# Patient Record
Sex: MALE | Race: BLACK OR AFRICAN AMERICAN | NOT HISPANIC OR LATINO | Employment: OTHER | URBAN - METROPOLITAN AREA
[De-identification: names, ages, dates, MRNs, and addresses within clinical notes are randomized per-mention and may not be internally consistent; named-entity substitution may affect disease eponyms.]

---

## 2017-05-28 ENCOUNTER — APPOINTMENT (OUTPATIENT)
Dept: GENERAL RADIOLOGY | Facility: HOSPITAL | Age: 77
End: 2017-05-28

## 2017-05-28 ENCOUNTER — APPOINTMENT (OUTPATIENT)
Dept: CT IMAGING | Facility: HOSPITAL | Age: 77
End: 2017-05-28

## 2017-05-28 ENCOUNTER — APPOINTMENT (OUTPATIENT)
Dept: MRI IMAGING | Facility: HOSPITAL | Age: 77
End: 2017-05-28

## 2017-05-28 ENCOUNTER — HOSPITAL ENCOUNTER (OUTPATIENT)
Facility: HOSPITAL | Age: 77
Setting detail: OBSERVATION
Discharge: HOME | End: 2017-05-30
Attending: EMERGENCY MEDICINE | Admitting: FAMILY MEDICINE

## 2017-05-28 DIAGNOSIS — R10.10 UPPER ABDOMINAL PAIN: ICD-10-CM

## 2017-05-28 DIAGNOSIS — M88.9 PAGET DISEASE OF BONE: Chronic | ICD-10-CM

## 2017-05-28 DIAGNOSIS — I16.0 HYPERTENSIVE URGENCY, MALIGNANT: ICD-10-CM

## 2017-05-28 DIAGNOSIS — N28.1 COMPLEX RENAL CYST: ICD-10-CM

## 2017-05-28 DIAGNOSIS — Z74.09 IMPAIRED MOBILITY AND ADLS: ICD-10-CM

## 2017-05-28 DIAGNOSIS — Z78.9 IMPAIRED MOBILITY AND ADLS: ICD-10-CM

## 2017-05-28 DIAGNOSIS — G93.41 ACUTE METABOLIC ENCEPHALOPATHY: Primary | ICD-10-CM

## 2017-05-28 PROBLEM — R41.82 ALTERED MENTAL STATUS, UNSPECIFIED: Status: ACTIVE | Noted: 2017-05-28

## 2017-05-28 PROBLEM — E78.5 HYPERLIPIDEMIA: Chronic | Status: ACTIVE | Noted: 2017-05-28

## 2017-05-28 PROBLEM — K57.90 DIVERTICULOSIS: Chronic | Status: ACTIVE | Noted: 2017-05-28

## 2017-05-28 PROBLEM — Z72.0 TOBACCO ABUSE: Chronic | Status: ACTIVE | Noted: 2017-05-28

## 2017-05-28 PROBLEM — D75.839 THROMBOCYTHEMIA: Status: ACTIVE | Noted: 2017-05-28

## 2017-05-28 PROBLEM — D72.819 LEUKOPENIA: Status: ACTIVE | Noted: 2017-05-28

## 2017-05-28 PROBLEM — I10 HYPERTENSION: Chronic | Status: ACTIVE | Noted: 2017-05-28

## 2017-05-28 PROBLEM — D72.819 CHRONIC LEUKOPENIA: Chronic | Status: ACTIVE | Noted: 2017-05-28

## 2017-05-28 PROBLEM — D69.3 CHRONIC IDIOPATHIC THROMBOCYTOPENIA: Chronic | Status: ACTIVE | Noted: 2017-05-28

## 2017-05-28 PROBLEM — R10.84 GENERALIZED ABDOMINAL PAIN: Status: ACTIVE | Noted: 2017-05-28

## 2017-05-28 PROBLEM — E11.9 DIABETES MELLITUS (HCC): Chronic | Status: ACTIVE | Noted: 2017-05-28

## 2017-05-28 LAB
ALBUMIN SERPL-MCNC: 4 G/DL (ref 3.2–4.8)
ALBUMIN/GLOB SERPL: 1.2 G/DL (ref 1.5–2.5)
ALP SERPL-CCNC: 63 U/L (ref 25–100)
ALT SERPL W P-5'-P-CCNC: 9 U/L (ref 7–40)
AMMONIA BLD-SCNC: 17 UMOL/L (ref 19–60)
ANION GAP SERPL CALCULATED.3IONS-SCNC: 0 MMOL/L (ref 3–11)
AST SERPL-CCNC: 17 U/L (ref 0–33)
BASOPHILS # BLD AUTO: 0.01 10*3/MM3 (ref 0–0.2)
BASOPHILS NFR BLD AUTO: 0.3 % (ref 0–1)
BILIRUB SERPL-MCNC: 0.5 MG/DL (ref 0.3–1.2)
BILIRUB UR QL STRIP: NEGATIVE
BUN BLD-MCNC: 13 MG/DL (ref 9–23)
BUN BLDA-MCNC: 17 MG/DL (ref 8–26)
BUN/CREAT SERPL: 14.4 (ref 7–25)
CA-I BLDA-SCNC: 1.26 MMOL/L (ref 1.2–1.32)
CALCIUM SPEC-SCNC: 9.5 MG/DL (ref 8.7–10.4)
CHLORIDE BLDA-SCNC: 110 MMOL/L (ref 98–109)
CHLORIDE SERPL-SCNC: 112 MMOL/L (ref 99–109)
CLARITY UR: CLEAR
CO2 BLDA-SCNC: 23 MMOL/L (ref 24–29)
CO2 SERPL-SCNC: 28 MMOL/L (ref 20–31)
COLOR UR: YELLOW
CREAT BLD-MCNC: 0.9 MG/DL (ref 0.6–1.3)
CREAT BLDA-MCNC: 0.9 MG/DL (ref 0.6–1.3)
D-LACTATE SERPL-SCNC: 1.1 MMOL/L (ref 0.5–2)
DEPRECATED RDW RBC AUTO: 50.6 FL (ref 37–54)
EOSINOPHIL # BLD AUTO: 0.06 10*3/MM3 (ref 0.1–0.3)
EOSINOPHIL NFR BLD AUTO: 2 % (ref 0–3)
ERYTHROCYTE [DISTWIDTH] IN BLOOD BY AUTOMATED COUNT: 15.5 % (ref 11.3–14.5)
GFR SERPL CREATININE-BSD FRML MDRD: 99 ML/MIN/1.73
GLOBULIN UR ELPH-MCNC: 3.3 GM/DL
GLUCOSE BLD-MCNC: 112 MG/DL (ref 70–100)
GLUCOSE BLDC GLUCOMTR-MCNC: 100 MG/DL (ref 70–130)
GLUCOSE BLDC GLUCOMTR-MCNC: 110 MG/DL (ref 70–130)
GLUCOSE UR STRIP-MCNC: NEGATIVE MG/DL
HCT VFR BLD AUTO: 45.1 % (ref 38.9–50.9)
HCT VFR BLDA CALC: 45 % (ref 38–51)
HGB BLD-MCNC: 14.4 G/DL (ref 13.1–17.5)
HGB BLDA-MCNC: 15.3 G/DL (ref 12–17)
HGB UR QL STRIP.AUTO: NEGATIVE
HOLD SPECIMEN: NORMAL
HOLD SPECIMEN: NORMAL
IMM GRANULOCYTES # BLD: 0 10*3/MM3 (ref 0–0.03)
IMM GRANULOCYTES NFR BLD: 0 % (ref 0–0.6)
INR PPP: 0.9 (ref 0.8–1.2)
KETONES UR QL STRIP: NEGATIVE
LEUKOCYTE ESTERASE UR QL STRIP.AUTO: NEGATIVE
LYMPHOCYTES # BLD AUTO: 0.96 10*3/MM3 (ref 0.6–4.8)
LYMPHOCYTES NFR BLD AUTO: 32.8 % (ref 24–44)
MCH RBC QN AUTO: 28.2 PG (ref 27–31)
MCHC RBC AUTO-ENTMCNC: 31.9 G/DL (ref 32–36)
MCV RBC AUTO: 88.4 FL (ref 80–99)
MONOCYTES # BLD AUTO: 0.23 10*3/MM3 (ref 0–1)
MONOCYTES NFR BLD AUTO: 7.8 % (ref 0–12)
NEUTROPHILS # BLD AUTO: 1.67 10*3/MM3 (ref 1.5–8.3)
NEUTROPHILS NFR BLD AUTO: 57.1 % (ref 41–71)
NITRITE UR QL STRIP: NEGATIVE
PH UR STRIP.AUTO: <=5 [PH] (ref 5–8)
PLATELET # BLD AUTO: 125 10*3/MM3 (ref 150–450)
PMV BLD AUTO: 10.9 FL (ref 6–12)
POTASSIUM BLD-SCNC: 4.4 MMOL/L (ref 3.5–5.5)
POTASSIUM BLDA-SCNC: 4.2 MMOL/L (ref 3.5–4.9)
PROT SERPL-MCNC: 7.3 G/DL (ref 5.7–8.2)
PROT UR QL STRIP: NEGATIVE
PROTHROMBIN TIME: 11.4 SECONDS (ref 12.8–15.2)
RBC # BLD AUTO: 5.1 10*6/MM3 (ref 4.2–5.76)
SODIUM BLD-SCNC: 140 MMOL/L (ref 132–146)
SODIUM BLDA-SCNC: 145 MMOL/L (ref 138–146)
SP GR UR STRIP: 1.02 (ref 1–1.03)
TROPONIN I SERPL-MCNC: 0 NG/ML (ref 0–0.07)
TROPONIN I SERPL-MCNC: 0.01 NG/ML (ref 0–0.07)
UROBILINOGEN UR QL STRIP: NORMAL
WBC NRBC COR # BLD: 2.93 10*3/MM3 (ref 3.5–10.8)
WHOLE BLOOD HOLD SPECIMEN: NORMAL
WHOLE BLOOD HOLD SPECIMEN: NORMAL

## 2017-05-28 PROCEDURE — G0378 HOSPITAL OBSERVATION PER HR: HCPCS

## 2017-05-28 PROCEDURE — 81003 URINALYSIS AUTO W/O SCOPE: CPT | Performed by: EMERGENCY MEDICINE

## 2017-05-28 PROCEDURE — 80047 BASIC METABLC PNL IONIZED CA: CPT

## 2017-05-28 PROCEDURE — 99220 PR INITIAL OBSERVATION CARE/DAY 70 MINUTES: CPT | Performed by: FAMILY MEDICINE

## 2017-05-28 PROCEDURE — 85610 PROTHROMBIN TIME: CPT

## 2017-05-28 PROCEDURE — 85025 COMPLETE CBC W/AUTO DIFF WBC: CPT | Performed by: EMERGENCY MEDICINE

## 2017-05-28 PROCEDURE — 82962 GLUCOSE BLOOD TEST: CPT

## 2017-05-28 PROCEDURE — 99285 EMERGENCY DEPT VISIT HI MDM: CPT

## 2017-05-28 PROCEDURE — 80053 COMPREHEN METABOLIC PANEL: CPT | Performed by: EMERGENCY MEDICINE

## 2017-05-28 PROCEDURE — 85014 HEMATOCRIT: CPT

## 2017-05-28 PROCEDURE — 70551 MRI BRAIN STEM W/O DYE: CPT

## 2017-05-28 PROCEDURE — 71010 HC CHEST PA OR AP: CPT

## 2017-05-28 PROCEDURE — 83605 ASSAY OF LACTIC ACID: CPT | Performed by: EMERGENCY MEDICINE

## 2017-05-28 PROCEDURE — 70450 CT HEAD/BRAIN W/O DYE: CPT

## 2017-05-28 PROCEDURE — 84484 ASSAY OF TROPONIN QUANT: CPT

## 2017-05-28 PROCEDURE — 0 IOPAMIDOL 61 % SOLUTION: Performed by: FAMILY MEDICINE

## 2017-05-28 PROCEDURE — 25010000002 HYDROMORPHONE PER 4 MG: Performed by: EMERGENCY MEDICINE

## 2017-05-28 PROCEDURE — 93005 ELECTROCARDIOGRAM TRACING: CPT | Performed by: EMERGENCY MEDICINE

## 2017-05-28 PROCEDURE — 74178 CT ABD&PLV WO CNTR FLWD CNTR: CPT

## 2017-05-28 PROCEDURE — 82140 ASSAY OF AMMONIA: CPT | Performed by: EMERGENCY MEDICINE

## 2017-05-28 RX ORDER — SODIUM CHLORIDE 9 MG/ML
75 INJECTION, SOLUTION INTRAVENOUS CONTINUOUS
Status: DISCONTINUED | OUTPATIENT
Start: 2017-05-28 | End: 2017-05-29

## 2017-05-28 RX ORDER — NICOTINE 21 MG/24HR
1 PATCH, TRANSDERMAL 24 HOURS TRANSDERMAL DAILY
Status: DISCONTINUED | OUTPATIENT
Start: 2017-05-28 | End: 2017-05-30 | Stop reason: HOSPADM

## 2017-05-28 RX ORDER — NICOTINE POLACRILEX 4 MG
15 LOZENGE BUCCAL
Status: DISCONTINUED | OUTPATIENT
Start: 2017-05-28 | End: 2017-05-30 | Stop reason: HOSPADM

## 2017-05-28 RX ORDER — SODIUM CHLORIDE 0.9 % (FLUSH) 0.9 %
10 SYRINGE (ML) INJECTION AS NEEDED
Status: DISCONTINUED | OUTPATIENT
Start: 2017-05-28 | End: 2017-05-30 | Stop reason: HOSPADM

## 2017-05-28 RX ORDER — HYDROMORPHONE HYDROCHLORIDE 1 MG/ML
0.5 INJECTION, SOLUTION INTRAMUSCULAR; INTRAVENOUS; SUBCUTANEOUS ONCE
Status: COMPLETED | OUTPATIENT
Start: 2017-05-28 | End: 2017-05-28

## 2017-05-28 RX ORDER — METOPROLOL SUCCINATE 50 MG/1
50 TABLET, EXTENDED RELEASE ORAL DAILY
Status: DISCONTINUED | OUTPATIENT
Start: 2017-05-28 | End: 2017-05-30 | Stop reason: HOSPADM

## 2017-05-28 RX ORDER — HYDRALAZINE HYDROCHLORIDE 20 MG/ML
10 INJECTION INTRAMUSCULAR; INTRAVENOUS EVERY 6 HOURS PRN
Status: DISCONTINUED | OUTPATIENT
Start: 2017-05-28 | End: 2017-05-30 | Stop reason: HOSPADM

## 2017-05-28 RX ORDER — HYDROMORPHONE HYDROCHLORIDE 1 MG/ML
0.5 INJECTION, SOLUTION INTRAMUSCULAR; INTRAVENOUS; SUBCUTANEOUS
Status: DISCONTINUED | OUTPATIENT
Start: 2017-05-28 | End: 2017-05-30 | Stop reason: HOSPADM

## 2017-05-28 RX ORDER — METOPROLOL SUCCINATE 50 MG/1
50 TABLET, EXTENDED RELEASE ORAL DAILY
COMMUNITY
End: 2018-06-06 | Stop reason: SDUPTHER

## 2017-05-28 RX ORDER — NALOXONE HCL 0.4 MG/ML
0.4 VIAL (ML) INJECTION
Status: DISCONTINUED | OUTPATIENT
Start: 2017-05-28 | End: 2017-05-30 | Stop reason: HOSPADM

## 2017-05-28 RX ORDER — SODIUM CHLORIDE 0.9 % (FLUSH) 0.9 %
1-10 SYRINGE (ML) INJECTION AS NEEDED
Status: DISCONTINUED | OUTPATIENT
Start: 2017-05-28 | End: 2017-05-30 | Stop reason: HOSPADM

## 2017-05-28 RX ORDER — DEXTROSE MONOHYDRATE 25 G/50ML
25 INJECTION, SOLUTION INTRAVENOUS
Status: DISCONTINUED | OUTPATIENT
Start: 2017-05-28 | End: 2017-05-30 | Stop reason: HOSPADM

## 2017-05-28 RX ADMIN — IOPAMIDOL 71.6 ML: 612 INJECTION, SOLUTION INTRAVENOUS at 18:32

## 2017-05-28 RX ADMIN — SODIUM CHLORIDE 75 ML/HR: 9 INJECTION, SOLUTION INTRAVENOUS at 20:48

## 2017-05-28 RX ADMIN — HYDROMORPHONE HYDROCHLORIDE 0.5 MG: 1 INJECTION, SOLUTION INTRAMUSCULAR; INTRAVENOUS; SUBCUTANEOUS at 14:18

## 2017-05-29 ENCOUNTER — APPOINTMENT (OUTPATIENT)
Dept: CARDIOLOGY | Facility: HOSPITAL | Age: 77
End: 2017-05-29

## 2017-05-29 PROBLEM — G93.41 ACUTE METABOLIC ENCEPHALOPATHY: Status: RESOLVED | Noted: 2017-05-28 | Resolved: 2017-05-29

## 2017-05-29 PROBLEM — N28.1 COMPLEX RENAL CYST: Status: ACTIVE | Noted: 2017-05-29

## 2017-05-29 PROBLEM — I16.0 HYPERTENSIVE URGENCY, MALIGNANT: Status: ACTIVE | Noted: 2017-05-29

## 2017-05-29 LAB
ALBUMIN SERPL-MCNC: 3.4 G/DL (ref 3.2–4.8)
ALBUMIN/GLOB SERPL: 1.2 G/DL (ref 1.5–2.5)
ALP SERPL-CCNC: 50 U/L (ref 25–100)
ALT SERPL W P-5'-P-CCNC: 8 U/L (ref 7–40)
ANION GAP SERPL CALCULATED.3IONS-SCNC: 4 MMOL/L (ref 3–11)
ARTICHOKE IGE QN: 105 MG/DL (ref 0–130)
AST SERPL-CCNC: 15 U/L (ref 0–33)
BASOPHILS # BLD AUTO: 0 10*3/MM3 (ref 0–0.2)
BASOPHILS NFR BLD AUTO: 0 % (ref 0–1)
BH CV ECHO MEAS - AI DEC SLOPE: 216 CM/SEC^2
BH CV ECHO MEAS - AI MAX PG: 81.4 MMHG
BH CV ECHO MEAS - AI MAX VEL: 451 CM/SEC
BH CV ECHO MEAS - AI P1/2T: 611.6 MSEC
BH CV ECHO MEAS - AO MAX PG (FULL): 3.8 MMHG
BH CV ECHO MEAS - AO MAX PG: 8.1 MMHG
BH CV ECHO MEAS - AO MEAN PG (FULL): 2 MMHG
BH CV ECHO MEAS - AO MEAN PG: 4 MMHG
BH CV ECHO MEAS - AO ROOT AREA (BSA CORRECTED): 1.7
BH CV ECHO MEAS - AO ROOT AREA: 9.1 CM^2
BH CV ECHO MEAS - AO ROOT DIAM: 3.4 CM
BH CV ECHO MEAS - AO V2 MAX: 142 CM/SEC
BH CV ECHO MEAS - AO V2 MEAN: 94.1 CM/SEC
BH CV ECHO MEAS - AO V2 VTI: 31.4 CM
BH CV ECHO MEAS - AVA(I,A): 2.9 CM^2
BH CV ECHO MEAS - AVA(I,D): 2.9 CM^2
BH CV ECHO MEAS - AVA(V,A): 2.8 CM^2
BH CV ECHO MEAS - AVA(V,D): 2.8 CM^2
BH CV ECHO MEAS - BSA(HAYCOCK): 2.1 M^2
BH CV ECHO MEAS - BSA(HAYCOCK): 2.1 M^2
BH CV ECHO MEAS - BSA: 2 M^2
BH CV ECHO MEAS - BSA: 2 M^2
BH CV ECHO MEAS - BZI_BMI: 27.3 KILOGRAMS/M^2
BH CV ECHO MEAS - BZI_BMI: 27.3 KILOGRAMS/M^2
BH CV ECHO MEAS - BZI_METRIC_HEIGHT: 177.8 CM
BH CV ECHO MEAS - BZI_METRIC_HEIGHT: 177.8 CM
BH CV ECHO MEAS - BZI_METRIC_WEIGHT: 86.2 KG
BH CV ECHO MEAS - BZI_METRIC_WEIGHT: 86.2 KG
BH CV ECHO MEAS - CONTRAST EF 4CH: 51.5 ML/M^2
BH CV ECHO MEAS - EDV(CUBED): 190.6 ML
BH CV ECHO MEAS - EDV(MOD-SP4): 237 ML
BH CV ECHO MEAS - EDV(TEICH): 163.6 ML
BH CV ECHO MEAS - EF(CUBED): 68.4 %
BH CV ECHO MEAS - EF(MOD-SP4): 51.5 %
BH CV ECHO MEAS - EF(TEICH): 59.2 %
BH CV ECHO MEAS - ESV(CUBED): 60.2 ML
BH CV ECHO MEAS - ESV(MOD-SP4): 115 ML
BH CV ECHO MEAS - ESV(TEICH): 66.7 ML
BH CV ECHO MEAS - FS: 31.9 %
BH CV ECHO MEAS - IVS/LVPW: 1.1
BH CV ECHO MEAS - IVSD: 1.3 CM
BH CV ECHO MEAS - LA DIMENSION: 4.5 CM
BH CV ECHO MEAS - LA/AO: 1.3
BH CV ECHO MEAS - LV DIASTOLIC VOL/BSA (35-75): 116 ML/M^2
BH CV ECHO MEAS - LV MASS(C)D: 305 GRAMS
BH CV ECHO MEAS - LV MASS(C)DI: 149.3 GRAMS/M^2
BH CV ECHO MEAS - LV MAX PG: 4.2 MMHG
BH CV ECHO MEAS - LV MEAN PG: 2 MMHG
BH CV ECHO MEAS - LV SYSTOLIC VOL/BSA (12-30): 56.3 ML/M^2
BH CV ECHO MEAS - LV V1 MAX: 103 CM/SEC
BH CV ECHO MEAS - LV V1 MEAN: 62.7 CM/SEC
BH CV ECHO MEAS - LV V1 VTI: 23.6 CM
BH CV ECHO MEAS - LVIDD: 5.6 CM
BH CV ECHO MEAS - LVIDS: 3.9 CM
BH CV ECHO MEAS - LVLD AP4: 10.4 CM
BH CV ECHO MEAS - LVLS AP4: 8.8 CM
BH CV ECHO MEAS - LVOT AREA (M): 3.8 CM^2
BH CV ECHO MEAS - LVOT AREA: 3.8 CM^2
BH CV ECHO MEAS - LVOT DIAM: 2.2 CM
BH CV ECHO MEAS - LVPWD: 1.2 CM
BH CV ECHO MEAS - MV A MAX VEL: 89.8 CM/SEC
BH CV ECHO MEAS - MV E MAX VEL: 67.6 CM/SEC
BH CV ECHO MEAS - MV E/A: 0.75
BH CV ECHO MEAS - SI(AO): 139.6 ML/M^2
BH CV ECHO MEAS - SI(CUBED): 63.8 ML/M^2
BH CV ECHO MEAS - SI(LVOT): 43.9 ML/M^2
BH CV ECHO MEAS - SI(MOD-SP4): 59.7 ML/M^2
BH CV ECHO MEAS - SI(TEICH): 47.4 ML/M^2
BH CV ECHO MEAS - SV(AO): 285.1 ML
BH CV ECHO MEAS - SV(CUBED): 130.4 ML
BH CV ECHO MEAS - SV(LVOT): 89.7 ML
BH CV ECHO MEAS - SV(MOD-SP4): 122 ML
BH CV ECHO MEAS - SV(TEICH): 96.9 ML
BH CV XLRA MEAS LEFT CCA RATIO VEL: 94 CM/SEC
BH CV XLRA MEAS LEFT DIST CCA EDV: 15.4 CM/SEC
BH CV XLRA MEAS LEFT DIST CCA PSV: 94 CM/SEC
BH CV XLRA MEAS LEFT DIST ICA EDV: 14.9 CM/SEC
BH CV XLRA MEAS LEFT DIST ICA PSV: 73.4 CM/SEC
BH CV XLRA MEAS LEFT ICA RATIO VEL: 106 CM/SEC
BH CV XLRA MEAS LEFT ICA/CCA RATIO: 1.1
BH CV XLRA MEAS LEFT MID ICA EDV: 14.5 CM/SEC
BH CV XLRA MEAS LEFT MID ICA PSV: 106 CM/SEC
BH CV XLRA MEAS LEFT PROX CCA EDV: 14 CM/SEC
BH CV XLRA MEAS LEFT PROX CCA PSV: 116 CM/SEC
BH CV XLRA MEAS LEFT PROX ECA PSV: 168 CM/SEC
BH CV XLRA MEAS LEFT PROX ICA EDV: 16.3 CM/SEC
BH CV XLRA MEAS LEFT PROX ICA PSV: 73.5 CM/SEC
BH CV XLRA MEAS LEFT PROX SCLA PSV: 162 CM/SEC
BH CV XLRA MEAS LEFT VERTEBRAL A EDV: 8.8 CM/SEC
BH CV XLRA MEAS LEFT VERTEBRAL A PSV: 55.3 CM/SEC
BH CV XLRA MEAS RIGHT CCA RATIO VEL: 112 CM/SEC
BH CV XLRA MEAS RIGHT DIST CCA EDV: 14 CM/SEC
BH CV XLRA MEAS RIGHT DIST CCA PSV: 112 CM/SEC
BH CV XLRA MEAS RIGHT DIST ICA EDV: 17.2 CM/SEC
BH CV XLRA MEAS RIGHT DIST ICA PSV: 98.4 CM/SEC
BH CV XLRA MEAS RIGHT ICA RATIO VEL: 91.3 CM/SEC
BH CV XLRA MEAS RIGHT ICA/CCA RATIO: 0.82
BH CV XLRA MEAS RIGHT MID ICA EDV: 17.2 CM/SEC
BH CV XLRA MEAS RIGHT MID ICA PSV: 91.3 CM/SEC
BH CV XLRA MEAS RIGHT PROX CCA EDV: 11.9 CM/SEC
BH CV XLRA MEAS RIGHT PROX CCA PSV: 97.1 CM/SEC
BH CV XLRA MEAS RIGHT PROX ECA PSV: 162 CM/SEC
BH CV XLRA MEAS RIGHT PROX ICA EDV: 19.2 CM/SEC
BH CV XLRA MEAS RIGHT PROX ICA PSV: 90.8 CM/SEC
BH CV XLRA MEAS RIGHT PROX SCLA PSV: 193 CM/SEC
BH CV XLRA MEAS RIGHT VERTEBRAL A EDV: 10.5 CM/SEC
BH CV XLRA MEAS RIGHT VERTEBRAL A PSV: 45.4 CM/SEC
BILIRUB SERPL-MCNC: 0.7 MG/DL (ref 0.3–1.2)
BUN BLD-MCNC: 12 MG/DL (ref 9–23)
BUN/CREAT SERPL: 15 (ref 7–25)
CALCIUM SPEC-SCNC: 9 MG/DL (ref 8.7–10.4)
CHLORIDE SERPL-SCNC: 113 MMOL/L (ref 99–109)
CHOLEST SERPL-MCNC: 149 MG/DL (ref 0–200)
CO2 SERPL-SCNC: 24 MMOL/L (ref 20–31)
CREAT BLD-MCNC: 0.8 MG/DL (ref 0.6–1.3)
DEPRECATED RDW RBC AUTO: 50 FL (ref 37–54)
EOSINOPHIL # BLD AUTO: 0.02 10*3/MM3 (ref 0.1–0.3)
EOSINOPHIL NFR BLD AUTO: 0.5 % (ref 0–3)
ERYTHROCYTE [DISTWIDTH] IN BLOOD BY AUTOMATED COUNT: 15.6 % (ref 11.3–14.5)
GFR SERPL CREATININE-BSD FRML MDRD: 113 ML/MIN/1.73
GLOBULIN UR ELPH-MCNC: 2.8 GM/DL
GLUCOSE BLD-MCNC: 91 MG/DL (ref 70–100)
GLUCOSE BLDC GLUCOMTR-MCNC: 121 MG/DL (ref 70–130)
GLUCOSE BLDC GLUCOMTR-MCNC: 150 MG/DL (ref 70–130)
GLUCOSE BLDC GLUCOMTR-MCNC: 89 MG/DL (ref 70–130)
HBA1C MFR BLD: 6 % (ref 4.8–5.6)
HCT VFR BLD AUTO: 39.8 % (ref 38.9–50.9)
HDLC SERPL-MCNC: 34 MG/DL (ref 40–60)
HGB BLD-MCNC: 12.6 G/DL (ref 13.1–17.5)
IMM GRANULOCYTES # BLD: 0.01 10*3/MM3 (ref 0–0.03)
IMM GRANULOCYTES NFR BLD: 0.2 % (ref 0–0.6)
LV EF 2D ECHO EST: 55 %
LYMPHOCYTES # BLD AUTO: 1.18 10*3/MM3 (ref 0.6–4.8)
LYMPHOCYTES NFR BLD AUTO: 29.4 % (ref 24–44)
MCH RBC QN AUTO: 27.6 PG (ref 27–31)
MCHC RBC AUTO-ENTMCNC: 31.7 G/DL (ref 32–36)
MCV RBC AUTO: 87.3 FL (ref 80–99)
MONOCYTES # BLD AUTO: 0.32 10*3/MM3 (ref 0–1)
MONOCYTES NFR BLD AUTO: 8 % (ref 0–12)
NEUTROPHILS # BLD AUTO: 2.48 10*3/MM3 (ref 1.5–8.3)
NEUTROPHILS NFR BLD AUTO: 61.9 % (ref 41–71)
PLATELET # BLD AUTO: 109 10*3/MM3 (ref 150–450)
PMV BLD AUTO: 11.5 FL (ref 6–12)
POTASSIUM BLD-SCNC: 3.8 MMOL/L (ref 3.5–5.5)
PROT SERPL-MCNC: 6.2 G/DL (ref 5.7–8.2)
RBC # BLD AUTO: 4.56 10*6/MM3 (ref 4.2–5.76)
SODIUM BLD-SCNC: 141 MMOL/L (ref 132–146)
TRIGL SERPL-MCNC: 96 MG/DL (ref 0–150)
TROPONIN I SERPL-MCNC: 0.03 NG/ML
TSH SERPL DL<=0.05 MIU/L-ACNC: 1.44 MIU/ML (ref 0.35–5.35)
WBC NRBC COR # BLD: 4.01 10*3/MM3 (ref 3.5–10.8)

## 2017-05-29 PROCEDURE — 85025 COMPLETE CBC W/AUTO DIFF WBC: CPT | Performed by: NURSE PRACTITIONER

## 2017-05-29 PROCEDURE — 80061 LIPID PANEL: CPT | Performed by: NURSE PRACTITIONER

## 2017-05-29 PROCEDURE — G0378 HOSPITAL OBSERVATION PER HR: HCPCS

## 2017-05-29 PROCEDURE — G9170 MEMORY D/C STATUS: HCPCS

## 2017-05-29 PROCEDURE — 83036 HEMOGLOBIN GLYCOSYLATED A1C: CPT | Performed by: NURSE PRACTITIONER

## 2017-05-29 PROCEDURE — G8996 SWALLOW CURRENT STATUS: HCPCS

## 2017-05-29 PROCEDURE — 99225 PR SBSQ OBSERVATION CARE/DAY 25 MINUTES: CPT | Performed by: INTERNAL MEDICINE

## 2017-05-29 PROCEDURE — 93880 EXTRACRANIAL BILAT STUDY: CPT

## 2017-05-29 PROCEDURE — 93306 TTE W/DOPPLER COMPLETE: CPT | Performed by: INTERNAL MEDICINE

## 2017-05-29 PROCEDURE — 93880 EXTRACRANIAL BILAT STUDY: CPT | Performed by: INTERNAL MEDICINE

## 2017-05-29 PROCEDURE — 93306 TTE W/DOPPLER COMPLETE: CPT

## 2017-05-29 PROCEDURE — 84484 ASSAY OF TROPONIN QUANT: CPT | Performed by: NURSE PRACTITIONER

## 2017-05-29 PROCEDURE — G9168 MEMORY CURRENT STATUS: HCPCS

## 2017-05-29 PROCEDURE — G8997 SWALLOW GOAL STATUS: HCPCS

## 2017-05-29 PROCEDURE — 92610 EVALUATE SWALLOWING FUNCTION: CPT

## 2017-05-29 PROCEDURE — G9169 MEMORY GOAL STATUS: HCPCS

## 2017-05-29 PROCEDURE — 80053 COMPREHEN METABOLIC PANEL: CPT | Performed by: NURSE PRACTITIONER

## 2017-05-29 PROCEDURE — 92523 SPEECH SOUND LANG COMPREHEN: CPT

## 2017-05-29 PROCEDURE — 84443 ASSAY THYROID STIM HORMONE: CPT | Performed by: NURSE PRACTITIONER

## 2017-05-29 PROCEDURE — G8998 SWALLOW D/C STATUS: HCPCS

## 2017-05-29 PROCEDURE — 82962 GLUCOSE BLOOD TEST: CPT

## 2017-05-30 ENCOUNTER — APPOINTMENT (OUTPATIENT)
Dept: NEUROLOGY | Facility: HOSPITAL | Age: 77
End: 2017-05-30
Attending: INTERNAL MEDICINE

## 2017-05-30 VITALS
WEIGHT: 190 LBS | OXYGEN SATURATION: 95 % | HEIGHT: 70 IN | BODY MASS INDEX: 27.2 KG/M2 | HEART RATE: 47 BPM | DIASTOLIC BLOOD PRESSURE: 65 MMHG | TEMPERATURE: 97.5 F | RESPIRATION RATE: 16 BRPM | SYSTOLIC BLOOD PRESSURE: 157 MMHG

## 2017-05-30 PROBLEM — G45.1 HEMISPHERIC CAROTID ARTERY SYNDROME: Status: ACTIVE | Noted: 2017-05-30

## 2017-05-30 PROBLEM — G93.41 ACUTE METABOLIC ENCEPHALOPATHY: Status: ACTIVE | Noted: 2017-05-30

## 2017-05-30 PROBLEM — G47.34 NOCTURNAL HYPOXIA: Status: ACTIVE | Noted: 2017-05-30

## 2017-05-30 PROBLEM — N28.1 BILATERAL RENAL CYSTS: Chronic | Status: ACTIVE | Noted: 2017-05-30

## 2017-05-30 LAB
GLUCOSE BLDC GLUCOMTR-MCNC: 105 MG/DL (ref 70–130)
GLUCOSE BLDC GLUCOMTR-MCNC: 126 MG/DL (ref 70–130)
GLUCOSE BLDC GLUCOMTR-MCNC: 144 MG/DL (ref 70–130)
PSA SERPL-MCNC: 1.26 NG/ML (ref 0–4)

## 2017-05-30 PROCEDURE — G8988 SELF CARE GOAL STATUS: HCPCS

## 2017-05-30 PROCEDURE — 99205 OFFICE O/P NEW HI 60 MIN: CPT | Performed by: PSYCHIATRY & NEUROLOGY

## 2017-05-30 PROCEDURE — 97530 THERAPEUTIC ACTIVITIES: CPT

## 2017-05-30 PROCEDURE — 84153 ASSAY OF PSA TOTAL: CPT | Performed by: INTERNAL MEDICINE

## 2017-05-30 PROCEDURE — G8987 SELF CARE CURRENT STATUS: HCPCS

## 2017-05-30 PROCEDURE — 95819 EEG AWAKE AND ASLEEP: CPT

## 2017-05-30 PROCEDURE — G0378 HOSPITAL OBSERVATION PER HR: HCPCS

## 2017-05-30 PROCEDURE — 97165 OT EVAL LOW COMPLEX 30 MIN: CPT

## 2017-05-30 PROCEDURE — 95819 EEG AWAKE AND ASLEEP: CPT | Performed by: PSYCHIATRY & NEUROLOGY

## 2017-05-30 PROCEDURE — G8989 SELF CARE D/C STATUS: HCPCS

## 2017-05-30 PROCEDURE — 99217 PR OBSERVATION CARE DISCHARGE MANAGEMENT: CPT | Performed by: INTERNAL MEDICINE

## 2017-05-30 PROCEDURE — 96375 TX/PRO/DX INJ NEW DRUG ADDON: CPT

## 2017-05-30 PROCEDURE — 82962 GLUCOSE BLOOD TEST: CPT

## 2017-05-30 RX ORDER — ATORVASTATIN CALCIUM 40 MG/1
40 TABLET, FILM COATED ORAL NIGHTLY
Qty: 30 TABLET | Refills: 1 | Status: SHIPPED | OUTPATIENT
Start: 2017-05-30 | End: 2017-05-30 | Stop reason: HOSPADM

## 2017-05-30 RX ORDER — ASPIRIN 81 MG/1
81 TABLET, CHEWABLE ORAL DAILY
Status: DISCONTINUED | OUTPATIENT
Start: 2017-05-30 | End: 2017-05-30 | Stop reason: HOSPADM

## 2017-05-30 RX ORDER — ROSUVASTATIN CALCIUM 20 MG/1
20 TABLET, COATED ORAL NIGHTLY
Status: DISCONTINUED | OUTPATIENT
Start: 2017-05-30 | End: 2017-05-30 | Stop reason: HOSPADM

## 2017-05-30 RX ORDER — PANTOPRAZOLE SODIUM 40 MG/1
40 TABLET, DELAYED RELEASE ORAL DAILY
Qty: 30 TABLET | Refills: 6 | Status: SHIPPED | OUTPATIENT
Start: 2017-05-30 | End: 2018-11-16

## 2017-05-30 RX ORDER — ROSUVASTATIN CALCIUM 20 MG/1
20 TABLET, COATED ORAL NIGHTLY
Qty: 30 TABLET | Refills: 0 | Status: SHIPPED | OUTPATIENT
Start: 2017-05-30 | End: 2017-05-30 | Stop reason: HOSPADM

## 2017-05-30 RX ORDER — PANTOPRAZOLE SODIUM 40 MG/10ML
40 INJECTION, POWDER, LYOPHILIZED, FOR SOLUTION INTRAVENOUS
Status: DISCONTINUED | OUTPATIENT
Start: 2017-05-30 | End: 2017-05-30 | Stop reason: HOSPADM

## 2017-05-30 RX ORDER — ASPIRIN 81 MG/1
81 TABLET, CHEWABLE ORAL DAILY
Start: 2017-05-30

## 2017-05-30 RX ADMIN — Medication: at 10:57

## 2017-05-30 RX ADMIN — PANTOPRAZOLE SODIUM 40 MG: 40 INJECTION, POWDER, FOR SOLUTION INTRAVENOUS at 10:19

## 2017-05-30 RX ADMIN — METOPROLOL SUCCINATE 50 MG: 50 TABLET, EXTENDED RELEASE ORAL at 08:26

## 2017-08-11 ENCOUNTER — HOSPITAL ENCOUNTER (OUTPATIENT)
Dept: GENERAL RADIOLOGY | Facility: HOSPITAL | Age: 77
Discharge: HOME | End: 2017-08-11
Admitting: PHYSICIAN ASSISTANT

## 2017-08-11 ENCOUNTER — TRANSCRIBE ORDERS (OUTPATIENT)
Dept: ADMINISTRATIVE | Facility: HOSPITAL | Age: 77
End: 2017-08-11

## 2017-08-11 DIAGNOSIS — M25.561 RIGHT KNEE PAIN, UNSPECIFIED CHRONICITY: Primary | ICD-10-CM

## 2017-08-11 PROCEDURE — 73560 X-RAY EXAM OF KNEE 1 OR 2: CPT

## 2017-08-12 ENCOUNTER — HOSPITAL ENCOUNTER (EMERGENCY)
Facility: HOSPITAL | Age: 77
Discharge: HOME | End: 2017-08-12
Attending: EMERGENCY MEDICINE | Admitting: EMERGENCY MEDICINE

## 2017-08-12 ENCOUNTER — APPOINTMENT (OUTPATIENT)
Dept: GENERAL RADIOLOGY | Facility: HOSPITAL | Age: 77
End: 2017-08-12

## 2017-08-12 VITALS
SYSTOLIC BLOOD PRESSURE: 147 MMHG | HEIGHT: 70 IN | TEMPERATURE: 98.2 F | DIASTOLIC BLOOD PRESSURE: 65 MMHG | RESPIRATION RATE: 16 BRPM | WEIGHT: 212 LBS | OXYGEN SATURATION: 100 % | BODY MASS INDEX: 30.35 KG/M2 | HEART RATE: 58 BPM

## 2017-08-12 DIAGNOSIS — M25.561 ACUTE PAIN OF RIGHT KNEE: ICD-10-CM

## 2017-08-12 DIAGNOSIS — S83.8X1A SPRAIN OF OTHER LIGAMENT OF RIGHT KNEE, INITIAL ENCOUNTER: Primary | ICD-10-CM

## 2017-08-12 LAB
ANION GAP SERPL CALCULATED.3IONS-SCNC: 8 MMOL/L (ref 3–11)
BASOPHILS # BLD AUTO: 0.01 10*3/MM3 (ref 0–0.2)
BASOPHILS NFR BLD AUTO: 0.3 % (ref 0–1)
BUN BLD-MCNC: 13 MG/DL (ref 9–23)
BUN/CREAT SERPL: 14.4 (ref 7–25)
CALCIUM SPEC-SCNC: 9.8 MG/DL (ref 8.7–10.4)
CHLORIDE SERPL-SCNC: 106 MMOL/L (ref 99–109)
CO2 SERPL-SCNC: 27 MMOL/L (ref 20–31)
CREAT BLD-MCNC: 0.9 MG/DL (ref 0.6–1.3)
DEPRECATED RDW RBC AUTO: 43.7 FL (ref 37–54)
EOSINOPHIL # BLD AUTO: 0.06 10*3/MM3 (ref 0–0.3)
EOSINOPHIL NFR BLD AUTO: 1.6 % (ref 0–3)
ERYTHROCYTE [DISTWIDTH] IN BLOOD BY AUTOMATED COUNT: 13.8 % (ref 11.3–14.5)
GFR SERPL CREATININE-BSD FRML MDRD: 99 ML/MIN/1.73
GLUCOSE BLD-MCNC: 86 MG/DL (ref 70–100)
HCT VFR BLD AUTO: 44 % (ref 38.9–50.9)
HGB BLD-MCNC: 14.3 G/DL (ref 13.1–17.5)
IMM GRANULOCYTES # BLD: 0 10*3/MM3 (ref 0–0.03)
IMM GRANULOCYTES NFR BLD: 0 % (ref 0–0.6)
LYMPHOCYTES # BLD AUTO: 1.12 10*3/MM3 (ref 0.6–4.8)
LYMPHOCYTES NFR BLD AUTO: 29.4 % (ref 24–44)
MCH RBC QN AUTO: 27.9 PG (ref 27–31)
MCHC RBC AUTO-ENTMCNC: 32.5 G/DL (ref 32–36)
MCV RBC AUTO: 85.9 FL (ref 80–99)
MONOCYTES # BLD AUTO: 0.29 10*3/MM3 (ref 0–1)
MONOCYTES NFR BLD AUTO: 7.6 % (ref 0–12)
NEUTROPHILS # BLD AUTO: 2.33 10*3/MM3 (ref 1.5–8.3)
NEUTROPHILS NFR BLD AUTO: 61.1 % (ref 41–71)
PLATELET # BLD AUTO: 110 10*3/MM3 (ref 150–450)
PMV BLD AUTO: 10.9 FL (ref 6–12)
POTASSIUM BLD-SCNC: 4 MMOL/L (ref 3.5–5.5)
RBC # BLD AUTO: 5.12 10*6/MM3 (ref 4.2–5.76)
SODIUM BLD-SCNC: 141 MMOL/L (ref 132–146)
URATE SERPL-MCNC: 7.1 MG/DL (ref 3.7–9.2)
WBC NRBC COR # BLD: 3.81 10*3/MM3 (ref 3.5–10.8)

## 2017-08-12 PROCEDURE — 80048 BASIC METABOLIC PNL TOTAL CA: CPT | Performed by: NURSE PRACTITIONER

## 2017-08-12 PROCEDURE — 85025 COMPLETE CBC W/AUTO DIFF WBC: CPT | Performed by: NURSE PRACTITIONER

## 2017-08-12 PROCEDURE — 99283 EMERGENCY DEPT VISIT LOW MDM: CPT

## 2017-08-12 PROCEDURE — 84550 ASSAY OF BLOOD/URIC ACID: CPT | Performed by: NURSE PRACTITIONER

## 2017-08-12 PROCEDURE — 73560 X-RAY EXAM OF KNEE 1 OR 2: CPT

## 2017-08-12 RX ORDER — HYDROCODONE BITARTRATE AND ACETAMINOPHEN 5; 325 MG/1; MG/1
1 TABLET ORAL EVERY 6 HOURS PRN
Qty: 12 TABLET | Refills: 0 | Status: SHIPPED | OUTPATIENT
Start: 2017-08-12 | End: 2017-12-01

## 2017-08-12 RX ORDER — CYCLOBENZAPRINE HCL 10 MG
10 TABLET ORAL ONCE
Status: DISCONTINUED | OUTPATIENT
Start: 2017-08-12 | End: 2017-08-13 | Stop reason: HOSPADM

## 2017-08-12 RX ORDER — POLYETHYLENE GLYCOL 3350 17 G/17G
17 POWDER, FOR SOLUTION ORAL DAILY
COMMUNITY
End: 2019-12-09

## 2017-08-12 RX ORDER — MELOXICAM 15 MG/1
15 TABLET ORAL DAILY
Qty: 15 TABLET | Refills: 0 | Status: SHIPPED | OUTPATIENT
Start: 2017-08-12 | End: 2018-11-16

## 2017-08-12 RX ORDER — OXYCODONE HYDROCHLORIDE AND ACETAMINOPHEN 5; 325 MG/1; MG/1
1 TABLET ORAL ONCE
Status: COMPLETED | OUTPATIENT
Start: 2017-08-12 | End: 2017-08-12

## 2017-08-12 RX ADMIN — OXYCODONE AND ACETAMINOPHEN 1 TABLET: 5; 325 TABLET ORAL at 22:04

## 2017-08-13 NOTE — DISCHARGE INSTRUCTIONS
Rest, ice and elevate right leg and follow up with orthopedic surgeon on Monday. Take medication as prescribed and return to ER with worsening of symptoms or development of new symptoms.

## 2017-10-06 ENCOUNTER — CONSULT (OUTPATIENT)
Dept: SLEEP MEDICINE | Facility: HOSPITAL | Age: 77
End: 2017-10-06

## 2017-10-06 VITALS
OXYGEN SATURATION: 98 % | HEIGHT: 70 IN | WEIGHT: 214 LBS | DIASTOLIC BLOOD PRESSURE: 76 MMHG | SYSTOLIC BLOOD PRESSURE: 158 MMHG | HEART RATE: 60 BPM | BODY MASS INDEX: 30.64 KG/M2

## 2017-10-06 DIAGNOSIS — G47.33 OBSTRUCTIVE SLEEP APNEA, ADULT: ICD-10-CM

## 2017-10-06 DIAGNOSIS — G47.34 NOCTURNAL HYPOXIA: Primary | ICD-10-CM

## 2017-10-06 PROCEDURE — 99204 OFFICE O/P NEW MOD 45 MIN: CPT | Performed by: INTERNAL MEDICINE

## 2017-10-06 RX ORDER — POTASSIUM CHLORIDE 20 MEQ/1
TABLET, EXTENDED RELEASE ORAL DAILY
COMMUNITY
Start: 2017-08-20 | End: 2018-06-06

## 2017-10-06 RX ORDER — BUMETANIDE 0.5 MG/1
TABLET ORAL
COMMUNITY
Start: 2017-07-14 | End: 2018-06-06 | Stop reason: SDUPTHER

## 2017-10-06 RX ORDER — ROSUVASTATIN CALCIUM 20 MG/1
TABLET, COATED ORAL DAILY
COMMUNITY
Start: 2017-09-20 | End: 2018-06-06 | Stop reason: SDUPTHER

## 2017-10-06 NOTE — PATIENT INSTRUCTIONS
Hypoxemia  Hypoxemia occurs when your blood does not contain enough oxygen. The body cannot work well when it does not have enough oxygen because every part of your body needs oxygen. Oxygen travels to all parts of the body through your blood. Hypoxemia can develop suddenly or can come on slowly.  CAUSES  Some common causes of hypoxemia include:  · Long-term (chronic) lung diseases, such as chronic obstructive pulmonary disease (COPD) or interstitial lung disease.   · Disorders that affect breathing at night, such as sleep apnea.  · Fluid buildup in your lungs (pulmonary edema).   · Lung infection (pneumonia).  · Lung or throat cancer.  · Abnormal blood flow that bypasses the lungs (shunt).  · Certain diseases that affect nerves or muscles.  · A collapsed lung (pneumothorax).  · A blood clot in the lungs (pulmonary embolus).  · Certain types of heart disease.  · Slow or shallow breathing (hypoventilation).   · Certain medicines.  · High altitudes.  · Toxic chemicals and gases.  SIGNS AND SYMPTOMS  Not everyone who has hypoxemia will develop symptoms. If the hypoxemia developed quickly, you will likely have symptoms such as shortness of breath. If the hypoxemia came on slowly over months or years, you may not notice any symptoms. Symptoms can include:  · Shortness of breath (dyspnea).  · Bluish color of the skin, lips, or nail beds.  · Breathing that is fast, noisy, or shallow.  · A fast heartbeat.  · Feeling tired or sleepy.  · Being confused or feeling anxious.  DIAGNOSIS  To determine if you have hypoxemia, your health care provider may perform:  · A physical exam.  · Blood tests.  · A pulse oximetry. A sensor will be put on your finger, toe, or earlobe to measure the percent of oxygen in your blood.  TREATMENT  You will likely be treated with oxygen therapy. Depending on the cause of your hypoxemia, you may need oxygen for a short time (weeks or months), or you may need it indefinitely. Your health care provider  may also recommend other therapies to treat the underlying cause of your hypoxemia.  HOME CARE INSTRUCTIONS  · Only take over-the-counter or prescription medicines as directed by your health care provider.  · Follow oxygen safety measures if you are on oxygen therapy. These may include:    Always having a backup supply of oxygen.    Not allowing anyone to smoke around oxygen.    Handling the oxygen tanks carefully and as instructed.  · If you smoke, quit. Stay away from people who smoke.  · Follow up with your health care provider as directed.  SEEK MEDICAL CARE IF:  · You have any concerns about your oxygen therapy.  · You still have trouble breathing.  · You become short of breath when you exercise.  · You are tired when you wake up.  · You have a headache when you wake up.  SEEK IMMEDIATE MEDICAL CARE IF:   · Your breathing gets worse.  · You have new shortness of breath with normal activity.  · You have a bluish color of the skin, lips, or nail beds.  · You have confusion or cloudy thinking.  · You cough up dark mucus.  · You have chest pain.  · You have a fever.  MAKE SURE YOU:  · Understand these instructions.  · Will watch your condition.  · Will get help right away if you are not doing well or get worse.     This information is not intended to replace advice given to you by your health care provider. Make sure you discuss any questions you have with your health care provider.     Document Released: 07/02/2012 Document Revised: 12/23/2014 Document Reviewed: 07/17/2014  [employer] Interactive Patient Education ©2017 [employer]  Sleep Apnea  Sleep apnea is a condition in which breathing pauses or becomes shallow during sleep. Episodes of sleep apnea usually last 10 seconds or longer, and they may occur as many as 20 times an hour. Sleep apnea disrupts your sleep and keeps your body from getting the rest that it needs. This condition can increase your risk of certain health problems, including:  · Heart  attack.  · Stroke.  · Obesity.  · Diabetes.  · Heart failure.  · Irregular heartbeat.  There are three kinds of sleep apnea:  · Obstructive sleep apnea. This kind is caused by a blocked or collapsed airway.  · Central sleep apnea. This kind happens when the part of the brain that controls breathing does not send the correct signals to the muscles that control breathing.  · Mixed sleep apnea. This is a combination of obstructive and central sleep apnea.  CAUSES  The most common cause of this condition is a collapsed or blocked airway. An airway can collapse or become blocked if:  · Your throat muscles are abnormally relaxed.  · Your tongue and tonsils are larger than normal.  · You are overweight.  · Your airway is smaller than normal.  RISK FACTORS  This condition is more likely to develop in people who:  · Are overweight.  · Smoke.  · Have a smaller than normal airway.  · Are elderly.  · Are male.  · Drink alcohol.  · Take sedatives or tranquilizers.  · Have a family history of sleep apnea.  SYMPTOMS  Symptoms of this condition include:  · Trouble staying asleep.  · Daytime sleepiness and tiredness.  · Irritability.  · Loud snoring.  · Morning headaches.  · Trouble concentrating.  · Forgetfulness.  · Decreased interest in sex.  · Unexplained sleepiness.  · Mood swings.  · Personality changes.  · Feelings of depression.  · Waking up often during the night to urinate.  · Dry mouth.  · Sore throat.  DIAGNOSIS  This condition may be diagnosed with:  · A medical history.  · A physical exam.  · A series of tests that are done while you are sleeping (sleep study). These tests are usually done in a sleep lab, but they may also be done at home.  TREATMENT  Treatment for this condition aims to restore normal breathing and to ease symptoms during sleep. It may involve managing health issues that can affect breathing, such as high blood pressure or obesity. Treatment may include:  · Sleeping on your side.  · Using a  decongestant if you have nasal congestion.  · Avoiding the use of depressants, including alcohol, sedatives, and narcotics.  · Losing weight if you are overweight.  · Making changes to your diet.  · Quitting smoking.  · Using a device to open your airway while you sleep, such as:    An oral appliance. This is a custom-made mouthpiece that shifts your lower jaw forward.    A continuous positive airway pressure (CPAP) device. This device delivers oxygen to your airway through a mask.    A nasal expiratory positive airway pressure (EPAP) device. This device has valves that you put into each nostril.    A bi-level positive airway pressure (BPAP) device. This device delivers oxygen to your airway through a mask.  · Surgery if other treatments do not work. During surgery, excess tissue is removed to create a wider airway.  It is important to get treatment for sleep apnea. Without treatment, this condition can lead to:  · High blood pressure.  · Coronary artery disease.  · (Men) An inability to achieve or maintain an erection (impotence).  · Reduced thinking abilities.  HOME CARE INSTRUCTIONS  · Make any lifestyle changes that your health care provider recommends.  · Eat a healthy, well-balanced diet.  · Take over-the-counter and prescription medicines only as told by your health care provider.  · Avoid using depressants, including alcohol, sedatives, and narcotics.  · Take steps to lose weight if you are overweight.  · If you were given a device to open your airway while you sleep, use it only as told by your health care provider.  · Do not use any tobacco products, such as cigarettes, chewing tobacco, and e-cigarettes. If you need help quitting, ask your health care provider.  · Keep all follow-up visits as told by your health care provider. This is important.  SEEK MEDICAL CARE IF:  · The device that you received to open your airway during sleep is uncomfortable or does not seem to be working.  · Your symptoms do not  improve.  · Your symptoms get worse.  SEEK IMMEDIATE MEDICAL CARE IF:  · You develop chest pain.  · You develop shortness of breath.  · You develop discomfort in your back, arms, or stomach.  · You have trouble speaking.  · You have weakness on one side of your body.  · You have drooping in your face.  These symptoms may represent a serious problem that is an emergency. Do not wait to see if the symptoms will go away. Get medical help right away. Call your local emergency services (911 in the [place] Do not drive yourself to the hospital.     This information is not intended to replace advice given to you by your health care provider. Make sure you discuss any questions you have with your health care provider.     Document Released: 12/08/2003 Document Revised: 04/10/2017 Document Reviewed: 09/26/2016  [employer] Interactive Patient Education ©2017 [employer]

## 2017-10-07 NOTE — PROGRESS NOTES
Subjective   Narendra Cochran is a 76 y.o. male is being seen for consultation today at the request of Harriet Alfredo MD for the evaluation of disturbed sleep and nocturnal hypoxemia.  Patient's primary care physician is Dr. Sexton.    History of Present Illness  Patient was hospitalized at [facility] in May.  He was noted to have significant problems with oxygenation while sleeping.  He has been placed on supplemental oxygen but he is referred here for further evaluation.  He denies knowing if he has any snoring.  He denies being noted to have apneas.  He denies awakening gasping for breath but does admit he's not rested on awakening in the morning.  He denies morning headaches.  He denies falling asleep while sitting quietly during the day.  He denies any problems while driving.  He denies any history of congestive heart failure.  His echocardiogram showed a normal ejection fraction earlier this year.  He denies ever breaking his nose or having trouble breathing through his nose.    He denies any reflux symptoms he denies hypnagogic hallucinations sleep paralysis or cataplexy.  He denies kicking or jerking his legs at night.  He says he often doesn't go to bed until about 4 AM.  He will go to sleep about 10 minutes.  He thinks he awakens twice during the night he gets about 8 hours of sleep and says that he often feels okay although sometimes he is not rested.  He's had a history of hypertension known for several years.  He's been known diabetic for several years.  He denies any history coronary artery disease.  He has a history of arthritis.  No Known Allergies       Current Outpatient Prescriptions:   •  aspirin 81 MG chewable tablet, Chew 1 tablet Daily., Disp: , Rfl:   •  bumetanide (BUMEX) 0.5 MG tablet, , Disp: , Rfl:   •  metFORMIN (GLUCOPHAGE) 1000 MG tablet, Take 1 tablet by mouth Daily With Breakfast., Disp: 30 tablet, Rfl: 0  •  pantoprazole (PROTONIX) 40 MG EC tablet, Take 1 tablet by mouth  Daily., Disp: 30 tablet, Rfl: 6  •  polyethylene glycol (MIRALAX) packet, Take 17 g by mouth Daily., Disp: , Rfl:   •  potassium chloride (K-DUR,KLOR-CON) 20 MEQ CR tablet, , Disp: , Rfl:   •  rosuvastatin (CRESTOR) 20 MG tablet, , Disp: , Rfl:   •  HYDROcodone-acetaminophen (NORCO) 5-325 MG per tablet, Take 1 tablet by mouth Every 6 (Six) Hours As Needed for Moderate Pain (4-6)., Disp: 12 tablet, Rfl: 0  •  meloxicam (MOBIC) 15 MG tablet, Take 1 tablet by mouth Daily., Disp: 15 tablet, Rfl: 0  •  metoprolol succinate XL (TOPROL-XL) 50 MG 24 hr tablet, Take 50 mg by mouth Daily., Disp: , Rfl:   •  PHARMACY MEDS TO BED CONSULT, Daily (Monday-Friday)., Disp: 1 each, Rfl: 0    Smoking status: Current Every Day Smoker                                                   Packs/day: 0.50      Years: 50.00     Smokeless status: Never Used                           History   Alcohol Use He has one drink per week.              Caffeine: He has 1 cup of coffee per day    Past Medical History:   Diagnosis Date   • Arthritis    • Diabetes mellitus    • Diverticulitis    • GERD (gastroesophageal reflux disease)    • Hyperlipidemia    • Hypertension    • Paget disease of bone        Past Surgical History:   Procedure Laterality Date   • APPENDECTOMY     • COLON RESECTION      second to diverticulitis    • FOOT SURGERY Left        History reviewed. Family history is positive for diabetes and hypertension    The following portions of the patient's history were reviewed and updated as appropriate: allergies, current medications, past family history, past medical history, past social history, past surgical history and problem list.    Review of Systems   Constitutional: Negative.    HENT: Positive for hearing loss, postnasal drip and sinus pressure.    Eyes: Positive for visual disturbance.   Respiratory: Negative.    Cardiovascular: Negative.    Gastrointestinal: Positive for abdominal pain.   Endocrine: Positive for cold intolerance,  "heat intolerance, polydipsia and polyuria.   Genitourinary: Positive for frequency.   Musculoskeletal: Positive for arthralgias, joint swelling and myalgias.   Skin: Negative.    Allergic/Immunologic: Negative.    Neurological: Negative.    Hematological: Negative.    Psychiatric/Behavioral: Negative.     [place] score 7/24    Objective     /76  Pulse 60  Ht 70\" (177.8 cm)  Wt 214 lb (97.1 kg)  SpO2 98%  BMI 30.71 kg/m2     Physical Exam   Constitutional: He is oriented to person, place, and time. He appears well-developed and well-nourished.   He is obese.   HENT:   Head: Normocephalic and atraumatic.   He has Mallampati class IV anatomy.   Eyes: EOM are normal. Pupils are equal, round, and reactive to light.   Neck: Normal range of motion. Neck supple.   Cardiovascular: Normal rate, regular rhythm and normal heart sounds.    Pulmonary/Chest: Effort normal and breath sounds normal.   Abdominal: Soft. Bowel sounds are normal.   Musculoskeletal: Normal range of motion. He exhibits no edema.   Neurological: He is alert and oriented to person, place, and time.   Skin: Skin is warm and dry.   Psychiatric: He has a normal mood and affect. His behavior is normal.         Assessment/Plan   Narendra was seen today for sleeping problem.    Diagnoses and all orders for this visit:    Nocturnal hypoxia  -     Polysomnography 4 or More Parameters; Future    Obstructive sleep apnea, adult  -     Polysomnography 4 or More Parameters; Future     patient presents with history as noted above.  He was noted to have significant nocturnal hypoxemia when hospitalized earlier this year.  He denies much the way of snoring but may have more significant sleep-disordered breathing then he is aware.  I think it would be prudent to proceed with evaluation with polysomnogram.  I've discussed possible therapies for sleep-disordered breathing including CPAP, weight control, oral appliances, and surgery.  I've also discussed the possible " complications of long-term untreated obstructive sleep apnea.  He is to return then after his study and we'll reassess situation.  He is encouraged to lose weight.  He is encouraged to avoid alcohol and sedatives close to bedtime.  He is encouraged practice lateral position sleep.         Brent Clemente MD [facility]  Sleep Medicine  Pulmonary and Critical Care Medicine

## 2017-12-01 ENCOUNTER — APPOINTMENT (OUTPATIENT)
Dept: GENERAL RADIOLOGY | Facility: HOSPITAL | Age: 77
End: 2017-12-01

## 2017-12-01 ENCOUNTER — HOSPITAL ENCOUNTER (EMERGENCY)
Facility: HOSPITAL | Age: 77
Discharge: HOME | End: 2017-12-01
Attending: EMERGENCY MEDICINE | Admitting: EMERGENCY MEDICINE

## 2017-12-01 VITALS
BODY MASS INDEX: 30.35 KG/M2 | HEIGHT: 70 IN | DIASTOLIC BLOOD PRESSURE: 65 MMHG | WEIGHT: 212 LBS | SYSTOLIC BLOOD PRESSURE: 144 MMHG | RESPIRATION RATE: 20 BRPM | HEART RATE: 56 BPM | OXYGEN SATURATION: 99 % | TEMPERATURE: 98.4 F

## 2017-12-01 DIAGNOSIS — M88.9 PAGET DISEASE OF BONE: Chronic | ICD-10-CM

## 2017-12-01 DIAGNOSIS — M17.11 OSTEOARTHRITIS OF RIGHT KNEE, UNSPECIFIED OSTEOARTHRITIS TYPE: ICD-10-CM

## 2017-12-01 DIAGNOSIS — E11.9 NON-INSULIN DEPENDENT TYPE 2 DIABETES MELLITUS (HCC): ICD-10-CM

## 2017-12-01 DIAGNOSIS — M70.61 TROCHANTERIC BURSITIS OF RIGHT HIP: Primary | ICD-10-CM

## 2017-12-01 PROCEDURE — 73560 X-RAY EXAM OF KNEE 1 OR 2: CPT

## 2017-12-01 PROCEDURE — 99283 EMERGENCY DEPT VISIT LOW MDM: CPT

## 2017-12-01 PROCEDURE — 73502 X-RAY EXAM HIP UNI 2-3 VIEWS: CPT

## 2017-12-01 RX ORDER — HYDROCODONE BITARTRATE AND ACETAMINOPHEN 5; 325 MG/1; MG/1
1 TABLET ORAL EVERY 6 HOURS PRN
Qty: 12 TABLET | Refills: 0 | Status: SHIPPED | OUTPATIENT
Start: 2017-12-01 | End: 2017-12-08

## 2017-12-01 RX ORDER — HYDROCODONE BITARTRATE AND ACETAMINOPHEN 5; 325 MG/1; MG/1
1 TABLET ORAL ONCE
Status: COMPLETED | OUTPATIENT
Start: 2017-12-01 | End: 2017-12-01

## 2017-12-01 RX ORDER — PREDNISONE 10 MG/1
10 TABLET ORAL 2 TIMES DAILY
Qty: 6 TABLET | Refills: 0 | Status: SHIPPED | OUTPATIENT
Start: 2017-12-01 | End: 2017-12-04

## 2017-12-01 RX ADMIN — HYDROCODONE BITARTRATE AND ACETAMINOPHEN 1 TABLET: 5; 325 TABLET ORAL at 19:49

## 2017-12-01 NOTE — ED PROVIDER NOTES
Subjective   HPI Comments: 77-year-old male presents to emergency department complaining of right hip pain since yesterday.  Fell 2 days ago, landing on her right hip and right knee.  Has some slight swelling in the right knee with no pain.  Has right hip pain, primarily lateral aspect, worse with movement and weightbearing.  No back pain or abdominal pain denies other symptoms or injuries.  Patient denies syncope presyncope dizziness, states tripped and fell.    Patient is a 77 y.o. male presenting with lower extremity pain.   Lower Extremity Issue   Location:  Hip  Time since incident:  2 days  Injury: yes    Mechanism of injury: fall    Fall:     Fall occurred: Ground-level fall at home.    Height of fall:  Ground level    Impact surface: floor.    Point of impact: Right knee right hip.    Entrapped after fall: no    Hip location:  R hip  Pain details:     Quality:  Aching    Severity:  Moderate    Onset quality:  Gradual    Duration:  1 day    Timing:  Constant    Progression:  Waxing and waning  Chronicity:  New  Dislocation: no    Foreign body present:  No foreign bodies  Tetanus status:  Unknown  Prior injury to area:  No  Relieved by:  Nothing  Worsened by:  Nothing  Ineffective treatments:  None tried  Associated symptoms: decreased ROM and swelling    Associated symptoms: no back pain, no fever and no stiffness        Review of Systems   Constitutional: Negative for chills, diaphoresis and fever.   HENT: Negative for congestion and sore throat.    Respiratory: Negative for cough, choking, chest tightness, shortness of breath and wheezing.    Cardiovascular: Negative for chest pain and leg swelling.   Gastrointestinal: Negative for abdominal distention, abdominal pain, anal bleeding, blood in stool, constipation, diarrhea, nausea and vomiting.   Genitourinary: Negative for difficulty urinating, dysuria, flank pain, frequency, hematuria and urgency.   Musculoskeletal: Negative for back pain and stiffness.         Per history of present illness   All other systems reviewed and are negative.      Past Medical History:   Diagnosis Date   • Arthritis    • Diabetes mellitus    • Diverticulitis    • GERD (gastroesophageal reflux disease)    • Hyperlipidemia    • Hypertension    • Paget disease of bone        No Known Allergies    Past Surgical History:   Procedure Laterality Date   • APPENDECTOMY     • COLON RESECTION      second to diverticulitis    • FOOT SURGERY Left        History reviewed. No pertinent family history.    Social History     Social History   • Marital status:      Spouse name: N/A   • Number of children: N/A   • Years of education: N/A     Social History Main Topics   • Smoking status: Current Every Day Smoker     Packs/day: 0.50     Years: 50.00   • Smokeless tobacco: Never Used   • Alcohol use Yes      Comment: OCCASIONALLY   • Drug use: No   • Sexual activity: Defer     Other Topics Concern   • None     Social History Narrative   • None           Objective   Physical Exam   Constitutional: He is oriented to person, place, and time. He appears well-developed and well-nourished. No distress.   Pleasant awake alert oriented not toxic appearing, conversant   HENT:   Head: Normocephalic and atraumatic.   Right Ear: External ear normal.   Left Ear: External ear normal.   Nose: Nose normal.   Mouth/Throat: Oropharynx is clear and moist. No oropharyngeal exudate.   Eyes: Conjunctivae and EOM are normal. Pupils are equal, round, and reactive to light. Right eye exhibits no discharge. Left eye exhibits no discharge. No scleral icterus.   Neck: Normal range of motion. Neck supple. No JVD present. No tracheal deviation present. No thyromegaly present.   Cardiovascular: Normal rate and regular rhythm.  Exam reveals no gallop and no friction rub.    No murmur heard.  Pulmonary/Chest: Effort normal. No stridor. No respiratory distress. He has no wheezes. He has no rales. He exhibits no tenderness.   Abdominal:  Soft. Bowel sounds are normal. He exhibits no distension and no mass. There is no guarding.   Musculoskeletal: He exhibits tenderness. He exhibits no edema or deformity.   Tenderness to lateral trochanteric aspect of right hip.  Range of motion is guarded, with pain upon abduction of hip.  Able to flex and extend passively and actively without difficulty.  No obvious deformity or external rotation or shortening.  There is slight soft tissue swelling/effusion to the right knee with no erythema or warmth or tenderness.  Range of motion is full.  Neurovascular status is intact distally.   Neurological: He is alert and oriented to person, place, and time. No cranial nerve deficit. He exhibits normal muscle tone. Coordination normal.   Skin: Skin is warm and dry. No rash noted. He is not diaphoretic. No erythema. No pallor.   Psychiatric: He has a normal mood and affect. His behavior is normal. Judgment and thought content normal.   Nursing note and vitals reviewed.      Procedures         ED Course  ED Course   Comment By Time   Right knee with degenerative findings.  Right hip and pelvis with diffuse sclerosis.  Patient has known history of Paget's disease.  No acute findings per radiology. Ollie Rico PA-C 12/01 1914                  [facility]    Final diagnoses:   Trochanteric bursitis of right hip   Osteoarthritis of right knee, unspecified osteoarthritis type   Paget disease of bone   Non-insulin dependent type 2 diabetes mellitus            Nina Lopez  12/01/17 1748       Ollie Rico PA-C  12/01/17 1931

## 2017-12-02 NOTE — DISCHARGE INSTRUCTIONS
Warm compresses to hip and knee every few hours for 20-30 minutes.  Use caution when ambulating, especially on stairs.  Follow-up with your primary care provider for recheck in 3 days.  Return if any change or worsening.

## 2017-12-08 ENCOUNTER — TRANSCRIBE ORDERS (OUTPATIENT)
Dept: ADMINISTRATIVE | Facility: HOSPITAL | Age: 77
End: 2017-12-08

## 2017-12-08 ENCOUNTER — APPOINTMENT (OUTPATIENT)
Dept: CARDIOLOGY | Facility: HOSPITAL | Age: 77
End: 2017-12-08

## 2017-12-08 ENCOUNTER — HOSPITAL ENCOUNTER (EMERGENCY)
Facility: HOSPITAL | Age: 77
Discharge: HOME | End: 2017-12-08
Attending: EMERGENCY MEDICINE | Admitting: EMERGENCY MEDICINE

## 2017-12-08 VITALS
HEART RATE: 56 BPM | DIASTOLIC BLOOD PRESSURE: 78 MMHG | HEIGHT: 70 IN | OXYGEN SATURATION: 99 % | RESPIRATION RATE: 18 BRPM | TEMPERATURE: 98.5 F | WEIGHT: 212 LBS | SYSTOLIC BLOOD PRESSURE: 152 MMHG | BODY MASS INDEX: 30.35 KG/M2

## 2017-12-08 DIAGNOSIS — I10 ESSENTIAL HYPERTENSION: Chronic | ICD-10-CM

## 2017-12-08 DIAGNOSIS — S86.811A STRAIN OF CALF MUSCLE, RIGHT, INITIAL ENCOUNTER: Primary | ICD-10-CM

## 2017-12-08 DIAGNOSIS — M88.9 PAGET DISEASE OF BONE: Chronic | ICD-10-CM

## 2017-12-08 DIAGNOSIS — Z86.39 HISTORY OF DIABETES MELLITUS: ICD-10-CM

## 2017-12-08 DIAGNOSIS — M79.604 LOWER EXTREMITY PAIN, RIGHT: Primary | ICD-10-CM

## 2017-12-08 LAB
HOLD SPECIMEN: NORMAL
HOLD SPECIMEN: NORMAL
WHOLE BLOOD HOLD SPECIMEN: NORMAL
WHOLE BLOOD HOLD SPECIMEN: NORMAL

## 2017-12-08 PROCEDURE — 93971 EXTREMITY STUDY: CPT | Performed by: INTERNAL MEDICINE

## 2017-12-08 PROCEDURE — 93971 EXTREMITY STUDY: CPT

## 2017-12-08 PROCEDURE — 99284 EMERGENCY DEPT VISIT MOD MDM: CPT

## 2017-12-08 RX ORDER — ONDANSETRON 4 MG/1
4 TABLET, ORALLY DISINTEGRATING ORAL ONCE
Status: COMPLETED | OUTPATIENT
Start: 2017-12-08 | End: 2017-12-08

## 2017-12-08 RX ORDER — HYDROCODONE BITARTRATE AND ACETAMINOPHEN 5; 325 MG/1; MG/1
1 TABLET ORAL EVERY 6 HOURS PRN
Qty: 10 TABLET | Refills: 0 | Status: SHIPPED | OUTPATIENT
Start: 2017-12-08 | End: 2019-01-31 | Stop reason: HOSPADM

## 2017-12-08 RX ORDER — HYDROCODONE BITARTRATE AND ACETAMINOPHEN 5; 325 MG/1; MG/1
1 TABLET ORAL ONCE
Status: COMPLETED | OUTPATIENT
Start: 2017-12-08 | End: 2017-12-08

## 2017-12-08 RX ADMIN — ONDANSETRON 4 MG: 4 TABLET, ORALLY DISINTEGRATING ORAL at 20:25

## 2017-12-08 RX ADMIN — HYDROCODONE BITARTRATE AND ACETAMINOPHEN 1 TABLET: 5; 325 TABLET ORAL at 20:25

## 2017-12-09 LAB
BH CV ECHO MEAS - BSA(HAYCOCK): 2.2 M^2
BH CV ECHO MEAS - BSA: 2.1 M^2
BH CV ECHO MEAS - BZI_BMI: 30.4 KILOGRAMS/M^2
BH CV ECHO MEAS - BZI_METRIC_HEIGHT: 177.8 CM
BH CV ECHO MEAS - BZI_METRIC_WEIGHT: 96.2 KG
BH CV LOWER VASCULAR LEFT COMMON FEMORAL AUGMENT: NORMAL
BH CV LOWER VASCULAR LEFT COMMON FEMORAL COMPRESS: NORMAL
BH CV LOWER VASCULAR LEFT COMMON FEMORAL PHASIC: NORMAL
BH CV LOWER VASCULAR LEFT COMMON FEMORAL SPONT: NORMAL
BH CV LOWER VASCULAR RIGHT COMMON FEMORAL AUGMENT: NORMAL
BH CV LOWER VASCULAR RIGHT COMMON FEMORAL COMPRESS: NORMAL
BH CV LOWER VASCULAR RIGHT COMMON FEMORAL PHASIC: NORMAL
BH CV LOWER VASCULAR RIGHT COMMON FEMORAL SPONT: NORMAL
BH CV LOWER VASCULAR RIGHT DISTAL FEMORAL COMPRESS: NORMAL
BH CV LOWER VASCULAR RIGHT MID FEMORAL AUGMENT: NORMAL
BH CV LOWER VASCULAR RIGHT MID FEMORAL COMPRESS: NORMAL
BH CV LOWER VASCULAR RIGHT MID FEMORAL PHASIC: NORMAL
BH CV LOWER VASCULAR RIGHT MID FEMORAL SPONT: NORMAL
BH CV LOWER VASCULAR RIGHT PERONEAL COMPRESS: NORMAL
BH CV LOWER VASCULAR RIGHT POPLITEAL AUGMENT: NORMAL
BH CV LOWER VASCULAR RIGHT POPLITEAL COMPRESS: NORMAL
BH CV LOWER VASCULAR RIGHT POPLITEAL PHASIC: NORMAL
BH CV LOWER VASCULAR RIGHT POPLITEAL SPONT: NORMAL
BH CV LOWER VASCULAR RIGHT POSTERIOR TIBIAL COMPRESS: NORMAL
BH CV LOWER VASCULAR RIGHT PROXIMAL FEMORAL COMPRESS: NORMAL
BH CV LOWER VASCULAR RIGHT SAPHENOFEMORAL JUNCTION AUGMENT: NORMAL
BH CV LOWER VASCULAR RIGHT SAPHENOFEMORAL JUNCTION COMPRESS: NORMAL
BH CV LOWER VASCULAR RIGHT SAPHENOFEMORAL JUNCTION PHASIC: NORMAL
BH CV LOWER VASCULAR RIGHT SAPHENOFEMORAL JUNCTION SPONT: NORMAL
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
## 2017-12-09 NOTE — DISCHARGE INSTRUCTIONS
Apply warm compresses every few hours for 20-30 minutes.  Keep leg elevated and rest as much as possible.  Follow-up with Dr. Sexton for recheck on Monday.  Return to the emergency department if any change or worsening of symptoms.

## 2018-03-08 NOTE — ED PROVIDER NOTES
Subjective   Patient is a 76 y.o. male presenting with lower extremity pain.   History provided by:  Patient and relative  Lower Extremity Issue   Location:  Knee  Time since incident:  4 days  Injury: yes    Mechanism of injury comment:  Fell while mowing lawn bending rith leg up under him  Knee location:  R knee  Pain details:     Quality:  Aching    Radiates to:  R leg    Severity:  Moderate    Onset quality:  Sudden    Duration:  2 days    Timing:  Constant    Progression:  Unchanged  Chronicity:  New  Dislocation: no    Associated symptoms: no back pain and no fever    Pt was seen by PCP yesterday and had XR ordered. Pt reports walking today with minimal difficulty, but pain and swelling has gotten worse this evening. Denies fever, or H/O gout.    Review of Systems   Constitutional: Negative for chills and fever.   Musculoskeletal: Positive for gait problem and joint swelling (right knee swelling). Negative for back pain.   Neurological: Negative for numbness.   All other systems reviewed and are negative.      Past Medical History:   Diagnosis Date   • Diabetes mellitus    • Diverticulitis    • GERD (gastroesophageal reflux disease)    • Hyperlipidemia    • Hypertension    • Paget disease of bone        No Known Allergies    Past Surgical History:   Procedure Laterality Date   • APPENDECTOMY     • COLON RESECTION      second to diverticulitis    • FOOT SURGERY Left        History reviewed. No pertinent family history.    Social History     Social History   • Marital status:      Spouse name: N/A   • Number of children: N/A   • Years of education: N/A     Social History Main Topics   • Smoking status: Current Every Day Smoker     Packs/day: 0.50     Years: 50.00   • Smokeless tobacco: None   • Alcohol use No      Comment: ex smoker    • Drug use: No   • Sexual activity: Defer     Other Topics Concern   • None     Social History Narrative           Objective   Physical Exam   Constitutional: He is  oriented to person, place, and time. He appears well-developed.   HENT:   Right Ear: External ear normal.   Left Ear: External ear normal.   Cardiovascular: Normal rate, regular rhythm and intact distal pulses.    Pulses:       Dorsalis pedis pulses are 2+ on the right side, and 2+ on the left side.   Cap refill BLE WNL   Pulmonary/Chest: Effort normal and breath sounds normal. No respiratory distress.   Musculoskeletal:        Right knee: He exhibits swelling. He exhibits normal range of motion, no LCL laxity and no MCL laxity. Tenderness found.   Neurological: He is alert and oriented to person, place, and time.   Skin: Skin is warm and dry.   Psychiatric: He has a normal mood and affect. His behavior is normal.   Vitals reviewed.      Procedures         ED Course  ED Course      Discussed x-ray findings and lab findings with patient and family.  We'll have him wear the immobilizer 24 7 except for showering until cleared by orthopedics.  Medication as prescribed.  Follow-up with orthopedics in 2-3 days.  Return to ER with worsening symptoms or development of new symptoms.  Patient verbalized understanding            MDM    Final diagnoses:   Sprain of other ligament of right knee, initial encounter   Acute pain of right knee            Sugar Prado, APRN  08/12/17 2620     Received dose of Benadryl last night, but constantly woken up by staff. Encouraged her to request sleep aid Zaleplon that I will order as PRN.

## 2018-06-06 ENCOUNTER — OFFICE VISIT (OUTPATIENT)
Dept: ORTHOPEDIC SURGERY | Facility: CLINIC | Age: 78
End: 2018-06-06

## 2018-06-06 ENCOUNTER — OFFICE VISIT (OUTPATIENT)
Dept: FAMILY MEDICINE CLINIC | Facility: CLINIC | Age: 78
End: 2018-06-06

## 2018-06-06 VITALS
BODY MASS INDEX: 29.63 KG/M2 | DIASTOLIC BLOOD PRESSURE: 60 MMHG | SYSTOLIC BLOOD PRESSURE: 110 MMHG | WEIGHT: 207 LBS | HEART RATE: 60 BPM | HEIGHT: 70 IN | OXYGEN SATURATION: 98 %

## 2018-06-06 VITALS — OXYGEN SATURATION: 97 % | WEIGHT: 193.56 LBS | BODY MASS INDEX: 27.71 KG/M2 | HEART RATE: 56 BPM | HEIGHT: 70 IN

## 2018-06-06 DIAGNOSIS — I10 ESSENTIAL HYPERTENSION: Primary | Chronic | ICD-10-CM

## 2018-06-06 DIAGNOSIS — R52 PAIN: Primary | ICD-10-CM

## 2018-06-06 DIAGNOSIS — M17.11 PRIMARY OSTEOARTHRITIS OF RIGHT KNEE: ICD-10-CM

## 2018-06-06 DIAGNOSIS — E11.8 TYPE 2 DIABETES MELLITUS WITH COMPLICATION, WITHOUT LONG-TERM CURRENT USE OF INSULIN (HCC): Chronic | ICD-10-CM

## 2018-06-06 DIAGNOSIS — E78.5 HYPERLIPIDEMIA, UNSPECIFIED HYPERLIPIDEMIA TYPE: Chronic | ICD-10-CM

## 2018-06-06 DIAGNOSIS — M25.561 ACUTE PAIN OF RIGHT KNEE: ICD-10-CM

## 2018-06-06 PROCEDURE — 99214 OFFICE O/P EST MOD 30 MIN: CPT | Performed by: PHYSICIAN ASSISTANT

## 2018-06-06 PROCEDURE — 20610 DRAIN/INJ JOINT/BURSA W/O US: CPT | Performed by: ORTHOPAEDIC SURGERY

## 2018-06-06 PROCEDURE — 99204 OFFICE O/P NEW MOD 45 MIN: CPT | Performed by: ORTHOPAEDIC SURGERY

## 2018-06-06 RX ORDER — POTASSIUM CHLORIDE 1500 MG/1
1 TABLET, FILM COATED, EXTENDED RELEASE ORAL DAILY
COMMUNITY
Start: 2018-05-23 | End: 2018-11-16 | Stop reason: SDUPTHER

## 2018-06-06 RX ORDER — METOPROLOL SUCCINATE 50 MG/1
50 TABLET, EXTENDED RELEASE ORAL DAILY
Qty: 90 TABLET | Refills: 1 | Status: SHIPPED | OUTPATIENT
Start: 2018-06-06 | End: 2018-11-16 | Stop reason: SDUPTHER

## 2018-06-06 RX ORDER — BUMETANIDE 0.5 MG/1
0.5 TABLET ORAL DAILY
Qty: 90 TABLET | Refills: 1 | Status: SHIPPED | OUTPATIENT
Start: 2018-06-06 | End: 2018-11-16 | Stop reason: SDUPTHER

## 2018-06-06 RX ORDER — BUPIVACAINE HYDROCHLORIDE 2.5 MG/ML
4 INJECTION, SOLUTION INFILTRATION; PERINEURAL
Status: COMPLETED | OUTPATIENT
Start: 2018-06-06 | End: 2018-06-06

## 2018-06-06 RX ORDER — BETAMETHASONE SODIUM PHOSPHATE AND BETAMETHASONE ACETATE 3; 3 MG/ML; MG/ML
6 INJECTION, SUSPENSION INTRA-ARTICULAR; INTRALESIONAL; INTRAMUSCULAR; SOFT TISSUE
Status: COMPLETED | OUTPATIENT
Start: 2018-06-06 | End: 2018-06-06

## 2018-06-06 RX ORDER — LIDOCAINE HYDROCHLORIDE 10 MG/ML
4 INJECTION, SOLUTION INFILTRATION; PERINEURAL
Status: COMPLETED | OUTPATIENT
Start: 2018-06-06 | End: 2018-06-06

## 2018-06-06 RX ORDER — ROSUVASTATIN CALCIUM 20 MG/1
20 TABLET, COATED ORAL DAILY
Qty: 90 TABLET | Refills: 1 | Status: SHIPPED | OUTPATIENT
Start: 2018-06-06 | End: 2018-11-16 | Stop reason: SDUPTHER

## 2018-06-06 RX ORDER — BLOOD SUGAR DIAGNOSTIC
STRIP MISCELLANEOUS
COMMUNITY
Start: 2018-05-23 | End: 2019-02-13 | Stop reason: SDUPTHER

## 2018-06-06 RX ORDER — LANCETS
EACH MISCELLANEOUS
COMMUNITY
Start: 2018-05-23 | End: 2019-02-13 | Stop reason: SDUPTHER

## 2018-06-06 RX ADMIN — LIDOCAINE HYDROCHLORIDE 4 ML: 10 INJECTION, SOLUTION INFILTRATION; PERINEURAL at 15:48

## 2018-06-06 RX ADMIN — BETAMETHASONE SODIUM PHOSPHATE AND BETAMETHASONE ACETATE 6 MG: 3; 3 INJECTION, SUSPENSION INTRA-ARTICULAR; INTRALESIONAL; INTRAMUSCULAR; SOFT TISSUE at 15:48

## 2018-06-06 RX ADMIN — BUPIVACAINE HYDROCHLORIDE 4 ML: 2.5 INJECTION, SOLUTION INFILTRATION; PERINEURAL at 15:48

## 2018-06-06 NOTE — PROGRESS NOTES
Procedure   Large Joint Arthrocentesis  Date/Time: 6/6/2018 3:48 PM  Consent given by: patient  Site marked: site marked  Timeout: Immediately prior to procedure a time out was called to verify the correct patient, procedure, equipment, support staff and site/side marked as required   Supporting Documentation  Indications: pain   Procedure Details  Location: knee - R knee  Preparation: Patient was prepped and draped in the usual sterile fashion  Needle size: 22 G  Approach: anterolateral  Medications administered: 6 mg betamethasone acetate-betamethasone sodium phosphate 6 (3-3) MG/ML; 4 mL bupivacaine 0.25 %; 4 mL lidocaine 1 %  Patient tolerance: patient tolerated the procedure well with no immediate complications

## 2018-06-06 NOTE — PROGRESS NOTES
Chief Complaint   Patient presents with   • Med Refill     pantoprazole        Patient is a pleasant 77-year-old -American male who presents to the office to establish care.  Patient was previously seen by Dr. Sexton and myself at [facility].  Patient presents today for hypertension, diabetes, GERD, hyperlipidemia and acute on chronic right knee pain.  Patient's blood pressure on repeat is 130/70 today.  He is compliant on bumex 0.5 mg daily and metoprolol 25 mg daily.  He is also taking potassium 20 mEq daily.  He has completed the pantoprazole prescription and reports improvement in GI issues.  He is no longer taking the pantoprazole and reports that his symptoms have not returned.  He is compliant on metformin for diabetes.      Patient is having acute on chronic right knee pain.  Patient has been having ongoing knee pain for the last several months.  He has had injections at [facility] with some temporary mild relief.  He has been to physical therapy without significant improvement.  He presents today with significant pain that is limiting his ability to walk and his quality of life.  He has had limited imaging showing DJD.  Patient has not been seen by orthopedics.  Patient takes the bus and transportation especially now is very difficult.        Past Medical History:   Diagnosis Date   • Arthritis    • Diabetes mellitus    • Diverticulitis    • GERD (gastroesophageal reflux disease)    • Hyperlipidemia    • Hypertension    • Paget disease of bone        Past Surgical History:   Procedure Laterality Date   • APPENDECTOMY     • COLON RESECTION      second to diverticulitis    • FOOT SURGERY Left        Family History   Problem Relation Age of Onset   • Diabetes Sister        Social History     Social History   • Marital status:      Spouse name: N/A   • Number of children: N/A   • Years of education: N/A     Occupational History   • Not on file.     Social History Main Topics   • Smoking status: Current Every  Day Smoker     Packs/day: 0.50     Years: 50.00     Types: Cigars   • Smokeless tobacco: Never Used   • Alcohol use No      Comment: rarely   • Drug use: No   • Sexual activity: Defer     Other Topics Concern   • Not on file     Social History Narrative   • No narrative on file       No Known Allergies    ROS    Review of Systems   Constitutional: Positive for activity change. Negative for appetite change.   Respiratory: Negative for cough and shortness of breath.    Gastrointestinal: Negative for GERD.   Musculoskeletal: Positive for arthralgias, joint swelling and myalgias.   Psychiatric/Behavioral: Negative for depressed mood.       Vitals:    06/06/18 1338   BP: 110/60   Pulse: 60   SpO2: 98%         Current Outpatient Prescriptions:   •  ACCU-CHEK FASTCLIX LANCETS misc, Use daily., Disp: , Rfl:   •  ACCU-CHEK SMARTVIEW test strip, Use daily, Disp: , Rfl:   •  aspirin 81 MG chewable tablet, Chew 1 tablet Daily., Disp: , Rfl:   •  bumetanide (BUMEX) 0.5 MG tablet, Take 1 tablet by mouth Daily., Disp: 90 tablet, Rfl: 1  •  metFORMIN (GLUCOPHAGE) 1000 MG tablet, Take 1 tablet by mouth Daily With Breakfast., Disp: 30 tablet, Rfl: 0  •  metoprolol succinate XL (TOPROL-XL) 50 MG 24 hr tablet, Take 1 tablet by mouth Daily., Disp: 90 tablet, Rfl: 1  •  pantoprazole (PROTONIX) 40 MG EC tablet, Take 1 tablet by mouth Daily., Disp: 30 tablet, Rfl: 6  •  polyethylene glycol (MIRALAX) packet, Take 17 g by mouth Daily., Disp: , Rfl:   •  potassium chloride ER (K-TAB) 20 MEQ tablet controlled-release ER tablet, Take 1 tablet by mouth Daily., Disp: , Rfl:   •  rosuvastatin (CRESTOR) 20 MG tablet, Take 1 tablet by mouth Daily., Disp: 90 tablet, Rfl: 1  •  HYDROcodone-acetaminophen (NORCO) 5-325 MG per tablet, Take 1 tablet by mouth Every 6 (Six) Hours As Needed for Moderate Pain  or Severe Pain ., Disp: 10 tablet, Rfl: 0  •  meloxicam (MOBIC) 15 MG tablet, Take 1 tablet by mouth Daily., Disp: 15 tablet, Rfl: 0  •  PHARMACY MEDS  TO BED CONSULT, Daily (Monday-Friday)., Disp: 1 each, Rfl: 0    PE    Physical Exam   Constitutional: He is oriented to person, place, and time. He appears well-developed and well-nourished. He appears distressed.   HENT:   Head: Normocephalic.   Eyes: Conjunctivae are normal. No scleral icterus.   Neck: Normal range of motion.   Cardiovascular: Normal rate, regular rhythm and normal heart sounds.  Exam reveals no gallop and no friction rub.    No murmur heard.  Pulmonary/Chest: Effort normal and breath sounds normal. No respiratory distress. He has no wheezes. He has no rales.   Musculoskeletal: Normal range of motion.   Right knee is TTP with swelling.  No effusion appreciated, no heat or erythema, laceration/wound.  Antalgic gait secondary to pain.   Neurological: He is alert and oriented to person, place, and time.   Skin: Skin is warm. No rash noted. No erythema.   Psychiatric: He has a normal mood and affect. Judgment normal.   Vitals reviewed.      A/P    Narendra was seen today for med refill.    Diagnoses and all orders for this visit:    Essential hypertension  -stable, well-controlled today  -repeat /72  -patient monitors at home  -     bumetanide (BUMEX) 0.5 MG tablet; Take 1 tablet by mouth Daily.  -     metoprolol succinate XL (TOPROL-XL) 50 MG 24 hr tablet; Take 1 tablet by mouth Daily.    Acute pain of right knee  -acute on chronic pain x several months  -TTP with swelling  -having difficulty walking  -failed in-office injections and physical therapy  -     Ambulatory Referral to Orthopedic Surgery    Type 2 diabetes mellitus with complication, without long-term current use of insulin  -     metFORMIN (GLUCOPHAGE) 1000 MG tablet; Take 1 tablet by mouth Daily With Breakfast.    Hyperlipidemia, unspecified hyperlipidemia type  -     rosuvastatin (CRESTOR) 20 MG tablet; Take 1 tablet by mouth Daily.         Plan of care reviewed with patient at the conclusion of today's visit. Education was provided  regarding diagnosis, management and any prescribed or recommended OTC medications.  Patient verbalizes understanding of and agreement with management plan.    Return in about 3 months (around 9/6/2018) for Next scheduled follow up.     Marguerite Moore PA-C

## 2018-06-06 NOTE — PROGRESS NOTES
[facility]    Subjective     Chief Complaint   Patient presents with   • Right Knee - Pain        HPI      Narendra Cochran is a 77 y.o. male.  He complains of right knee pain.  It started in December.  It is intermittent.  He had a fall in January.  Pain is throbbing.  It is 9 out of 10.  He's had a previous injection.        Past Medical History:   Diagnosis Date   • Arthritis    • Diabetes mellitus    • Diverticulitis    • GERD (gastroesophageal reflux disease)    • Hyperlipidemia    • Hypertension    • Paget disease of bone       Past Surgical History:   Procedure Laterality Date   • APPENDECTOMY     • COLON RESECTION      second to diverticulitis    • FOOT SURGERY Left       Family History   Problem Relation Age of Onset   • Diabetes Sister      Social History     Social History   • Marital status:      Spouse name: N/A   • Number of children: N/A   • Years of education: N/A     Occupational History   • Not on file.     Social History Main Topics   • Smoking status: Current Every Day Smoker     Packs/day: 0.50     Years: 50.00     Types: Cigars   • Smokeless tobacco: Never Used   • Alcohol use No      Comment: rarely   • Drug use: No   • Sexual activity: Defer     Other Topics Concern   • Not on file     Social History Narrative   • No narrative on file      Current Outpatient Prescriptions on File Prior to Visit   Medication Sig Dispense Refill   • ACCU-CHEK FASTCLIX LANCETS misc Use daily.     • ACCU-CHEK SMARTVIEW test strip Use daily     • aspirin 81 MG chewable tablet Chew 1 tablet Daily.     • bumetanide (BUMEX) 0.5 MG tablet Take 1 tablet by mouth Daily. 90 tablet 1   • HYDROcodone-acetaminophen (NORCO) 5-325 MG per tablet Take 1 tablet by mouth Every 6 (Six) Hours As Needed for Moderate Pain  or Severe Pain . 10 tablet 0   • meloxicam (MOBIC) 15 MG tablet Take 1 tablet by mouth Daily. 15 tablet 0   • metFORMIN (GLUCOPHAGE) 1000 MG tablet Take 1 tablet by mouth Daily With  "Breakfast. 30 tablet 0   • metoprolol succinate XL (TOPROL-XL) 50 MG 24 hr tablet Take 1 tablet by mouth Daily. 90 tablet 1   • pantoprazole (PROTONIX) 40 MG EC tablet Take 1 tablet by mouth Daily. 30 tablet 6   • PHARMACY MEDS TO BED CONSULT Daily (Monday-Friday). 1 each 0   • polyethylene glycol (MIRALAX) packet Take 17 g by mouth Daily.     • potassium chloride ER (K-TAB) 20 MEQ tablet controlled-release ER tablet Take 1 tablet by mouth Daily.     • rosuvastatin (CRESTOR) 20 MG tablet Take 1 tablet by mouth Daily. 90 tablet 1   • [DISCONTINUED] bumetanide (BUMEX) 0.5 MG tablet      • [DISCONTINUED] metFORMIN (GLUCOPHAGE) 1000 MG tablet Take 1 tablet by mouth Daily With Breakfast. 30 tablet 0   • [DISCONTINUED] metoprolol succinate XL (TOPROL-XL) 50 MG 24 hr tablet Take 50 mg by mouth Daily.     • [DISCONTINUED] potassium chloride (K-DUR,KLOR-CON) 20 MEQ CR tablet Daily.     • [DISCONTINUED] rosuvastatin (CRESTOR) 20 MG tablet Daily.       No current facility-administered medications on file prior to visit.       No Known Allergies     The following portions of the patient's history were reviewed and updated as appropriate: allergies, current medications, past family history, past medical history, past social history, past surgical history and problem list.    Review of Systems   Constitutional: Negative.    HENT: Negative.    Eyes: Negative.    Respiratory: Negative.    Cardiovascular: Negative.    Gastrointestinal: Negative.    Endocrine: Negative.    Genitourinary: Negative.    Musculoskeletal: Positive for arthralgias.   Skin: Negative.    Allergic/Immunologic: Negative.    Neurological: Negative.    Hematological: Negative.    Psychiatric/Behavioral: Negative.         Objective      Physical Exam  Pulse 56   Ht 177.8 cm (70\")   Wt 87.8 kg (193 lb 9 oz)   SpO2 97%   BMI 27.77 kg/m²     Body mass index is 27.77 kg/m².        GENERAL APPEARANCE: awake, alert & oriented x 3, in no acute distress and well " developed, well nourished  PSYCH: normal mood and affect  LUNGS:  breathing nonlabored, no wheezing  EYES: sclera anicteric, pupils equal  CARDIOVASCULAR: palpable pulses dorsalis pedis, palpable posterior tibial bilaterally. Capillary refill less than 2 seconds  INTEGUMENTARY: skin intact, no clubbing, cyanosis  NEUROLOGIC:  Normal Sensation and reflexes             Ortho Exam  Peripheral Vascular:    Upper Extremity:   Inspection:  Left--no cyanotic nail beds Right--no cyanotic nail beds   Bilateral:  Pink nail beds with brisk capillary refill   Palpation:  Bilateral radial pulse normal    Musculoskeletal:  Global Assessment:  Overall assessment of Lower Extremity Muscle Strength and Tone:  Right quadriceps--5/5   Right hamstrings--5/5       Right tibialis anterior--5/5  Right gastroc-soleus--5/5  Right EHL --5/5    Lower Extremity:  Knee/Patella:  No digital clubbing or cyanosis.    Examination of right knee reveals:  Normal deep tendon reflexes, coordination, strength, tone, sensation.  No known fractures or deformities.    Inspection and Palpation:  Right knee:  Tenderness:  Over the medial joint line and moderate severity  Effusion:  none  Crepitus:  Positive  Pulses:  2+  Ecchymosis:  None  Warmth:  None     ROM:  Right:  Extension:120    Flexion: 5  Left:  Extension:120     Flexion:5    Instability:    Right:  Lachman Test:  Negative, Varus stress test negative, Valgus stress test negative    Deformities/Malalignments/Discrepancies:    Left:  No deformities   Right:  Genu Varum    Functional Testing:  Giovanni's test:  Negative  Patella grind test:  Positive  Q-angle:  normal        Imaging/Studies  Imaging Results (last 7 days)     Procedure Component Value Units Date/Time    XR Knee 4+ View Right [[id number]] Resulted:  06/06/18 1542     Updated:  06/06/18 1542    Narrative:       Knee X-Ray  Indication: Pain    Upright AP of bilateral knees. Lateral, skiers and Sunrise views of right   knee     Findings:  Mild arthritic changes  No fracture  No bony lesion  Normal soft tissues  Normal joint spaces    No prior studies were available for comparison.            Assessment/Plan        ICD-10-CM ICD-9-CM   1. Pain R52 780.96   2. Primary osteoarthritis of right knee M17.11 715.16       Orders Placed This Encounter   Procedures   • Large Joint Arthrocentesis   • XR Knee 4+ View Right        I gave him a right knee cortisone injection today.  He tolerated it well.  He will follow-up in 3 weeks.  We will give him a hinged knee brace for support because he has had some instability.      Medical Decision Making  Management Options : over-the-counter medicine and prescription/IM medicine  Data/Risk: radiology tests and independent visualization of imaging, lab tests, or EMG/NCV    Jason Treviño MD  06/06/18  4:01 PM         EMR Dragon/Transcription disclaimer:  Much of this encounter note is an electronic transcription of spoken language to printed text. Electronic transcription of spoken language may permit erroneous, or at times, nonsensical words or phrases to be inadvertently transcribed. Although I have reviewed the note for such errors, some may still exist.

## 2018-06-27 ENCOUNTER — OFFICE VISIT (OUTPATIENT)
Dept: ORTHOPEDIC SURGERY | Facility: CLINIC | Age: 78
End: 2018-06-27

## 2018-06-27 VITALS — HEART RATE: 52 BPM | HEIGHT: 70 IN | WEIGHT: 203.26 LBS | OXYGEN SATURATION: 100 % | BODY MASS INDEX: 29.1 KG/M2

## 2018-06-27 DIAGNOSIS — M17.11 PRIMARY OSTEOARTHRITIS OF RIGHT KNEE: Primary | ICD-10-CM

## 2018-06-27 PROCEDURE — 99212 OFFICE O/P EST SF 10 MIN: CPT | Performed by: ORTHOPAEDIC SURGERY

## 2018-06-27 NOTE — PROGRESS NOTES
[facility]    Subjective     Chief Complaint   Patient presents with   • Follow-up     3 week f/u Primary osteoarthritis of right knee        HPI      Narendra Cochran is a 77 y.o. male.  He says his right knee is doing much better.  I gave him a cortisone injection last time in a knee brace.  He is quite pleased.  He has no pain.        Past Medical History:   Diagnosis Date   • Arthritis    • Diabetes mellitus    • Diverticulitis    • GERD (gastroesophageal reflux disease)    • Hyperlipidemia    • Hypertension    • Paget disease of bone       Past Surgical History:   Procedure Laterality Date   • APPENDECTOMY     • COLON RESECTION      second to diverticulitis    • FOOT SURGERY Left       Family History   Problem Relation Age of Onset   • Diabetes Sister      Social History     Social History   • Marital status:      Spouse name: N/A   • Number of children: N/A   • Years of education: N/A     Occupational History   • Not on file.     Social History Main Topics   • Smoking status: Current Every Day Smoker     Packs/day: 0.50     Years: 50.00     Types: Cigars   • Smokeless tobacco: Never Used   • Alcohol use No      Comment: rarely   • Drug use: No   • Sexual activity: Defer     Other Topics Concern   • Not on file     Social History Narrative   • No narrative on file      Current Outpatient Prescriptions on File Prior to Visit   Medication Sig Dispense Refill   • ACCU-CHEK FASTCLIX LANCETS misc Use daily.     • ACCU-CHEK SMARTVIEW test strip Use daily     • aspirin 81 MG chewable tablet Chew 1 tablet Daily.     • bumetanide (BUMEX) 0.5 MG tablet Take 1 tablet by mouth Daily. 90 tablet 1   • HYDROcodone-acetaminophen (NORCO) 5-325 MG per tablet Take 1 tablet by mouth Every 6 (Six) Hours As Needed for Moderate Pain  or Severe Pain . 10 tablet 0   • meloxicam (MOBIC) 15 MG tablet Take 1 tablet by mouth Daily. 15 tablet 0   • metFORMIN (GLUCOPHAGE) 1000 MG tablet Take 1 tablet by mouth  "Daily With Breakfast. 30 tablet 0   • metoprolol succinate XL (TOPROL-XL) 50 MG 24 hr tablet Take 1 tablet by mouth Daily. 90 tablet 1   • pantoprazole (PROTONIX) 40 MG EC tablet Take 1 tablet by mouth Daily. 30 tablet 6   • PHARMACY MEDS TO BED CONSULT Daily (Monday-Friday). 1 each 0   • polyethylene glycol (MIRALAX) packet Take 17 g by mouth Daily.     • potassium chloride ER (K-TAB) 20 MEQ tablet controlled-release ER tablet Take 1 tablet by mouth Daily.     • rosuvastatin (CRESTOR) 20 MG tablet Take 1 tablet by mouth Daily. 90 tablet 1     No current facility-administered medications on file prior to visit.       No Known Allergies     The following portions of the patient's history were reviewed and updated as appropriate: allergies, current medications, past family history, past medical history, past social history, past surgical history and problem list.    Review of Systems   Constitutional: Negative.    HENT: Negative.    Eyes: Negative.    Respiratory: Negative.    Cardiovascular: Negative.    Gastrointestinal: Negative.    Endocrine: Negative.    Genitourinary: Negative.    Musculoskeletal: Positive for arthralgias (Right knee).   Skin: Negative.    Allergic/Immunologic: Negative.    Neurological: Negative.    Hematological: Negative.    Psychiatric/Behavioral: Negative.         Objective      Physical Exam  Pulse 52   Ht 177.8 cm (70\")   Wt 92.2 kg (203 lb 4.2 oz)   SpO2 100%   BMI 29.17 kg/m²     Body mass index is 29.17 kg/m².        GENERAL APPEARANCE: awake, alert & oriented x 3, in no acute distress and well developed, well nourished  PSYCH: normal mood and affect  LUNGS:  breathing nonlabored, no wheezing  EYES: sclera anicteric, pupils equal  CARDIOVASCULAR: palpable pulses dorsalis pedis, palpable posterior tibial bilaterally. Capillary refill less than 2 seconds  INTEGUMENTARY: skin intact, no clubbing, cyanosis  NEUROLOGIC:  Normal Sensation and reflexes             Ortho Exam  Peripheral " Vascular:    Upper Extremity:   Inspection:  Left--no cyanotic nail beds Right--no cyanotic nail beds   Bilateral:  Pink nail beds with brisk capillary refill   Palpation:  Bilateral radial pulse normal  Musculoskeletal:  Global Assessment:  Overall assessment of Lower Extremity Muscle Strength and Tone:  Right quadriceps--5/5  Right hamstrings--5/5  Right tibialis anterior--5/5  Right gastroc soleus--5/5  Right EHL--5/5  Lower Extremity:  Knee/Patella:  No digital clubbing or cyanosis.    Examination of right knee reveals:  Normal deep tendon reflexes, coordination, strength, tone, sensation.  No known fractures or deformities.  Inspection and Palpation:    Right knee:  Tenderness:  none  Effusion:  none  Crepitus:  none  Pulses:  2+  Ecchymosis:  None  Warmth:  None   ROM:  Right:  Extension:0    Flexion:135  Left:  Extension:0     Flexion:135  Instability:  Right:  Lachman Test:  Negative, Varus stress test negative,   Valgus stress test negative, Posterior Drawer Test:  Negative  Deformities/Malalignments/Discrepancies:    Left:  none  Right:  none  Functional Testing:  Right:  Giovanni's test:  Negative  Patella grind test:  Negative  Q-angle:  Normal  Apprehension Sign:  Negative        Assessment/Plan        ICD-10-CM ICD-9-CM   1. Primary osteoarthritis of right knee M17.11 715.16     He will follow-up prn  Medical Decision Making  Management Options : over-the-counter medicine      Jason Treviño MD  06/27/18  3:08 PM         EMR Dragon/Transcription disclaimer:  Much of this encounter note is an electronic transcription of spoken language to printed text. Electronic transcription of spoken language may permit erroneous, or at times, nonsensical words or phrases to be inadvertently transcribed. Although I have reviewed the note for such errors, some may still exist.

## 2018-11-16 ENCOUNTER — LAB REQUISITION (OUTPATIENT)
Dept: LAB | Facility: HOSPITAL | Age: 78
End: 2018-11-16

## 2018-11-16 ENCOUNTER — OFFICE VISIT (OUTPATIENT)
Dept: FAMILY MEDICINE CLINIC | Facility: CLINIC | Age: 78
End: 2018-11-16

## 2018-11-16 VITALS
DIASTOLIC BLOOD PRESSURE: 78 MMHG | OXYGEN SATURATION: 99 % | WEIGHT: 204.6 LBS | BODY MASS INDEX: 29.29 KG/M2 | HEART RATE: 70 BPM | SYSTOLIC BLOOD PRESSURE: 152 MMHG | HEIGHT: 70 IN

## 2018-11-16 DIAGNOSIS — Z72.0 TOBACCO ABUSE: Chronic | ICD-10-CM

## 2018-11-16 DIAGNOSIS — R06.09 EXERTIONAL DYSPNEA: ICD-10-CM

## 2018-11-16 DIAGNOSIS — M25.561 ACUTE PAIN OF RIGHT KNEE: Primary | ICD-10-CM

## 2018-11-16 DIAGNOSIS — E78.5 HYPERLIPIDEMIA, UNSPECIFIED HYPERLIPIDEMIA TYPE: Chronic | ICD-10-CM

## 2018-11-16 DIAGNOSIS — E11.8 TYPE 2 DIABETES MELLITUS WITH COMPLICATION, WITHOUT LONG-TERM CURRENT USE OF INSULIN (HCC): Chronic | ICD-10-CM

## 2018-11-16 DIAGNOSIS — Z00.00 ROUTINE GENERAL MEDICAL EXAMINATION AT A HEALTH CARE FACILITY: ICD-10-CM

## 2018-11-16 DIAGNOSIS — I10 ESSENTIAL HYPERTENSION: Chronic | ICD-10-CM

## 2018-11-16 PROBLEM — H91.90 IMPAIRED HEARING: Status: ACTIVE | Noted: 2018-11-16

## 2018-11-16 PROBLEM — IMO0001 IMPAIRED HEARING: Status: ACTIVE | Noted: 2018-11-16

## 2018-11-16 LAB
ALBUMIN SERPL-MCNC: 4.31 G/DL (ref 3.2–4.8)
ALBUMIN/GLOB SERPL: 1.9 G/DL (ref 1.5–2.5)
ALP SERPL-CCNC: 69 U/L (ref 25–100)
ALT SERPL W P-5'-P-CCNC: 16 U/L (ref 7–40)
ANION GAP SERPL CALCULATED.3IONS-SCNC: 2 MMOL/L (ref 3–11)
AST SERPL-CCNC: 27 U/L (ref 0–33)
BILIRUB SERPL-MCNC: 0.5 MG/DL (ref 0.3–1.2)
BUN BLD-MCNC: 19 MG/DL (ref 9–23)
BUN/CREAT SERPL: 19.6 (ref 7–25)
CALCIUM SPEC-SCNC: 9.3 MG/DL (ref 8.7–10.4)
CHLORIDE SERPL-SCNC: 110 MMOL/L (ref 99–109)
CO2 SERPL-SCNC: 29 MMOL/L (ref 20–31)
CREAT BLD-MCNC: 0.97 MG/DL (ref 0.6–1.3)
GFR SERPL CREATININE-BSD FRML MDRD: 90 ML/MIN/1.73
GLOBULIN UR ELPH-MCNC: 2.3 GM/DL
GLUCOSE BLD-MCNC: 135 MG/DL (ref 70–100)
HBA1C MFR BLD: 6.4 % (ref 4.8–5.6)
POTASSIUM BLD-SCNC: 4.1 MMOL/L (ref 3.5–5.5)
PROT SERPL-MCNC: 6.6 G/DL (ref 5.7–8.2)
SODIUM BLD-SCNC: 141 MMOL/L (ref 132–146)

## 2018-11-16 PROCEDURE — 83036 HEMOGLOBIN GLYCOSYLATED A1C: CPT | Performed by: PHYSICIAN ASSISTANT

## 2018-11-16 PROCEDURE — 80053 COMPREHEN METABOLIC PANEL: CPT | Performed by: PHYSICIAN ASSISTANT

## 2018-11-16 PROCEDURE — 99214 OFFICE O/P EST MOD 30 MIN: CPT | Performed by: PHYSICIAN ASSISTANT

## 2018-11-16 PROCEDURE — 36415 COLL VENOUS BLD VENIPUNCTURE: CPT | Performed by: PHYSICIAN ASSISTANT

## 2018-11-16 RX ORDER — BUMETANIDE 0.5 MG/1
0.5 TABLET ORAL DAILY
Qty: 90 TABLET | Refills: 1 | Status: SHIPPED | OUTPATIENT
Start: 2018-11-16 | End: 2019-02-18 | Stop reason: SDUPTHER

## 2018-11-16 RX ORDER — ROSUVASTATIN CALCIUM 20 MG/1
20 TABLET, COATED ORAL DAILY
Qty: 90 TABLET | Refills: 1 | Status: SHIPPED | OUTPATIENT
Start: 2018-11-16 | End: 2019-02-18 | Stop reason: SDUPTHER

## 2018-11-16 RX ORDER — POTASSIUM CHLORIDE 1500 MG/1
20 TABLET, FILM COATED, EXTENDED RELEASE ORAL DAILY
Qty: 90 TABLET | Refills: 0 | Status: SHIPPED | OUTPATIENT
Start: 2018-11-16 | End: 2019-02-18 | Stop reason: SDUPTHER

## 2018-11-16 RX ORDER — METOPROLOL SUCCINATE 50 MG/1
50 TABLET, EXTENDED RELEASE ORAL DAILY
Qty: 90 TABLET | Refills: 1 | Status: SHIPPED | OUTPATIENT
Start: 2018-11-16 | End: 2019-02-18 | Stop reason: SDUPTHER

## 2018-11-16 NOTE — PROGRESS NOTES
Chief Complaint   Patient presents with   • Follow-up     Needs refills on medications       HPI     Narendra Cochran is a pleasant 78 y.o. male who is here for routine follow-up of chronic conditions:  HTN, DM II, hyperlipidemia, right knee pain and exertional dyspnea.  Blood pressure is elevated today.  He reports that it is usually better at home when he checks it.  He is compliant on all medications.  His right knee pain improved after steroid injection and it is not bothering him as much anymore.  He does report acute back pain without radiation into his legs.  He denies any trauma/injury.  Reports pain worse with certain positions and going from sitting to standing or leaning forward.  He reports exertional dyspnea with prolonged walking.  History of smoking, although he denies current use today.  He is not using an inhaler.    Past Medical History:   Diagnosis Date   • Arthritis    • Diabetes mellitus (CMS/HCC)    • Diverticulitis    • GERD (gastroesophageal reflux disease)    • Hyperlipidemia    • Hypertension    • Paget disease of bone        Past Surgical History:   Procedure Laterality Date   • APPENDECTOMY     • COLON RESECTION      second to diverticulitis    • FOOT SURGERY Left        Family History   Problem Relation Age of Onset   • Diabetes Sister        Social History     Socioeconomic History   • Marital status:      Spouse name: Not on file   • Number of children: Not on file   • Years of education: Not on file   • Highest education level: Not on file   Social Needs   • Financial resource strain: Not on file   • Food insecurity - worry: Not on file   • Food insecurity - inability: Not on file   • Transportation needs - medical: Not on file   • Transportation needs - non-medical: Not on file   Occupational History   • Not on file   Tobacco Use   • Smoking status: Current Every Day Smoker     Packs/day: 0.50     Years: 50.00     Pack years: 25.00     Types: Cigars   • Smokeless tobacco: Never Used  "  Substance and Sexual Activity   • Alcohol use: No     Comment: rarely   • Drug use: No   • Sexual activity: Defer   Other Topics Concern   • Not on file   Social History Narrative   • Not on file       No Known Allergies    ROS    Review of Systems   Constitutional: Negative for chills, diaphoresis, fatigue and fever.   HENT: Positive for hearing loss.    Respiratory: Positive for shortness of breath and wheezing. Negative for cough.    Cardiovascular: Negative for chest pain, palpitations and leg swelling.   Musculoskeletal: Positive for arthralgias, back pain, gait problem, joint swelling and myalgias.   Psychiatric/Behavioral: Negative for depressed mood. The patient is not nervous/anxious.        Vitals:    11/16/18 1527   BP: 152/78   BP Location: Right arm   Patient Position: Sitting   Cuff Size: Adult   Pulse: 70   SpO2: 99%   Weight: 92.8 kg (204 lb 9.6 oz)   Height: 177.8 cm (70\")         Current Outpatient Medications:   •  ACCU-CHEK FASTCLIX LANCETS misc, Use daily., Disp: , Rfl:   •  ACCU-CHEK SMARTVIEW test strip, Use daily, Disp: , Rfl:   •  aspirin 81 MG chewable tablet, Chew 1 tablet Daily., Disp: , Rfl:   •  bumetanide (BUMEX) 0.5 MG tablet, Take 1 tablet by mouth Daily., Disp: 90 tablet, Rfl: 1  •  HYDROcodone-acetaminophen (NORCO) 5-325 MG per tablet, Take 1 tablet by mouth Every 6 (Six) Hours As Needed for Moderate Pain  or Severe Pain ., Disp: 10 tablet, Rfl: 0  •  metFORMIN (GLUCOPHAGE) 1000 MG tablet, Take 1 tablet by mouth Daily With Breakfast., Disp: 30 tablet, Rfl: 0  •  metoprolol succinate XL (TOPROL-XL) 50 MG 24 hr tablet, Take 1 tablet by mouth Daily., Disp: 90 tablet, Rfl: 1  •  PHARMACY MEDS TO BED CONSULT, Daily (Monday-Friday)., Disp: 1 each, Rfl: 0  •  polyethylene glycol (MIRALAX) packet, Take 17 g by mouth Daily., Disp: , Rfl:   •  potassium chloride ER (K-TAB) 20 MEQ tablet controlled-release ER tablet, Take 1 tablet by mouth Daily., Disp: 90 tablet, Rfl: 0  •  rosuvastatin " (CRESTOR) 20 MG tablet, Take 1 tablet by mouth Daily., Disp: 90 tablet, Rfl: 1  •  tiotropium bromide-olodaterol (STIOLTO RESPIMAT) 2.5-2.5 MCG/ACT aerosol solution inhaler, Inhale 2 puffs Daily., Disp: , Rfl:     PE    Physical Exam   Constitutional: He appears well-developed and well-nourished. No distress.   HENT:   Head: Normocephalic and atraumatic.   Eyes: EOM are normal.   Neck: Normal range of motion.   Cardiovascular: Normal rate, regular rhythm and normal heart sounds.   Pulmonary/Chest: Effort normal. He has decreased breath sounds. He has no wheezes.   Musculoskeletal: Normal range of motion.   Neurological: He is alert.   Skin: Skin is warm. He is not diaphoretic. No erythema.   Psychiatric: He has a normal mood and affect. His speech is normal and behavior is normal. Judgment and thought content normal. He is not actively hallucinating. He is attentive.       A/P    Narendra was seen today for follow-up.    Diagnoses and all orders for this visit:    Acute pain of right knee    Essential hypertension  -blood pressure is elevated today  -patient is reporting pain in his back  -he monitors it at home and will call if it is above 140/90  -will order CMP today  -     metoprolol succinate XL (TOPROL-XL) 50 MG 24 hr tablet; Take 1 tablet by mouth Daily.  -     bumetanide (BUMEX) 0.5 MG tablet; Take 1 tablet by mouth Daily.  -     Comprehensive Metabolic Panel; Future  -     potassium chloride ER (K-TAB) 20 MEQ tablet controlled-release ER tablet; Take 1 tablet by mouth Daily.    Hyperlipidemia, unspecified hyperlipidemia type  -     rosuvastatin (CRESTOR) 20 MG tablet; Take 1 tablet by mouth Daily.    Type 2 diabetes mellitus with complication, without long-term current use of insulin (CMS/[facility])  -     metFORMIN (GLUCOPHAGE) 1000 MG tablet; Take 1 tablet by mouth Daily With Breakfast.  -     Hemoglobin A1c; Future    Exertional dyspnea  Tobacco abuse  -history of tobacco abuse, patient states that he doesn't  smoke anymore currently  -reports exertional dyspnea with prolonged walking/activity  -will trial stiolto, samples given       Plan of care reviewed with patient at the conclusion of today's visit. Education was provided regarding diagnosis, management and any prescribed or recommended OTC medications.  Patient verbalizes understanding of and agreement with management plan.    Return in about 3 months (around 2/16/2019) for Annual physical, Medicare Wellness.     Marguerite Moore PA-C

## 2018-11-16 NOTE — PATIENT INSTRUCTIONS
-do not take Mobic (meloxicam)  -take bumex in the morning with potassium  -limit ibuprofen to once a day  -may take tylenol for pain  -call if blood pressure is higher than 140/90  -use heating pad for back pain and avoid heavy lifting    Back Pain, Adult  Many adults have back pain from time to time. Common causes of back pain include:  · A strained muscle or ligament.  · Wear and tear (degeneration) of the spinal disks.  · Arthritis.  · A hit to the back.    Back pain can be short-lived (acute) or last a long time (chronic). A physical exam, lab tests, and imaging studies may be done to find the cause of your pain.  Follow these instructions at home:  Managing pain and stiffness  · Take over-the-counter and prescription medicines only as told by your health care provider.  · If directed, apply heat to the affected area as often as told by your health care provider. Use the heat source that your health care provider recommends, such as a moist heat pack or a heating pad.  ? Place a towel between your skin and the heat source.  ? Leave the heat on for 20-30 minutes.  ? Remove the heat if your skin turns bright red. This is especially important if you are unable to feel pain, heat, or cold. You have a greater risk of getting burned.  · If directed, apply ice to the injured area:  ? Put ice in a plastic bag.  ? Place a towel between your skin and the bag.  ? Leave the ice on for 20 minutes, 2-3 times a day for the first 2-3 days.  Activity  · Do not stay in bed. Resting more than 1-2 days can delay your recovery.  · Take short walks on even surfaces as soon as you are able. Try to increase the length of time you walk each day.  · Do not sit, drive, or  one place for more than 30 minutes at a time. Sitting or standing for long periods of time can put stress on your back.  · Use proper lifting techniques. When you bend and lift, use positions that put less stress on your back:  ? Bend your knees.  ? Keep the  load close to your body.  ? Avoid twisting.  · Exercise regularly as told by your health care provider. Exercising will help your back heal faster. This also helps prevent back injuries by keeping muscles strong and flexible.  · Your health care provider may recommend that you see a physical therapist. This person can help you come up with a safe exercise program. Do any exercises as told by your physical therapist.  Lifestyle  · Maintain a healthy weight. Extra weight puts stress on your back and makes it difficult to have good posture.  · Avoid activities or situations that make you feel anxious or stressed. Learn ways to manage anxiety and stress. One way to manage stress is through exercise. Stress and anxiety increase muscle tension and can make back pain worse.  General instructions  · Sleep on a firm mattress in a comfortable position. Try lying on your side with your knees slightly bent. If you lie on your back, put a pillow under your knees.  · Follow your treatment plan as told by your health care provider. This may include:  ? Cognitive or behavioral therapy.  ? Acupuncture or massage therapy.  ? Meditation or yoga.  Contact a health care provider if:  · You have pain that is not relieved with rest or medicine.  · You have increasing pain going down into your legs or buttocks.  · Your pain does not improve in 2 weeks.  · You have pain at night.  · You lose weight.  · You have a fever or chills.  Get help right away if:  · You develop new bowel or bladder control problems.  · You have unusual weakness or numbness in your arms or legs.  · You develop nausea or vomiting.  · You develop abdominal pain.  · You feel faint.  Summary  · Many adults have back pain from time to time. A physical exam, lab tests, and imaging studies may be done to find the cause of your pain.  · Use proper lifting techniques. When you bend and lift, use positions that put less stress on your back.  · Take over-the-counter and  prescription medicines and apply heat or ice as directed by your health care provider.  This information is not intended to replace advice given to you by your health care provider. Make sure you discuss any questions you have with your health care provider.  Document Released: 2006 Document Revised: 2018 Document Reviewed: 2018  [employer] Patient Education © 2018 [employer]    Back Exercises  The following exercises strengthen the muscles that help to support the back. They also help to keep the lower back flexible. Doing these exercises can help to prevent back pain or lessen existing pain.  If you have back pain or discomfort, try doing these exercises 2-3 times each day or as told by your health care provider. When the pain goes away, do them once each day, but increase the number of times that you repeat the steps for each exercise (do more repetitions). If you do not have back pain or discomfort, do these exercises once each day or as told by your health care provider.  Exercises  Single Knee to Chest    Repeat these steps 3-5 times for each le. Lie on your back on a firm bed or the floor with your legs extended.  2. Bring one knee to your chest. Your other leg should stay extended and in contact with the floor.  3. Hold your knee in place by grabbing your knee or thigh.  4. Pull on your knee until you feel a gentle stretch in your lower back.  5. Hold the stretch for 10-30 seconds.  6. Slowly release and straighten your leg.    Pelvic Tilt    Repeat these steps 5-10 times:  1. Lie on your back on a firm bed or the floor with your legs extended.  2. Bend your knees so they are pointing toward the ceiling and your feet are flat on the floor.  3. Tighten your lower abdominal muscles to press your lower back against the floor. This motion will tilt your pelvis so your tailbone points up toward the ceiling instead of pointing to your feet or the floor.  4. With gentle tension  and even breathing, hold this position for 5-10 seconds.    Cat-Cow    Repeat these steps until your lower back becomes more flexible:  1. Get into a hands-and-knees position on a firm surface. Keep your hands under your shoulders, and keep your knees under your hips. You may place padding under your knees for comfort.  2. Let your head hang down, and point your tailbone toward the floor so your lower back becomes rounded like the back of a cat.  3. Hold this position for 5 seconds.  4. Slowly lift your head and point your tailbone up toward the ceiling so your back forms a sagging arch like the back of a cow.  5. Hold this position for 5 seconds.    Press-Ups    Repeat these steps 5-10 times:  1. Lie on your abdomen (face-down) on the floor.  2. Place your palms near your head, about shoulder-width apart.  3. While you keep your back as relaxed as possible and keep your hips on the floor, slowly straighten your arms to raise the top half of your body and lift your shoulders. Do not use your back muscles to raise your upper torso. You may adjust the placement of your hands to make yourself more comfortable.  4. Hold this position for 5 seconds while you keep your back relaxed.  5. Slowly return to lying flat on the floor.    Bridges    Repeat these steps 10 times:  1. Lie on your back on a firm surface.  2. Bend your knees so they are pointing toward the ceiling and your feet are flat on the floor.  3. Tighten your buttocks muscles and lift your buttocks off of the floor until your waist is at almost the same height as your knees. You should feel the muscles working in your buttocks and the back of your thighs. If you do not feel these muscles, slide your feet 1-2 inches farther away from your buttocks.  4. Hold this position for 3-5 seconds.  5. Slowly lower your hips to the starting position, and allow your buttocks muscles to relax completely.    If this exercise is too easy, try doing it with your arms crossed  over your chest.  Abdominal Crunches    Repeat these steps 5-10 times:  1. Lie on your back on a firm bed or the floor with your legs extended.  2. Bend your knees so they are pointing toward the ceiling and your feet are flat on the floor.  3. Cross your arms over your chest.  4. Tip your chin slightly toward your chest without bending your neck.  5. Tighten your abdominal muscles and slowly raise your trunk (torso) high enough to lift your shoulder blades a tiny bit off of the floor. Avoid raising your torso higher than that, because it can put too much stress on your low back and it does not help to strengthen your abdominal muscles.  6. Slowly return to your starting position.    Back Lifts  Repeat these steps 5-10 times:  1. Lie on your abdomen (face-down) with your arms at your sides, and rest your forehead on the floor.  2. Tighten the muscles in your legs and your buttocks.  3. Slowly lift your chest off of the floor while you keep your hips pressed to the floor. Keep the back of your head in line with the curve in your back. Your eyes should be looking at the floor.  4. Hold this position for 3-5 seconds.  5. Slowly return to your starting position.    Contact a health care provider if:  · Your back pain or discomfort gets much worse when you do an exercise.  · Your back pain or discomfort does not lessen within 2 hours after you exercise.  If you have any of these problems, stop doing these exercises right away. Do not do them again unless your health care provider says that you can.  Get help right away if:  · You develop sudden, severe back pain. If this happens, stop doing the exercises right away. Do not do them again unless your health care provider says that you can.  This information is not intended to replace advice given to you by your health care provider. Make sure you discuss any questions you have with your health care provider.  Document Released: 01/25/2006 Document Revised: 04/26/2017  Document Reviewed: 02/11/2016  [employer] Interactive Patient Education © 2017 [employer]

## 2018-12-12 LAB
ALBUMIN SERPL-MCNC: 4.31 G/DL (ref 3.2–4.8)
ALBUMIN/GLOB SERPL: 1.9 G/DL (ref 1.5–2.5)
ALP SERPL-CCNC: 69 U/L (ref 25–100)
ALT SERPL-CCNC: 16 U/L (ref 7–40)
AST SERPL-CCNC: 27 U/L (ref 0–33)
BILIRUB SERPL-MCNC: 0.5 MG/DL (ref 0.3–1.2)
BUN SERPL-MCNC: 19 MG/DL (ref 9–23)
BUN/CREAT SERPL: 19.6 (ref 7–25)
CALCIUM SERPL-MCNC: 9.3 MG/DL (ref 8.7–10.4)
CHLORIDE SERPL-SCNC: 110 MMOL/L (ref 99–109)
CO2 SERPL-SCNC: 29 MMOL/L (ref 20–31)
CREAT SERPL-MCNC: 0.97 MG/DL (ref 0.6–1.3)
GLOBULIN SER CALC-MCNC: 2.3 G/DL
GLUCOSE SERPL-MCNC: 135 MG/DL (ref 70–100)
HBA1C MFR BLD: 6.4 % (ref 4.8–5.6)
POTASSIUM SERPL-SCNC: 4.1 MMOL/L (ref 3.5–5.5)
PROT SERPL-MCNC: 6.6 G/DL (ref 5.7–8.2)
SODIUM SERPL-SCNC: 141 MMOL/L (ref 132–146)

## 2019-01-25 ENCOUNTER — APPOINTMENT (OUTPATIENT)
Dept: GENERAL RADIOLOGY | Facility: HOSPITAL | Age: 79
End: 2019-01-25

## 2019-01-25 ENCOUNTER — APPOINTMENT (OUTPATIENT)
Dept: CT IMAGING | Facility: HOSPITAL | Age: 79
End: 2019-01-25

## 2019-01-25 ENCOUNTER — HOSPITAL ENCOUNTER (INPATIENT)
Facility: HOSPITAL | Age: 79
LOS: 6 days | Discharge: NURSING, REHAB OR LONG-TERM CARE | End: 2019-01-31
Attending: EMERGENCY MEDICINE | Admitting: HOSPITALIST

## 2019-01-25 DIAGNOSIS — M25.562 CHRONIC PAIN OF BOTH KNEES: ICD-10-CM

## 2019-01-25 DIAGNOSIS — R10.9 ABDOMINAL PAIN, UNSPECIFIED ABDOMINAL LOCATION: ICD-10-CM

## 2019-01-25 DIAGNOSIS — J69.0 ASPIRATION PNEUMONIA OF RIGHT UPPER LOBE, UNSPECIFIED ASPIRATION PNEUMONIA TYPE (HCC): ICD-10-CM

## 2019-01-25 DIAGNOSIS — Z86.39 HISTORY OF DIABETES MELLITUS, TYPE II: ICD-10-CM

## 2019-01-25 DIAGNOSIS — M88.9 PAGET DISEASE OF BONE: Chronic | ICD-10-CM

## 2019-01-25 DIAGNOSIS — M25.561 CHRONIC PAIN OF BOTH KNEES: ICD-10-CM

## 2019-01-25 DIAGNOSIS — R56.9 NEW ONSET SEIZURE (HCC): Primary | ICD-10-CM

## 2019-01-25 DIAGNOSIS — Z78.9 IMPAIRED MOBILITY AND ADLS: ICD-10-CM

## 2019-01-25 DIAGNOSIS — Z86.79 HISTORY OF HYPERTENSION: ICD-10-CM

## 2019-01-25 DIAGNOSIS — Z74.09 IMPAIRED FUNCTIONAL MOBILITY, BALANCE, GAIT, AND ENDURANCE: ICD-10-CM

## 2019-01-25 DIAGNOSIS — R93.89 ABNORMAL CHEST X-RAY: ICD-10-CM

## 2019-01-25 DIAGNOSIS — Z74.09 IMPAIRED MOBILITY AND ADLS: ICD-10-CM

## 2019-01-25 DIAGNOSIS — G89.29 CHRONIC PAIN OF BOTH KNEES: ICD-10-CM

## 2019-01-25 LAB
ALBUMIN SERPL-MCNC: 4.19 G/DL (ref 3.2–4.8)
ALBUMIN/GLOB SERPL: 1.5 G/DL (ref 1.5–2.5)
ALP SERPL-CCNC: 73 U/L (ref 25–100)
ALT SERPL W P-5'-P-CCNC: 13 U/L (ref 7–40)
AMPHET+METHAMPHET UR QL: NEGATIVE
AMPHETAMINES UR QL: NEGATIVE
ANION GAP SERPL CALCULATED.3IONS-SCNC: 12 MMOL/L (ref 3–11)
AST SERPL-CCNC: 28 U/L (ref 0–33)
BACTERIA UR QL AUTO: ABNORMAL /HPF
BARBITURATES UR QL SCN: NEGATIVE
BASOPHILS # BLD AUTO: 0 10*3/MM3 (ref 0–0.2)
BASOPHILS NFR BLD AUTO: 0 % (ref 0–1)
BENZODIAZ UR QL SCN: NEGATIVE
BILIRUB SERPL-MCNC: 0.5 MG/DL (ref 0.3–1.2)
BILIRUB UR QL STRIP: NEGATIVE
BUN BLD-MCNC: 18 MG/DL (ref 9–23)
BUN/CREAT SERPL: 16.8 (ref 7–25)
BUPRENORPHINE SERPL-MCNC: NEGATIVE NG/ML
CALCIUM SPEC-SCNC: 9 MG/DL (ref 8.7–10.4)
CANNABINOIDS SERPL QL: POSITIVE
CHLORIDE SERPL-SCNC: 110 MMOL/L (ref 99–109)
CK SERPL-CCNC: 407 U/L (ref 26–174)
CLARITY UR: CLEAR
CO2 SERPL-SCNC: 20 MMOL/L (ref 20–31)
COCAINE UR QL: NEGATIVE
COLOR UR: YELLOW
CREAT BLD-MCNC: 1.07 MG/DL (ref 0.6–1.3)
D-LACTATE SERPL-SCNC: 3.5 MMOL/L (ref 0.5–2)
DEPRECATED RDW RBC AUTO: 46.3 FL (ref 37–54)
EOSINOPHIL # BLD AUTO: 0.04 10*3/MM3 (ref 0–0.3)
EOSINOPHIL NFR BLD AUTO: 1.2 % (ref 0–3)
ERYTHROCYTE [DISTWIDTH] IN BLOOD BY AUTOMATED COUNT: 14.7 % (ref 11.3–14.5)
GFR SERPL CREATININE-BSD FRML MDRD: 81 ML/MIN/1.73
GLOBULIN UR ELPH-MCNC: 2.7 GM/DL
GLUCOSE BLD-MCNC: 112 MG/DL (ref 70–100)
GLUCOSE UR STRIP-MCNC: NEGATIVE MG/DL
HCT VFR BLD AUTO: 43.9 % (ref 38.9–50.9)
HGB BLD-MCNC: 14.7 G/DL (ref 13.1–17.5)
HGB UR QL STRIP.AUTO: ABNORMAL
HOLD SPECIMEN: NORMAL
HOLD SPECIMEN: NORMAL
HYALINE CASTS UR QL AUTO: ABNORMAL /LPF
IMM GRANULOCYTES # BLD AUTO: 0.01 10*3/MM3 (ref 0–0.03)
IMM GRANULOCYTES NFR BLD AUTO: 0.3 % (ref 0–0.6)
KETONES UR QL STRIP: ABNORMAL
LEUKOCYTE ESTERASE UR QL STRIP.AUTO: NEGATIVE
LYMPHOCYTES # BLD AUTO: 1.04 10*3/MM3 (ref 0.6–4.8)
LYMPHOCYTES NFR BLD AUTO: 30.6 % (ref 24–44)
MAGNESIUM SERPL-MCNC: 2.1 MG/DL (ref 1.3–2.7)
MCH RBC QN AUTO: 28.9 PG (ref 27–31)
MCHC RBC AUTO-ENTMCNC: 33.5 G/DL (ref 32–36)
MCV RBC AUTO: 86.2 FL (ref 80–99)
METHADONE UR QL SCN: NEGATIVE
MONOCYTES # BLD AUTO: 0.22 10*3/MM3 (ref 0–1)
MONOCYTES NFR BLD AUTO: 6.5 % (ref 0–12)
NEUTROPHILS # BLD AUTO: 2.1 10*3/MM3 (ref 1.5–8.3)
NEUTROPHILS NFR BLD AUTO: 61.7 % (ref 41–71)
NITRITE UR QL STRIP: NEGATIVE
OPIATES UR QL: NEGATIVE
OXYCODONE UR QL SCN: NEGATIVE
PCP UR QL SCN: NEGATIVE
PH UR STRIP.AUTO: 6.5 [PH] (ref 5–8)
PLATELET # BLD AUTO: 108 10*3/MM3 (ref 150–450)
PMV BLD AUTO: 11.3 FL (ref 6–12)
POTASSIUM BLD-SCNC: 4.5 MMOL/L (ref 3.5–5.5)
PROPOXYPH UR QL: NEGATIVE
PROT SERPL-MCNC: 6.9 G/DL (ref 5.7–8.2)
PROT UR QL STRIP: ABNORMAL
RBC # BLD AUTO: 5.09 10*6/MM3 (ref 4.2–5.76)
RBC # UR: ABNORMAL /HPF
REF LAB TEST METHOD: ABNORMAL
SODIUM BLD-SCNC: 142 MMOL/L (ref 132–146)
SP GR UR STRIP: 1.02 (ref 1–1.03)
SQUAMOUS #/AREA URNS HPF: ABNORMAL /HPF
TRICYCLICS UR QL SCN: NEGATIVE
TROPONIN I SERPL-MCNC: 0.01 NG/ML (ref 0–0.07)
UROBILINOGEN UR QL STRIP: ABNORMAL
WBC NRBC COR # BLD: 3.4 10*3/MM3 (ref 3.5–10.8)
WBC UR QL AUTO: ABNORMAL /HPF
WHOLE BLOOD HOLD SPECIMEN: NORMAL
WHOLE BLOOD HOLD SPECIMEN: NORMAL

## 2019-01-25 PROCEDURE — 74177 CT ABD & PELVIS W/CONTRAST: CPT

## 2019-01-25 PROCEDURE — 25010000002 IOPAMIDOL 61 % SOLUTION: Performed by: EMERGENCY MEDICINE

## 2019-01-25 PROCEDURE — 80306 DRUG TEST PRSMV INSTRMNT: CPT | Performed by: NURSE PRACTITIONER

## 2019-01-25 PROCEDURE — 25010000002 MIDAZOLAM PER 1 MG

## 2019-01-25 PROCEDURE — 83735 ASSAY OF MAGNESIUM: CPT | Performed by: EMERGENCY MEDICINE

## 2019-01-25 PROCEDURE — 99285 EMERGENCY DEPT VISIT HI MDM: CPT

## 2019-01-25 PROCEDURE — 25010000002 LEVETRIRACETAM PER 10 MG: Performed by: NURSE PRACTITIONER

## 2019-01-25 PROCEDURE — 85025 COMPLETE CBC W/AUTO DIFF WBC: CPT | Performed by: EMERGENCY MEDICINE

## 2019-01-25 PROCEDURE — 83605 ASSAY OF LACTIC ACID: CPT | Performed by: NURSE PRACTITIONER

## 2019-01-25 PROCEDURE — 70450 CT HEAD/BRAIN W/O DYE: CPT

## 2019-01-25 PROCEDURE — 93005 ELECTROCARDIOGRAM TRACING: CPT | Performed by: EMERGENCY MEDICINE

## 2019-01-25 PROCEDURE — 87040 BLOOD CULTURE FOR BACTERIA: CPT | Performed by: NURSE PRACTITIONER

## 2019-01-25 PROCEDURE — 84484 ASSAY OF TROPONIN QUANT: CPT

## 2019-01-25 PROCEDURE — 82550 ASSAY OF CK (CPK): CPT | Performed by: NURSE PRACTITIONER

## 2019-01-25 PROCEDURE — 81001 URINALYSIS AUTO W/SCOPE: CPT | Performed by: EMERGENCY MEDICINE

## 2019-01-25 PROCEDURE — 80053 COMPREHEN METABOLIC PANEL: CPT | Performed by: EMERGENCY MEDICINE

## 2019-01-25 PROCEDURE — 71045 X-RAY EXAM CHEST 1 VIEW: CPT

## 2019-01-25 RX ORDER — CEFTRIAXONE SODIUM 1 G/50ML
1 INJECTION, SOLUTION INTRAVENOUS ONCE
Status: COMPLETED | OUTPATIENT
Start: 2019-01-25 | End: 2019-01-26

## 2019-01-25 RX ORDER — LORAZEPAM 2 MG/ML
INJECTION INTRAMUSCULAR
Status: DISCONTINUED
Start: 2019-01-25 | End: 2019-01-25 | Stop reason: WASHOUT

## 2019-01-25 RX ORDER — MIDAZOLAM HYDROCHLORIDE 1 MG/ML
4 INJECTION INTRAMUSCULAR; INTRAVENOUS ONCE
Status: COMPLETED | OUTPATIENT
Start: 2019-01-25 | End: 2019-01-25

## 2019-01-25 RX ORDER — MIDAZOLAM HYDROCHLORIDE 1 MG/ML
INJECTION INTRAMUSCULAR; INTRAVENOUS
Status: COMPLETED
Start: 2019-01-25 | End: 2019-01-25

## 2019-01-25 RX ORDER — SODIUM CHLORIDE 0.9 % (FLUSH) 0.9 %
10 SYRINGE (ML) INJECTION AS NEEDED
Status: DISCONTINUED | OUTPATIENT
Start: 2019-01-25 | End: 2019-01-31 | Stop reason: HOSPADM

## 2019-01-25 RX ADMIN — SODIUM CHLORIDE 1000 ML: 9 INJECTION, SOLUTION INTRAVENOUS at 20:30

## 2019-01-25 RX ADMIN — MIDAZOLAM HYDROCHLORIDE 4 MG: 1 INJECTION INTRAMUSCULAR; INTRAVENOUS at 20:27

## 2019-01-25 RX ADMIN — MIDAZOLAM HYDROCHLORIDE 4 MG: 1 INJECTION, SOLUTION INTRAMUSCULAR; INTRAVENOUS at 20:27

## 2019-01-25 RX ADMIN — LEVETIRACETAM 2000 MG: 500 INJECTION, SOLUTION, CONCENTRATE INTRAVENOUS at 20:33

## 2019-01-25 RX ADMIN — IOPAMIDOL 100 ML: 612 INJECTION, SOLUTION INTRAVENOUS at 20:54

## 2019-01-25 RX ADMIN — SODIUM CHLORIDE 1000 ML: 9 INJECTION, SOLUTION INTRAVENOUS at 23:55

## 2019-01-26 ENCOUNTER — APPOINTMENT (OUTPATIENT)
Dept: NEUROLOGY | Facility: HOSPITAL | Age: 79
End: 2019-01-26
Attending: PSYCHIATRY & NEUROLOGY

## 2019-01-26 ENCOUNTER — APPOINTMENT (OUTPATIENT)
Dept: MRI IMAGING | Facility: HOSPITAL | Age: 79
End: 2019-01-26

## 2019-01-26 ENCOUNTER — APPOINTMENT (OUTPATIENT)
Dept: CT IMAGING | Facility: HOSPITAL | Age: 79
End: 2019-01-26

## 2019-01-26 PROBLEM — E87.20 LACTIC ACIDOSIS: Status: ACTIVE | Noted: 2019-01-26

## 2019-01-26 LAB
ANION GAP SERPL CALCULATED.3IONS-SCNC: 2 MMOL/L (ref 3–11)
BASOPHILS # BLD AUTO: 0.01 10*3/MM3 (ref 0–0.2)
BASOPHILS NFR BLD AUTO: 0.2 % (ref 0–1)
BUN BLD-MCNC: 14 MG/DL (ref 9–23)
BUN/CREAT SERPL: 16.1 (ref 7–25)
CALCIUM SPEC-SCNC: 8.5 MG/DL (ref 8.7–10.4)
CHLORIDE SERPL-SCNC: 114 MMOL/L (ref 99–109)
CO2 SERPL-SCNC: 24 MMOL/L (ref 20–31)
CREAT BLD-MCNC: 0.87 MG/DL (ref 0.6–1.3)
D-LACTATE SERPL-SCNC: 1.2 MMOL/L (ref 0.5–2)
D-LACTATE SERPL-SCNC: 2.5 MMOL/L (ref 0.5–2)
DEPRECATED RDW RBC AUTO: 46.2 FL (ref 37–54)
EOSINOPHIL # BLD AUTO: 0.03 10*3/MM3 (ref 0–0.3)
EOSINOPHIL NFR BLD AUTO: 0.7 % (ref 0–3)
ERYTHROCYTE [DISTWIDTH] IN BLOOD BY AUTOMATED COUNT: 14.6 % (ref 11.3–14.5)
GFR SERPL CREATININE-BSD FRML MDRD: 102 ML/MIN/1.73
GLUCOSE BLD-MCNC: 88 MG/DL (ref 70–100)
GLUCOSE BLDC GLUCOMTR-MCNC: 110 MG/DL (ref 70–130)
GLUCOSE BLDC GLUCOMTR-MCNC: 139 MG/DL (ref 70–130)
GLUCOSE BLDC GLUCOMTR-MCNC: 88 MG/DL (ref 70–130)
GLUCOSE BLDC GLUCOMTR-MCNC: 93 MG/DL (ref 70–130)
HBA1C MFR BLD: 6.5 % (ref 4.8–5.6)
HCT VFR BLD AUTO: 40.3 % (ref 38.9–50.9)
HGB BLD-MCNC: 13 G/DL (ref 13.1–17.5)
HOLD SPECIMEN: NORMAL
IMM GRANULOCYTES # BLD AUTO: 0.01 10*3/MM3 (ref 0–0.03)
IMM GRANULOCYTES NFR BLD AUTO: 0.2 % (ref 0–0.6)
LYMPHOCYTES # BLD AUTO: 1.1 10*3/MM3 (ref 0.6–4.8)
LYMPHOCYTES NFR BLD AUTO: 24 % (ref 24–44)
MCH RBC QN AUTO: 27.6 PG (ref 27–31)
MCHC RBC AUTO-ENTMCNC: 32.3 G/DL (ref 32–36)
MCV RBC AUTO: 85.6 FL (ref 80–99)
MONOCYTES # BLD AUTO: 0.35 10*3/MM3 (ref 0–1)
MONOCYTES NFR BLD AUTO: 7.6 % (ref 0–12)
NEUTROPHILS # BLD AUTO: 3.09 10*3/MM3 (ref 1.5–8.3)
NEUTROPHILS NFR BLD AUTO: 67.5 % (ref 41–71)
PLATELET # BLD AUTO: 106 10*3/MM3 (ref 150–450)
PMV BLD AUTO: 11.7 FL (ref 6–12)
POTASSIUM BLD-SCNC: 3.7 MMOL/L (ref 3.5–5.5)
RBC # BLD AUTO: 4.71 10*6/MM3 (ref 4.2–5.76)
SODIUM BLD-SCNC: 140 MMOL/L (ref 132–146)
WBC NRBC COR # BLD: 4.58 10*3/MM3 (ref 3.5–10.8)

## 2019-01-26 PROCEDURE — 63710000001 INSULIN LISPRO (HUMAN) PER 5 UNITS: Performed by: NURSE PRACTITIONER

## 2019-01-26 PROCEDURE — 92610 EVALUATE SWALLOWING FUNCTION: CPT

## 2019-01-26 PROCEDURE — 25010000002 VANCOMYCIN 10 G RECONSTITUTED SOLUTION

## 2019-01-26 PROCEDURE — 83605 ASSAY OF LACTIC ACID: CPT | Performed by: INTERNAL MEDICINE

## 2019-01-26 PROCEDURE — 95819 EEG AWAKE AND ASLEEP: CPT

## 2019-01-26 PROCEDURE — 85025 COMPLETE CBC W/AUTO DIFF WBC: CPT | Performed by: NURSE PRACTITIONER

## 2019-01-26 PROCEDURE — 70553 MRI BRAIN STEM W/O & W/DYE: CPT

## 2019-01-26 PROCEDURE — A9577 INJ MULTIHANCE: HCPCS | Performed by: INTERNAL MEDICINE

## 2019-01-26 PROCEDURE — 71250 CT THORAX DX C-: CPT

## 2019-01-26 PROCEDURE — 25010000002 AZITHROMYCIN: Performed by: NURSE PRACTITIONER

## 2019-01-26 PROCEDURE — 99223 1ST HOSP IP/OBS HIGH 75: CPT | Performed by: INTERNAL MEDICINE

## 2019-01-26 PROCEDURE — 82962 GLUCOSE BLOOD TEST: CPT

## 2019-01-26 PROCEDURE — 25010000002 PIPERACILLIN SOD-TAZOBACTAM PER 1 G: Performed by: NURSE PRACTITIONER

## 2019-01-26 PROCEDURE — 80048 BASIC METABOLIC PNL TOTAL CA: CPT | Performed by: NURSE PRACTITIONER

## 2019-01-26 PROCEDURE — 0 GADOBENATE DIMEGLUMINE 529 MG/ML SOLUTION: Performed by: INTERNAL MEDICINE

## 2019-01-26 PROCEDURE — 83036 HEMOGLOBIN GLYCOSYLATED A1C: CPT | Performed by: NURSE PRACTITIONER

## 2019-01-26 PROCEDURE — 25010000002 ENOXAPARIN PER 10 MG: Performed by: NURSE PRACTITIONER

## 2019-01-26 PROCEDURE — 83605 ASSAY OF LACTIC ACID: CPT | Performed by: NURSE PRACTITIONER

## 2019-01-26 PROCEDURE — 25010000002 CEFTRIAXONE PER 250 MG: Performed by: NURSE PRACTITIONER

## 2019-01-26 RX ORDER — SODIUM CHLORIDE 9 MG/ML
75 INJECTION, SOLUTION INTRAVENOUS ONCE
Status: COMPLETED | OUTPATIENT
Start: 2019-01-26 | End: 2019-01-26

## 2019-01-26 RX ORDER — SODIUM CHLORIDE 0.9 % (FLUSH) 0.9 %
3 SYRINGE (ML) INJECTION EVERY 12 HOURS SCHEDULED
Status: DISCONTINUED | OUTPATIENT
Start: 2019-01-26 | End: 2019-01-31 | Stop reason: HOSPADM

## 2019-01-26 RX ORDER — LEVETIRACETAM 500 MG/1
500 TABLET ORAL EVERY 12 HOURS SCHEDULED
Status: DISCONTINUED | OUTPATIENT
Start: 2019-01-26 | End: 2019-01-31 | Stop reason: HOSPADM

## 2019-01-26 RX ORDER — BUMETANIDE 1 MG/1
0.5 TABLET ORAL DAILY
Status: DISCONTINUED | OUTPATIENT
Start: 2019-01-26 | End: 2019-01-31 | Stop reason: HOSPADM

## 2019-01-26 RX ORDER — NICOTINE POLACRILEX 4 MG
15 LOZENGE BUCCAL
Status: DISCONTINUED | OUTPATIENT
Start: 2019-01-26 | End: 2019-01-31 | Stop reason: HOSPADM

## 2019-01-26 RX ORDER — ACETAMINOPHEN 325 MG/1
650 TABLET ORAL EVERY 6 HOURS PRN
Status: DISCONTINUED | OUTPATIENT
Start: 2019-01-26 | End: 2019-01-31 | Stop reason: HOSPADM

## 2019-01-26 RX ORDER — ROSUVASTATIN CALCIUM 20 MG/1
20 TABLET, COATED ORAL DAILY
Status: DISCONTINUED | OUTPATIENT
Start: 2019-01-26 | End: 2019-01-31 | Stop reason: HOSPADM

## 2019-01-26 RX ORDER — ASPIRIN 81 MG/1
81 TABLET, CHEWABLE ORAL DAILY
Status: DISCONTINUED | OUTPATIENT
Start: 2019-01-26 | End: 2019-01-31 | Stop reason: HOSPADM

## 2019-01-26 RX ORDER — SODIUM CHLORIDE 0.9 % (FLUSH) 0.9 %
3-10 SYRINGE (ML) INJECTION AS NEEDED
Status: DISCONTINUED | OUTPATIENT
Start: 2019-01-26 | End: 2019-01-31 | Stop reason: HOSPADM

## 2019-01-26 RX ORDER — METOPROLOL SUCCINATE 50 MG/1
50 TABLET, EXTENDED RELEASE ORAL DAILY
Status: DISCONTINUED | OUTPATIENT
Start: 2019-01-26 | End: 2019-01-31 | Stop reason: HOSPADM

## 2019-01-26 RX ORDER — DEXTROSE MONOHYDRATE 25 G/50ML
25 INJECTION, SOLUTION INTRAVENOUS
Status: DISCONTINUED | OUTPATIENT
Start: 2019-01-26 | End: 2019-01-31 | Stop reason: HOSPADM

## 2019-01-26 RX ADMIN — BUMETANIDE 0.5 MG: 1 TABLET ORAL at 09:27

## 2019-01-26 RX ADMIN — TAZOBACTAM SODIUM AND PIPERACILLIN SODIUM 3.38 G: 375; 3 INJECTION, SOLUTION INTRAVENOUS at 16:55

## 2019-01-26 RX ADMIN — ASPIRIN 81 MG CHEWABLE TABLET 81 MG: 81 TABLET CHEWABLE at 09:28

## 2019-01-26 RX ADMIN — AZITHROMYCIN MONOHYDRATE 500 MG: 500 INJECTION, POWDER, LYOPHILIZED, FOR SOLUTION INTRAVENOUS at 02:17

## 2019-01-26 RX ADMIN — CEFTRIAXONE SODIUM 1 G: 1 INJECTION, SOLUTION INTRAVENOUS at 00:48

## 2019-01-26 RX ADMIN — POLYETHYLENE GLYCOL 3350 17 G: 17 POWDER, FOR SOLUTION ORAL at 09:28

## 2019-01-26 RX ADMIN — SODIUM CHLORIDE, PRESERVATIVE FREE 3 ML: 5 INJECTION INTRAVENOUS at 02:18

## 2019-01-26 RX ADMIN — TAZOBACTAM SODIUM AND PIPERACILLIN SODIUM 3.38 G: 375; 3 INJECTION, SOLUTION INTRAVENOUS at 09:27

## 2019-01-26 RX ADMIN — SODIUM CHLORIDE 75 ML/HR: 9 INJECTION, SOLUTION INTRAVENOUS at 02:24

## 2019-01-26 RX ADMIN — ENOXAPARIN SODIUM 40 MG: 40 INJECTION SUBCUTANEOUS at 05:18

## 2019-01-26 RX ADMIN — SODIUM CHLORIDE, PRESERVATIVE FREE 3 ML: 5 INJECTION INTRAVENOUS at 20:44

## 2019-01-26 RX ADMIN — METOPROLOL SUCCINATE 50 MG: 50 TABLET, EXTENDED RELEASE ORAL at 09:27

## 2019-01-26 RX ADMIN — TAZOBACTAM SODIUM AND PIPERACILLIN SODIUM 3.38 G: 375; 3 INJECTION, SOLUTION INTRAVENOUS at 02:17

## 2019-01-26 RX ADMIN — ROSUVASTATIN CALCIUM 20 MG: 20 TABLET, FILM COATED ORAL at 09:27

## 2019-01-26 RX ADMIN — GADOBENATE DIMEGLUMINE 18 ML: 529 INJECTION, SOLUTION INTRAVENOUS at 04:15

## 2019-01-26 RX ADMIN — TAZOBACTAM SODIUM AND PIPERACILLIN SODIUM 3.38 G: 375; 3 INJECTION, SOLUTION INTRAVENOUS at 23:04

## 2019-01-26 RX ADMIN — LEVETIRACETAM 500 MG: 500 TABLET, FILM COATED ORAL at 16:55

## 2019-01-26 RX ADMIN — VANCOMYCIN HYDROCHLORIDE 2500 MG: 10 INJECTION, POWDER, LYOPHILIZED, FOR SOLUTION INTRAVENOUS at 02:17

## 2019-01-27 LAB
GLUCOSE BLDC GLUCOMTR-MCNC: 111 MG/DL (ref 70–130)
GLUCOSE BLDC GLUCOMTR-MCNC: 154 MG/DL (ref 70–130)
GLUCOSE BLDC GLUCOMTR-MCNC: 77 MG/DL (ref 70–130)
GLUCOSE BLDC GLUCOMTR-MCNC: 83 MG/DL (ref 70–130)
MRSA DNA SPEC QL NAA+PROBE: NEGATIVE

## 2019-01-27 PROCEDURE — 25010000002 PIPERACILLIN SOD-TAZOBACTAM PER 1 G: Performed by: NURSE PRACTITIONER

## 2019-01-27 PROCEDURE — 25010000002 HYDRALAZINE PER 20 MG: Performed by: NURSE PRACTITIONER

## 2019-01-27 PROCEDURE — 99232 SBSQ HOSP IP/OBS MODERATE 35: CPT | Performed by: NURSE PRACTITIONER

## 2019-01-27 PROCEDURE — 82962 GLUCOSE BLOOD TEST: CPT

## 2019-01-27 PROCEDURE — 25010000002 ENOXAPARIN PER 10 MG: Performed by: NURSE PRACTITIONER

## 2019-01-27 PROCEDURE — 87641 MR-STAPH DNA AMP PROBE: CPT

## 2019-01-27 PROCEDURE — 25010000002 VANCOMYCIN 10 G RECONSTITUTED SOLUTION

## 2019-01-27 RX ORDER — HYDRALAZINE HYDROCHLORIDE 20 MG/ML
10 INJECTION INTRAMUSCULAR; INTRAVENOUS ONCE
Status: COMPLETED | OUTPATIENT
Start: 2019-01-27 | End: 2019-01-27

## 2019-01-27 RX ADMIN — ENOXAPARIN SODIUM 40 MG: 40 INJECTION SUBCUTANEOUS at 05:53

## 2019-01-27 RX ADMIN — ROSUVASTATIN CALCIUM 20 MG: 20 TABLET, FILM COATED ORAL at 09:29

## 2019-01-27 RX ADMIN — LEVETIRACETAM 500 MG: 500 TABLET, FILM COATED ORAL at 20:53

## 2019-01-27 RX ADMIN — HYDRALAZINE HYDROCHLORIDE 10 MG: 20 INJECTION INTRAMUSCULAR; INTRAVENOUS at 20:53

## 2019-01-27 RX ADMIN — POLYETHYLENE GLYCOL 3350 17 G: 17 POWDER, FOR SOLUTION ORAL at 09:31

## 2019-01-27 RX ADMIN — SODIUM CHLORIDE, PRESERVATIVE FREE 3 ML: 5 INJECTION INTRAVENOUS at 09:30

## 2019-01-27 RX ADMIN — BUMETANIDE 0.5 MG: 1 TABLET ORAL at 09:29

## 2019-01-27 RX ADMIN — VANCOMYCIN HYDROCHLORIDE 1500 MG: 10 INJECTION, POWDER, LYOPHILIZED, FOR SOLUTION INTRAVENOUS at 05:53

## 2019-01-27 RX ADMIN — TAZOBACTAM SODIUM AND PIPERACILLIN SODIUM 3.38 G: 375; 3 INJECTION, SOLUTION INTRAVENOUS at 16:05

## 2019-01-27 RX ADMIN — TAZOBACTAM SODIUM AND PIPERACILLIN SODIUM 3.38 G: 375; 3 INJECTION, SOLUTION INTRAVENOUS at 09:27

## 2019-01-27 RX ADMIN — ASPIRIN 81 MG CHEWABLE TABLET 81 MG: 81 TABLET CHEWABLE at 09:28

## 2019-01-27 RX ADMIN — LEVETIRACETAM 500 MG: 500 TABLET, FILM COATED ORAL at 09:29

## 2019-01-28 LAB
ANION GAP SERPL CALCULATED.3IONS-SCNC: 4 MMOL/L (ref 3–11)
BUN BLD-MCNC: 6 MG/DL (ref 9–23)
BUN/CREAT SERPL: 7.9 (ref 7–25)
CALCIUM SPEC-SCNC: 8.6 MG/DL (ref 8.7–10.4)
CHLORIDE SERPL-SCNC: 108 MMOL/L (ref 99–109)
CO2 SERPL-SCNC: 21 MMOL/L (ref 20–31)
CREAT BLD-MCNC: 0.76 MG/DL (ref 0.6–1.3)
DEPRECATED RDW RBC AUTO: 44.6 FL (ref 37–54)
ERYTHROCYTE [DISTWIDTH] IN BLOOD BY AUTOMATED COUNT: 14.3 % (ref 11.3–14.5)
GFR SERPL CREATININE-BSD FRML MDRD: 120 ML/MIN/1.73
GLUCOSE BLD-MCNC: 85 MG/DL (ref 70–100)
GLUCOSE BLDC GLUCOMTR-MCNC: 120 MG/DL (ref 70–130)
GLUCOSE BLDC GLUCOMTR-MCNC: 77 MG/DL (ref 70–130)
GLUCOSE BLDC GLUCOMTR-MCNC: 90 MG/DL (ref 70–130)
GLUCOSE BLDC GLUCOMTR-MCNC: 98 MG/DL (ref 70–130)
HCT VFR BLD AUTO: 41.4 % (ref 38.9–50.9)
HGB BLD-MCNC: 13.7 G/DL (ref 13.1–17.5)
MCH RBC QN AUTO: 28 PG (ref 27–31)
MCHC RBC AUTO-ENTMCNC: 33.1 G/DL (ref 32–36)
MCV RBC AUTO: 84.7 FL (ref 80–99)
PLATELET # BLD AUTO: 96 10*3/MM3 (ref 150–450)
PMV BLD AUTO: 11.5 FL (ref 6–12)
POTASSIUM BLD-SCNC: 3.6 MMOL/L (ref 3.5–5.5)
RBC # BLD AUTO: 4.89 10*6/MM3 (ref 4.2–5.76)
SODIUM BLD-SCNC: 133 MMOL/L (ref 132–146)
VANCOMYCIN TROUGH SERPL-MCNC: 9.8 MCG/ML (ref 10–20)
WBC NRBC COR # BLD: 3.35 10*3/MM3 (ref 3.5–10.8)

## 2019-01-28 PROCEDURE — 25010000002 VANCOMYCIN PER 500 MG

## 2019-01-28 PROCEDURE — 25010000002 ENOXAPARIN PER 10 MG: Performed by: NURSE PRACTITIONER

## 2019-01-28 PROCEDURE — 85027 COMPLETE CBC AUTOMATED: CPT | Performed by: NURSE PRACTITIONER

## 2019-01-28 PROCEDURE — 80202 ASSAY OF VANCOMYCIN: CPT

## 2019-01-28 PROCEDURE — 97161 PT EVAL LOW COMPLEX 20 MIN: CPT

## 2019-01-28 PROCEDURE — 97165 OT EVAL LOW COMPLEX 30 MIN: CPT | Performed by: OCCUPATIONAL THERAPIST

## 2019-01-28 PROCEDURE — 80048 BASIC METABOLIC PNL TOTAL CA: CPT | Performed by: NURSE PRACTITIONER

## 2019-01-28 PROCEDURE — 82962 GLUCOSE BLOOD TEST: CPT

## 2019-01-28 PROCEDURE — 99232 SBSQ HOSP IP/OBS MODERATE 35: CPT | Performed by: NURSE PRACTITIONER

## 2019-01-28 PROCEDURE — 25010000002 PIPERACILLIN SOD-TAZOBACTAM PER 1 G: Performed by: NURSE PRACTITIONER

## 2019-01-28 RX ORDER — VANCOMYCIN HYDROCHLORIDE 1 G/200ML
1000 INJECTION, SOLUTION INTRAVENOUS EVERY 12 HOURS
Status: DISCONTINUED | OUTPATIENT
Start: 2019-01-28 | End: 2019-01-30

## 2019-01-28 RX ADMIN — BUMETANIDE 0.5 MG: 1 TABLET ORAL at 08:18

## 2019-01-28 RX ADMIN — SODIUM CHLORIDE, PRESERVATIVE FREE 3 ML: 5 INJECTION INTRAVENOUS at 20:54

## 2019-01-28 RX ADMIN — TAZOBACTAM SODIUM AND PIPERACILLIN SODIUM 3.38 G: 375; 3 INJECTION, SOLUTION INTRAVENOUS at 00:54

## 2019-01-28 RX ADMIN — ASPIRIN 81 MG CHEWABLE TABLET 81 MG: 81 TABLET CHEWABLE at 08:18

## 2019-01-28 RX ADMIN — LEVETIRACETAM 500 MG: 500 TABLET, FILM COATED ORAL at 08:18

## 2019-01-28 RX ADMIN — VANCOMYCIN HYDROCHLORIDE 1000 MG: 1 INJECTION, SOLUTION INTRAVENOUS at 20:53

## 2019-01-28 RX ADMIN — SODIUM CHLORIDE, PRESERVATIVE FREE 3 ML: 5 INJECTION INTRAVENOUS at 08:19

## 2019-01-28 RX ADMIN — ENOXAPARIN SODIUM 40 MG: 40 INJECTION SUBCUTANEOUS at 05:42

## 2019-01-28 RX ADMIN — POLYETHYLENE GLYCOL 3350 17 G: 17 POWDER, FOR SOLUTION ORAL at 08:19

## 2019-01-28 RX ADMIN — TAZOBACTAM SODIUM AND PIPERACILLIN SODIUM 3.38 G: 375; 3 INJECTION, SOLUTION INTRAVENOUS at 15:50

## 2019-01-28 RX ADMIN — TAZOBACTAM SODIUM AND PIPERACILLIN SODIUM 3.38 G: 375; 3 INJECTION, SOLUTION INTRAVENOUS at 08:18

## 2019-01-28 RX ADMIN — METOPROLOL SUCCINATE 50 MG: 50 TABLET, EXTENDED RELEASE ORAL at 08:18

## 2019-01-28 RX ADMIN — VANCOMYCIN HYDROCHLORIDE 1000 MG: 1 INJECTION, SOLUTION INTRAVENOUS at 08:18

## 2019-01-28 RX ADMIN — ACETAMINOPHEN 650 MG: 325 TABLET ORAL at 00:54

## 2019-01-28 RX ADMIN — LEVETIRACETAM 500 MG: 500 TABLET, FILM COATED ORAL at 20:53

## 2019-01-28 RX ADMIN — ROSUVASTATIN CALCIUM 20 MG: 20 TABLET, FILM COATED ORAL at 08:18

## 2019-01-29 LAB
ANION GAP SERPL CALCULATED.3IONS-SCNC: 7 MMOL/L (ref 3–11)
BUN BLD-MCNC: 8 MG/DL (ref 9–23)
BUN/CREAT SERPL: 7.2 (ref 7–25)
CALCIUM SPEC-SCNC: 9 MG/DL (ref 8.7–10.4)
CHLORIDE SERPL-SCNC: 109 MMOL/L (ref 99–109)
CO2 SERPL-SCNC: 24 MMOL/L (ref 20–31)
CREAT BLD-MCNC: 1.11 MG/DL (ref 0.6–1.3)
GFR SERPL CREATININE-BSD FRML MDRD: 77 ML/MIN/1.73
GLUCOSE BLD-MCNC: 90 MG/DL (ref 70–100)
GLUCOSE BLDC GLUCOMTR-MCNC: 120 MG/DL (ref 70–130)
GLUCOSE BLDC GLUCOMTR-MCNC: 123 MG/DL (ref 70–130)
GLUCOSE BLDC GLUCOMTR-MCNC: 147 MG/DL (ref 70–130)
GLUCOSE BLDC GLUCOMTR-MCNC: 88 MG/DL (ref 70–130)
POTASSIUM BLD-SCNC: 3.6 MMOL/L (ref 3.5–5.5)
POTASSIUM BLD-SCNC: 3.7 MMOL/L (ref 3.5–5.5)
POTASSIUM BLD-SCNC: 4.5 MMOL/L (ref 3.5–5.5)
SODIUM BLD-SCNC: 140 MMOL/L (ref 132–146)

## 2019-01-29 PROCEDURE — 97530 THERAPEUTIC ACTIVITIES: CPT

## 2019-01-29 PROCEDURE — 84132 ASSAY OF SERUM POTASSIUM: CPT | Performed by: NURSE PRACTITIONER

## 2019-01-29 PROCEDURE — 97535 SELF CARE MNGMENT TRAINING: CPT

## 2019-01-29 PROCEDURE — 84132 ASSAY OF SERUM POTASSIUM: CPT | Performed by: HOSPITALIST

## 2019-01-29 PROCEDURE — 25010000002 ENOXAPARIN PER 10 MG: Performed by: NURSE PRACTITIONER

## 2019-01-29 PROCEDURE — 25010000002 VANCOMYCIN PER 500 MG

## 2019-01-29 PROCEDURE — 82962 GLUCOSE BLOOD TEST: CPT

## 2019-01-29 PROCEDURE — 80048 BASIC METABOLIC PNL TOTAL CA: CPT

## 2019-01-29 PROCEDURE — 99233 SBSQ HOSP IP/OBS HIGH 50: CPT | Performed by: NURSE PRACTITIONER

## 2019-01-29 PROCEDURE — 25010000002 PIPERACILLIN SOD-TAZOBACTAM PER 1 G: Performed by: NURSE PRACTITIONER

## 2019-01-29 RX ORDER — POTASSIUM CHLORIDE 7.45 MG/ML
10 INJECTION INTRAVENOUS
Status: DISCONTINUED | OUTPATIENT
Start: 2019-01-29 | End: 2019-01-31 | Stop reason: HOSPADM

## 2019-01-29 RX ORDER — POTASSIUM CHLORIDE 750 MG/1
40 CAPSULE, EXTENDED RELEASE ORAL AS NEEDED
Status: DISCONTINUED | OUTPATIENT
Start: 2019-01-29 | End: 2019-01-31 | Stop reason: HOSPADM

## 2019-01-29 RX ORDER — POTASSIUM CHLORIDE 1.5 G/1.77G
40 POWDER, FOR SOLUTION ORAL AS NEEDED
Status: DISCONTINUED | OUTPATIENT
Start: 2019-01-29 | End: 2019-01-31 | Stop reason: HOSPADM

## 2019-01-29 RX ADMIN — SODIUM CHLORIDE, PRESERVATIVE FREE 3 ML: 5 INJECTION INTRAVENOUS at 21:22

## 2019-01-29 RX ADMIN — TAZOBACTAM SODIUM AND PIPERACILLIN SODIUM 3.38 G: 375; 3 INJECTION, SOLUTION INTRAVENOUS at 10:23

## 2019-01-29 RX ADMIN — SODIUM CHLORIDE, PRESERVATIVE FREE 3 ML: 5 INJECTION INTRAVENOUS at 10:26

## 2019-01-29 RX ADMIN — TAZOBACTAM SODIUM AND PIPERACILLIN SODIUM 3.38 G: 375; 3 INJECTION, SOLUTION INTRAVENOUS at 16:22

## 2019-01-29 RX ADMIN — TAZOBACTAM SODIUM AND PIPERACILLIN SODIUM 3.38 G: 375; 3 INJECTION, SOLUTION INTRAVENOUS at 00:08

## 2019-01-29 RX ADMIN — BUMETANIDE 0.5 MG: 1 TABLET ORAL at 10:26

## 2019-01-29 RX ADMIN — VANCOMYCIN HYDROCHLORIDE 1000 MG: 1 INJECTION, SOLUTION INTRAVENOUS at 21:21

## 2019-01-29 RX ADMIN — ASPIRIN 81 MG CHEWABLE TABLET 81 MG: 81 TABLET CHEWABLE at 10:26

## 2019-01-29 RX ADMIN — TAZOBACTAM SODIUM AND PIPERACILLIN SODIUM 3.38 G: 375; 3 INJECTION, SOLUTION INTRAVENOUS at 23:35

## 2019-01-29 RX ADMIN — POTASSIUM CHLORIDE 40 MEQ: 750 CAPSULE, EXTENDED RELEASE ORAL at 10:28

## 2019-01-29 RX ADMIN — VANCOMYCIN HYDROCHLORIDE 1000 MG: 1 INJECTION, SOLUTION INTRAVENOUS at 10:23

## 2019-01-29 RX ADMIN — LEVETIRACETAM 500 MG: 500 TABLET, FILM COATED ORAL at 21:21

## 2019-01-29 RX ADMIN — ENOXAPARIN SODIUM 40 MG: 40 INJECTION SUBCUTANEOUS at 05:11

## 2019-01-29 RX ADMIN — ROSUVASTATIN CALCIUM 20 MG: 20 TABLET, FILM COATED ORAL at 10:26

## 2019-01-29 RX ADMIN — LEVETIRACETAM 500 MG: 500 TABLET, FILM COATED ORAL at 10:25

## 2019-01-29 RX ADMIN — POTASSIUM CHLORIDE 40 MEQ: 750 CAPSULE, EXTENDED RELEASE ORAL at 16:22

## 2019-01-30 LAB
ANION GAP SERPL CALCULATED.3IONS-SCNC: 3 MMOL/L (ref 3–11)
BACTERIA SPEC AEROBE CULT: NORMAL
BACTERIA SPEC AEROBE CULT: NORMAL
BUN BLD-MCNC: 10 MG/DL (ref 9–23)
BUN/CREAT SERPL: 9.2 (ref 7–25)
CALCIUM SPEC-SCNC: 9.2 MG/DL (ref 8.7–10.4)
CHLORIDE SERPL-SCNC: 114 MMOL/L (ref 99–109)
CO2 SERPL-SCNC: 25 MMOL/L (ref 20–31)
CREAT BLD-MCNC: 1.09 MG/DL (ref 0.6–1.3)
GFR SERPL CREATININE-BSD FRML MDRD: 79 ML/MIN/1.73
GLUCOSE BLD-MCNC: 98 MG/DL (ref 70–100)
GLUCOSE BLDC GLUCOMTR-MCNC: 100 MG/DL (ref 70–130)
GLUCOSE BLDC GLUCOMTR-MCNC: 106 MG/DL (ref 70–130)
GLUCOSE BLDC GLUCOMTR-MCNC: 133 MG/DL (ref 70–130)
GLUCOSE BLDC GLUCOMTR-MCNC: 90 MG/DL (ref 70–130)
POTASSIUM BLD-SCNC: 3.8 MMOL/L (ref 3.5–5.5)
SODIUM BLD-SCNC: 142 MMOL/L (ref 132–146)
VANCOMYCIN TROUGH SERPL-MCNC: 19.7 MCG/ML (ref 10–20)

## 2019-01-30 PROCEDURE — 97110 THERAPEUTIC EXERCISES: CPT

## 2019-01-30 PROCEDURE — 97116 GAIT TRAINING THERAPY: CPT

## 2019-01-30 PROCEDURE — 80202 ASSAY OF VANCOMYCIN: CPT

## 2019-01-30 PROCEDURE — 25010000002 ENOXAPARIN PER 10 MG: Performed by: NURSE PRACTITIONER

## 2019-01-30 PROCEDURE — 82962 GLUCOSE BLOOD TEST: CPT

## 2019-01-30 PROCEDURE — 80048 BASIC METABOLIC PNL TOTAL CA: CPT

## 2019-01-30 PROCEDURE — 99233 SBSQ HOSP IP/OBS HIGH 50: CPT | Performed by: NURSE PRACTITIONER

## 2019-01-30 PROCEDURE — 25010000002 VANCOMYCIN PER 500 MG

## 2019-01-30 PROCEDURE — 25010000002 PIPERACILLIN SOD-TAZOBACTAM PER 1 G: Performed by: NURSE PRACTITIONER

## 2019-01-30 RX ORDER — L.ACID,PARA/B.BIFIDUM/S.THERM 8B CELL
1 CAPSULE ORAL DAILY
Status: DISCONTINUED | OUTPATIENT
Start: 2019-01-30 | End: 2019-01-31 | Stop reason: HOSPADM

## 2019-01-30 RX ORDER — AMOXICILLIN AND CLAVULANATE POTASSIUM 875; 125 MG/1; MG/1
1 TABLET, FILM COATED ORAL EVERY 12 HOURS SCHEDULED
Status: DISCONTINUED | OUTPATIENT
Start: 2019-01-30 | End: 2019-01-31 | Stop reason: HOSPADM

## 2019-01-30 RX ADMIN — LEVETIRACETAM 500 MG: 500 TABLET, FILM COATED ORAL at 21:02

## 2019-01-30 RX ADMIN — VANCOMYCIN HYDROCHLORIDE 1000 MG: 1 INJECTION, SOLUTION INTRAVENOUS at 08:54

## 2019-01-30 RX ADMIN — Medication 1 CAPSULE: at 12:49

## 2019-01-30 RX ADMIN — POLYETHYLENE GLYCOL 3350 17 G: 17 POWDER, FOR SOLUTION ORAL at 08:54

## 2019-01-30 RX ADMIN — BUMETANIDE 0.5 MG: 1 TABLET ORAL at 08:55

## 2019-01-30 RX ADMIN — ROSUVASTATIN CALCIUM 20 MG: 20 TABLET, FILM COATED ORAL at 08:55

## 2019-01-30 RX ADMIN — ASPIRIN 81 MG CHEWABLE TABLET 81 MG: 81 TABLET CHEWABLE at 08:55

## 2019-01-30 RX ADMIN — LEVETIRACETAM 500 MG: 500 TABLET, FILM COATED ORAL at 08:55

## 2019-01-30 RX ADMIN — SODIUM CHLORIDE, PRESERVATIVE FREE 3 ML: 5 INJECTION INTRAVENOUS at 08:55

## 2019-01-30 RX ADMIN — AMOXICILLIN AND CLAVULANATE POTASSIUM 1 TABLET: 875; 125 TABLET, FILM COATED ORAL at 21:02

## 2019-01-30 RX ADMIN — ENOXAPARIN SODIUM 40 MG: 40 INJECTION SUBCUTANEOUS at 06:45

## 2019-01-30 RX ADMIN — TAZOBACTAM SODIUM AND PIPERACILLIN SODIUM 3.38 G: 375; 3 INJECTION, SOLUTION INTRAVENOUS at 08:54

## 2019-01-31 VITALS
WEIGHT: 191.6 LBS | SYSTOLIC BLOOD PRESSURE: 120 MMHG | BODY MASS INDEX: 25.95 KG/M2 | TEMPERATURE: 98.4 F | HEIGHT: 72 IN | OXYGEN SATURATION: 98 % | DIASTOLIC BLOOD PRESSURE: 67 MMHG | RESPIRATION RATE: 16 BRPM | HEART RATE: 79 BPM

## 2019-01-31 LAB
ANION GAP SERPL CALCULATED.3IONS-SCNC: 4 MMOL/L (ref 3–11)
BUN BLD-MCNC: 11 MG/DL (ref 9–23)
BUN/CREAT SERPL: 10.2 (ref 7–25)
CALCIUM SPEC-SCNC: 8.9 MG/DL (ref 8.7–10.4)
CHLORIDE SERPL-SCNC: 108 MMOL/L (ref 99–109)
CO2 SERPL-SCNC: 26 MMOL/L (ref 20–31)
CREAT BLD-MCNC: 1.08 MG/DL (ref 0.6–1.3)
GFR SERPL CREATININE-BSD FRML MDRD: 80 ML/MIN/1.73
GLUCOSE BLD-MCNC: 85 MG/DL (ref 70–100)
GLUCOSE BLDC GLUCOMTR-MCNC: 124 MG/DL (ref 70–130)
GLUCOSE BLDC GLUCOMTR-MCNC: 83 MG/DL (ref 70–130)
POTASSIUM BLD-SCNC: 3.8 MMOL/L (ref 3.5–5.5)
SODIUM BLD-SCNC: 138 MMOL/L (ref 132–146)

## 2019-01-31 PROCEDURE — 25010000002 ENOXAPARIN PER 10 MG: Performed by: NURSE PRACTITIONER

## 2019-01-31 PROCEDURE — 82962 GLUCOSE BLOOD TEST: CPT

## 2019-01-31 PROCEDURE — 97535 SELF CARE MNGMENT TRAINING: CPT | Performed by: OCCUPATIONAL THERAPIST

## 2019-01-31 PROCEDURE — 99239 HOSP IP/OBS DSCHRG MGMT >30: CPT | Performed by: NURSE PRACTITIONER

## 2019-01-31 PROCEDURE — 97530 THERAPEUTIC ACTIVITIES: CPT | Performed by: OCCUPATIONAL THERAPIST

## 2019-01-31 PROCEDURE — 80048 BASIC METABOLIC PNL TOTAL CA: CPT

## 2019-01-31 RX ORDER — AMOXICILLIN AND CLAVULANATE POTASSIUM 875; 125 MG/1; MG/1
1 TABLET, FILM COATED ORAL EVERY 12 HOURS SCHEDULED
Qty: 10 TABLET | Refills: 0 | Status: SHIPPED | OUTPATIENT
Start: 2019-01-31 | End: 2019-02-05

## 2019-01-31 RX ORDER — L.ACID,PARA/B.BIFIDUM/S.THERM 8B CELL
1 CAPSULE ORAL DAILY
Qty: 30 CAPSULE | Refills: 1 | Status: SHIPPED | OUTPATIENT
Start: 2019-02-01 | End: 2019-02-18

## 2019-01-31 RX ORDER — LEVETIRACETAM 500 MG/1
500 TABLET ORAL EVERY 12 HOURS SCHEDULED
Start: 2019-01-31 | End: 2019-02-18

## 2019-01-31 RX ADMIN — BUMETANIDE 0.5 MG: 1 TABLET ORAL at 08:34

## 2019-01-31 RX ADMIN — AMOXICILLIN AND CLAVULANATE POTASSIUM 1 TABLET: 875; 125 TABLET, FILM COATED ORAL at 08:34

## 2019-01-31 RX ADMIN — Medication 1 CAPSULE: at 08:35

## 2019-01-31 RX ADMIN — POLYETHYLENE GLYCOL 3350 17 G: 17 POWDER, FOR SOLUTION ORAL at 08:34

## 2019-01-31 RX ADMIN — ENOXAPARIN SODIUM 40 MG: 40 INJECTION SUBCUTANEOUS at 06:13

## 2019-01-31 RX ADMIN — METOPROLOL SUCCINATE 50 MG: 50 TABLET, EXTENDED RELEASE ORAL at 08:35

## 2019-01-31 RX ADMIN — ROSUVASTATIN CALCIUM 20 MG: 20 TABLET, FILM COATED ORAL at 08:36

## 2019-01-31 RX ADMIN — LEVETIRACETAM 500 MG: 500 TABLET, FILM COATED ORAL at 08:35

## 2019-01-31 RX ADMIN — ASPIRIN 81 MG CHEWABLE TABLET 81 MG: 81 TABLET CHEWABLE at 08:36

## 2019-02-13 ENCOUNTER — READMISSION MANAGEMENT (OUTPATIENT)
Dept: CALL CENTER | Facility: HOSPITAL | Age: 79
End: 2019-02-13

## 2019-02-13 ENCOUNTER — TELEPHONE (OUTPATIENT)
Dept: FAMILY MEDICINE CLINIC | Facility: CLINIC | Age: 79
End: 2019-02-13

## 2019-02-13 DIAGNOSIS — E11.8 TYPE 2 DIABETES MELLITUS WITH COMPLICATION, WITHOUT LONG-TERM CURRENT USE OF INSULIN (HCC): Chronic | ICD-10-CM

## 2019-02-13 RX ORDER — LANCETS
EACH MISCELLANEOUS
Qty: 102 EACH | Refills: 0 | Status: SHIPPED | OUTPATIENT
Start: 2019-02-13 | End: 2020-08-03 | Stop reason: SDUPTHER

## 2019-02-13 RX ORDER — BLOOD SUGAR DIAGNOSTIC
STRIP MISCELLANEOUS
Qty: 100 EACH | Refills: 0 | Status: SHIPPED | OUTPATIENT
Start: 2019-02-13 | End: 2019-03-26

## 2019-02-13 NOTE — TELEPHONE ENCOUNTER
They called back and I gave the verbal order and confirmed that his diabetes medicine was sent to [facility] pharmacy

## 2019-02-13 NOTE — OUTREACH NOTE
Prep Survey      Responses   Facility patient discharged from?  [facility]   Is patient eligible?  Yes   Discharge diagnosis  Seizure-new onset   Does the patient have one of the following disease processes/diagnoses(primary or secondary)?  Other   Does the patient have Home health ordered?  No   Is there a DME ordered?  No   General alerts for this patient  DC from sub acute care on 2/10   Prep survey completed?  Yes          Marta Jack RN

## 2019-02-13 NOTE — TELEPHONE ENCOUNTER
Okay to give order for diabetes education.  Please reorder glucometer supplies.  I sent in refill of metformin to [facility].

## 2019-02-13 NOTE — TELEPHONE ENCOUNTER
Called luek with HH, no answer . m with office number to call back.     Sent in glucometer supplies into [facility] pharmacy

## 2019-02-13 NOTE — TELEPHONE ENCOUNTER
PER YODIT CORDERO, CALLED, STATED PT IS OUT OF DIABETIC MEDICINE AND GLUCOMETER. ALSO, REQUESTING A VERBAL ORDER TO TREAT PT FOR DIABETES EDUCATION.    [phone]

## 2019-02-14 ENCOUNTER — READMISSION MANAGEMENT (OUTPATIENT)
Dept: CALL CENTER | Facility: HOSPITAL | Age: 79
End: 2019-02-14

## 2019-02-14 NOTE — OUTREACH NOTE
Medical Week 3 Survey      Responses   Facility patient discharged from?  [place]   Does the patient have one of the following disease processes/diagnoses(primary or secondary)?  Other   Week 3 attempt successful?  Yes   Call start time  1246   Call end time  1249   General alerts for this patient  DC from sub acute care on 2/10   Discharge diagnosis  Seizure-new onset   Is patient permission given to speak with other caregiver?  Yes   List who call center can speak with  Jessica - wife    Person spoke with today (if not patient) and relationship  Jessica - wife    Meds reviewed with patient/caregiver?  Yes   Is the patient having any side effects they believe may be caused by any medication additions or changes?  No   Does the patient have all medications ordered at discharge?  Yes   Is the patient taking all medications as directed (includes completed medication regime)?  Yes   Does the patient have a primary care provider?   Yes   Comments regarding PCP  All the appts have been made    Does the patient have an appointment with their PCP within 7 days of discharge?  Yes   Has the patient kept scheduled appointments due by today?  Yes   Has home health visited the patient within 72 hours of discharge?  Yes   Psychosocial issues?  No   Did the patient receive a copy of their discharge instructions?  Yes   What is the patient's perception of their health status since discharge?  Improving   Is the patient/caregiver able to teach back signs and symptoms related to disease process for when to call PCP?  Yes   Is the patient/caregiver able to teach back signs and symptoms related to disease process for when to call 911?  Yes   Is the patient/caregiver able to teach back the hierarchy of who to call/visit for symptoms/problems? PCP, Specialist, Home health nurse, Urgent Care, ED, 911  Yes   Week 3 Call Completed?  Yes   Graduated  Yes [He is doing very good. ]   Did the patient feel the follow up calls were helpful  during their recovery period?  Yes   Was the number of calls appropriate?  Yes   Does the patient have an Advance Directive or Living Will?  Yes   Is the patient/caregiver familiar with Advance Care Planning?  No   Would the patient like more information on Advance Care Planning?  No          Francia Hernandez RN

## 2019-02-18 ENCOUNTER — LAB (OUTPATIENT)
Dept: LAB | Facility: HOSPITAL | Age: 79
End: 2019-02-18

## 2019-02-18 ENCOUNTER — OFFICE VISIT (OUTPATIENT)
Dept: FAMILY MEDICINE CLINIC | Facility: CLINIC | Age: 79
End: 2019-02-18

## 2019-02-18 ENCOUNTER — CONSULT (OUTPATIENT)
Dept: ONCOLOGY | Facility: CLINIC | Age: 79
End: 2019-02-18

## 2019-02-18 VITALS
WEIGHT: 199 LBS | HEIGHT: 72 IN | BODY MASS INDEX: 26.95 KG/M2 | SYSTOLIC BLOOD PRESSURE: 152 MMHG | HEART RATE: 70 BPM | DIASTOLIC BLOOD PRESSURE: 78 MMHG | OXYGEN SATURATION: 98 %

## 2019-02-18 VITALS
OXYGEN SATURATION: 97 % | HEIGHT: 72 IN | DIASTOLIC BLOOD PRESSURE: 77 MMHG | BODY MASS INDEX: 26.68 KG/M2 | TEMPERATURE: 97.8 F | HEART RATE: 53 BPM | SYSTOLIC BLOOD PRESSURE: 168 MMHG | WEIGHT: 197 LBS | RESPIRATION RATE: 18 BRPM

## 2019-02-18 DIAGNOSIS — Z72.0 TOBACCO ABUSE: Chronic | ICD-10-CM

## 2019-02-18 DIAGNOSIS — R56.9 SEIZURE (HCC): ICD-10-CM

## 2019-02-18 DIAGNOSIS — E55.9 VITAMIN D DEFICIENCY: ICD-10-CM

## 2019-02-18 DIAGNOSIS — E78.5 HYPERLIPIDEMIA, UNSPECIFIED HYPERLIPIDEMIA TYPE: Chronic | ICD-10-CM

## 2019-02-18 DIAGNOSIS — Z00.00 MEDICARE ANNUAL WELLNESS VISIT, SUBSEQUENT: Primary | ICD-10-CM

## 2019-02-18 DIAGNOSIS — H91.93 BILATERAL HEARING LOSS, UNSPECIFIED HEARING LOSS TYPE: ICD-10-CM

## 2019-02-18 DIAGNOSIS — J44.9 CHRONIC OBSTRUCTIVE PULMONARY DISEASE, UNSPECIFIED COPD TYPE (HCC): ICD-10-CM

## 2019-02-18 DIAGNOSIS — Z23 NEED FOR PNEUMOCOCCAL VACCINE: ICD-10-CM

## 2019-02-18 DIAGNOSIS — M25.561 CHRONIC PAIN OF RIGHT KNEE: ICD-10-CM

## 2019-02-18 DIAGNOSIS — D69.3 CHRONIC IDIOPATHIC THROMBOCYTOPENIA (HCC): ICD-10-CM

## 2019-02-18 DIAGNOSIS — Z12.5 SCREENING FOR PROSTATE CANCER: ICD-10-CM

## 2019-02-18 DIAGNOSIS — G89.29 CHRONIC PAIN OF RIGHT KNEE: ICD-10-CM

## 2019-02-18 DIAGNOSIS — I10 ESSENTIAL HYPERTENSION: Chronic | ICD-10-CM

## 2019-02-18 DIAGNOSIS — M88.9 PAGET DISEASE OF BONE: Chronic | ICD-10-CM

## 2019-02-18 DIAGNOSIS — M88.9 PAGET DISEASE OF BONE: Primary | Chronic | ICD-10-CM

## 2019-02-18 DIAGNOSIS — E11.8 TYPE 2 DIABETES MELLITUS WITH COMPLICATION, WITHOUT LONG-TERM CURRENT USE OF INSULIN (HCC): Chronic | ICD-10-CM

## 2019-02-18 PROBLEM — M13.0 HYPERTROPHIC POLYARTHRITIS: Status: ACTIVE | Noted: 2019-02-18

## 2019-02-18 PROBLEM — M25.569 GONALGIA: Status: ACTIVE | Noted: 2019-02-18

## 2019-02-18 LAB
25(OH)D3 SERPL-MCNC: 24.9 NG/ML
ALBUMIN SERPL-MCNC: 4.2 G/DL (ref 3.2–4.8)
ALBUMIN/GLOB SERPL: 1.7 G/DL (ref 1.5–2.5)
ALP SERPL-CCNC: 82 U/L (ref 25–100)
ALT SERPL W P-5'-P-CCNC: 16 U/L (ref 7–40)
ANION GAP SERPL CALCULATED.3IONS-SCNC: 5 MMOL/L (ref 3–11)
ARTICHOKE IGE QN: 63 MG/DL (ref 0–130)
AST SERPL-CCNC: 20 U/L (ref 0–33)
BILIRUB SERPL-MCNC: 0.5 MG/DL (ref 0.3–1.2)
BUN BLD-MCNC: 19 MG/DL (ref 9–23)
BUN/CREAT SERPL: 15.4 (ref 7–25)
CALCIUM SPEC-SCNC: 9.6 MG/DL (ref 8.7–10.4)
CHLORIDE SERPL-SCNC: 109 MMOL/L (ref 99–109)
CHOLEST SERPL-MCNC: 132 MG/DL (ref 0–200)
CO2 SERPL-SCNC: 28 MMOL/L (ref 20–31)
CREAT BLD-MCNC: 1.23 MG/DL (ref 0.6–1.3)
GFR SERPL CREATININE-BSD FRML MDRD: 69 ML/MIN/1.73
GLOBULIN UR ELPH-MCNC: 2.5 GM/DL
GLUCOSE BLD-MCNC: 125 MG/DL (ref 70–100)
HDLC SERPL-MCNC: 36 MG/DL (ref 40–60)
POTASSIUM BLD-SCNC: 4.5 MMOL/L (ref 3.5–5.5)
PROT SERPL-MCNC: 6.7 G/DL (ref 5.7–8.2)
PSA SERPL-MCNC: 1.6 NG/ML (ref 0–4)
SODIUM BLD-SCNC: 142 MMOL/L (ref 132–146)
TRIGL SERPL-MCNC: 161 MG/DL (ref 0–150)

## 2019-02-18 PROCEDURE — 86334 IMMUNOFIX E-PHORESIS SERUM: CPT

## 2019-02-18 PROCEDURE — 82306 VITAMIN D 25 HYDROXY: CPT

## 2019-02-18 PROCEDURE — 80053 COMPREHEN METABOLIC PANEL: CPT

## 2019-02-18 PROCEDURE — 82784 ASSAY IGA/IGD/IGG/IGM EACH: CPT

## 2019-02-18 PROCEDURE — 99204 OFFICE O/P NEW MOD 45 MIN: CPT | Performed by: INTERNAL MEDICINE

## 2019-02-18 PROCEDURE — 99397 PER PM REEVAL EST PAT 65+ YR: CPT | Performed by: PHYSICIAN ASSISTANT

## 2019-02-18 PROCEDURE — 84165 PROTEIN E-PHORESIS SERUM: CPT

## 2019-02-18 PROCEDURE — G0439 PPPS, SUBSEQ VISIT: HCPCS | Performed by: PHYSICIAN ASSISTANT

## 2019-02-18 PROCEDURE — 96160 PT-FOCUSED HLTH RISK ASSMT: CPT | Performed by: PHYSICIAN ASSISTANT

## 2019-02-18 PROCEDURE — 90732 PPSV23 VACC 2 YRS+ SUBQ/IM: CPT | Performed by: PHYSICIAN ASSISTANT

## 2019-02-18 PROCEDURE — 80061 LIPID PANEL: CPT

## 2019-02-18 PROCEDURE — G0009 ADMIN PNEUMOCOCCAL VACCINE: HCPCS | Performed by: PHYSICIAN ASSISTANT

## 2019-02-18 PROCEDURE — G0103 PSA SCREENING: HCPCS

## 2019-02-18 PROCEDURE — 36415 COLL VENOUS BLD VENIPUNCTURE: CPT

## 2019-02-18 RX ORDER — METOPROLOL SUCCINATE 50 MG/1
50 TABLET, EXTENDED RELEASE ORAL DAILY
Qty: 90 TABLET | Refills: 1 | Status: SHIPPED | OUTPATIENT
Start: 2019-02-18 | End: 2019-09-05 | Stop reason: SDUPTHER

## 2019-02-18 RX ORDER — POTASSIUM CHLORIDE 1500 MG/1
20 TABLET, FILM COATED, EXTENDED RELEASE ORAL DAILY
Qty: 90 TABLET | Refills: 0 | Status: SHIPPED | OUTPATIENT
Start: 2019-02-18 | End: 2019-06-24 | Stop reason: SDUPTHER

## 2019-02-18 RX ORDER — LEVETIRACETAM 250 MG/1
250 TABLET ORAL 2 TIMES DAILY
COMMUNITY
Start: 2019-02-11 | End: 2019-02-18 | Stop reason: SDUPTHER

## 2019-02-18 RX ORDER — POTASSIUM CHLORIDE 1500 MG/1
20 TABLET, EXTENDED RELEASE ORAL DAILY
COMMUNITY
Start: 2018-11-19 | End: 2019-02-18 | Stop reason: SDUPTHER

## 2019-02-18 RX ORDER — LEVETIRACETAM 250 MG/1
250 TABLET ORAL 2 TIMES DAILY
Qty: 180 TABLET | Refills: 1 | Status: SHIPPED | OUTPATIENT
Start: 2019-02-18 | End: 2019-10-21 | Stop reason: SDUPTHER

## 2019-02-18 RX ORDER — AMLODIPINE BESYLATE 5 MG/1
5 TABLET ORAL DAILY
Qty: 90 TABLET | Refills: 3 | Status: ON HOLD | OUTPATIENT
Start: 2019-02-18 | End: 2019-11-14 | Stop reason: SDUPTHER

## 2019-02-18 RX ORDER — ROSUVASTATIN CALCIUM 20 MG/1
20 TABLET, COATED ORAL DAILY
Qty: 90 TABLET | Refills: 1 | Status: SHIPPED | OUTPATIENT
Start: 2019-02-18 | End: 2019-09-05 | Stop reason: SDUPTHER

## 2019-02-18 RX ORDER — BUMETANIDE 0.5 MG/1
0.5 TABLET ORAL DAILY
Qty: 90 TABLET | Refills: 1 | Status: SHIPPED | OUTPATIENT
Start: 2019-02-18 | End: 2019-09-05 | Stop reason: SDUPTHER

## 2019-02-18 NOTE — PROGRESS NOTES
CHIEF COMPLAINT: Follow-up Paget's disease and chronic leukopenia and thrombocytopenia with history of Barraza's and more recent new onset seizures    REASON FOR REFERRAL: Same      RECORDS OBTAINED  Records of the patients history including those obtained from  primary care were reviewed and summarized in detail.    Oncology/Hematology History    1. Paget's disease of the bone:  2. History of leukopenia and thrombocytopenia  3. History of Barraza's esophagus treated with Protonix    -Has a history of Paget's disease of the bone dating back into his 50s where he was managed by a physician at [facility] and they performed a bone biopsy according to the patient that prove that this was not metastatic.  He had received periodic bisphosphonate therapy as the alkaline phosphatase and bone imaging would dictate.  I Saw patient for the first time in 2010 for this history of Paget's disease of the bone with alkaline phosphatase of 142 and a bone scan 11/2010 showing substantial and severe abnormalities in the pelvis, left femur and worse in the left pelvis most consistent with Paget's disease.  X-rays of the lumbar spine revealed Paget's disease of the proximal femurs, pelvis, lumbar spine with fusion of the left sacroiliac joint.  He also had Paget's of the proximal femur on plain x-ray.  He had prostatic enlargement with a normal PSA.  9/29/10 colonoscopy showed hemorrhoids with no malignancy.  We started him on calcium 400 mg 3 times a day with vitamin D 800 international units daily.  Because of involvement of weightbearing bones we started him on Reclast 5 mg on 1/24/11.  Subsequent vitamin D level normalized.June 2012 because of abdominal pain had a CT of the abdomen knowing small umbilical hernia and bilateral renal cysts.  No splenomegaly or hepatomegaly.  The May 2012 total body bone scan showed slight improvement in previous right tibial and leftward skull abnormalities with treatment and subsequent  December 2012 bone scan showed no active metabolic disease to suggest any ongoing Paget's.  Peripheral smear showed mild leukoplakia pancytopenia without dysplastic changes and no immature forms.  Platelets 112,000 and 2010 with white count 3520 hemoglobin 14, unremarkable coag, and negative PEYTON and rheumatoid factor.  In January 2019 he developed seizures.  MRI of the brain negative.  CT showed some atelectasis right upper lobe and 11 mm right adrenal nodule not well characterized on CT.  There was abnormal heterogeneous sclerosis of the lower lumbar spine, sacrum, and pelvis consistent with his Paget's.  This compares to May 2017 showing mixed lytic and sclerotic area is of the pelvis and lumbar sacral and femoral areas which could be concerning for metastatic disease or marrow infiltrative process and no significant bony expansion to confirm Paget's.  Last saw me in 2014.  Post seizure follow-up saw me 2/18/19.  His alkaline phosphatase has been normal on review of his labs through 2017 and 2018 as well as January 2019.  I will order repeat bone scan.  I also ordered a bone survey given the prior mixed lytic and sclerotic picture.  I also checked his serum immunoelectrophoresis.  The odds of aggressive malignancy lurking all this time is unlikely but concomitant malignancy can cloud this picture and can come on with age.        Paget disease of bone    5/28/2017 Initial Diagnosis     Paget disease of bone            HISTORY OF PRESENT ILLNESS:  The patient is a 78 y.o.  male, referred for Follow-up Paget's disease and chronic leukopenia and thrombocytopenia with history of Barraza's and more recent new onset seizures.  I reviewed my old records from [facility].  See above for his hematology oncology history of present illness.    REVIEW OF SYSTEMS:  A 14 point review of systems was performed and is negative except as noted above.    Past Medical History:   Diagnosis Date   • Arthritis    • Diabetes mellitus (CMS/HCC)     • Diverticulitis    • GERD (gastroesophageal reflux disease)    • Hyperlipidemia    • Hypertension    • Paget disease of bone      Past Surgical History:   Procedure Laterality Date   • APPENDECTOMY     • COLON RESECTION      second to diverticulitis    • FOOT SURGERY Left        Current Outpatient Medications on File Prior to Visit   Medication Sig Dispense Refill   • ACCU-CHEK FASTCLIX LANCETS misc Use daily. 102 each 0   • ACCU-CHEK SMARTVIEW test strip Use daily 100 each 0   • aspirin 81 MG chewable tablet Chew 1 tablet Daily.     • bumetanide (BUMEX) 0.5 MG tablet Take 1 tablet by mouth Daily. 90 tablet 1   • KLOR-CON 20 MEQ CR tablet      • lactobacillus acidophilus (RISAQUAD) capsule capsule Take 1 capsule by mouth Daily for 41 days. Take through 2/14/2019 30 capsule 1   • levETIRAcetam (KEPPRA) 250 MG tablet      • levETIRAcetam (KEPPRA) 500 MG tablet Take 1 tablet by mouth Every 12 (Twelve) Hours.     • metFORMIN (GLUCOPHAGE) 1000 MG tablet Take 1 tablet by mouth Daily With Breakfast. 30 tablet 0   • metoprolol succinate XL (TOPROL-XL) 50 MG 24 hr tablet Take 1 tablet by mouth Daily. 90 tablet 1   • PHARMACY MEDS TO BED CONSULT Daily (Monday-Friday). 1 each 0   • polyethylene glycol (MIRALAX) packet Take 17 g by mouth Daily.     • potassium chloride ER (K-TAB) 20 MEQ tablet controlled-release ER tablet Take 1 tablet by mouth Daily. 90 tablet 0   • rosuvastatin (CRESTOR) 20 MG tablet Take 1 tablet by mouth Daily. 90 tablet 1   • tiotropium bromide-olodaterol (STIOLTO RESPIMAT) 2.5-2.5 MCG/ACT aerosol solution inhaler Inhale 2 puffs Daily.       No current facility-administered medications on file prior to visit.        No Known Allergies    Social History     Socioeconomic History   • Marital status:      Spouse name: Not on file   • Number of children: Not on file   • Years of education: Not on file   • Highest education level: Not on file   Tobacco Use   • Smoking status: Current Every Day Smoker  "    Packs/day: 0.25     Years: 65.00     Pack years: 16.25     Types: Cigars, Cigarettes   • Smokeless tobacco: Never Used   Substance and Sexual Activity   • Alcohol use: No     Comment: rarely   • Drug use: No   • Sexual activity: Defer       Family History   Problem Relation Age of Onset   • Diabetes Sister        PHYSICAL EXAM:    /77   Pulse 53   Temp 97.8 °F (36.6 °C)   Resp 18   Ht 182.9 cm (72\")   Wt 89.4 kg (197 lb)   SpO2 97%   BMI 26.72 kg/m²     ECOG: (1) Restricted in physically strenuous activity, ambulatory and able to do work of light nature  General: well appearing male in no acute distress  HEENT: sclera anicteric, oropharynx clear  Lymphatics: no cervical, supraclavicular, inguinal, or axillary adenopathy  Cardiovascular: regular rate and rhythm, no murmurs  Neck: Supple; No thyromegaly  Lungs: clear to auscultation bilaterally. No respiratory distress.   Abdomen: soft, nontender, nondistended.  No palpable organomegaly  Extremities: no cyanosis, clubbing, edema, or cords  Skin: no rashes, lesions, bruising, or petechiae  Neuro: Alert and oriented x 4; Moving all extremities.  Psych: No anxiety or depression    Lab Results   Component Value Date    HGB 13.7 01/28/2019    HCT 41.4 01/28/2019    MCV 84.7 01/28/2019    PLT 96 (L) 01/28/2019    WBC 3.35 (L) 01/28/2019    NEUTROABS 3.09 01/26/2019    LYMPHSABS 1.10 01/26/2019    MONOSABS 0.35 01/26/2019    EOSABS 0.03 01/26/2019    BASOSABS 0.01 01/26/2019     Lab Results   Component Value Date    GLUCOSE 85 01/31/2019    BUN 11 01/31/2019    CREATININE 1.08 01/31/2019     01/31/2019    K 3.8 01/31/2019     01/31/2019    CO2 26.0 01/31/2019    CALCIUM 8.9 01/31/2019    PROTEINTOT 6.9 01/25/2019    ALBUMIN 4.19 01/25/2019    BILITOT 0.5 01/25/2019    ALKPHOS 73 01/25/2019    AST 28 01/25/2019    ALT 13 01/25/2019           Assessment/Plan      1. Paget's disease of the bone:  2. History of leukopenia and " thrombocytopenia  3. History of Barraza's esophagus treated with Protonix    -Has a history of Paget's disease of the bone dating back into his 50s where he was managed by a physician at [facility] and they performed a bone biopsy according to the patient that prove that this was not metastatic.  He had received periodic bisphosphonate therapy as the alkaline phosphatase and bone imaging would dictate.  I Saw patient for the first time in 2010 for this history of Paget's disease of the bone with alkaline phosphatase of 142 and a bone scan 11/2010 showing substantial and severe abnormalities in the pelvis, left femur and worse in the left pelvis most consistent with Paget's disease.  X-rays of the lumbar spine revealed Paget's disease of the proximal femurs, pelvis, lumbar spine with fusion of the left sacroiliac joint.  He also had Paget's of the proximal femur on plain x-ray.  He had prostatic enlargement with a normal PSA.  9/29/10 colonoscopy showed hemorrhoids with no malignancy.  We started him on calcium 400 mg 3 times a day with vitamin D 800 international units daily.  Because of involvement of weightbearing bones we started him on Reclast 5 mg on 1/24/11.  Subsequent vitamin D level normalized.June 2012 because of abdominal pain had a CT of the abdomen knowing small umbilical hernia and bilateral renal cysts.  No splenomegaly or hepatomegaly.  The May 2012 total body bone scan showed slight improvement in previous right tibial and leftward skull abnormalities with treatment and subsequent December 2012 bone scan showed no active metabolic disease to suggest any ongoing Paget's.  Peripheral smear showed mild leukoplakia pancytopenia without dysplastic changes and no immature forms.  Platelets 112,000 and 2010 with white count 3520 hemoglobin 14, unremarkable coag, and negative PEYTON and rheumatoid factor.  In January 2019 he developed seizures.  MRI of the brain negative.  CT showed some  atelectasis right upper lobe and 11 mm right adrenal nodule not well characterized on CT.  There was abnormal heterogeneous sclerosis of the lower lumbar spine, sacrum, and pelvis consistent with his Paget's.  This compares to May 2017 showing mixed lytic and sclerotic area is of the pelvis and lumbar sacral and femoral areas which could be concerning for metastatic disease or marrow infiltrative process and no significant bony expansion to confirm Paget's.  Last saw me in 2014.  Post seizure follow-up saw me 2/18/19.  His alkaline phosphatase has been normal on review of his labs through 2017 and 2018 as well as January 2019.  I will order repeat bone scan.  I also ordered a bone survey given the prior mixed lytic and sclerotic picture.  I also checked his serum immunoelectrophoresis.  The odds of aggressive malignancy lurking all this time is unlikely but concomitant malignancy can cloud this picture and can come on with age.    Discussed with patient 45 minutes greater than 50% spent counseling.    Thomas Lui MD    2/18/2019

## 2019-02-19 PROBLEM — J44.9 CHRONIC OBSTRUCTIVE PULMONARY DISEASE (HCC): Status: ACTIVE | Noted: 2019-02-19

## 2019-02-19 LAB
ALBUMIN SERPL-MCNC: 3.8 G/DL (ref 2.9–4.4)
ALBUMIN/GLOB SERPL: 1.2 {RATIO} (ref 0.7–1.7)
ALPHA1 GLOB FLD ELPH-MCNC: 0.3 G/DL (ref 0–0.4)
ALPHA2 GLOB SERPL ELPH-MCNC: 0.6 G/DL (ref 0.4–1)
B-GLOBULIN SERPL ELPH-MCNC: 1 G/DL (ref 0.7–1.3)
GAMMA GLOB SERPL ELPH-MCNC: 1.5 G/DL (ref 0.4–1.8)
GLOBULIN SER CALC-MCNC: 3.4 G/DL (ref 2.2–3.9)
IGA SERPL-MCNC: 138 MG/DL (ref 61–437)
IGG SERPL-MCNC: 1369 MG/DL (ref 700–1600)
IGM SERPL-MCNC: 33 MG/DL (ref 15–143)
INTERPRETATION SERPL IEP-IMP: NORMAL
Lab: NORMAL
M-SPIKE: NORMAL G/DL
PROT SERPL-MCNC: 7.2 G/DL (ref 6–8.5)

## 2019-02-21 ENCOUNTER — TELEPHONE (OUTPATIENT)
Dept: FAMILY MEDICINE CLINIC | Facility: CLINIC | Age: 79
End: 2019-02-21

## 2019-02-21 DIAGNOSIS — J44.9 CHRONIC OBSTRUCTIVE PULMONARY DISEASE, UNSPECIFIED COPD TYPE (HCC): Primary | ICD-10-CM

## 2019-02-21 NOTE — TELEPHONE ENCOUNTER
Called Salma back, no answer. [facility] for her to return call. Samples will be downstairs for them to  when convenient for them.

## 2019-02-21 NOTE — TELEPHONE ENCOUNTER
Salma called back, gave message below. Verbalized understanding and had no further questions or concerns at this time.

## 2019-02-21 NOTE — TELEPHONE ENCOUNTER
PER ALEJANDRO WITH DAIN AT HOME, STATED THE HOSPITAL PRESCRIBED STIOLTO AND HE CURRENTLY DOES NOT HAVE ANY LEFT.  TO GET A REFILL IT IS EXPENSIVE FOR PT. PLEASE CALL BACK TO ADVISE ON WHAT HE SHOULD DO.      [phone]

## 2019-02-21 NOTE — TELEPHONE ENCOUNTER
Will trial anoro.  See if this is more cost effective.  Will leave samples of stiolto at front for patient to pick-up in interim.

## 2019-02-27 ENCOUNTER — OFFICE VISIT (OUTPATIENT)
Dept: ORTHOPEDIC SURGERY | Facility: CLINIC | Age: 79
End: 2019-02-27

## 2019-02-27 VITALS — BODY MASS INDEX: 26.31 KG/M2 | HEART RATE: 61 BPM | OXYGEN SATURATION: 96 % | WEIGHT: 194.22 LBS | HEIGHT: 72 IN

## 2019-02-27 DIAGNOSIS — M25.561 RIGHT KNEE PAIN, UNSPECIFIED CHRONICITY: Primary | ICD-10-CM

## 2019-02-27 DIAGNOSIS — M17.11 PRIMARY OSTEOARTHRITIS OF RIGHT KNEE: ICD-10-CM

## 2019-02-27 PROCEDURE — 99214 OFFICE O/P EST MOD 30 MIN: CPT | Performed by: ORTHOPAEDIC SURGERY

## 2019-02-27 PROCEDURE — 20610 DRAIN/INJ JOINT/BURSA W/O US: CPT | Performed by: ORTHOPAEDIC SURGERY

## 2019-02-27 RX ORDER — POTASSIUM CHLORIDE 1500 MG/1
TABLET, EXTENDED RELEASE ORAL
COMMUNITY
Start: 2019-02-21 | End: 2019-11-14 | Stop reason: HOSPADM

## 2019-02-27 RX ADMIN — LIDOCAINE HYDROCHLORIDE 4 ML: 10 INJECTION, SOLUTION INFILTRATION; PERINEURAL at 13:41

## 2019-02-27 RX ADMIN — BUPIVACAINE HYDROCHLORIDE 4 ML: 5 INJECTION, SOLUTION PERINEURAL at 13:41

## 2019-02-27 NOTE — PROGRESS NOTES
Procedure   Large Joint Arthrocentesis: R knee  Date/Time: 2/27/2019 1:41 PM  Consent given by: patient  Site marked: site marked  Timeout: Immediately prior to procedure a time out was called to verify the correct patient, procedure, equipment, support staff and site/side marked as required   Supporting Documentation  Indications: pain   Procedure Details  Location: knee - R knee  Preparation: Patient was prepped and draped in the usual sterile fashion  Needle size: 22 G  Medications administered: 4 mL bupivacaine 0.5 %; 4 mL lidocaine 1 % ([address] [place] [address] [id number] jan 2020)  Patient tolerance: patient tolerated the procedure well with no immediate complications

## 2019-02-27 NOTE — PROGRESS NOTES
[facility] Note    Subjective     Chief Complaint   Patient presents with   • Right Knee - Pain     Pain has started to increase recently        HPI      Narendra Cochran is a 78 y.o. male.  He complains of right knee pain.  He has had it for several years.  Pain is 5 out of 10.  Is aching.  Is worse with walking stairs and sleeping.  He is tried a cream but no injections.        Past Medical History:   Diagnosis Date   • Arthritis    • Diabetes mellitus (CMS/HCC)    • Diverticulitis    • GERD (gastroesophageal reflux disease)    • Hyperlipidemia    • Hypertension    • Paget disease of bone       Past Surgical History:   Procedure Laterality Date   • APPENDECTOMY     • COLON RESECTION      second to diverticulitis    • FOOT SURGERY Left       Family History   Problem Relation Age of Onset   • Diabetes Sister      Social History     Socioeconomic History   • Marital status:      Spouse name: Not on file   • Number of children: Not on file   • Years of education: Not on file   • Highest education level: Not on file   Social Needs   • Financial resource strain: Not on file   • Food insecurity - worry: Not on file   • Food insecurity - inability: Not on file   • Transportation needs - medical: Not on file   • Transportation needs - non-medical: Not on file   Occupational History   • Not on file   Tobacco Use   • Smoking status: Current Every Day Smoker     Packs/day: 0.25     Years: 65.00     Pack years: 16.25     Types: Cigars, Cigarettes   • Smokeless tobacco: Never Used   Substance and Sexual Activity   • Alcohol use: No     Comment: rarely   • Drug use: No   • Sexual activity: Defer   Other Topics Concern   • Not on file   Social History Narrative   • Not on file      Current Outpatient Medications on File Prior to Visit   Medication Sig Dispense Refill   • ACCU-CHEK FASTCLIX LANCETS misc Use daily. 102 each 0   • ACCU-CHEK SMARTVIEW test strip Use daily 100 each 0   • amLODIPine  "(NORVASC) 5 MG tablet Take 1 tablet by mouth Daily. 90 tablet 3   • aspirin 81 MG chewable tablet Chew 1 tablet Daily.     • bumetanide (BUMEX) 0.5 MG tablet Take 1 tablet by mouth Daily. 90 tablet 1   • KLOR-CON 20 MEQ CR tablet      • levETIRAcetam (KEPPRA) 250 MG tablet Take 1 tablet by mouth 2 (Two) Times a Day. 180 tablet 1   • metFORMIN (GLUCOPHAGE) 1000 MG tablet Take 1 tablet by mouth Daily With Breakfast. 90 tablet 3   • metoprolol succinate XL (TOPROL-XL) 50 MG 24 hr tablet Take 1 tablet by mouth Daily. 90 tablet 1   • PHARMACY MEDS TO BED CONSULT Daily (Monday-Friday). 1 each 0   • polyethylene glycol (MIRALAX) packet Take 17 g by mouth Daily.     • potassium chloride ER (K-TAB) 20 MEQ tablet controlled-release ER tablet Take 1 tablet by mouth Daily. 90 tablet 0   • rosuvastatin (CRESTOR) 20 MG tablet Take 1 tablet by mouth Daily. 90 tablet 1   • umeclidinium-vilanterol (ANORO ELLIPTA) 62.5-25 MCG/INH aerosol powder  inhaler Inhale 1 puff Daily. 60 each 5     No current facility-administered medications on file prior to visit.       No Known Allergies     The following portions of the patient's history were reviewed and updated as appropriate: allergies, current medications, past family history, past medical history, past social history, past surgical history and problem list.    Review of Systems   Constitutional: Positive for activity change.   HENT: Positive for hearing loss.    Eyes: Negative.    Respiratory: Negative.    Cardiovascular: Negative.    Gastrointestinal: Negative.    Endocrine: Negative.    Genitourinary: Negative.    Musculoskeletal: Positive for arthralgias (right knee) and gait problem.   Skin: Negative.    Allergic/Immunologic: Negative.    Hematological: Negative.    Psychiatric/Behavioral: Negative.         Objective      Physical Exam  Pulse 61   Ht 182.9 cm (72\")   Wt 88.1 kg (194 lb 3.6 oz)   SpO2 96%   BMI 26.34 kg/m²     Body mass index is 26.34 kg/m².        GENERAL " APPEARANCE: awake, alert & oriented x 3, in no acute distress and well developed, well nourished  PSYCH: normal mood and affect  LUNGS:  breathing nonlabored, no wheezing  EYES: sclera anicteric, pupils equal  CARDIOVASCULAR: palpable pulses dorsalis pedis, palpable posterior tibial bilaterally. Capillary refill less than 2 seconds  INTEGUMENTARY: skin intact, no clubbing, cyanosis  NEUROLOGIC:  Normal Sensation and reflexes             Ortho Exam  Peripheral Vascular:    Upper Extremity:   Inspection:  Left--no cyanotic nail beds Right--no cyanotic nail beds   Bilateral:  Pink nail beds with brisk capillary refill   Palpation:  Bilateral radial pulse normal    Musculoskeletal:  Global Assessment:  Overall assessment of Lower Extremity Muscle Strength and Tone:  Right quadriceps--5/5   Right hamstrings--5/5       Right tibialis anterior--5/5  Right gastroc-soleus--5/5  Right EHL --5/5    Lower Extremity:  Knee/Patella:  No digital clubbing or cyanosis.    Examination of right knee reveals:  Normal deep tendon reflexes, coordination, strength, tone, sensation.  No known fractures or deformities.    Inspection and Palpation:  Right knee:  Tenderness:  Over the medial joint line and moderate severity  Effusion:  none  Crepitus:  Positive  Pulses:  2+  Ecchymosis:  None  Warmth:  None     ROM:  Right:  Extension:120    Flexion: 5  Left:  Extension:120     Flexion:5    Instability:    Right:  Lachman Test:  Negative, Varus stress test negative, Valgus stress test negative    Deformities/Malalignments/Discrepancies:    Left:  No deformities   Right:  Genu Varum    Functional Testing:  Giovanni's test:  Negative  Patella grind test:  Positive  Q-angle:  normal        Imaging/Studies  Imaging Results (last 7 days)     Procedure Component Value Units Date/Time    XR Knee 4+ View Right [[id number]] Resulted:  02/27/19 1337     Updated:  02/27/19 1337    Narrative:       Knee X-Ray  Indication: Pain    Upright AP of bilateral  knees. Lateral, skiers and Sunrise views of right   knee     Findings: Minimal arthritic changes  No fracture  No bony lesion  Normal soft tissues  Normal joint spaces    No prior studies were available for comparison.            Assessment/Plan        ICD-10-CM ICD-9-CM   1. Right knee pain, unspecified chronicity M25.561 719.46   2. Primary osteoarthritis of right knee M17.11 715.16       Orders Placed This Encounter   Procedures   • XR Knee 4+ View Right      Injected his right knee with cortisone.  He tolerated the injection well without complication.  He will follow-up in 3 weeks.  If this does not help we can try Orthovisc  Medical Decision Making  Management Options : prescription/IM medicine  Data/Risk: radiology tests and independent visualization of imaging, lab tests, or EMG/NCV    Jason Treviño MD  02/27/19  1:41 PM         EMR Dragon/Transcription disclaimer:  Much of this encounter note is an electronic transcription of spoken language to printed text. Electronic transcription of spoken language may permit erroneous, or at times, nonsensical words or phrases to be inadvertently transcribed. Although I have reviewed the note for such errors, some may still exist.

## 2019-02-28 RX ORDER — LIDOCAINE HYDROCHLORIDE 10 MG/ML
4 INJECTION, SOLUTION INFILTRATION; PERINEURAL
Status: COMPLETED | OUTPATIENT
Start: 2019-02-27 | End: 2019-02-27

## 2019-02-28 RX ORDER — BUPIVACAINE HYDROCHLORIDE 5 MG/ML
4 INJECTION, SOLUTION PERINEURAL
Status: COMPLETED | OUTPATIENT
Start: 2019-02-27 | End: 2019-02-27

## 2019-03-05 ENCOUNTER — TELEPHONE (OUTPATIENT)
Dept: FAMILY MEDICINE CLINIC | Facility: CLINIC | Age: 79
End: 2019-03-05

## 2019-03-05 DIAGNOSIS — E11.8 TYPE 2 DIABETES MELLITUS WITH COMPLICATION, WITHOUT LONG-TERM CURRENT USE OF INSULIN (HCC): Primary | Chronic | ICD-10-CM

## 2019-03-05 RX ORDER — BLOOD-GLUCOSE METER
1 EACH MISCELLANEOUS DAILY
Qty: 1 KIT | Refills: 0 | Status: SHIPPED | OUTPATIENT
Start: 2019-03-05 | End: 2020-08-03 | Stop reason: SDUPTHER

## 2019-03-05 NOTE — TELEPHONE ENCOUNTER
FRANCISCO JAVIER BROWN WITH DAIN AT HOME, STATED PT HAS  LANCETS AND STRIPS, BUT NO GLUCOMETER. PLEASE CALL BACK TO ADVISE.      [phone]

## 2019-03-05 NOTE — TELEPHONE ENCOUNTER
Home Health Nurse called back to see if we ordered glucometer. Stated it was order. Nurse verbalized understanding.

## 2019-03-05 NOTE — TELEPHONE ENCOUNTER
Sent in Accu-chek glucometer to [facility]. LVM to #[phone] to call back and gave office number.

## 2019-03-12 ENCOUNTER — TELEPHONE (OUTPATIENT)
Dept: FAMILY MEDICINE CLINIC | Facility: CLINIC | Age: 79
End: 2019-03-12

## 2019-03-12 NOTE — TELEPHONE ENCOUNTER
[facility] called.  They received a letter but was not signed.  Feb 18 office note needs to be signed and Faxed to [phone]

## 2019-03-20 ENCOUNTER — OFFICE VISIT (OUTPATIENT)
Dept: ORTHOPEDIC SURGERY | Facility: CLINIC | Age: 79
End: 2019-03-20

## 2019-03-20 VITALS — WEIGHT: 195.11 LBS | BODY MASS INDEX: 26.43 KG/M2 | OXYGEN SATURATION: 98 % | HEART RATE: 66 BPM | HEIGHT: 72 IN

## 2019-03-20 DIAGNOSIS — M17.11 PRIMARY OSTEOARTHRITIS OF RIGHT KNEE: Primary | ICD-10-CM

## 2019-03-20 PROCEDURE — 99212 OFFICE O/P EST SF 10 MIN: CPT | Performed by: ORTHOPAEDIC SURGERY

## 2019-03-20 RX ORDER — CHOLECALCIFEROL (VITAMIN D3) 25 MCG
TABLET,CHEWABLE ORAL
COMMUNITY
Start: 2019-03-04 | End: 2019-12-09

## 2019-03-20 NOTE — PROGRESS NOTES
[facility] [place] [facility] Note    Subjective     Chief Complaint   Patient presents with   • Right Knee - Follow-up     Primary Osteoarthritis of Right Knee; Cortisone Injection given 02/27/2019  3 week f/u        HPI      Narendra ONEIDA Cochran is a 78 y.o. male.  Doing much better follow-up right knee cortisone injection.  He still has pain 4 out of 10.  It is aching.        Past Medical History:   Diagnosis Date   • Arthritis    • Diabetes mellitus (CMS/HCC)    • Diverticulitis    • GERD (gastroesophageal reflux disease)    • Hyperlipidemia    • Hypertension    • Paget disease of bone       Past Surgical History:   Procedure Laterality Date   • APPENDECTOMY     • COLON RESECTION      second to diverticulitis    • FOOT SURGERY Left       Family History   Problem Relation Age of Onset   • Diabetes Sister      Social History     Socioeconomic History   • Marital status:      Spouse name: Not on file   • Number of children: Not on file   • Years of education: Not on file   • Highest education level: Not on file   Tobacco Use   • Smoking status: Current Every Day Smoker     Packs/day: 0.25     Years: 65.00     Pack years: 16.25     Types: Cigars, Cigarettes   • Smokeless tobacco: Never Used   Substance and Sexual Activity   • Alcohol use: No     Comment: rarely   • Drug use: No   • Sexual activity: Defer      Current Outpatient Medications on File Prior to Visit   Medication Sig Dispense Refill   • ACCU-CHEK FASTCLIX LANCETS misc Use daily. 102 each 0   • ACCU-CHEK SMARTVIEW test strip Use daily 100 each 0   • amLODIPine (NORVASC) 5 MG tablet Take 1 tablet by mouth Daily. 90 tablet 3   • aspirin 81 MG chewable tablet Chew 1 tablet Daily.     • B Complex-C-Folic Acid (SUPER B-COMPLEX/VIT C/FA) tablet      • Blood Glucose Monitoring Suppl (ACCU-CHEK GUIDE) w/Device kit 1 each Daily. 1 kit 0   • bumetanide (BUMEX) 0.5 MG tablet Take 1 tablet by mouth Daily. 90 tablet 1   • KLOR-CON 20 MEQ CR tablet      •  "levETIRAcetam (KEPPRA) 250 MG tablet Take 1 tablet by mouth 2 (Two) Times a Day. 180 tablet 1   • metFORMIN (GLUCOPHAGE) 1000 MG tablet Take 1 tablet by mouth Daily With Breakfast. 90 tablet 3   • metoprolol succinate XL (TOPROL-XL) 50 MG 24 hr tablet Take 1 tablet by mouth Daily. 90 tablet 1   • PHARMACY MEDS TO BED CONSULT Daily (Monday-Friday). 1 each 0   • polyethylene glycol (MIRALAX) packet Take 17 g by mouth Daily.     • potassium chloride ER (K-TAB) 20 MEQ tablet controlled-release ER tablet Take 1 tablet by mouth Daily. 90 tablet 0   • rosuvastatin (CRESTOR) 20 MG tablet Take 1 tablet by mouth Daily. 90 tablet 1   • umeclidinium-vilanterol (ANORO ELLIPTA) 62.5-25 MCG/INH aerosol powder  inhaler Inhale 1 puff Daily. 60 each 5     No current facility-administered medications on file prior to visit.       No Known Allergies     The following portions of the patient's history were reviewed and updated as appropriate: allergies, current medications, past family history, past medical history, past social history, past surgical history and problem list.    Review of Systems   Constitutional: Negative.    HENT: Negative.    Eyes: Negative.    Respiratory: Negative.    Cardiovascular: Negative.    Gastrointestinal: Negative.    Endocrine: Negative.    Genitourinary: Negative.    Musculoskeletal: Positive for back pain and joint swelling.   Skin: Negative.    Allergic/Immunologic: Negative.    Neurological: Negative.    Hematological: Negative.    Psychiatric/Behavioral: Negative.         Objective      Physical Exam  Pulse 66   Ht 182.9 cm (72.01\")   Wt 88.5 kg (195 lb 1.7 oz)   SpO2 98%   BMI 26.46 kg/m²     Body mass index is 26.46 kg/m².        GENERAL APPEARANCE: awake, alert & oriented x 3, in no acute distress and well developed, well nourished  PSYCH: normal mood and affect    Ortho Exam  Peripheral Vascular:    Upper Extremity:   Inspection:  Left--no cyanotic nail beds Right--no cyanotic nail " beds   Bilateral:  Pink nail beds with brisk capillary refill   Palpation:  Bilateral radial pulse normal    Musculoskeletal:  Global Assessment:  Overall assessment of Lower Extremity Muscle Strength and Tone:  Right quadriceps--5/5   Right hamstrings--5/5       Right tibialis anterior--5/5  Right gastroc-soleus--5/5  Right EHL --5/5    Lower Extremity:  Knee/Patella:  No digital clubbing or cyanosis.    Examination of right knee reveals:  Normal deep tendon reflexes, coordination, strength, tone, sensation.  No known fractures or deformities.    Inspection and Palpation:  Right knee:  Tenderness:  Over the medial joint line and moderate severity  Effusion:  none  Crepitus:  Positive  Pulses:  2+  Ecchymosis:  None  Warmth:  None     ROM:  Right:  Extension:120    Flexion: 5  Left:  Extension:120     Flexion:5    Instability:    Right:  Lachman Test:  Negative, Varus stress test negative, Valgus stress test negative    Deformities/Malalignments/Discrepancies:    Left:  No deformities   Right:  Genu Varum    Functional Testing:  Giovanni's test:  Negative  Patella grind test:  Positive  Q-angle:  normal        Imaging/Studies  Imaging Results (last 7 days)     ** No results found for the last 168 hours. **          Assessment/Plan        ICD-10-CM ICD-9-CM   1. Primary osteoarthritis of right knee M17.11 715.16     He will follow-up in 2 months for possible repeat cortisone injection.  In the meantime he will take over-the-counter medicine.  Medical Decision Making  Management Options : over-the-counter medicine      Jason Treviño MD  03/20/19  1:58 PM         EMR Dragon/Transcription disclaimer:  Much of this encounter note is an electronic transcription of spoken language to printed text. Electronic transcription of spoken language may permit erroneous, or at times, nonsensical words or phrases to be inadvertently transcribed. Although I have reviewed the note for such errors, some may still exist.

## 2019-03-21 ENCOUNTER — OFFICE VISIT (OUTPATIENT)
Dept: ONCOLOGY | Facility: CLINIC | Age: 79
End: 2019-03-21

## 2019-03-21 VITALS
HEIGHT: 72 IN | DIASTOLIC BLOOD PRESSURE: 69 MMHG | SYSTOLIC BLOOD PRESSURE: 158 MMHG | TEMPERATURE: 97.6 F | BODY MASS INDEX: 27.09 KG/M2 | RESPIRATION RATE: 16 BRPM | WEIGHT: 200 LBS | HEART RATE: 60 BPM | OXYGEN SATURATION: 98 %

## 2019-03-21 DIAGNOSIS — M88.9 PAGET DISEASE OF BONE: Primary | Chronic | ICD-10-CM

## 2019-03-21 PROCEDURE — 99214 OFFICE O/P EST MOD 30 MIN: CPT | Performed by: INTERNAL MEDICINE

## 2019-03-21 NOTE — PROGRESS NOTES
CHIEF COMPLAINT: These of the bone    Problem List:  Oncology/Hematology History    1. Paget's disease of the bone:  2. History of leukopenia and thrombocytopenia  3. History of Barraza's esophagus treated with Protonix    -Has a history of Paget's disease of the bone dating back into his 50s where he was managed by a physician at [facility] and they performed a bone biopsy according to the patient that prove that this was not metastatic.  He had received periodic bisphosphonate therapy as the alkaline phosphatase and bone imaging would dictate.  I Saw patient for the first time in 2010 for this history of Paget's disease of the bone with alkaline phosphatase of 142 and a bone scan 11/2010 showing substantial and severe abnormalities in the pelvis, left femur and worse in the left pelvis most consistent with Paget's disease.  X-rays of the lumbar spine revealed Paget's disease of the proximal femurs, pelvis, lumbar spine with fusion of the left sacroiliac joint.  He also had Paget's of the proximal femur on plain x-ray.  He had prostatic enlargement with a normal PSA.  9/29/10 colonoscopy showed hemorrhoids with no malignancy.  We started him on calcium 400 mg 3 times a day with vitamin D 800 international units daily.  Because of involvement of weightbearing bones we started him on Reclast 5 mg on 1/24/11.  Subsequent vitamin D level normalized.June 2012 because of abdominal pain had a CT of the abdomen knowing small umbilical hernia and bilateral renal cysts.  No splenomegaly or hepatomegaly.  The May 2012 total body bone scan showed slight improvement in previous right tibial and leftward skull abnormalities with treatment and subsequent December 2012 bone scan showed no active metabolic disease to suggest any ongoing Paget's.  Peripheral smear showed mild leukoplakia pancytopenia without dysplastic changes and no immature forms.  Platelets 112,000 and 2010 with white count 3520 hemoglobin 14,  unremarkable coag, and negative PEYTON and rheumatoid factor.  In January 2019 he developed seizures.  MRI of the brain negative.  CT showed some atelectasis right upper lobe and 11 mm right adrenal nodule not well characterized on CT.  There was abnormal heterogeneous sclerosis of the lower lumbar spine, sacrum, and pelvis consistent with his Paget's.  This compares to May 2017 showing mixed lytic and sclerotic area is of the pelvis and lumbar sacral and femoral areas which could be concerning for metastatic disease or marrow infiltrative process and no significant bony expansion to confirm Paget's.  Last saw me in 2014.  Post seizure follow-up saw me 2/18/19.  His alkaline phosphatase has been normal on review of his labs through 2017 and 2018 as well as January 2019.  I will order repeat bone scan.  I also ordered a bone survey given the prior mixed lytic and sclerotic picture.  I also checked his serum immunoelectrophoresis.  The odds of aggressive malignancy lurking all this time is unlikely but concomitant malignancy can cloud this picture and can come on with age.        Paget disease of bone    5/28/2017 Initial Diagnosis     Paget disease of bone            HISTORY OF PRESENT ILLNESS:  The patient is a 78 y.o. male, here for follow up on management of Paget's disease of the bone.  The previous bone pain that he return to me regarding this back in February 2019 following seizures for which she had had scan showing sclerotic lesions.  His alkaline phosphatase was entirely normal.  PSA was normal.  No monoclonal gammopathy.      Past Medical History:   Diagnosis Date   • Arthritis    • Diabetes mellitus (CMS/HCC)    • Diverticulitis    • GERD (gastroesophageal reflux disease)    • Hyperlipidemia    • Hypertension    • Paget disease of bone      Past Surgical History:   Procedure Laterality Date   • APPENDECTOMY     • COLON RESECTION      second to diverticulitis    • FOOT SURGERY Left        No Known  "Allergies    Family History and Social History reviewed and changed as necessary      REVIEW OF SYSTEM:   Review of Systems   Constitutional: Negative for appetite change, chills, diaphoresis, fatigue, fever and unexpected weight change.   HENT:   Negative for mouth sores, sore throat and trouble swallowing.    Eyes: Negative for icterus.   Respiratory: Negative for cough, hemoptysis and shortness of breath.    Cardiovascular: Negative for chest pain, leg swelling and palpitations.   Gastrointestinal: Negative for abdominal distention, abdominal pain, blood in stool, constipation, diarrhea, nausea and vomiting.   Endocrine: Negative for hot flashes.   Genitourinary: Negative for bladder incontinence, difficulty urinating, dysuria, frequency and hematuria.    Musculoskeletal: Negative for gait problem, neck pain and neck stiffness.   Skin: Negative for rash.   Neurological: Negative for dizziness, gait problem, headaches, light-headedness and numbness.   Hematological: Negative for adenopathy. Does not bruise/bleed easily.   Psychiatric/Behavioral: Negative for depression. The patient is not nervous/anxious.    All other systems reviewed and are negative.       PHYSICAL EXAM    Vitals:    03/21/19 1519   BP: 158/69   Pulse: 60   Resp: 16   Temp: 97.6 °F (36.4 °C)   SpO2: 98%   Weight: 90.7 kg (200 lb)   Height: 182.9 cm (72\")     Constitutional: Appears well-developed and well-nourished. No distress.   ECOG: (1) Restricted in physically strenuous activity, ambulatory and able to do work of light nature  HENT:   Head: Normocephalic.   Mouth/Throat: Oropharynx is clear and moist.   Eyes: Conjunctivae are normal. Pupils are equal, round, and reactive to light. No scleral icterus.   Neck: Neck supple. No JVD present. No thyromegaly present.   Cardiovascular: Normal rate, regular rhythm and normal heart sounds.    Pulmonary/Chest: Breath sounds normal. No respiratory distress.   Abdominal: Soft. Exhibits no distension and " no mass. There is no hepatosplenomegaly. There is no tenderness. There is no rebound and no guarding.   Musculoskeletal:Exhibits no edema, tenderness or deformity.   Neurological: Alert and oriented to person, place, and time. Exhibits normal muscle tone.   Skin: No ecchymosis, no petechiae and no rash noted. Not diaphoretic. No cyanosis. Nails show no clubbing.   Psychiatric: Normal mood and affect.   Vitals reviewed.      Lab Results   Component Value Date    HGB 13.7 01/28/2019    HCT 41.4 01/28/2019    MCV 84.7 01/28/2019    PLT 96 (L) 01/28/2019    WBC 3.35 (L) 01/28/2019    NEUTROABS 3.09 01/26/2019    LYMPHSABS 1.10 01/26/2019    MONOSABS 0.35 01/26/2019    EOSABS 0.03 01/26/2019    BASOSABS 0.01 01/26/2019       Lab Results   Component Value Date    GLUCOSE 125 (H) 02/18/2019    BUN 19 02/18/2019    CREATININE 1.23 02/18/2019     02/18/2019    K 4.5 02/18/2019     02/18/2019    CO2 28.0 02/18/2019    CALCIUM 9.6 02/18/2019    PROTEINTOT 6.7 02/18/2019    ALBUMIN 4.20 02/18/2019    ALBUMIN 3.8 02/18/2019    BILITOT 0.5 02/18/2019    ALKPHOS 82 02/18/2019    AST 20 02/18/2019    ALT 16 02/18/2019                   ASSESSMENT & PLAN:    1. History of Paget's disease  2. Sclerotic bone lesions    Discussion:The previous bone pain that he return to me regarding this back in February 2019 following seizures for which she had had scan showing sclerotic lesions.  His alkaline phosphatase was entirely normal.  PSA was normal.  No monoclonal gammopathy.  The odds of this being progressive metastasis so slowly over such a long period of time is quite low.  The odds of there being active Paget's with normal alkaline phosphatase is also quite low and his bony pains have completely resolved.  He did not get the MRI or bone scan as previously ordered but, given the lack of symptoms and the normal alkaline phosphatase, we will continue to just scan based on symptoms as they arise and/or elevated alkaline  phosphatase.  He will follow up with primary care for CMP couple times a year and we will see as needed    Discussed with patient 30 minutes greater than 50% spent counseling  Thomas Lui MD    03/21/2019

## 2019-03-26 DIAGNOSIS — E08.00 DIABETES MELLITUS DUE TO UNDERLYING CONDITION WITH HYPEROSMOLARITY WITHOUT COMA, WITH LONG-TERM CURRENT USE OF INSULIN (HCC): Primary | Chronic | ICD-10-CM

## 2019-03-26 DIAGNOSIS — Z79.4 DIABETES MELLITUS DUE TO UNDERLYING CONDITION WITH HYPEROSMOLARITY WITHOUT COMA, WITH LONG-TERM CURRENT USE OF INSULIN (HCC): Primary | Chronic | ICD-10-CM

## 2019-03-26 NOTE — TELEPHONE ENCOUNTER
Niru from  called and stated that patient had the wrong strips for glucose monitor. Pt testing meter is Accu Check Guide and is requesting those strips be sent to pharmacy.      Called correct strips into mail order pharmacy

## 2019-05-20 ENCOUNTER — OFFICE VISIT (OUTPATIENT)
Dept: FAMILY MEDICINE CLINIC | Facility: CLINIC | Age: 79
End: 2019-05-20

## 2019-05-20 VITALS
OXYGEN SATURATION: 98 % | SYSTOLIC BLOOD PRESSURE: 150 MMHG | BODY MASS INDEX: 26.76 KG/M2 | WEIGHT: 197.6 LBS | HEIGHT: 72 IN | HEART RATE: 50 BPM | DIASTOLIC BLOOD PRESSURE: 64 MMHG

## 2019-05-20 DIAGNOSIS — Z72.0 TOBACCO ABUSE: Chronic | ICD-10-CM

## 2019-05-20 DIAGNOSIS — E11.8 TYPE 2 DIABETES MELLITUS WITH COMPLICATION, WITHOUT LONG-TERM CURRENT USE OF INSULIN (HCC): Primary | Chronic | ICD-10-CM

## 2019-05-20 DIAGNOSIS — I10 ESSENTIAL HYPERTENSION: Chronic | ICD-10-CM

## 2019-05-20 DIAGNOSIS — R56.9 SEIZURE (HCC): ICD-10-CM

## 2019-05-20 DIAGNOSIS — M25.561 ACUTE PAIN OF RIGHT KNEE: ICD-10-CM

## 2019-05-20 DIAGNOSIS — H91.93 BILATERAL HEARING LOSS, UNSPECIFIED HEARING LOSS TYPE: ICD-10-CM

## 2019-05-20 DIAGNOSIS — J44.9 CHRONIC OBSTRUCTIVE PULMONARY DISEASE, UNSPECIFIED COPD TYPE (HCC): ICD-10-CM

## 2019-05-20 LAB — HBA1C MFR BLD: 5.9 %

## 2019-05-20 PROCEDURE — 99214 OFFICE O/P EST MOD 30 MIN: CPT | Performed by: PHYSICIAN ASSISTANT

## 2019-05-20 PROCEDURE — 83036 HEMOGLOBIN GLYCOSYLATED A1C: CPT | Performed by: PHYSICIAN ASSISTANT

## 2019-05-20 NOTE — PROGRESS NOTES
Chief Complaint   Patient presents with   • Hypertension   • Diabetes       HPI     Narendra Cochran is a pleasant 78 y.o. male who is here for routine follow-up of right knee pain, hypertension, seizures and hearing loss.  Patient was referred to audiology but has not been, states that he has never received a call.  Went to ophthalmology appointment.  He cancelled appointment with Dr. Treviño today as his right knee pain is doing well.  His blood pressure is slightly elevated, he reports that he is taking the amlodipine.  Denies any dizziness or headache.  He is compliant on all medications.  He is still taking keppra and hasn't had any further seizures.    Past Medical History:   Diagnosis Date   • Arthritis    • Diabetes mellitus (CMS/HCC)    • Diverticulitis    • GERD (gastroesophageal reflux disease)    • Hyperlipidemia    • Hypertension    • Paget disease of bone        Past Surgical History:   Procedure Laterality Date   • APPENDECTOMY     • COLON RESECTION      second to diverticulitis    • FOOT SURGERY Left        Family History   Problem Relation Age of Onset   • Diabetes Sister        Social History     Socioeconomic History   • Marital status:      Spouse name: Not on file   • Number of children: Not on file   • Years of education: Not on file   • Highest education level: Not on file   Tobacco Use   • Smoking status: Current Every Day Smoker     Packs/day: 0.25     Years: 65.00     Pack years: 16.25     Types: Cigars, Cigarettes   • Smokeless tobacco: Never Used   Substance and Sexual Activity   • Alcohol use: No     Comment: rarely   • Drug use: No   • Sexual activity: Defer       No Known Allergies    ROS    Review of Systems   Constitutional: Negative for chills, diaphoresis and fever.   HENT: Positive for hearing loss. Negative for congestion, postnasal drip and rhinorrhea.    Respiratory: Negative for cough.    Cardiovascular: Negative for chest pain and leg swelling.   Musculoskeletal: Positive  "for arthralgias and gait problem.   Neurological: Negative for dizziness, seizures, light-headedness and headache.       Vitals:    05/20/19 1557   BP: 150/64   BP Location: Right arm   Patient Position: Sitting   Cuff Size: Adult   Pulse: 50   SpO2: 98%   Weight: 89.6 kg (197 lb 9.6 oz)   Height: 182.9 cm (72\")       Current Outpatient Medications on File Prior to Visit   Medication Sig Dispense Refill   • ACCU-CHEK FASTCLIX LANCETS misc Use daily. 102 each 0   • amLODIPine (NORVASC) 5 MG tablet Take 1 tablet by mouth Daily. 90 tablet 3   • aspirin 81 MG chewable tablet Chew 1 tablet Daily.     • B Complex-C-Folic Acid (SUPER B-COMPLEX/VIT C/FA) tablet      • Blood Glucose Monitoring Suppl (ACCU-CHEK GUIDE) w/Device kit 1 each Daily. 1 kit 0   • bumetanide (BUMEX) 0.5 MG tablet Take 1 tablet by mouth Daily. 90 tablet 1   • glucose blood test strip Use as instructed to test blood sugar daily 100 each 5   • KLOR-CON 20 MEQ CR tablet      • levETIRAcetam (KEPPRA) 250 MG tablet Take 1 tablet by mouth 2 (Two) Times a Day. 180 tablet 1   • metFORMIN (GLUCOPHAGE) 1000 MG tablet Take 1 tablet by mouth Daily With Breakfast. 90 tablet 3   • metoprolol succinate XL (TOPROL-XL) 50 MG 24 hr tablet Take 1 tablet by mouth Daily. 90 tablet 1   • PHARMACY MEDS TO BED CONSULT Daily (Monday-Friday). 1 each 0   • polyethylene glycol (MIRALAX) packet Take 17 g by mouth Daily.     • potassium chloride ER (K-TAB) 20 MEQ tablet controlled-release ER tablet Take 1 tablet by mouth Daily. 90 tablet 0   • rosuvastatin (CRESTOR) 20 MG tablet Take 1 tablet by mouth Daily. 90 tablet 1   • umeclidinium-vilanterol (ANORO ELLIPTA) 62.5-25 MCG/INH aerosol powder  inhaler Inhale 1 puff Daily. 60 each 5     No current facility-administered medications on file prior to visit.        Results for orders placed or performed in visit on 05/20/19   Microalbumin / Creatinine Urine Ratio - Urine, Clean Catch   Result Value Ref Range    Creatinine, Urine 208.9 " Not Estab. mg/dL    Microalbumin, Urine 36.2 Not Estab. ug/mL    Microalbumin/Creatinine Ratio 17.3 0.0 - 30.0 mg/g creat   POC Glycosylated Hemoglobin (Hb A1C)   Result Value Ref Range    Hemoglobin A1C 5.9 %       PE    Physical Exam   Constitutional: Vital signs are normal. He appears well-developed and well-nourished. He is active and cooperative. He does not appear ill. No distress. He appears overweight.   HENT:   Head: Normocephalic and atraumatic.   Right Ear: Decreased hearing is noted.   Left Ear: Decreased hearing is noted.   Eyes: EOM are normal.   Neck: Normal range of motion.   Cardiovascular: Normal rate, regular rhythm and normal heart sounds.   Pulmonary/Chest: Effort normal and breath sounds normal.   Musculoskeletal: Normal range of motion. He exhibits no edema.   Neurological: He is alert.   Skin: Skin is warm. He is not diaphoretic. No erythema.   Psychiatric: He has a normal mood and affect. His speech is normal and behavior is normal. Judgment and thought content normal. He is not actively hallucinating. He is attentive.       A/P    Narendra was seen today for hypertension and diabetes.    Diagnoses and all orders for this visit:    Type 2 diabetes mellitus with complication, without long-term current use of insulin (CMS/[facility])  -recent hemoglobin AIC was 5.9%, this has improved compared to 6.5%  -continue on metformin  -     POC Glycosylated Hemoglobin (Hb A1C)  -     Microalbumin / Creatinine Urine Ratio - Urine, Clean Catch    Essential hypertension  -continues to be elevated  -patient reports that he is compliant on amlodipine 5 mg QD, bumex 0.5 mg, metoprolol 50 mg QD, potassium 20 mEq  -may want to add irbesartan 75 mg daily, no known allergies to ACE/ARB    Seizure (CMS/[facility])  -on keppra 250 mg BID  -no issues with seizure since initial episode    Acute pain of right knee  -doing well today  -cancelled appointment with ortho as his pain is not hurting as much as before    Chronic  obstructive pulmonary disease, unspecified COPD type (CMS/[facility])  -using anoro  -denies worsening shortness of breath    Tobacco abuse  -continues to smoke daily, aware he should quit    Bilateral hearing loss, unspecified hearing loss type  -pending audiology appointment  -needs hearing aids, hearing is severely diminished       Plan of care reviewed with patient at the conclusion of today's visit. Education was provided regarding diagnosis, management and any prescribed or recommended OTC medications.  Patient verbalizes understanding of and agreement with management plan.    Return in about 5 months (around 10/20/2019) for Recheck.     Marguerite Moore PA-C

## 2019-05-21 LAB
ALBUMIN/CREAT UR: 17.3 MG/G CREAT (ref 0–30)
CREAT UR-MCNC: 208.9 MG/DL
MICROALBUMIN UR-MCNC: 36.2 UG/ML

## 2019-05-21 RX ORDER — IRBESARTAN 75 MG/1
75 TABLET ORAL NIGHTLY
Qty: 90 TABLET | Refills: 1 | Status: SHIPPED | OUTPATIENT
Start: 2019-05-21 | End: 2019-10-21 | Stop reason: SDUPTHER

## 2019-05-23 ENCOUNTER — TELEPHONE (OUTPATIENT)
Dept: FAMILY MEDICINE CLINIC | Facility: CLINIC | Age: 79
End: 2019-05-23

## 2019-05-23 DIAGNOSIS — H91.93 BILATERAL HEARING LOSS, UNSPECIFIED HEARING LOSS TYPE: Primary | ICD-10-CM

## 2019-06-24 DIAGNOSIS — I10 ESSENTIAL HYPERTENSION: Chronic | ICD-10-CM

## 2019-06-24 RX ORDER — POTASSIUM CHLORIDE 1500 MG/1
20 TABLET, FILM COATED, EXTENDED RELEASE ORAL DAILY
Qty: 30 TABLET | Refills: 0 | Status: SHIPPED | OUTPATIENT
Start: 2019-06-24 | End: 2019-10-22 | Stop reason: SDUPTHER

## 2019-06-24 RX ORDER — POTASSIUM CHLORIDE 1500 MG/1
20 TABLET, FILM COATED, EXTENDED RELEASE ORAL DAILY
Qty: 90 TABLET | Refills: 0 | Status: SHIPPED | OUTPATIENT
Start: 2019-06-24 | End: 2019-06-24 | Stop reason: SDUPTHER

## 2019-06-24 NOTE — TELEPHONE ENCOUNTER
Patient would also like a small supply sent to [facility] on [address] as he has been completley out for the last two weeks.

## 2019-09-05 DIAGNOSIS — E78.5 HYPERLIPIDEMIA, UNSPECIFIED HYPERLIPIDEMIA TYPE: Chronic | ICD-10-CM

## 2019-09-05 DIAGNOSIS — I10 ESSENTIAL HYPERTENSION: Chronic | ICD-10-CM

## 2019-09-05 RX ORDER — ROSUVASTATIN CALCIUM 20 MG/1
20 TABLET, COATED ORAL DAILY
Qty: 90 TABLET | Refills: 1 | Status: SHIPPED | OUTPATIENT
Start: 2019-09-05 | End: 2020-03-12 | Stop reason: SDUPTHER

## 2019-09-05 RX ORDER — METOPROLOL SUCCINATE 50 MG/1
50 TABLET, EXTENDED RELEASE ORAL DAILY
Qty: 90 TABLET | Refills: 1 | Status: SHIPPED | OUTPATIENT
Start: 2019-09-05 | End: 2019-10-21

## 2019-09-05 RX ORDER — BUMETANIDE 0.5 MG/1
0.5 TABLET ORAL DAILY
Qty: 90 TABLET | Refills: 1 | Status: SHIPPED | OUTPATIENT
Start: 2019-09-05 | End: 2019-11-14 | Stop reason: HOSPADM

## 2019-10-21 ENCOUNTER — OFFICE VISIT (OUTPATIENT)
Dept: FAMILY MEDICINE CLINIC | Facility: CLINIC | Age: 79
End: 2019-10-21

## 2019-10-21 ENCOUNTER — LAB REQUISITION (OUTPATIENT)
Dept: LAB | Facility: HOSPITAL | Age: 79
End: 2019-10-21

## 2019-10-21 VITALS
SYSTOLIC BLOOD PRESSURE: 142 MMHG | DIASTOLIC BLOOD PRESSURE: 62 MMHG | HEIGHT: 72 IN | WEIGHT: 195 LBS | HEART RATE: 52 BPM | OXYGEN SATURATION: 97 % | BODY MASS INDEX: 26.41 KG/M2

## 2019-10-21 DIAGNOSIS — J44.9 CHRONIC OBSTRUCTIVE PULMONARY DISEASE, UNSPECIFIED COPD TYPE (HCC): ICD-10-CM

## 2019-10-21 DIAGNOSIS — Z00.00 ROUTINE GENERAL MEDICAL EXAMINATION AT A HEALTH CARE FACILITY: ICD-10-CM

## 2019-10-21 DIAGNOSIS — E11.65 TYPE 2 DIABETES MELLITUS WITH HYPERGLYCEMIA, WITHOUT LONG-TERM CURRENT USE OF INSULIN (HCC): Primary | Chronic | ICD-10-CM

## 2019-10-21 DIAGNOSIS — E78.5 HYPERLIPIDEMIA, UNSPECIFIED HYPERLIPIDEMIA TYPE: ICD-10-CM

## 2019-10-21 DIAGNOSIS — M25.561 ACUTE PAIN OF RIGHT KNEE: ICD-10-CM

## 2019-10-21 DIAGNOSIS — G89.29 CHRONIC PAIN OF BOTH KNEES: ICD-10-CM

## 2019-10-21 DIAGNOSIS — D64.9 ANEMIA, UNSPECIFIED TYPE: ICD-10-CM

## 2019-10-21 DIAGNOSIS — M25.561 CHRONIC PAIN OF BOTH KNEES: ICD-10-CM

## 2019-10-21 DIAGNOSIS — M25.562 CHRONIC PAIN OF BOTH KNEES: ICD-10-CM

## 2019-10-21 DIAGNOSIS — R56.9 SEIZURE (HCC): ICD-10-CM

## 2019-10-21 DIAGNOSIS — I10 ESSENTIAL HYPERTENSION: ICD-10-CM

## 2019-10-21 DIAGNOSIS — H91.93 BILATERAL HEARING LOSS, UNSPECIFIED HEARING LOSS TYPE: ICD-10-CM

## 2019-10-21 LAB — HBA1C MFR BLD: 6.2 %

## 2019-10-21 PROCEDURE — 83036 HEMOGLOBIN GLYCOSYLATED A1C: CPT | Performed by: PHYSICIAN ASSISTANT

## 2019-10-21 PROCEDURE — 99214 OFFICE O/P EST MOD 30 MIN: CPT | Performed by: PHYSICIAN ASSISTANT

## 2019-10-21 PROCEDURE — 36415 COLL VENOUS BLD VENIPUNCTURE: CPT | Performed by: PHYSICIAN ASSISTANT

## 2019-10-21 RX ORDER — LEVETIRACETAM 250 MG/1
250 TABLET ORAL 2 TIMES DAILY
Qty: 180 TABLET | Refills: 1 | Status: SHIPPED | OUTPATIENT
Start: 2019-10-21 | End: 2020-02-28 | Stop reason: SDUPTHER

## 2019-10-21 RX ORDER — IRBESARTAN 150 MG/1
150 TABLET ORAL NIGHTLY
Qty: 90 TABLET | Refills: 1 | Status: SHIPPED | OUTPATIENT
Start: 2019-10-21 | End: 2019-11-12

## 2019-10-21 RX ORDER — METOPROLOL SUCCINATE 25 MG/1
25 TABLET, EXTENDED RELEASE ORAL DAILY
Qty: 90 TABLET | Refills: 1 | Status: SHIPPED | OUTPATIENT
Start: 2019-10-21 | End: 2019-11-14 | Stop reason: HOSPADM

## 2019-10-21 NOTE — PROGRESS NOTES
Chief Complaint   Patient presents with   • Diabetes   • Hypertension       HPI     Narendra Cochran is a pleasant 78 y.o. male who is here for routine follow-up of diabetes, hearing loss bilaterally, seizure, hypertension, hyperlipidemia, COPD, anemia, tobacco abuse and chronic bilateral knee pain.  Patient was seen by audiologist and has follow-up appointment.  He recently had a seizure and was started on keppra 250 mg BID.  He is compliant on this and has not had any other issues since.  Patient's blood pressure is elevated, low heart rate.  He is compliant on all medications.  Not monitoring at home.  He is taking rosuvastatin 20 mg nightly.  Has baseline shortness of breath.  Denies any worsening symptoms.  Using inhaler but doesn't feel he gets much benefit.  Continues to smoke and no interest in quitting.  He chronic bilateral knee pain.  Has been seen by orthopedic surgery for joint injections and these helped.  Not ready to return for more injections.  Wants refill on topical diclofenac at this time.    Past Medical History:   Diagnosis Date   • Arthritis    • Diabetes mellitus (CMS/[facility])    • Diverticulitis    • GERD (gastroesophageal reflux disease)    • Hyperlipidemia    • Hypertension    • Paget disease of bone        Past Surgical History:   Procedure Laterality Date   • APPENDECTOMY     • COLON RESECTION      second to diverticulitis    • FOOT SURGERY Left        Family History   Problem Relation Age of Onset   • Diabetes Sister        Social History     Socioeconomic History   • Marital status:      Spouse name: Not on file   • Number of children: Not on file   • Years of education: Not on file   • Highest education level: Not on file   Tobacco Use   • Smoking status: Current Every Day Smoker     Packs/day: 0.25     Years: 65.00     Pack years: 16.25     Types: Cigars, Cigarettes   • Smokeless tobacco: Never Used   Substance and Sexual Activity   • Alcohol use: No     Comment: rarely   • Drug use:  "No   • Sexual activity: Defer       No Known Allergies    ROS    Review of Systems   Constitutional: Positive for fatigue. Negative for chills, diaphoresis and fever.   HENT: Positive for hearing loss. Negative for congestion, postnasal drip and rhinorrhea.    Respiratory: Positive for shortness of breath (baseline). Negative for cough and wheezing.    Cardiovascular: Negative for chest pain and leg swelling.   Musculoskeletal: Positive for arthralgias, gait problem and myalgias. Negative for joint swelling.   Neurological: Negative for dizziness and headache.   Psychiatric/Behavioral: Negative for self-injury, sleep disturbance, suicidal ideas, depressed mood and stress. The patient is not nervous/anxious.        Vitals:    10/21/19 1556   BP: 142/62   Pulse: 52   SpO2: 97%   Weight: 88.5 kg (195 lb)   Height: 182.9 cm (72\")     Body mass index is 26.45 kg/m².    Current Outpatient Medications on File Prior to Visit   Medication Sig Dispense Refill   • ACCU-CHEK FASTCLIX LANCETS misc Use daily. 102 each 0   • amLODIPine (NORVASC) 5 MG tablet Take 1 tablet by mouth Daily. 90 tablet 3   • aspirin 81 MG chewable tablet Chew 1 tablet Daily.     • B Complex-C-Folic Acid (SUPER B-COMPLEX/VIT C/FA) tablet      • Blood Glucose Monitoring Suppl (ACCU-CHEK GUIDE) w/Device kit 1 each Daily. 1 kit 0   • bumetanide (BUMEX) 0.5 MG tablet Take 1 tablet by mouth Daily. 90 tablet 1   • glucose blood test strip Use as instructed to test blood sugar daily 100 each 5   • KLOR-CON 20 MEQ CR tablet      • metFORMIN (GLUCOPHAGE) 1000 MG tablet Take 1 tablet by mouth Daily With Breakfast. 90 tablet 3   • PHARMACY MEDS TO BED CONSULT Daily (Monday-Friday). 1 each 0   • polyethylene glycol (MIRALAX) packet Take 17 g by mouth Daily.     • potassium chloride ER (K-TAB) 20 MEQ tablet controlled-release ER tablet Take 1 tablet by mouth Daily. 30 tablet 0   • rosuvastatin (CRESTOR) 20 MG tablet Take 1 tablet by mouth Daily. 90 tablet 1   • " umeclidinium-vilanterol (ANORO ELLIPTA) 62.5-25 MCG/INH aerosol powder  inhaler Inhale 1 puff Daily. 60 each 5     No current facility-administered medications on file prior to visit.        Results for orders placed or performed in visit on 10/21/19   Comprehensive Metabolic Panel   Result Value Ref Range    Glucose 159 (H) 65 - 99 mg/dL    BUN 11 8 - 23 mg/dL    Creatinine 1.10 0.76 - 1.27 mg/dL    eGFR Non African Am 64 >60 mL/min/1.73    eGFR African Am 78 >60 mL/min/1.73    BUN/Creatinine Ratio 10.0 7.0 - 25.0    Sodium 142 136 - 145 mmol/L    Potassium 2.9 (L) 3.5 - 5.2 mmol/L    Chloride 97 (L) 98 - 107 mmol/L    Total CO2 30.9 (H) 22.0 - 29.0 mmol/L    Calcium 9.1 8.6 - 10.5 mg/dL    Total Protein 7.4 6.0 - 8.5 g/dL    Albumin 4.60 3.50 - 5.20 g/dL    Globulin 2.8 gm/dL    A/G Ratio 1.6 g/dL    Total Bilirubin 0.8 0.2 - 1.2 mg/dL    Alkaline Phosphatase 77 39 - 117 U/L    AST (SGOT) 16 1 - 40 U/L    ALT (SGPT) 7 1 - 41 U/L   POC Glycosylated Hemoglobin (Hb A1C)   Result Value Ref Range    Hemoglobin A1C 6.2 %   CBC & Differential   Result Value Ref Range    WBC 2.55 (L) 3.40 - 10.80 10*3/mm3    RBC 4.67 4.14 - 5.80 10*6/mm3    Hemoglobin 13.2 13.0 - 17.7 g/dL    Hematocrit 39.8 37.5 - 51.0 %    MCV 85.2 79.0 - 97.0 fL    MCH 28.3 26.6 - 33.0 pg    MCHC 33.2 31.5 - 35.7 g/dL    RDW 13.5 12.3 - 15.4 %    Platelets 111 (L) 140 - 450 10*3/mm3    Neutrophil Rel % 58.8 42.7 - 76.0 %    Lymphocyte Rel % 29.8 19.6 - 45.3 %    Monocyte Rel % 9.4 5.0 - 12.0 %    Eosinophil Rel % 1.6 0.3 - 6.2 %    Basophil Rel % 0.4 0.0 - 1.5 %    Neutrophils Absolute 1.50 (L) 1.70 - 7.00 10*3/mm3    Lymphocytes Absolute 0.76 0.70 - 3.10 10*3/mm3    Monocytes Absolute 0.24 0.10 - 0.90 10*3/mm3    Eosinophils Absolute 0.04 0.00 - 0.40 10*3/mm3    Basophils Absolute 0.01 0.00 - 0.20 10*3/mm3    Immature Granulocyte Rel % 0.0 0.0 - 0.5 %    Immature Grans Absolute 0.00 0.00 - 0.05 10*3/mm3    nRBC 0.0 0.0 - 0.2 /100 WBC        PE    Physical Exam   Constitutional: He appears well-developed and well-nourished. He is active and cooperative. No distress.   HENT:   Head: Normocephalic and atraumatic.   Eyes: EOM are normal.   Neck: Normal range of motion.   Cardiovascular: Regular rhythm and normal heart sounds. Bradycardia present.   Pulmonary/Chest: Effort normal and breath sounds normal.   Musculoskeletal: Normal range of motion. He exhibits no edema.   Neurological: He is alert.   Slow antalgic gait secondary to knee pain.  Not using cane or walker.   Skin: Skin is warm. He is not diaphoretic. No erythema.   Psychiatric: He has a normal mood and affect. His speech is normal and behavior is normal. Judgment and thought content normal. He is not actively hallucinating. He is attentive.   Vitals reviewed.      A/P    Narendra was seen today for diabetes and hypertension.    Diagnoses and all orders for this visit:    Type 2 diabetes mellitus with hyperglycemia, without long-term current use of insulin (CMS/[facility])  -     POC Glycosylated Hemoglobin (Hb A1C)  Compliant on metformin 1000 mg daily    Essential hypertension  -     irbesartan (AVAPRO) 150 MG tablet; Take 1 tablet by mouth Every Night. For blood pressure.  -     metoprolol succinate XL (TOPROL XL) 25 MG 24 hr tablet; Take 1 tablet by mouth Daily.  -     Comprehensive Metabolic Panel  Elevated blood pressure with bradycardia  Reduce metoprolol to 25 mg daily and increase irbesartan to 300 mg daily  Continue other meds as prescribed  Follow-up in 1 month    Hyperlipidemia, unspecified hyperlipidemia type  -     Comprehensive Metabolic Panel  On crestor 20 mg nightly    Anemia, unspecified type  -     CBC Auto Differential    Chronic obstructive pulmonary disease, unspecified COPD type (CMS/[facility])  Baseline shortness of breath, nothing worse or new today  Compliant on inhalers  Continues to smoke, no desire to quit    Chronic pain of both knees  -     diclofenac (VOLTAREN) 1 % gel  gel; Apply 4 g topically to the appropriate area as directed 4 (Four) Times a Day As Needed (prn for pain).  Bilateral knee pain, worse in right  Has had joint injections with orthopedic surgery and these were very helpful, declines wanting these again at this time  Requests refill of diclofenac cream    Seizure (CMS/[facility])  -     levETIRAcetam (KEPPRA) 250 MG tablet; Take 1 tablet by mouth 2 (Two) Times a Day.  No seizure activity since starting keppra 250 mg BID, patient is compliant on medication    Bilateral hearing loss, unspecified hearing loss type  Seen by audiologist, has follow-up and hopefully will be able to afford hearing aids             Plan of care reviewed with patient at the conclusion of today's visit. Education was provided regarding diagnosis, management and any prescribed or recommended OTC medications.  Patient verbalizes understanding of and agreement with management plan.    Return in about 4 weeks (around 11/18/2019) for Recheck, hypertension and bradycardia.     Marguerite Moore PA-C

## 2019-10-22 DIAGNOSIS — I10 ESSENTIAL HYPERTENSION: Chronic | ICD-10-CM

## 2019-10-22 LAB
ALBUMIN SERPL-MCNC: 4.6 G/DL (ref 3.5–5.2)
ALBUMIN/GLOB SERPL: 1.6 G/DL
ALP SERPL-CCNC: 77 U/L (ref 39–117)
ALT SERPL-CCNC: 7 U/L (ref 1–41)
AST SERPL-CCNC: 16 U/L (ref 1–40)
BASOPHILS # BLD AUTO: 0.01 10*3/MM3 (ref 0–0.2)
BASOPHILS NFR BLD AUTO: 0.4 % (ref 0–1.5)
BILIRUB SERPL-MCNC: 0.8 MG/DL (ref 0.2–1.2)
BUN SERPL-MCNC: 11 MG/DL (ref 8–23)
BUN/CREAT SERPL: 10 (ref 7–25)
CALCIUM SERPL-MCNC: 9.1 MG/DL (ref 8.6–10.5)
CHLORIDE SERPL-SCNC: 97 MMOL/L (ref 98–107)
CO2 SERPL-SCNC: 30.9 MMOL/L (ref 22–29)
CREAT SERPL-MCNC: 1.1 MG/DL (ref 0.76–1.27)
EOSINOPHIL # BLD AUTO: 0.04 10*3/MM3 (ref 0–0.4)
EOSINOPHIL NFR BLD AUTO: 1.6 % (ref 0.3–6.2)
ERYTHROCYTE [DISTWIDTH] IN BLOOD BY AUTOMATED COUNT: 13.5 % (ref 12.3–15.4)
GLOBULIN SER CALC-MCNC: 2.8 GM/DL
GLUCOSE SERPL-MCNC: 159 MG/DL (ref 65–99)
HCT VFR BLD AUTO: 39.8 % (ref 37.5–51)
HGB BLD-MCNC: 13.2 G/DL (ref 13–17.7)
IMM GRANULOCYTES # BLD AUTO: 0 10*3/MM3 (ref 0–0.05)
IMM GRANULOCYTES NFR BLD AUTO: 0 % (ref 0–0.5)
LYMPHOCYTES # BLD AUTO: 0.76 10*3/MM3 (ref 0.7–3.1)
LYMPHOCYTES NFR BLD AUTO: 29.8 % (ref 19.6–45.3)
MCH RBC QN AUTO: 28.3 PG (ref 26.6–33)
MCHC RBC AUTO-ENTMCNC: 33.2 G/DL (ref 31.5–35.7)
MCV RBC AUTO: 85.2 FL (ref 79–97)
MONOCYTES # BLD AUTO: 0.24 10*3/MM3 (ref 0.1–0.9)
MONOCYTES NFR BLD AUTO: 9.4 % (ref 5–12)
NEUTROPHILS # BLD AUTO: 1.5 10*3/MM3 (ref 1.7–7)
NEUTROPHILS NFR BLD AUTO: 58.8 % (ref 42.7–76)
NRBC BLD AUTO-RTO: 0 /100 WBC (ref 0–0.2)
PLATELET # BLD AUTO: 111 10*3/MM3 (ref 140–450)
POTASSIUM SERPL-SCNC: 2.9 MMOL/L (ref 3.5–5.2)
PROT SERPL-MCNC: 7.4 G/DL (ref 6–8.5)
RBC # BLD AUTO: 4.67 10*6/MM3 (ref 4.14–5.8)
SODIUM SERPL-SCNC: 142 MMOL/L (ref 136–145)
WBC # BLD AUTO: 2.55 10*3/MM3 (ref 3.4–10.8)

## 2019-10-22 RX ORDER — POTASSIUM CHLORIDE 1500 MG/1
20 TABLET, FILM COATED, EXTENDED RELEASE ORAL 2 TIMES DAILY
Qty: 180 TABLET | Refills: 1 | Status: SHIPPED | OUTPATIENT
Start: 2019-10-22 | End: 2019-11-12

## 2019-11-04 ENCOUNTER — APPOINTMENT (OUTPATIENT)
Dept: LAB | Facility: HOSPITAL | Age: 79
End: 2019-11-04

## 2019-11-04 ENCOUNTER — OFFICE VISIT (OUTPATIENT)
Dept: FAMILY MEDICINE CLINIC | Facility: CLINIC | Age: 79
End: 2019-11-04

## 2019-11-04 VITALS
SYSTOLIC BLOOD PRESSURE: 110 MMHG | HEART RATE: 74 BPM | WEIGHT: 193.1 LBS | OXYGEN SATURATION: 100 % | DIASTOLIC BLOOD PRESSURE: 68 MMHG | HEIGHT: 72 IN | BODY MASS INDEX: 26.15 KG/M2

## 2019-11-04 DIAGNOSIS — E66.3 OVERWEIGHT (BMI 25.0-29.9): ICD-10-CM

## 2019-11-04 DIAGNOSIS — I10 ESSENTIAL HYPERTENSION: Primary | Chronic | ICD-10-CM

## 2019-11-04 DIAGNOSIS — Z23 FLU VACCINE NEED: ICD-10-CM

## 2019-11-04 DIAGNOSIS — E87.6 HYPOKALEMIA: ICD-10-CM

## 2019-11-04 LAB
ANION GAP SERPL CALCULATED.3IONS-SCNC: 10 MMOL/L (ref 5–15)
BUN BLD-MCNC: 18 MG/DL (ref 8–23)
BUN/CREAT SERPL: 15.4 (ref 7–25)
CALCIUM SPEC-SCNC: 9.8 MG/DL (ref 8.6–10.5)
CHLORIDE SERPL-SCNC: 103 MMOL/L (ref 98–107)
CO2 SERPL-SCNC: 27 MMOL/L (ref 22–29)
CREAT BLD-MCNC: 1.17 MG/DL (ref 0.76–1.27)
GFR SERPL CREATININE-BSD FRML MDRD: 73 ML/MIN/1.73
GLUCOSE BLD-MCNC: 92 MG/DL (ref 65–99)
POTASSIUM BLD-SCNC: 4.9 MMOL/L (ref 3.5–5.2)
SODIUM BLD-SCNC: 140 MMOL/L (ref 136–145)

## 2019-11-04 PROCEDURE — 99213 OFFICE O/P EST LOW 20 MIN: CPT | Performed by: PHYSICIAN ASSISTANT

## 2019-11-04 PROCEDURE — G0008 ADMIN INFLUENZA VIRUS VAC: HCPCS | Performed by: PHYSICIAN ASSISTANT

## 2019-11-04 PROCEDURE — 80048 BASIC METABOLIC PNL TOTAL CA: CPT | Performed by: PHYSICIAN ASSISTANT

## 2019-11-04 PROCEDURE — 90653 IIV ADJUVANT VACCINE IM: CPT | Performed by: PHYSICIAN ASSISTANT

## 2019-11-04 NOTE — PATIENT INSTRUCTIONS

## 2019-11-04 NOTE — PROGRESS NOTES
Chief Complaint   Patient presents with   • Hypertension     Here for a follow up       HPI     Narendra Cochran is a pleasant 79 y.o. male who is here for routine follow-up of hypertension and hypokalemia.  Patient reports that he feels better with medication change.  Monitoring blood pressure at home and is normally well-controlled, although was elevated yesterday.  Denies headache or dizziness/lightheadedness.  Taking potassium 20 mEq twice a day.  Overall patient reports that he is doing well.    Has audiologist appointment on 11/12 to obtain hearing aids.    Past Medical History:   Diagnosis Date   • Arthritis    • Diabetes mellitus (CMS/HCC)    • Diverticulitis    • GERD (gastroesophageal reflux disease)    • Hyperlipidemia    • Hypertension    • Paget disease of bone        Past Surgical History:   Procedure Laterality Date   • APPENDECTOMY     • COLON RESECTION      second to diverticulitis    • FOOT SURGERY Left        Family History   Problem Relation Age of Onset   • Diabetes Sister        Social History     Socioeconomic History   • Marital status:      Spouse name: Not on file   • Number of children: Not on file   • Years of education: Not on file   • Highest education level: Not on file   Tobacco Use   • Smoking status: Current Every Day Smoker     Packs/day: 0.25     Years: 65.00     Pack years: 16.25     Types: Cigars, Cigarettes   • Smokeless tobacco: Never Used   Substance and Sexual Activity   • Alcohol use: No     Comment: rarely   • Drug use: No   • Sexual activity: Defer       No Known Allergies    ROS    Review of Systems   Constitutional: Negative for chills, diaphoresis, fatigue and fever.   HENT: Positive for hearing loss.    Respiratory: Negative for cough, shortness of breath and wheezing.    Cardiovascular: Negative for chest pain and leg swelling.   Neurological: Negative for dizziness, light-headedness and headache.       Vitals:    11/04/19 1532   BP: 110/68   Pulse: 74   SpO2:  "100%   Weight: 87.6 kg (193 lb 1.6 oz)   Height: 182.9 cm (72\")     Body mass index is 26.19 kg/m².    Current Outpatient Medications on File Prior to Visit   Medication Sig Dispense Refill   • ACCU-CHEK FASTCLIX LANCETS misc Use daily. 102 each 0   • amLODIPine (NORVASC) 5 MG tablet Take 1 tablet by mouth Daily. 90 tablet 3   • aspirin 81 MG chewable tablet Chew 1 tablet Daily.     • B Complex-C-Folic Acid (SUPER B-COMPLEX/VIT C/FA) tablet      • Blood Glucose Monitoring Suppl (ACCU-CHEK GUIDE) w/Device kit 1 each Daily. 1 kit 0   • bumetanide (BUMEX) 0.5 MG tablet Take 1 tablet by mouth Daily. 90 tablet 1   • diclofenac (VOLTAREN) 1 % gel gel Apply 4 g topically to the appropriate area as directed 4 (Four) Times a Day As Needed (prn for pain). 60 tube 0   • glucose blood test strip Use as instructed to test blood sugar daily 100 each 5   • irbesartan (AVAPRO) 150 MG tablet Take 1 tablet by mouth Every Night. For blood pressure. 90 tablet 1   • KLOR-CON 20 MEQ CR tablet      • levETIRAcetam (KEPPRA) 250 MG tablet Take 1 tablet by mouth 2 (Two) Times a Day. 180 tablet 1   • metFORMIN (GLUCOPHAGE) 1000 MG tablet Take 1 tablet by mouth Daily With Breakfast. 90 tablet 3   • metoprolol succinate XL (TOPROL XL) 25 MG 24 hr tablet Take 1 tablet by mouth Daily. 90 tablet 1   • PHARMACY MEDS TO BED CONSULT Daily (Monday-Friday). 1 each 0   • polyethylene glycol (MIRALAX) packet Take 17 g by mouth Daily.     • potassium chloride ER (K-TAB) 20 MEQ tablet controlled-release ER tablet Take 1 tablet by mouth 2 (Two) Times a Day. 180 tablet 1   • rosuvastatin (CRESTOR) 20 MG tablet Take 1 tablet by mouth Daily. 90 tablet 1   • umeclidinium-vilanterol (ANORO ELLIPTA) 62.5-25 MCG/INH aerosol powder  inhaler Inhale 1 puff Daily. 60 each 5     No current facility-administered medications on file prior to visit.        Results for orders placed or performed in visit on 10/21/19   Comprehensive Metabolic Panel   Result Value Ref Range "    Glucose 159 (H) 65 - 99 mg/dL    BUN 11 8 - 23 mg/dL    Creatinine 1.10 0.76 - 1.27 mg/dL    eGFR Non African Am 64 >60 mL/min/1.73    eGFR African Am 78 >60 mL/min/1.73    BUN/Creatinine Ratio 10.0 7.0 - 25.0    Sodium 142 136 - 145 mmol/L    Potassium 2.9 (L) 3.5 - 5.2 mmol/L    Chloride 97 (L) 98 - 107 mmol/L    Total CO2 30.9 (H) 22.0 - 29.0 mmol/L    Calcium 9.1 8.6 - 10.5 mg/dL    Total Protein 7.4 6.0 - 8.5 g/dL    Albumin 4.60 3.50 - 5.20 g/dL    Globulin 2.8 gm/dL    A/G Ratio 1.6 g/dL    Total Bilirubin 0.8 0.2 - 1.2 mg/dL    Alkaline Phosphatase 77 39 - 117 U/L    AST (SGOT) 16 1 - 40 U/L    ALT (SGPT) 7 1 - 41 U/L   POC Glycosylated Hemoglobin (Hb A1C)   Result Value Ref Range    Hemoglobin A1C 6.2 %   CBC & Differential   Result Value Ref Range    WBC 2.55 (L) 3.40 - 10.80 10*3/mm3    RBC 4.67 4.14 - 5.80 10*6/mm3    Hemoglobin 13.2 13.0 - 17.7 g/dL    Hematocrit 39.8 37.5 - 51.0 %    MCV 85.2 79.0 - 97.0 fL    MCH 28.3 26.6 - 33.0 pg    MCHC 33.2 31.5 - 35.7 g/dL    RDW 13.5 12.3 - 15.4 %    Platelets 111 (L) 140 - 450 10*3/mm3    Neutrophil Rel % 58.8 42.7 - 76.0 %    Lymphocyte Rel % 29.8 19.6 - 45.3 %    Monocyte Rel % 9.4 5.0 - 12.0 %    Eosinophil Rel % 1.6 0.3 - 6.2 %    Basophil Rel % 0.4 0.0 - 1.5 %    Neutrophils Absolute 1.50 (L) 1.70 - 7.00 10*3/mm3    Lymphocytes Absolute 0.76 0.70 - 3.10 10*3/mm3    Monocytes Absolute 0.24 0.10 - 0.90 10*3/mm3    Eosinophils Absolute 0.04 0.00 - 0.40 10*3/mm3    Basophils Absolute 0.01 0.00 - 0.20 10*3/mm3    Immature Granulocyte Rel % 0.0 0.0 - 0.5 %    Immature Grans Absolute 0.00 0.00 - 0.05 10*3/mm3    nRBC 0.0 0.0 - 0.2 /100 WBC       PE    Physical Exam   Constitutional: He appears well-developed and well-nourished. He is active and cooperative. No distress.   HENT:   Head: Normocephalic and atraumatic.   Right Ear: Decreased hearing is noted.   Left Ear: Decreased hearing is noted.   Eyes: EOM are normal.   Neck: Normal range of motion.    Cardiovascular: Normal rate, regular rhythm and normal heart sounds.   Pulmonary/Chest: Effort normal and breath sounds normal.   Musculoskeletal: Normal range of motion. He exhibits no edema.   Neurological: He is alert.   Skin: Skin is warm. He is not diaphoretic. No erythema.   Psychiatric: He has a normal mood and affect. His speech is normal and behavior is normal. Judgment and thought content normal. He is not actively hallucinating. He is attentive.   Vitals reviewed.      A/P    Narendra was seen today for hypertension.    Diagnoses and all orders for this visit:    Essential hypertension  Stable and well-controlled today.  Patient states he is monitoring it at home and is usually normal.  He will bring in blood pressure log to next appointment.  Compliant on all medications.    Hypokalemia  -     Basic Metabolic Panel  Taking potassium 20 mEq twice a day.      Overweight (BMI 25.0-29.9)  Gave patient handout on watching his calories.  He walks a lot.    Flu vaccine need  -     Fluad Tri 65yr ([id number])         Plan of care reviewed with patient at the conclusion of today's visit. Education was provided regarding diagnosis, management and any prescribed or recommended OTC medications.  Patient verbalizes understanding of and agreement with management plan.    No Follow-up on file.     Marguerite Moore PA-C

## 2019-11-08 ENCOUNTER — HOSPITAL ENCOUNTER (OUTPATIENT)
Dept: NUCLEAR MEDICINE | Facility: HOSPITAL | Age: 79
Discharge: HOME | End: 2019-11-08

## 2019-11-08 DIAGNOSIS — M88.9 PAGET DISEASE OF BONE: Chronic | ICD-10-CM

## 2019-11-08 PROCEDURE — A9503 TC99M MEDRONATE: HCPCS | Performed by: INTERNAL MEDICINE

## 2019-11-08 PROCEDURE — 78306 BONE IMAGING WHOLE BODY: CPT

## 2019-11-08 PROCEDURE — 0 TECHNETIUM MEDRONATE KIT: Performed by: INTERNAL MEDICINE

## 2019-11-08 RX ORDER — TC 99M MEDRONATE 20 MG/10ML
27.2 INJECTION, POWDER, LYOPHILIZED, FOR SOLUTION INTRAVENOUS
Status: COMPLETED | OUTPATIENT
Start: 2019-11-08 | End: 2019-11-08

## 2019-11-08 RX ADMIN — Medication 27.2 MILLICURIE: at 11:05

## 2019-11-12 ENCOUNTER — TELEPHONE (OUTPATIENT)
Dept: FAMILY MEDICINE CLINIC | Facility: CLINIC | Age: 79
End: 2019-11-12

## 2019-11-12 ENCOUNTER — APPOINTMENT (OUTPATIENT)
Dept: GENERAL RADIOLOGY | Facility: HOSPITAL | Age: 79
End: 2019-11-12

## 2019-11-12 ENCOUNTER — HOSPITAL ENCOUNTER (INPATIENT)
Facility: HOSPITAL | Age: 79
LOS: 1 days | Discharge: HOME | End: 2019-11-14
Attending: EMERGENCY MEDICINE | Admitting: INTERNAL MEDICINE

## 2019-11-12 DIAGNOSIS — N28.9 RENAL INSUFFICIENCY: ICD-10-CM

## 2019-11-12 DIAGNOSIS — R42 DIZZINESS: Primary | ICD-10-CM

## 2019-11-12 DIAGNOSIS — I95.1 ORTHOSTATIC HYPOTENSION: ICD-10-CM

## 2019-11-12 DIAGNOSIS — Z78.9 IMPAIRED MOBILITY AND ADLS: ICD-10-CM

## 2019-11-12 DIAGNOSIS — Z74.09 IMPAIRED MOBILITY AND ADLS: ICD-10-CM

## 2019-11-12 DIAGNOSIS — R55 SYNCOPE, UNSPECIFIED SYNCOPE TYPE: ICD-10-CM

## 2019-11-12 DIAGNOSIS — I95.9 HYPOTENSION, UNSPECIFIED HYPOTENSION TYPE: ICD-10-CM

## 2019-11-12 DIAGNOSIS — I10 ESSENTIAL HYPERTENSION: Chronic | ICD-10-CM

## 2019-11-12 DIAGNOSIS — I10 ESSENTIAL HYPERTENSION: Primary | Chronic | ICD-10-CM

## 2019-11-12 DIAGNOSIS — E87.5 HYPERKALEMIA: ICD-10-CM

## 2019-11-12 LAB
ALBUMIN SERPL-MCNC: 4.2 G/DL (ref 3.5–5.2)
ALBUMIN/GLOB SERPL: 1.1 G/DL
ALP SERPL-CCNC: 88 U/L (ref 39–117)
ALT SERPL W P-5'-P-CCNC: 7 U/L (ref 1–41)
ANION GAP SERPL CALCULATED.3IONS-SCNC: 15 MMOL/L (ref 5–15)
AST SERPL-CCNC: 16 U/L (ref 1–40)
BILIRUB SERPL-MCNC: 0.6 MG/DL (ref 0.2–1.2)
BUN BLD-MCNC: 41 MG/DL (ref 8–23)
BUN/CREAT SERPL: 23 (ref 7–25)
CALCIUM SPEC-SCNC: 9.7 MG/DL (ref 8.6–10.5)
CHLORIDE SERPL-SCNC: 98 MMOL/L (ref 98–107)
CO2 SERPL-SCNC: 20 MMOL/L (ref 22–29)
CREAT BLD-MCNC: 1.78 MG/DL (ref 0.76–1.27)
GFR SERPL CREATININE-BSD FRML MDRD: 45 ML/MIN/1.73
GLOBULIN UR ELPH-MCNC: 3.9 GM/DL
GLUCOSE BLD-MCNC: 123 MG/DL (ref 65–99)
GLUCOSE BLDC GLUCOMTR-MCNC: 122 MG/DL (ref 70–130)
INR PPP: 1 (ref 0.85–1.16)
MAGNESIUM SERPL-MCNC: 2.5 MG/DL (ref 1.6–2.4)
POTASSIUM BLD-SCNC: 5.3 MMOL/L (ref 3.5–5.2)
PROT SERPL-MCNC: 8.1 G/DL (ref 6–8.5)
PROTHROMBIN TIME: 12.7 SECONDS (ref 11.2–14.3)
SODIUM BLD-SCNC: 133 MMOL/L (ref 136–145)
TROPONIN T SERPL-MCNC: <0.01 NG/ML (ref 0–0.03)

## 2019-11-12 PROCEDURE — 71045 X-RAY EXAM CHEST 1 VIEW: CPT

## 2019-11-12 PROCEDURE — 85007 BL SMEAR W/DIFF WBC COUNT: CPT | Performed by: EMERGENCY MEDICINE

## 2019-11-12 PROCEDURE — 85610 PROTHROMBIN TIME: CPT | Performed by: EMERGENCY MEDICINE

## 2019-11-12 PROCEDURE — 82962 GLUCOSE BLOOD TEST: CPT

## 2019-11-12 PROCEDURE — 85025 COMPLETE CBC W/AUTO DIFF WBC: CPT | Performed by: EMERGENCY MEDICINE

## 2019-11-12 PROCEDURE — 84484 ASSAY OF TROPONIN QUANT: CPT | Performed by: EMERGENCY MEDICINE

## 2019-11-12 PROCEDURE — 99284 EMERGENCY DEPT VISIT MOD MDM: CPT

## 2019-11-12 PROCEDURE — 80053 COMPREHEN METABOLIC PANEL: CPT | Performed by: EMERGENCY MEDICINE

## 2019-11-12 PROCEDURE — 93005 ELECTROCARDIOGRAM TRACING: CPT | Performed by: EMERGENCY MEDICINE

## 2019-11-12 PROCEDURE — 83735 ASSAY OF MAGNESIUM: CPT | Performed by: EMERGENCY MEDICINE

## 2019-11-12 RX ORDER — ALBUTEROL SULFATE 2.5 MG/3ML
10 SOLUTION RESPIRATORY (INHALATION) ONCE
Status: COMPLETED | OUTPATIENT
Start: 2019-11-12 | End: 2019-11-13

## 2019-11-12 RX ORDER — DEXTROSE MONOHYDRATE 25 G/50ML
50 INJECTION, SOLUTION INTRAVENOUS ONCE
Status: COMPLETED | OUTPATIENT
Start: 2019-11-12 | End: 2019-11-13

## 2019-11-12 RX ORDER — SODIUM CHLORIDE 0.9 % (FLUSH) 0.9 %
10 SYRINGE (ML) INJECTION AS NEEDED
Status: DISCONTINUED | OUTPATIENT
Start: 2019-11-12 | End: 2019-11-14 | Stop reason: HOSPADM

## 2019-11-12 RX ORDER — IRBESARTAN 75 MG/1
75 TABLET ORAL NIGHTLY
Qty: 30 TABLET | Refills: 1 | Status: SHIPPED | OUTPATIENT
Start: 2019-11-12 | End: 2019-11-14 | Stop reason: HOSPADM

## 2019-11-12 RX ADMIN — SODIUM CHLORIDE 1000 ML: 9 INJECTION, SOLUTION INTRAVENOUS at 23:48

## 2019-11-12 NOTE — TELEPHONE ENCOUNTER
Spoke with wife.  They haven't checked blood pressure - he hasn't taken any medications this morning.  Recommend checking blood pressure and calling with any concerns.  Will reduce avapro to 75 mg daily.  Also discussed that patient should only be taking 1 tablet of potassium a day and wife will verify this.  Recommend going to ER if he is not improving or at least making an office visit.  Hydrate well with water.

## 2019-11-12 NOTE — TELEPHONE ENCOUNTER
PATIENT IS HAVING DIZZY SPELLS AND VERY WEAK. ADEOLA HAD INCREASED THE DOSAGE AND PATIENT IS NOT DOING WELL. IS NOW FALLING. PATIENT'S WIFE THINKS IT IS THE AVAPRO. PLEASE ADVISE

## 2019-11-13 ENCOUNTER — APPOINTMENT (OUTPATIENT)
Dept: CT IMAGING | Facility: HOSPITAL | Age: 79
End: 2019-11-13

## 2019-11-13 ENCOUNTER — APPOINTMENT (OUTPATIENT)
Dept: CARDIOLOGY | Facility: HOSPITAL | Age: 79
End: 2019-11-13

## 2019-11-13 PROBLEM — D69.6 THROMBOCYTOPENIA (HCC): Status: ACTIVE | Noted: 2019-11-13

## 2019-11-13 PROBLEM — R55 SYNCOPE: Status: ACTIVE | Noted: 2019-11-13

## 2019-11-13 PROBLEM — N17.9 ARF (ACUTE RENAL FAILURE) (HCC): Status: ACTIVE | Noted: 2019-11-13

## 2019-11-13 PROBLEM — I95.9 HYPOTENSION: Status: ACTIVE | Noted: 2019-11-13

## 2019-11-13 PROBLEM — E87.5 HYPERKALEMIA: Status: ACTIVE | Noted: 2019-11-13

## 2019-11-13 PROBLEM — R42 DIZZINESS: Status: ACTIVE | Noted: 2019-11-13

## 2019-11-13 PROBLEM — E83.41 HYPERMAGNESEMIA: Status: ACTIVE | Noted: 2019-11-13

## 2019-11-13 LAB
ANION GAP SERPL CALCULATED.3IONS-SCNC: 11 MMOL/L (ref 5–15)
BASOPHILS # BLD AUTO: 0.01 10*3/MM3 (ref 0–0.2)
BASOPHILS # BLD AUTO: 0.01 10*3/MM3 (ref 0–0.2)
BASOPHILS NFR BLD AUTO: 0.3 % (ref 0–1.5)
BASOPHILS NFR BLD AUTO: 0.3 % (ref 0–1.5)
BH CV ECHO MEAS - AI DEC SLOPE: 273.4 CM/SEC^2
BH CV ECHO MEAS - AI MAX PG: 93 MMHG
BH CV ECHO MEAS - AI MAX VEL: 482 CM/SEC
BH CV ECHO MEAS - AI P1/2T: 516.4 MSEC
BH CV ECHO MEAS - AO ROOT AREA (BSA CORRECTED): 1.5
BH CV ECHO MEAS - AO ROOT AREA: 7.7 CM^2
BH CV ECHO MEAS - AO ROOT DIAM: 3.1 CM
BH CV ECHO MEAS - BSA(HAYCOCK): 2.1 M^2
BH CV ECHO MEAS - BSA: 2 M^2
BH CV ECHO MEAS - BZI_BMI: 27 KILOGRAMS/M^2
BH CV ECHO MEAS - BZI_METRIC_HEIGHT: 177.8 CM
BH CV ECHO MEAS - BZI_METRIC_WEIGHT: 85.3 KG
BH CV ECHO MEAS - EDV(CUBED): 88.2 ML
BH CV ECHO MEAS - EDV(TEICH): 90.2 ML
BH CV ECHO MEAS - EF(CUBED): 68.9 %
BH CV ECHO MEAS - EF(TEICH): 60.6 %
BH CV ECHO MEAS - ESV(CUBED): 27.5 ML
BH CV ECHO MEAS - ESV(TEICH): 35.5 ML
BH CV ECHO MEAS - FS: 32.2 %
BH CV ECHO MEAS - IVS/LVPW: 1.1
BH CV ECHO MEAS - IVSD: 1.4 CM
BH CV ECHO MEAS - LA DIMENSION: 4.6 CM
BH CV ECHO MEAS - LA/AO: 1.5
BH CV ECHO MEAS - LAD MAJOR: 5.8 CM
BH CV ECHO MEAS - LAT PEAK E' VEL: 13.2 CM/SEC
BH CV ECHO MEAS - LATERAL E/E' RATIO: 2.7
BH CV ECHO MEAS - LV MASS(C)D: 228.7 GRAMS
BH CV ECHO MEAS - LV MASS(C)DI: 112.5 GRAMS/M^2
BH CV ECHO MEAS - LVIDD: 4.5 CM
BH CV ECHO MEAS - LVIDS: 3 CM
BH CV ECHO MEAS - LVPWD: 1.3 CM
BH CV ECHO MEAS - MED PEAK E' VEL: 5 CM/SEC
BH CV ECHO MEAS - MEDIAL E/E' RATIO: 7
BH CV ECHO MEAS - MV A MAX VEL: 55.8 CM/SEC
BH CV ECHO MEAS - MV DEC SLOPE: 369.4 CM/SEC^2
BH CV ECHO MEAS - MV DEC TIME: 0.33 SEC
BH CV ECHO MEAS - MV E MAX VEL: 36.3 CM/SEC
BH CV ECHO MEAS - MV E/A: 0.65
BH CV ECHO MEAS - MV P1/2T MAX VEL: 75.5 CM/SEC
BH CV ECHO MEAS - MV P1/2T: 59.8 MSEC
BH CV ECHO MEAS - MVA P1/2T LCG: 2.9 CM^2
BH CV ECHO MEAS - MVA(P1/2T): 3.7 CM^2
BH CV ECHO MEAS - PA ACC SLOPE: 773 CM/SEC^2
BH CV ECHO MEAS - PA ACC TIME: 0.12 SEC
BH CV ECHO MEAS - PA PR(ACCEL): 23.5 MMHG
BH CV ECHO MEAS - RV MAX PG: 2.3 MMHG
BH CV ECHO MEAS - RV V1 MAX: 76 CM/SEC
BH CV ECHO MEAS - SI(CUBED): 29.9 ML/M^2
BH CV ECHO MEAS - SI(TEICH): 26.9 ML/M^2
BH CV ECHO MEAS - SV(CUBED): 60.8 ML
BH CV ECHO MEAS - SV(TEICH): 54.6 ML
BH CV ECHO MEAS - TAPSE (>1.6): 1.8 CM2
BH CV ECHO MEASUREMENTS AVERAGE E/E' RATIO: 3.99
BH CV XLRA - RV BASE: 3.6 CM
BH CV XLRA - RV LENGTH: 6.7 CM
BH CV XLRA - RV MID: 3.1 CM
BH CV XLRA - TDI S': 9.43 CM/SEC
BILIRUB UR QL STRIP: NEGATIVE
BUN BLD-MCNC: 39 MG/DL (ref 8–23)
BUN/CREAT SERPL: 27.7 (ref 7–25)
BURR CELLS BLD QL SMEAR: NORMAL
CALCIUM SPEC-SCNC: 8.7 MG/DL (ref 8.6–10.5)
CHLORIDE SERPL-SCNC: 102 MMOL/L (ref 98–107)
CLARITY UR: CLEAR
CLUMPED PLATELETS: PRESENT
CO2 SERPL-SCNC: 20 MMOL/L (ref 22–29)
COLOR UR: YELLOW
CORTIS SERPL-MCNC: 9 MCG/DL
CREAT BLD-MCNC: 1.41 MG/DL (ref 0.76–1.27)
DEPRECATED RDW RBC AUTO: 43.1 FL (ref 37–54)
DEPRECATED RDW RBC AUTO: 43.5 FL (ref 37–54)
EOSINOPHIL # BLD AUTO: 0.04 10*3/MM3 (ref 0–0.4)
EOSINOPHIL # BLD AUTO: 0.07 10*3/MM3 (ref 0–0.4)
EOSINOPHIL NFR BLD AUTO: 1.3 % (ref 0.3–6.2)
EOSINOPHIL NFR BLD AUTO: 2.4 % (ref 0.3–6.2)
ERYTHROCYTE [DISTWIDTH] IN BLOOD BY AUTOMATED COUNT: 14 % (ref 12.3–15.4)
ERYTHROCYTE [DISTWIDTH] IN BLOOD BY AUTOMATED COUNT: 14 % (ref 12.3–15.4)
GFR SERPL CREATININE-BSD FRML MDRD: 58 ML/MIN/1.73
GLUCOSE BLD-MCNC: 81 MG/DL (ref 65–99)
GLUCOSE BLDC GLUCOMTR-MCNC: 102 MG/DL (ref 70–130)
GLUCOSE BLDC GLUCOMTR-MCNC: 110 MG/DL (ref 70–130)
GLUCOSE BLDC GLUCOMTR-MCNC: 127 MG/DL (ref 70–130)
GLUCOSE BLDC GLUCOMTR-MCNC: 92 MG/DL (ref 70–130)
GLUCOSE UR STRIP-MCNC: NEGATIVE MG/DL
HCT VFR BLD AUTO: 41.3 % (ref 37.5–51)
HCT VFR BLD AUTO: 47.9 % (ref 37.5–51)
HGB BLD-MCNC: 13.4 G/DL (ref 13–17.7)
HGB BLD-MCNC: 15.3 G/DL (ref 13–17.7)
HGB UR QL STRIP.AUTO: NEGATIVE
HOLD SPECIMEN: NORMAL
HOLD SPECIMEN: NORMAL
IMM GRANULOCYTES # BLD AUTO: 0.01 10*3/MM3 (ref 0–0.05)
IMM GRANULOCYTES # BLD AUTO: 0.02 10*3/MM3 (ref 0–0.05)
IMM GRANULOCYTES NFR BLD AUTO: 0.3 % (ref 0–0.5)
IMM GRANULOCYTES NFR BLD AUTO: 0.6 % (ref 0–0.5)
KETONES UR QL STRIP: NEGATIVE
LEFT ATRIUM VOLUME INDEX: 19.7 ML/M^2
LEFT ATRIUM VOLUME: 40 ML
LEUKOCYTE ESTERASE UR QL STRIP.AUTO: NEGATIVE
LV EF 2D ECHO EST: 60 %
LYMPHOCYTES # BLD AUTO: 0.75 10*3/MM3 (ref 0.7–3.1)
LYMPHOCYTES # BLD AUTO: 1.02 10*3/MM3 (ref 0.7–3.1)
LYMPHOCYTES NFR BLD AUTO: 24 % (ref 19.6–45.3)
LYMPHOCYTES NFR BLD AUTO: 35.2 % (ref 19.6–45.3)
MAXIMAL PREDICTED HEART RATE: 139 BPM
MCH RBC QN AUTO: 26.8 PG (ref 26.6–33)
MCH RBC QN AUTO: 27.5 PG (ref 26.6–33)
MCHC RBC AUTO-ENTMCNC: 31.9 G/DL (ref 31.5–35.7)
MCHC RBC AUTO-ENTMCNC: 32.4 G/DL (ref 31.5–35.7)
MCV RBC AUTO: 83.9 FL (ref 79–97)
MCV RBC AUTO: 84.8 FL (ref 79–97)
MONOCYTES # BLD AUTO: 0.26 10*3/MM3 (ref 0.1–0.9)
MONOCYTES # BLD AUTO: 0.28 10*3/MM3 (ref 0.1–0.9)
MONOCYTES NFR BLD AUTO: 8.9 % (ref 5–12)
MONOCYTES NFR BLD AUTO: 9 % (ref 5–12)
NEUTROPHILS # BLD AUTO: 1.53 10*3/MM3 (ref 1.7–7)
NEUTROPHILS # BLD AUTO: 2.03 10*3/MM3 (ref 1.7–7)
NEUTROPHILS NFR BLD AUTO: 52.8 % (ref 42.7–76)
NEUTROPHILS NFR BLD AUTO: 64.9 % (ref 42.7–76)
NITRITE UR QL STRIP: NEGATIVE
NRBC BLD AUTO-RTO: 0 /100 WBC (ref 0–0.2)
NRBC BLD AUTO-RTO: 0 /100 WBC (ref 0–0.2)
PH UR STRIP.AUTO: <=5 [PH] (ref 5–8)
PLATELET # BLD AUTO: 108 10*3/MM3 (ref 140–450)
PLATELET # BLD AUTO: 91 10*3/MM3 (ref 140–450)
PMV BLD AUTO: 11.3 FL (ref 6–12)
PMV BLD AUTO: 11.8 FL (ref 6–12)
POTASSIUM BLD-SCNC: 4.4 MMOL/L (ref 3.5–5.2)
PROT UR QL STRIP: NEGATIVE
RBC # BLD AUTO: 4.87 10*6/MM3 (ref 4.14–5.8)
RBC # BLD AUTO: 5.71 10*6/MM3 (ref 4.14–5.8)
SMALL PLATELETS BLD QL SMEAR: NORMAL
SODIUM BLD-SCNC: 133 MMOL/L (ref 136–145)
SP GR UR STRIP: 1.02 (ref 1–1.03)
STRESS TARGET HR: 118 BPM
TROPONIN T SERPL-MCNC: <0.01 NG/ML (ref 0–0.03)
TSH SERPL DL<=0.05 MIU/L-ACNC: 2.78 UIU/ML (ref 0.27–4.2)
UROBILINOGEN UR QL STRIP: NORMAL
WBC MORPH BLD: NORMAL
WBC NRBC COR # BLD: 2.9 10*3/MM3 (ref 3.4–10.8)
WBC NRBC COR # BLD: 3.13 10*3/MM3 (ref 3.4–10.8)
WHOLE BLOOD HOLD SPECIMEN: NORMAL
WHOLE BLOOD HOLD SPECIMEN: NORMAL

## 2019-11-13 PROCEDURE — 0 IOPAMIDOL PER 1 ML: Performed by: EMERGENCY MEDICINE

## 2019-11-13 PROCEDURE — 82962 GLUCOSE BLOOD TEST: CPT

## 2019-11-13 PROCEDURE — 93010 ELECTROCARDIOGRAM REPORT: CPT | Performed by: INTERNAL MEDICINE

## 2019-11-13 PROCEDURE — 99223 1ST HOSP IP/OBS HIGH 75: CPT | Performed by: INTERNAL MEDICINE

## 2019-11-13 PROCEDURE — 85025 COMPLETE CBC W/AUTO DIFF WBC: CPT | Performed by: NURSE PRACTITIONER

## 2019-11-13 PROCEDURE — 97116 GAIT TRAINING THERAPY: CPT

## 2019-11-13 PROCEDURE — 93005 ELECTROCARDIOGRAM TRACING: CPT | Performed by: NURSE PRACTITIONER

## 2019-11-13 PROCEDURE — 97161 PT EVAL LOW COMPLEX 20 MIN: CPT

## 2019-11-13 PROCEDURE — 80048 BASIC METABOLIC PNL TOTAL CA: CPT | Performed by: EMERGENCY MEDICINE

## 2019-11-13 PROCEDURE — 81003 URINALYSIS AUTO W/O SCOPE: CPT | Performed by: NURSE PRACTITIONER

## 2019-11-13 PROCEDURE — 71275 CT ANGIOGRAPHY CHEST: CPT

## 2019-11-13 PROCEDURE — 93306 TTE W/DOPPLER COMPLETE: CPT

## 2019-11-13 PROCEDURE — 70450 CT HEAD/BRAIN W/O DYE: CPT

## 2019-11-13 PROCEDURE — 84484 ASSAY OF TROPONIN QUANT: CPT | Performed by: NURSE PRACTITIONER

## 2019-11-13 PROCEDURE — 82533 TOTAL CORTISOL: CPT | Performed by: NURSE PRACTITIONER

## 2019-11-13 PROCEDURE — 63710000001 INSULIN LISPRO (HUMAN) PER 5 UNITS: Performed by: NURSE PRACTITIONER

## 2019-11-13 PROCEDURE — 93306 TTE W/DOPPLER COMPLETE: CPT | Performed by: INTERNAL MEDICINE

## 2019-11-13 PROCEDURE — 94640 AIRWAY INHALATION TREATMENT: CPT

## 2019-11-13 PROCEDURE — 84443 ASSAY THYROID STIM HORMONE: CPT | Performed by: NURSE PRACTITIONER

## 2019-11-13 PROCEDURE — 63710000001 INSULIN REGULAR HUMAN PER 5 UNITS: Performed by: EMERGENCY MEDICINE

## 2019-11-13 PROCEDURE — 94799 UNLISTED PULMONARY SVC/PX: CPT

## 2019-11-13 RX ORDER — SODIUM CHLORIDE 0.9 % (FLUSH) 0.9 %
10 SYRINGE (ML) INJECTION EVERY 12 HOURS SCHEDULED
Status: DISCONTINUED | OUTPATIENT
Start: 2019-11-13 | End: 2019-11-14 | Stop reason: HOSPADM

## 2019-11-13 RX ORDER — PANTOPRAZOLE SODIUM 40 MG/1
40 TABLET, DELAYED RELEASE ORAL
Status: DISCONTINUED | OUTPATIENT
Start: 2019-11-14 | End: 2019-11-14 | Stop reason: HOSPADM

## 2019-11-13 RX ORDER — LEVETIRACETAM 250 MG/1
250 TABLET ORAL 2 TIMES DAILY
Status: DISCONTINUED | OUTPATIENT
Start: 2019-11-13 | End: 2019-11-14 | Stop reason: HOSPADM

## 2019-11-13 RX ORDER — ASPIRIN 81 MG/1
81 TABLET, CHEWABLE ORAL DAILY
Status: DISCONTINUED | OUTPATIENT
Start: 2019-11-13 | End: 2019-11-14 | Stop reason: HOSPADM

## 2019-11-13 RX ORDER — DICYCLOMINE HYDROCHLORIDE 10 MG/1
20 CAPSULE ORAL ONCE
Status: COMPLETED | OUTPATIENT
Start: 2019-11-13 | End: 2019-11-13

## 2019-11-13 RX ORDER — AMOXICILLIN 250 MG
2 CAPSULE ORAL NIGHTLY
Status: DISCONTINUED | OUTPATIENT
Start: 2019-11-13 | End: 2019-11-14 | Stop reason: HOSPADM

## 2019-11-13 RX ORDER — ROSUVASTATIN CALCIUM 20 MG/1
20 TABLET, COATED ORAL DAILY
Status: DISCONTINUED | OUTPATIENT
Start: 2019-11-13 | End: 2019-11-14 | Stop reason: HOSPADM

## 2019-11-13 RX ORDER — SODIUM CHLORIDE 0.9 % (FLUSH) 0.9 %
10 SYRINGE (ML) INJECTION AS NEEDED
Status: DISCONTINUED | OUTPATIENT
Start: 2019-11-13 | End: 2019-11-14 | Stop reason: HOSPADM

## 2019-11-13 RX ORDER — NICOTINE POLACRILEX 4 MG
15 LOZENGE BUCCAL
Status: DISCONTINUED | OUTPATIENT
Start: 2019-11-13 | End: 2019-11-14 | Stop reason: HOSPADM

## 2019-11-13 RX ORDER — DEXTROSE MONOHYDRATE 25 G/50ML
25 INJECTION, SOLUTION INTRAVENOUS
Status: DISCONTINUED | OUTPATIENT
Start: 2019-11-13 | End: 2019-11-14 | Stop reason: HOSPADM

## 2019-11-13 RX ORDER — IPRATROPIUM BROMIDE AND ALBUTEROL SULFATE 2.5; .5 MG/3ML; MG/3ML
3 SOLUTION RESPIRATORY (INHALATION)
Status: DISCONTINUED | OUTPATIENT
Start: 2019-11-13 | End: 2019-11-14 | Stop reason: HOSPADM

## 2019-11-13 RX ORDER — SODIUM CHLORIDE 9 MG/ML
100 INJECTION, SOLUTION INTRAVENOUS CONTINUOUS
Status: DISCONTINUED | OUTPATIENT
Start: 2019-11-13 | End: 2019-11-13

## 2019-11-13 RX ADMIN — IPRATROPIUM BROMIDE AND ALBUTEROL SULFATE 3 ML: 2.5; .5 SOLUTION RESPIRATORY (INHALATION) at 15:45

## 2019-11-13 RX ADMIN — POLYETHYLENE GLYCOL 3350 17 G: 17 POWDER, FOR SOLUTION ORAL at 16:59

## 2019-11-13 RX ADMIN — ROSUVASTATIN CALCIUM 20 MG: 20 TABLET, COATED ORAL at 09:34

## 2019-11-13 RX ADMIN — ASPIRIN 81 MG 81 MG: 81 TABLET ORAL at 09:35

## 2019-11-13 RX ADMIN — SODIUM CHLORIDE 100 ML/HR: 9 INJECTION, SOLUTION INTRAVENOUS at 04:19

## 2019-11-13 RX ADMIN — DICYCLOMINE HYDROCHLORIDE 20 MG: 10 CAPSULE ORAL at 01:35

## 2019-11-13 RX ADMIN — ALBUTEROL SULFATE 2.5 MG: 2.5 SOLUTION RESPIRATORY (INHALATION) at 00:02

## 2019-11-13 RX ADMIN — IPRATROPIUM BROMIDE AND ALBUTEROL SULFATE 3 ML: 2.5; .5 SOLUTION RESPIRATORY (INHALATION) at 23:54

## 2019-11-13 RX ADMIN — SODIUM CHLORIDE 1000 ML: 9 INJECTION, SOLUTION INTRAVENOUS at 00:54

## 2019-11-13 RX ADMIN — LEVETIRACETAM 250 MG: 250 TABLET, FILM COATED ORAL at 09:35

## 2019-11-13 RX ADMIN — LEVETIRACETAM 250 MG: 250 TABLET, FILM COATED ORAL at 20:27

## 2019-11-13 RX ADMIN — INSULIN HUMAN 6 UNITS: 100 INJECTION, SOLUTION PARENTERAL at 00:26

## 2019-11-13 RX ADMIN — IPRATROPIUM BROMIDE AND ALBUTEROL SULFATE 3 ML: 2.5; .5 SOLUTION RESPIRATORY (INHALATION) at 07:05

## 2019-11-13 RX ADMIN — DEXTROSE 50 % IN WATER (D50W) INTRAVENOUS SYRINGE 50 ML: at 00:26

## 2019-11-13 RX ADMIN — SENNOSIDES AND DOCUSATE SODIUM 2 TABLET: 8.6; 5 TABLET ORAL at 20:27

## 2019-11-13 RX ADMIN — IOPAMIDOL 80 ML: 755 INJECTION, SOLUTION INTRAVENOUS at 00:48

## 2019-11-13 RX ADMIN — SODIUM CHLORIDE, PRESERVATIVE FREE 10 ML: 5 INJECTION INTRAVENOUS at 09:34

## 2019-11-13 NOTE — PLAN OF CARE
Problem: Patient Care Overview  Goal: Plan of Care Review  Outcome: Ongoing (interventions implemented as appropriate)   19   Plan of Care Review   Progress improving   OTHER   Outcome Summary Pt has had no s/s of dizziness, pt also able to work with therapy with no complaints. OPt has no c/o soa, pain, or nausea. A/O this shift. Wife at bedside. Pt able to rest well. VSS. No new concerns at this time,    Coping/Psychosocial   Plan of Care Reviewed With patient;spouse     Goal: Individualization and Mutuality  Outcome: Outcome(s) achieved Date Met: 19   Individualization   Patient Specific Preferences wife at bedside, tv on sports   Patient Specific Goals (Include Timeframe) home at dc   Patient Specific Interventions enc oob as tolerates, verb plan of care   Mutuality/Individual Preferences   What Anxieties, Fears, Concerns, or Questions Do You Have About Your Care? none   What Information Would Help Us Give You More Personalized Care? none   How Would You and/or Your Support Person Like to Participate in Your Care? none   Mutuality/Individual Preferences   How to Address Anxieties/Fears none       Problem: Fall Risk (Adult)  Intervention: Monitor/Assist with Self Care   19   Activity   Activity Assistance Provided assistance, 2 people     Intervention: Reduce Risk/Promote Restraint Free Environment   19 1500   Safety Management   Environmental Safety Modification assistive device/personal items within reach;clutter free environment maintained;lighting adjusted   Safety Promotion/Fall Prevention safety round/check completed   Prevent [place] Drop/Fall   Safety/Security Measures bed alarm set     Intervention: Review Medications/Identify Contributors to Fall Risk   19   Safety Management   Medication Review/Management medications reviewed       Goal: Absence of Fall  Outcome: Ongoing (interventions implemented as appropriate)   19   Fall Risk  (Adult)   Absence of Fall making progress toward outcome

## 2019-11-13 NOTE — PLAN OF CARE
Problem: Patient Care Overview  Goal: Plan of Care Review  Outcome: Ongoing (interventions implemented as appropriate)   11/13/19 7201   OTHER   Outcome Summary Patient is independent with functional mobility and does not meet criteria for skilled therapy svcs at this time   Coping/Psychosocial   Plan of Care Reviewed With patient

## 2019-11-13 NOTE — PROGRESS NOTES
Patient seen and examined. Plan     Assessment/Plan   Assessment / Plan     Active Hospital Problems    Diagnosis  POA   • Hyperkalemia [E87.5]  Yes   • ARF (acute renal failure) (CMS/HCC) [N17.9]  Yes   • Hypermagnesemia [E83.41]  Yes   • Thrombocytopenia (CMS/HCC) [D69.6]  Yes   • Hypotension [I95.9]  Yes   • Syncope [R55]  Yes   • Dizziness [R42]  Yes   • Paget disease of bone [M88.9]  Yes   • Leukopenia [D72.819]  Yes      Resolved Hospital Problems   No resolved problems to display.        Brief Hospital Course to date:  80 y/o male here w/ syncope/hypotension:    -- Syncope/hypotension;  Felt to be related to recent doubling of ARB.   - BP has improved; labs improved  - AM TSH and cortisol ok   - ECHO with diastolic dysfunction     --  Hyponatremia/hyperkalemia;  -received insulin, d50, albuterol and K now improved     -- ARF;   -likely a result of hypotension; improved  - will DC fluids and repeat AM labs      -- Stomach pain; constipation  - Recent issues with stomach pain; new issue of constipation  - Bowel regimen; start PPI     -- ST depression;  -suspect also related to  Hypotension. Trop negative x 1.  No chest pain.    - trop negative; ECHO with diastolic dysfunction; LV sole normally     -- Chronic leukopenia/thrombocytopenia;  -stable; followed by Dr. Lui    -- Paget disease of bone;   -stable w/ normal alk phos; managed by Dr. Lui     DVT prophylaxis:  heparin    CODE STATUS:   Code Status and Medical Interventions:   Ordered at: 11/13/19 0347     Code Status:    CPR     Medical Interventions (Level of Support Prior to Arrest):    Full         Electronically signed by Bia Caputo DO, 11/13/19, 4:14 PM.

## 2019-11-13 NOTE — THERAPY DISCHARGE NOTE
Patient Name: Narendra Cochran  : 1940    MRN: [id number]                              Today's Date: 2019       Admit Date: 2019    Visit Dx:     ICD-10-CM ICD-9-CM   1. Dizziness R42 780.4   2. Hypotension, unspecified hypotension type I95.9 458.9   3. Renal insufficiency N28.9 593.9   4. Hyperkalemia E87.5 276.7   5. Syncope, unspecified syncope type R55 780.2   6. Orthostatic hypotension I95.1 458.0     Patient Active Problem List   Diagnosis   • Hypertension   • Hyperlipidemia   • Diabetes mellitus (CMS/HCC)   • Diverticulosis   • Paget disease of bone   • Generalized abdominal pain   • Tobacco abuse   • Leukopenia   • chronic thrombocytopenia   • Hypertensive urgency, malignant   • Complex renal cyst   • Nocturnal hypoxia   • Hemispheric carotid artery syndrome   • Bilateral renal cysts   • Acute metabolic encephalopathy   • Acute pain of right knee   • Bilateral hearing loss   • Seizure (CMS/HCC)   • Lactic acidosis   • Hypertrophic polyarthritis   • Gonalgia   • Osteitis deformans   • Chronic obstructive pulmonary disease (CMS/HCC)   • Overweight (BMI 25.0-29.9)   • Hyperkalemia   • ARF (acute renal failure) (CMS/HCC)   • Hypermagnesemia   • Thrombocytopenia (CMS/HCC)   • Hypotension   • Syncope   • Dizziness     Past Medical History:   Diagnosis Date   • Arthritis    • Diabetes mellitus (CMS/HCC)    • Diverticulitis    • GERD (gastroesophageal reflux disease)    • Hyperlipidemia    • Hypertension    • Paget disease of bone      Past Surgical History:   Procedure Laterality Date   • APPENDECTOMY     • COLON RESECTION      second to diverticulitis    • FOOT SURGERY Left      General Information     Row Name 19 1120          PT Evaluation Time/Intention    Document Type  evaluation;discharge treatment  -KM     Mode of Treatment  physical therapy  -KM     Row Name 19 1120          General Information    Patient Profile Reviewed?  yes  -KM     Prior Level of Function   independent:;gait;transfer;bed mobility;ADL's  -KM     Existing Precautions/Restrictions  no known precautions/restrictions  -KM     Barriers to Rehab  none identified  -KM     Row Name 11/13/19 1120          Relationship/Environment    Lives With  spouse  -KM     Row Name 11/13/19 1120          Resource/Environmental Concerns    Current Living Arrangements  home/apartment/condo  -KM     Row Name 11/13/19 1120          Home Main Entrance    Number of Stairs, Main Entrance  three  -KM     Stair Railings, Main Entrance  railing on left side (ascending)  -KM     Row Name 11/13/19 1120          Stairs Within Home, Primary    Number of Stairs, Within Home, Primary  none  -KM     Row Name 11/13/19 1120          Cognitive Assessment/Intervention- PT/OT    Orientation Status (Cognition)  oriented x 3 [place]  -KM       User Key  (r) = Recorded By, (t) = Taken By, (c) = Cosigned By    Initials Name Provider Type    Nia Foster, PT Physical Therapist        Mobility     Row Name 11/13/19 1126          Bed Mobility Assessment/Treatment    Bed Mobility Assessment/Treatment  supine-sit;scooting/bridging  -KM     Scooting/Bridging [place] (Bed Mobility)  independent  -KM     Supine-Sit [place] (Bed Mobility)  independent  -KM     Row Name 11/13/19 1126          Sit-Stand Transfer    Sit-Stand [place] (Transfers)  independent  -KM     Row Name 11/13/19 1126          Gait/Stairs Assessment/Training    Gait/Stairs Assessment/Training  gait/ambulation independence  -KM     [place] Level (Gait)  independent as supervised by PT  -KM     Distance in Feet (Gait)  400  -KM     Pattern (Gait)  step-through  -KM       User Key  (r) = Recorded By, (t) = Taken By, (c) = Cosigned By    Initials Name Provider Type    Nia Foster, PT Physical Therapist        Obj/Interventions     Row Name 11/13/19 1130          General ROM    GENERAL ROM COMMENTS  B l/es wfls  -KM     Row Name 11/13/19 1130           MMT (Manual Muscle Testing)    General MMT Comments  B l/es 5/5  -KM     Row Name 11/13/19 1130          Static Sitting Balance    Level of [place] (Unsupported Sitting, Static Balance)  independent  -KM     Sitting Position (Unsupported Sitting, Static Balance)  sitting on edge of bed  -KM     Row Name 11/13/19 1130          Dynamic Sitting Balance    Level of [place], Reaches Outside Midline (Sitting, Dynamic Balance)  [facility]  -     Sitting Position, Reaches Outside Midline (Sitting, Dynamic Balance)  sitting on edge of bed  -KM     Row Name 11/13/19 1130          Static Standing Balance    Level of [place] (Supported Standing, Static Balance)  independent  -KM     Row Name 11/13/19 1130          Dynamic Standing Balance    Level of [place], Reaches Outside Midline (Standing, Dynamic Balance)  independent  -KM       User Key  (r) = Recorded By, (t) = Taken By, (c) = Cosigned By    Initials Name Provider Type     Nia Lee, PT Physical Therapist        Goals/Plan    No documentation.       Clinical Impression     Row Name 11/13/19 1136          Pain Assessment    Additional Documentation  Pain Scale: Numbers Pre/Post-Treatment (Group)  -KM     Row Name 11/13/19 1136          Pain Scale: Numbers Pre/Post-Treatment    Pain Scale: Numbers, Pretreatment  0/10 - no pain  -     Pain Scale: Numbers, Post-Treatment  0/10 - no pain  -KM     Row Name 11/13/19 1136          Plan of Care Review    Plan of Care Reviewed With  patient;spouse  -KM     Row Name 11/13/19 1136          Physical Therapy Clinical Impression    Patient/Family Goals Statement (PT Clinical Impression)  return home  -     Criteria for Skilled Interventions Met (PT Clinical Impression)  no problems identified which require skilled intervention  -KM     Row Name 11/13/19 1136          Vital Signs    Pre Systolic BP Rehab  128  -KM     Pre Treatment Diastolic BP  62  -KM     Post Systolic BP Rehab  153  -KM      Post Treatment Diastolic BP  64  -KM     Row Name 11/13/19 1136          Positioning and Restraints    Pre-Treatment Position  in bed  -KM     Post Treatment Position  bed  -KM     In Bed  sitting EOB;call light within reach;encouraged to call for assist;with family/caregiver;notified nsg  -KM       User Key  (r) = Recorded By, (t) = Taken By, (c) = Cosigned By    Initials Name Provider Type    Nia Foster, PT Physical Therapist        Outcome Measures     Row Name 11/13/19 1144          How much help from another person do you currently need...    Turning from your back to your side while in flat bed without using bedrails?  4  -KM     Moving from lying on back to sitting on the side of a flat bed without bedrails?  4  -KM     Moving to and from a bed to a chair (including a wheelchair)?  4  -KM     Standing up from a chair using your arms (e.g., wheelchair, bedside chair)?  4  -KM     Climbing 3-5 steps with a railing?  4  -KM     To walk in hospital room?  4  -KM     AM-PAC 6 Clicks Score (PT)  24  -KM     Row Name 11/13/19 1144          Functional Assessment    Outcome Measure Options  AM-PAC 6 Clicks Basic Mobility (PT)  -KM       User Key  (r) = Recorded By, (t) = Taken By, (c) = Cosigned By    Initials Name Provider Type    Nia Foster, PT Physical Therapist        Physical Therapy Education     Title: PT OT SLP Therapies (Done)     Topic: Physical Therapy (Done)     Point: Mobility training (Done)     Learning Progress Summary           Patient SHIMON Kelly VU by TIANNA at 11/13/2019 11:45 AM    Comment:  discussed plan if care and in agreement   Family SHIMON Kelly VU by KM at 11/13/2019 11:45 AM    Comment:  discussed plan if care and in agreement                   Point: Home exercise program (Done)     Learning Progress Summary           Patient SHIMON Kelly VU by TIANNA at 11/13/2019 11:45 AM    Comment:  discussed plan if care and in agreement   Family SHIMON Kelly VU by KM at 11/13/2019 11:45  AM    Comment:  discussed plan if care and in agreement                   Point: Body mechanics (Done)     Learning Progress Summary           Patient SHIMON Kelly VU by  at 11/13/2019 11:45 AM    Comment:  discussed plan if care and in agreement   Family SHIMON Kelly VU by  at 11/13/2019 11:45 AM    Comment:  discussed plan if care and in agreement                   Point: Precautions (Done)     Learning Progress Summary           Patient SHIMON Kelly VU by  at 11/13/2019 11:45 AM    Comment:  discussed plan if care and in agreement   Family SHIMON Kelly VU by  at 11/13/2019 11:45 AM    Comment:  discussed plan if care and in agreement                               User Key     Initials Effective Dates Name Provider Type Discipline     06/19/15 -  Nia Lee, PT Physical Therapist PT              PT Recommendation and Plan     Outcome Summary/Treatment Plan (PT)  Anticipated Discharge Disposition (PT): home  Plan of Care Reviewed With: patient  Outcome Summary: Patient is independent with functional mobility and does not meet criteria for skilled therapy svcs at this time     Time Calculation:   PT Charges     Row Name 11/13/19 1148             Time Calculation    Start Time  1103  -KM      PT Received On  11/13/19  -KM      PT Goal Re-Cert Due Date  11/13/19  -KM         Time Calculation- PT    Total Timed Code Minutes- PT  8 minute(s)  -KM         Timed Charges    22628 - Gait Training Minutes   8  -KM        User Key  (r) = Recorded By, (t) = Taken By, (c) = Cosigned By    Initials Name Provider Type     Nia Lee, PT Physical Therapist        Therapy Charges for Today     Code Description Service Date Service Provider Modifiers Qty    38478167639 HC GAIT TRAINING EA 15 MIN 11/13/2019 Nia Lee, PT GP 1    81372051033 HC PT EVAL LOW COMPLEXITY 3 11/13/2019 Nia Lee, PT GP 1          PT G-Codes  Outcome Measure Options: AM-PAC 6 Clicks Basic Mobility  (PT)  AM-PAC 6 Clicks Score (PT): 24    PT Discharge Summary  Anticipated Discharge Disposition (PT): home  Reason for Discharge: At baseline function    Nia Lee, PT  11/13/2019

## 2019-11-13 NOTE — H&P
[facility]  HISTORY AND PHYSICAL    Patient Name: Narendra Cochran  : 1940  MRN: [id number]  Primary Care Physician: Marguerite Moore PA-C  Date of admission: 2019      Subjective   Subjective     Chief Complaint:  dizziness    HPI:  Narendra Cochran is a 79 y.o. male who recently had increase in his ARB dose for uncontrolled BP.  Now pt has had 3 days of dizziness, lightheadedness worse when gets up to walk.  Today fell and hit head.  No chest pain or palpitations.  No focal weakness.  No f/c.  Does have epigastric pain w/ttp.      Found to be hypotensive in er w/ ST depression on EKG.      Feels much better now after NS and improvement in BP.    Review of Systems      All other systems reviewed and are negative.     Personal History     Past Medical History:   Diagnosis Date   • Arthritis    • Diabetes mellitus (CMS/HCC)    • Diverticulitis    • GERD (gastroesophageal reflux disease)    • Hyperlipidemia    • Hypertension    • Paget disease of bone        Past Surgical History:   Procedure Laterality Date   • APPENDECTOMY     • COLON RESECTION      second to diverticulitis    • FOOT SURGERY Left        Family History: family history includes Diabetes in his sister. Otherwise pertinent FHx was reviewed and unremarkable.     Social History:  reports that he has been smoking cigars and cigarettes.  He has a 16.25 pack-year smoking history. He has never used smokeless tobacco. He reports that he does not drink alcohol or use drugs.  Social History     Social History Narrative   • Not on file       Medications:  Available home medication information reviewed.  Medications Prior to Admission   Medication Sig Dispense Refill Last Dose   • ACCU-CHEK FASTCLIX LANCETS misc Use daily. 102 each 0 Taking   • aspirin 81 MG chewable tablet Chew 1 tablet Daily.   Taking   • Blood Glucose Monitoring Suppl (ACCU-CHEK GUIDE) w/Device kit 1 each Daily. 1 kit 0 Taking   •  bumetanide (BUMEX) 0.5 MG tablet Take 1 tablet by mouth Daily. 90 tablet 1 Taking   • diclofenac (VOLTAREN) 1 % gel gel Apply 4 g topically to the appropriate area as directed 4 (Four) Times a Day As Needed (prn for pain). 60 tube 0 Taking   • glucose blood test strip Use as instructed to test blood sugar daily 100 each 5 Taking   • irbesartan (AVAPRO) 75 MG tablet Take 1 tablet by mouth Every Night. 30 tablet 1    • levETIRAcetam (KEPPRA) 250 MG tablet Take 1 tablet by mouth 2 (Two) Times a Day. 180 tablet 1 Taking   • metFORMIN (GLUCOPHAGE) 1000 MG tablet Take 1 tablet by mouth Daily With Breakfast. 90 tablet 3 Taking   • metoprolol succinate XL (TOPROL XL) 25 MG 24 hr tablet Take 1 tablet by mouth Daily. 90 tablet 1 Taking   • Potassium 75 MG tablet Take 20 mEq by mouth.      • rosuvastatin (CRESTOR) 20 MG tablet Take 1 tablet by mouth Daily. 90 tablet 1 Taking   • umeclidinium-vilanterol (ANORO ELLIPTA) 62.5-25 MCG/INH aerosol powder  inhaler Inhale 1 puff Daily. 60 each 5 Taking   • amLODIPine (NORVASC) 5 MG tablet Take 1 tablet by mouth Daily. 90 tablet 3 Taking   • B Complex-C-Folic Acid (SUPER B-COMPLEX/VIT C/FA) tablet    Taking   • KLOR-CON 20 MEQ CR tablet    Taking   • PHARMACY MEDS TO BED CONSULT Daily (Monday-Friday). 1 each 0 Taking   • polyethylene glycol (MIRALAX) packet Take 17 g by mouth Daily.   Taking       No Known Allergies    Objective   Objective     Vital Signs:   Temp:  [96.4 °F (35.8 °C)-98.1 °F (36.7 °C)] 98.1 °F (36.7 °C)  Heart Rate:  [50-86] 58  Resp:  [18] 18  BP: ()/(49-76) 128/61        Physical Exam   Gen; alert, oriented, nad  Heent; perrla, eomi, mmm  Cv; rrr, no mrg  L; ctab, no wheeze/crackles  Abd; epig ttp, soft, +bs, no r/g  Ext; no cce, 2+ pulses  Skin; cdi, warm  Neuro; grossly intact  Psych; mood and affect appropriate       Results Reviewed:  I have personally reviewed current lab and radiology data.    Results from last 7 days   Lab Units 11/12/19  9686   WBC  10*3/mm3 2.90*   HEMOGLOBIN g/dL 15.3   HEMATOCRIT % 47.9   PLATELETS 10*3/mm3 108*   INR  1.00     Results from last 7 days   Lab Units 11/13/19  0233 11/12/19  2259   SODIUM mmol/L 133* 133*   POTASSIUM mmol/L 4.4 5.3*   CHLORIDE mmol/L 102 98   CO2 mmol/L 20.0* 20.0*   BUN mg/dL 39* 41*   CREATININE mg/dL 1.41* 1.78*   GLUCOSE mg/dL 81 123*   CALCIUM mg/dL 8.7 9.7   ALT (SGPT) U/L  --  7   AST (SGOT) U/L  --  16   TROPONIN T ng/mL  --  <0.010     Estimated Creatinine Clearance: 49.6 mL/min (A) (by C-G formula based on SCr of 1.41 mg/dL (H)).  Brief Urine Lab Results  (Last result in the past 365 days)      Color   Clarity   Blood   Leuk Est   Nitrite   Protein   CREAT   Urine HCG        05/20/19 1628             208.9           Imaging Results (Last 24 Hours)     Procedure Component Value Units Date/Time    CT Angiogram Chest [[id number]] Collected:  11/13/19 0111     Updated:  11/13/19 0113    Narrative:       CT CHEST WITH CONTRAST, PE PROTOCOL, 11/3/1919    HISTORY:  79-year-old male in the ED complaining of generalized weakness and dizziness. He notes episodes began after his blood pressure medication dosage was increased recently. Episodes when standing or walking. He struck his head during a fall this morning  during one of these episodes.    TECHNIQUE:  CT examination of the chest with IV contrast. CTA MIP multiplanar pulmonary artery images were reformatted with 3-D postprocessing. Radiation dose reduction techniques included automated exposure control. Radiation audit for CT and nuclear cardiology  exams in the last 12 months: 3.    FINDINGS:  No pulmonary embolism is demonstrated. Thoracic aorta is normal in caliber with no aneurysm or dissection. Heart is not enlarged, and there is no pericardial effusion.    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    Lung images show moderate diffuse pulmonary emphysema. The lungs are clear with no pulmonary  infiltrate or pleural effusion. No suspicious mass or adenopathy is seen within the chest. Limited upper abdominal images are unremarkable.      Impression:       1.  No evidence of pulmonary embolism or other acute vascular abnormality within the chest.  2.  Advanced pulmonary emphysema. Lungs clear.  3.  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    Signer Name: Chris Buitrago MD   Signed: 11/13/2019 1:11 AM   Workstation Name: [email or web address]    [facility]    CT Head Without Contrast [[id number]] Collected:  11/13/19 0100     Updated:  11/13/19 0102    Narrative:       CT HEAD, NONCONTRAST, 11/13/2019    HISTORY:  79-year-old male in the ED complaining of generalized weakness and dizziness. He notes episodes began after his blood pressure medication dosage was increased recently. Episodes when standing or walking. He struck his head during a fall this morning  during one of these episodes.    TECHNIQUE:  CT imaging of the head without IV contrast. Radiation dose reduction techniques included automated exposure control. Radiation audit for CT and nuclear cardiology exams in the last 12 months: 3.    COMPARISON:  *  CT head, 1/25/2019.    FINDINGS:  No acute intracranial abnormality is demonstrated.    Minimal generalized age-appropriate cerebral volume loss. Mild diffuse low-attenuation white matter changes, nonspecific but most typically seen in the setting of chronic small vessel disease. These findings are stable.    No evidence of intracranial hemorrhage, mass, mass effect, cerebral edema, extra-axial fluid collection or hydrocephalus. Visualized upper paranasal sinuses and mastoid air spaces are clear.      Impression:       1. No acute intracranial abnormality.  2. Stable mild diffuse chronic changes as noted.  3. No change since 1/25/2019.    Signer Name: Chris Buitrago MD   Signed: 11/13/2019 1:00 AM   Workstation Name: [email or web address]     [facility]    XR Chest 1 View [[id number]] Collected:  11/12/19 2253     Updated:  11/12/19 2255    Narrative:       CHEST X-RAY, 11/12/2019      HISTORY:    79-year-old male in the ED with new onset weakness, dizziness, acute mental status change.      TECHNIQUE:  AP portable upright chest x-ray.      FINDINGS:  Heart size and pulmonary vascularity are normal. Generalized pulmonary hyperinflation compatible with COPD. No visible pulmonary infiltrate or pleural effusion. Benign calcified granuloma right lung base.      Impression:       No active disease. COPD.    Signer Name: Chris Buitrago MD   Signed: 11/12/2019 10:53 PM   Workstation Name: [email or web address]    [facility]        Results for orders placed during the hospital encounter of 05/28/17   Transthoracic Echocardiogram 2D Complete    Narrative · Left ventricular systolic function is normal. Estimated EF = 55%.  · Moderate aortic valve regurgitation is present.  · Mild mitral valve regurgitation is present          Assessment/Plan   Assessment / Plan     Active Hospital Problems    Diagnosis POA   • Hyperkalemia [E87.5] Yes   • ARF (acute renal failure) (CMS/HCC) [N17.9] Yes   • Hypermagnesemia [E83.41] Yes   • Thrombocytopenia (CMS/HCC) [D69.6] Yes   • Hypotension [I95.9] Yes   • Syncope [R55] Yes   • Paget disease of bone [M88.9] Yes     History of Paget's disease, treated with Reclast.  Last saw Dr. Lui 2014.     • Leukopenia [D72.819] Yes       80 y/o male here w/ syncope/hypotension:  1. Syncope/hypotension;  Felt to be related to recent doubling of ARB.  Has improved w/ NS.  Will also check a.m. Cortisol level.    Hold antihypertensives for now.  ECHO in a.m. As well.  2. Hyponatremia/hyperkalemia;  -received insulin, d50, albuterol and K now improved.  Cont NS.  Check cortisol  3. ARF;   -likely a result of hypotension  4. ST depression;  -suspect also related to  Hypotension. Trop negative x 1.  No  chest pain.    -trend trop and check ECHO  5. Chronic leukopenia/thrombocytopenia;  -stable; followed by Dr. Lui  6. Paget disease of bone;   -stable w/ normal alk phos; managed by Dr. Lui    DVT prophylaxis:  heparin    CODE STATUS:    Code Status and Medical Interventions:   Ordered at: 11/13/19 0347     Code Status:    CPR     Medical Interventions (Level of Support Prior to Arrest):    Full       Admission Status:  I believe this patient meets  INPATIENT status due to syncope, st depression, electrolyte derangement.  I feel patient’s risk for adverse outcomes and need for care warrant INPATIENT evaluation and I predict the patient’s care encounter to likely last beyond 2 midnights.    Electronically signed by Jaleesa Almanza MD, 11/13/19, 1:48 AM.

## 2019-11-13 NOTE — PLAN OF CARE
Problem: Patient Care Overview  Goal: Plan of Care Review  Outcome: Ongoing (interventions implemented as appropriate)   11/13/19 0237 11/13/19 0321   Plan of Care Review   Progress --  no change   OTHER   Outcome Summary --  VSS; complains of abdominal pain; epigastric; resting well; will continue to monitor   Coping/Psychosocial   Plan of Care Reviewed With patient;spouse --      Goal: Discharge Needs Assessment  Outcome: Ongoing (interventions implemented as appropriate)   11/13/19 0232 11/13/19 0233 11/13/19 0321   Discharge Needs Assessment   Readmission Within the Last 30 Days --  --  no previous admission in last 30 days   Concerns to be Addressed --  --  denies needs/concerns at this time;no discharge needs identified   Patient/Family Anticipates Transition to home with family --  --    Patient/Family Anticipated Services at Transition none --  --    Transportation Anticipated family or friend will provide;car, drives self --  --    Disability   Equipment Currently Used at Home --  none --        Problem: Fall Risk (Adult)  Goal: Identify Related Risk Factors and Signs and Symptoms  Outcome: Outcome(s) achieved Date Met: 11/13/19 11/13/19 0321   Fall Risk (Adult)   Related Risk Factors (Fall Risk) history of falls;environment unfamiliar   Signs and Symptoms (Fall Risk) presence of risk factors     Goal: Absence of Fall  Outcome: Ongoing (interventions implemented as appropriate)   11/13/19 0321   Fall Risk (Adult)   Absence of Fall making progress toward outcome

## 2019-11-13 NOTE — ED PROVIDER NOTES
Subjective   79-year-old male presents for evaluation of generalized weakness and dizziness.  Of note, the patient's primary care physician recently doubled his blood pressure medication to obtain tighter control of his poorly controlled hypertension.  The patient states that for the past 3 days, he has been experiencing intermittent episodes of dizziness and lightheadedness, especially when he is standing or walking.  This morning, he fell secondary to his dizziness and hit his head.  He is not anticoagulated.  He denies any accompanying chest pain, shortness of breath, or palpitations.        History provided by:  Patient and spouse  Dizziness   Quality:  Lightheadedness  Severity:  Moderate  Onset quality:  Gradual  Duration:  3 days  Timing:  Intermittent  Progression:  Worsening  Chronicity:  New  Context comment:  Recent change in Irbesartan  Relieved by:  None tried  Worsened by:  Nothing  Ineffective treatments:  None tried  Associated symptoms: weakness        Review of Systems   Neurological: Positive for dizziness and weakness.   All other systems reviewed and are negative.      Past Medical History:   Diagnosis Date   • Arthritis    • Diabetes mellitus (CMS/HCC)    • Diverticulitis    • GERD (gastroesophageal reflux disease)    • Hyperlipidemia    • Hypertension    • Paget disease of bone        No Known Allergies    Past Surgical History:   Procedure Laterality Date   • APPENDECTOMY     • COLON RESECTION      second to diverticulitis    • FOOT SURGERY Left        Family History   Problem Relation Age of Onset   • Diabetes Sister        Social History     Socioeconomic History   • Marital status:      Spouse name: Not on file   • Number of children: Not on file   • Years of education: Not on file   • Highest education level: Not on file   Tobacco Use   • Smoking status: Current Every Day Smoker     Packs/day: 0.25     Years: 65.00     Pack years: 16.25     Types: Cigars, Cigarettes   • Smokeless  tobacco: Never Used   Substance and Sexual Activity   • Alcohol use: No     Comment: rarely   • Drug use: No   • Sexual activity: Defer         Objective   Physical Exam   Constitutional: He is oriented to person, place, and time. He appears well-developed and well-nourished. No distress.   Well-appearing male in no acute distress   HENT:   Head: Normocephalic and atraumatic.   Mouth/Throat: Oropharynx is clear and moist.   Eyes: EOM are normal. Pupils are equal, round, and reactive to light.   Visual acuity at baseline subjectively, no nystagmus   Neck: Normal range of motion. No JVD present.   No carotid bruits   Cardiovascular: Normal rate, regular rhythm and normal heart sounds. Exam reveals no gallop and no friction rub.   No murmur heard.  Pulmonary/Chest: Effort normal and breath sounds normal. No respiratory distress. He has no wheezes. He has no rales.   Abdominal: Soft. Bowel sounds are normal. He exhibits no distension and no mass. There is no tenderness. There is no guarding.   Musculoskeletal: Normal range of motion.   Neurological: He is alert and oriented to person, place, and time. No cranial nerve deficit or sensory deficit. Coordination normal.   Clear and fluent speech, awake, alert, and oriented x3, 5 out of 5 strength in all fours, neurovascularly intact distally in all fours with bounding distal pulses normal sensation, normal gait, no ataxia, no dysmetria   Skin: Skin is warm and dry. No rash noted. He is not diaphoretic. No erythema. No pallor.   Psychiatric: He has a normal mood and affect. Judgment and thought content normal.   Nursing note and vitals reviewed.      Procedures         ED Course  ED Course as of Nov 13 0304 Wed Nov 13, 2019   0000    0001 79-year-old male presents for evaluation of intermittent episodes of dizziness over the past 3 days.  This morning, a dizzy spell at home resulted in a fall in which the patient hit the back of his head on the ground.  No family history  of sudden cardiac death.  On arrival to the ED, patient nontoxic-appearing but hypotensive.  His initial EKG revealed normal sinus rhythm with a heart rate of 64, LVH, and T wave inversion/ST depression in high lateral and lateral precordial leads that appears a bit more pronounced when compared to priors.  Labs remarkable for mild renal insufficiency as well as mild hyperkalemia.  IV fluids administered.  Insulin and D50 given.  Albuterol given.  CHESS +.    [DD]   0004 The patient has positive orthostatic vital signs as well.  [DD]   0007 Pt getting breathing treatment  [CU]   0010 ED Disposition set to Decision to Admit  [JW]   0010    0018    0024 (Final result) CBC AND DIFFERENTIAL  [BL]   0024 (Final result) CBC WITH AUTO DIFFERENTIAL  [BL]   0024 (Final result) SCAN SLIDE  [BL]   0035 CT Angiogram Chest  [AL]   0035    0035 CT Head Without Contrast  [AL]   0035    0047 CT Angiogram Chest  [AL]   0047    0047 CT Head Without Contrast  [AL]   0047    0102 CT Head Without Contrast  [RI]   0102    0102 (Final result) CT HEAD WO CONTRAST  [RI]   0106 CT Head negative.  [DD]   0112 Following medications and 2 L of crystalloid, the patient's blood pressure is improving.  However, he is still complaining of dizziness with standing.  Given his lab abnormalities, EKG changes, and overall clinical picture, I feel that he warrants admission to the hospital at this time.  [DD]   0113 Chest CTA negative.  [DD]   0113 CT Angiogram Chest  [RI]   0113    0113 (Final result) CT ANGIOGRAM CHEST  [RI]   0114 I discussed the patient's case with Dr. Almanza, and the patient will be admitted under her care for further evaluation and treatment.  He is hemodynamically stable at this time and aware/agreeable with the plan.  [DD]   0116    0117 Jaleesa Almanza MD assigned as Admitting  [DD]   0117 ED Disposition set to Decision to Admit  [DD]   0117 0117 Tele Bed Request - [[id number]]  [DD]   0117 Requested: Medicine  [DD]   0117 Ready  "to Plan: Medicine  [DD]   0130 0208 Assigned:  [facility]G - S560/1  [BG]   0208 Bed Ready:  [facility]G - S560/1  [BG]   0215 From room 01 to room [facility]EN  [MW]   0215 0221 0221 To department  [facility]  [CE]   0221 Triggered by ED Depart - 72hr Delay  [CE]   0221 0221 0221 0221 0223    0233 GRN  [EJ]      ED Course User Index  [AL] Emma Smith L  [BG] Claudia Vázquez  [BL] Lab, Background User  [CE] Claire Lantigua RN  [CU] Susan Castellanos  [DD] Hernan Chavez MD  [EJ] Avis Winslow  [JW] Kevin Arcos  [KD] Jaleesa Almanza MD  [MW] Di Mahoney RN  [RI] Interface, Rad Results Wirt In     No results found for this or any previous visit (from the past 24 hour(s)).  Note: In addition to lab results from this visit, the labs listed above may include labs taken at another facility or during a different encounter within the last 24 hours. Please correlate lab times with ED admission and discharge times for further clarification of the services performed during this visit.    XR Chest 1 View    (Results Pending)   CT Angiogram Chest    (Results Pending)   CT Head Without Contrast    (Results Pending)     Vitals:    11/12/19 2154   BP: (!) 84/57   BP Location: Right arm   Patient Position: Sitting   Pulse: 75   Resp: 18   Temp: 96.4 °F (35.8 °C)   TempSrc: Axillary   SpO2: 98%   Weight: 93 kg (205 lb)   Height: 180.3 cm (71\")     Medications   sodium chloride 0.9 % flush 10 mL (not administered)   sodium chloride 0.9 % bolus 1,000 mL (not administered)     ECG/EMG Results (last 24 hours)     Procedure Component Value Units Date/Time    ECG 12 Lead [[id number]] Collected:  11/12/19 2210     Updated:  11/12/19 2210        ECG 12 Lead                       Recent Results (from the past 24 hour(s))   POC Glucose Once    Collection Time: 11/12/19 10:56 PM   Result Value Ref Range    Glucose 122 70 - 130 mg/dL   Comprehensive Metabolic Panel    Collection Time: 11/12/19 10:59 PM "   Result Value Ref Range    Glucose 123 (H) 65 - 99 mg/dL    BUN 41 (H) 8 - 23 mg/dL    Creatinine 1.78 (H) 0.76 - 1.27 mg/dL    Sodium 133 (L) 136 - 145 mmol/L    Potassium 5.3 (H) 3.5 - 5.2 mmol/L    Chloride 98 98 - 107 mmol/L    CO2 20.0 (L) 22.0 - 29.0 mmol/L    Calcium 9.7 8.6 - 10.5 mg/dL    Total Protein 8.1 6.0 - 8.5 g/dL    Albumin 4.20 3.50 - 5.20 g/dL    ALT (SGPT) 7 1 - 41 U/L    AST (SGOT) 16 1 - 40 U/L    Alkaline Phosphatase 88 39 - 117 U/L    Total Bilirubin 0.6 0.2 - 1.2 mg/dL    eGFR  African Amer 45 (L) >60 mL/min/1.73    Globulin 3.9 gm/dL    A/G Ratio 1.1 g/dL    BUN/Creatinine Ratio 23.0 7.0 - 25.0    Anion Gap 15.0 5.0 - 15.0 mmol/L   Troponin    Collection Time: 11/12/19 10:59 PM   Result Value Ref Range    Troponin T <0.010 0.000 - 0.030 ng/mL   Magnesium    Collection Time: 11/12/19 10:59 PM   Result Value Ref Range    Magnesium 2.5 (H) 1.6 - 2.4 mg/dL   Light Blue Top    Collection Time: 11/12/19 10:59 PM   Result Value Ref Range    Extra Tube hold for add-on    Green Top (Gel)    Collection Time: 11/12/19 10:59 PM   Result Value Ref Range    Extra Tube Hold for add-ons.    Lavender Top    Collection Time: 11/12/19 10:59 PM   Result Value Ref Range    Extra Tube hold for add-on    Gold Top - SST    Collection Time: 11/12/19 10:59 PM   Result Value Ref Range    Extra Tube Hold for add-ons.    CBC Auto Differential    Collection Time: 11/12/19 10:59 PM   Result Value Ref Range    WBC 2.90 (L) 3.40 - 10.80 10*3/mm3    RBC 5.71 4.14 - 5.80 10*6/mm3    Hemoglobin 15.3 13.0 - 17.7 g/dL    Hematocrit 47.9 37.5 - 51.0 %    MCV 83.9 79.0 - 97.0 fL    MCH 26.8 26.6 - 33.0 pg    MCHC 31.9 31.5 - 35.7 g/dL    RDW 14.0 12.3 - 15.4 %    RDW-SD 43.1 37.0 - 54.0 fl    MPV 11.3 6.0 - 12.0 fL    Platelets 108 (L) 140 - 450 10*3/mm3    Neutrophil % 52.8 42.7 - 76.0 %    Lymphocyte % 35.2 19.6 - 45.3 %    Monocyte % 9.0 5.0 - 12.0 %    Eosinophil % 2.4 0.3 - 6.2 %    Basophil % 0.3 0.0 - 1.5 %    Immature  Grans % 0.3 0.0 - 0.5 %    Neutrophils, Absolute 1.53 (L) 1.70 - 7.00 10*3/mm3    Lymphocytes, Absolute 1.02 0.70 - 3.10 10*3/mm3    Monocytes, Absolute 0.26 0.10 - 0.90 10*3/mm3    Eosinophils, Absolute 0.07 0.00 - 0.40 10*3/mm3    Basophils, Absolute 0.01 0.00 - 0.20 10*3/mm3    Immature Grans, Absolute 0.01 0.00 - 0.05 10*3/mm3    nRBC 0.0 0.0 - 0.2 /100 WBC   Protime-INR    Collection Time: 11/12/19 10:59 PM   Result Value Ref Range    Protime 12.7 11.2 - 14.3 Seconds    INR 1.00 0.85 - 1.16   Scan Slide    Collection Time: 11/12/19 10:59 PM   Result Value Ref Range    Crenated RBC's Slight/1+ None Seen    WBC Morphology Normal Normal    Platelet Estimate Decreased Normal    Clumped Platelets Present None Seen   Basic Metabolic Panel    Collection Time: 11/13/19  2:33 AM   Result Value Ref Range    Glucose 81 65 - 99 mg/dL    BUN 39 (H) 8 - 23 mg/dL    Creatinine 1.41 (H) 0.76 - 1.27 mg/dL    Sodium 133 (L) 136 - 145 mmol/L    Potassium 4.4 3.5 - 5.2 mmol/L    Chloride 102 98 - 107 mmol/L    CO2 20.0 (L) 22.0 - 29.0 mmol/L    Calcium 8.7 8.6 - 10.5 mg/dL    eGFR  African Amer 58 (L) >60 mL/min/1.73    BUN/Creatinine Ratio 27.7 (H) 7.0 - 25.0    Anion Gap 11.0 5.0 - 15.0 mmol/L     Note: In addition to lab results from this visit, the labs listed above may include labs taken at another facility or during a different encounter within the last 24 hours. Please correlate lab times with ED admission and discharge times for further clarification of the services performed during this visit.    CT Angiogram Chest   Final Result   1.  No evidence of pulmonary embolism or other acute vascular abnormality within the chest.   2.  Advanced pulmonary emphysema. Lungs clear.   3.  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      Signer Name: Chris Buitrago MD    Signed: 11/13/2019 1:11 AM    Workstation Name: [email or web address]     [facility]      CT Head Without Contrast    Final Result   1. No acute intracranial abnormality.   2. Stable mild diffuse chronic changes as noted.   3. No change since 1/25/2019.      Signer Name: Chris Buitrago MD    Signed: 11/13/2019 1:00 AM    Workstation Name: [facility]     Radiology [facility]      XR Chest 1 View   Final Result   No active disease. COPD.      Signer Name: Chris Buitrago MD    Signed: 11/12/2019 10:53 PM    [email or web address]     [facility]        Vitals:    11/13/19 0115 11/13/19 0145 11/13/19 0200 11/13/19 0211   BP: 124/67 144/75 136/58    Pulse:    50   Resp:       Temp:       TempSrc:       SpO2: 94% 98% 100%    Weight:       Height:         Medications   sodium chloride 0.9 % flush 10 mL (not administered)   sodium chloride 0.9 % bolus 1,000 mL (0 mL Intravenous Stopped 11/13/19 0200)   dextrose (D50W) 25 g/ 50mL Intravenous Solution 50 mL (50 mL Intravenous Given 11/13/19 0026)   insulin regular (humuLIN R,novoLIN R) injection 6 Units (6 Units Intravenous Given 11/13/19 0026)   albuterol (PROVENTIL) nebulizer solution 0.083% 2.5 mg/3mL (2.5 mg Nebulization Given 11/13/19 0002)   sodium chloride 0.9 % bolus 1,000 mL (1,000 mL Intravenous New Bag 11/13/19 0054)   iopamidol (ISOVUE-370) 76 % injection 100 mL (80 mL Intravenous Given 11/13/19 0048)   dicyclomine (BENTYL) capsule 20 mg (20 mg Oral Given 11/13/19 0135)     ECG/EMG Results (last 24 hours)     Procedure Component Value Units Date/Time    ECG 12 Lead [[id number]] Collected:  11/12/19 2210     Updated:  11/12/19 2210        ECG 12 Lead                   MDM    Final diagnoses:   Dizziness   Hypotension, unspecified hypotension type   Renal insufficiency   Hyperkalemia   Syncope, unspecified syncope type   Orthostatic hypotension       Documentation assistance provided by cherry Arcos.  Information recorded by the scribe was done at my direction and has been verified and validated by me.      Kevin Arcos  11/12/19 9665       Hernan Chavez MD  11/13/19 3113

## 2019-11-13 NOTE — PROGRESS NOTES
Discharge Planning Assessment  [facility]     Patient Name: Narendra Cochran  MRN: [id number]  Today's Date: 11/13/2019    Admit Date: 11/12/2019    Discharge Needs Assessment     Row Name 11/13/19 0953       Living Environment    Lives With  spouse    Current Living Arrangements  home/apartment/condo    Living Arrangement Comments  Lives in a duplex with a basement. He doesnt have to go downstairs. Steps to enter. Good family support.       Discharge Needs Assessment    Equipment Currently Used at Home  none    Equipment Needed After Discharge  none    Discharge Coordination/Progress  No HH or outpt services involved in care currently.        Discharge Plan     Row Name 11/13/19 0954       Plan    Plan  Home at DC    Patient/Family in Agreement with Plan  yes    Plan Comments  I spoke with the pt and spopuse. The goal is home at DC. No DC needs at this time.    Row Name 11/13/19 0828       Plan    Final Discharge Disposition Code  01 - home or self-care        Destination      No service coordination in this encounter.      Durable Medical Equipment      No service coordination in this encounter.      Dialysis/Infusion      No service coordination in this encounter.      Home Medical Care      No service coordination in this encounter.      Therapy      No service coordination in this encounter.      Community Resources      No service coordination in this encounter.        Expected Discharge Date and Time     Expected Discharge Date Expected Discharge Time    Nov 15, 2019         Demographic Summary    No documentation.       Functional Status     Row Name 11/13/19 0952       Functional Status    Usual Activity Tolerance  good       Functional Status, IADL    Medications  independent    Meal Preparation  assistive person    Housekeeping  assistive person    Laundry  assistive person    Shopping  assistive person        Psychosocial    No documentation.       Abuse/Neglect    No documentation.       Legal    No  documentation.       Substance Abuse    No documentation.       Patient Forms    No documentation.           Radha Castrejon RN

## 2019-11-14 VITALS
BODY MASS INDEX: 26.36 KG/M2 | TEMPERATURE: 98 F | SYSTOLIC BLOOD PRESSURE: 126 MMHG | HEIGHT: 71 IN | HEART RATE: 57 BPM | WEIGHT: 188.3 LBS | DIASTOLIC BLOOD PRESSURE: 73 MMHG | OXYGEN SATURATION: 100 % | RESPIRATION RATE: 18 BRPM

## 2019-11-14 PROBLEM — R42 DIZZINESS: Status: RESOLVED | Noted: 2019-11-13 | Resolved: 2019-11-14

## 2019-11-14 PROBLEM — E83.41 HYPERMAGNESEMIA: Status: RESOLVED | Noted: 2019-11-13 | Resolved: 2019-11-14

## 2019-11-14 PROBLEM — D72.819 LEUKOPENIA: Chronic | Status: RESOLVED | Noted: 2017-05-28 | Resolved: 2019-11-14

## 2019-11-14 PROBLEM — I95.9 HYPOTENSION: Status: RESOLVED | Noted: 2019-11-13 | Resolved: 2019-11-14

## 2019-11-14 PROBLEM — D69.6 THROMBOCYTOPENIA: Status: RESOLVED | Noted: 2019-11-13 | Resolved: 2019-11-14

## 2019-11-14 PROBLEM — N17.9 ARF (ACUTE RENAL FAILURE): Status: RESOLVED | Noted: 2019-11-13 | Resolved: 2019-11-14

## 2019-11-14 PROBLEM — E87.5 HYPERKALEMIA: Status: RESOLVED | Noted: 2019-11-13 | Resolved: 2019-11-14

## 2019-11-14 LAB
ANION GAP SERPL CALCULATED.3IONS-SCNC: 11 MMOL/L (ref 5–15)
BUN BLD-MCNC: 19 MG/DL (ref 8–23)
BUN/CREAT SERPL: 19.4 (ref 7–25)
CALCIUM SPEC-SCNC: 9.5 MG/DL (ref 8.6–10.5)
CHLORIDE SERPL-SCNC: 104 MMOL/L (ref 98–107)
CO2 SERPL-SCNC: 21 MMOL/L (ref 22–29)
CREAT BLD-MCNC: 0.98 MG/DL (ref 0.76–1.27)
GFR SERPL CREATININE-BSD FRML MDRD: 89 ML/MIN/1.73
GLUCOSE BLD-MCNC: 120 MG/DL (ref 65–99)
GLUCOSE BLDC GLUCOMTR-MCNC: 124 MG/DL (ref 70–130)
GLUCOSE BLDC GLUCOMTR-MCNC: 156 MG/DL (ref 70–130)
MAGNESIUM SERPL-MCNC: 2.3 MG/DL (ref 1.6–2.4)
POTASSIUM BLD-SCNC: 5 MMOL/L (ref 3.5–5.2)
SODIUM BLD-SCNC: 136 MMOL/L (ref 136–145)

## 2019-11-14 PROCEDURE — 82962 GLUCOSE BLOOD TEST: CPT

## 2019-11-14 PROCEDURE — 94799 UNLISTED PULMONARY SVC/PX: CPT

## 2019-11-14 PROCEDURE — 97165 OT EVAL LOW COMPLEX 30 MIN: CPT

## 2019-11-14 PROCEDURE — 83735 ASSAY OF MAGNESIUM: CPT | Performed by: INTERNAL MEDICINE

## 2019-11-14 PROCEDURE — 99239 HOSP IP/OBS DSCHRG MGMT >30: CPT | Performed by: NURSE PRACTITIONER

## 2019-11-14 PROCEDURE — 80048 BASIC METABOLIC PNL TOTAL CA: CPT | Performed by: INTERNAL MEDICINE

## 2019-11-14 RX ORDER — PANTOPRAZOLE SODIUM 40 MG/1
40 TABLET, DELAYED RELEASE ORAL DAILY
Qty: 30 TABLET | Refills: 0 | Status: SHIPPED | OUTPATIENT
Start: 2019-11-14 | End: 2019-11-18 | Stop reason: SDUPTHER

## 2019-11-14 RX ORDER — AMLODIPINE BESYLATE 2.5 MG/1
2.5 TABLET ORAL DAILY
Qty: 30 TABLET | Refills: 0 | Status: SHIPPED | OUTPATIENT
Start: 2019-11-14 | End: 2019-11-18 | Stop reason: SDUPTHER

## 2019-11-14 RX ADMIN — PANTOPRAZOLE SODIUM 40 MG: 40 TABLET, DELAYED RELEASE ORAL at 05:41

## 2019-11-14 RX ADMIN — ROSUVASTATIN CALCIUM 20 MG: 20 TABLET, COATED ORAL at 08:15

## 2019-11-14 RX ADMIN — POLYETHYLENE GLYCOL 3350 17 G: 17 POWDER, FOR SOLUTION ORAL at 08:15

## 2019-11-14 RX ADMIN — INSULIN LISPRO 2 UNITS: 100 INJECTION, SOLUTION INTRAVENOUS; SUBCUTANEOUS at 12:20

## 2019-11-14 RX ADMIN — SODIUM CHLORIDE, PRESERVATIVE FREE 10 ML: 5 INJECTION INTRAVENOUS at 08:15

## 2019-11-14 RX ADMIN — LEVETIRACETAM 250 MG: 250 TABLET, FILM COATED ORAL at 08:15

## 2019-11-14 RX ADMIN — IPRATROPIUM BROMIDE AND ALBUTEROL SULFATE 3 ML: 2.5; .5 SOLUTION RESPIRATORY (INHALATION) at 09:13

## 2019-11-14 RX ADMIN — ASPIRIN 81 MG 81 MG: 81 TABLET ORAL at 08:15

## 2019-11-14 NOTE — PLAN OF CARE
Problem: Patient Care Overview  Goal: Plan of Care Review  Outcome: Ongoing (interventions implemented as appropriate)   11/14/19 1015   OTHER   Outcome Summary OT eval complete. Pt required setup/supervision to don socks, supervision STS, CGA to ambulate in hallway with several sidesteps and LOB with self recovery. OT will follow to increase independence and safety with self care. Recommend home with assist upon d/c.    Coping/Psychosocial   Plan of Care Reviewed With patient

## 2019-11-14 NOTE — DISCHARGE SUMMARY
[facility] Medicine Services  DISCHARGE SUMMARY    Patient Name: Narendra Cochran  : 1940  MRN: [id number]    Date of Admission: 2019 10:17 PM  Date of Discharge:  2019  Primary Care Physician: Marguerite Moore PA-C    Consults     No orders found from 10/14/2019 to 2019.          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 male here w/ syncope/hypotension:     -- Syncope/hypotension;  Felt to be related to recent doubling of ARB.   - BP has improved; labs improved  - AM TSH and cortisol wnl  - ECHO with diastolic dysfunction   - will restart norvasc at decreased dose of 2.5 daily at discharge  - will continue to hold arb, bb, and bumex at discharge  -patient will need to check blood pressure frequently over the next couple of days and take results to PCP on Monday  - follow up with PCP on Monday     --  Hyponatremia/hyperkalemia;  -received insulin, d50, albuterol and K  -resolved     -- ARF;   -likely a result of hypotension  -resolved       -- Stomach pain; constipation  - Recent issues with stomach pain; new issue of constipation  - Bowel regimen; start PPI   -reports bowel movement this morning     -- ST depression;  -suspect also related to  Hypotension. Trop negative x 1.  No chest pain.    - trop negative; ECHO with diastolic dysfunction; LV sole normally      -- Chronic  leukopenia/thrombocytopenia;  -stable; followed by Dr. Lui     -- Paget disease of bone;   -stable w/ normal alk phos; managed by Dr. Lui      Disposition:  Discharge home today    Discharge Follow Up Recommendations for labs/diagnostics:  -Follow up with PCP on Monday    Day of Discharge     HPI:   Patient awake sitting in the recliner chair, slept well with no overnight issues.  Has been up walking around the halls.  Denies fever, shortness of air, nausea, vomiting, abdominal pain, diarrhea, headache, lightheadedness, dizziness, or any other complaints at this time.  Reports overall that he is feeling much better and is requesting to be discharged home.    Review of Systems  Gen- No fevers, chills  CV- No chest pain, palpitations  Resp- No cough, dyspnea  GI- No N/V/D, abd pain     Otherwise ROS is negative except as mentioned in the HPI.    Vital Signs:   Temp:  [97.4 °F (36.3 °C)-98 °F (36.7 °C)] 98 °F (36.7 °C)  Heart Rate:  [57-84] 57  Resp:  [16-18] 18  BP: ()/(48-73) 126/73     Physical Exam:  Constitutional: Awake, alert, sitting in recliner chair, no family/visitors present  Eyes: PERRLA, sclerae anicteric, no conjunctival injection  HENT: NCAT, mucous membranes moist  Neck: Supple, no thyromegaly, no lymphadenopathy, trachea midline  Respiratory: Clear to auscultation bilaterally, nonlabored respirations   Cardiovascular: RRR, no murmurs, rubs, or gallops, palpable pedal pulses bilaterally  Gastrointestinal: Positive bowel sounds, soft, nontender, nondistended  Musculoskeletal: No bilateral ankle edema, no clubbing or cyanosis to extremities, moves all extremities  Psychiatric: Appropriate affect, cooperative  Neurologic: Oriented x 3, strength symmetric in all extremities, Cranial Nerves grossly intact to confrontation, speech clear  Skin: No rashes       Pertinent  and/or Most Recent Results     Results from last 7 days   Lab Units 11/14/19  0551 11/13/19  0543 11/13/19  0233 11/12/19  4101    WBC 10*3/mm3  --  3.13*  --  2.90*   HEMOGLOBIN g/dL  --  13.4  --  15.3   HEMATOCRIT %  --  41.3  --  47.9   PLATELETS 10*3/mm3  --  91*  --  108*   SODIUM mmol/L 136  --  133* 133*   POTASSIUM mmol/L 5.0  --  4.4 5.3*   CHLORIDE mmol/L 104  --  102 98   CO2 mmol/L 21.0*  --  20.0* 20.0*   BUN mg/dL 19  --  39* 41*   CREATININE mg/dL 0.98  --  1.41* 1.78*   GLUCOSE mg/dL 120*  --  81 123*   CALCIUM mg/dL 9.5  --  8.7 9.7     Results from last 7 days   Lab Units 11/12/19  2259   BILIRUBIN mg/dL 0.6   ALK PHOS U/L 88   ALT (SGPT) U/L 7   AST (SGOT) U/L 16   PROTIME Seconds 12.7   INR  1.00           Invalid input(s): TG, LDLCALC, LDLREALC  Results from last 7 days   Lab Units 11/13/19  0543 11/13/19  0233 11/12/19  2259   TSH uIU/mL 2.780  --   --    CORTISOL mcg/dL 9.00  --   --    TROPONIN T ng/mL  --  <0.010 <0.010       Brief Urine Lab Results  (Last result in the past 365 days)      Color   Clarity   Blood   Leuk Est   Nitrite   Protein   CREAT   Urine HCG        11/13/19 1934 Yellow Clear Negative Negative Negative Negative               Microbiology Results Abnormal     None          Imaging Results (All)     Procedure Component Value Units Date/Time    CT Angiogram Chest [[id number]] Collected:  11/13/19 0111     Updated:  11/13/19 0113    Narrative:       CT CHEST WITH CONTRAST, PE PROTOCOL, 11/3/1919    HISTORY:  79-year-old male in the ED complaining of generalized weakness and dizziness. He notes episodes began after his blood pressure medication dosage was increased recently. Episodes when standing or walking. He struck his head during a fall this morning  during one of these episodes.    TECHNIQUE:  CT examination of the chest with IV contrast. CTA MIP multiplanar pulmonary artery images were reformatted with 3-D postprocessing. Radiation dose reduction techniques included automated exposure control. Radiation audit for CT and nuclear cardiology  exams in the last 12 months: 3.    FINDINGS:  No  pulmonary embolism is demonstrated. Thoracic aorta is normal in caliber with no aneurysm or dissection. Heart is not enlarged, and there is no pericardial effusion.    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    Lung images show moderate diffuse pulmonary emphysema. The lungs are clear with no pulmonary infiltrate or pleural effusion. No suspicious mass or adenopathy is seen within the chest. Limited upper abdominal images are unremarkable.      Impression:       1.  No evidence of pulmonary embolism or other acute vascular abnormality within the chest.  2.  Advanced pulmonary emphysema. Lungs clear.  3.  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    Signer Name: Chris Buitrago MD   Signed: 11/13/2019 1:11 AM   Workstation Name: [email or web address]    [facility]    CT Head Without Contrast [[id number]] Collected:  11/13/19 0100     Updated:  11/13/19 0102    Narrative:       CT HEAD, NONCONTRAST, 11/13/2019    HISTORY:  79-year-old male in the ED complaining of generalized weakness and dizziness. He notes episodes began after his blood pressure medication dosage was increased recently. Episodes when standing or walking. He struck his head during a fall this morning  during one of these episodes.    TECHNIQUE:  CT imaging of the head without IV contrast. Radiation dose reduction techniques included automated exposure control. Radiation audit for CT and nuclear cardiology exams in the last 12 months: 3.    COMPARISON:  *  CT head, 1/25/2019.    FINDINGS:  No acute intracranial abnormality is demonstrated.    Minimal generalized age-appropriate cerebral volume loss. Mild diffuse low-attenuation white matter changes, nonspecific but most typically seen in the setting of chronic small vessel disease. These findings are stable.    No evidence of intracranial hemorrhage, mass, mass effect, cerebral edema, extra-axial  fluid collection or hydrocephalus. Visualized upper paranasal sinuses and mastoid air spaces are clear.      Impression:       1. No acute intracranial abnormality.  2. Stable mild diffuse chronic changes as noted.  3. No change since 1/25/2019.    Signer Name: Chris Buitrago MD   Signed: 11/13/2019 1:00 AM   Workstation Name: [email or web address]    [facility]    XR Chest 1 View [[id number]] Collected:  11/12/19 2253     Updated:  11/12/19 2255    Narrative:       CHEST X-RAY, 11/12/2019      HISTORY:    79-year-old male in the ED with new onset weakness, dizziness, acute mental status change.      TECHNIQUE:  AP portable upright chest x-ray.      FINDINGS:  Heart size and pulmonary vascularity are normal. Generalized pulmonary hyperinflation compatible with COPD. No visible pulmonary infiltrate or pleural effusion. Benign calcified granuloma right lung base.      Impression:       No active disease. COPD.    Signer Name: Chris Buitrago MD   Signed: 11/12/2019 10:53 PM   Workstation Name: [email or web address]    [facility]          Results for orders placed in visit on 12/08/17   SCANNED VASCULAR STUDIES       Results for orders placed in visit on 12/08/17   SCANNED VASCULAR STUDIES       Results for orders placed during the hospital encounter of 11/12/19   Adult Transthoracic Echo Complete W/ Cont if Necessary Per Protocol    Narrative · Left atrial cavity size is mild-to-moderately dilated.  · Left ventricular wall thickness is consistent with mild concentric   hypertrophy.  · Left ventricular systolic function is normal. Estimated EF = 60%.  · Left ventricular diastolic dysfunction (grade I) consistent with   impaired relaxation.  · Mild to moderate aortic valve regurgitation is present.  · Mild mitral valve regurgitation is present            Discharge Details        Discharge Medications      New Medications      Instructions Start Date   pantoprazole 40 MG EC  tablet  Commonly known as:  PROTONIX   40 mg, Oral, Daily         Changes to Medications      Instructions Start Date   amLODIPine 2.5 MG tablet  Commonly known as:  NORVASC  What changed:    · medication strength  · how much to take   2.5 mg, Oral, Daily         Continue These Medications      Instructions Start Date   ACCU-CHEK FASTCLIX LANCETS misc   Use daily.       ACCU-CHEK GUIDE w/Device kit   1 each, Does not apply, Daily      aspirin 81 MG chewable tablet   81 mg, Oral, Daily      diclofenac 1 % gel gel  Commonly known as:  VOLTAREN   4 g, Topical, 4 Times Daily PRN      glucose blood test strip   Use as instructed to test blood sugar daily      levETIRAcetam 250 MG tablet  Commonly known as:  KEPPRA   250 mg, Oral, 2 Times Daily      metFORMIN 1000 MG tablet  Commonly known as:  GLUCOPHAGE   1,000 mg, Oral, Daily With Breakfast      polyethylene glycol packet  Commonly known as:  MIRALAX   17 g, Oral, Daily      rosuvastatin 20 MG tablet  Commonly known as:  CRESTOR   20 mg, Oral, Daily      SUPER B-COMPLEX/VIT C/FA tablet   No dose, route, or frequency recorded.      umeclidinium-vilanterol 62.5-25 MCG/INH aerosol powder  inhaler  Commonly known as:  ANORO ELLIPTA   1 puff, Inhalation, Daily - RT         Stop These Medications    bumetanide 0.5 MG tablet  Commonly known as:  BUMEX     irbesartan 75 MG tablet  Commonly known as:  AVAPRO     KLOR-CON 20 MEQ CR tablet  Generic drug:  potassium chloride     metoprolol succinate XL 25 MG 24 hr tablet  Commonly known as:  TOPROL XL     PHARMACY MEDS TO BED CONSULT     Potassium 75 MG tablet            No Known Allergies      Discharge Disposition:  Home or Self Care    Diet:  Hospital:  Diet Order   Procedures   • Diet Regular; Cardiac, Consistent Carbohydrate           CODE STATUS:    Code Status and Medical Interventions:   Ordered at: 11/13/19 2691     Code Status:    CPR     Medical Interventions (Level of Support Prior to Arrest):    Full       Future  Appointments   Date Time Provider Department Center   1/6/2020  2:30 PM Marguerite Moore PA-C [facility] None       Additional Instructions for the Follow-ups that You Need to Schedule     Discharge Follow-up with PCP   As directed       Currently Documented PCP:    Marguerite Moore PA-C    PCP Phone Number:    [phone]     Follow Up Details:  On Monday               Time Spent on Discharge:  45 minutes    Electronically signed by LEANDRO Amaya, 11/14/19, 9:08 AM.

## 2019-11-14 NOTE — PLAN OF CARE
Problem: Patient Care Overview  Goal: Plan of Care Review  Outcome: Ongoing (interventions implemented as appropriate)   11/13/19 1718 11/13/19 2000   Plan of Care Review   Progress improving --    OTHER   Outcome Summary Pt has had no s/s of dizziness, pt also able to work with therapy with no complaints. OPt has no c/o soa, pain, or nausea. A/O this shift. Wife at bedside. Pt able to rest well. VSS. No new concerns at this time,  --    Coping/Psychosocial   Plan of Care Reviewed With --  patient;spouse     Goal: Discharge Needs Assessment  Outcome: Ongoing (interventions implemented as appropriate)   11/13/19 0232 11/13/19 0321 11/13/19 0953   Discharge Needs Assessment   Readmission Within the Last 30 Days --  no previous admission in last 30 days --    Concerns to be Addressed --  denies needs/concerns at this time;no discharge needs identified --    Patient/Family Anticipates Transition to home with family --  --    Patient/Family Anticipated Services at Transition none --  --    Transportation Anticipated family or friend will provide;car, drives self --  --    Equipment Needed After Discharge --  --  none   Discharge Coordination/Progress --  --  No HH or outpt services involved in care currently.   Disability   Equipment Currently Used at Home --  --  none       Problem: Fall Risk (Adult)  Goal: Absence of Fall  Outcome: Ongoing (interventions implemented as appropriate)   11/14/19 0249   Fall Risk (Adult)   Absence of Fall making progress toward outcome

## 2019-11-14 NOTE — THERAPY EVALUATION
Acute Care - Occupational Therapy Initial Evaluation  [facility]     Patient Name: Narendra Cochran  : 1940  MRN: [id number]  Today's Date: 2019     Date of Referral to OT: 19  Referring Physician: MD Harshal    Admit Date: 2019       ICD-10-CM ICD-9-CM   1. Dizziness R42 780.4   2. Hypotension, unspecified hypotension type I95.9 458.9   3. Renal insufficiency N28.9 593.9   4. Hyperkalemia E87.5 276.7   5. Syncope, unspecified syncope type R55 780.2   6. Orthostatic hypotension I95.1 458.0   7. Impaired mobility and ADLs Z74.09 799.89     Patient Active Problem List   Diagnosis   • Hypertension   • Hyperlipidemia   • Diabetes mellitus (CMS/HCC)   • Diverticulosis   • Paget disease of bone   • Generalized abdominal pain   • Tobacco abuse   • chronic thrombocytopenia   • Hypertensive urgency, malignant   • Complex renal cyst   • Nocturnal hypoxia   • Hemispheric carotid artery syndrome   • Bilateral renal cysts   • Acute metabolic encephalopathy   • Acute pain of right knee   • Bilateral hearing loss   • Seizure (CMS/HCC)   • Lactic acidosis   • Hypertrophic polyarthritis   • Gonalgia   • Osteitis deformans   • Chronic obstructive pulmonary disease (CMS/HCC)   • Overweight (BMI 25.0-29.9)   • Syncope     Past Medical History:   Diagnosis Date   • Arthritis    • Diabetes mellitus (CMS/HCC)    • Diverticulitis    • GERD (gastroesophageal reflux disease)    • Hyperlipidemia    • Hypertension    • Paget disease of bone      Past Surgical History:   Procedure Laterality Date   • APPENDECTOMY     • COLON RESECTION      second to diverticulitis    • FOOT SURGERY Left           OT ASSESSMENT FLOWSHEET (last 12 hours)      Occupational Therapy Evaluation     Row Name 19 0735                   OT Evaluation Time/Intention    Document Type  evaluation  -AC        Mode of Treatment  occupational therapy  -AC        Patient Effort  good  -AC           General Information    Patient Profile Reviewed?   yes  -        Referring Physician  MD Harshal  -        Patient/Family Observations  Pt received in bed  -AC        Prior Level of Function  independent:;all household mobility;ADL's;dependent:;driving  -        Existing Precautions/Restrictions  fall [place]  -        Risks Reviewed  patient:;LOB  -        Benefits Reviewed  patient and family:;improve function;increase independence;increase strength;increase balance;increase knowledge  -           Relationship/Environment    Primary Source of Support/Comfort  spouse  -        Lives With  spouse  -           Resource/Environmental Concerns    Current Living Arrangements  home/apartment/condo  -           Home Main Entrance    Number of Stairs, Main Entrance  three  -AC           Stairs Within Home, Primary    Number of Stairs, Within Home, Primary  -- basement  -           Cognitive Assessment/Interventions    Additional Documentation  Cognitive Assessment/Intervention (Group)  -           Cognitive Assessment/Intervention- PT/OT    Orientation Status (Cognition)  oriented x 3  -AC        Follows Commands (Cognition)  WFL  -           Safety Issues, Functional Mobility    Safety Issues Affecting Function (Mobility)  safety precaution awareness;safety precautions follow-through/compliance  -           Bed Mobility Assessment/Treatment    Bed Mobility Assessment/Treatment  supine-sit  -AC        Scooting/Bridging [place] (Bed Mobility)  independent  -           Functional Mobility    Functional Mobility- Ind. Level  contact guard assist  -        Functional Mobility- Device  -- gt belt  -        Functional Mobility-Distance (Feet)  250  -AC        Functional Mobility- Comment  pt with several LOB with self recovery   -           Transfer Assessment/Treatment    Transfer Assessment/Treatment  sit-stand transfer;stand-sit transfer  -           Sit-Stand Transfer    Sit-Stand [place] (Transfers)  supervision  -            Stand-Sit Transfer    Stand-Sit [place] (Transfers)  independent  -AC           ADL Assessment/Intervention    BADL Assessment/Intervention  lower body dressing  -AC           Lower Body Dressing Assessment/Training    Lower Body Dressing [place] Level  doff;don;socks;set up;supervision  -AC        Lower Body Dressing Position  unsupported sitting  -AC           BADL Safety/Performance    Impairments, BADL Safety/Performance  balance  -AC           General ROM    GENERAL ROM COMMENTS  BUE WFL  -AC           MMT (Manual Muscle Testing)    General MMT Comments  BUE grossly 4+/5  -AC           Static Sitting Balance    Level of [place] (Unsupported Sitting, Static Balance)  independent  -AC           Dynamic Sitting Balance    Level of [place], Reaches Outside Midline (Sitting, Dynamic Balance)  independent  -AC           Static Standing Balance    Level of [place] (Supported Standing, Static Balance)  supervision  -AC           Dynamic Standing Balance    Level of [place], Reaches Outside Midline (Standing, Dynamic Balance)  contact guard assist  -AC           Positioning and Restraints    Pre-Treatment Position  in bed  -AC        Post Treatment Position  chair  -AC        In Chair  reclined;call light within reach;encouraged to call for assist;exit alarm on  -AC           Pain Scale: Numbers Pre/Post-Treatment    Pain Scale: Numbers, Pretreatment  0/10 - no pain  -AC        Pain Scale: Numbers, Post-Treatment  0/10 - no pain  -AC           Plan of Care Review    Plan of Care Reviewed With  patient  -AC           Clinical Impression (OT)    Date of Referral to OT  11/13/19  -AC        OT Diagnosis  ADL decline  -AC        Patient/Family Goals Statement (OT Eval)  return home  -AC        Therapy Frequency (OT Eval)  daily  -AC        Anticipated Discharge Disposition (OT)  home with assist  -AC           Vital Signs    Pre Systolic BP Rehab  126  -AC        Pre Treatment Diastolic BP  73   -AC        Post Systolic BP Rehab  131  -AC        Post Treatment Diastolic BP  70  -AC        Pretreatment Heart Rate (beats/min)  61  -AC        Posttreatment Heart Rate (beats/min)  60  -AC        Pre SpO2 (%)  96  -AC        O2 Delivery Pre Treatment  room air  -AC        Post SpO2 (%)  91  -AC        O2 Delivery Post Treatment  room air  -AC        Pre Patient Position  Supine  -AC        Intra Patient Position  Standing  -AC        Post Patient Position  Sitting  -AC           Planned OT Interventions    Planned Therapy Interventions (OT Eval)  BADL retraining;functional balance retraining;transfer/mobility retraining  -AC           OT Goals    Transfer Goal Selection (OT)  transfer, OT goal 1  -AC        Bathing Goal Selection (OT)  bathing, OT goal 1  -AC        Dressing Goal Selection (OT)  dressing, OT goal 1  -AC        Toileting Goal Selection (OT)  toileting, OT goal 1  -AC           Transfer Goal 1 (OT)    Activity/Assistive Device (Transfer Goal 1, OT)  bed-to-chair/chair-to-bed;toilet  -AC        [place] Level/Cues Needed (Transfer Goal 1, OT)  independent  -AC        Time Frame (Transfer Goal 1, OT)  by discharge  -AC        Progress/Outcome (Transfer Goal 1, OT)  goal ongoing  -AC           Bathing Goal 1 (OT)    Activity/Assistive Device (Bathing Goal 1, OT)  upper body bathing;lower body bathing  -AC        [place] Level/Cues Needed (Bathing Goal 1, OT)  set-up required;supervision required  -AC        Time Frame (Bathing Goal 1, OT)  by discharge  -AC        Progress/Outcomes (Bathing Goal 1, OT)  goal ongoing  -AC           Dressing Goal 1 (OT)    Activity/Assistive Device (Dressing Goal 1, OT)  -- don/doff pants/ shoes  -AC        [place]/Cues Needed (Dressing Goal 1, OT)  set-up required  -AC        Time Frame (Dressing Goal 1, OT)  by discharge  -AC        Progress/Outcome (Dressing Goal 1, OT)  goal ongoing  -AC           Toileting Goal 1 (OT)    Activity/Device (Toileting  Goal 1, OT)  adjust/manage clothing;perform perineal hygiene  -        [place] Level/Cues Needed (Toileting Goal 1, OT)  independent  -        Time Frame (Toileting Goal 1, OT)  by discharge  -        Progress/Outcome (Toileting Goal 1, OT)  goal ongoing  -           Living Environment    Home Accessibility  stairs to enter home;tub/shower is not walk in  -          User Key  (r) = Recorded By, (t) = Taken By, (c) = Cosigned By    Initials Name Effective Dates     Francia Yao, OT 06/23/15 -          Occupational Therapy Education     Title: PT OT SLP Therapies (In Progress)     Topic: Occupational Therapy (In Progress)     Point: ADL training (Done)     Description: Instruct learner(s) on proper safety adaptation and remediation techniques during self care or transfers.   Instruct in proper use of assistive devices.    Learning Progress Summary           Patient Acceptance, E, VU by  at 11/14/2019  7:35 AM    Comment:  benefits of activity, role of OT                               User Key     Initials Effective Dates Name Provider Type Discipline     06/23/15 -  Francia Yao, OT Occupational Therapist OT                  OT Recommendation and Plan  Outcome Summary/Treatment Plan (OT)  Anticipated Discharge Disposition (OT): home with assist  Planned Therapy Interventions (OT Eval): BADL retraining, functional balance retraining, transfer/mobility retraining  Therapy Frequency (OT Eval): daily  Plan of Care Review  Plan of Care Reviewed With: patient  Plan of Care Reviewed With: patient  Outcome Summary: OT eval complete.  Pt required setup/supervision to don socks, supervision STS,  CGA to ambulate in hallway with several sidesteps and LOB with self recovery.  OT will follow to increase independence and safety with self care.   Recommend home with assist upon d/c.     Outcome Measures     Row Name 11/14/19 0735             How much help from another is currently needed...    Putting on and  taking off regular lower body clothing?  3  -AC      Bathing (including washing, rinsing, and drying)  3  -AC      Toileting (which includes using toilet bed pan or urinal)  3  -AC      Putting on and taking off regular upper body clothing  4  -AC      Taking care of personal grooming (such as brushing teeth)  4  -AC      Eating meals  4  -AC      AM-PAC 6 Clicks Score (OT)  21  -AC         Functional Assessment    Outcome Measure Options  AM-PAC 6 Clicks Daily Activity (OT)  -AC        User Key  (r) = Recorded By, (t) = Taken By, (c) = Cosigned By    Initials Name Provider Type    Francia Solares, OT Occupational Therapist          Time Calculation:   Time Calculation- OT     Row Name 11/14/19 0735             Time Calculation- OT    OT Start Time  0735  -      OT Received On  11/14/19  -      OT Goal Re-Cert Due Date  11/24/19  -        User Key  (r) = Recorded By, (t) = Taken By, (c) = Cosigned By    Initials Name Provider Type    Francia Solares, OT Occupational Therapist        Therapy Charges for Today     Code Description Service Date Service Provider Modifiers Qty    [id number]  OT EVAL LOW COMPLEXITY 4 11/14/2019 Francia Yao, OT GO 1               Francia Yao OT  11/14/2019

## 2019-11-14 NOTE — DISCHARGE INSTRUCTIONS
Patient will need to check blood pressure twice daily and keep a record of results to take to PCP on Monday

## 2019-11-15 ENCOUNTER — TRANSITIONAL CARE MANAGEMENT TELEPHONE ENCOUNTER (OUTPATIENT)
Dept: FAMILY MEDICINE CLINIC | Facility: CLINIC | Age: 79
End: 2019-11-15

## 2019-11-15 ENCOUNTER — READMISSION MANAGEMENT (OUTPATIENT)
Dept: CALL CENTER | Facility: HOSPITAL | Age: 79
End: 2019-11-15

## 2019-11-15 NOTE — OUTREACH NOTE
The patient was discharged from [facility] 11/14/19 and has a hospital follow up scheduled with PCP Marguerite Moore PA-C 11/18/19 which is within 2 business days of hospital discharge.

## 2019-11-15 NOTE — OUTREACH NOTE
Prep Survey      Responses   Facility patient discharged from?  [facility]   Is patient eligible?  Yes   Discharge diagnosis  Dizziness,  Syncope,  Leukopenia,  hypotension   Does the patient have one of the following disease processes/diagnoses(primary or secondary)?  Other   Does the patient have Home health ordered?  No   Is there a DME ordered?  No   Prep survey completed?  Yes          Adrienne Herrera RN

## 2019-11-18 ENCOUNTER — READMISSION MANAGEMENT (OUTPATIENT)
Dept: CALL CENTER | Facility: HOSPITAL | Age: 79
End: 2019-11-18

## 2019-11-18 ENCOUNTER — OFFICE VISIT (OUTPATIENT)
Dept: FAMILY MEDICINE CLINIC | Facility: CLINIC | Age: 79
End: 2019-11-18

## 2019-11-18 DIAGNOSIS — H91.93 BILATERAL HEARING LOSS, UNSPECIFIED HEARING LOSS TYPE: ICD-10-CM

## 2019-11-18 DIAGNOSIS — E66.3 OVERWEIGHT (BMI 25.0-29.9): ICD-10-CM

## 2019-11-18 DIAGNOSIS — K21.9 GASTROESOPHAGEAL REFLUX DISEASE, ESOPHAGITIS PRESENCE NOT SPECIFIED: ICD-10-CM

## 2019-11-18 DIAGNOSIS — D69.3 CHRONIC IDIOPATHIC THROMBOCYTOPENIA (HCC): Chronic | ICD-10-CM

## 2019-11-18 DIAGNOSIS — Z09 HOSPITAL DISCHARGE FOLLOW-UP: Primary | ICD-10-CM

## 2019-11-18 DIAGNOSIS — I10 ESSENTIAL HYPERTENSION: Chronic | ICD-10-CM

## 2019-11-18 DIAGNOSIS — I35.1 AORTIC VALVE INSUFFICIENCY, ETIOLOGY OF CARDIAC VALVE DISEASE UNSPECIFIED: ICD-10-CM

## 2019-11-18 PROCEDURE — 99495 TRANSJ CARE MGMT MOD F2F 14D: CPT | Performed by: PHYSICIAN ASSISTANT

## 2019-11-18 RX ORDER — PANTOPRAZOLE SODIUM 40 MG/1
40 TABLET, DELAYED RELEASE ORAL DAILY
Qty: 90 TABLET | Refills: 1 | Status: SHIPPED | OUTPATIENT
Start: 2019-11-18 | End: 2019-12-09

## 2019-11-18 RX ORDER — AMLODIPINE BESYLATE 2.5 MG/1
2.5 TABLET ORAL DAILY
Qty: 90 TABLET | Refills: 0 | Status: SHIPPED | OUTPATIENT
Start: 2019-11-18 | End: 2019-12-09 | Stop reason: SDUPTHER

## 2019-11-18 NOTE — OUTREACH NOTE
Medical Week 1 Survey      Responses   Facility patient discharged from?  [facility]   Does the patient have one of the following disease processes/diagnoses(primary or secondary)?  Other   Is there a successful TCM telephone encounter documented?  No   Week 1 attempt successful?  Yes   Call start time  1220   Call end time  1223   Discharge diagnosis  Dizziness,  Syncope,  Leukopenia,  hypotension   Person spoke with today (if not patient) and relationship  Jessica-wife   Meds reviewed with patient/caregiver?  Yes   Is the patient having any side effects they believe may be caused by any medication additions or changes?  No   Does the patient have all medications ordered at discharge?  Yes   Is the patient taking all medications as directed (includes completed medication regime)?  Yes   Does the patient have a primary care provider?   Yes   Does the patient have an appointment with their PCP within 7 days of discharge?  Yes   Comments regarding PCP  Appointment with PCP 11/18/19   Has the patient kept scheduled appointments due by today?  N/A   Home health comments  Mrs. Cochran reports patient doesn't want HH. RN informed [facility].   Psychosocial issues?  No   Did the patient receive a copy of their discharge instructions?  Yes   Nursing interventions  Reviewed instructions with patient   What is the patient's perception of their health status since discharge?  Improving   Is the patient/caregiver able to teach back signs and symptoms related to disease process for when to call PCP?  Yes   Is the patient/caregiver able to teach back signs and symptoms related to disease process for when to call 911?  Yes   Is the patient/caregiver able to teach back the hierarchy of who to call/visit for symptoms/problems? PCP, Specialist, Home health nurse, Urgent Care, ED, 911  Yes   Week 1 call completed?  Yes          Adrienne Herrera RN

## 2019-11-19 VITALS
SYSTOLIC BLOOD PRESSURE: 130 MMHG | HEART RATE: 62 BPM | HEIGHT: 71 IN | BODY MASS INDEX: 26.8 KG/M2 | DIASTOLIC BLOOD PRESSURE: 68 MMHG | WEIGHT: 191.4 LBS | OXYGEN SATURATION: 98 %

## 2019-11-19 PROBLEM — I16.0 HYPERTENSIVE URGENCY, MALIGNANT: Status: RESOLVED | Noted: 2017-05-29 | Resolved: 2019-11-19

## 2019-11-19 PROBLEM — I35.1 AORTIC VALVE REGURGITATION: Status: ACTIVE | Noted: 2019-11-19

## 2019-11-19 NOTE — PROGRESS NOTES
Transitional Care Follow Up Visit  Subjective     Narendra Cochran is a 79 y.o. male who presents for a transitional care management visit.    Within 48 business hours after discharge our office contacted him via telephone to coordinate his care and needs.      I reviewed and discussed the details of that call along with the discharge summary, hospital problems, inpatient lab results, inpatient diagnostic studies, and consultation reports with Narendra.     Current outpatient and discharge medications have been reconciled for the patient.    Date of TCM Phone Call 11/15/2019   [facility]   Date of Admission 11/12/2019   Date of Discharge 11/14/2019   Discharge Disposition Home or Self Care     Risk for Readmission (LACE) Score: 9 (11/14/2019  6:00 AM)      Patient presents for routine hospital follow-up.  Patient was hospitalized at [facility] from 11/12 to 11/14 for hypotension, hyperkalemia, hypermagnesemia, thrombocytopenia, ARF, dizziness and leukopenia.  Patient's irbesartan was recently increased from 75 mg to 150 mg and he subsequently became hypotensive and felt dizziness and unwell.  He proceeded to the hospital where he was admitted.  Labs found abnormal electrolytes and CBC.  His metoprolol, bumex, potassium, and irbesartan were all discontinued.  He is currently on amlodipine 2.5 mg and his blood pressure is stable and well-controlled today.  Echo showed mild to moderate aortic regurgitation.  He reports that he feels better although is still having some weakness and has noticed swelling in his legs.  His labs prior to being discharged showed resolution of abnormal electrolytes.  He continues to have leukopenia, and thrombocytopenia which has been a chronic issue.  Patient was also started on protonix while in the hospital and is tolerating this well.  He has severe hearing deficits and has upcoming appointment to hopefully get hearing aids.  No family is present with patient at  appointment.       Duration of Hospital Stay:  11/12/19 to 11/14/19     The following portions of the patient's history were reviewed and updated as appropriate: allergies, current medications, past family history, past medical history, past social history, past surgical history and problem list.    Review of Systems   Constitutional: Positive for fatigue. Negative for chills, diaphoresis and fever.   HENT: Positive for hearing loss. Negative for congestion, postnasal drip and rhinorrhea.    Respiratory: Negative for cough, shortness of breath and wheezing.    Cardiovascular: Negative for chest pain and leg swelling.   Neurological: Negative for dizziness and headache.   Psychiatric/Behavioral: Negative for self-injury, sleep disturbance, suicidal ideas, depressed mood and stress. The patient is not nervous/anxious.        Current Outpatient Medications on File Prior to Visit   Medication Sig Dispense Refill   • ACCU-CHEK FASTCLIX LANCETS misc Use daily. 102 each 0   • aspirin 81 MG chewable tablet Chew 1 tablet Daily.     • B Complex-C-Folic Acid (SUPER B-COMPLEX/VIT C/FA) tablet      • Blood Glucose Monitoring Suppl (ACCU-CHEK GUIDE) w/Device kit 1 each Daily. 1 kit 0   • diclofenac (VOLTAREN) 1 % gel gel Apply 4 g topically to the appropriate area as directed 4 (Four) Times a Day As Needed (prn for pain). 60 tube 0   • glucose blood test strip Use as instructed to test blood sugar daily 100 each 5   • levETIRAcetam (KEPPRA) 250 MG tablet Take 1 tablet by mouth 2 (Two) Times a Day. 180 tablet 1   • metFORMIN (GLUCOPHAGE) 1000 MG tablet Take 1 tablet by mouth Daily With Breakfast. 90 tablet 3   • polyethylene glycol (MIRALAX) packet Take 17 g by mouth Daily.     • rosuvastatin (CRESTOR) 20 MG tablet Take 1 tablet by mouth Daily. 90 tablet 1   • umeclidinium-vilanterol (ANORO ELLIPTA) 62.5-25 MCG/INH aerosol powder  inhaler Inhale 1 puff Daily. 60 each 5     No current facility-administered medications on file prior  to visit.        Results for orders placed or performed during the hospital encounter of 11/12/19   Comprehensive Metabolic Panel   Result Value Ref Range    Glucose 123 (H) 65 - 99 mg/dL    BUN 41 (H) 8 - 23 mg/dL    Creatinine 1.78 (H) 0.76 - 1.27 mg/dL    Sodium 133 (L) 136 - 145 mmol/L    Potassium 5.3 (H) 3.5 - 5.2 mmol/L    Chloride 98 98 - 107 mmol/L    CO2 20.0 (L) 22.0 - 29.0 mmol/L    Calcium 9.7 8.6 - 10.5 mg/dL    Total Protein 8.1 6.0 - 8.5 g/dL    Albumin 4.20 3.50 - 5.20 g/dL    ALT (SGPT) 7 1 - 41 U/L    AST (SGOT) 16 1 - 40 U/L    Alkaline Phosphatase 88 39 - 117 U/L    Total Bilirubin 0.6 0.2 - 1.2 mg/dL    eGFR  African Amer 45 (L) >60 mL/min/1.73    Globulin 3.9 gm/dL    A/G Ratio 1.1 g/dL    BUN/Creatinine Ratio 23.0 7.0 - 25.0    Anion Gap 15.0 5.0 - 15.0 mmol/L   Troponin   Result Value Ref Range    Troponin T <0.010 0.000 - 0.030 ng/mL   Magnesium   Result Value Ref Range    Magnesium 2.5 (H) 1.6 - 2.4 mg/dL   CBC Auto Differential   Result Value Ref Range    WBC 2.90 (L) 3.40 - 10.80 10*3/mm3    RBC 5.71 4.14 - 5.80 10*6/mm3    Hemoglobin 15.3 13.0 - 17.7 g/dL    Hematocrit 47.9 37.5 - 51.0 %    MCV 83.9 79.0 - 97.0 fL    MCH 26.8 26.6 - 33.0 pg    MCHC 31.9 31.5 - 35.7 g/dL    RDW 14.0 12.3 - 15.4 %    RDW-SD 43.1 37.0 - 54.0 fl    MPV 11.3 6.0 - 12.0 fL    Platelets 108 (L) 140 - 450 10*3/mm3    Neutrophil % 52.8 42.7 - 76.0 %    Lymphocyte % 35.2 19.6 - 45.3 %    Monocyte % 9.0 5.0 - 12.0 %    Eosinophil % 2.4 0.3 - 6.2 %    Basophil % 0.3 0.0 - 1.5 %    Immature Grans % 0.3 0.0 - 0.5 %    Neutrophils, Absolute 1.53 (L) 1.70 - 7.00 10*3/mm3    Lymphocytes, Absolute 1.02 0.70 - 3.10 10*3/mm3    Monocytes, Absolute 0.26 0.10 - 0.90 10*3/mm3    Eosinophils, Absolute 0.07 0.00 - 0.40 10*3/mm3    Basophils, Absolute 0.01 0.00 - 0.20 10*3/mm3    Immature Grans, Absolute 0.01 0.00 - 0.05 10*3/mm3    nRBC 0.0 0.0 - 0.2 /100 WBC   Protime-INR   Result Value Ref Range    Protime 12.7 11.2 - 14.3  Seconds    INR 1.00 0.85 - 1.16   Scan Slide   Result Value Ref Range    Crenated RBC's Slight/1+ None Seen    WBC Morphology Normal Normal    Platelet Estimate Decreased Normal    Clumped Platelets Present None Seen   Basic Metabolic Panel   Result Value Ref Range    Glucose 81 65 - 99 mg/dL    BUN 39 (H) 8 - 23 mg/dL    Creatinine 1.41 (H) 0.76 - 1.27 mg/dL    Sodium 133 (L) 136 - 145 mmol/L    Potassium 4.4 3.5 - 5.2 mmol/L    Chloride 102 98 - 107 mmol/L    CO2 20.0 (L) 22.0 - 29.0 mmol/L    Calcium 8.7 8.6 - 10.5 mg/dL    eGFR  African Amer 58 (L) >60 mL/min/1.73    BUN/Creatinine Ratio 27.7 (H) 7.0 - 25.0    Anion Gap 11.0 5.0 - 15.0 mmol/L   CBC Auto Differential   Result Value Ref Range    WBC 3.13 (L) 3.40 - 10.80 10*3/mm3    RBC 4.87 4.14 - 5.80 10*6/mm3    Hemoglobin 13.4 13.0 - 17.7 g/dL    Hematocrit 41.3 37.5 - 51.0 %    MCV 84.8 79.0 - 97.0 fL    MCH 27.5 26.6 - 33.0 pg    MCHC 32.4 31.5 - 35.7 g/dL    RDW 14.0 12.3 - 15.4 %    RDW-SD 43.5 37.0 - 54.0 fl    MPV 11.8 6.0 - 12.0 fL    Platelets 91 (L) 140 - 450 10*3/mm3    Neutrophil % 64.9 42.7 - 76.0 %    Lymphocyte % 24.0 19.6 - 45.3 %    Monocyte % 8.9 5.0 - 12.0 %    Eosinophil % 1.3 0.3 - 6.2 %    Basophil % 0.3 0.0 - 1.5 %    Immature Grans % 0.6 (H) 0.0 - 0.5 %    Neutrophils, Absolute 2.03 1.70 - 7.00 10*3/mm3    Lymphocytes, Absolute 0.75 0.70 - 3.10 10*3/mm3    Monocytes, Absolute 0.28 0.10 - 0.90 10*3/mm3    Eosinophils, Absolute 0.04 0.00 - 0.40 10*3/mm3    Basophils, Absolute 0.01 0.00 - 0.20 10*3/mm3    Immature Grans, Absolute 0.02 0.00 - 0.05 10*3/mm3    nRBC 0.0 0.0 - 0.2 /100 WBC   Cortisol   Result Value Ref Range    Cortisol 9.00   mcg/dL   TSH   Result Value Ref Range    TSH 2.780 0.270 - 4.200 uIU/mL   Troponin   Result Value Ref Range    Troponin T <0.010 0.000 - 0.030 ng/mL   Urinalysis With Culture If Indicated - Urine, Clean Catch   Result Value Ref Range    Color, UA Yellow Yellow, Straw    Appearance, UA Clear Clear    pH,  UA <=5.0 5.0 - 8.0    Specific Gravity, UA 1.019 1.001 - 1.030    Glucose, UA Negative Negative    Ketones, UA Negative Negative    Bilirubin, UA Negative Negative    Blood, UA Negative Negative    Protein, UA Negative Negative    Leuk Esterase, UA Negative Negative    Nitrite, UA Negative Negative    Urobilinogen, UA 0.2 E.U./dL 0.2 - 1.0 E.U./dL   Basic Metabolic Panel   Result Value Ref Range    Glucose 120 (H) 65 - 99 mg/dL    BUN 19 8 - 23 mg/dL    Creatinine 0.98 0.76 - 1.27 mg/dL    Sodium 136 136 - 145 mmol/L    Potassium 5.0 3.5 - 5.2 mmol/L    Chloride 104 98 - 107 mmol/L    CO2 21.0 (L) 22.0 - 29.0 mmol/L    Calcium 9.5 8.6 - 10.5 mg/dL    eGFR  African Amer 89 >60 mL/min/1.73    BUN/Creatinine Ratio 19.4 7.0 - 25.0    Anion Gap 11.0 5.0 - 15.0 mmol/L   Magnesium   Result Value Ref Range    Magnesium 2.3 1.6 - 2.4 mg/dL   POC Glucose Once   Result Value Ref Range    Glucose 122 70 - 130 mg/dL   POC Glucose Once   Result Value Ref Range    Glucose 92 70 - 130 mg/dL   POC Glucose Once   Result Value Ref Range    Glucose 110 70 - 130 mg/dL   POC Glucose Once   Result Value Ref Range    Glucose 102 70 - 130 mg/dL   POC Glucose Once   Result Value Ref Range    Glucose 127 70 - 130 mg/dL   POC Glucose Once   Result Value Ref Range    Glucose 124 70 - 130 mg/dL   POC Glucose Once   Result Value Ref Range    Glucose 156 (H) 70 - 130 mg/dL   Adult Transthoracic Echo Complete W/ Cont if Necessary Per Protocol   Result Value Ref Range    BSA 2.0 m^2    IVSd 1.4 cm    LVIDd 4.5 cm    LVIDs 3.0 cm    LVPWd 1.3 cm    IVS/LVPW 1.1     FS 32.2 %    EDV(Teich) 90.2 ml    ESV(Teich) 35.5 ml    EF(Teich) 60.6 %    EDV(cubed) 88.2 ml    ESV(cubed) 27.5 ml    EF(cubed) 68.9 %    LV mass(C)d 228.7 grams    LV mass(C)dI 112.5 grams/m^2    SV(Teich) 54.6 ml    SI(Teich) 26.9 ml/m^2    SV(cubed) 60.8 ml    SI(cubed) 29.9 ml/m^2    Ao root diam 3.1 cm    Ao root area 7.7 cm^2    LA dimension 4.6 cm    LA/Ao 1.5     LAd major 5.8  cm    LA volume 40.0 ml    Ao root area (BSA corrected) 1.5     LA Volume Index 19.7 ml/m^2    MV E max torey 36.3 cm/sec    MV A max torey 55.8 cm/sec    MV E/A 0.65     MV P1/2t max torey 75.5 cm/sec    MV P1/2t 59.8 msec    MVA(P1/2t) 3.7 cm^2    MV dec slope 369.4 cm/sec^2    MV dec time 0.33 sec    AI max torey 482.0 cm/sec    AI max PG 93.0 mmHg    AI dec slope 273.4 cm/sec^2    AI P1/2t 516.4 msec    PA acc slope 773.0 cm/sec^2    PA acc time 0.12 sec    RV V1 max PG 2.3 mmHg    RV V1 max 76.0 cm/sec    PA pr(Accel) 23.5 mmHg    MVA P1/2T LCG 2.9 cm^2    Lat E/e'  2.7     Med E/e' 7.0     Lat Peak E' Torey 13.2 cm/sec    Med Peak E' Torey 5.0 cm/sec     CV ECHO JAYA - BZI_BMI 27.0 kilograms/m^2     CV ECHO JAYA - BSA([facility]) 2.1 m^2     CV ECHO JAYA - BZI_METRIC_WEIGHT 85.3 kg     CV ECHO JAYA - BZI_METRIC_HEIGHT 177.8 cm    Avg E/e' ratio 3.99     Target HR (85%) 118 bpm    Max. Pred. HR (100%) 139 bpm    TDI S' 9.43 cm/sec    RV Base 3.60 cm    RV Length 6.70 cm    RV Mid 3.10 cm    TAPSE (>1.6) 1.80 cm2    Echo EF Estimated 60 %   Light Blue Top   Result Value Ref Range    Extra Tube hold for add-on    Green Top (Gel)   Result Value Ref Range    Extra Tube Hold for add-ons.    Lavender Top   Result Value Ref Range    Extra Tube hold for add-on    Gold Top - SST   Result Value Ref Range    Extra Tube Hold for add-ons.        Visit Vitals  /68   Pulse 62   SpO2 98%     There is no height or weight on file to calculate BMI.    Current outpatient and discharge medications have been reconciled for the patient.  Reviewed by: Marguerite Moore PA-C  .  Objective   Physical Exam   Constitutional: He appears well-developed and well-nourished. He is active and cooperative. No distress.   HENT:   Head: Normocephalic and atraumatic.   Right Ear: Decreased hearing is noted.   Left Ear: Decreased hearing is noted.   Eyes: EOM are normal.   Neck: Normal range of motion.   Cardiovascular: Normal rate and regular  rhythm.   Murmur heard.  Pulmonary/Chest: Effort normal and breath sounds normal.   Musculoskeletal: Normal range of motion. He exhibits no edema.   Neurological: He is alert.   Skin: Skin is warm. He is not diaphoretic. No erythema.   Psychiatric: He has a normal mood and affect. His speech is normal and behavior is normal. Judgment and thought content normal. He is not actively hallucinating. He is attentive.   Vitals reviewed.      Assessment/Plan   Narendra was seen today for hospital follow up.    Diagnoses and all orders for this visit:    Hospital discharge follow-up  Hospitalized at [facility] from 11/12 to 11/14 due to hypotension, abnormal electrolytes, ARF, leukopenia and thrombocytopenia    Gastroesophageal reflux disease, esophagitis presence not specified  -     pantoprazole (PROTONIX) 40 MG EC tablet; Take 1 tablet by mouth Daily.  Medication is working well for patient and he denies any issues with reflux    Essential hypertension  -     amLODIPine (NORVASC) 2.5 MG tablet; Take 1 tablet by mouth Daily.  Multiple medications discontinued including bumex, potassium, irbesartan and metoprolol  Blood pressure is stable and well-controlled with amlodipine 2.5 mg nightly  Return in 2 weeks for blood pressure check and lab redraw    Bilateral hearing loss, unspecified hearing loss type  Pending appointment in 1-2 weeks to obtain hearings aids    Chronic thrombocytopenia  Recent labs show platelets at 91    Aortic valve insufficiency, mild to moderate regurgitation    Overweight (BMI 25.0-29.9)  Patient's BM is 26.3  Limited mobility with chronic knee pain  Discussed healthy diet and encouraged patient to eat more vegetables and fruit

## 2019-11-25 ENCOUNTER — READMISSION MANAGEMENT (OUTPATIENT)
Dept: CALL CENTER | Facility: HOSPITAL | Age: 79
End: 2019-11-25

## 2019-11-25 NOTE — OUTREACH NOTE
Medical Week 2 Survey      Responses   Facility patient discharged from?  [place]   Does the patient have one of the following disease processes/diagnoses(primary or secondary)?  Other   Week 2 attempt successful?  No   Unsuccessful attempts  Attempt 1   Call end time  [id number]   Week 2 Call Completed?  Yes          Francia Hernandez RN

## 2019-11-26 ENCOUNTER — READMISSION MANAGEMENT (OUTPATIENT)
Dept: CALL CENTER | Facility: HOSPITAL | Age: 79
End: 2019-11-26

## 2019-11-26 NOTE — OUTREACH NOTE
Medical Week 2 Survey      Responses   Facility patient discharged from?  [place]   Does the patient have one of the following disease processes/diagnoses(primary or secondary)?  Other   Week 2 attempt successful?  Yes   Call start time  1509   Discharge diagnosis  Dizziness,  Syncope,  Leukopenia,  hypotension   Call end time  1510   Is patient permission given to speak with other caregiver?  Yes   Person spoke with today (if not patient) and relationship  Jessica-wife   Meds reviewed with patient/caregiver?  Yes   Is the patient having any side effects they believe may be caused by any medication additions or changes?  No   Does the patient have all medications ordered at discharge?  Yes   Is the patient taking all medications as directed (includes completed medication regime)?  Yes   Does the patient have a primary care provider?   Yes   Does the patient have an appointment with their PCP within 7 days of discharge?  Yes   Has the patient kept scheduled appointments due by today?  Yes   Has home health visited the patient within 72 hours of discharge?  N/A   Psychosocial issues?  No   Did the patient receive a copy of their discharge instructions?  Yes   Nursing interventions  Reviewed instructions with patient   What is the patient's perception of their health status since discharge?  Improving   Is the patient/caregiver able to teach back signs and symptoms related to disease process for when to call PCP?  Yes   Is the patient/caregiver able to teach back signs and symptoms related to disease process for when to call 911?  Yes   Is the patient/caregiver able to teach back the hierarchy of who to call/visit for symptoms/problems? PCP, Specialist, Home health nurse, Urgent Care, ED, 911  Yes   Week 2 Call Completed?  Yes          Josue White RN

## 2019-12-04 ENCOUNTER — READMISSION MANAGEMENT (OUTPATIENT)
Dept: CALL CENTER | Facility: HOSPITAL | Age: 79
End: 2019-12-04

## 2019-12-04 NOTE — OUTREACH NOTE
Medical Week 3 Survey      Responses   Facility patient discharged from?  [place]   Does the patient have one of the following disease processes/diagnoses(primary or secondary)?  Other   Week 3 attempt successful?  Yes   Call start time  1543   Call end time  1544   Discharge diagnosis  Dizziness,  Syncope,  Leukopenia,  hypotension   Is patient permission given to speak with other caregiver?  Yes   Person spoke with today (if not patient) and relationship  Jessica-wife   Meds reviewed with patient/caregiver?  Yes   Is the patient having any side effects they believe may be caused by any medication additions or changes?  No   Does the patient have all medications ordered at discharge?  Yes   Is the patient taking all medications as directed (includes completed medication regime)?  Yes   Does the patient have a primary care provider?   Yes   Comments regarding PCP  Appointment with PCP 11/18/19   Does the patient have an appointment with their PCP within 7 days of discharge?  Yes   Has the patient kept scheduled appointments due by today?  Yes   Psychosocial issues?  No   Did the patient receive a copy of their discharge instructions?  Yes   Nursing interventions  Reviewed instructions with patient   What is the patient's perception of their health status since discharge?  Same   Is the patient/caregiver able to teach back signs and symptoms related to disease process for when to call PCP?  Yes   Is the patient/caregiver able to teach back signs and symptoms related to disease process for when to call 911?  Yes   Is the patient/caregiver able to teach back the hierarchy of who to call/visit for symptoms/problems? PCP, Specialist, Home health nurse, Urgent Care, ED, 911  Yes   Week 3 Call Completed?  Yes          Rika De Jesus RN

## 2019-12-09 ENCOUNTER — OFFICE VISIT (OUTPATIENT)
Dept: FAMILY MEDICINE CLINIC | Facility: CLINIC | Age: 79
End: 2019-12-09

## 2019-12-09 VITALS
WEIGHT: 204.2 LBS | HEIGHT: 71 IN | DIASTOLIC BLOOD PRESSURE: 80 MMHG | BODY MASS INDEX: 28.59 KG/M2 | HEART RATE: 60 BPM | OXYGEN SATURATION: 98 % | SYSTOLIC BLOOD PRESSURE: 130 MMHG

## 2019-12-09 DIAGNOSIS — H91.93 BILATERAL HEARING LOSS, UNSPECIFIED HEARING LOSS TYPE: ICD-10-CM

## 2019-12-09 DIAGNOSIS — I10 ESSENTIAL HYPERTENSION: Primary | Chronic | ICD-10-CM

## 2019-12-09 DIAGNOSIS — K21.9 GASTROESOPHAGEAL REFLUX DISEASE, ESOPHAGITIS PRESENCE NOT SPECIFIED: ICD-10-CM

## 2019-12-09 PROCEDURE — 99214 OFFICE O/P EST MOD 30 MIN: CPT | Performed by: PHYSICIAN ASSISTANT

## 2019-12-09 RX ORDER — AMLODIPINE BESYLATE 5 MG/1
5 TABLET ORAL DAILY
Qty: 90 TABLET | Refills: 1 | Status: SHIPPED | OUTPATIENT
Start: 2019-12-09 | End: 2020-02-28 | Stop reason: SDUPTHER

## 2019-12-09 RX ORDER — WEIGH SCALE
1 MISCELLANEOUS MISCELLANEOUS DAILY
Qty: 1 EACH | Refills: 0 | Status: SHIPPED | OUTPATIENT
Start: 2019-12-09 | End: 2019-12-09 | Stop reason: SDUPTHER

## 2019-12-09 RX ORDER — FAMOTIDINE 20 MG/1
20 TABLET, FILM COATED ORAL NIGHTLY PRN
Qty: 90 TABLET | Refills: 1 | Status: SHIPPED | OUTPATIENT
Start: 2019-12-09 | End: 2019-12-09 | Stop reason: SDUPTHER

## 2019-12-09 RX ORDER — AMLODIPINE BESYLATE 5 MG/1
5 TABLET ORAL DAILY
Qty: 90 TABLET | Refills: 1 | Status: SHIPPED | OUTPATIENT
Start: 2019-12-09 | End: 2019-12-09 | Stop reason: SDUPTHER

## 2019-12-09 RX ORDER — FAMOTIDINE 20 MG/1
20 TABLET, FILM COATED ORAL NIGHTLY PRN
Qty: 90 TABLET | Refills: 1 | Status: SHIPPED | OUTPATIENT
Start: 2019-12-09 | End: 2021-03-25

## 2019-12-09 RX ORDER — WEIGH SCALE
1 MISCELLANEOUS MISCELLANEOUS DAILY
Qty: 1 EACH | Refills: 0 | Status: SHIPPED | OUTPATIENT
Start: 2019-12-09 | End: 2022-09-06

## 2019-12-09 NOTE — PROGRESS NOTES
Chief Complaint   Patient presents with   • Hypertension     Pt is here for a follow up       HPI     Narendra Cochran is a pleasant 79 y.o. male who is here for routine follow-up of hypertension.  Patient brings in pill bottle of irbesartan 75 mg and states he has been taking this daily.  This was supposed to have been discontinued and he was prescribed amlodipine 2.5 mg daily.  He states he never received prescription of amlodipine and did not realize he was supposed to discontinue irbesartan.  He checks blood pressure at home and it is usually in the 140s/80s.  He admits his blood pressure cuff is old and he gets lots of error messages.  He denies any recent headaches, dizziness or fatigue. He is feeling well overall.  He states he is not taking bumex or metoprolol anymore.      Patient also reports that he is not taking protonix as he never received this prescription either.  He does have burning in his stomach and ache on occasion but not every day.  This may occur 1-2 times a week depending upon what he eats.    He is still pending hearing aids due to financial reasons.        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   • Paget disease of bone        Past Surgical History:   Procedure Laterality Date   • APPENDECTOMY     • COLON RESECTION      second to diverticulitis    • FOOT SURGERY Left        Family History   Problem Relation Age of Onset   • Diabetes Sister        Social History     Socioeconomic History   • Marital status:      Spouse name: Not on file   • Number of children: Not on file   • Years of education: Not on file   • Highest education level: Not on file   Tobacco Use   • Smoking status: Current Every Day Smoker     Packs/day: 0.25     Years: 65.00     Pack years: 16.25     Types: Cigars, Cigarettes  "  • Smokeless tobacco: Never Used   Substance and Sexual Activity   • Alcohol use: No     Comment: rarely   • Drug use: No   • Sexual activity: Defer       No Known Allergies    ROS    Review of Systems   Constitutional: Negative for chills, diaphoresis, fatigue and fever.   HENT: Positive for hearing loss.    Respiratory: Negative for cough, shortness of breath and wheezing.    Cardiovascular: Negative for chest pain and leg swelling.   Musculoskeletal: Positive for arthralgias.   Neurological: Negative for dizziness and headache.       Vitals:    12/09/19 1055   BP: 130/80   Pulse: 60   SpO2: 98%   Weight: 92.6 kg (204 lb 3.2 oz)   Height: 180.1 cm (70.9\")     Body mass index is 28.56 kg/m².    Current Outpatient Medications on File Prior to Visit   Medication Sig Dispense Refill   • ACCU-CHEK FASTCLIX LANCETS misc Use daily. 102 each 0   • aspirin 81 MG chewable tablet Chew 1 tablet Daily.     • Blood Glucose Monitoring Suppl (ACCU-CHEK GUIDE) w/Device kit 1 each Daily. 1 kit 0   • diclofenac (VOLTAREN) 1 % gel gel Apply 4 g topically to the appropriate area as directed 4 (Four) Times a Day As Needed (prn for pain). 60 tube 0   • glucose blood test strip Use as instructed to test blood sugar daily 100 each 5   • levETIRAcetam (KEPPRA) 250 MG tablet Take 1 tablet by mouth 2 (Two) Times a Day. 180 tablet 1   • metFORMIN (GLUCOPHAGE) 1000 MG tablet Take 1 tablet by mouth Daily With Breakfast. 90 tablet 3   • rosuvastatin (CRESTOR) 20 MG tablet Take 1 tablet by mouth Daily. 90 tablet 1   • umeclidinium-vilanterol (ANORO ELLIPTA) 62.5-25 MCG/INH aerosol powder  inhaler Inhale 1 puff Daily. 60 each 5   • [DISCONTINUED] amLODIPine (NORVASC) 2.5 MG tablet Take 1 tablet by mouth Daily. 90 tablet 0   • [DISCONTINUED] B Complex-C-Folic Acid (SUPER B-COMPLEX/VIT C/FA) tablet      • [DISCONTINUED] pantoprazole (PROTONIX) 40 MG EC tablet Take 1 tablet by mouth Daily. 90 tablet 1   • [DISCONTINUED] polyethylene glycol (MIRALAX) " packet Take 17 g by mouth Daily.       No current facility-administered medications on file prior to visit.        Results for orders placed or performed during the hospital encounter of 11/12/19   Comprehensive Metabolic Panel   Result Value Ref Range    Glucose 123 (H) 65 - 99 mg/dL    BUN 41 (H) 8 - 23 mg/dL    Creatinine 1.78 (H) 0.76 - 1.27 mg/dL    Sodium 133 (L) 136 - 145 mmol/L    Potassium 5.3 (H) 3.5 - 5.2 mmol/L    Chloride 98 98 - 107 mmol/L    CO2 20.0 (L) 22.0 - 29.0 mmol/L    Calcium 9.7 8.6 - 10.5 mg/dL    Total Protein 8.1 6.0 - 8.5 g/dL    Albumin 4.20 3.50 - 5.20 g/dL    ALT (SGPT) 7 1 - 41 U/L    AST (SGOT) 16 1 - 40 U/L    Alkaline Phosphatase 88 39 - 117 U/L    Total Bilirubin 0.6 0.2 - 1.2 mg/dL    eGFR  African Amer 45 (L) >60 mL/min/1.73    Globulin 3.9 gm/dL    A/G Ratio 1.1 g/dL    BUN/Creatinine Ratio 23.0 7.0 - 25.0    Anion Gap 15.0 5.0 - 15.0 mmol/L   Troponin   Result Value Ref Range    Troponin T <0.010 0.000 - 0.030 ng/mL   Magnesium   Result Value Ref Range    Magnesium 2.5 (H) 1.6 - 2.4 mg/dL   CBC Auto Differential   Result Value Ref Range    WBC 2.90 (L) 3.40 - 10.80 10*3/mm3    RBC 5.71 4.14 - 5.80 10*6/mm3    Hemoglobin 15.3 13.0 - 17.7 g/dL    Hematocrit 47.9 37.5 - 51.0 %    MCV 83.9 79.0 - 97.0 fL    MCH 26.8 26.6 - 33.0 pg    MCHC 31.9 31.5 - 35.7 g/dL    RDW 14.0 12.3 - 15.4 %    RDW-SD 43.1 37.0 - 54.0 fl    MPV 11.3 6.0 - 12.0 fL    Platelets 108 (L) 140 - 450 10*3/mm3    Neutrophil % 52.8 42.7 - 76.0 %    Lymphocyte % 35.2 19.6 - 45.3 %    Monocyte % 9.0 5.0 - 12.0 %    Eosinophil % 2.4 0.3 - 6.2 %    Basophil % 0.3 0.0 - 1.5 %    Immature Grans % 0.3 0.0 - 0.5 %    Neutrophils, Absolute 1.53 (L) 1.70 - 7.00 10*3/mm3    Lymphocytes, Absolute 1.02 0.70 - 3.10 10*3/mm3    Monocytes, Absolute 0.26 0.10 - 0.90 10*3/mm3    Eosinophils, Absolute 0.07 0.00 - 0.40 10*3/mm3    Basophils, Absolute 0.01 0.00 - 0.20 10*3/mm3    Immature Grans, Absolute 0.01 0.00 - 0.05 10*3/mm3     nRBC 0.0 0.0 - 0.2 /100 WBC   Protime-INR   Result Value Ref Range    Protime 12.7 11.2 - 14.3 Seconds    INR 1.00 0.85 - 1.16   Scan Slide   Result Value Ref Range    Crenated RBC's Slight/1+ None Seen    WBC Morphology Normal Normal    Platelet Estimate Decreased Normal    Clumped Platelets Present None Seen   Basic Metabolic Panel   Result Value Ref Range    Glucose 81 65 - 99 mg/dL    BUN 39 (H) 8 - 23 mg/dL    Creatinine 1.41 (H) 0.76 - 1.27 mg/dL    Sodium 133 (L) 136 - 145 mmol/L    Potassium 4.4 3.5 - 5.2 mmol/L    Chloride 102 98 - 107 mmol/L    CO2 20.0 (L) 22.0 - 29.0 mmol/L    Calcium 8.7 8.6 - 10.5 mg/dL    eGFR  African Amer 58 (L) >60 mL/min/1.73    BUN/Creatinine Ratio 27.7 (H) 7.0 - 25.0    Anion Gap 11.0 5.0 - 15.0 mmol/L   CBC Auto Differential   Result Value Ref Range    WBC 3.13 (L) 3.40 - 10.80 10*3/mm3    RBC 4.87 4.14 - 5.80 10*6/mm3    Hemoglobin 13.4 13.0 - 17.7 g/dL    Hematocrit 41.3 37.5 - 51.0 %    MCV 84.8 79.0 - 97.0 fL    MCH 27.5 26.6 - 33.0 pg    MCHC 32.4 31.5 - 35.7 g/dL    RDW 14.0 12.3 - 15.4 %    RDW-SD 43.5 37.0 - 54.0 fl    MPV 11.8 6.0 - 12.0 fL    Platelets 91 (L) 140 - 450 10*3/mm3    Neutrophil % 64.9 42.7 - 76.0 %    Lymphocyte % 24.0 19.6 - 45.3 %    Monocyte % 8.9 5.0 - 12.0 %    Eosinophil % 1.3 0.3 - 6.2 %    Basophil % 0.3 0.0 - 1.5 %    Immature Grans % 0.6 (H) 0.0 - 0.5 %    Neutrophils, Absolute 2.03 1.70 - 7.00 10*3/mm3    Lymphocytes, Absolute 0.75 0.70 - 3.10 10*3/mm3    Monocytes, Absolute 0.28 0.10 - 0.90 10*3/mm3    Eosinophils, Absolute 0.04 0.00 - 0.40 10*3/mm3    Basophils, Absolute 0.01 0.00 - 0.20 10*3/mm3    Immature Grans, Absolute 0.02 0.00 - 0.05 10*3/mm3    nRBC 0.0 0.0 - 0.2 /100 WBC   Cortisol   Result Value Ref Range    Cortisol 9.00   mcg/dL   TSH   Result Value Ref Range    TSH 2.780 0.270 - 4.200 uIU/mL   Troponin   Result Value Ref Range    Troponin T <0.010 0.000 - 0.030 ng/mL   Urinalysis With Culture If Indicated - Urine, Clean Catch    Result Value Ref Range    Color, UA Yellow Yellow, Straw    Appearance, UA Clear Clear    pH, UA <=5.0 5.0 - 8.0    Specific Gravity, UA 1.019 1.001 - 1.030    Glucose, UA Negative Negative    Ketones, UA Negative Negative    Bilirubin, UA Negative Negative    Blood, UA Negative Negative    Protein, UA Negative Negative    Leuk Esterase, UA Negative Negative    Nitrite, UA Negative Negative    Urobilinogen, UA 0.2 E.U./dL 0.2 - 1.0 E.U./dL   Basic Metabolic Panel   Result Value Ref Range    Glucose 120 (H) 65 - 99 mg/dL    BUN 19 8 - 23 mg/dL    Creatinine 0.98 0.76 - 1.27 mg/dL    Sodium 136 136 - 145 mmol/L    Potassium 5.0 3.5 - 5.2 mmol/L    Chloride 104 98 - 107 mmol/L    CO2 21.0 (L) 22.0 - 29.0 mmol/L    Calcium 9.5 8.6 - 10.5 mg/dL    eGFR  African Amer 89 >60 mL/min/1.73    BUN/Creatinine Ratio 19.4 7.0 - 25.0    Anion Gap 11.0 5.0 - 15.0 mmol/L   Magnesium   Result Value Ref Range    Magnesium 2.3 1.6 - 2.4 mg/dL   POC Glucose Once   Result Value Ref Range    Glucose 122 70 - 130 mg/dL   POC Glucose Once   Result Value Ref Range    Glucose 92 70 - 130 mg/dL   POC Glucose Once   Result Value Ref Range    Glucose 110 70 - 130 mg/dL   POC Glucose Once   Result Value Ref Range    Glucose 102 70 - 130 mg/dL   POC Glucose Once   Result Value Ref Range    Glucose 127 70 - 130 mg/dL   POC Glucose Once   Result Value Ref Range    Glucose 124 70 - 130 mg/dL   POC Glucose Once   Result Value Ref Range    Glucose 156 (H) 70 - 130 mg/dL   Adult Transthoracic Echo Complete W/ Cont if Necessary Per Protocol   Result Value Ref Range    BSA 2.0 m^2    IVSd 1.4 cm    LVIDd 4.5 cm    LVIDs 3.0 cm    LVPWd 1.3 cm    IVS/LVPW 1.1     FS 32.2 %    EDV(Teich) 90.2 ml    ESV(Teich) 35.5 ml    EF(Teich) 60.6 %    EDV(cubed) 88.2 ml    ESV(cubed) 27.5 ml    EF(cubed) 68.9 %    LV mass(C)d 228.7 grams    LV mass(C)dI 112.5 grams/m^2    SV(Teich) 54.6 ml    SI(Teich) 26.9 ml/m^2    SV(cubed) 60.8 ml    SI(cubed) 29.9 ml/m^2    Ao  root diam 3.1 cm    Ao root area 7.7 cm^2    LA dimension 4.6 cm    LA/Ao 1.5     LAd major 5.8 cm    LA volume 40.0 ml    Ao root area (BSA corrected) 1.5     LA Volume Index 19.7 ml/m^2    MV E max torey 36.3 cm/sec    MV A max torey 55.8 cm/sec    MV E/A 0.65     MV P1/2t max torey 75.5 cm/sec    MV P1/2t 59.8 msec    MVA(P1/2t) 3.7 cm^2    MV dec slope 369.4 cm/sec^2    MV dec time 0.33 sec    AI max torey 482.0 cm/sec    AI max PG 93.0 mmHg    AI dec slope 273.4 cm/sec^2    AI P1/2t 516.4 msec    PA acc slope 773.0 cm/sec^2    PA acc time 0.12 sec    RV V1 max PG 2.3 mmHg    RV V1 max 76.0 cm/sec    PA pr(Accel) 23.5 mmHg    MVA P1/2T LCG 2.9 cm^2    Lat E/e'  2.7     Med E/e' 7.0     Lat Peak E' Torey 13.2 cm/sec    Med Peak E' Torey 5.0 cm/sec     CV ECHO JAYA - BZI_BMI 27.0 kilograms/m^2     CV ECHO JAYA - BSA(HAYCOCK) 2.1 m^2     CV ECHO JAYA - BZI_METRIC_WEIGHT 85.3 kg     CV ECHO JAYA - BZI_METRIC_HEIGHT 177.8 cm    Avg E/e' ratio 3.99     Target HR (85%) 118 bpm    Max. Pred. HR (100%) 139 bpm    TDI S' 9.43 cm/sec    RV Base 3.60 cm    RV Length 6.70 cm    RV Mid 3.10 cm    TAPSE (>1.6) 1.80 cm2    Echo EF Estimated 60 %   Light Blue Top   Result Value Ref Range    Extra Tube hold for add-on    Green Top (Gel)   Result Value Ref Range    Extra Tube Hold for add-ons.    Lavender Top   Result Value Ref Range    Extra Tube hold for add-on    Gold Top - SST   Result Value Ref Range    Extra Tube Hold for add-ons.        PE    Physical Exam   Constitutional: He appears well-developed and well-nourished. He is active and cooperative. No distress.   HENT:   Head: Normocephalic and atraumatic.   Eyes: EOM are normal.   Neck: Normal range of motion.   Cardiovascular: Normal rate, regular rhythm and normal heart sounds.   Pulmonary/Chest: Effort normal and breath sounds normal.   Musculoskeletal: Normal range of motion. He exhibits no edema.   Neurological: He is alert.   Skin: Skin is warm. He is not diaphoretic. No  erythema.   Psychiatric: He has a normal mood and affect. His speech is normal and behavior is normal. Judgment and thought content normal. He is not actively hallucinating. He is attentive.   Vitals reviewed.      A/P    Narendra was seen today for hypertension.    Diagnoses and all orders for this visit:    Essential hypertension  -     Discontinue: amLODIPine (NORVASC) 5 MG tablet; Take 1 tablet by mouth Daily.  -     amLODIPine (NORVASC) 5 MG tablet; Take 1 tablet by mouth Daily.  -     Discontinue: Blood Pressure Monitoring (BLOOD PRESSURE MONITOR/L CUFF) misc; 1 Units Daily.  -     Blood Pressure Monitoring (BLOOD PRESSURE MONITOR/L CUFF) misc; 1 Units Daily.  Patient has not been taking correct prescriptions.  He is taking irbesartan 75 mg at night.  He was supposed to only be taking amlodipine 2.5 mg nightly.  Discontinue irbesartan and start on amlodipine 5 mg nightly.  Monitor at home and call if blood pressure is greater than 140/90.    Gastroesophageal reflux disease, esophagitis presence not specified  -     famotidine (PEPCID) 20 MG tablet; Take 1 tablet by mouth At Night As Needed for Heartburn.  Start pepcid prn nightly for reflux that occurs infrequently.    Bilateral hearing loss, unspecified hearing loss type  Pending hearing aids.             Plan of care reviewed with patient at the conclusion of today's visit. Education was provided regarding diagnosis, management and any prescribed or recommended OTC medications.  Patient verbalizes understanding of and agreement with management plan.    No follow-ups on file.     Marguerite Moore PA-C

## 2019-12-12 ENCOUNTER — READMISSION MANAGEMENT (OUTPATIENT)
Dept: CALL CENTER | Facility: HOSPITAL | Age: 79
End: 2019-12-12

## 2019-12-12 NOTE — OUTREACH NOTE
Medical Week 4 Survey      Responses   Facility patient discharged from?  [facility]   Does the patient have one of the following disease processes/diagnoses(primary or secondary)?  Other   Week 4 attempt successful?  Yes   Call start time  [phone]   Call end time  [phone]   Discharge diagnosis  Dizziness,  Syncope,  Leukopenia,  hypotension   Is patient permission given to speak with other caregiver?  Yes   List who call center can speak with  Jessica   Person spoke with today (if not patient) and relationship  Jessica-wife   Meds reviewed with patient/caregiver?  Yes   Is the patient taking all medications as directed (includes completed medication regime)?  Yes   Has the patient kept scheduled appointments due by today?  Yes   Comments  Went to PCP Monday   Is the patient still receiving Home Health Services?  No   What is the patient's perception of their health status since discharge?  Returned to baseline/stable   Additional teach back comments  Wife states pt doing well, he is getting out and about   Week 4 Call Completed?  Yes   Would the patient like one additional call?  No   Graduated  Yes   Did the patient feel the follow up calls were helpful during their recovery period?  Yes   Was the number of calls appropriate?  Yes          Erna Fitch RN

## 2020-01-06 ENCOUNTER — OFFICE VISIT (OUTPATIENT)
Dept: FAMILY MEDICINE CLINIC | Facility: CLINIC | Age: 80
End: 2020-01-06

## 2020-01-06 VITALS
WEIGHT: 203 LBS | HEIGHT: 60 IN | DIASTOLIC BLOOD PRESSURE: 80 MMHG | BODY MASS INDEX: 39.85 KG/M2 | OXYGEN SATURATION: 98 % | SYSTOLIC BLOOD PRESSURE: 122 MMHG | HEART RATE: 61 BPM

## 2020-01-06 DIAGNOSIS — E11.65 TYPE 2 DIABETES MELLITUS WITH HYPERGLYCEMIA, WITHOUT LONG-TERM CURRENT USE OF INSULIN (HCC): ICD-10-CM

## 2020-01-06 DIAGNOSIS — H91.93 BILATERAL HEARING LOSS, UNSPECIFIED HEARING LOSS TYPE: ICD-10-CM

## 2020-01-06 DIAGNOSIS — I49.9 CARDIAC ARRHYTHMIA, UNSPECIFIED CARDIAC ARRHYTHMIA TYPE: ICD-10-CM

## 2020-01-06 DIAGNOSIS — I44.30 AV HEART BLOCK: ICD-10-CM

## 2020-01-06 DIAGNOSIS — I10 ESSENTIAL HYPERTENSION: Primary | Chronic | ICD-10-CM

## 2020-01-06 DIAGNOSIS — R56.9 SEIZURE (HCC): ICD-10-CM

## 2020-01-06 DIAGNOSIS — E66.01 MORBIDLY OBESE (HCC): ICD-10-CM

## 2020-01-06 PROCEDURE — 99214 OFFICE O/P EST MOD 30 MIN: CPT | Performed by: PHYSICIAN ASSISTANT

## 2020-01-06 PROCEDURE — 93000 ELECTROCARDIOGRAM COMPLETE: CPT | Performed by: PHYSICIAN ASSISTANT

## 2020-01-06 NOTE — PROGRESS NOTES
Chief Complaint   Patient presents with   • Hypertension       HPI     Narendra Cochran is a pleasant 79 y.o. male who is here for routine follow-up of hearing loss and hypertension.  Patient is doing well overall.  He needs new referral to audiologist for hearing aids as his insurance has changed.  His blood pressure is stable and well-controlled on repeat today at 138/67.  Monitoring at home and readings have all been under 140/90.  He denies headache or dizziness.  He is currently only taking amlodipine 5 mg nightly.  He denies chest pain or leg swelling.  Does have exertional dyspnea at times, this is baseline and nothing worse.  Recently hospitalized and underwent echo which was relatively unremarkable, mild diastolic dysfunction.      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   • Paget disease of bone        Past Surgical History:   Procedure Laterality Date   • APPENDECTOMY     • COLON RESECTION      second to diverticulitis    • FOOT SURGERY Left        Family History   Problem Relation Age of Onset   • Diabetes Sister        Social History     Socioeconomic History   • Marital status:      Spouse name: Not on file   • Number of children: Not on file   • Years of education: Not on file   • Highest education level: Not on file   Tobacco Use   • Smoking status: Current Every Day Smoker     Packs/day: 0.25     Years: 65.00     Pack years: 16.25     Types: Cigars, Cigarettes   • Smokeless tobacco: Never Used   Substance and Sexual Activity   • Alcohol use: No     Comment: rarely   • Drug use: No   • Sexual activity: Defer       No Known Allergies    ROS    Review of Systems   Constitutional: Positive for fatigue. Negative for chills, diaphoresis and fever.   HENT: Positive for hearing loss. Negative for congestion, ear  "pain, postnasal drip and rhinorrhea.    Respiratory: Positive for shortness of breath (exertional, baseline). Negative for cough and wheezing.    Cardiovascular: Negative for chest pain, palpitations and leg swelling.   Musculoskeletal: Positive for arthralgias.   Neurological: Negative for dizziness and headache.       Vitals:    01/06/20 1414   BP: 122/80   Pulse: 61   SpO2: 98%   Weight: 92.1 kg (203 lb)   Height: 70.9 cm (27.91\")     Body mass index is 183.18 kg/m².    Current Outpatient Medications on File Prior to Visit   Medication Sig Dispense Refill   • ACCU-CHEK FASTCLIX LANCETS misc Use daily. 102 each 0   • amLODIPine (NORVASC) 5 MG tablet Take 1 tablet by mouth Daily. 90 tablet 1   • aspirin 81 MG chewable tablet Chew 1 tablet Daily.     • Blood Glucose Monitoring Suppl (ACCU-CHEK GUIDE) w/Device kit 1 each Daily. 1 kit 0   • Blood Pressure Monitoring (BLOOD PRESSURE MONITOR/L CUFF) misc 1 Units Daily. 1 each 0   • diclofenac (VOLTAREN) 1 % gel gel Apply 4 g topically to the appropriate area as directed 4 (Four) Times a Day As Needed (prn for pain). 60 tube 0   • famotidine (PEPCID) 20 MG tablet Take 1 tablet by mouth At Night As Needed for Heartburn. 90 tablet 1   • glucose blood test strip Use as instructed to test blood sugar daily 100 each 5   • levETIRAcetam (KEPPRA) 250 MG tablet Take 1 tablet by mouth 2 (Two) Times a Day. 180 tablet 1   • metFORMIN (GLUCOPHAGE) 1000 MG tablet Take 1 tablet by mouth Daily With Breakfast. 90 tablet 3   • rosuvastatin (CRESTOR) 20 MG tablet Take 1 tablet by mouth Daily. 90 tablet 1   • umeclidinium-vilanterol (ANORO ELLIPTA) 62.5-25 MCG/INH aerosol powder  inhaler Inhale 1 puff Daily. 60 each 5     No current facility-administered medications on file prior to visit.        Results for orders placed or performed during the hospital encounter of 11/12/19   Comprehensive Metabolic Panel   Result Value Ref Range    Glucose 123 (H) 65 - 99 mg/dL    BUN 41 (H) 8 - 23 " mg/dL    Creatinine 1.78 (H) 0.76 - 1.27 mg/dL    Sodium 133 (L) 136 - 145 mmol/L    Potassium 5.3 (H) 3.5 - 5.2 mmol/L    Chloride 98 98 - 107 mmol/L    CO2 20.0 (L) 22.0 - 29.0 mmol/L    Calcium 9.7 8.6 - 10.5 mg/dL    Total Protein 8.1 6.0 - 8.5 g/dL    Albumin 4.20 3.50 - 5.20 g/dL    ALT (SGPT) 7 1 - 41 U/L    AST (SGOT) 16 1 - 40 U/L    Alkaline Phosphatase 88 39 - 117 U/L    Total Bilirubin 0.6 0.2 - 1.2 mg/dL    eGFR  African Amer 45 (L) >60 mL/min/1.73    Globulin 3.9 gm/dL    A/G Ratio 1.1 g/dL    BUN/Creatinine Ratio 23.0 7.0 - 25.0    Anion Gap 15.0 5.0 - 15.0 mmol/L   Troponin   Result Value Ref Range    Troponin T <0.010 0.000 - 0.030 ng/mL   Magnesium   Result Value Ref Range    Magnesium 2.5 (H) 1.6 - 2.4 mg/dL   CBC Auto Differential   Result Value Ref Range    WBC 2.90 (L) 3.40 - 10.80 10*3/mm3    RBC 5.71 4.14 - 5.80 10*6/mm3    Hemoglobin 15.3 13.0 - 17.7 g/dL    Hematocrit 47.9 37.5 - 51.0 %    MCV 83.9 79.0 - 97.0 fL    MCH 26.8 26.6 - 33.0 pg    MCHC 31.9 31.5 - 35.7 g/dL    RDW 14.0 12.3 - 15.4 %    RDW-SD 43.1 37.0 - 54.0 fl    MPV 11.3 6.0 - 12.0 fL    Platelets 108 (L) 140 - 450 10*3/mm3    Neutrophil % 52.8 42.7 - 76.0 %    Lymphocyte % 35.2 19.6 - 45.3 %    Monocyte % 9.0 5.0 - 12.0 %    Eosinophil % 2.4 0.3 - 6.2 %    Basophil % 0.3 0.0 - 1.5 %    Immature Grans % 0.3 0.0 - 0.5 %    Neutrophils, Absolute 1.53 (L) 1.70 - 7.00 10*3/mm3    Lymphocytes, Absolute 1.02 0.70 - 3.10 10*3/mm3    Monocytes, Absolute 0.26 0.10 - 0.90 10*3/mm3    Eosinophils, Absolute 0.07 0.00 - 0.40 10*3/mm3    Basophils, Absolute 0.01 0.00 - 0.20 10*3/mm3    Immature Grans, Absolute 0.01 0.00 - 0.05 10*3/mm3    nRBC 0.0 0.0 - 0.2 /100 WBC   Protime-INR   Result Value Ref Range    Protime 12.7 11.2 - 14.3 Seconds    INR 1.00 0.85 - 1.16   Scan Slide   Result Value Ref Range    Crenated RBC's Slight/1+ None Seen    WBC Morphology Normal Normal    Platelet Estimate Decreased Normal    Clumped Platelets Present None  Seen   Basic Metabolic Panel   Result Value Ref Range    Glucose 81 65 - 99 mg/dL    BUN 39 (H) 8 - 23 mg/dL    Creatinine 1.41 (H) 0.76 - 1.27 mg/dL    Sodium 133 (L) 136 - 145 mmol/L    Potassium 4.4 3.5 - 5.2 mmol/L    Chloride 102 98 - 107 mmol/L    CO2 20.0 (L) 22.0 - 29.0 mmol/L    Calcium 8.7 8.6 - 10.5 mg/dL    eGFR  African Amer 58 (L) >60 mL/min/1.73    BUN/Creatinine Ratio 27.7 (H) 7.0 - 25.0    Anion Gap 11.0 5.0 - 15.0 mmol/L   CBC Auto Differential   Result Value Ref Range    WBC 3.13 (L) 3.40 - 10.80 10*3/mm3    RBC 4.87 4.14 - 5.80 10*6/mm3    Hemoglobin 13.4 13.0 - 17.7 g/dL    Hematocrit 41.3 37.5 - 51.0 %    MCV 84.8 79.0 - 97.0 fL    MCH 27.5 26.6 - 33.0 pg    MCHC 32.4 31.5 - 35.7 g/dL    RDW 14.0 12.3 - 15.4 %    RDW-SD 43.5 37.0 - 54.0 fl    MPV 11.8 6.0 - 12.0 fL    Platelets 91 (L) 140 - 450 10*3/mm3    Neutrophil % 64.9 42.7 - 76.0 %    Lymphocyte % 24.0 19.6 - 45.3 %    Monocyte % 8.9 5.0 - 12.0 %    Eosinophil % 1.3 0.3 - 6.2 %    Basophil % 0.3 0.0 - 1.5 %    Immature Grans % 0.6 (H) 0.0 - 0.5 %    Neutrophils, Absolute 2.03 1.70 - 7.00 10*3/mm3    Lymphocytes, Absolute 0.75 0.70 - 3.10 10*3/mm3    Monocytes, Absolute 0.28 0.10 - 0.90 10*3/mm3    Eosinophils, Absolute 0.04 0.00 - 0.40 10*3/mm3    Basophils, Absolute 0.01 0.00 - 0.20 10*3/mm3    Immature Grans, Absolute 0.02 0.00 - 0.05 10*3/mm3    nRBC 0.0 0.0 - 0.2 /100 WBC   Cortisol   Result Value Ref Range    Cortisol 9.00   mcg/dL   TSH   Result Value Ref Range    TSH 2.780 0.270 - 4.200 uIU/mL   Troponin   Result Value Ref Range    Troponin T <0.010 0.000 - 0.030 ng/mL   Urinalysis With Culture If Indicated - Urine, Clean Catch   Result Value Ref Range    Color, UA Yellow Yellow, Straw    Appearance, UA Clear Clear    pH, UA <=5.0 5.0 - 8.0    Specific Gravity, UA 1.019 1.001 - 1.030    Glucose, UA Negative Negative    Ketones, UA Negative Negative    Bilirubin, UA Negative Negative    Blood, UA Negative Negative    Protein, UA  Negative Negative    Leuk Esterase, UA Negative Negative    Nitrite, UA Negative Negative    Urobilinogen, UA 0.2 E.U./dL 0.2 - 1.0 E.U./dL   Basic Metabolic Panel   Result Value Ref Range    Glucose 120 (H) 65 - 99 mg/dL    BUN 19 8 - 23 mg/dL    Creatinine 0.98 0.76 - 1.27 mg/dL    Sodium 136 136 - 145 mmol/L    Potassium 5.0 3.5 - 5.2 mmol/L    Chloride 104 98 - 107 mmol/L    CO2 21.0 (L) 22.0 - 29.0 mmol/L    Calcium 9.5 8.6 - 10.5 mg/dL    eGFR  African Amer 89 >60 mL/min/1.73    BUN/Creatinine Ratio 19.4 7.0 - 25.0    Anion Gap 11.0 5.0 - 15.0 mmol/L   Magnesium   Result Value Ref Range    Magnesium 2.3 1.6 - 2.4 mg/dL   POC Glucose Once   Result Value Ref Range    Glucose 122 70 - 130 mg/dL   POC Glucose Once   Result Value Ref Range    Glucose 92 70 - 130 mg/dL   POC Glucose Once   Result Value Ref Range    Glucose 110 70 - 130 mg/dL   POC Glucose Once   Result Value Ref Range    Glucose 102 70 - 130 mg/dL   POC Glucose Once   Result Value Ref Range    Glucose 127 70 - 130 mg/dL   POC Glucose Once   Result Value Ref Range    Glucose 124 70 - 130 mg/dL   POC Glucose Once   Result Value Ref Range    Glucose 156 (H) 70 - 130 mg/dL   Adult Transthoracic Echo Complete W/ Cont if Necessary Per Protocol   Result Value Ref Range    BSA 2.0 m^2    IVSd 1.4 cm    LVIDd 4.5 cm    LVIDs 3.0 cm    LVPWd 1.3 cm    IVS/LVPW 1.1     FS 32.2 %    EDV(Teich) 90.2 ml    ESV(Teich) 35.5 ml    EF(Teich) 60.6 %    EDV(cubed) 88.2 ml    ESV(cubed) 27.5 ml    EF(cubed) 68.9 %    LV mass(C)d 228.7 grams    LV mass(C)dI 112.5 grams/m^2    SV(Teich) 54.6 ml    SI(Teich) 26.9 ml/m^2    SV(cubed) 60.8 ml    SI(cubed) 29.9 ml/m^2    Ao root diam 3.1 cm    Ao root area 7.7 cm^2    LA dimension 4.6 cm    LA/Ao 1.5     LAd major 5.8 cm    LA volume 40.0 ml    Ao root area (BSA corrected) 1.5     LA Volume Index 19.7 ml/m^2    MV E max jing 36.3 cm/sec    MV A max jing 55.8 cm/sec    MV E/A 0.65     MV P1/2t max jing 75.5 cm/sec    MV P1/2t  59.8 msec    MVA(P1/2t) 3.7 cm^2    MV dec slope 369.4 cm/sec^2    MV dec time 0.33 sec    AI max torey 482.0 cm/sec    AI max PG 93.0 mmHg    AI dec slope 273.4 cm/sec^2    AI P1/2t 516.4 msec    PA acc slope 773.0 cm/sec^2    PA acc time 0.12 sec    RV V1 max PG 2.3 mmHg    RV V1 max 76.0 cm/sec    PA pr(Accel) 23.5 mmHg    MVA P1/2T LCG 2.9 cm^2    Lat E/e'  2.7     Med E/e' 7.0     Lat Peak E' Torey 13.2 cm/sec    Med Peak E' Torey 5.0 cm/sec     CV ECHO JAYA - BZI_BMI 27.0 kilograms/m^2     CV ECHO JAYA - BSA(HAYCOCK) 2.1 m^2     CV ECHO JAYA - BZI_METRIC_WEIGHT 85.3 kg     CV ECHO JAYA - BZI_METRIC_HEIGHT 177.8 cm    Avg E/e' ratio 3.99     Target HR (85%) 118 bpm    Max. Pred. HR (100%) 139 bpm    TDI S' 9.43 cm/sec    RV Base 3.60 cm    RV Length 6.70 cm    RV Mid 3.10 cm    TAPSE (>1.6) 1.80 cm2    Echo EF Estimated 60 %   Light Blue Top   Result Value Ref Range    Extra Tube hold for add-on    Green Top (Gel)   Result Value Ref Range    Extra Tube Hold for add-ons.    Lavender Top   Result Value Ref Range    Extra Tube hold for add-on    Gold Top - SST   Result Value Ref Range    Extra Tube Hold for add-ons.          PE    Physical Exam   Constitutional: He appears well-developed and well-nourished. He is active and cooperative. No distress.   HENT:   Head: Normocephalic and atraumatic.   Eyes: EOM are normal.   Neck: Normal range of motion.   Cardiovascular: An irregular rhythm present. Bradycardia present. Exam reveals distant heart sounds.   Pulmonary/Chest: Effort normal and breath sounds normal.   Musculoskeletal: Normal range of motion. He exhibits no edema.   Neurological: He is alert.   Skin: Skin is warm. He is not diaphoretic. No erythema.   Psychiatric: He has a normal mood and affect. His speech is normal and behavior is normal. Judgment and thought content normal. He is not actively hallucinating. He is attentive.   Vitals reviewed.      ECG 12 Lead  Date/Time: 1/6/2020 5:28 PM  Performed  by: Marguerite Moore PA-C  Authorized by: Marguerite Moore PA-C   Comparison: compared with previous ECG from 11/13/2019  Comparison to previous ECG: Bradycardia with possible AV heart block and frequent PVCs  Rate: bradycardic  Conduction: 1st degree AV block    Clinical impression: abnormal EKG          A/P    Narendra was seen today for hypertension.    Diagnoses and all orders for this visit:    Essential hypertension  -     Ambulatory Referral to Cardiology  Stable today on exam.  Patient is only taking amlodipine 5 mg nightly.  He monitors blood pressure at home and it has not been evaluated greater than 140/90 at any time.  Denies headaches or dizziness.    Cardiac arrhythmia, unspecified cardiac arrhythmia type  Abnormal rhythm appreciated on exam, evaluated with ECG given possibility for arrhythmia or atrial fibrillation.    AV heart block  -     Ambulatory Referral to Cardiology  Given abnormality of AV heart block with bradycardia and frequent PVCs, will refer to cardiology for further evaluation and management.  Recent echocardiogram showed mild diastolic dysfunction and normal EF.    Bilateral hearing loss, unspecified hearing loss type  -     Ambulatory Referral to Audiology  New insurance, needs new referral for audiology and hearing aids.    Morbidly obese (CMS/[facility])  Aware he needs to watch his weight and work on diet.  Walks a lot during the day.    Seizure (CMS/HCC)  On keppra 250 mg BID.  No further issues with seizures.    Type 2 diabetes mellitus with hyperglycemia, without long-term current use of insulin (CMS/[facility])  Recent hemoglobin AIC was 6.2%.  Compliant on metformin.  Repeat hemoglobin AIC at next appointment.  Consider starting low dose lisinopril.    Other orders  -     ECG 12 Lead         Plan of care reviewed with patient at the conclusion of today's visit. Education was provided regarding diagnosis, management and any prescribed or recommended OTC medications.  Patient  verbalizes understanding of and agreement with management plan.    Return in about 3 months (around 4/6/2020) for Medicare Wellness.     NICHOLAS TruongC

## 2020-01-09 ENCOUNTER — TELEPHONE (OUTPATIENT)
Dept: CASE MANAGEMENT | Facility: OTHER | Age: 80
End: 2020-01-09

## 2020-01-09 NOTE — TELEPHONE ENCOUNTER
"MAJO spoke to pt's wife Jessica on 12.12.19. Jessica stated they have spoken to [employer] [facility] [employer] and was told that there is a program where the pt could apply for hearing aids and only pay $200. Jessica stated she has \"started the paperwork\" and she would finish it and mail it in to the company.  Then they would begin processing the paperwork and would schedule him for an appt to have him fitted for hearing aids.     MAJO asked Jessica if she needed anything further and she stated, \"no not at this time\".      On 01.09.2019, MAJO contacted Radha, referral coordinator and she stated she scheduled pt for an appt with  Audiology on Jan 30. Pt is aware of this appt.    At this time, MAJO will close case as pt's needs have been addressed.   "

## 2020-02-04 ENCOUNTER — CONSULT (OUTPATIENT)
Dept: CARDIOLOGY | Facility: CLINIC | Age: 80
End: 2020-02-04

## 2020-02-04 VITALS
WEIGHT: 207 LBS | DIASTOLIC BLOOD PRESSURE: 82 MMHG | SYSTOLIC BLOOD PRESSURE: 150 MMHG | BODY MASS INDEX: 28.04 KG/M2 | OXYGEN SATURATION: 98 % | HEART RATE: 53 BPM | HEIGHT: 72 IN

## 2020-02-04 DIAGNOSIS — I49.1 PREMATURE ATRIAL CONTRACTIONS: ICD-10-CM

## 2020-02-04 DIAGNOSIS — R07.89 CHEST PAIN, ATYPICAL: Primary | ICD-10-CM

## 2020-02-04 PROCEDURE — 93000 ELECTROCARDIOGRAM COMPLETE: CPT | Performed by: INTERNAL MEDICINE

## 2020-02-04 PROCEDURE — 99204 OFFICE O/P NEW MOD 45 MIN: CPT | Performed by: INTERNAL MEDICINE

## 2020-02-19 ENCOUNTER — TELEPHONE (OUTPATIENT)
Dept: CARDIOLOGY | Facility: CLINIC | Age: 80
End: 2020-02-19

## 2020-02-19 NOTE — TELEPHONE ENCOUNTER
----- Message from Javad Mercedes MD sent at 2/19/2020  8:22 AM EST -----  This patient was having some palpitation symptoms.  His monitor does show a fair amount of PVCs which are extra beats from the bottom chamber of his heart.  I would like to start him on a low-dose of metoprolol 12.5 mg twice a day please.  Thank you

## 2020-02-19 NOTE — TELEPHONE ENCOUNTER
Called patient to let them know results and recommendations per NSK (see NSK note).    Patient agrees to plan and verbalized understanding.

## 2020-02-28 DIAGNOSIS — I10 ESSENTIAL HYPERTENSION: Chronic | ICD-10-CM

## 2020-02-28 DIAGNOSIS — J44.9 CHRONIC OBSTRUCTIVE PULMONARY DISEASE, UNSPECIFIED COPD TYPE (HCC): ICD-10-CM

## 2020-02-28 RX ORDER — AMLODIPINE BESYLATE 5 MG/1
5 TABLET ORAL DAILY
Qty: 90 TABLET | Refills: 1 | Status: SHIPPED | OUTPATIENT
Start: 2020-02-28 | End: 2020-03-12 | Stop reason: SDUPTHER

## 2020-02-28 RX ORDER — LEVETIRACETAM 250 MG/1
250 TABLET ORAL 2 TIMES DAILY
Qty: 180 TABLET | Refills: 1 | Status: SHIPPED | OUTPATIENT
Start: 2020-02-28 | End: 2020-03-13 | Stop reason: SDUPTHER

## 2020-02-28 NOTE — TELEPHONE ENCOUNTER
Pt wife called and stated that because they changed insurances at the first of the year they need all prescriptions switched to the [facility] on [facility]. They need refill of the following  amLODIPine (NORVASC) 5 MG tablet and levETIRAcetam (KEPPRA) 250 MG tablet

## 2020-03-04 ENCOUNTER — APPOINTMENT (OUTPATIENT)
Dept: CARDIOLOGY | Facility: HOSPITAL | Age: 80
End: 2020-03-04

## 2020-03-04 ENCOUNTER — HOSPITAL ENCOUNTER (OUTPATIENT)
Dept: CARDIOLOGY | Facility: HOSPITAL | Age: 80
End: 2020-03-04

## 2020-03-12 ENCOUNTER — HOSPITAL ENCOUNTER (OUTPATIENT)
Dept: CARDIOLOGY | Facility: HOSPITAL | Age: 80
Discharge: HOME | End: 2020-03-12

## 2020-03-12 ENCOUNTER — HOSPITAL ENCOUNTER (OUTPATIENT)
Dept: CARDIOLOGY | Facility: HOSPITAL | Age: 80
Discharge: HOME | End: 2020-03-12
Admitting: INTERNAL MEDICINE

## 2020-03-12 DIAGNOSIS — E78.5 HYPERLIPIDEMIA, UNSPECIFIED HYPERLIPIDEMIA TYPE: Chronic | ICD-10-CM

## 2020-03-12 DIAGNOSIS — I10 ESSENTIAL HYPERTENSION: Chronic | ICD-10-CM

## 2020-03-12 DIAGNOSIS — R07.89 CHEST PAIN, ATYPICAL: ICD-10-CM

## 2020-03-12 PROCEDURE — 93017 CV STRESS TEST TRACING ONLY: CPT

## 2020-03-12 PROCEDURE — A9500 TC99M SESTAMIBI: HCPCS | Performed by: INTERNAL MEDICINE

## 2020-03-12 PROCEDURE — 25010000002 REGADENOSON 0.4 MG/5ML SOLUTION: Performed by: INTERNAL MEDICINE

## 2020-03-12 PROCEDURE — 0 TECHNETIUM SESTAMIBI: Performed by: INTERNAL MEDICINE

## 2020-03-12 PROCEDURE — 78452 HT MUSCLE IMAGE SPECT MULT: CPT | Performed by: INTERNAL MEDICINE

## 2020-03-12 PROCEDURE — 78452 HT MUSCLE IMAGE SPECT MULT: CPT

## 2020-03-12 PROCEDURE — 93018 CV STRESS TEST I&R ONLY: CPT | Performed by: INTERNAL MEDICINE

## 2020-03-12 RX ORDER — ROSUVASTATIN CALCIUM 20 MG/1
20 TABLET, COATED ORAL DAILY
Qty: 90 TABLET | Refills: 1 | Status: SHIPPED | OUTPATIENT
Start: 2020-03-12 | End: 2020-09-17

## 2020-03-12 RX ORDER — AMLODIPINE BESYLATE 5 MG/1
5 TABLET ORAL DAILY
Qty: 90 TABLET | Refills: 1 | Status: SHIPPED | OUTPATIENT
Start: 2020-03-12 | End: 2020-04-28

## 2020-03-12 RX ADMIN — TECHNETIUM TC 99M SESTAMIBI 1 DOSE: 1 INJECTION INTRAVENOUS at 13:25

## 2020-03-12 RX ADMIN — REGADENOSON 0.4 MG: 0.08 INJECTION, SOLUTION INTRAVENOUS at 14:46

## 2020-03-12 RX ADMIN — TECHNETIUM TC 99M SESTAMIBI 1 DOSE: 1 INJECTION INTRAVENOUS at 14:45

## 2020-03-12 NOTE — TELEPHONE ENCOUNTER
PT NEEDING REFILLS ON  AMLODIPINE 5 MG  ROSUVASTATIN 20 MG    [facility]    PT IS TOTALLY OUT

## 2020-03-13 ENCOUNTER — TELEPHONE (OUTPATIENT)
Dept: CARDIOLOGY | Facility: CLINIC | Age: 80
End: 2020-03-13

## 2020-03-13 ENCOUNTER — TELEPHONE (OUTPATIENT)
Dept: FAMILY MEDICINE CLINIC | Facility: CLINIC | Age: 80
End: 2020-03-13

## 2020-03-13 DIAGNOSIS — J44.9 CHRONIC OBSTRUCTIVE PULMONARY DISEASE, UNSPECIFIED COPD TYPE (HCC): ICD-10-CM

## 2020-03-13 DIAGNOSIS — R94.39 ABNORMAL STRESS TEST: Primary | ICD-10-CM

## 2020-03-13 LAB
BH CV STRESS BP STAGE 2: NORMAL
BH CV STRESS BP STAGE 3: NORMAL
BH CV STRESS COMMENTS STAGE 1: NORMAL
BH CV STRESS DOSE REGADENOSON STAGE 1: 0.4
BH CV STRESS DURATION MIN STAGE 1: 1
BH CV STRESS DURATION MIN STAGE 2: 1
BH CV STRESS DURATION MIN STAGE 3: 1
BH CV STRESS DURATION MIN STAGE 4: 1
BH CV STRESS HR STAGE 1: 51
BH CV STRESS HR STAGE 2: 67
BH CV STRESS HR STAGE 3: 70
BH CV STRESS HR STAGE 4: 62
BH CV STRESS O2 STAGE 1: 96
BH CV STRESS O2 STAGE 3: 98
BH CV STRESS PROTOCOL 1: NORMAL
BH CV STRESS RECOVERY BP: NORMAL MMHG
BH CV STRESS RECOVERY HR: 61 BPM
BH CV STRESS STAGE 1: 1
BH CV STRESS STAGE 2: 2
BH CV STRESS STAGE 3: 3
BH CV STRESS STAGE 4: 4
LV EF NUC BP: 31 %
MAXIMAL PREDICTED HEART RATE: 139 BPM
PERCENT MAX PREDICTED HR: 51.08 %
STRESS BASELINE BP: NORMAL MMHG
STRESS BASELINE HR: 52 BPM
STRESS O2 SAT REST: 96 %
STRESS PERCENT HR: 60 %
STRESS POST ESTIMATED WORKLOAD: 1 METS
STRESS POST EXERCISE DUR MIN: 4 MIN
STRESS POST EXERCISE DUR SEC: 0 SEC
STRESS POST O2 SAT PEAK: 98 %
STRESS POST PEAK BP: NORMAL MMHG
STRESS POST PEAK HR: 71 BPM
STRESS TARGET HR: 118 BPM

## 2020-03-13 RX ORDER — LEVETIRACETAM 250 MG/1
250 TABLET ORAL 2 TIMES DAILY
Qty: 180 TABLET | Refills: 1 | Status: SHIPPED | OUTPATIENT
Start: 2020-03-13 | End: 2021-03-01

## 2020-03-13 NOTE — TELEPHONE ENCOUNTER
----- Message from Javad Mercedes MD sent at 3/13/2020  8:49 AM EDT -----  Mr. Cochran had a stress test yesterday.  I tried calling both home and mobile numbers to discuss findings and voicemail not set up.  Would you mind trying to call him this afternoon and see if he picks up?  If he does  I thought his stress test showed an abnormally reduced pumping function of his heart and I think a left heart catheterization to further evaluate this is warranted if he is agreeable.  We have preliminarily discussed that in the office when first met

## 2020-03-13 NOTE — TELEPHONE ENCOUNTER
Called patient to let them know results and recommendations per NSK (see NSK note).    Patient agrees to the [facility].

## 2020-03-16 PROBLEM — R94.39 ABNORMAL STRESS TEST: Status: ACTIVE | Noted: 2020-03-16

## 2020-03-17 ENCOUNTER — PREP FOR SURGERY (OUTPATIENT)
Dept: OTHER | Facility: HOSPITAL | Age: 80
End: 2020-03-17

## 2020-03-17 DIAGNOSIS — R94.39 ABNORMAL STRESS TEST: Primary | ICD-10-CM

## 2020-03-17 RX ORDER — SODIUM CHLORIDE 0.9 % (FLUSH) 0.9 %
10 SYRINGE (ML) INJECTION AS NEEDED
Status: CANCELLED | OUTPATIENT
Start: 2020-03-17

## 2020-03-17 RX ORDER — ASPIRIN 325 MG
325 TABLET ORAL ONCE
Status: CANCELLED | OUTPATIENT
Start: 2020-03-17 | End: 2020-03-17

## 2020-03-17 RX ORDER — ASPIRIN 325 MG
325 TABLET, DELAYED RELEASE (ENTERIC COATED) ORAL DAILY
Status: CANCELLED | OUTPATIENT
Start: 2020-03-18

## 2020-03-17 RX ORDER — NITROGLYCERIN 0.4 MG/1
0.4 TABLET SUBLINGUAL
Status: CANCELLED | OUTPATIENT
Start: 2020-03-17

## 2020-03-17 RX ORDER — ONDANSETRON 2 MG/ML
4 INJECTION INTRAMUSCULAR; INTRAVENOUS EVERY 8 HOURS PRN
Status: CANCELLED | OUTPATIENT
Start: 2020-03-17

## 2020-03-17 RX ORDER — SODIUM CHLORIDE 0.9 % (FLUSH) 0.9 %
3 SYRINGE (ML) INJECTION EVERY 12 HOURS SCHEDULED
Status: CANCELLED | OUTPATIENT
Start: 2020-03-17

## 2020-03-18 ENCOUNTER — HOSPITAL ENCOUNTER (OUTPATIENT)
Facility: HOSPITAL | Age: 80
Setting detail: HOSPITAL OUTPATIENT SURGERY
Discharge: HOME | End: 2020-03-18
Attending: INTERNAL MEDICINE | Admitting: INTERNAL MEDICINE

## 2020-03-18 VITALS
RESPIRATION RATE: 18 BRPM | OXYGEN SATURATION: 99 % | WEIGHT: 204.9 LBS | HEIGHT: 70 IN | DIASTOLIC BLOOD PRESSURE: 62 MMHG | BODY MASS INDEX: 29.33 KG/M2 | SYSTOLIC BLOOD PRESSURE: 140 MMHG | HEART RATE: 50 BPM | TEMPERATURE: 97.5 F

## 2020-03-18 DIAGNOSIS — R94.39 ABNORMAL STRESS TEST: ICD-10-CM

## 2020-03-18 PROBLEM — R07.89 OTHER CHEST PAIN: Status: RESOLVED | Noted: 2020-03-18 | Resolved: 2020-03-18

## 2020-03-18 PROBLEM — R10.84 GENERALIZED ABDOMINAL PAIN: Status: RESOLVED | Noted: 2017-05-28 | Resolved: 2020-03-18

## 2020-03-18 PROBLEM — E66.3 OVERWEIGHT (BMI 25.0-29.9): Status: RESOLVED | Noted: 2019-11-04 | Resolved: 2020-03-18

## 2020-03-18 PROBLEM — I47.29 NSVT (NONSUSTAINED VENTRICULAR TACHYCARDIA): Status: ACTIVE | Noted: 2020-03-18

## 2020-03-18 PROBLEM — I49.8 OTHER SPECIFIED CARDIAC ARRHYTHMIAS: Status: ACTIVE | Noted: 2020-03-18

## 2020-03-18 PROBLEM — E87.20 LACTIC ACIDOSIS: Status: RESOLVED | Noted: 2019-01-26 | Resolved: 2020-03-18

## 2020-03-18 PROBLEM — I42.8 NICM (NONISCHEMIC CARDIOMYOPATHY): Status: ACTIVE | Noted: 2020-03-18

## 2020-03-18 PROBLEM — R07.89 OTHER CHEST PAIN: Status: ACTIVE | Noted: 2020-03-18

## 2020-03-18 PROBLEM — I25.10 CAD (CORONARY ARTERY DISEASE): Status: ACTIVE | Noted: 2020-03-18

## 2020-03-18 LAB
ALBUMIN SERPL-MCNC: 3.9 G/DL (ref 3.5–5.2)
ALBUMIN/GLOB SERPL: 1.3 G/DL
ALP SERPL-CCNC: 64 U/L (ref 39–117)
ALT SERPL W P-5'-P-CCNC: 7 U/L (ref 1–41)
ANION GAP SERPL CALCULATED.3IONS-SCNC: 9 MMOL/L (ref 5–15)
AST SERPL-CCNC: 15 U/L (ref 1–40)
BASOPHILS # BLD AUTO: 0.01 10*3/MM3 (ref 0–0.2)
BASOPHILS NFR BLD AUTO: 0.4 % (ref 0–1.5)
BILIRUB SERPL-MCNC: 0.4 MG/DL (ref 0.2–1.2)
BUN BLD-MCNC: 16 MG/DL (ref 8–23)
BUN/CREAT SERPL: 19 (ref 7–25)
CALCIUM SPEC-SCNC: 9.1 MG/DL (ref 8.6–10.5)
CHLORIDE SERPL-SCNC: 106 MMOL/L (ref 98–107)
CHOLEST SERPL-MCNC: 131 MG/DL (ref 0–200)
CO2 SERPL-SCNC: 25 MMOL/L (ref 22–29)
CREAT BLD-MCNC: 0.84 MG/DL (ref 0.76–1.27)
DEPRECATED RDW RBC AUTO: 43.8 FL (ref 37–54)
EOSINOPHIL # BLD AUTO: 0.04 10*3/MM3 (ref 0–0.4)
EOSINOPHIL NFR BLD AUTO: 1.5 % (ref 0.3–6.2)
ERYTHROCYTE [DISTWIDTH] IN BLOOD BY AUTOMATED COUNT: 14.2 % (ref 12.3–15.4)
GFR SERPL CREATININE-BSD FRML MDRD: 106 ML/MIN/1.73
GLOBULIN UR ELPH-MCNC: 3.1 GM/DL
GLUCOSE BLD-MCNC: 106 MG/DL (ref 65–99)
HBA1C MFR BLD: 5.9 % (ref 4.8–5.6)
HCT VFR BLD AUTO: 39.1 % (ref 37.5–51)
HDLC SERPL-MCNC: 58 MG/DL (ref 40–60)
HGB BLD-MCNC: 12.1 G/DL (ref 13–17.7)
IMM GRANULOCYTES # BLD AUTO: 0.01 10*3/MM3 (ref 0–0.05)
IMM GRANULOCYTES NFR BLD AUTO: 0.4 % (ref 0–0.5)
LDLC SERPL CALC-MCNC: 58 MG/DL (ref 0–100)
LDLC/HDLC SERPL: 0.99 {RATIO}
LYMPHOCYTES # BLD AUTO: 0.9 10*3/MM3 (ref 0.7–3.1)
LYMPHOCYTES NFR BLD AUTO: 34.6 % (ref 19.6–45.3)
MCH RBC QN AUTO: 26.4 PG (ref 26.6–33)
MCHC RBC AUTO-ENTMCNC: 30.9 G/DL (ref 31.5–35.7)
MCV RBC AUTO: 85.4 FL (ref 79–97)
MONOCYTES # BLD AUTO: 0.28 10*3/MM3 (ref 0.1–0.9)
MONOCYTES NFR BLD AUTO: 10.8 % (ref 5–12)
NEUTROPHILS # BLD AUTO: 1.36 10*3/MM3 (ref 1.7–7)
NEUTROPHILS NFR BLD AUTO: 52.3 % (ref 42.7–76)
NRBC BLD AUTO-RTO: 0 /100 WBC (ref 0–0.2)
PLATELET # BLD AUTO: 124 10*3/MM3 (ref 140–450)
PMV BLD AUTO: 11.2 FL (ref 6–12)
POTASSIUM BLD-SCNC: 3.9 MMOL/L (ref 3.5–5.2)
PROT SERPL-MCNC: 7 G/DL (ref 6–8.5)
RBC # BLD AUTO: 4.58 10*6/MM3 (ref 4.14–5.8)
SODIUM BLD-SCNC: 140 MMOL/L (ref 136–145)
TRIGL SERPL-MCNC: 77 MG/DL (ref 0–150)
VLDLC SERPL-MCNC: 15.4 MG/DL
WBC NRBC COR # BLD: 2.6 10*3/MM3 (ref 3.4–10.8)

## 2020-03-18 PROCEDURE — 93458 L HRT ARTERY/VENTRICLE ANGIO: CPT | Performed by: INTERNAL MEDICINE

## 2020-03-18 PROCEDURE — 82565 ASSAY OF CREATININE: CPT

## 2020-03-18 PROCEDURE — 36415 COLL VENOUS BLD VENIPUNCTURE: CPT

## 2020-03-18 PROCEDURE — 85025 COMPLETE CBC W/AUTO DIFF WBC: CPT | Performed by: INTERNAL MEDICINE

## 2020-03-18 PROCEDURE — C1894 INTRO/SHEATH, NON-LASER: HCPCS | Performed by: INTERNAL MEDICINE

## 2020-03-18 PROCEDURE — 25010000002 HEPARIN (PORCINE) PER 1000 UNITS: Performed by: INTERNAL MEDICINE

## 2020-03-18 PROCEDURE — S0260 H&P FOR SURGERY: HCPCS | Performed by: INTERNAL MEDICINE

## 2020-03-18 PROCEDURE — 80053 COMPREHEN METABOLIC PANEL: CPT | Performed by: INTERNAL MEDICINE

## 2020-03-18 PROCEDURE — 0 IOPAMIDOL PER 1 ML: Performed by: INTERNAL MEDICINE

## 2020-03-18 PROCEDURE — 83036 HEMOGLOBIN GLYCOSYLATED A1C: CPT | Performed by: PHYSICIAN ASSISTANT

## 2020-03-18 PROCEDURE — 80061 LIPID PANEL: CPT | Performed by: PHYSICIAN ASSISTANT

## 2020-03-18 PROCEDURE — C1769 GUIDE WIRE: HCPCS | Performed by: INTERNAL MEDICINE

## 2020-03-18 RX ORDER — SODIUM CHLORIDE 9 MG/ML
100 INJECTION, SOLUTION INTRAVENOUS CONTINUOUS
Status: ACTIVE | OUTPATIENT
Start: 2020-03-18 | End: 2020-03-18

## 2020-03-18 RX ORDER — NITROGLYCERIN 0.4 MG/1
0.4 TABLET SUBLINGUAL
Status: DISCONTINUED | OUTPATIENT
Start: 2020-03-18 | End: 2020-03-18 | Stop reason: HOSPADM

## 2020-03-18 RX ORDER — ASPIRIN 325 MG
325 TABLET, DELAYED RELEASE (ENTERIC COATED) ORAL DAILY
Status: DISCONTINUED | OUTPATIENT
Start: 2020-03-19 | End: 2020-03-18 | Stop reason: HOSPADM

## 2020-03-18 RX ORDER — ONDANSETRON 2 MG/ML
4 INJECTION INTRAMUSCULAR; INTRAVENOUS EVERY 8 HOURS PRN
Status: DISCONTINUED | OUTPATIENT
Start: 2020-03-18 | End: 2020-03-18 | Stop reason: HOSPADM

## 2020-03-18 RX ORDER — ASPIRIN 325 MG
325 TABLET ORAL ONCE
Status: COMPLETED | OUTPATIENT
Start: 2020-03-18 | End: 2020-03-18

## 2020-03-18 RX ORDER — SODIUM CHLORIDE 0.9 % (FLUSH) 0.9 %
10 SYRINGE (ML) INJECTION AS NEEDED
Status: DISCONTINUED | OUTPATIENT
Start: 2020-03-18 | End: 2020-03-18 | Stop reason: HOSPADM

## 2020-03-18 RX ORDER — LIDOCAINE HYDROCHLORIDE 10 MG/ML
INJECTION, SOLUTION EPIDURAL; INFILTRATION; INTRACAUDAL; PERINEURAL AS NEEDED
Status: DISCONTINUED | OUTPATIENT
Start: 2020-03-18 | End: 2020-03-18 | Stop reason: HOSPADM

## 2020-03-18 RX ORDER — SODIUM CHLORIDE 0.9 % (FLUSH) 0.9 %
3 SYRINGE (ML) INJECTION EVERY 12 HOURS SCHEDULED
Status: DISCONTINUED | OUTPATIENT
Start: 2020-03-18 | End: 2020-03-18 | Stop reason: HOSPADM

## 2020-03-18 RX ORDER — ACETAMINOPHEN 325 MG/1
650 TABLET ORAL EVERY 4 HOURS PRN
Status: DISCONTINUED | OUTPATIENT
Start: 2020-03-18 | End: 2020-03-18 | Stop reason: HOSPADM

## 2020-03-18 RX ADMIN — ASPIRIN 325 MG ORAL TABLET 325 MG: 325 PILL ORAL at 08:58

## 2020-03-18 NOTE — H&P
[facility]    Date of Hospital Visit: 20    Place of Service: [facility]    Patient Name: Narendra Cochran  :1940    Referring: Javad Mercedes MD  Primary Care Provider: Marguerite Moore PA-C    Chief complaint/Reason for Consultation:  chest pain      Patient Active Problem List    Diagnosis    • *Abnormal stress test      1. MPS 3-12-20  · Myocardial perfusion imaging demonstrates a large size, mild to moderate intensity basal to apical inferior wall defect which is fixed and more severe on the rest images  · Left ventricular ejection fraction is moderately reduced (Calculated EF = 31%).   • Other chest pain      1. Echo 19:   · Left ventricular wall thickness is consistent with mild concentric hypertrophy.  · Left ventricular systolic function is normal. Estimated EF = 60%.  · Left ventricular diastolic dysfunction (grade I) consistent with impaired relaxation.   • NSVT (nonsustained ventricular tachycardia) (CMS/[facility])      1. 48 Hour Holter 2020:   · PVCs present with burden of 12.3%  · Some of the counted PVCs appear to be PACs with aberrancy  · There was a 4 beat run of nonsustained VT versus SVT with aberrancy   • Hypertension    • Hyperlipidemia    • Type 2 diabetes mellitus with hyperglycemia, without long-term current use of insulin (CMS/[facility])    • Tobacco abuse    • Syncope    • Chronic obstructive pulmonary disease (CMS/[facility])      History of Present Illness:  This is an 79-year-old hypertensive dyslipidemic diabetic male smoker originally referred to our service for suspicion of bradycardia and AV block due to an episode of syncope.  On initial evaluation he was noted to have intermittent chest pain and dyspnea on exertion with ambulation of approximately 3 city blocks.  He was aware of frequent palpitations and reported fluctuations of blood pressure.  His evaluation included a myocardial perfusion study which returned  "abnormal as noted above.  His 48-hour Holter monitor showed a 12.3% PVC burden and a 4 beat run of NSVT.      No Known Allergies      Social History     Socioeconomic History   • Marital status:      Spouse name: Not on file   • Number of children: Not on file   • Years of education: Not on file   • Highest education level: Not on file   Tobacco Use   • Smoking status: Former Smoker     Packs/day: 0.25     Years: 65.00     Pack years: 16.25     Types: Cigars, Cigarettes     Last attempt to quit: 2019     Years since quittin.5   • Smokeless tobacco: Never Used   Substance and Sexual Activity   • Alcohol use: No     Comment: rarely   • Drug use: No   • Sexual activity: Defer       Family History   Problem Relation Age of Onset   • No Known Problems Daughter    • No Known Problems Son    • No Known Problems Son    • No Known Problems Son    • Diabetes Sister    • Clotting disorder Sister    • No Known Problems Mother    • No Known Problems Sister    • No Known Problems Brother    • Fainting Brother        REVIEW OF SYSTEMS:   Review of Systems   Constitution: Negative.   HENT: Negative.    Eyes: Negative.    Cardiovascular: Positive for chest pain, dyspnea on exertion and palpitations.   Respiratory: Negative.    Endocrine: Negative.    Hematologic/Lymphatic: Negative.    Skin: Negative.    Musculoskeletal: Negative.    Gastrointestinal: Negative.    Genitourinary: Negative.    Neurological: Negative.    Psychiatric/Behavioral: Negative.    Allergic/Immunologic: Negative.    All other systems reviewed and are negative.           Objective:  Vitals:    20 0819 20 0821   BP: 149/67 150/63   BP Location: Right arm Left arm   Patient Position: Lying Lying   Pulse:  55   Resp:  18   Temp:  97.5 °F (36.4 °C)   TempSrc:  Temporal   SpO2:  100%   Weight:  92.9 kg (204 lb 14.4 oz)   Height:  177.8 cm (70\")     Body mass index is 29.4 kg/m².      Physical Exam   Constitutional: He is oriented to person, " place, and time. He appears well-developed and well-nourished.   HENT:   Head: Normocephalic and atraumatic.   Mouth/Throat: Oropharynx is clear and moist.   Eyes: Pupils are equal, round, and reactive to light. EOM are normal. No scleral icterus.   Neck: Neck supple. No JVD present. No thyromegaly present.   Cardiovascular: Normal rate, regular rhythm and normal heart sounds. Exam reveals no gallop and no friction rub.   No murmur heard.  Pulmonary/Chest: Breath sounds normal. No stridor. He has no wheezes. He has no rales.   Abdominal: Soft. Bowel sounds are normal. He exhibits no distension and no mass. There is no tenderness.   Musculoskeletal: Normal range of motion. He exhibits no edema, tenderness or deformity.   Lymphadenopathy:     He has no cervical adenopathy.   Neurological: He is alert and oriented to person, place, and time. No cranial nerve deficit. Coordination normal.   Skin: Skin is warm and dry. No rash noted. No erythema.   Psychiatric: He has a normal mood and affect.   Vitals reviewed.        Barbaeu Test:  Left: Normal  (oxymetric Allens) Right: Not Assessed       Lab Review:               CrCl cannot be calculated (Patient's most recent lab result is older than the maximum 30 days allowed.).              Assessment:   · Chest pain with abnormal myocardial perfusion study  · Cardiomyopathy with EF 31%  · NSVT, and high ectopic burden  · Hypertension  · Dyslipidemia    Plan:   · Left heart catheterization possible catheter-based intervention      Electronically signed by LYNNETTE Lloyd, 03/18/20, 8:34 AM.    I have seen and examined the patient, case was discussed with the physician extender, reviewed the above note, necessary changes were made and I agree with the final note.   Sonya Garibay MD, FACC, [facility]

## 2020-03-23 ENCOUNTER — TELEPHONE (OUTPATIENT)
Dept: CARDIOLOGY | Facility: CLINIC | Age: 80
End: 2020-03-23

## 2020-03-23 LAB — CREAT BLDA-MCNC: 0.8 MG/DL (ref 0.6–1.3)

## 2020-03-23 NOTE — TELEPHONE ENCOUNTER
Called patient to let them know results and recommendations per NSK (see NSK note).    Patient's wife agreed and verbalized understanding.

## 2020-04-08 DIAGNOSIS — E11.8 TYPE 2 DIABETES MELLITUS WITH COMPLICATION, WITHOUT LONG-TERM CURRENT USE OF INSULIN (HCC): Chronic | ICD-10-CM

## 2020-04-08 NOTE — TELEPHONE ENCOUNTER
PT WIFE CALLED REQUESTING REFILL FOR RX metFORMIN (GLUCOPHAGE) 1000 MG tablet.    PLEASE ADVISE.  CALL BACK:[phone]       [place] [address] AT [facility] [address]

## 2020-04-27 NOTE — PROGRESS NOTES
[facility] at [facility]  Follow Up Visit  Narendra Cochran  1940    VISIT DATE:  04/28/20    PCP:   Marguerite Moore PA-C  [address] [facility] [address]          CC:  abnormal stress test and heart catheterization      Problem List:  1.  HTN  2.  DM  3.  COPD  4.  Pagets disease  5.  Hearing loss  6.  Obesity  7.  Tobacco-quit last year  8.  Systolic heart failure/nonischemic cardiomyopathy  9.  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  · Left ventricular wall thickness is consistent with mild concentric hypertrophy.  · Left ventricular systolic function is normal. Estimated EF = 60%.  · Left ventricular diastolic dysfunction (grade I) consistent with impaired relaxation.  · Mild to moderate aortic valve regurgitation is present.  · Mild mitral valve regurgitation is present    Stress test February 2020  PVCs at both rest and stress, no perfusion evidence of ischemia, ejection fraction 31% with dilated LV cavity    Cardiac catheterization March 2020  · LM: Angiographically normal.  · LAD: Minor irregularities, no significant disease.  · LCX: Minor irregularities, no significant disease.  · RCA: Dominant vessel with minor irregularities and no significant disease throughout its course or in the large PDA.  A small less than 2 mm posterolateral branch has a focal 90% stenosis.    · LV: Left ventriculogram performed in 30 LE projection revealed global hypokinesis with moderate left ventricular systolic dysfunction.  Estimated ejection fraction is 36 to 40%. No significant mitral regurgitation was noted.      Holter monitor February 2020  · An abnormal monitor study.  · PVCs present with burden of 12.3%  · Some of the counted PVCs appear to be PACs with aberrancy  · There was a 4 beat run of nonsustained VT versus SVT with aberrancy    History of Present Illness:  Narendra Cochran  Is a 79 y.o.  male with pertinent cardiac history detailed above.  Patient returns for follow-up after stress testing heart catheterization.  He was shown to have an abnormal ejection fraction on stress test and heart cath, 36 to 40% by left ventriculogram.  In November 2019 it was normal on an echocardiogram measuring 60%.  On Holter monitoring he was found to have frequent PVCs with a 12.3% burden.  His degree of LV dysfunction appears out of proportion to catheterization findings showing only focal stenosis in a small posterior lateral branch.  Current medical therapy for systolic dysfunction is Entresto 24/26, and metoprolol tartrate 12.5 mg twice daily.  He has resting bradycardia which complicates treatment of PVCs, he is not having symptomatic palpitations    Patient tolerating Entresto fine.  Denies chest pain.  He still has persistent shortness of breath.  He is a long-term smoker and quit in September 2019.  No previous PFTs.  Denies lower extremity edema.  Other complaint today is fairly chronic alternating constipation and diarrhea.       Patient Active Problem List    Diagnosis Date Noted   • NSVT (nonsustained ventricular tachycardia) (CMS/[facility]) 03/18/2020     Note Last Updated: 3/18/2020     1. 48 Hour Holter Feb 2020:   · PVCs present with burden of 12.3%  · Some of the counted PVCs appear to be PACs with aberrancy  · There was a 4 beat run of nonsustained VT versus SVT with aberrancy     • CAD (coronary artery disease) 03/18/2020     Note Last Updated: 3/18/2020     1. [facility] 3-18-20:   · Single-vessel CAD involving a small posterolateral branch of the right coronary artery.  · No evidence of any significant disease involving any major vessels.  · Nonischemic cardiomyopathy with moderate left ventricular systolic dysfunction, estimated EF 36 to 40%.       • NICM (nonischemic cardiomyopathy) (CMS/[facility]) 03/18/2020     Note Last Updated: 3/18/2020     1. [facility] 3-18-20:   · Single-vessel CAD involving a small posterolateral  branch of the right coronary artery.  · No evidence of any significant disease involving any major vessels.  · Nonischemic cardiomyopathy with moderate left ventricular systolic dysfunction, estimated EF 36 to 40%.       • Syncope 2019   • Chronic obstructive pulmonary disease (CMS/[facility]) 2019   • Hypertrophic polyarthritis 2019   • Gonalgia 2019   • Osteitis deformans 2019   • Seizure (CMS/HCC) 2019   • Bilateral hearing loss 2018   • Acute pain of right knee 2018   • Nocturnal hypoxia 2017   • Hemispheric carotid artery syndrome 2017   • Bilateral renal cysts 2017   • Acute metabolic encephalopathy 2017   • Complex renal cyst 2017   • Hypertension 2017   • Hyperlipidemia 2017   • Type 2 diabetes mellitus with hyperglycemia, without long-term current use of insulin (CMS/HCC) 2017   • Diverticulosis 2017   • Paget disease of bone 2017     Note Last Updated: 2017     History of Paget's disease, treated with Reclast.  Last saw Dr. Lui .     • Tobacco abuse 2017   • chronic thrombocytopenia 2017       No Known Allergies    Social History     Socioeconomic History   • Marital status:      Spouse name: Not on file   • Number of children: Not on file   • Years of education: Not on file   • Highest education level: Not on file   Tobacco Use   • Smoking status: Former Smoker     Packs/day: 0.25     Years: 65.00     Pack years: 16.25     Types: Cigars, Cigarettes     Last attempt to quit: 2019     Years since quittin.6   • Smokeless tobacco: Never Used   Substance and Sexual Activity   • Alcohol use: Yes     Comment: rarely   • Drug use: Yes     Types: Marijuana     Comment: Pt states used weekly but now quitting    • Sexual activity: Defer       Family History   Problem Relation Age of Onset   • No Known Problems Daughter    • No Known Problems Son    • No Known Problems Son    • No  Known Problems Son    • Diabetes Sister    • Clotting disorder Sister    • No Known Problems Mother    • No Known Problems Sister    • No Known Problems Brother    • Fainting Brother        Current Medications:    Current Outpatient Medications:   •  ACCU-CHEK FASTCLIX LANCETS misc, Use daily., Disp: 102 each, Rfl: 0  •  aspirin 81 MG chewable tablet, Chew 1 tablet Daily., Disp: , Rfl:   •  Blood Glucose Monitoring Suppl (ACCU-CHEK GUIDE) w/Device kit, 1 each Daily., Disp: 1 kit, Rfl: 0  •  Blood Pressure Monitoring (BLOOD PRESSURE MONITOR/L CUFF) misc, 1 Units Daily., Disp: 1 each, Rfl: 0  •  diclofenac (VOLTAREN) 1 % gel gel, Apply 4 g topically to the appropriate area as directed 4 (Four) Times a Day As Needed (prn for pain)., Disp: 60 tube, Rfl: 0  •  famotidine (PEPCID) 20 MG tablet, Take 1 tablet by mouth At Night As Needed for Heartburn., Disp: 90 tablet, Rfl: 1  •  glucose blood test strip, Use as instructed to test blood sugar daily, Disp: 100 each, Rfl: 5  •  levETIRAcetam (KEPPRA) 250 MG tablet, Take 1 tablet by mouth 2 (Two) Times a Day., Disp: 180 tablet, Rfl: 1  •  metFORMIN (GLUCOPHAGE) 1000 MG tablet, Take 1 tablet by mouth Daily With Breakfast., Disp: 90 tablet, Rfl: 3  •  rosuvastatin (CRESTOR) 20 MG tablet, Take 1 tablet by mouth Daily., Disp: 90 tablet, Rfl: 1  •  sacubitril-valsartan (ENTRESTO) 24-26 MG tablet, Take 1 tablet by mouth 2 (Two) Times a Day., Disp: 60 tablet, Rfl: 6  •  amiodarone (PACERONE) 200 MG tablet, Take 1 tablet by mouth Daily., Disp: 30 tablet, Rfl: 5  •  metoprolol succinate XL (TOPROL-XL) 25 MG 24 hr tablet, Take 0.5 tablets by mouth Daily., Disp: 30 tablet, Rfl: 6     Review of Systems   Constitution: Negative for malaise/fatigue.   Cardiovascular: Positive for dyspnea on exertion. Negative for chest pain and leg swelling.   Respiratory: Positive for shortness of breath.    Gastrointestinal: Positive for abdominal pain, constipation and diarrhea.   All other systems  "reviewed and are negative.      Vitals:    04/28/20 1146   BP: 132/52   BP Location: Left arm   Patient Position: Sitting   Pulse: 51   SpO2: 99%   Weight: 90.3 kg (199 lb)   Height: 177.8 cm (70\")       Physical Exam   Constitutional: He is oriented to person, place, and time. He appears well-developed and well-nourished.   Neck: Neck supple. No JVD present.   Cardiovascular: Regular rhythm, S1 normal, S2 normal and intact distal pulses. Bradycardia present.   No ectopy at time of exam  2+ bilateral radial pulses   Pulmonary/Chest: Effort normal and breath sounds normal.   Abdominal: Soft.   Musculoskeletal: He exhibits no edema.   Arthritic changes both hands   Neurological: He is alert and oriented to person, place, and time.       Diagnostic Data:  Procedures  Lab Results   Component Value Date    TRIG 77 03/18/2020    HDL 58 03/18/2020     Lab Results   Component Value Date    GLUCOSE 106 (H) 03/18/2020    BUN 16 03/18/2020    CREATININE 0.80 03/18/2020     03/18/2020    K 3.9 03/18/2020     03/18/2020    CO2 25.0 03/18/2020     Lab Results   Component Value Date    HGBA1C 5.90 (H) 03/18/2020     Lab Results   Component Value Date    WBC 2.60 (L) 03/18/2020    HGB 12.1 (L) 03/18/2020    HCT 39.1 03/18/2020     (L) 03/18/2020       Assessment:   Diagnosis Plan   1. Dyspnea on exertion  Full Pulmonary Function Test With Bronchodilator   2. NICM (nonischemic cardiomyopathy) (CMS/[facility])     3. NSVT (nonsustained ventricular tachycardia) (CMS/[facility])         Plan:      1.  Nonischemic cardiomyopathy  -Current medications Entresto and metoprolol tartrate   -Replace metoprolol tartrate with succinate 12.5 mg daily  -EF 36 to 40% by left ventriculogram  -Continue ASA and statin for nonobstructive CAD  -Frequent PVCs may have contributed to decline in EF treated with amiodarone as detailed below    2..  Hypertension  -Off of amlodipine, on Entresto, tolerating well  -EKG does show changes consistent with " LVH and strain, mild LVH on echo     3.   Frequent PVCs  -Decline in ejection fraction from November 2019 to February 2020 with 12% PVCs on Holter monitoring   -No significant obstructive CAD contributing  -Start amiodarone 200 mg daily for PVC suppression  -Limited and beta-blocker up titration due to resting bradycardia  -Follow-up 3 months with repeat Holter to quantify PVC burden and echo    4.  HLD  -LDL 63 February last year, he is on a statin     5. Dyspnea on exertion  -Additional treatment cardiomyopathy check PFTs given long smoking history    Follow-up 3 months    Javad Mercedes MD

## 2020-04-28 ENCOUNTER — OFFICE VISIT (OUTPATIENT)
Dept: CARDIOLOGY | Facility: CLINIC | Age: 80
End: 2020-04-28

## 2020-04-28 VITALS
OXYGEN SATURATION: 99 % | HEIGHT: 70 IN | SYSTOLIC BLOOD PRESSURE: 132 MMHG | HEART RATE: 51 BPM | BODY MASS INDEX: 28.49 KG/M2 | DIASTOLIC BLOOD PRESSURE: 52 MMHG | WEIGHT: 199 LBS

## 2020-04-28 DIAGNOSIS — I47.29 NSVT (NONSUSTAINED VENTRICULAR TACHYCARDIA) (HCC): ICD-10-CM

## 2020-04-28 DIAGNOSIS — R06.09 DYSPNEA ON EXERTION: Primary | ICD-10-CM

## 2020-04-28 DIAGNOSIS — I42.8 NICM (NONISCHEMIC CARDIOMYOPATHY) (HCC): ICD-10-CM

## 2020-04-28 PROCEDURE — 99213 OFFICE O/P EST LOW 20 MIN: CPT | Performed by: INTERNAL MEDICINE

## 2020-04-28 RX ORDER — METOPROLOL SUCCINATE 25 MG/1
12.5 TABLET, EXTENDED RELEASE ORAL DAILY
Qty: 30 TABLET | Refills: 6 | Status: SHIPPED | OUTPATIENT
Start: 2020-04-28 | End: 2020-12-30 | Stop reason: SDUPTHER

## 2020-04-28 RX ORDER — AMIODARONE HYDROCHLORIDE 200 MG/1
200 TABLET ORAL DAILY
Qty: 30 TABLET | Refills: 5 | Status: SHIPPED | OUTPATIENT
Start: 2020-04-28 | End: 2020-07-17 | Stop reason: SDUPTHER

## 2020-05-11 ENCOUNTER — TELEPHONE (OUTPATIENT)
Dept: FAMILY MEDICINE CLINIC | Facility: CLINIC | Age: 80
End: 2020-05-11

## 2020-05-11 DIAGNOSIS — G89.29 CHRONIC PAIN OF BOTH KNEES: ICD-10-CM

## 2020-05-11 DIAGNOSIS — M25.562 CHRONIC PAIN OF BOTH KNEES: ICD-10-CM

## 2020-05-11 DIAGNOSIS — M25.561 CHRONIC PAIN OF BOTH KNEES: ICD-10-CM

## 2020-05-11 NOTE — TELEPHONE ENCOUNTER
PT'S WIFE DEE DEE WILKINS STATES PT IS NEEDING A REFILL ON THE FOLLOWING MEDICATION:     diclofenac (VOLTAREN) 1 % gel gel      CONTACT: [phone]    CONFIRMED PHARMACY:  [facility] [address] [place] [address] [address] AT [facility] & [address]  - [phone]  - [phone]

## 2020-05-19 ENCOUNTER — OFFICE VISIT (OUTPATIENT)
Dept: PREADMISSION TESTING | Facility: HOSPITAL | Age: 80
End: 2020-05-19

## 2020-05-19 PROCEDURE — U0004 COV-19 TEST NON-CDC HGH THRU: HCPCS | Performed by: INTERNAL MEDICINE

## 2020-05-19 PROCEDURE — C9803 HOPD COVID-19 SPEC COLLECT: HCPCS

## 2020-05-19 PROCEDURE — U0002 COVID-19 LAB TEST NON-CDC: HCPCS | Performed by: INTERNAL MEDICINE

## 2020-05-20 LAB
REF LAB TEST METHOD: NORMAL
SARS-COV-2 RNA RESP QL NAA+PROBE: NOT DETECTED

## 2020-05-21 ENCOUNTER — HOSPITAL ENCOUNTER (OUTPATIENT)
Dept: PULMONOLOGY | Facility: HOSPITAL | Age: 80
Discharge: HOME | End: 2020-05-21
Admitting: INTERNAL MEDICINE

## 2020-05-21 DIAGNOSIS — R06.09 DYSPNEA ON EXERTION: ICD-10-CM

## 2020-05-21 PROCEDURE — 94640 AIRWAY INHALATION TREATMENT: CPT

## 2020-05-21 PROCEDURE — 94727 GAS DIL/WSHOT DETER LNG VOL: CPT

## 2020-05-21 PROCEDURE — 94060 EVALUATION OF WHEEZING: CPT | Performed by: INTERNAL MEDICINE

## 2020-05-21 PROCEDURE — 94727 GAS DIL/WSHOT DETER LNG VOL: CPT | Performed by: INTERNAL MEDICINE

## 2020-05-21 PROCEDURE — 94060 EVALUATION OF WHEEZING: CPT

## 2020-05-21 PROCEDURE — 94729 DIFFUSING CAPACITY: CPT | Performed by: INTERNAL MEDICINE

## 2020-05-21 PROCEDURE — 94729 DIFFUSING CAPACITY: CPT

## 2020-05-21 RX ORDER — ALBUTEROL SULFATE 2.5 MG/3ML
2.5 SOLUTION RESPIRATORY (INHALATION) ONCE
Status: COMPLETED | OUTPATIENT
Start: 2020-05-21 | End: 2020-05-21

## 2020-05-21 RX ADMIN — ALBUTEROL SULFATE 2.5 MG: 2.5 SOLUTION RESPIRATORY (INHALATION) at 13:19

## 2020-05-28 DIAGNOSIS — J44.9 CHRONIC OBSTRUCTIVE PULMONARY DISEASE, UNSPECIFIED COPD TYPE (HCC): ICD-10-CM

## 2020-05-28 RX ORDER — LEVETIRACETAM 250 MG/1
250 TABLET ORAL 2 TIMES DAILY
Qty: 180 TABLET | Refills: 1 | OUTPATIENT
Start: 2020-05-28

## 2020-05-28 RX ORDER — AMIODARONE HYDROCHLORIDE 200 MG/1
200 TABLET ORAL DAILY
Qty: 30 TABLET | Refills: 5 | OUTPATIENT
Start: 2020-05-28

## 2020-07-14 NOTE — PROGRESS NOTES
[facility]  Follow Up Visit  Narendra Cochran  1940    VISIT DATE:  07/15/20    PCP:   Marguerite Moore PA-C  [address] [facility] [address]          CC:  Dyspnea on exertion      Problem List:  1.  HTN  2.  DM  3.  COPD  4.  Pagets disease  5.  Hearing loss  6.  Obesity  7.  Tobacco-quit last year  8.  Systolic heart failure/nonischemic cardiomyopathy  9.  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  · Left ventricular wall thickness is consistent with mild concentric hypertrophy.  · Left ventricular systolic function is normal. Estimated EF = 60%.  · Left ventricular diastolic dysfunction (grade I) consistent with impaired relaxation.  · Mild to moderate aortic valve regurgitation is present.  · Mild mitral valve regurgitation is present    Stress test February 2020  PVCs at both rest and stress, no perfusion evidence of ischemia, ejection fraction 31% with dilated LV cavity    Cardiac catheterization March 2020  · LM: Angiographically normal.  · LAD: Minor irregularities, no significant disease.  · LCX: Minor irregularities, no significant disease.  · RCA: Dominant vessel with minor irregularities and no significant disease throughout its course or in the large PDA.  A small less than 2 mm posterolateral branch has a focal 90% stenosis.    · LV: Left ventriculogram performed in 30 LE projection revealed global hypokinesis with moderate left ventricular systolic dysfunction.  Estimated ejection fraction is 36 to 40%. No significant mitral regurgitation was noted.      Holter monitor February 2020  · An abnormal monitor study.  · PVCs present with burden of 12.3%  · Some of the counted PVCs appear to be PACs with aberrancy  · There was a 4 beat run of nonsustained VT versus SVT with aberrancy    History of Present Illness:  Narendra Cochran  Is a 79 y.o. male with pertinent cardiac  history detailed above.  Patient returns for evaluation of nonischemic cardiomyopathy.  He is on Entresto and a low-dose of Toprol-XL.  At his last visit amiodarone 200 mg daily was started to help decrease PVC frequency.  Patient feels generally well without specific complaints.  Still doing noted to have some ectopy on today's EKG.  QTC within acceptable limits.  Blood pressure well controlled.  Appears euvolemic.  No new complaints    Patient Active Problem List    Diagnosis Date Noted   • NSVT (nonsustained ventricular tachycardia) (CMS/HCC) 03/18/2020     Note Last Updated: 3/18/2020     1. 48 Hour Holter Feb 2020:   · PVCs present with burden of 12.3%  · Some of the counted PVCs appear to be PACs with aberrancy  · There was a 4 beat run of nonsustained VT versus SVT with aberrancy     • CAD (coronary artery disease) 03/18/2020     Note Last Updated: 3/18/2020     1. [facility] 3-18-20:   · Single-vessel CAD involving a small posterolateral branch of the right coronary artery.  · No evidence of any significant disease involving any major vessels.  · Nonischemic cardiomyopathy with moderate left ventricular systolic dysfunction, estimated EF 36 to 40%.       • NICM (nonischemic cardiomyopathy) (CMS/[facility]) 03/18/2020     Note Last Updated: 3/18/2020     1. [facility] 3-18-20:   · Single-vessel CAD involving a small posterolateral branch of the right coronary artery.  · No evidence of any significant disease involving any major vessels.  · Nonischemic cardiomyopathy with moderate left ventricular systolic dysfunction, estimated EF 36 to 40%.       • Syncope 11/13/2019   • Chronic obstructive pulmonary disease (CMS/[facility]) 02/19/2019   • Hypertrophic polyarthritis 02/18/2019   • Gonalgia 02/18/2019   • Osteitis deformans 02/18/2019   • Seizure (CMS/[facility]) 01/25/2019   • Bilateral hearing loss 11/16/2018   • Acute pain of right knee 06/06/2018   • Nocturnal hypoxia 05/30/2017   • Hemispheric carotid artery syndrome 05/30/2017   •  Bilateral renal cysts 2017   • Acute metabolic encephalopathy 2017   • Complex renal cyst 2017   • Hypertension 2017   • Hyperlipidemia 2017   • Type 2 diabetes mellitus with hyperglycemia, without long-term current use of insulin (CMS/[facility]) 2017   • Diverticulosis 2017   • Paget disease of bone 2017     Note Last Updated: 2017     History of Paget's disease, treated with Reclast.  Last saw Dr. Lui .     • Tobacco abuse 2017   • chronic thrombocytopenia 2017       No Known Allergies    Social History     Socioeconomic History   • Marital status:      Spouse name: Not on file   • Number of children: Not on file   • Years of education: Not on file   • Highest education level: Not on file   Tobacco Use   • Smoking status: Former Smoker     Packs/day: 0.25     Years: 65.00     Pack years: 16.25     Types: Cigars, Cigarettes     Last attempt to quit: 2019     Years since quittin.8   • Smokeless tobacco: Never Used   Substance and Sexual Activity   • Alcohol use: Yes     Comment: rarely   • Drug use: Yes     Types: Marijuana     Comment: Pt states used weekly but now quitting    • Sexual activity: Defer       Family History   Problem Relation Age of Onset   • No Known Problems Daughter    • No Known Problems Son    • No Known Problems Son    • No Known Problems Son    • Diabetes Sister    • Clotting disorder Sister    • No Known Problems Mother    • No Known Problems Sister    • No Known Problems Brother    • Fainting Brother        Current Medications:    Current Outpatient Medications:   •  ACCU-CHEK FASTCLIX LANCETS misc, Use daily., Disp: 102 each, Rfl: 0  •  amiodarone (PACERONE) 200 MG tablet, Take 1 tablet by mouth Daily., Disp: 30 tablet, Rfl: 5  •  aspirin 81 MG chewable tablet, Chew 1 tablet Daily., Disp: , Rfl:   •  Blood Glucose Monitoring Suppl (ACCU-CHEK GUIDE) w/Device kit, 1 each Daily., Disp: 1 kit, Rfl: 0  •  Blood  "Pressure Monitoring (BLOOD PRESSURE MONITOR/L CUFF) misc, 1 Units Daily., Disp: 1 each, Rfl: 0  •  diclofenac (VOLTAREN) 1 % gel gel, Apply 4 g topically to the appropriate area as directed 4 (Four) Times a Day As Needed (prn for pain)., Disp: 60 tube, Rfl: 0  •  famotidine (PEPCID) 20 MG tablet, Take 1 tablet by mouth At Night As Needed for Heartburn., Disp: 90 tablet, Rfl: 1  •  glucose blood test strip, Use as instructed to test blood sugar daily, Disp: 100 each, Rfl: 5  •  levETIRAcetam (KEPPRA) 250 MG tablet, Take 1 tablet by mouth 2 (Two) Times a Day., Disp: 180 tablet, Rfl: 1  •  metFORMIN (GLUCOPHAGE) 1000 MG tablet, Take 1 tablet by mouth Daily With Breakfast., Disp: 90 tablet, Rfl: 3  •  metoprolol succinate XL (TOPROL-XL) 25 MG 24 hr tablet, Take 0.5 tablets by mouth Daily., Disp: 30 tablet, Rfl: 6  •  rosuvastatin (CRESTOR) 20 MG tablet, Take 1 tablet by mouth Daily., Disp: 90 tablet, Rfl: 1  •  sacubitril-valsartan (ENTRESTO) 24-26 MG tablet, Take 1 tablet by mouth 2 (Two) Times a Day., Disp: 60 tablet, Rfl: 3     Review of Systems   Constitution: Negative for malaise/fatigue.   Cardiovascular: Positive for dyspnea on exertion (Stable). Negative for chest pain and leg swelling.   Respiratory: Negative for shortness of breath.    Musculoskeletal: Positive for arthritis.   Gastrointestinal: Negative for abdominal pain, constipation and diarrhea.   All other systems reviewed and are negative.      Vitals:    07/15/20 1446   BP: 118/42   BP Location: Right arm   Patient Position: Sitting   Pulse: 54   Temp: 98 °F (36.7 °C)   SpO2: 99%   Weight: 89.4 kg (197 lb)   Height: 177.8 cm (70\")       Physical Exam   Constitutional: He is oriented to person, place, and time. He appears well-developed and well-nourished.   Neck: Neck supple. No JVD present.   Cardiovascular: S1 normal, S2 normal and intact distal pulses. Bradycardia present.   Occasional ectopy   Pulmonary/Chest: Effort normal and breath sounds " normal.   Abdominal: Soft.   Musculoskeletal: He exhibits no edema.   Arthritic changes both hands   Neurological: He is alert and oriented to person, place, and time.       Diagnostic Data:    ECG 12 Lead  Date/Time: 7/15/2020 3:07 PM  Performed by: Javad Mercedes MD  Authorized by: Javad Mercedes MD   Comparison: compared with previous ECG from 2/4/2020  Similar to previous ECG  Rhythm: sinus bradycardia  Ectopy: unifocal PVCs  Rate: bradycardic  BPM: 54  Conduction: 1st degree AV block  QRS axis: normal  Other findings: left ventricular hypertrophy with strain    Clinical impression: abnormal EKG          Lab Results   Component Value Date    TRIG 77 03/18/2020    HDL 58 03/18/2020     Lab Results   Component Value Date    GLUCOSE 106 (H) 03/18/2020    BUN 16 03/18/2020    CREATININE 0.80 03/18/2020     03/18/2020    K 3.9 03/18/2020     03/18/2020    CO2 25.0 03/18/2020     Lab Results   Component Value Date    HGBA1C 5.90 (H) 03/18/2020     Lab Results   Component Value Date    WBC 2.60 (L) 03/18/2020    HGB 12.1 (L) 03/18/2020    HCT 39.1 03/18/2020     (L) 03/18/2020       Assessment:   Diagnosis Plan   1. NICM (nonischemic cardiomyopathy) (CMS/[facility])  Holter Monitor - 48 Hour       Plan:      1.  Nonischemic cardiomyopathy  -Current medications Entresto and Toprol-XL  -EF 36 to 40% by left ventriculogram  -Continue ASA and statin for nonobstructive CAD  -Frequent PVCs may have contributed to decline in EF treated with amiodarone as detailed below  -Recheck PVC burden today    2..  Hypertension  -Continue Entresto  -EKG does show changes consistent with LVH and strain, mild LVH on echo     3.   Frequent PVCs  -Decline in ejection fraction from November 2019 to February 2020 with 12% PVCs on Holter monitoring   -No significant obstructive CAD contributing  -On amiodarone 200 mg daily  -Limited to low-dose beta-blocker beta-blocker up titration due to resting  bradycardia  -Recheck PVC burden with Holter monitor today    4.  HLD  -LDL 63 February last year, he is on a statin     5. Dyspnea on exertion  -No significant obstruction on PFTs, mildly reduced DLCO     Follow-up 4 months        Javad Mercedes MD

## 2020-07-15 ENCOUNTER — OFFICE VISIT (OUTPATIENT)
Dept: CARDIOLOGY | Facility: CLINIC | Age: 80
End: 2020-07-15

## 2020-07-15 VITALS
DIASTOLIC BLOOD PRESSURE: 42 MMHG | SYSTOLIC BLOOD PRESSURE: 118 MMHG | HEIGHT: 70 IN | WEIGHT: 197 LBS | TEMPERATURE: 98 F | OXYGEN SATURATION: 99 % | HEART RATE: 54 BPM | BODY MASS INDEX: 28.2 KG/M2

## 2020-07-15 DIAGNOSIS — I42.8 NICM (NONISCHEMIC CARDIOMYOPATHY) (HCC): Primary | ICD-10-CM

## 2020-07-15 PROCEDURE — 99213 OFFICE O/P EST LOW 20 MIN: CPT | Performed by: INTERNAL MEDICINE

## 2020-07-15 PROCEDURE — 93000 ELECTROCARDIOGRAM COMPLETE: CPT | Performed by: INTERNAL MEDICINE

## 2020-07-17 RX ORDER — AMIODARONE HYDROCHLORIDE 200 MG/1
200 TABLET ORAL DAILY
Qty: 90 TABLET | Refills: 3 | Status: SHIPPED | OUTPATIENT
Start: 2020-07-17 | End: 2020-12-30 | Stop reason: ALTCHOICE

## 2020-07-29 DIAGNOSIS — I47.29 NSVT (NONSUSTAINED VENTRICULAR TACHYCARDIA) (HCC): Primary | ICD-10-CM

## 2020-08-03 ENCOUNTER — LAB (OUTPATIENT)
Dept: LAB | Facility: HOSPITAL | Age: 80
End: 2020-08-03

## 2020-08-03 ENCOUNTER — PATIENT OUTREACH (OUTPATIENT)
Dept: CASE MANAGEMENT | Facility: OTHER | Age: 80
End: 2020-08-03

## 2020-08-03 ENCOUNTER — TELEPHONE (OUTPATIENT)
Dept: FAMILY MEDICINE CLINIC | Facility: CLINIC | Age: 80
End: 2020-08-03

## 2020-08-03 ENCOUNTER — OFFICE VISIT (OUTPATIENT)
Dept: FAMILY MEDICINE CLINIC | Facility: CLINIC | Age: 80
End: 2020-08-03

## 2020-08-03 VITALS
WEIGHT: 204 LBS | HEIGHT: 70 IN | BODY MASS INDEX: 29.2 KG/M2 | HEART RATE: 49 BPM | OXYGEN SATURATION: 98 % | TEMPERATURE: 97.3 F | DIASTOLIC BLOOD PRESSURE: 60 MMHG | SYSTOLIC BLOOD PRESSURE: 140 MMHG

## 2020-08-03 DIAGNOSIS — R42 DIZZINESS: ICD-10-CM

## 2020-08-03 DIAGNOSIS — D69.3 CHRONIC IDIOPATHIC THROMBOCYTOPENIA (HCC): Chronic | ICD-10-CM

## 2020-08-03 DIAGNOSIS — I10 ESSENTIAL HYPERTENSION: Chronic | ICD-10-CM

## 2020-08-03 DIAGNOSIS — H91.93 BILATERAL HEARING LOSS, UNSPECIFIED HEARING LOSS TYPE: ICD-10-CM

## 2020-08-03 DIAGNOSIS — E11.65 TYPE 2 DIABETES MELLITUS WITH HYPERGLYCEMIA, WITHOUT LONG-TERM CURRENT USE OF INSULIN (HCC): Primary | Chronic | ICD-10-CM

## 2020-08-03 DIAGNOSIS — R00.1 BRADYCARDIA: ICD-10-CM

## 2020-08-03 DIAGNOSIS — R25.1 SHAKING: ICD-10-CM

## 2020-08-03 LAB — HBA1C MFR BLD: 6 %

## 2020-08-03 PROCEDURE — 99214 OFFICE O/P EST MOD 30 MIN: CPT | Performed by: PHYSICIAN ASSISTANT

## 2020-08-03 PROCEDURE — 83036 HEMOGLOBIN GLYCOSYLATED A1C: CPT | Performed by: PHYSICIAN ASSISTANT

## 2020-08-03 RX ORDER — METFORMIN HYDROCHLORIDE 500 MG/1
500 TABLET, EXTENDED RELEASE ORAL 2 TIMES DAILY WITH MEALS
Qty: 180 TABLET | Refills: 1 | Status: SHIPPED | OUTPATIENT
Start: 2020-08-03 | End: 2021-01-28

## 2020-08-03 RX ORDER — BLOOD-GLUCOSE METER
1 EACH MISCELLANEOUS DAILY
Qty: 1 KIT | Refills: 0 | Status: SHIPPED | OUTPATIENT
Start: 2020-08-03 | End: 2020-08-03 | Stop reason: CLARIF

## 2020-08-03 RX ORDER — LANCETS
EACH MISCELLANEOUS
Qty: 102 EACH | Refills: 0 | Status: SHIPPED | OUTPATIENT
Start: 2020-08-03 | End: 2020-08-03 | Stop reason: CLARIF

## 2020-08-03 RX ORDER — BLOOD-GLUCOSE METER
1 KIT MISCELLANEOUS DAILY
Qty: 1 EACH | Refills: 0 | Status: SHIPPED | OUTPATIENT
Start: 2020-08-03 | End: 2022-09-06

## 2020-08-03 NOTE — OUTREACH NOTE
"Patient Outreach Note    MAJO spoke with pt's wife Jessica to follow up on pt's hearing aids. Jessica stated that she\" did not want to discuss hearing aids until we can get his heart fixed\". Jessica stated pt may need a pacemaker and stated \"that is more important than hearing aids\". MAJO told Jessica that she is welcome to call with any questions she may have about the hearing aids once she is ready to discuss it. Jessica stated, \"thank you I appreciate that. Also  what about pt's ENT appt \". MAJO told Jessica that there was no referral placed for pt for ENT but that she would contact the provider to inquire. Jessica stated, \"thank you and please let me know what the provider says\".     RACHELLE Mcleod  Ambulatory     8/3/2020, 16:16      "

## 2020-08-03 NOTE — OUTREACH NOTE
Care Coordination Note    SW sent a message to provider about this pt regarding an ENT referral that the pt's wife would like submitted for him.     RACHELLE Mcleod  Ambulatory     8/3/2020, 16:23

## 2020-08-03 NOTE — TELEPHONE ENCOUNTER
Patients insurance does not cover Accu-Chek and wanted to change to One touch. Please advise and call back    [phone]-[id number]

## 2020-08-04 NOTE — PROGRESS NOTES
Chief Complaint   Patient presents with   • Shaking     His wife reports that he has been shaking constantly in his hands and also losing balance and bumping into walk, he reports that he gets dizzy when up and walking around.   • Slow Heart Rate     He reports that he has been following up with his cardiologist and had discussed the need for a pacemaker because his heart rate remains slow even while taking prescribed meds       HPI      Narendra Cochran is a 79 y.o. male who presents for Shaking (His wife reports that he has been shaking constantly in his hands and also losing balance and bumping into walk, he reports that he gets dizzy when up and walking around.) and Slow Heart Rate (He reports that he has been following up with his cardiologist and had discussed the need for a pacemaker because his heart rate remains slow even while taking prescribed meds)    Patient presents for fatigue, shaking hands, dizziness, hearing loss and slow heart rate.  Followed by cardiology who has recommended pacemaker.  Feel that patient would benefit from this, encouraged patient and family to consider it.    He reports that the shaking is episodic, none today.  Dizziness when he goes from sitting to standing.  When he is dizzy, he will bump into walls.    Ongoing hearing issues.  He is willing to get hearing aids now.  Will need financial assistance.    Daughter is present at appointment with patient.  Mother recently recovered from Covid-19.    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   • Paget disease of bone        Past Surgical History:   Procedure Laterality Date   • APPENDECTOMY     • CARDIAC CATHETERIZATION N/A 3/18/2020    Procedure: Left Heart Cath;  Surgeon: Sonya Garibay MD;  Location: [facility] CATH INVASIVE LOCATION;  Service: Cardiology;  Laterality: N/A;  First availabel provider    "  • COLON RESECTION      second to diverticulitis    • FOOT SURGERY Left        Family History   Problem Relation Age of Onset   • No Known Problems Daughter    • No Known Problems Son    • No Known Problems Son    • No Known Problems Son    • Diabetes Sister    • Clotting disorder Sister    • No Known Problems Mother    • No Known Problems Sister    • No Known Problems Brother    • Fainting Brother        Social History     Socioeconomic History   • Marital status:      Spouse name: Not on file   • Number of children: Not on file   • Years of education: Not on file   • Highest education level: Not on file   Tobacco Use   • Smoking status: Former Smoker     Packs/day: 0.25     Years: 65.00     Pack years: 16.25     Types: Cigars, Cigarettes     Last attempt to quit: 2019     Years since quittin.9   • Smokeless tobacco: Never Used   Substance and Sexual Activity   • Alcohol use: Yes     Comment: rarely   • Drug use: Yes     Types: Marijuana     Comment: Pt states used weekly but now quitting    • Sexual activity: Defer       No Known Allergies    ROS    Review of Systems   Constitutional: Positive for fatigue. Negative for chills, diaphoresis and fever.   HENT: Negative for congestion, postnasal drip and rhinorrhea.    Respiratory: Negative for cough, shortness of breath and wheezing.    Cardiovascular: Negative for chest pain and leg swelling.   Musculoskeletal: Positive for arthralgias.   Neurological: Positive for dizziness, tremors, weakness and light-headedness. Negative for headache.   Psychiatric/Behavioral: Positive for stress. Negative for self-injury, sleep disturbance, suicidal ideas and depressed mood. The patient is not nervous/anxious.        Vitals:    20 1303   BP: 140/60   Pulse: (!) 49   Temp: 97.3 °F (36.3 °C)   SpO2: 98%   Weight: 92.5 kg (204 lb)   Height: 177.8 cm (70\")   PainSc: 0-No pain     Body mass index is 29.27 kg/m².    Current Outpatient Medications on File Prior to " Visit   Medication Sig Dispense Refill   • amiodarone (PACERONE) 200 MG tablet Take 1 tablet by mouth Daily. 90 tablet 3   • aspirin 81 MG chewable tablet Chew 1 tablet Daily.     • Blood Pressure Monitoring (BLOOD PRESSURE MONITOR/L CUFF) misc 1 Units Daily. 1 each 0   • diclofenac (VOLTAREN) 1 % gel gel Apply 4 g topically to the appropriate area as directed 4 (Four) Times a Day As Needed (prn for pain). 60 tube 0   • famotidine (PEPCID) 20 MG tablet Take 1 tablet by mouth At Night As Needed for Heartburn. 90 tablet 1   • levETIRAcetam (KEPPRA) 250 MG tablet Take 1 tablet by mouth 2 (Two) Times a Day. 180 tablet 1   • metoprolol succinate XL (TOPROL-XL) 25 MG 24 hr tablet Take 0.5 tablets by mouth Daily. 30 tablet 6   • rosuvastatin (CRESTOR) 20 MG tablet Take 1 tablet by mouth Daily. 90 tablet 1   • sacubitril-valsartan (ENTRESTO) 24-26 MG tablet Take 1 tablet by mouth 2 (Two) Times a Day. 60 tablet 3   • [DISCONTINUED] ACCU-CHEK FASTCLIX LANCETS misc Use daily. 102 each 0   • [DISCONTINUED] Blood Glucose Monitoring Suppl (ACCU-CHEK GUIDE) w/Device kit 1 each Daily. 1 kit 0   • [DISCONTINUED] glucose blood test strip Use as instructed to test blood sugar daily 100 each 5   • [DISCONTINUED] metFORMIN (GLUCOPHAGE) 1000 MG tablet Take 1 tablet by mouth Daily With Breakfast. 90 tablet 3     No current facility-administered medications on file prior to visit.        Results for orders placed or performed in visit on 08/03/20   POC Glycosylated Hemoglobin (Hb A1C)   Result Value Ref Range    Hemoglobin A1C 6.0 %       PE    Physical Exam   Constitutional: He appears well-developed and well-nourished. He is active and cooperative. He does not appear ill. No distress. He is not overweight.  HENT:   Head: Normocephalic and atraumatic.   Right Ear: Decreased hearing is noted.   Left Ear: Decreased hearing is noted.   Eyes: EOM are normal.   Neck: Normal range of motion.   Cardiovascular: Normal rate, regular rhythm and  normal heart sounds.   Pulmonary/Chest: Effort normal and breath sounds normal.   Musculoskeletal: Normal range of motion. He exhibits no edema.   Neurological: He is alert.   Skin: Skin is warm. He is not diaphoretic. No erythema.   Psychiatric: He has a normal mood and affect. His speech is normal and behavior is normal. Judgment and thought content normal. He is not actively hallucinating. He is attentive.   Vitals reviewed.       A/P    Narendra was seen today for shaking and slow heart rate.    Diagnoses and all orders for this visit:    Type 2 diabetes mellitus with hyperglycemia, without long-term current use of insulin (CMS/[facility])  -     POC Glycosylated Hemoglobin (Hb A1C)  -     metFORMIN ER (GLUCOPHAGE-XR) 500 MG 24 hr tablet; Take 1 tablet by mouth 2 (Two) Times a Day With Meals.  -     Discontinue: Blood Glucose Monitoring Suppl (ACCU-CHEK GUIDE) w/Device kit; 1 each Daily.  -     Discontinue: Accu-Chek FastClix Lancets misc; Use daily.  -     Discontinue: glucose blood test strip; Use as instructed to test blood sugar daily  Hemoglobin AIC was previously 5.9%, today it is 6%.  Compliant on metformin 1000 mg QD.  Admits to diarrhea most days.  Trial metformin  mg BID.  Needs new glucometer and supplies, sent to pharmacy.    Bilateral hearing loss, unspecified hearing loss type  -     Ambulatory Referral to Social Work  Referral to ENT per patient's wife request.  Patient will coordinate with social work for assistance on getting hearing aids.    Essential hypertension  -     Comprehensive Metabolic Panel; Future  Stable, well-controlled.    Bradycardia  Pending pacemaker placement.  Followed by cardiology.    Shaking  Dizziness  No tremors today on exam.  Dizziness with sitting to standing or changing position.  Some of these symptoms may be related to bradycardia and hypotension.  Follow-up with cardiology.    chronic thrombocytopenia  -     CBC Auto Differential; Future  Followed by hematology.        Plan of care reviewed with patient at the conclusion of today's visit. Education was provided regarding diagnosis, management and any prescribed or recommended OTC medications.  Patient verbalizes understanding of and agreement with management plan.    Return in about 6 months (around 2/3/2021) for Medicare Wellness.     Marguerite Moore PA-C

## 2020-08-12 NOTE — PROGRESS NOTES
Cardiac Electrophysiology Outpatient Consult Note            [facility]    Consult Note     Narendra Cochran  [id number]  08/13/2020       Primary Care Physician: Marguerite Moore PA-C    Referred By: Javad Mercedes,*    Subjective     Chief Complaint:   Chief Complaint   Patient presents with   • NICM     consult     Problem List:      Premature ventricular contractions  Echo 11/13/2019 EF 60%, mild LVH, mild MR, mild to moderate AR, LA 4.6 cm  Stress test February 2020 PVCs at both rest and stress, no perfusion evidence of ischemia, ejection fraction 31% with dilated LV cavity  [facility] March 2020 with mild nonobstructive CAD, EF 36-40%  Holter monitor February 2020:  PVCs burden 12.3% with some of the counted PVCs appear to be PACs with aberrancy. One 4 beat run of nonsustained VT versus SVT with aberrancy  Amiodarone therapy initiated at 200 mg daily for PVCs  24-hour Holter 7/15/2020 HR 43-79, average 55 bpm, PVC burden 10.2%  Nonischemic cardiomyopathy /systolic congestive heart failure  Echo 11/13/2019 EF 60%, mild LVH, mild MR, mild to moderate AR, LA 4.6 cm  [facility] March 2020 with mild nonobstructive CAD, EF 36-40%  Essential hypertension  Dyslipidemia  Diabetes mellitus  COPD  Seizures  Paget's disease  Hearing loss    History of Present Illness:   Mr. Narendra Cochran is a 79 y.o. male who presents to my electrophysiology clinic for evaluation of PVCs and also sinus node dysfunction.    The patient is here with his wife today.  He is rather hard of hearing.    He says that he feels reasonably well.  He is not perfect.  He does get short of breath when he walks but also notes that he does have bad lungs and wheezes when he walks.  He never feels short of breath while lying flat.  He does not wake up at night short of breath.  He has had no palpitations.  He very rarely will appreciate that he is a skipped extra heartbeat this is really only however when he  sits down and thinks about it and tries to pay attention carefully.  He is never had any moderate or severe palpitations.  He is never passed out.  Is never had any severe presyncope.    He takes his medications reliably.    No chest pain nausea vomiting fevers or chills.  No COVID-19 exposure..    Review of Systems:   Constitutional: No fevers or chills, no recent weight gain or weight loss or fatigue  Eyes: No visual loss, blurred vision, double vision, yellow sclerae.  ENT: No headaches, hearing loss, vertigo, congestion or sore throat.   Cardiovascular: Per HPI  Respiratory: No cough or wheezing, no sputum production, no hematemesis   Gastrointestinal: No abdominal pain, no nausea, vomiting, constipation, diarrhea, melena.   Genitourinary: No dysuria, hematuria or increased frequency.  Musculoskeletal:  No gait disturbance, weakness or joint pain or stiffness  Integumentary: No rashes, urticaria, ulcers or sores.   Neurological: No headache, dizziness, syncope, paralysis, ataxia, no prior CVA/TIA  Psychiatric: No anxiety, or depression  Endocrine: No diaphoresis, cold or heat intolerance. No polyuria or polydipsia.   Hematologic/Lymphatic: No anemia, abnormal bruising or bleeding. No history of DVT/PE.        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   • Paget disease of bone        Past Surgical History:   Past Surgical History:   Procedure Laterality Date   • APPENDECTOMY     • CARDIAC CATHETERIZATION N/A 3/18/2020    Procedure: Left Heart Cath;  Surgeon: Sonya Garibay MD;  Location: [facility] INVASIVE LOCATION;  Service: Cardiology;  Laterality: N/A;  First availabel provider     • COLON RESECTION      second to diverticulitis    • FOOT SURGERY Left        Family History:   Family History   Problem Relation Age of Onset   • No Known Problems  Daughter    • No Known Problems Son    • No Known Problems Son    • No Known Problems Son    • Diabetes Sister    • Clotting disorder Sister    • No Known Problems Mother    • No Known Problems Sister    • No Known Problems Brother    • Fainting Brother        Social History:   Social History     Socioeconomic History   • Marital status:      Spouse name: Not on file   • Number of children: Not on file   • Years of education: Not on file   • Highest education level: Not on file   Tobacco Use   • Smoking status: Former Smoker     Packs/day: 0.25     Years: 65.00     Pack years: 16.25     Types: Cigars, Cigarettes     Last attempt to quit: 2019     Years since quittin.9   • Smokeless tobacco: Never Used   Substance and Sexual Activity   • Alcohol use: Yes     Comment: rarely   • Drug use: Yes     Types: Marijuana     Comment: Pt states used weekly but now quitting    • Sexual activity: Defer       Medications:     Current Outpatient Medications:   •  amiodarone (PACERONE) 200 MG tablet, Take 1 tablet by mouth Daily., Disp: 90 tablet, Rfl: 3  •  aspirin 81 MG chewable tablet, Chew 1 tablet Daily., Disp: , Rfl:   •  Blood Pressure Monitoring (BLOOD PRESSURE MONITOR/L CUFF) misc, 1 Units Daily., Disp: 1 each, Rfl: 0  •  diclofenac (VOLTAREN) 1 % gel gel, Apply 4 g topically to the appropriate area as directed 4 (Four) Times a Day As Needed (prn for pain)., Disp: 60 tube, Rfl: 0  •  famotidine (PEPCID) 20 MG tablet, Take 1 tablet by mouth At Night As Needed for Heartburn., Disp: 90 tablet, Rfl: 1  •  glucose blood test strip, Use to check blood glucose once a day. DX:E11.65, Disp: 100 each, Rfl: 3  •  glucose monitor monitoring kit, 1 each Daily. DX: E11.65, Disp: 1 each, Rfl: 0  •  Lancets 30G misc, Use to check blood glucose once a day. DX: E11.65, Disp: 100 each, Rfl: 3  •  levETIRAcetam (KEPPRA) 250 MG tablet, Take 1 tablet by mouth 2 (Two) Times a Day., Disp: 180 tablet, Rfl: 1  •  metFORMIN ER  "(GLUCOPHAGE-XR) 500 MG 24 hr tablet, Take 1 tablet by mouth 2 (Two) Times a Day With Meals., Disp: 180 tablet, Rfl: 1  •  metoprolol succinate XL (TOPROL-XL) 25 MG 24 hr tablet, Take 0.5 tablets by mouth Daily., Disp: 30 tablet, Rfl: 6  •  rosuvastatin (CRESTOR) 20 MG tablet, Take 1 tablet by mouth Daily., Disp: 90 tablet, Rfl: 1  •  sacubitril-valsartan (ENTRESTO) 24-26 MG tablet, Take 1 tablet by mouth 2 (Two) Times a Day., Disp: 60 tablet, Rfl: 3    Allergies:   No Known Allergies    Objective     Physical Exam:  Vital Signs:   Vitals:    08/13/20 0935   BP: 110/72   BP Location: Left arm   Patient Position: Sitting   Pulse: 50   Temp: 98.4 °F (36.9 °C)   SpO2: 97%   Weight: 95.9 kg (211 lb 6.4 oz)   Height: 180.3 cm (71\")     GEN: Well nourished, well-developed, no acute distress  HEENT: Normocephalic, atraumatic, moist mucous membranes  NECK: Supple, no JVD, no thyromegaly, no lymphadenopathy  CARD: Regular rate and rhythm, normal S1 & S2 are present.  No murmur, gallop or rubs are appreciated.  LUNGS: Clear to auscultation bilateraly, normal respiratory effort  ABDOMEN: Soft, nontender, normal bowel sounds  EXTREMITIES: No gross deformities, no clubbing, cyanosis.  Edema none  SKIN: Warm, dry  NEURO: No focal deficits, alert and oriented x 3  PSYCHIATRIC: Normal affect and mood, appropriate use of semantics and logic.      Lab Results   Component Value Date    GLUCOSE 106 (H) 03/18/2020    CALCIUM 9.1 03/18/2020     03/18/2020    K 3.9 03/18/2020    CO2 25.0 03/18/2020     03/18/2020    BUN 16 03/18/2020    CREATININE 0.80 03/18/2020    EGFRIFAFRI 106 03/18/2020    EGFRIFNONA 64 10/21/2019    BCR 19.0 03/18/2020    ANIONGAP 9.0 03/18/2020     Lab Results   Component Value Date    WBC 2.60 (L) 03/18/2020    HGB 12.1 (L) 03/18/2020    HCT 39.1 03/18/2020    MCV 85.4 03/18/2020     (L) 03/18/2020     Lab Results   Component Value Date    INR 1.00 11/12/2019    INR 0.9 05/28/2017    PROTIME 12.7 " 11/12/2019    PROTIME 11.4 (L) 05/28/2017     Lab Results   Component Value Date    TSH 2.780 11/13/2019       Cardiac Testing:      I personally viewed and interpreted the patient's EKG/Telemetry/lab data      ECG 12 Lead  Date/Time: 8/13/2020 10:24 AM  Performed by: Som Boyd DO  Authorized by: Som Boyd DO   Comparison: compared with previous ECG   Similar to previous ECG  Rhythm: sinus rhythm  Ectopy: unifocal PVCs            Tobacco Cessation: N/A  Obstructive Sleep Apnea Screening: N/A    Assessment & Plan      79-year-old gentleman here to be evaluated for PVCs and potentially significant sinus node dysfunction.  This is in the context of a nonischemic cardiomyopathy with ejection fraction that is variably either entirely normal or mildly depressed.  He has single-vessel minor obstructive coronary disease but this does not explain his myopathy.    His PVC burden is quite low at only 10% and is either entirely asymptomatic or occasionally he will have mild palpitations at worst.    At this point the patient does not have indication for permanent pacemaker.  He has no evidence of symptomatic sinus node dysfunction and no evidence of any other indication for pacemaker.    I do not think that his PVCs require any additional work-up.  They seem to be potentially left-sided and with very mild symptoms with concomitant lung disease at age 79 I think we should continue to treat these with a low-dose of a beta-blocker.  These do not confer in my opinion any significant increased risk of morbidity and mortality.    I discussed this extensively with the patient.  I told him that I do not recommend any additional medication work-up or procedures.    I be happy to see him in an as needed fashion.    He will follow-up with Dr. Mercedes his primary cardiologist.     Narendra was seen today for nicm.    Diagnoses and all orders for this visit:    NSVT (nonsustained ventricular tachycardia) (CMS/[facility])    NICM  (nonischemic cardiomyopathy) (CMS/HCC)        Follow Up:       Thank you for allowing me to participate in the care of your patient. Please to not hesitate to contact me with additional questions or concerns.        Som Boyd DO, FACC, [facility], [place]  Cardiac Electrophysiologist

## 2020-08-13 ENCOUNTER — CONSULT (OUTPATIENT)
Dept: CARDIOLOGY | Facility: CLINIC | Age: 80
End: 2020-08-13

## 2020-08-13 VITALS
OXYGEN SATURATION: 97 % | HEART RATE: 50 BPM | SYSTOLIC BLOOD PRESSURE: 110 MMHG | BODY MASS INDEX: 29.6 KG/M2 | TEMPERATURE: 98.4 F | DIASTOLIC BLOOD PRESSURE: 72 MMHG | HEIGHT: 71 IN | WEIGHT: 211.4 LBS

## 2020-08-13 DIAGNOSIS — I42.8 NICM (NONISCHEMIC CARDIOMYOPATHY) (HCC): ICD-10-CM

## 2020-08-13 DIAGNOSIS — I47.29 NSVT (NONSUSTAINED VENTRICULAR TACHYCARDIA) (HCC): Primary | ICD-10-CM

## 2020-08-13 PROCEDURE — 93000 ELECTROCARDIOGRAM COMPLETE: CPT | Performed by: INTERNAL MEDICINE

## 2020-08-13 PROCEDURE — 99204 OFFICE O/P NEW MOD 45 MIN: CPT | Performed by: INTERNAL MEDICINE

## 2020-09-16 DIAGNOSIS — E78.5 HYPERLIPIDEMIA, UNSPECIFIED HYPERLIPIDEMIA TYPE: Chronic | ICD-10-CM

## 2020-09-17 RX ORDER — ROSUVASTATIN CALCIUM 20 MG/1
TABLET, COATED ORAL
Qty: 90 TABLET | Refills: 0 | Status: SHIPPED | OUTPATIENT
Start: 2020-09-17 | End: 2020-12-21

## 2020-10-12 ENCOUNTER — TELEPHONE (OUTPATIENT)
Dept: FAMILY MEDICINE CLINIC | Facility: CLINIC | Age: 80
End: 2020-10-12

## 2020-10-12 DIAGNOSIS — E11.65 TYPE 2 DIABETES MELLITUS WITH HYPERGLYCEMIA, WITHOUT LONG-TERM CURRENT USE OF INSULIN (HCC): Primary | Chronic | ICD-10-CM

## 2020-10-12 NOTE — TELEPHONE ENCOUNTER
Called and spoke with patient wife, informed that referral had been placed and that they should hear something soon about referral. Verbalized understanding and had no further questions or concerns at this time

## 2020-12-21 DIAGNOSIS — E78.5 HYPERLIPIDEMIA, UNSPECIFIED HYPERLIPIDEMIA TYPE: Chronic | ICD-10-CM

## 2020-12-21 RX ORDER — ROSUVASTATIN CALCIUM 20 MG/1
TABLET, COATED ORAL
Qty: 90 TABLET | Refills: 0 | Status: SHIPPED | OUTPATIENT
Start: 2020-12-21 | End: 2021-03-25 | Stop reason: SDUPTHER

## 2020-12-30 ENCOUNTER — OFFICE VISIT (OUTPATIENT)
Dept: CARDIOLOGY | Facility: CLINIC | Age: 80
End: 2020-12-30

## 2020-12-30 VITALS
OXYGEN SATURATION: 93 % | BODY MASS INDEX: 28.98 KG/M2 | HEIGHT: 71 IN | SYSTOLIC BLOOD PRESSURE: 146 MMHG | HEART RATE: 60 BPM | DIASTOLIC BLOOD PRESSURE: 58 MMHG | WEIGHT: 207 LBS

## 2020-12-30 DIAGNOSIS — I42.8 NICM (NONISCHEMIC CARDIOMYOPATHY) (HCC): Primary | ICD-10-CM

## 2020-12-30 PROCEDURE — 99213 OFFICE O/P EST LOW 20 MIN: CPT | Performed by: INTERNAL MEDICINE

## 2020-12-30 PROCEDURE — 93000 ELECTROCARDIOGRAM COMPLETE: CPT | Performed by: INTERNAL MEDICINE

## 2020-12-30 RX ORDER — METOPROLOL SUCCINATE 25 MG/1
25 TABLET, EXTENDED RELEASE ORAL DAILY
Qty: 30 TABLET | Refills: 6 | Status: SHIPPED | OUTPATIENT
Start: 2020-12-30 | End: 2021-03-20 | Stop reason: HOSPADM

## 2020-12-30 NOTE — PROGRESS NOTES
[facility] Cardiology  Follow Up Visit  Narendra Cochran  1940    VISIT DATE:  12/30/20    PCP:   Marguerite Moore PA-C  [id number] [facility] [address]          CC:  NICM (f/u)      Problem List:  1.  HTN  2.  DM  3.  COPD  4.  Pagets disease  5.  Hearing loss  6.  Obesity  7.  Tobacco-quit last year  8.  Systolic heart failure/nonischemic cardiomyopathy  9.  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  · Left ventricular wall thickness is consistent with mild concentric hypertrophy.  · Left ventricular systolic function is normal. Estimated EF = 60%.  · Left ventricular diastolic dysfunction (grade I) consistent with impaired relaxation.  · Mild to moderate aortic valve regurgitation is present.  · Mild mitral valve regurgitation is present     Stress test February 2020  PVCs at both rest and stress, no perfusion evidence of ischemia, ejection fraction 31% with dilated LV cavity     Cardiac catheterization March 2020  · LM: Angiographically normal.  · LAD: Minor irregularities, no significant disease.  · LCX: Minor irregularities, no significant disease.  · RCA: Dominant vessel with minor irregularities and no significant disease throughout its course or in the large PDA.  A small less than 2 mm posterolateral branch has a focal 90% stenosis.    · LV: Left ventriculogram performed in 30 LE projection revealed global hypokinesis with moderate left ventricular systolic dysfunction.  Estimated ejection fraction is 36 to 40%. No significant mitral regurgitation was noted.        Holter monitor February 2020  · An abnormal monitor study.  · PVCs present with burden of 12.3%  · Some of the counted PVCs appear to be PACs with aberrancy  · There was a 4 beat run of nonsustained VT versus SVT with aberrancy    History of Present Illness:  Narendra Cochran  Is a 80 y.o. male with pertinent cardiac history detailed  above.  Since last visit the patient did see Dr. Boyd for his PVCs.  Continued management with a beta-blocker was recommended no additional treatment.  His PVC burden was not changed while on amiodarone.  He has stable dyspnea on exertion.  No  Chest pain, no syncope.  Occasional stable palpiations.  EKG today does show PVCs and a pattern of bigeminy.  States at home blood pressure has normally been in the 130s systolic.  No other new complaints today      Patient Active Problem List    Diagnosis Date Noted   • NSVT (nonsustained ventricular tachycardia) (CMS/[facility]) 03/18/2020     Note Last Updated: 3/18/2020     1. 48 Hour Holter Feb 2020:   · PVCs present with burden of 12.3%  · Some of the counted PVCs appear to be PACs with aberrancy  · There was a 4 beat run of nonsustained VT versus SVT with aberrancy     • CAD (coronary artery disease) 03/18/2020     Note Last Updated: 3/18/2020     1. [facility] 3-18-20:   · Single-vessel CAD involving a small posterolateral branch of the right coronary artery.  · No evidence of any significant disease involving any major vessels.  · Nonischemic cardiomyopathy with moderate left ventricular systolic dysfunction, estimated EF 36 to 40%.       • NICM (nonischemic cardiomyopathy) (CMS/[facility]) 03/18/2020     Note Last Updated: 3/18/2020     1. [facility] 3-18-20:   · Single-vessel CAD involving a small posterolateral branch of the right coronary artery.  · No evidence of any significant disease involving any major vessels.  · Nonischemic cardiomyopathy with moderate left ventricular systolic dysfunction, estimated EF 36 to 40%.       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) 2017   • Diverticulosis 2017   • Paget disease of bone 2017     Note Last Updated: 2017     History of Paget's disease, treated with Reclast.  Last saw Dr. Lui .     • Tobacco abuse 2017   • chronic thrombocytopenia 2017       No Known Allergies    Social History     Socioeconomic History   • Marital status:      Spouse name: Not on file   • Number of children: Not on file   • Years of education: Not on file   • Highest education level: Not on file   Tobacco Use   • Smoking status: Former Smoker     Packs/day: 0.25     Years: 65.00     Pack years: 16.25     Types: Cigars, Cigarettes     Quit date: 2019     Years since quittin.3   • Smokeless tobacco: Never Used   Substance and Sexual Activity   • Alcohol use: Yes     Comment: rarely   • Drug use: Yes     Types: Marijuana     Comment: Pt states used weekly but now quitting    • Sexual activity: Defer       Family History   Problem Relation Age of Onset   • No Known Problems Daughter    • No Known Problems Son    • No Known Problems Son    • No Known Problems Son    • Diabetes Sister    • Clotting disorder Sister    • No Known Problems Mother    • No Known Problems Sister    • No Known Problems Brother    • Fainting Brother        Current Medications:    Current Outpatient Medications:   •  aspirin 81 MG chewable tablet, Chew 1 tablet Daily., Disp: , Rfl:   •  Blood Pressure Monitoring (BLOOD PRESSURE MONITOR/L CUFF) misc, 1 Units Daily., Disp: 1 each, Rfl: 0  •  diclofenac (VOLTAREN) 1 % gel gel, Apply 4 g topically to the appropriate area as directed 4 (Four) Times a Day As Needed (prn for pain)., Disp: 60 tube, Rfl: 0  •   "famotidine (PEPCID) 20 MG tablet, Take 1 tablet by mouth At Night As Needed for Heartburn., Disp: 90 tablet, Rfl: 1  •  glucose blood test strip, Use to check blood glucose once a day. DX:E11.65, Disp: 100 each, Rfl: 3  •  glucose monitor monitoring kit, 1 each Daily. DX: E11.65, Disp: 1 each, Rfl: 0  •  Lancets 30G misc, Use to check blood glucose once a day. DX: E11.65, Disp: 100 each, Rfl: 3  •  levETIRAcetam (KEPPRA) 250 MG tablet, Take 1 tablet by mouth 2 (Two) Times a Day., Disp: 180 tablet, Rfl: 1  •  metFORMIN ER (GLUCOPHAGE-XR) 500 MG 24 hr tablet, Take 1 tablet by mouth 2 (Two) Times a Day With Meals., Disp: 180 tablet, Rfl: 1  •  metoprolol succinate XL (TOPROL-XL) 25 MG 24 hr tablet, Take 1 tablet by mouth Daily., Disp: 30 tablet, Rfl: 6  •  rosuvastatin (CRESTOR) 20 MG tablet, TAKE ONE TABLET BY MOUTH DAILY, Disp: 90 tablet, Rfl: 0  •  sacubitril-valsartan (ENTRESTO) 24-26 MG tablet, Take 1 tablet by mouth 2 (Two) Times a Day., Disp: 60 tablet, Rfl: 3  •  Dapagliflozin Propanediol 10 MG tablet, Take 10 mg by mouth Daily., Disp: 30 tablet, Rfl: 6     Review of Systems   HENT:        Hard of hearing   Cardiovascular: Positive for dyspnea on exertion, irregular heartbeat and palpitations. Negative for chest pain.   Respiratory: Positive for shortness of breath.    All other systems reviewed and are negative.      Vitals:    12/30/20 1532   BP: 146/58   BP Location: Right arm   Patient Position: Sitting   Pulse: 60   SpO2: 93%   Weight: 93.9 kg (207 lb)   Height: 180.3 cm (71\")       Physical Exam  Constitutional:       Appearance: Normal appearance.   Cardiovascular:      Rate and Rhythm: Normal rate.      Comments: Positive extrasystoles  Pulmonary:      Effort: Pulmonary effort is normal.      Breath sounds: Normal breath sounds.   Abdominal:      Palpations: Abdomen is soft.   Musculoskeletal:      Right lower leg: No edema.      Left lower leg: No edema.   Skin:     General: Skin is warm and dry. "   Neurological:      General: No focal deficit present.      Mental Status: He is alert.         Diagnostic Data:    ECG 12 Lead    Date/Time: 12/30/2020 3:40 PM  Performed by: Javad Mercedes MD  Authorized by: Javad Mercedes MD   Comparison: compared with previous ECG from 8/13/2020  Similar to previous ECG  Rhythm: sinus rhythm  Ectopy: unifocal PVCs and bigeminy  BPM: 60  Conduction: 1st degree AV block  QRS axis: normal  Other findings: left ventricular hypertrophy with strain    Clinical impression: abnormal EKG          Lab Results   Component Value Date    TRIG 77 03/18/2020    HDL 58 03/18/2020     Lab Results   Component Value Date    GLUCOSE 106 (H) 03/18/2020    BUN 16 03/18/2020    CREATININE 0.80 03/18/2020     03/18/2020    K 3.9 03/18/2020     03/18/2020    CO2 25.0 03/18/2020     Lab Results   Component Value Date    HGBA1C 6.0 08/03/2020     Lab Results   Component Value Date    WBC 2.60 (L) 03/18/2020    HGB 12.1 (L) 03/18/2020    HCT 39.1 03/18/2020     (L) 03/18/2020       Assessment:   Diagnosis Plan   1. NICM (nonischemic cardiomyopathy) (CMS/[facility])  ECG 12 Lead       Plan:    1.  Nonischemic cardiomyopathy  -Continue Entresto  -Increase metoprolol XL back to 25 mg daily  -Adding Farxiga 10 mg daily for CHF and diabetes  -EF 36 to 40% by left ventriculogram  -Continue ASA and statin for nonobstructive CAD     2..  Hypertension  -Continue Entresto  -EKG shows LVH with strain     3.   Frequent PVCs  -Decline in ejection fraction from November 2019 to February 2020 with 12% PVCs on Holter monitoring no significant decrease in burden while on amiodarone.  Remained 10%  -We will discontinue amiodarone   -Increase Toprol-XL back to 25 mg     4.  HLD  -LDL 63 February last year, he is on a statin     5. Dyspnea on exertion  -stable, hx of COPD    6.  DM  -last a1c6  -Adding Farxiga 10 mg daily for reduction of cardiovascular risk    Follow-up 4 months.  After visit  summary with medication changes given to patient given that he is hard of hearing        Javad Mercedes MD FACC

## 2021-01-08 ENCOUNTER — TELEPHONE (OUTPATIENT)
Dept: FAMILY MEDICINE CLINIC | Facility: CLINIC | Age: 81
End: 2021-01-08

## 2021-01-08 NOTE — TELEPHONE ENCOUNTER
PATIENT'S WIFE CALLED TO SAY THAT CARDIOLOGIST CHANGED PATIENT'S DIABETES MEDICATION AND IT IS TOO EXPENSIVE FOR HIM. WIFE DOESN'T KNOW WHY HE CHANGED HIS MEDICATION. ALSO WIFE DOESN'T KNOW THE NAM OF THE MEDICATION HE CHANGED IT TOO.    PLEASE ADVISE AND CALL WIFE DEE DEE BACK [phone]

## 2021-01-08 NOTE — TELEPHONE ENCOUNTER
Please let patient's wife know that cardiology did not stop any of his medication for diabetes that I can see.  It looks like they added farxiga - this medicine helps with diabetes and protects the heart.  Patient does not need to worry about taking this right now - we can discuss it at his next visit.  Please ask wife to accompany patient if possible on 02/02/21.

## 2021-01-09 NOTE — TELEPHONE ENCOUNTER
Attempted to contact, no answer. Left detailed message relaying Marguerite's message and asked for a return call for any questions

## 2021-01-15 ENCOUNTER — TELEPHONE (OUTPATIENT)
Dept: FAMILY MEDICINE CLINIC | Facility: CLINIC | Age: 81
End: 2021-01-15

## 2021-01-15 DIAGNOSIS — H91.93 BILATERAL HEARING LOSS, UNSPECIFIED HEARING LOSS TYPE: Primary | ICD-10-CM

## 2021-01-18 ENCOUNTER — OFFICE VISIT (OUTPATIENT)
Dept: FAMILY MEDICINE CLINIC | Facility: CLINIC | Age: 81
End: 2021-01-18

## 2021-01-18 VITALS
HEART RATE: 56 BPM | HEIGHT: 71 IN | BODY MASS INDEX: 27.58 KG/M2 | WEIGHT: 197 LBS | DIASTOLIC BLOOD PRESSURE: 60 MMHG | SYSTOLIC BLOOD PRESSURE: 150 MMHG | TEMPERATURE: 97 F | OXYGEN SATURATION: 99 %

## 2021-01-18 DIAGNOSIS — K21.9 GASTROESOPHAGEAL REFLUX DISEASE, UNSPECIFIED WHETHER ESOPHAGITIS PRESENT: ICD-10-CM

## 2021-01-18 DIAGNOSIS — R56.9 SEIZURE (HCC): ICD-10-CM

## 2021-01-18 DIAGNOSIS — D69.3 CHRONIC IDIOPATHIC THROMBOCYTOPENIA (HCC): ICD-10-CM

## 2021-01-18 DIAGNOSIS — R19.7 DIARRHEA, UNSPECIFIED TYPE: ICD-10-CM

## 2021-01-18 DIAGNOSIS — J44.9 CHRONIC OBSTRUCTIVE PULMONARY DISEASE, UNSPECIFIED COPD TYPE (HCC): ICD-10-CM

## 2021-01-18 DIAGNOSIS — I10 ESSENTIAL HYPERTENSION: Chronic | ICD-10-CM

## 2021-01-18 DIAGNOSIS — R11.0 NAUSEA: ICD-10-CM

## 2021-01-18 DIAGNOSIS — H91.93 BILATERAL HEARING LOSS, UNSPECIFIED HEARING LOSS TYPE: ICD-10-CM

## 2021-01-18 DIAGNOSIS — E11.65 TYPE 2 DIABETES MELLITUS WITH HYPERGLYCEMIA, WITHOUT LONG-TERM CURRENT USE OF INSULIN (HCC): Primary | Chronic | ICD-10-CM

## 2021-01-18 DIAGNOSIS — R10.84 GENERALIZED ABDOMINAL PAIN: ICD-10-CM

## 2021-01-18 LAB — HBA1C MFR BLD: 5.6 %

## 2021-01-18 PROCEDURE — 99214 OFFICE O/P EST MOD 30 MIN: CPT | Performed by: PHYSICIAN ASSISTANT

## 2021-01-18 PROCEDURE — 83036 HEMOGLOBIN GLYCOSYLATED A1C: CPT | Performed by: PHYSICIAN ASSISTANT

## 2021-01-18 RX ORDER — OMEPRAZOLE 40 MG/1
40 CAPSULE, DELAYED RELEASE ORAL
Qty: 90 CAPSULE | Refills: 1 | Status: SHIPPED | OUTPATIENT
Start: 2021-01-18 | End: 2021-03-25

## 2021-01-18 NOTE — PATIENT INSTRUCTIONS
Prilosec (omeprazole) in the morning on an empty stomach.  30 minutes before breakfast.    Stop pepcid.    Stop metformin in the morning.  Take 1 tablet of metformin 500 mg with dinner at night.

## 2021-01-19 ENCOUNTER — LAB (OUTPATIENT)
Dept: LAB | Facility: HOSPITAL | Age: 81
End: 2021-01-19

## 2021-01-19 DIAGNOSIS — I10 ESSENTIAL HYPERTENSION: ICD-10-CM

## 2021-01-19 DIAGNOSIS — R10.84 GENERALIZED ABDOMINAL PAIN: ICD-10-CM

## 2021-01-19 DIAGNOSIS — D69.3 CHRONIC IDIOPATHIC THROMBOCYTOPENIA (HCC): ICD-10-CM

## 2021-01-19 DIAGNOSIS — E11.65 TYPE 2 DIABETES MELLITUS WITH HYPERGLYCEMIA, WITHOUT LONG-TERM CURRENT USE OF INSULIN (HCC): Chronic | ICD-10-CM

## 2021-01-19 LAB
BASOPHILS # BLD AUTO: 0.01 10*3/MM3 (ref 0–0.2)
BASOPHILS NFR BLD AUTO: 0.4 % (ref 0–1.5)
DEPRECATED RDW RBC AUTO: 43.4 FL (ref 37–54)
EOSINOPHIL # BLD AUTO: 0.03 10*3/MM3 (ref 0–0.4)
EOSINOPHIL NFR BLD AUTO: 1.1 % (ref 0.3–6.2)
ERYTHROCYTE [DISTWIDTH] IN BLOOD BY AUTOMATED COUNT: 13.6 % (ref 12.3–15.4)
HCT VFR BLD AUTO: 41.2 % (ref 37.5–51)
HGB BLD-MCNC: 13.2 G/DL (ref 13–17.7)
IMM GRANULOCYTES # BLD AUTO: 0.01 10*3/MM3 (ref 0–0.05)
IMM GRANULOCYTES NFR BLD AUTO: 0.4 % (ref 0–0.5)
LYMPHOCYTES # BLD AUTO: 0.71 10*3/MM3 (ref 0.7–3.1)
LYMPHOCYTES NFR BLD AUTO: 26.7 % (ref 19.6–45.3)
MCH RBC QN AUTO: 28 PG (ref 26.6–33)
MCHC RBC AUTO-ENTMCNC: 32 G/DL (ref 31.5–35.7)
MCV RBC AUTO: 87.3 FL (ref 79–97)
MONOCYTES # BLD AUTO: 0.26 10*3/MM3 (ref 0.1–0.9)
MONOCYTES NFR BLD AUTO: 9.8 % (ref 5–12)
NEUTROPHILS NFR BLD AUTO: 1.64 10*3/MM3 (ref 1.7–7)
NEUTROPHILS NFR BLD AUTO: 61.6 % (ref 42.7–76)
NRBC BLD AUTO-RTO: 0 /100 WBC (ref 0–0.2)
PLATELET # BLD AUTO: 140 10*3/MM3 (ref 140–450)
PMV BLD AUTO: 12.3 FL (ref 6–12)
RBC # BLD AUTO: 4.72 10*6/MM3 (ref 4.14–5.8)
WBC # BLD AUTO: 2.66 10*3/MM3 (ref 3.4–10.8)

## 2021-01-19 PROCEDURE — 85025 COMPLETE CBC W/AUTO DIFF WBC: CPT

## 2021-01-19 PROCEDURE — 83690 ASSAY OF LIPASE: CPT

## 2021-01-19 PROCEDURE — 82607 VITAMIN B-12: CPT

## 2021-01-19 PROCEDURE — 36415 COLL VENOUS BLD VENIPUNCTURE: CPT

## 2021-01-19 PROCEDURE — 80053 COMPREHEN METABOLIC PANEL: CPT

## 2021-01-19 NOTE — PROGRESS NOTES
Chief Complaint   Patient presents with   • Hearing Problem     Pt states he needs a refferal for a program he is in to receive cheper hearing aids.Pt states he was given a prescription from his cardiologist that was too expensive and they would  like to discuss different options with PCP.       HPI     Narendra Cochran is a pleasant 80 y.o. male who is here for routine follow-up of seizure, hypertension, hearing loss, diabetes type 2 COPD, thrombocytopenia and abdominal pain.  Patient is compliant on all medications.  Followed by cardiology.  BP is elevated today at appointment.  He has significant hearing loss, wife is present at appointment.  Referral has been made to audiology.  Patient is taking metformin 500 mg BID for diabetes.  He was prescribed farxiga by cardiology but this is too expensive for patient.  He has epigastric pain, nausea, loss of appetite and diarrhea without blood/mucus.  He is taking pepcid 20 mg nightly prn.  Denies burning in esophagus.  No vomiting.  Pain is not aggravated or alleviated by eating.    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   • Paget disease of bone        Past Surgical History:   Procedure Laterality Date   • APPENDECTOMY     • CARDIAC CATHETERIZATION N/A 3/18/2020    Procedure: Left Heart Cath;  Surgeon: Sonya Garibay MD;  Location: [facility] CATH INVASIVE LOCATION;  Service: Cardiology;  Laterality: N/A;  First availabel provider     • COLON RESECTION      second to diverticulitis    • FOOT SURGERY Left        Family History   Problem Relation Age of Onset   • No Known Problems Daughter    • No Known Problems Son    • No Known Problems Son    • No Known Problems Son    • Diabetes Sister    • Clotting disorder Sister    • No Known Problems Mother    • No Known Problems Sister    • No Known Problems Brother    • Fainting Brother   "      Social History     Socioeconomic History   • Marital status:      Spouse name: Not on file   • Number of children: Not on file   • Years of education: Not on file   • Highest education level: Not on file   Tobacco Use   • Smoking status: Former Smoker     Packs/day: 0.25     Years: 65.00     Pack years: 16.25     Types: Cigars, Cigarettes     Quit date: 2019     Years since quittin.3   • Smokeless tobacco: Never Used   Substance and Sexual Activity   • Alcohol use: Yes     Comment: rarely   • Drug use: Yes     Types: Marijuana     Comment: Pt states used weekly but now quitting    • Sexual activity: Defer       No Known Allergies    ROS  Review of Systems   Constitutional: Positive for fatigue. Negative for chills, diaphoresis and fever.   HENT: Positive for hearing loss. Negative for congestion, postnasal drip and rhinorrhea.    Respiratory: Positive for shortness of breath. Negative for cough and wheezing.    Cardiovascular: Negative for chest pain and leg swelling.   Gastrointestinal: Positive for abdominal pain, diarrhea, nausea, GERD and indigestion. Negative for blood in stool, constipation and vomiting.   Musculoskeletal: Positive for arthralgias and gait problem.   Neurological: Negative for dizziness and headache.   Psychiatric/Behavioral: Positive for stress. Negative for self-injury, sleep disturbance, suicidal ideas and depressed mood. The patient is not nervous/anxious.        Vitals:    21 1448   BP: 150/60   Pulse: 56   Temp: 97 °F (36.1 °C)   SpO2: 99%   Weight: 89.4 kg (197 lb)   Height: 180.3 cm (71\")   PainSc:   4     Body mass index is 27.48 kg/m².    Current Outpatient Medications on File Prior to Visit   Medication Sig Dispense Refill   • aspirin 81 MG chewable tablet Chew 1 tablet Daily.     • Blood Pressure Monitoring (BLOOD PRESSURE MONITOR/L CUFF) misc 1 Units Daily. 1 each 0   • diclofenac (VOLTAREN) 1 % gel gel Apply 4 g topically to the appropriate area as " directed 4 (Four) Times a Day As Needed (prn for pain). 60 tube 0   • famotidine (PEPCID) 20 MG tablet Take 1 tablet by mouth At Night As Needed for Heartburn. 90 tablet 1   • glucose blood test strip Use to check blood glucose once a day. DX:E11.65 100 each 3   • glucose monitor monitoring kit 1 each Daily. DX: E11.65 1 each 0   • Lancets 30G misc Use to check blood glucose once a day. DX: E11.65 100 each 3   • levETIRAcetam (KEPPRA) 250 MG tablet Take 1 tablet by mouth 2 (Two) Times a Day. 180 tablet 1   • metFORMIN ER (GLUCOPHAGE-XR) 500 MG 24 hr tablet Take 1 tablet by mouth 2 (Two) Times a Day With Meals. 180 tablet 1   • metoprolol succinate XL (TOPROL-XL) 25 MG 24 hr tablet Take 1 tablet by mouth Daily. 30 tablet 6   • rosuvastatin (CRESTOR) 20 MG tablet TAKE ONE TABLET BY MOUTH DAILY 90 tablet 0   • sacubitril-valsartan (ENTRESTO) 24-26 MG tablet Take 1 tablet by mouth 2 (Two) Times a Day. 60 tablet 3     No current facility-administered medications on file prior to visit.        Results for orders placed or performed in visit on 01/18/21   POC Glycosylated Hemoglobin (Hb A1C)    Specimen: Blood   Result Value Ref Range    Hemoglobin A1C 5.6 %       PE    Physical Exam  Vitals signs reviewed.   Constitutional:       General: He is not in acute distress.     Appearance: Normal appearance. He is well-developed and normal weight. He is not ill-appearing or diaphoretic.   HENT:      Head: Normocephalic and atraumatic.      Right Ear: Decreased hearing noted.      Left Ear: Decreased hearing noted.   Eyes:      Extraocular Movements: Extraocular movements intact.      Conjunctiva/sclera: Conjunctivae normal.   Neck:      Musculoskeletal: Normal range of motion.   Cardiovascular:      Rate and Rhythm: Regular rhythm. Bradycardia present.      Heart sounds: Normal heart sounds.   Pulmonary:      Effort: Pulmonary effort is normal.      Breath sounds: Normal breath sounds.   Abdominal:      General: Bowel sounds are  normal.      Palpations: Abdomen is soft.      Tenderness: There is generalized abdominal tenderness and tenderness in the right upper quadrant and epigastric area. There is guarding. There is no right CVA tenderness, left CVA tenderness or rebound.   Musculoskeletal: Normal range of motion.      Right lower leg: No edema.      Left lower leg: No edema.   Skin:     General: Skin is warm.      Findings: No erythema or rash.   Neurological:      General: No focal deficit present.      Mental Status: He is alert.   Psychiatric:         Attention and Perception: He is attentive.         Mood and Affect: Mood normal.         Speech: Speech normal.         Behavior: Behavior normal. Behavior is cooperative.         Thought Content: Thought content normal.         Judgment: Judgment normal.         A/P    Diagnoses and all orders for this visit:    1. Type 2 diabetes mellitus with hyperglycemia, without long-term current use of insulin (CMS/HCC) (Primary)  -     POC Glycosylated Hemoglobin (Hb A1C)  -     Empagliflozin 25 MG tablet; Take 25 mg by mouth Daily.  Dispense: 30 tablet; Refill: 5  -     Vitamin B12; Future  On metformin 500 mg BID.  Reduce metformin to QD as this may be causing GI issues.  Hemoglobin AIC is 5.6% today.  Farxiga was not affordable for patient, will send in Jardiance and see if this is more cost effective.  Followed by podiatry.  On statin/ARB.    2. Essential hypertension  Elevated.  Followed by cardiology.  Compliant on medications.    3. Chronic obstructive pulmonary disease, unspecified COPD type (CMS/HCC)  Dyspnea with exertion.  No SOB with rest.  Not on any inhalers.  No longer smoking.    4. Seizure (CMS/HCC)  On keppra.  Has not had any additional seizures since starting the medication.    5. Chronic idiopathic thrombocytopenia (CMS/HCC)  -     CBC Auto Differential; Future    6. Generalized abdominal pain  -     CT Abdomen Pelvis Without Contrast; Future  -     Lipase; Future  -      Comprehensive Metabolic Panel; Future  Patient is acutely tender on exam, generalized throughout abdomen worse in epigastric and right upper quadrant.  Will order CT abdo/pelvis given degree of guarding and tenderness.  Will order labs.    7. Gastroesophageal reflux disease, unspecified whether esophagitis present  -     omeprazole (priLOSEC) 40 MG capsule; Take 1 capsule by mouth Every Morning Before Breakfast.  Dispense: 90 capsule; Refill: 1  Stop pepcid.  Trial of omeprazole 40 mg QD.  Depending upon CT, may need referral to GI for endoscopy.  Patient's previous colonoscopy date is unknown.     8. Nausea  -     CT Abdomen Pelvis Without Contrast; Future  No vomiting.    9. Diarrhea, unspecified type  -     CT Abdomen Pelvis Without Contrast; Future  Denies blood in stool.    10. Bilateral hearing loss, unspecified hearing loss type  Referral placed for audiology.       Plan of care reviewed with patient at the conclusion of today's visit. Education was provided regarding diagnosis, management and any prescribed or recommended OTC medications.  Patient verbalizes understanding of and agreement with management plan.    Return in about 4 weeks (around 2/15/2021) for Medicare Wellness.     Marguerite Moore PA-C

## 2021-01-20 LAB
ALBUMIN SERPL-MCNC: 4.2 G/DL (ref 3.5–5.2)
ALBUMIN/GLOB SERPL: 1.5 G/DL
ALP SERPL-CCNC: 70 U/L (ref 39–117)
ALT SERPL W P-5'-P-CCNC: 11 U/L (ref 1–41)
ANION GAP SERPL CALCULATED.3IONS-SCNC: 9.1 MMOL/L (ref 5–15)
AST SERPL-CCNC: 21 U/L (ref 1–40)
BILIRUB SERPL-MCNC: 0.7 MG/DL (ref 0–1.2)
BUN SERPL-MCNC: 7 MG/DL (ref 8–23)
BUN/CREAT SERPL: 7.5 (ref 7–25)
CALCIUM SPEC-SCNC: 9.3 MG/DL (ref 8.6–10.5)
CHLORIDE SERPL-SCNC: 107 MMOL/L (ref 98–107)
CO2 SERPL-SCNC: 26.9 MMOL/L (ref 22–29)
CREAT SERPL-MCNC: 0.93 MG/DL (ref 0.76–1.27)
GFR SERPL CREATININE-BSD FRML MDRD: 94 ML/MIN/1.73
GLOBULIN UR ELPH-MCNC: 2.8 GM/DL
GLUCOSE SERPL-MCNC: 107 MG/DL (ref 65–99)
LIPASE SERPL-CCNC: 23 U/L (ref 13–60)
POTASSIUM SERPL-SCNC: 3.4 MMOL/L (ref 3.5–5.2)
PROT SERPL-MCNC: 7 G/DL (ref 6–8.5)
SODIUM SERPL-SCNC: 143 MMOL/L (ref 136–145)
VIT B12 BLD-MCNC: 328 PG/ML (ref 211–946)

## 2021-01-27 ENCOUNTER — HOSPITAL ENCOUNTER (OUTPATIENT)
Dept: CT IMAGING | Facility: HOSPITAL | Age: 81
Discharge: HOME | End: 2021-01-27
Admitting: PHYSICIAN ASSISTANT

## 2021-01-27 DIAGNOSIS — R11.0 NAUSEA: ICD-10-CM

## 2021-01-27 DIAGNOSIS — R19.7 DIARRHEA, UNSPECIFIED TYPE: ICD-10-CM

## 2021-01-27 DIAGNOSIS — R10.84 GENERALIZED ABDOMINAL PAIN: ICD-10-CM

## 2021-01-27 PROCEDURE — 74176 CT ABD & PELVIS W/O CONTRAST: CPT

## 2021-01-28 DIAGNOSIS — E11.65 TYPE 2 DIABETES MELLITUS WITH HYPERGLYCEMIA, WITHOUT LONG-TERM CURRENT USE OF INSULIN (HCC): Chronic | ICD-10-CM

## 2021-01-28 RX ORDER — METFORMIN HYDROCHLORIDE 500 MG/1
TABLET, EXTENDED RELEASE ORAL
Qty: 180 TABLET | Refills: 0 | Status: SHIPPED | OUTPATIENT
Start: 2021-01-28 | End: 2021-04-29

## 2021-02-02 ENCOUNTER — TELEPHONE (OUTPATIENT)
Dept: FAMILY MEDICINE CLINIC | Facility: CLINIC | Age: 81
End: 2021-02-02

## 2021-02-02 NOTE — TELEPHONE ENCOUNTER
----- Message from Marguerite Moore PA-C sent at 2/2/2021  1:10 PM EST -----  Please notify patient that CT abdo/pelvis is reassuring.  It does not show any acute findings.  There was moderate stool burden indicating constipation.  Patient's pain may be associated with this.  Recommend trying miralax nightly to help with bowel movements.

## 2021-03-01 DIAGNOSIS — J44.9 CHRONIC OBSTRUCTIVE PULMONARY DISEASE, UNSPECIFIED COPD TYPE (HCC): ICD-10-CM

## 2021-03-01 RX ORDER — LEVETIRACETAM 250 MG/1
TABLET ORAL
Qty: 180 TABLET | Refills: 0 | Status: SHIPPED | OUTPATIENT
Start: 2021-03-01 | End: 2021-03-20 | Stop reason: HOSPADM

## 2021-03-12 ENCOUNTER — HOSPITAL ENCOUNTER (INPATIENT)
Facility: HOSPITAL | Age: 81
LOS: 8 days | Discharge: HOME HEALTH | End: 2021-03-20
Attending: EMERGENCY MEDICINE | Admitting: INTERNAL MEDICINE

## 2021-03-12 ENCOUNTER — APPOINTMENT (OUTPATIENT)
Dept: GENERAL RADIOLOGY | Facility: HOSPITAL | Age: 81
End: 2021-03-12

## 2021-03-12 ENCOUNTER — APPOINTMENT (OUTPATIENT)
Dept: CT IMAGING | Facility: HOSPITAL | Age: 81
End: 2021-03-12

## 2021-03-12 DIAGNOSIS — I46.9 CARDIAC ARREST (HCC): ICD-10-CM

## 2021-03-12 DIAGNOSIS — R56.9 SEIZURE (HCC): ICD-10-CM

## 2021-03-12 DIAGNOSIS — I25.119 CORONARY ARTERY DISEASE INVOLVING NATIVE CORONARY ARTERY OF NATIVE HEART WITH ANGINA PECTORIS (HCC): ICD-10-CM

## 2021-03-12 DIAGNOSIS — E87.6 HYPOKALEMIA: ICD-10-CM

## 2021-03-12 DIAGNOSIS — I47.29 NSVT (NONSUSTAINED VENTRICULAR TACHYCARDIA) (HCC): ICD-10-CM

## 2021-03-12 DIAGNOSIS — I50.9 ACUTE ON CHRONIC CONGESTIVE HEART FAILURE, UNSPECIFIED HEART FAILURE TYPE (HCC): Primary | ICD-10-CM

## 2021-03-12 DIAGNOSIS — M88.9 PAGET DISEASE OF BONE: ICD-10-CM

## 2021-03-12 PROBLEM — I25.10 CAD (CORONARY ARTERY DISEASE): Chronic | Status: ACTIVE | Noted: 2020-03-18

## 2021-03-12 PROBLEM — K21.9 GERD (GASTROESOPHAGEAL REFLUX DISEASE): Chronic | Status: ACTIVE | Noted: 2021-03-12

## 2021-03-12 PROBLEM — I50.20 HFREF (HEART FAILURE WITH REDUCED EJECTION FRACTION): Status: ACTIVE | Noted: 2021-03-12

## 2021-03-12 PROBLEM — K21.9 GERD (GASTROESOPHAGEAL REFLUX DISEASE): Status: ACTIVE | Noted: 2021-03-12

## 2021-03-12 PROBLEM — I38 VHD (VALVULAR HEART DISEASE): Chronic | Status: ACTIVE | Noted: 2021-03-12

## 2021-03-12 PROBLEM — I50.20 HFREF (HEART FAILURE WITH REDUCED EJECTION FRACTION): Chronic | Status: ACTIVE | Noted: 2021-03-12

## 2021-03-12 PROBLEM — F12.90 MARIJUANA USE: Status: ACTIVE | Noted: 2021-03-12

## 2021-03-12 PROBLEM — E83.42 HYPOMAGNESEMIA: Status: ACTIVE | Noted: 2021-03-12

## 2021-03-12 PROBLEM — F12.90 MARIJUANA USE: Chronic | Status: ACTIVE | Noted: 2021-03-12

## 2021-03-12 PROBLEM — I38 VHD (VALVULAR HEART DISEASE): Status: ACTIVE | Noted: 2021-03-12

## 2021-03-12 PROBLEM — I50.43 ACUTE ON CHRONIC COMBINED SYSTOLIC AND DIASTOLIC CHF (CONGESTIVE HEART FAILURE): Chronic | Status: ACTIVE | Noted: 2021-03-12

## 2021-03-12 PROBLEM — I49.3 FREQUENT PVCS: Status: ACTIVE | Noted: 2021-03-12

## 2021-03-12 PROBLEM — I42.8 NICM (NONISCHEMIC CARDIOMYOPATHY): Chronic | Status: ACTIVE | Noted: 2020-03-18

## 2021-03-12 PROBLEM — Z87.891 FORMER SMOKER: Chronic | Status: ACTIVE | Noted: 2021-03-12

## 2021-03-12 PROBLEM — I49.3 FREQUENT PVCS: Chronic | Status: ACTIVE | Noted: 2021-03-12

## 2021-03-12 PROBLEM — Z87.891 FORMER SMOKER: Status: ACTIVE | Noted: 2021-03-12

## 2021-03-12 PROBLEM — I50.43 ACUTE ON CHRONIC COMBINED SYSTOLIC AND DIASTOLIC CHF (CONGESTIVE HEART FAILURE): Status: ACTIVE | Noted: 2021-03-12

## 2021-03-12 LAB
ALBUMIN SERPL-MCNC: 3.7 G/DL (ref 3.5–5.2)
ALBUMIN/GLOB SERPL: 1.3 G/DL
ALP SERPL-CCNC: 112 U/L (ref 39–117)
ALT SERPL W P-5'-P-CCNC: 42 U/L (ref 1–41)
AMMONIA BLD-SCNC: 19 UMOL/L (ref 16–60)
AMPHET+METHAMPHET UR QL: NEGATIVE
AMPHETAMINES UR QL: NEGATIVE
ANION GAP SERPL CALCULATED.3IONS-SCNC: 15 MMOL/L (ref 5–15)
AST SERPL-CCNC: 101 U/L (ref 1–40)
BACTERIA UR QL AUTO: ABNORMAL /HPF
BARBITURATES UR QL SCN: NEGATIVE
BASE EXCESS BLDA CALC-SCNC: -1 MMOL/L (ref -5–5)
BASOPHILS # BLD AUTO: 0.01 10*3/MM3 (ref 0–0.2)
BASOPHILS NFR BLD AUTO: 0.3 % (ref 0–1.5)
BENZODIAZ UR QL SCN: NEGATIVE
BILIRUB SERPL-MCNC: 1.4 MG/DL (ref 0–1.2)
BILIRUB UR QL STRIP: NEGATIVE
BUN SERPL-MCNC: 7 MG/DL (ref 8–23)
BUN/CREAT SERPL: 7.8 (ref 7–25)
BUPRENORPHINE SERPL-MCNC: NEGATIVE NG/ML
CA-I BLDA-SCNC: 1.17 MMOL/L (ref 1.2–1.32)
CALCIUM SPEC-SCNC: 8.9 MG/DL (ref 8.6–10.5)
CANNABINOIDS SERPL QL: POSITIVE
CHLORIDE SERPL-SCNC: 103 MMOL/L (ref 98–107)
CK SERPL-CCNC: 188 U/L (ref 20–200)
CLARITY UR: ABNORMAL
CO2 BLDA-SCNC: 25 MMOL/L (ref 24–29)
CO2 SERPL-SCNC: 23 MMOL/L (ref 22–29)
COCAINE UR QL: NEGATIVE
COLOR UR: YELLOW
CORTIS SERPL-MCNC: 23.77 MCG/DL
CREAT SERPL-MCNC: 0.9 MG/DL (ref 0.76–1.27)
D-LACTATE SERPL-SCNC: 2.4 MMOL/L (ref 0.5–2)
D-LACTATE SERPL-SCNC: 3.2 MMOL/L (ref 0.5–2)
DEPRECATED RDW RBC AUTO: 53.6 FL (ref 37–54)
EOSINOPHIL # BLD AUTO: 0.05 10*3/MM3 (ref 0–0.4)
EOSINOPHIL NFR BLD AUTO: 1.6 % (ref 0.3–6.2)
ERYTHROCYTE [DISTWIDTH] IN BLOOD BY AUTOMATED COUNT: 16.2 % (ref 12.3–15.4)
ETHANOL BLD-MCNC: <10 MG/DL (ref 0–10)
FLUAV RNA RESP QL NAA+PROBE: NOT DETECTED
FLUBV RNA RESP QL NAA+PROBE: NOT DETECTED
GFR SERPL CREATININE-BSD FRML MDRD: 98 ML/MIN/1.73
GLOBULIN UR ELPH-MCNC: 2.8 GM/DL
GLUCOSE BLDC GLUCOMTR-MCNC: 169 MG/DL (ref 70–130)
GLUCOSE SERPL-MCNC: 186 MG/DL (ref 65–99)
GLUCOSE UR STRIP-MCNC: ABNORMAL MG/DL
HCO3 BLDA-SCNC: 23.8 MMOL/L (ref 22–26)
HCT VFR BLD AUTO: 40.4 % (ref 37.5–51)
HCT VFR BLDA CALC: 39 % (ref 38–51)
HGB BLD-MCNC: 12.4 G/DL (ref 13–17.7)
HGB BLDA-MCNC: 13.3 G/DL (ref 12–17)
HGB UR QL STRIP.AUTO: ABNORMAL
HOLD SPECIMEN: NORMAL
HYALINE CASTS UR QL AUTO: ABNORMAL /LPF
IMM GRANULOCYTES # BLD AUTO: 0.04 10*3/MM3 (ref 0–0.05)
IMM GRANULOCYTES NFR BLD AUTO: 1.3 % (ref 0–0.5)
INR PPP: 1.13 (ref 0.85–1.16)
KETONES UR QL STRIP: ABNORMAL
LEUKOCYTE ESTERASE UR QL STRIP.AUTO: NEGATIVE
LIPASE SERPL-CCNC: 25 U/L (ref 13–60)
LYMPHOCYTES # BLD AUTO: 0.46 10*3/MM3 (ref 0.7–3.1)
LYMPHOCYTES NFR BLD AUTO: 14.4 % (ref 19.6–45.3)
MAGNESIUM SERPL-MCNC: 1.6 MG/DL (ref 1.6–2.4)
MCH RBC QN AUTO: 27.9 PG (ref 26.6–33)
MCHC RBC AUTO-ENTMCNC: 30.7 G/DL (ref 31.5–35.7)
MCV RBC AUTO: 90.8 FL (ref 79–97)
METHADONE UR QL SCN: NEGATIVE
MONOCYTES # BLD AUTO: 0.14 10*3/MM3 (ref 0.1–0.9)
MONOCYTES NFR BLD AUTO: 4.4 % (ref 5–12)
NEUTROPHILS NFR BLD AUTO: 2.49 10*3/MM3 (ref 1.7–7)
NEUTROPHILS NFR BLD AUTO: 78 % (ref 42.7–76)
NITRITE UR QL STRIP: NEGATIVE
NRBC BLD AUTO-RTO: 0 /100 WBC (ref 0–0.2)
NT-PROBNP SERPL-MCNC: ABNORMAL PG/ML (ref 0–1800)
OPIATES UR QL: NEGATIVE
OXYCODONE UR QL SCN: NEGATIVE
PCO2 BLDA: 38.5 MM HG (ref 35–45)
PCP UR QL SCN: NEGATIVE
PH BLDA: 7.4 PH UNITS (ref 7.35–7.6)
PH UR STRIP.AUTO: 6.5 [PH] (ref 5–8)
PHOSPHATE SERPL-MCNC: 2.7 MG/DL (ref 2.5–4.5)
PLATELET # BLD AUTO: 112 10*3/MM3 (ref 140–450)
PMV BLD AUTO: 12.5 FL (ref 6–12)
PO2 BLDA: 38 MMHG (ref 80–105)
POTASSIUM BLDA-SCNC: 2.3 MMOL/L (ref 3.5–4.9)
POTASSIUM SERPL-SCNC: 2.5 MMOL/L (ref 3.5–5.2)
PROCALCITONIN SERPL-MCNC: 0.08 NG/ML (ref 0–0.25)
PROPOXYPH UR QL: NEGATIVE
PROT SERPL-MCNC: 6.5 G/DL (ref 6–8.5)
PROT UR QL STRIP: ABNORMAL
PROTHROMBIN TIME: 14.2 SECONDS (ref 11.5–14)
RBC # BLD AUTO: 4.45 10*6/MM3 (ref 4.14–5.8)
RBC # UR: ABNORMAL /HPF
REF LAB TEST METHOD: ABNORMAL
SAO2 % BLDA: 73 % (ref 95–98)
SARS-COV-2 RNA RESP QL NAA+PROBE: NOT DETECTED
SODIUM BLD-SCNC: 144 MMOL/L (ref 138–146)
SODIUM SERPL-SCNC: 141 MMOL/L (ref 136–145)
SP GR UR STRIP: 1.03 (ref 1–1.03)
SQUAMOUS #/AREA URNS HPF: ABNORMAL /HPF
T4 FREE SERPL-MCNC: 1.64 NG/DL (ref 0.93–1.7)
TRICYCLICS UR QL SCN: NEGATIVE
TROPONIN T SERPL-MCNC: 0.03 NG/ML (ref 0–0.03)
TROPONIN T SERPL-MCNC: <0.01 NG/ML (ref 0–0.03)
TSH SERPL DL<=0.05 MIU/L-ACNC: 6.72 UIU/ML (ref 0.27–4.2)
UROBILINOGEN UR QL STRIP: ABNORMAL
WBC # BLD AUTO: 3.19 10*3/MM3 (ref 3.4–10.8)
WBC UR QL AUTO: ABNORMAL /HPF
WHOLE BLOOD HOLD SPECIMEN: NORMAL
WHOLE BLOOD HOLD SPECIMEN: NORMAL

## 2021-03-12 PROCEDURE — 82803 BLOOD GASES ANY COMBINATION: CPT

## 2021-03-12 PROCEDURE — 71045 X-RAY EXAM CHEST 1 VIEW: CPT

## 2021-03-12 PROCEDURE — 80306 DRUG TEST PRSMV INSTRMNT: CPT | Performed by: EMERGENCY MEDICINE

## 2021-03-12 PROCEDURE — 84439 ASSAY OF FREE THYROXINE: CPT | Performed by: NURSE PRACTITIONER

## 2021-03-12 PROCEDURE — 85610 PROTHROMBIN TIME: CPT | Performed by: NURSE PRACTITIONER

## 2021-03-12 PROCEDURE — 84132 ASSAY OF SERUM POTASSIUM: CPT

## 2021-03-12 PROCEDURE — 84100 ASSAY OF PHOSPHORUS: CPT | Performed by: NURSE PRACTITIONER

## 2021-03-12 PROCEDURE — 25010000003 MAGNESIUM SULFATE 4 GM/100ML SOLUTION: Performed by: NURSE PRACTITIONER

## 2021-03-12 PROCEDURE — 84295 ASSAY OF SERUM SODIUM: CPT

## 2021-03-12 PROCEDURE — 93005 ELECTROCARDIOGRAM TRACING: CPT | Performed by: EMERGENCY MEDICINE

## 2021-03-12 PROCEDURE — 83735 ASSAY OF MAGNESIUM: CPT | Performed by: EMERGENCY MEDICINE

## 2021-03-12 PROCEDURE — 85025 COMPLETE CBC W/AUTO DIFF WBC: CPT | Performed by: EMERGENCY MEDICINE

## 2021-03-12 PROCEDURE — 83880 ASSAY OF NATRIURETIC PEPTIDE: CPT | Performed by: EMERGENCY MEDICINE

## 2021-03-12 PROCEDURE — 84443 ASSAY THYROID STIM HORMONE: CPT | Performed by: NURSE PRACTITIONER

## 2021-03-12 PROCEDURE — 82947 ASSAY GLUCOSE BLOOD QUANT: CPT

## 2021-03-12 PROCEDURE — 84145 PROCALCITONIN (PCT): CPT | Performed by: NURSE PRACTITIONER

## 2021-03-12 PROCEDURE — 87086 URINE CULTURE/COLONY COUNT: CPT | Performed by: EMERGENCY MEDICINE

## 2021-03-12 PROCEDURE — 70450 CT HEAD/BRAIN W/O DYE: CPT

## 2021-03-12 PROCEDURE — 81001 URINALYSIS AUTO W/SCOPE: CPT | Performed by: EMERGENCY MEDICINE

## 2021-03-12 PROCEDURE — 25010000003 LEVETIRACETAM IN NACL 0.75% 1000 MG/100ML SOLUTION: Performed by: EMERGENCY MEDICINE

## 2021-03-12 PROCEDURE — 80053 COMPREHEN METABOLIC PANEL: CPT | Performed by: EMERGENCY MEDICINE

## 2021-03-12 PROCEDURE — 99291 CRITICAL CARE FIRST HOUR: CPT | Performed by: INTERNAL MEDICINE

## 2021-03-12 PROCEDURE — 82533 TOTAL CORTISOL: CPT | Performed by: NURSE PRACTITIONER

## 2021-03-12 PROCEDURE — 82010 KETONE BODYS QUAN: CPT | Performed by: NURSE PRACTITIONER

## 2021-03-12 PROCEDURE — 99285 EMERGENCY DEPT VISIT HI MDM: CPT

## 2021-03-12 PROCEDURE — 84484 ASSAY OF TROPONIN QUANT: CPT | Performed by: EMERGENCY MEDICINE

## 2021-03-12 PROCEDURE — 87636 SARSCOV2 & INF A&B AMP PRB: CPT | Performed by: EMERGENCY MEDICINE

## 2021-03-12 PROCEDURE — 82077 ASSAY SPEC XCP UR&BREATH IA: CPT | Performed by: NURSE PRACTITIONER

## 2021-03-12 PROCEDURE — 82330 ASSAY OF CALCIUM: CPT

## 2021-03-12 PROCEDURE — P9612 CATHETERIZE FOR URINE SPEC: HCPCS

## 2021-03-12 PROCEDURE — 85014 HEMATOCRIT: CPT

## 2021-03-12 PROCEDURE — 82550 ASSAY OF CK (CPK): CPT | Performed by: NURSE PRACTITIONER

## 2021-03-12 PROCEDURE — 25010000003 POTASSIUM CHLORIDE 10 MEQ/100ML SOLUTION: Performed by: EMERGENCY MEDICINE

## 2021-03-12 PROCEDURE — 83690 ASSAY OF LIPASE: CPT | Performed by: EMERGENCY MEDICINE

## 2021-03-12 PROCEDURE — 82140 ASSAY OF AMMONIA: CPT | Performed by: NURSE PRACTITIONER

## 2021-03-12 PROCEDURE — 83605 ASSAY OF LACTIC ACID: CPT | Performed by: NURSE PRACTITIONER

## 2021-03-12 RX ORDER — SODIUM CHLORIDE 0.9 % (FLUSH) 0.9 %
10 SYRINGE (ML) INJECTION AS NEEDED
Status: DISCONTINUED | OUTPATIENT
Start: 2021-03-12 | End: 2021-03-20 | Stop reason: HOSPADM

## 2021-03-12 RX ORDER — POTASSIUM CHLORIDE 7.45 MG/ML
10 INJECTION INTRAVENOUS ONCE
Status: COMPLETED | OUTPATIENT
Start: 2021-03-12 | End: 2021-03-12

## 2021-03-12 RX ORDER — OXYMETAZOLINE HYDROCHLORIDE 0.05 G/100ML
2 SPRAY NASAL 2 TIMES DAILY
Status: COMPLETED | OUTPATIENT
Start: 2021-03-12 | End: 2021-03-13

## 2021-03-12 RX ORDER — MAGNESIUM SULFATE HEPTAHYDRATE 40 MG/ML
4 INJECTION, SOLUTION INTRAVENOUS ONCE
Status: DISCONTINUED | OUTPATIENT
Start: 2021-03-12 | End: 2021-03-15

## 2021-03-12 RX ORDER — LEVETIRACETAM 5 MG/ML
500 INJECTION INTRAVASCULAR EVERY 12 HOURS SCHEDULED
Status: DISCONTINUED | OUTPATIENT
Start: 2021-03-13 | End: 2021-03-13

## 2021-03-12 RX ORDER — NICOTINE POLACRILEX 4 MG
15 LOZENGE BUCCAL
Status: DISCONTINUED | OUTPATIENT
Start: 2021-03-12 | End: 2021-03-20 | Stop reason: HOSPADM

## 2021-03-12 RX ORDER — POTASSIUM CHLORIDE 7.45 MG/ML
10 INJECTION INTRAVENOUS ONCE
Status: COMPLETED | OUTPATIENT
Start: 2021-03-13 | End: 2021-03-13

## 2021-03-12 RX ORDER — MAGNESIUM SULFATE HEPTAHYDRATE 40 MG/ML
4 INJECTION, SOLUTION INTRAVENOUS AS NEEDED
Status: DISCONTINUED | OUTPATIENT
Start: 2021-03-12 | End: 2021-03-20 | Stop reason: HOSPADM

## 2021-03-12 RX ORDER — POTASSIUM CHLORIDE 7.45 MG/ML
10 INJECTION INTRAVENOUS ONCE
Status: COMPLETED | OUTPATIENT
Start: 2021-03-12 | End: 2021-03-13

## 2021-03-12 RX ORDER — ASPIRIN 81 MG/1
81 TABLET, CHEWABLE ORAL DAILY
Status: DISCONTINUED | OUTPATIENT
Start: 2021-03-13 | End: 2021-03-20 | Stop reason: HOSPADM

## 2021-03-12 RX ORDER — SODIUM CHLORIDE 0.9 % (FLUSH) 0.9 %
10 SYRINGE (ML) INJECTION EVERY 12 HOURS SCHEDULED
Status: DISCONTINUED | OUTPATIENT
Start: 2021-03-12 | End: 2021-03-20 | Stop reason: HOSPADM

## 2021-03-12 RX ORDER — IPRATROPIUM BROMIDE AND ALBUTEROL SULFATE 2.5; .5 MG/3ML; MG/3ML
3 SOLUTION RESPIRATORY (INHALATION) EVERY 6 HOURS PRN
Status: DISCONTINUED | OUTPATIENT
Start: 2021-03-12 | End: 2021-03-20 | Stop reason: HOSPADM

## 2021-03-12 RX ORDER — NITROGLYCERIN 20 MG/100ML
5-200 INJECTION INTRAVENOUS
Status: DISCONTINUED | OUTPATIENT
Start: 2021-03-12 | End: 2021-03-13

## 2021-03-12 RX ORDER — MAGNESIUM SULFATE HEPTAHYDRATE 40 MG/ML
2 INJECTION, SOLUTION INTRAVENOUS AS NEEDED
Status: DISCONTINUED | OUTPATIENT
Start: 2021-03-12 | End: 2021-03-20 | Stop reason: HOSPADM

## 2021-03-12 RX ORDER — DEXTROSE MONOHYDRATE 25 G/50ML
25 INJECTION, SOLUTION INTRAVENOUS
Status: DISCONTINUED | OUTPATIENT
Start: 2021-03-12 | End: 2021-03-20 | Stop reason: HOSPADM

## 2021-03-12 RX ORDER — ASPIRIN 81 MG/1
324 TABLET, CHEWABLE ORAL ONCE
Status: DISCONTINUED | OUTPATIENT
Start: 2021-03-12 | End: 2021-03-12

## 2021-03-12 RX ORDER — SODIUM CHLORIDE 0.9 % (FLUSH) 0.9 %
10 SYRINGE (ML) INJECTION AS NEEDED
Status: DISCONTINUED | OUTPATIENT
Start: 2021-03-12 | End: 2021-03-18

## 2021-03-12 RX ORDER — ROSUVASTATIN CALCIUM 20 MG/1
20 TABLET, COATED ORAL DAILY
Status: DISCONTINUED | OUTPATIENT
Start: 2021-03-13 | End: 2021-03-20 | Stop reason: HOSPADM

## 2021-03-12 RX ORDER — LEVETIRACETAM 10 MG/ML
1000 INJECTION INTRAVASCULAR ONCE
Status: COMPLETED | OUTPATIENT
Start: 2021-03-12 | End: 2021-03-12

## 2021-03-12 RX ADMIN — POTASSIUM CHLORIDE 10 MEQ: 7.46 INJECTION, SOLUTION INTRAVENOUS at 20:23

## 2021-03-12 RX ADMIN — POTASSIUM CHLORIDE 10 MEQ: 7.46 INJECTION, SOLUTION INTRAVENOUS at 22:56

## 2021-03-12 RX ADMIN — POTASSIUM CHLORIDE 10 MEQ: 7.46 INJECTION, SOLUTION INTRAVENOUS at 23:53

## 2021-03-12 RX ADMIN — MAGNESIUM SULFATE HEPTAHYDRATE 4 G: 40 INJECTION, SOLUTION INTRAVENOUS at 21:45

## 2021-03-12 RX ADMIN — NITROGLYCERIN 5 MCG/MIN: 20 INJECTION INTRAVENOUS at 23:53

## 2021-03-12 RX ADMIN — POTASSIUM CHLORIDE 10 MEQ: 7.46 INJECTION, SOLUTION INTRAVENOUS at 21:45

## 2021-03-12 RX ADMIN — LEVETIRACETAM 1000 MG: 10 INJECTION INTRAVENOUS at 19:29

## 2021-03-13 ENCOUNTER — APPOINTMENT (OUTPATIENT)
Dept: GENERAL RADIOLOGY | Facility: HOSPITAL | Age: 81
End: 2021-03-13

## 2021-03-13 ENCOUNTER — APPOINTMENT (OUTPATIENT)
Dept: CARDIOLOGY | Facility: HOSPITAL | Age: 81
End: 2021-03-13

## 2021-03-13 ENCOUNTER — APPOINTMENT (OUTPATIENT)
Dept: NEUROLOGY | Facility: HOSPITAL | Age: 81
End: 2021-03-13

## 2021-03-13 PROBLEM — M13.0 HYPERTROPHIC POLYARTHRITIS: Status: RESOLVED | Noted: 2019-02-18 | Resolved: 2021-03-13

## 2021-03-13 PROBLEM — J44.9 CHRONIC OBSTRUCTIVE PULMONARY DISEASE: Status: RESOLVED | Noted: 2019-02-19 | Resolved: 2021-03-13

## 2021-03-13 PROBLEM — G47.34 NOCTURNAL HYPOXIA: Status: RESOLVED | Noted: 2017-05-30 | Resolved: 2021-03-13

## 2021-03-13 PROBLEM — K57.90 DIVERTICULOSIS: Chronic | Status: RESOLVED | Noted: 2017-05-28 | Resolved: 2021-03-13

## 2021-03-13 PROBLEM — G93.41 ACUTE METABOLIC ENCEPHALOPATHY: Status: RESOLVED | Noted: 2017-05-30 | Resolved: 2021-03-13

## 2021-03-13 PROBLEM — H91.93 BILATERAL HEARING LOSS: Status: RESOLVED | Noted: 2018-11-16 | Resolved: 2021-03-13

## 2021-03-13 PROBLEM — D69.3 CHRONIC IDIOPATHIC THROMBOCYTOPENIA: Chronic | Status: RESOLVED | Noted: 2017-05-28 | Resolved: 2021-03-13

## 2021-03-13 PROBLEM — K92.2 ACUTE GI BLEEDING: Status: ACTIVE | Noted: 2021-03-13

## 2021-03-13 PROBLEM — D62 ACUTE BLOOD LOSS ANEMIA: Status: ACTIVE | Noted: 2021-03-13

## 2021-03-13 PROBLEM — I25.119 CORONARY ARTERY DISEASE INVOLVING NATIVE CORONARY ARTERY OF NATIVE HEART WITH ANGINA PECTORIS (HCC): Status: ACTIVE | Noted: 2020-03-18

## 2021-03-13 PROBLEM — N28.1 COMPLEX RENAL CYST: Status: RESOLVED | Noted: 2017-05-29 | Resolved: 2021-03-13

## 2021-03-13 PROBLEM — M25.561 ACUTE PAIN OF RIGHT KNEE: Status: RESOLVED | Noted: 2018-06-06 | Resolved: 2021-03-13

## 2021-03-13 PROBLEM — I50.22 CHRONIC SYSTOLIC CONGESTIVE HEART FAILURE (HCC): Status: ACTIVE | Noted: 2021-03-12

## 2021-03-13 PROBLEM — E78.5 HYPERLIPIDEMIA LDL GOAL <70: Status: ACTIVE | Noted: 2017-05-28

## 2021-03-13 PROBLEM — I50.23 ACUTE ON CHRONIC SYSTOLIC CONGESTIVE HEART FAILURE (HCC): Status: ACTIVE | Noted: 2021-03-12

## 2021-03-13 PROBLEM — G45.1 HEMISPHERIC CAROTID ARTERY SYNDROME: Status: RESOLVED | Noted: 2017-05-30 | Resolved: 2021-03-13

## 2021-03-13 PROBLEM — I50.33 ACUTE ON CHRONIC DIASTOLIC CONGESTIVE HEART FAILURE: Status: ACTIVE | Noted: 2021-03-12

## 2021-03-13 PROBLEM — N28.1 BILATERAL RENAL CYSTS: Chronic | Status: RESOLVED | Noted: 2017-05-30 | Resolved: 2021-03-13

## 2021-03-13 PROBLEM — I50.9 ACUTE ON CHRONIC CONGESTIVE HEART FAILURE: Status: RESOLVED | Noted: 2021-03-12 | Resolved: 2021-03-13

## 2021-03-13 LAB
ALBUMIN SERPL-MCNC: 3 G/DL (ref 3.5–5.2)
ALBUMIN/GLOB SERPL: 1.3 G/DL
ALP SERPL-CCNC: 74 U/L (ref 39–117)
ALT SERPL W P-5'-P-CCNC: 28 U/L (ref 1–41)
ANION GAP SERPL CALCULATED.3IONS-SCNC: 12 MMOL/L (ref 5–15)
AST SERPL-CCNC: 53 U/L (ref 1–40)
B-OH-BUTYR SERPL-SCNC: 0.97 MMOL/L (ref 0.02–0.27)
BACTERIA SPEC AEROBE CULT: NO GROWTH
BASOPHILS # BLD AUTO: 0 10*3/MM3 (ref 0–0.2)
BASOPHILS NFR BLD AUTO: 0 % (ref 0–1.5)
BH CV ECHO MEAS - AI DEC SLOPE: 254 CM/SEC^2
BH CV ECHO MEAS - AI MAX PG: 72.6 MMHG
BH CV ECHO MEAS - AI MAX VEL: 395.7 CM/SEC
BH CV ECHO MEAS - AI P1/2T: 456.3 MSEC
BH CV ECHO MEAS - AO MAX PG (FULL): 5.4 MMHG
BH CV ECHO MEAS - AO MAX PG: 9.1 MMHG
BH CV ECHO MEAS - AO MEAN PG (FULL): 3.9 MMHG
BH CV ECHO MEAS - AO MEAN PG: 5.8 MMHG
BH CV ECHO MEAS - AO ROOT AREA (BSA CORRECTED): 1.5
BH CV ECHO MEAS - AO ROOT AREA: 7.1 CM^2
BH CV ECHO MEAS - AO ROOT DIAM: 3 CM
BH CV ECHO MEAS - AO V2 MAX: 150.5 CM/SEC
BH CV ECHO MEAS - AO V2 MEAN: 113.9 CM/SEC
BH CV ECHO MEAS - AO V2 VTI: 29.7 CM
BH CV ECHO MEAS - AVA(I,A): 2.2 CM^2
BH CV ECHO MEAS - AVA(I,D): 2.2 CM^2
BH CV ECHO MEAS - AVA(V,A): 2.1 CM^2
BH CV ECHO MEAS - AVA(V,D): 2.1 CM^2
BH CV ECHO MEAS - BSA(HAYCOCK): 2 M^2
BH CV ECHO MEAS - BSA: 2 M^2
BH CV ECHO MEAS - BZI_BMI: 24.8 KILOGRAMS/M^2
BH CV ECHO MEAS - BZI_METRIC_HEIGHT: 180.3 CM
BH CV ECHO MEAS - BZI_METRIC_WEIGHT: 80.7 KG
BH CV ECHO MEAS - EDV(CUBED): 130.3 ML
BH CV ECHO MEAS - EDV(MOD-SP2): 128 ML
BH CV ECHO MEAS - EDV(MOD-SP4): 125 ML
BH CV ECHO MEAS - EDV(TEICH): 122.1 ML
BH CV ECHO MEAS - EF(CUBED): 50.7 %
BH CV ECHO MEAS - EF(MOD-BP): 50 %
BH CV ECHO MEAS - EF(MOD-SP2): 49.2 %
BH CV ECHO MEAS - EF(MOD-SP4): 50.4 %
BH CV ECHO MEAS - EF(TEICH): 42.5 %
BH CV ECHO MEAS - ESV(CUBED): 64.2 ML
BH CV ECHO MEAS - ESV(MOD-SP2): 65 ML
BH CV ECHO MEAS - ESV(MOD-SP4): 62 ML
BH CV ECHO MEAS - ESV(TEICH): 70.2 ML
BH CV ECHO MEAS - FS: 21 %
BH CV ECHO MEAS - IVS/LVPW: 1.1
BH CV ECHO MEAS - IVSD: 1.3 CM
BH CV ECHO MEAS - LAD MAJOR: 5.2 CM
BH CV ECHO MEAS - LAT PEAK E' VEL: 4.1 CM/SEC
BH CV ECHO MEAS - LATERAL E/E' RATIO: 9.7
BH CV ECHO MEAS - LV DIASTOLIC VOL/BSA (35-75): 62.3 ML/M^2
BH CV ECHO MEAS - LV IVRT: 0.08 SEC
BH CV ECHO MEAS - LV MASS(C)D: 244.8 GRAMS
BH CV ECHO MEAS - LV MASS(C)DI: 122 GRAMS/M^2
BH CV ECHO MEAS - LV MAX PG: 3.7 MMHG
BH CV ECHO MEAS - LV MEAN PG: 2 MMHG
BH CV ECHO MEAS - LV SYSTOLIC VOL/BSA (12-30): 30.9 ML/M^2
BH CV ECHO MEAS - LV V1 MAX: 96 CM/SEC
BH CV ECHO MEAS - LV V1 MEAN: 63.3 CM/SEC
BH CV ECHO MEAS - LV V1 VTI: 20.3 CM
BH CV ECHO MEAS - LVIDD: 5.1 CM
BH CV ECHO MEAS - LVIDS: 4 CM
BH CV ECHO MEAS - LVLD AP2: 8.6 CM
BH CV ECHO MEAS - LVLD AP4: 8.5 CM
BH CV ECHO MEAS - LVLS AP2: 7.7 CM
BH CV ECHO MEAS - LVLS AP4: 7.6 CM
BH CV ECHO MEAS - LVOT AREA (M): 3.1 CM^2
BH CV ECHO MEAS - LVOT AREA: 3.2 CM^2
BH CV ECHO MEAS - LVOT DIAM: 2 CM
BH CV ECHO MEAS - LVPWD: 1.2 CM
BH CV ECHO MEAS - MED PEAK E' VEL: 3.4 CM/SEC
BH CV ECHO MEAS - MEDIAL E/E' RATIO: 11.9
BH CV ECHO MEAS - MR MAX PG: 54 MMHG
BH CV ECHO MEAS - MR MAX VEL: 365.9 CM/SEC
BH CV ECHO MEAS - MR MEAN PG: 30.4 MMHG
BH CV ECHO MEAS - MR MEAN VEL: 248.1 CM/SEC
BH CV ECHO MEAS - MR VTI: 111.8 CM
BH CV ECHO MEAS - MV A MAX VEL: 102.2 CM/SEC
BH CV ECHO MEAS - MV DEC SLOPE: 157.9 CM/SEC^2
BH CV ECHO MEAS - MV DEC TIME: 0.17 SEC
BH CV ECHO MEAS - MV E MAX VEL: 41.5 CM/SEC
BH CV ECHO MEAS - MV E/A: 0.41
BH CV ECHO MEAS - MV MAX PG: 3.2 MMHG
BH CV ECHO MEAS - MV MEAN PG: 1 MMHG
BH CV ECHO MEAS - MV P1/2T MAX VEL: 53.2 CM/SEC
BH CV ECHO MEAS - MV P1/2T: 98.7 MSEC
BH CV ECHO MEAS - MV V2 MAX: 90.1 CM/SEC
BH CV ECHO MEAS - MV V2 MEAN: 48.4 CM/SEC
BH CV ECHO MEAS - MV V2 VTI: 23.3 CM
BH CV ECHO MEAS - MVA P1/2T LCG: 4.1 CM^2
BH CV ECHO MEAS - MVA(P1/2T): 2.2 CM^2
BH CV ECHO MEAS - MVA(VTI): 2.8 CM^2
BH CV ECHO MEAS - SI(AO): 105.4 ML/M^2
BH CV ECHO MEAS - SI(CUBED): 32.9 ML/M^2
BH CV ECHO MEAS - SI(LVOT): 32.5 ML/M^2
BH CV ECHO MEAS - SI(MOD-SP2): 31.4 ML/M^2
BH CV ECHO MEAS - SI(MOD-SP4): 31.4 ML/M^2
BH CV ECHO MEAS - SI(TEICH): 25.9 ML/M^2
BH CV ECHO MEAS - SV(AO): 211.6 ML
BH CV ECHO MEAS - SV(CUBED): 66.1 ML
BH CV ECHO MEAS - SV(LVOT): 65.3 ML
BH CV ECHO MEAS - SV(MOD-SP2): 63 ML
BH CV ECHO MEAS - SV(MOD-SP4): 63 ML
BH CV ECHO MEAS - SV(TEICH): 51.9 ML
BH CV ECHO MEAS - TAPSE (>1.6): 1.8 CM
BH CV ECHO MEASUREMENTS AVERAGE E/E' RATIO: 11.07
BH CV VAS BP RIGHT ARM: NORMAL MMHG
BH CV XLRA - RV BASE: 4.2 CM
BH CV XLRA - RV LENGTH: 8.5 CM
BH CV XLRA - RV MID: 3 CM
BH CV XLRA - TDI S': 11.4 CM/SEC
BILIRUB SERPL-MCNC: 1.1 MG/DL (ref 0–1.2)
BUN SERPL-MCNC: 11 MG/DL (ref 8–23)
BUN/CREAT SERPL: 15.5 (ref 7–25)
C DIFF TOX GENS STL QL NAA+PROBE: NOT DETECTED
CALCIUM SPEC-SCNC: 8 MG/DL (ref 8.6–10.5)
CHLORIDE SERPL-SCNC: 106 MMOL/L (ref 98–107)
CO2 SERPL-SCNC: 21 MMOL/L (ref 22–29)
CREAT SERPL-MCNC: 0.71 MG/DL (ref 0.76–1.27)
D-LACTATE SERPL-SCNC: 1.3 MMOL/L (ref 0.5–2)
DEPRECATED RDW RBC AUTO: 54.3 FL (ref 37–54)
EOSINOPHIL # BLD AUTO: 0 10*3/MM3 (ref 0–0.4)
EOSINOPHIL NFR BLD AUTO: 0 % (ref 0.3–6.2)
ERYTHROCYTE [DISTWIDTH] IN BLOOD BY AUTOMATED COUNT: 16.2 % (ref 12.3–15.4)
GFR SERPL CREATININE-BSD FRML MDRD: 129 ML/MIN/1.73
GLOBULIN UR ELPH-MCNC: 2.3 GM/DL
GLUCOSE BLDC GLUCOMTR-MCNC: 111 MG/DL (ref 70–130)
GLUCOSE BLDC GLUCOMTR-MCNC: 123 MG/DL (ref 70–130)
GLUCOSE BLDC GLUCOMTR-MCNC: 156 MG/DL (ref 70–130)
GLUCOSE BLDC GLUCOMTR-MCNC: 208 MG/DL (ref 70–130)
GLUCOSE SERPL-MCNC: 140 MG/DL (ref 65–99)
HCT VFR BLD AUTO: 29.4 % (ref 37.5–51)
HCT VFR BLD AUTO: 31.7 % (ref 37.5–51)
HGB BLD-MCNC: 9.3 G/DL (ref 13–17.7)
HGB BLD-MCNC: 9.6 G/DL (ref 13–17.7)
IMM GRANULOCYTES # BLD AUTO: 0.01 10*3/MM3 (ref 0–0.05)
IMM GRANULOCYTES NFR BLD AUTO: 0.3 % (ref 0–0.5)
LEFT ATRIUM VOLUME INDEX: 41.9 ML/M^2
LEFT ATRIUM VOLUME: 84 ML
LV EF 2D ECHO EST: 50 %
LYMPHOCYTES # BLD AUTO: 0.29 10*3/MM3 (ref 0.7–3.1)
LYMPHOCYTES NFR BLD AUTO: 7.9 % (ref 19.6–45.3)
MAGNESIUM SERPL-MCNC: 2.3 MG/DL (ref 1.6–2.4)
MCH RBC QN AUTO: 28 PG (ref 26.6–33)
MCHC RBC AUTO-ENTMCNC: 30.3 G/DL (ref 31.5–35.7)
MCV RBC AUTO: 92.4 FL (ref 79–97)
MONOCYTES # BLD AUTO: 0.19 10*3/MM3 (ref 0.1–0.9)
MONOCYTES NFR BLD AUTO: 5.2 % (ref 5–12)
NEUTROPHILS NFR BLD AUTO: 3.18 10*3/MM3 (ref 1.7–7)
NEUTROPHILS NFR BLD AUTO: 86.6 % (ref 42.7–76)
NRBC BLD AUTO-RTO: 0 /100 WBC (ref 0–0.2)
NT-PROBNP SERPL-MCNC: ABNORMAL PG/ML (ref 0–1800)
PHOSPHATE SERPL-MCNC: 1.6 MG/DL (ref 2.5–4.5)
PHOSPHATE SERPL-MCNC: 2.1 MG/DL (ref 2.5–4.5)
PLATELET # BLD AUTO: 107 10*3/MM3 (ref 140–450)
PMV BLD AUTO: 12.6 FL (ref 6–12)
POTASSIUM SERPL-SCNC: 3.1 MMOL/L (ref 3.5–5.2)
POTASSIUM SERPL-SCNC: 3.7 MMOL/L (ref 3.5–5.2)
PROT SERPL-MCNC: 5.3 G/DL (ref 6–8.5)
QT INTERVAL: 440 MS
QT INTERVAL: 466 MS
QT INTERVAL: 542 MS
QTC INTERVAL: 446 MS
QTC INTERVAL: 484 MS
QTC INTERVAL: 532 MS
RBC # BLD AUTO: 3.43 10*6/MM3 (ref 4.14–5.8)
SODIUM SERPL-SCNC: 139 MMOL/L (ref 136–145)
TROPONIN T SERPL-MCNC: 0.16 NG/ML (ref 0–0.03)
WBC # BLD AUTO: 3.67 10*3/MM3 (ref 3.4–10.8)

## 2021-03-13 PROCEDURE — 83880 ASSAY OF NATRIURETIC PEPTIDE: CPT | Performed by: INTERNAL MEDICINE

## 2021-03-13 PROCEDURE — 99222 1ST HOSP IP/OBS MODERATE 55: CPT | Performed by: INTERNAL MEDICINE

## 2021-03-13 PROCEDURE — 92610 EVALUATE SWALLOWING FUNCTION: CPT

## 2021-03-13 PROCEDURE — 85014 HEMATOCRIT: CPT | Performed by: INTERNAL MEDICINE

## 2021-03-13 PROCEDURE — 99222 1ST HOSP IP/OBS MODERATE 55: CPT | Performed by: PSYCHIATRY & NEUROLOGY

## 2021-03-13 PROCEDURE — 25010000002 LEVETRIRACETAM PER 10 MG: Performed by: PSYCHIATRY & NEUROLOGY

## 2021-03-13 PROCEDURE — 93306 TTE W/DOPPLER COMPLETE: CPT

## 2021-03-13 PROCEDURE — 82962 GLUCOSE BLOOD TEST: CPT

## 2021-03-13 PROCEDURE — 63710000001 INSULIN LISPRO (HUMAN) PER 5 UNITS: Performed by: NURSE PRACTITIONER

## 2021-03-13 PROCEDURE — 99291 CRITICAL CARE FIRST HOUR: CPT | Performed by: INTERNAL MEDICINE

## 2021-03-13 PROCEDURE — 83605 ASSAY OF LACTIC ACID: CPT | Performed by: INTERNAL MEDICINE

## 2021-03-13 PROCEDURE — 83735 ASSAY OF MAGNESIUM: CPT | Performed by: NURSE PRACTITIONER

## 2021-03-13 PROCEDURE — 25010000003 POTASSIUM CHLORIDE 10 MEQ/100ML SOLUTION: Performed by: EMERGENCY MEDICINE

## 2021-03-13 PROCEDURE — 80053 COMPREHEN METABOLIC PANEL: CPT | Performed by: INTERNAL MEDICINE

## 2021-03-13 PROCEDURE — 25010000002 LEVETIRACETAM IN NACL 0.82% 500 MG/100ML SOLUTION: Performed by: NURSE PRACTITIONER

## 2021-03-13 PROCEDURE — 85018 HEMOGLOBIN: CPT | Performed by: INTERNAL MEDICINE

## 2021-03-13 PROCEDURE — 85025 COMPLETE CBC W/AUTO DIFF WBC: CPT | Performed by: INTERNAL MEDICINE

## 2021-03-13 PROCEDURE — 71045 X-RAY EXAM CHEST 1 VIEW: CPT

## 2021-03-13 PROCEDURE — 84100 ASSAY OF PHOSPHORUS: CPT | Performed by: NURSE PRACTITIONER

## 2021-03-13 PROCEDURE — 84100 ASSAY OF PHOSPHORUS: CPT | Performed by: INTERNAL MEDICINE

## 2021-03-13 PROCEDURE — 93306 TTE W/DOPPLER COMPLETE: CPT | Performed by: INTERNAL MEDICINE

## 2021-03-13 PROCEDURE — 84132 ASSAY OF SERUM POTASSIUM: CPT | Performed by: INTERNAL MEDICINE

## 2021-03-13 PROCEDURE — 84484 ASSAY OF TROPONIN QUANT: CPT | Performed by: INTERNAL MEDICINE

## 2021-03-13 PROCEDURE — 87493 C DIFF AMPLIFIED PROBE: CPT | Performed by: INTERNAL MEDICINE

## 2021-03-13 PROCEDURE — 95816 EEG AWAKE AND DROWSY: CPT

## 2021-03-13 RX ORDER — POTASSIUM CHLORIDE 1.5 G/1.77G
40 POWDER, FOR SOLUTION ORAL AS NEEDED
Status: DISCONTINUED | OUTPATIENT
Start: 2021-03-13 | End: 2021-03-20 | Stop reason: HOSPADM

## 2021-03-13 RX ORDER — POTASSIUM CHLORIDE 750 MG/1
40 CAPSULE, EXTENDED RELEASE ORAL AS NEEDED
Status: DISCONTINUED | OUTPATIENT
Start: 2021-03-13 | End: 2021-03-20 | Stop reason: HOSPADM

## 2021-03-13 RX ORDER — POTASSIUM CHLORIDE 7.45 MG/ML
10 INJECTION INTRAVENOUS
Status: DISCONTINUED | OUTPATIENT
Start: 2021-03-13 | End: 2021-03-20 | Stop reason: HOSPADM

## 2021-03-13 RX ORDER — PANTOPRAZOLE SODIUM 40 MG/10ML
40 INJECTION, POWDER, LYOPHILIZED, FOR SOLUTION INTRAVENOUS EVERY 12 HOURS SCHEDULED
Status: DISCONTINUED | OUTPATIENT
Start: 2021-03-13 | End: 2021-03-20 | Stop reason: HOSPADM

## 2021-03-13 RX ORDER — SPIRONOLACTONE 25 MG/1
25 TABLET ORAL DAILY
Status: DISCONTINUED | OUTPATIENT
Start: 2021-03-13 | End: 2021-03-20 | Stop reason: HOSPADM

## 2021-03-13 RX ORDER — METOPROLOL SUCCINATE 25 MG/1
25 TABLET, EXTENDED RELEASE ORAL
Status: DISCONTINUED | OUTPATIENT
Start: 2021-03-13 | End: 2021-03-19

## 2021-03-13 RX ADMIN — LEVETIRACETAM 750 MG: 100 INJECTION, SOLUTION INTRAVENOUS at 20:12

## 2021-03-13 RX ADMIN — METOPROLOL SUCCINATE 25 MG: 25 TABLET, EXTENDED RELEASE ORAL at 14:02

## 2021-03-13 RX ADMIN — SODIUM CHLORIDE, PRESERVATIVE FREE 10 ML: 5 INJECTION INTRAVENOUS at 20:12

## 2021-03-13 RX ADMIN — INSULIN LISPRO 4 UNITS: 100 INJECTION, SOLUTION INTRAVENOUS; SUBCUTANEOUS at 11:49

## 2021-03-13 RX ADMIN — Medication 2 SPRAY: at 00:43

## 2021-03-13 RX ADMIN — PANTOPRAZOLE SODIUM 40 MG: 40 INJECTION, POWDER, FOR SOLUTION INTRAVENOUS at 20:11

## 2021-03-13 RX ADMIN — POTASSIUM & SODIUM PHOSPHATES POWDER PACK 280-160-250 MG 2 PACKET: 280-160-250 PACK at 19:41

## 2021-03-13 RX ADMIN — SACUBITRIL AND VALSARTAN 1 TABLET: 49; 51 TABLET, FILM COATED ORAL at 20:11

## 2021-03-13 RX ADMIN — POTASSIUM CHLORIDE 10 MEQ: 7.46 INJECTION, SOLUTION INTRAVENOUS at 02:50

## 2021-03-13 RX ADMIN — POTASSIUM CHLORIDE 40 MEQ: 1.5 POWDER, FOR SOLUTION ORAL at 19:40

## 2021-03-13 RX ADMIN — SACUBITRIL AND VALSARTAN 1 TABLET: 24; 26 TABLET, FILM COATED ORAL at 08:27

## 2021-03-13 RX ADMIN — POTASSIUM CHLORIDE 40 MEQ: 1.5 POWDER, FOR SOLUTION ORAL at 11:49

## 2021-03-13 RX ADMIN — ROSUVASTATIN CALCIUM 20 MG: 20 TABLET, COATED ORAL at 08:27

## 2021-03-13 RX ADMIN — ASPIRIN 81 MG: 81 TABLET, CHEWABLE ORAL at 08:27

## 2021-03-13 RX ADMIN — Medication 2 SPRAY: at 08:27

## 2021-03-13 RX ADMIN — SPIRONOLACTONE 25 MG: 25 TABLET ORAL at 14:02

## 2021-03-13 RX ADMIN — PANTOPRAZOLE SODIUM 40 MG: 40 INJECTION, POWDER, FOR SOLUTION INTRAVENOUS at 11:56

## 2021-03-13 RX ADMIN — POTASSIUM CHLORIDE 10 MEQ: 7.46 INJECTION, SOLUTION INTRAVENOUS at 01:44

## 2021-03-13 RX ADMIN — SACUBITRIL AND VALSARTAN 1 TABLET: 24; 26 TABLET, FILM COATED ORAL at 00:04

## 2021-03-13 RX ADMIN — POTASSIUM & SODIUM PHOSPHATES POWDER PACK 280-160-250 MG 2 PACKET: 280-160-250 PACK at 12:49

## 2021-03-13 RX ADMIN — SODIUM CHLORIDE, PRESERVATIVE FREE 10 ML: 5 INJECTION INTRAVENOUS at 08:28

## 2021-03-13 RX ADMIN — LEVETIRACETAM 500 MG: 5 INJECTION INTRAVASCULAR at 08:27

## 2021-03-13 RX ADMIN — INSULIN LISPRO 2 UNITS: 100 INJECTION, SOLUTION INTRAVENOUS; SUBCUTANEOUS at 09:31

## 2021-03-13 NOTE — PROGRESS NOTES
Clinical Nutrition     Reason for Visit:   Identified at risk by screening criteria, Difficulty chewing/swallowing      Patient Name: Narendra Cochran  YOB: 1940  MRN: [id number]  Date of Encounter: 03/13/21 09:41 EST  Admission date: 3/12/2021        Nutrition Assessment   Assessment     OHCA  Possible seizure during arrest  A/C CHF  Nosebleed    h/o: Dm2, CHF, NICM, HTN, CAD, HLP, VHD: MR, AR, Paget diease of bone, Seizures, Diverticulitis, GERD, Constipation, Diarrhea, Abdominal Pain     PMH: He  has a past medical history of Arthritis, Diabetes mellitus (CMS/HCC), Diverticulitis, GERD (gastroesophageal reflux disease), History of transfusion, Hyperlipidemia, Hypertension, Hypertensive urgency, 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   PSxH: He  has a past surgical history that includes Appendectomy; Foot surgery (Left); Colectomy; and Cardiac catheterization (N/A, 3/18/2020).     Substance history: + THC, Tobacco remote    Reported/Observed/Food/Nutrition Related History:     Pt resting in bed, has L. nare bleed, getting cleaned up at present, [place]    Pt reports abdominal pain, diarrhea alternating with constipation past 2-3 months, thinks he has lost weight but unsure how much, he does not reports any swallowing issues    Per RN: SLP consulted, stool sample sent for C. difficile      Anthropometrics     Height: 71in  Last filed wt: 178lb ? method    BMI: 24.8  Normal: 18.5-24.9kg/m2      ? Wt loss 19 lb's    Need accurate scale weight            Last 15 Recorded Weights  View Complete Flowsheet  Weight Weight (kg) Weight (lbs) Weight Method VISIT REPORT   3/13/2021 80.74 kg 178 lb - -   3/12/2021 80.9 kg 178 lb 5.6 oz - -   3/12/2021 89.359 kg 197 lb Stated -   1/18/2021 89.359 kg 197 lb - Report   12/30/2020 93.895 kg 207 lb - Report   8/13/2020 95.89 kg 211 lb 6.4 oz - -   8/3/2020 92.534 kg 204 lb - Report   7/15/2020 89.359 kg 197 lb - Report   4/28/2020 90.266 kg 199  lb - Report   3/18/2020 92.942 kg 204 lb 14.4 oz Standing scale -   2/4/2020 93.895 kg 207 lb - -   1/6/2020 92.08 kg 203 lb - Report   12/9/2019 92.625 kg 204 lb 3.2 oz - Report   11/18/2019 86.818 kg 191 lb 6.4 oz - Report   11/13/2019 85.412 kg 188 lb 4.8 oz Bed scale -         Labs reviewed     Results from last 7 days   Lab Units 03/13/21  0453 03/12/21  1911   GLUCOSE mg/dL 140* 186*   BUN mg/dL 11 7*   CREATININE mg/dL 0.71* 0.90   SODIUM mmol/L 139 141   CHLORIDE mmol/L 106 103   POTASSIUM mmol/L 3.7 2.5*   PHOSPHORUS mg/dL 2.1* 2.7   MAGNESIUM mg/dL 2.3 1.6   ALT (SGPT) U/L 28 42*     Results from last 7 days   Lab Units 03/13/21  0453 03/12/21  1911   ALBUMIN g/dL 3.00* 3.70       Results from last 7 days   Lab Units 03/13/21  0746 03/12/21  1914   GLUCOSE mg/dL 156* 169*     Lab Results   Lab Value Date/Time    HGBA1C 5.6 01/18/2021 1523    HGBA1C 6.0 08/03/2020 1351    HGBA1C 5.90 (H) 03/18/2020 0835    HGBA1C 6.50 (H) 01/26/2019 0436         Medications reviewed   Pertinent:insulin, keppra, nitro      Intake/Ouptut 24 hrs (7:00AM - 6:59 AM)     Intake & Output (last day)       03/12 0701 - 03/13 0700 03/13 0701 - 03/14 0700    I.V. (mL/kg) 447.6 (5.5)     IV Piggyback 470.1     Total Intake(mL/kg) 917.7 (11.3)     Urine (mL/kg/hr) 400     Total Output 400     Net +517.7                      GI: abdominal pain, no diarrhea at present    SKIN: wnl    Needs Assessment       Height used 71in   Weight used 178lb         Estimated need Method/Equation used Result    Energy/Calorie need   kcals/d MSJ x 1.2 1837kcal    25kcal per kg 2023kcal        Protein g/day 1.2-1.5g protein per kg 97-121g protein                           Current Nutrition Prescription     PO: NPO Diet  No active supplement orders           Nutrition Diagnosis     3-13-21  Problem Predicted suboptimal energy intake   Etiology Per Clinical Status   Signs/Symptoms Reported wt loss, chronic abdominal pain, diarrhea, constipation        Nutrition Intervention   1.  Follow treatment progress      Await SLP eval, if pt fails suggest start EN if feasible to feed via R. nare with keofeed    TF recs if needed: Peptamen 1.5 @60ml/hr, Prostat 1x/day, free water @10ml/hr    Need accurate scale weight to determine wt loss       At Target Goal Volume     % Est needs   Volume  1200ml     Energy/kcals 1900kcals 100%   Protein 97g pro 100%   Fiber 0g         Fluid via EN 924mL    Total Fluid  1124    Meets 100% RDI yes          Goal:   General: Nutrition support treatment    Monitoring/Evaluation:   Per protocol, I&O, Pertinent labs, Weight, Skin status, GI status, Symptoms, Swallow function        Salma Ruiz RD, CNSC  Time Spent: 60min

## 2021-03-13 NOTE — PROGRESS NOTES
Continued Stay Note  [facility]     Patient Name: Narendra Cochran  MRN: [id number]  Today's Date: 3/13/2021    Admit Date: 3/12/2021    Discharge Plan     Row Name 03/13/21 0829       Plan    Plan  Ongoing    Plan Comments  Consult received for drug abuse resources.  Email sent to chemical dependency team.  CM will continue to follow.        Discharge Codes    No documentation.             Vida Cabrera RN

## 2021-03-13 NOTE — CONSULTS
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  Moderate MR.  Moderate aortic insufficiency.     • Hypokalemia [E87.6]  Yes   • Hypomagnesemia [E83.42]  Yes   • VHD; mild-mod AR, mild MR [I38]  Yes     · Echo (3/13/2021): LVEF 50%.  Moderate MR.  Moderate aortic insufficiency.     • Chronic systolic congestive heart failure (CMS/HCC) [I50.22]  Yes     · Cardiac catheterization (3/18/2020): 1 vessel CAD involving small posterior lateral branch of the RCA.  LVEF 35%  · Echo (3/13/2021): LVEF 50%.  Moderate MR.  Moderate aortic insufficiency.     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 One 4 beat run of nonsustained VT versus SVT with aberrancy.  · Amiodarone therapy initiated at 200 mg daily for PVCs.  · 24-hour Holter 7/15/2020 HR 43-79, average 55 bpm, PVC burden 10.2%.     • Coronary artery disease involving native coronary artery of native heart with angina pectoris (CMS/HCC) [I25.119]  Yes     · Cardiac catheterization (3-18-20): 1 vessel CAD involving small posterior lateral branch of the RCA.     • H/O seizures on Keppra [R56.9]  Yes   • Hypertension [I10]  Yes   • Hyperlipidemia LDL goal <70 [E78.5]  Yes   • T2DM [E11.65]  Yes   • Tobacco abuse [Z72.0]  Yes   • Paget disease of bone [M88.9]  Yes      History of Paget's disease, treated with Reclast.  Last saw Dr. Lui 2014.                80-year-old gentleman with a history of chronic systolic heart failure due to nonischemic cardiomyopathy, frequent PVCs and mild to moderate valvular heart disease.    Yesterday, the patient suffered out of [place]  He had bystander CPR performed and an AED placed which detected and shocked a shockable rhythm.  The patient regained consciousness and was transferred to [facility] emergency room.    Initial work-up was notable for magnesium of 1.6, potassium of 2.5.  Echocardiogram performed this morning shows preserved LVEF with moderate mitral regurgitation and moderate aortic insufficiency.    The patient has seen Dr. Boyd regarding frequent PVCs.  Amiodarone was recommended with no improvement in his ectopy and was discontinued.    The patient reports he has been in his normal state of health.  He denies any recent onset of chest discomfort to suggest angina.  He has had some mild increased shortness of breath with activity.  Presently, he denies any cardiac symptoms.    The nurse states that the patient had dark stools this morning and they are checking serial hematocrits for GI bleeding.      No Known Allergies    Prior to Admission medications    Medication Sig Start Date End Date Taking? Authorizing Provider   aspirin 81 MG chewable tablet Chew 1 tablet Daily. 5/30/17  Yes Glenna Nuno APRN   famotidine (PEPCID) 20 MG tablet Take 1 tablet by mouth At Night As Needed for Heartburn. 12/9/19  Yes Marguerite Moore PA-C   levETIRAcetam (KEPPRA) 250 MG tablet TAKE ONE TABLET BY MOUTH TWICE A DAY 3/1/21  Yes Marguerite Moore PA-C   metoprolol succinate XL (TOPROL-XL) 25 MG 24 hr tablet Take 1 tablet by mouth Daily. 12/30/20  Yes Javad Mercedes MD   omeprazole (priLOSEC) 40 MG capsule Take 1 capsule by mouth Every Morning Before Breakfast. 1/18/21  Yes  Marguerite Moore PA-C   rosuvastatin (CRESTOR) 20 MG tablet TAKE ONE TABLET BY MOUTH DAILY 12/21/20  Jen Bragg APRN   Blood Pressure Monitoring (BLOOD PRESSURE MONITOR/L CUFF) misc 1 Units Daily. 12/9/19   Marguerite Moore PA-C   diclofenac (VOLTAREN) 1 % gel gel Apply 4 g topically to the appropriate area as directed 4 (Four) Times a Day As Needed (prn for pain). 5/11/20   Marguerite Moore PA-C   Empagliflozin 25 MG tablet Take 25 mg by mouth Daily. 1/18/21   Marguerite Moore PA-C   glucose blood test strip Use to check blood glucose once a day. DX:E11.65 8/3/20   Marguerite Moore PA-C   glucose monitor monitoring kit 1 each Daily. DX: E11.65 8/3/20   Marguerite Moore PA-C   Lancets 30G misc Use to check blood glucose once a day. DX: E11.65 8/3/20   Marguerite Moore PA-C   metFORMIN ER (GLUCOPHAGE-XR) 500 MG 24 hr tablet TAKE ONE TABLET BY MOUTH TWICE A DAY WITH MEALS  Patient taking differently: Take 500 mg by mouth Daily With Breakfast. 1/28/21   Marguerite Moore PA-C   sacubitril-valsartan (ENTRESTO) 24-26 MG tablet Take 1 tablet by mouth 2 (Two) Times a Day. 7/15/20   Javad Mercedes MD       Past Medical History:   Diagnosis Date   • Arthritis    • Diabetes mellitus (CMS/HCC)    • Diverticulitis    • GERD (gastroesophageal reflux disease)    • History of transfusion    • Hyperlipidemia    • Hypertension    • Hypertensive urgency, 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   • Paget disease of bone        Past Surgical History:   Procedure Laterality Date   • APPENDECTOMY     • CARDIAC CATHETERIZATION N/A 3/18/2020    Procedure: Left Heart Cath;  Surgeon: Sonya Garibay MD;  Location: [facility] CATH INVASIVE LOCATION;  Service: Cardiology;  Laterality: N/A;  First availabel provider     • COLON RESECTION      second to diverticulitis    • FOOT SURGERY Left        Family History   Problem Relation Age of Onset   • No Known Problems Daughter    • No Known Problems  Son    • No Known Problems Son    • No Known Problems Son    • Diabetes Sister    • Clotting disorder Sister    • No Known Problems Mother    • No Known Problems Sister    • No Known Problems Brother    • Fainting Brother        Social History     Tobacco Use   Smoking Status Current Every Day Smoker   • Packs/day: 0.25   • Years: 65.00   • Pack years: 16.25   • Types: Cigars, Cigarettes   • Last attempt to quit: 2019   • Years since quittin.5   Smokeless Tobacco Never Used       Social History     Substance and Sexual Activity   Alcohol Use Yes    Comment: rarely         Review of Systems:   See admission H&P for details         Vital Sign Min/Max for last 24 hours  Temp  Min: 97.6 °F (36.4 °C)  Max: 98.8 °F (37.1 °C)   BP  Min: 114/57  Max: 189/94   Pulse  Min: 54  Max: 70   Resp  Min: 20  Max: 24   SpO2  Min: 92 %  Max: 100 %   Flow (L/min)  Min: 3  Max: 3      Intake/Output Summary (Last 24 hours) at 3/13/2021 1124  Last data filed at 3/13/2021 0800  Gross per 24 hour   Intake 917.74 ml   Output 550 ml   Net 367.74 ml             Constitutional:       Appearance: Well-developed.   Eyes:      General: No scleral icterus.     Pupils: Pupils are equal, round, and reactive to light.   HENT:      Head: Normocephalic and atraumatic.   Neck:      Thyroid: No thyromegaly.      Vascular: No carotid bruit or JVD.   Pulmonary:      Effort: Pulmonary effort is normal.      Breath sounds: Normal breath sounds.   Cardiovascular:      Normal rate. Regular rhythm.      Murmurs: There is a grade 2/6 high frequency blowing holosystolic murmur at the apex. There is a high frequency blowing decrescendo, early diastolic murmur at the URSB, radiating to the apex.      No gallop.   Abdominal:      Palpations: Abdomen is soft. There is no abdominal mass.      Tenderness: There is no abdominal tenderness.   Musculoskeletal:      Extremities: No clubbing present.Skin:     General: Skin is warm and dry. There is no cyanosis.    Neurological:      Mental Status: Alert and oriented to person, place, and time.   Psychiatric:         Behavior: Behavior normal.          Tele: Sinus    DATA REVIEW:    EKG (3/12/2021): Sinus rhythm with bigeminy    CXR (3/12/2021): Mild increase pulmonary vascularity    ECHO (3/13/2021): LVEF 50%.  Moderate aortic insufficiency.  Moderate mitral regurgitation      Results from last 7 days   Lab Units 03/13/21 0453 03/12/21 1911   SODIUM mmol/L 139 141   POTASSIUM mmol/L 3.7 2.5*   CHLORIDE mmol/L 106 103   BUN mg/dL 11 7*   CREATININE mg/dL 0.71* 0.90   MAGNESIUM mg/dL 2.3 1.6     Results from last 7 days   Lab Units 03/13/21  0453 03/12/21  2215 03/12/21 1911   TROPONIN T ng/mL 0.159* 0.026 <0.010     Results from last 7 days   Lab Units 03/13/21  0453 03/12/21  1914 03/12/21  1911   WBC 10*3/mm3 3.67  --  3.19*   HEMOGLOBIN g/dL 9.6*  --  12.4*   HEMOGLOBIN, POC g/dL  --  13.3  --    HEMATOCRIT % 31.7*  --  40.4   HEMATOCRIT POC %  --  39  --    PLATELETS 10*3/mm3 107*  --  112*     Lab Results   Component Value Date    HGBA1C 5.6 01/18/2021     Lab Results   Component Value Date    CHOL 131 03/18/2020    TRIG 77 03/18/2020    HDL 58 03/18/2020    LDL 58 03/18/2020    AST 53 (H) 03/13/2021    ALT 28 03/13/2021                Out of hospital cardiac arrest/VF arrest  · Patient collapsed with shockable rhythm with AED  · History of frequent ventricular ectopy but no previous history of sustained ventricular arrhythmia  · LVEF normal on echocardiogram  · Patient candidate for ICD for primary prevention  · Replete potassium >4 and magnesium >2    HFrEF with normalized LVEF  · Appears nearly euvolemic  · Echocardiogram 3/13/2021 shows LVEF of 50%  · Continue beta-blocker, Entresto  · #Prolactin treatment milligrams daily    Elevated troponin, likely demand  · Elevated troponin likely in context to shock/VF arrest  · Cardiac catheterization 1 year ago showed nonobstructive CAD of the major epicardial  arteries  · Patient will require cardiac catheterization prior to undergoing ICD    Type 2 diabetes mellitus  · Well-controlled  · Consider Farxiga at discharge for history of heart failure and diabetes           · N.p.o. after midnight Sunday for cardiac catheterization Monday  · Start spironolactone  · Change metoprolol tartrate to succinate 25 mg daily  · ICD likely Tuesday  · Replete potassium >4 and magnesium >2      Electronically signed by Doc Sahni IV, MD, 03/13/21, 12:50 PM EST.

## 2021-03-13 NOTE — THERAPY EVALUATION
Acute Care - Speech Language Pathology   Swallow Initial Evaluation  [place]   Clinical Swallow Evaluation     Patient Name: Narendra Cochran  : 1940  MRN: [id number]  Today's Date: 3/13/2021               Admit Date: 3/12/2021    Visit Dx:     ICD-10-CM ICD-9-CM   1. Acute on chronic congestive heart failure, unspecified heart failure type (CMS/[facility])  I50.9 428.0   2. Hypokalemia  E87.6 276.8   3. Coronary artery disease involving native coronary artery of native heart with angina pectoris (CMS/[facility])  I25.119 414.01     413.9   4. OHCA  I46.9 427.5   5. Dysphagia, unspecified type  R13.10 787.20     Patient Active Problem List   Diagnosis   • Hypertension   • Hyperlipidemia LDL goal <70   • T2DM   • Paget disease of bone   • Tobacco abuse   • H/O seizures on Keppra   • Gonalgia   • Osteitis deformans   • Syncope   • NSVT (nonsustained ventricular tachycardia) (CMS/HCC)   • Coronary artery disease involving native coronary artery of native heart with angina pectoris (CMS/[facility])   • NICM    • OHCA   • Hypokalemia   • Hypomagnesemia   • VHD; mild-mod AR, mild MR   • Chronic systolic congestive heart failure (CMS/[facility])   • Former tobacco user   • GERD   • Marijuana use   • Frequent PVCs   • Acute GI bleeding   • Acute blood loss anemia     Past Medical History:   Diagnosis Date   • Arthritis    • Diabetes mellitus (CMS/[facility])    • Diverticulitis    • GERD (gastroesophageal reflux disease)    • History of transfusion    • Hyperlipidemia    • Hypertension    • Hypertensive urgency, malignant 2017   • Paget disease of bone      Past Surgical History:   Procedure Laterality Date   • APPENDECTOMY     • CARDIAC CATHETERIZATION N/A 3/18/2020    Procedure: Left Heart Cath;  Surgeon: Sonya Garibay MD;  Location:  [facility] CATH INVASIVE LOCATION;  Service: Cardiology;  Laterality: N/A;  First availabel provider     • COLON RESECTION      second to diverticulitis    • FOOT SURGERY Left         SWALLOW EVALUATION (last 72  hours)      SLP Adult Swallow Evaluation     Row Name 03/13/21 1030                   Rehab Evaluation    Document Type  evaluation  -MP        Subjective Information  no complaints  -MP        Patient Observations  alert;cooperative  -MP        Patient/Family/Caregiver Comments/Observations  No family present, pt very [place]  -MP        Care Plan Review  evaluation/treatment results reviewed;patient/other agree to care plan  -MP        Patient Effort  good  -MP           General Information    Patient Profile Reviewed  yes  -MP        Pertinent History Of Current Problem  Adm 3/12 w/ OHCA & seizure - [place], performed CPR & defib. CXR w/ bilateral basilar opacifications & pulmonary edema. Hx prior tobacco use, Paget's dz, DM2, HLD, HTN, VHD, systolic & diastolic CHF, seizures, GERD, ? drug abuse.   -MP        Current Method of Nutrition  NPO  -MP        Precautions/Limitations, Vision  WFL;for purposes of eval  -MP        Precautions/Limitations, Hearing  hearing impairment, bilaterally  -MP        Prior Level of Function-Communication  unknown  -MP        Prior Level of Function-Swallowing  no diet consistency restrictions  -MP        Plans/Goals Discussed with  patient;agreed upon  -MP        Barriers to Rehab  medically complex  -MP        Patient's Goals for Discharge  patient did not state  -MP           Pain    Additional Documentation  Pain Scale: FACES Pre/Post-Treatment (Group)  -MP           Pain Scale: FACES Pre/Post-Treatment    Pain: FACES Scale, Pretreatment  0-->no hurt  -MP        Posttreatment Pain Rating  0-->no hurt  -MP           Oral Motor Structure and Function    Dentition Assessment  natural, present and adequate  -MP        Secretion Management  WNL/WFL  -MP        Mucosal Quality  moist, healthy  -MP           Oral Musculature and Cranial Nerve Assessment    Oral Motor General Assessment  WFL  -MP           General Eating/Swallowing Observations    Respiratory  Support Currently in Use  room air  -MP        Eating/Swallowing Skills  self-fed;fed by SLP  -MP        Positioning During Eating  upright in bed  -MP        Utensils Used  spoon;cup;straw  -MP        Consistencies Trialed  thin liquids;pureed;regular textures  -MP           Clinical Swallow Eval    Pharyngeal Phase  suspected pharyngeal impairment  -MP        Clinical Swallow Evaluation Summary  Hard cough w/ kelsie cracker. No overt clinical s/sxs aspiration w/ thin or puree, even when pushed. Will initiate puree diet & thin liquids. Meds whole w/ thin or puree, as tolerated. SLP will f/u for re-eval v. FEES.  -MP           Pharyngeal Phase Concerns    Pharyngeal Phase Concerns  cough  -MP        Cough  regular consistencies  -MP           Clinical Impression    SLP Swallowing Diagnosis  R/O pharyngeal dysphagia  -MP        Functional Impact  risk of aspiration/pneumonia  -MP        Rehab Potential/Prognosis, Swallowing  good, to achieve stated therapy goals  -MP        Swallow Criteria for Skilled Therapeutic Interventions Met  demonstrates skilled criteria  -MP           Recommendations    Predicted Duration Therapy Intervention (Days)  until discharge  -MP        SLP Diet Recommendation  puree;thin liquids  -MP        Recommended Diagnostics  reassess via clinical swallow evaluation  -MP        Recommended Precautions and Strategies  upright posture during/after eating;general aspiration precautions  -MP        Oral Care Recommendations  Oral Care BID/PRN  -MP        SLP Rec. for Method of Medication Administration  meds whole;with thin liquids;with pudding or applesauce;as tolerated  -MP        Monitor for Signs of Aspiration  yes;notify SLP if any concerns  -MP        Anticipated Discharge Disposition (SLP)  unknown;anticipate therapy at next level of care  -MP          User Key  (r) = Recorded By, (t) = Taken By, (c) = Cosigned By    Initials Name Effective Dates    Terrence Mix, MS CCC-SLP 06/19/19  -           EDUCATION  The patient has been educated in the following areas:   Dysphagia (Swallowing Impairment) Modified Diet Instruction.    SLP Recommendation and Plan  SLP Swallowing Diagnosis: R/O pharyngeal dysphagia  SLP Diet Recommendation: puree, thin liquids  Recommended Precautions and Strategies: upright posture during/after eating, general aspiration precautions  SLP Rec. for Method of Medication Administration: meds whole, with thin liquids, with pudding or applesauce, as tolerated     Monitor for Signs of Aspiration: yes, notify SLP if any concerns  Recommended Diagnostics: reassess via clinical swallow evaluation  Swallow Criteria for Skilled Therapeutic Interventions Met: demonstrates skilled criteria  Anticipated Discharge Disposition (SLP): unknown, anticipate therapy at next level of care  Rehab Potential/Prognosis, Swallowing: good, to achieve stated therapy goals     Predicted Duration Therapy Intervention (Days): until discharge                         Plan of Care Reviewed With: patient           Time Calculation:   Time Calculation- SLP     Row Name 03/13/21 1302             Time Calculation- SLP    SLP Start Time  1030  -MP      SLP Received On  03/13/21  -MP        User Key  (r) = Recorded By, (t) = Taken By, (c) = Cosigned By    Initials Name Provider Type    MP Terrence Ureña MS CCC-SLP Speech and Language Pathologist          Therapy Charges for Today     Code Description Service Date Service Provider Modifiers Qty    [id number] HC ST EVAL ORAL PHARYNG SWALLOW 2 3/13/2021 Terrence Ureña MS CCC-SLP GN 1          Patient was not wearing a face mask and did exhibit coughing during this therapy encounter.  Procedure performed was aerosolizing, involved close contact (within 6 feet for at least 15 minutes or longer), and did not involve contact with infectious secretions or specimens.  Therapist used appropriate personal protective equipment including gloves, standard procedure mask,  eye protection and gown.  Appropriate PPE was worn during the entire therapy session.  Hand hygiene was completed before and after therapy session.        Terrence Ureña MS CCC-SLP  3/13/2021

## 2021-03-13 NOTE — PAYOR COMM NOTE
"  Sherie Holliday RN Utilization Review [phone]  Fax # [phone]    Notification of admission and initial clinicals. Status is inpatient.    Narendra Reynolds (80 y.o. Male)     Date of Birth Social Security Number Address Home Phone MRN    [id number]  [id number] [place] [address] [phone] [id number]    Faith Marital Status          Jehovah's witness        Admission Date Admission Type Admitting Provider Attending Provider Department, Room/Bed    3/12/21 Emergency Juan Jose Valle MD Thompson, Patton Weddington, MD [facility] 2A ICU, N216/1    Discharge Date Discharge Disposition Discharge Destination                       Attending Provider: Juan Jose Valle MD    Allergies: No Known Allergies    Isolation: Spore   Infection: C.difficile (rule out) (03/12/21)   Code Status: CPR    Ht: 180.3 cm (70.98\")   Wt: 80.7 kg (178 lb)    Admission Cmt: None   Principal Problem: OHCA [I46.9]                 Active Insurance as of 3/12/2021     Primary Coverage     Payor Plan Insurance Group Employer/Plan Group    [facility] [employer] MEDICARE REPLACEMENT [facility]     Payor Plan Address Payor Plan Phone Number Payor Plan Fax Number Effective Dates    [address] [address] [phone]  1/6/2020 - None Entered    [facility] [id number]       Subscriber Name Subscriber Birth Date Member ID       NARENDRA REYNOLDS [id number]                 Emergency Contacts      (Rel.) Home Phone Work Phone Mobile Phone    Jessica Reynolds (Spouse) -- -- [phone]    colette campos (Daughter) -- -- [phone]    Narendra Reynolds (Son) -- -- [phone]               History & Physical      Juan Jose Valle MD at 03/12/21 2006          INTENSIVIST / PULMONARY INITIAL VISIT (CONSULT / H&P) NOTE     Hospital:  LOS: 0 days   Mr. Narendra Reynolds, 80 y.o. male is followed for:   Chief Complaint   Patient presents with   • Syncope       " OHCA    NICM     Hypokalemia    Hypomagnesemia    Acute on chronic combined systolic and diastolic CHF     Frequent PVCs    Hypertension    T2DM    Paget disease of bone    Tobacco abuse    H/O seizures on Keppra    CAD    VHD; mild-mod AR, mild MR    Former tobacco user    Marijuana use    Dyslipidemia    GERD       History of Present Illness   Narendra Cochran is a 80 y.o. male who presents to [facility] ED 03/12/21 S/P witnessed OHCA and possible seizure.    Patient has a PMH of prior tobacco use, marijuana use, Paget's disease, T2DM, dyslipidemia, HTN, CAD, VHD (mild-mod AR, mild MR), chronic combined systolic and diastolic CHF, NICM (EF 36-40%) on Entresto, frequent PVC's, NSVT, seizures on Keppra, and GERD. He reports additional recent ~2 month history of intermittent diarrhea and abdominal pain as well as poor appetite.     Patient has no recollection of the events that occurred today. Per EMS report, patient was found down unresponsive by bystanders in the mall earlier today. They performed CPR ~5 min and AED was placed with shock advised - following defibrillation patient apparently began to arouse by the time EMS arrived.  An IO was placed and lidocaine was administered, and patient was brought to our ED for further evaluation.    On ED arrival patient afebrile, hypertensive with SBP 170s, and oxygenating appropriately on room air. EKG with 1st degree AVB and unifocal PVC's with bigeminy (appears to be baseline). Lab work-up significant for negative troponin, , proBNP 15293, K 2.5, mag 1.6, , ALT 42, total bili 1.4, , lactate 3.2, PCT negative, TSH 6.72, free T4 1.64. UDS + THC. COVID-19 and Flu A/B PCR testing negative. CXR with bilateral basilar opacifications and pulmonary edema pattern although underlying infection/PNA unable to be excluded. CTH with stable mild diffuse chronic changes but negative for an acute intracranial abnormality.  He was loaded with 1 g Keppra and electrolyte  replacement protocol initiated.  Mr. Cochran will be admitted to the ICU for further management.    Of note, patient was referred to Dr. Boyd of EP Cardiology by Dr. Mercedes in August of last year due to concerns of possible severe sinus node dysfunction and frequent PVC's with 12.3% burden. He was not felt to be a candidate for PPM insertion and was instead trialed on Amiodarone with no significant decrease in burden - this was D/C'd and Toprol-XL was increased instead.     Past Medical History:   Diagnosis Date   • Arthritis    • GERD (gastroesophageal reflux disease)    • Hyperlipidemia    • Hypertension    • Hypertensive urgency, malignant 2017   • Paget disease of bone      Past Surgical History:   Procedure Laterality Date   • APPENDECTOMY     • CARDIAC CATHETERIZATION N/A 3/18/2020    Procedure: Left Heart Cath;  Surgeon: Sonya Garibay MD;  [facility];  Service: Cardiology;  Laterality: N/A;  First availabel provider     • COLON RESECTION      second to diverticulitis    • FOOT SURGERY Left      Family History   Problem Relation Age of Onset   • No Known Problems Daughter    • No Known Problems Son    • No Known Problems Son    • No Known Problems Son    • Diabetes Sister    • Clotting disorder Sister    • No Known Problems Mother    • No Known Problems Sister    • No Known Problems Brother    • Fainting Brother      Social History     Socioeconomic History   • Marital status:      Spouse name: Not on file   • Number of children: Not on file   • Years of education: Not on file   • Highest education level: Not on file   Tobacco Use   • Smoking status: Former Smoker     Packs/day: 0.25     Years: 65.00     Pack years: 16.25     Types: Cigars, Cigarettes     Quit date: 2019     Years since quittin.5   • Smokeless tobacco: Never Used   Substance and Sexual Activity   • Alcohol use: Yes     Comment: rarely   • Drug use: Yes     Types: Marijuana     Comment: Pt states  used weekly but now quitting    • Sexual activity: Defer     No Known Allergies  No current facility-administered medications on file prior to encounter.     Current Outpatient Medications on File Prior to Encounter   Medication Sig Dispense Refill   • aspirin 81 MG chewable tablet Chew 1 tablet Daily.     • Blood Pressure Monitoring (BLOOD PRESSURE MONITOR/L CUFF) misc 1 Units Daily. 1 each 0   • diclofenac (VOLTAREN) 1 % gel gel Apply 4 g topically to the appropriate area as directed 4 (Four) Times a Day As Needed (prn for pain). 60 tube 0   • Empagliflozin 25 MG tablet Take 25 mg by mouth Daily. 30 tablet 5   • famotidine (PEPCID) 20 MG tablet Take 1 tablet by mouth At Night As Needed for Heartburn. 90 tablet 1   • glucose blood test strip Use to check blood glucose once a day. DX:E11.65 100 each 3   • glucose monitor monitoring kit 1 each Daily. DX: E11.65 1 each 0   • Lancets 30G misc Use to check blood glucose once a day. DX: E11.65 100 each 3   • levETIRAcetam (KEPPRA) 250 MG tablet TAKE ONE TABLET BY MOUTH TWICE A  tablet 0   • metFORMIN ER (GLUCOPHAGE-XR) 500 MG 24 hr tablet TAKE ONE TABLET BY MOUTH TWICE A DAY WITH MEALS 180 tablet 0   • metoprolol succinate XL (TOPROL-XL) 25 MG 24 hr tablet Take 1 tablet by mouth Daily. 30 tablet 6   • omeprazole (priLOSEC) 40 MG capsule Take 1 capsule by mouth Every Morning Before Breakfast. 90 capsule 1   • rosuvastatin (CRESTOR) 20 MG tablet TAKE ONE TABLET BY MOUTH DAILY 90 tablet 0   • sacubitril-valsartan (ENTRESTO) 24-26 MG tablet Take 1 tablet by mouth 2 (Two) Times a Day. 60 tablet 3       ROS:  Per HPI, all other systems were reviewed and were negative        OBJECTIVE     Vital Sign Min/Max for last 24 hours  Temp  Min: 98.8 °F (37.1 °C)  Max: 98.8 °F (37.1 °C)   BP  Min: 170/80  Max: 176/88   Pulse  Min: 55  Max: 64   Resp  Min: 20  Max: 20   SpO2  Min: 93 %  Max: 97 %   No data recorded     Telemetry:              General Appearance:  Conversant, in no  acute distress  Eyes:  No scleral icterus or pallor, pupils normal  Ears, Nose, Mouth, Throat: macerated lower lip  Neck:  Trachea midline, thyroid normal  Respiratory:  Clear to auscultation bilaterally, normal effort, no tenderness to palpation  Cardiovascular:  Regular rate and rhythm, no murmurs, no peripheral edema, no thrill  Gastrointestinal:  Soft, nontender, nondistended, no hepatosplenomegaly  Skin:  Normal temperature, no rash  Psychiatric:  Alert and oriented x 3, normal judgement and insight  Neuro:  No new focal neurologic deficits observed    SpO2: 95 % SpO2  Min: 93 %  Max: 97 %   Device:      Flow Rate:   No data recorded     Mechanical Ventilator Settings:                                         Intake/Ouptut 24 hrs (7:00AM - 6:59 AM)  Intake & Output (last 3 days)     None            Lines, Drains & Airways    Active LDAs     Name:   Placement date:   Placement time:   Site:   Days:    Peripheral IV 03/12/21 1905 Right Forearm   03/12/21 1905    Forearm   less than 1                Hematology:  Results from last 7 days   Lab Units 03/12/21 1914 03/12/21 1911   WBC 10*3/mm3  --  3.19*   HEMOGLOBIN g/dL  --  12.4*   HEMOGLOBIN, POC g/dL 13.3  --    HEMATOCRIT %  --  40.4   HEMATOCRIT POC % 39  --    PLATELETS 10*3/mm3  --  112*     Electrolytes, Magnesium and Phosphorus:  Results from last 7 days   Lab Units 03/12/21 1911   SODIUM mmol/L 141   POTASSIUM mmol/L 2.5*   CO2 mmol/L 23.0   MAGNESIUM mg/dL 1.6   PHOSPHORUS mg/dL 2.7     Renal:  Results from last 7 days   Lab Units 03/12/21 1911   CREATININE mg/dL 0.90   BUN mg/dL 7*     Estimated Creatinine Clearance: 80 mL/min (by C-G formula based on SCr of 0.9 mg/dL).  Estimated Creatinine Clearance: 80 mL/min (by C-G formula based on SCr of 0.9 mg/dL).  Hepatic:  Results from last 7 days   Lab Units 03/12/21 1911   ALK PHOS U/L 112   BILIRUBIN mg/dL 1.4*   ALT (SGPT) U/L 42*   AST (SGOT) U/L 101*     Arterial Blood Gases:  Results from last 7  days   Lab Units 03/12/21  1914   PH, ARTERIAL pH units 7.40             Lab Results   Component Value Date    LACTATE 3.2 (C) 03/12/2021       CT Head Without Contrast    Result Date: 3/12/2021  Narrative: CT HEAD, NONCONTRAST, 3/12/2021 HISTORY: 80-year-old male in the ED after an episode of altered mental status. TECHNIQUE: CT imaging of the head without IV contrast. Radiation dose reduction techniques included automated exposure control. Radiation audit for CT and nuclear cardiology exams in the last 12 months: 0. COMPARISON: *  CT head, 11/13/2019. FINDINGS: No acute intracranial abnormality is identified. Minimal generalized age-appropriate cerebral volume loss. Mild diffuse low-attenuation white matter changes, nonspecific but most typically seen in the setting of chronic small vessel disease. These findings are stable. No evidence of intracranial hemorrhage, mass, mass effect, cerebral edema, extra-axial fluid collection or hydrocephalus. Visualized upper paranasal sinuses and mastoid air spaces are clear.     Impression: 1. No acute intracranial abnormality. 2. Stable mild diffuse chronic changes as noted above. 3. No change since 11/13/2019. Signer Name: Chris Buitrago MD  Signed: 3/12/2021 8:34 PM  Workstation Name: [facility]  [facility]    XR Chest 1 View    Result Date: 3/12/2021  Narrative: CR Chest 1 Vw INDICATION: Chest pain, altered mental status COMPARISON:  Chest x-ray from 11/12/2019 FINDINGS: Single portable AP view(s) of the chest.  The exam is limited. There are bibasilar opacities with left basilar atelectasis. No definite pneumothorax. The heart appears somewhat enlarged. Pulmonary vasculature appears congested with potential edema.     Impression: Bilateral basilar opacities with pulmonary vascular congestion and potential edema may suggest CHF exacerbation although underlying infection/pneumonia is not excluded. Signer Name: Sharron Nettles MD  Signed:  3/12/2021 7:26 PM  Workstation Name: [id number]  [facility]      Relevant imaging studies and labs from 03/12/21 were reviewed and interpreted by me    Medications (drips):       magnesium sulfate, 4 g, Intravenous, Once  potassium chloride, 10 mEq, Intravenous, Once   And  potassium chloride, 10 mEq, Intravenous, Once   And  [START ON 3/13/2021] potassium chloride, 10 mEq, Intravenous, Once   And  [START ON 3/13/2021] potassium chloride, 10 mEq, Intravenous, Once        Assessment/Plan   IMPRESSION / PLAN     Inpatient Problem List:  80 y.o.male:  Active Hospital Problems    Diagnosis    • **OHCA    • Hypokalemia    • Hypomagnesemia    • Acute on chronic combined systolic and diastolic CHF     • Frequent PVCs      · Holter monitor February 2020:  PVCs burden 12.3% with some of the counted PVCs appear to be PACs with aberrancy. One 4 beat run of nonsustained VT versus SVT with aberrancy.  · Amiodarone therapy initiated at 200 mg daily for PVCs.  · 24-hour Holter 7/15/2020 HR 43-79, average 55 bpm, PVC burden 10.2%.     • NICM       [facility] 3-18-20:   · Nonischemic cardiomyopathy with moderate left ventricular systolic dysfunction, estimated EF 36 to 40%.       • VHD; mild-mod AR, mild MR      TTE 11/13/2019  · Mild to moderate aortic valve regurgitation is present.  · Mild mitral valve regurgitation is present     • Former tobacco user    • Marijuana use    • CAD      [facility] 3-18-20:   · Single-vessel CAD involving a small posterolateral branch of the right coronary artery  · No evidence of any significant disease involving any major vessels     • H/O seizures on Keppra    • Hypertension    • T2DM    • Tobacco abuse    • Paget disease of bone      History of Paget's disease, treated with Reclast.  Last saw Dr. Lui 2014.     • GERD    • Dyslipidemia         Impression:  80 y.o.male with relevant PMH of tobacco use, marijuana use, Paget's disease, T2DM, dyslipidemia, HTN, CAD, VHD (mild-mod AR, mild  MR), chronic combined systolic and diastolic CHF, NICM (EF 36-40%) on Entresto, frequent PVC's, NSVT, seizures on Keppra, and GERD, admitted 3/12/2021 after witnessed cardiac arrest at the [facility].  Received bystander CPR and had ROSC with AED.  Unclear how long CPR lasted or how many shocks but patient is alert and hemodynamically stable off pressors / oxygen / vent / etc.  He also bit his lip and had urinary incontinence raising possibility of seizures.  Noted to be profoundly hypokalemic 2.5 and also with borderline mag at 1.6.    Plan:  Cardiac Arrest - trend troponins.  Replace electrolytes aggressively.  Hold diuretics - on room air - dont want to exacerbate hypokalemia.  Echo in am. Qtc 532.  Hold amio.  If further ventricular arrythmias start lidocaine gtt.  Cardiology consult in am.    Possible Seizures - loaded with Keppra in ED.  EEG in am.  Neurology consult.  Microvascular disease on CTH.    Hypertensive Urgency - 's, start NTG gtt to keep SBP < 140    Diarrhea - stool studies.  No signs of peritonitis on exam.    Drug Abuse - social work consult for resources    DM - titrate SQ insulin    DVT / PUD prophylaxis    NPO    Critical Care time spent in direct patient care: 45 minutes (excluding procedure time, if applicable) including high complexity decision making to assess, manipulate, and support vital organ system failure in this individual who has impairment of one or more vital organ systems such that there is a high probability of imminent or life threatening deterioration in the patient’s condition.       Juan Jose Valle MD  Intensive Care Medicine       Electronically signed by Juan Jose Valle MD at 03/12/21 2320          Emergency Department Notes      Sudarshan Hernandez MD at 03/13/21 0221      Procedure Orders    1. Critical Care [[id number]] ordered by Sudarshan Hernandez MD               Subjective   80-year-old male brought to us by EMS postcode.  The patient  reportedly [place] the mall when he had sudden loss of consciousness that was witnessed by other bystanders.  Bystander CPR was initiated and the AED device was applied.  Ultimately the AED device shocked the patient and he had return of spontaneous circulation.  He has brought in her awake at his baseline mentation.  He reports a known history of CHF, denies a known history of abnormal rhythm in the past.  He also reportedly has a known history of seizures.  He reports that he takes all his medications as prescribed and has not missed any doses.  In addition to biting his tongue he also had urinary incontinence.  He denies recent fever, bodies, or chills.  No acute respiratory symptoms at this time.  No other acute complaints.          Review of Systems   Constitutional: Negative for chills, fatigue and fever.   HENT: Negative for congestion, ear pain, postnasal drip, sinus pressure and sore throat.         Tongue injury   Eyes: Negative for pain, redness and visual disturbance.   Respiratory: Negative for cough, chest tightness and shortness of breath.    Cardiovascular: Negative for chest pain, palpitations and leg swelling.   Gastrointestinal: Negative for abdominal pain, anal bleeding, blood in stool, diarrhea, nausea and vomiting.   Endocrine: Negative for polydipsia and polyuria.   Genitourinary: Negative for difficulty urinating, dysuria, frequency and urgency.        Urinary incontinence   Musculoskeletal: Negative for arthralgias, back pain and neck pain.   Skin: Negative for pallor and rash.   Allergic/Immunologic: Negative for environmental allergies and immunocompromised state.   Neurological: Positive for syncope. Negative for dizziness, weakness and headaches.   Hematological: Negative for adenopathy.   Psychiatric/Behavioral: Positive for decreased concentration. Negative for confusion, self-injury and suicidal ideas. The patient is not nervous/anxious.    All other systems reviewed and are  negative.      Past Medical History:   Diagnosis Date   • Arthritis    • Diabetes mellitus (CMS/HCC)    • Diverticulitis    • GERD (gastroesophageal reflux disease)    • History of transfusion    • Hyperlipidemia    • Hypertension    • Hypertensive urgency, malignant 2017   • Paget disease of bone        No Known Allergies    Past Surgical History:   Procedure Laterality Date   • APPENDECTOMY     • CARDIAC CATHETERIZATION N/A 3/18/2020    Procedure: Left Heart Cath;  Surgeon: Sonya Garibay MD;  Location[facility] CATH INVASIVE LOCATION;  Service: Cardiology;  Laterality: N/A;  First availabel provider     • COLON RESECTION      second to diverticulitis    • FOOT SURGERY Left        Family History   Problem Relation Age of Onset   • No Known Problems Daughter    • No Known Problems Son    • No Known Problems Son    • No Known Problems Son    • Diabetes Sister    • Clotting disorder Sister    • No Known Problems Mother    • No Known Problems Sister    • No Known Problems Brother    • Fainting Brother        Social History     Socioeconomic History   • Marital status:      Spouse name: Not on file   • Number of children: Not on file   • Years of education: Not on file   • Highest education level: Not on file   Tobacco Use   • Smoking status: Current Every Day Smoker     Packs/day: 0.25     Years: 65.00     Pack years: 16.25     Types: Cigars, Cigarettes     Last attempt to quit: 2019     Years since quittin.5   • Smokeless tobacco: Never Used   Substance and Sexual Activity   • Alcohol use: Yes     Comment: rarely   • Drug use: Yes     Types: Marijuana     Comment: Pt states used weekly but now quitting    • Sexual activity: Defer           Objective   Physical Exam  Vitals and nursing note reviewed.   Constitutional:       General: He is not in acute distress.     Appearance: Normal appearance. He is well-developed. He is not toxic-appearing or diaphoretic.   HENT:      Head: Normocephalic and  atraumatic.      Right Ear: External ear normal.      Left Ear: External ear normal.      Nose: Nose normal.      Mouth/Throat:     Eyes:      General: Lids are normal.      Pupils: Pupils are equal, round, and reactive to light.   Neck:      Trachea: No tracheal deviation.   Cardiovascular:      Rate and Rhythm: Normal rate and regular rhythm.      Pulses: No decreased pulses.      Heart sounds: Normal heart sounds. No murmur. No friction rub. No gallop.    Pulmonary:      Effort: Pulmonary effort is normal. No respiratory distress.      Breath sounds: Normal breath sounds. No decreased breath sounds, wheezing, rhonchi or rales.       Abdominal:      General: Bowel sounds are normal.      Palpations: Abdomen is soft.      Tenderness: There is no abdominal tenderness. There is no guarding or rebound.       Musculoskeletal:         General: No deformity. Normal range of motion.      Cervical back: Normal range of motion and neck supple.   Lymphadenopathy:      Cervical: No cervical adenopathy.   Skin:     General: Skin is warm and dry.      Findings: No rash.   Neurological:      Mental Status: He is oriented to person, place, and time. He is lethargic.      Cranial Nerves: No cranial nerve deficit.      Sensory: No sensory deficit.   Psychiatric:         Speech: Speech normal.         Behavior: Behavior normal.         Thought Content: Thought content normal.         Judgment: Judgment normal.         Critical Care  Performed by: Sudarshan Hernandez MD  Authorized by: Sudarshan Hernandez MD     Critical care provider statement:     Critical care time (minutes):  45    Critical care time was exclusive of:  Separately billable procedures and treating other patients    Critical care was necessary to treat or prevent imminent or life-threatening deterioration of the following conditions:  Cardiac failure    Critical care was time spent personally by me on the following activities:  Development of treatment plan with  patient or surrogate, discussions with consultants, evaluation of patient's response to treatment, examination of patient, obtaining history from patient or surrogate, ordering and performing treatments and interventions, ordering and review of laboratory studies, ordering and review of radiographic studies, pulse oximetry, re-evaluation of patient's condition and review of old charts              ED Course                                           MDM  Number of Diagnoses or Management Options  Acute on chronic congestive heart failure, unspecified heart failure type (CMS/HCC): new and requires workup  Hypokalemia: new and requires workup  Diagnosis management comments: Lab evaluation showed low potassium.  Potassium runs have been initiated.    Labs also showed elevated lactic acid level, and elevated BNP.  Chest x-ray shows concern for CHF with increased pulmonary venous congestion.    The patient had PVCs, and LVH with repolarization abnormalities on EKG.    Given the patient's history of CHF I am concerned patient had an arrhythmia contributing to this pulseless episode that required defibrillation with AED.    Seizure is a possibility given the patient's seizure history along with a bleeding tongue and urinary incontinence.       Amount and/or Complexity of Data Reviewed  Clinical lab tests: ordered and reviewed  Tests in the radiology section of CPT®: ordered and reviewed  Review and summarize past medical records: yes  Discuss the patient with other providers: yes  Independent visualization of images, tracings, or specimens: yes        Final diagnoses:   Acute on chronic congestive heart failure, unspecified heart failure type (CMS/HCC)   Hypokalemia            Sudarshan Hernandez MD  03/13/21 0231      Electronically signed by Sudarshan Hernandez MD at 03/13/21 0231       Vital Signs (last day)     Date/Time   Temp   Temp src   Pulse   Resp   BP   Patient Position   SpO2    03/13/21 0945   --   --   55    --   120/63   --   96    03/13/21 0924   --   --   62   --   135/73   --   95    03/13/21 0900   98.5 (36.9)   Oral   59   22   123/61   Lying   97    03/13/21 0845   --   --   56   --   130/63   --   96    03/13/21 0830   --   --   57   --   114/57   --   94    03/13/21 0815   --   --   60   --   130/81   --   97    03/13/21 0800   --   --   59   --   131/65   --   95    03/13/21 0745   --   --   60   --   136/72   --   97    03/13/21 0730   --   --   70   --   150/71   --   96    03/13/21 0715   --   --   61   --   133/65   --   99    03/13/21 0700   --   --   61   --   123/71   --   94    03/13/21 0615   --   --   54   --   125/61   --   93    03/13/21 0600   --   --   59   24   136/68   Lying   92    03/13/21 0545   --   --   56   --   126/61   --   96    03/13/21 0530   --   --   58   --   125/59   --   94    03/13/21 0515   --   --   58   --   116/61   --   96    03/13/21 0500   --   --   61   --   116/53   --   93    03/13/21 0445   --   --   63   --   121/64   --   95    03/13/21 0430   --   --   61   --   127/69   --   95    03/13/21 0415   --   --   65   --   114/92   --   95    03/13/21 0400   --   --   64   --   133/68   --   98    03/13/21 0353   98.2 (36.8)   Axillary   --   24   --   --   94    03/13/21 0345   --   --   64   --   129/72   --   95    03/13/21 0330   --   --   61   --   137/64   --   96    03/13/21 0315   --   --   58   --   130/77   --   96    03/13/21 0300   --   --   56   --   128/73   --   94    03/13/21 0245   --   --   61   --   125/77   --   93    03/13/21 0240   --   --   65   --   133/66   --   95    03/13/21 0235   --   --   62   --   133/71   --   92    03/13/21 0230   --   --   57   --   140/72   --   94    03/13/21 0225   --   --   61   --   156/75   --   92    03/13/21 0220   --   --   63   --   145/79   --   93    03/13/21 0215   --   --   61   --   166/75   --   93    03/13/21 0210   --   --   64   --   152/78   --   94    03/13/21 0205   --   --   57   --   156/69   --    94    03/13/21 0200   --   --   63   --   167/76   --   94    03/13/21 0155   --   --   64   --   175/80   --   97    03/13/21 0150   --   --   62   --   173/84   --   96    03/13/21 0145   --   --   64   --   173/82   --   93    03/13/21 0140   --   --   65   --   168/93   --   96    03/13/21 0135   --   --   60   --   165/85   --   97    03/13/21 0130   --   --   64   --   167/81   --   95    03/13/21 0125   --   --   62   --   166/73   --   95    03/13/21 0120   --   --   62   --   170/81   --   96    03/13/21 0115   --   --   62   --   172/84   --   97    03/13/21 0110   --   --   56   --   171/77   --   97    03/13/21 0105   --   --   60   --   169/76   --   94    03/13/21 0100   --   --   63   --   168/68   --   96    03/13/21 0055   --   --   62   --   164/72   --   95    03/13/21 0050   --   --   63   --   164/73   --   95    03/13/21 0045   --   --   63   --   169/81   --   95    03/13/21 0040   --   --   60   --   166/81   --   94    03/13/21 0035   --   --   59   --   167/78   --   95    03/13/21 0030   --   --   60   --   168/75   --   95    03/13/21 0025   --   --   63   --   168/78   --   95    03/13/21 0020   --   --   61   --   165/83   --   93    03/13/21 0017   --   --   58   --   --   --   --    03/13/21 0015   --   --   59   --   168/81   --   95    03/13/21 0010   --   --   61   --   169/81   --   95    03/13/21 0005   --   --   60   --   165/83   --   95    03/13/21 0004   --   --   62   --   171/81   --   --    03/13/21 0000   --   --   63   --   171/81   --   96    03/12/21 2349   97.6 (36.4)   Oral   62   --   174/97   Lying   92    03/12/21 2300   --   --   66   --   (!) 189/94   --   95    03/12/21 2245   --   --   59   --   (!) 183/87   --   95 03/12/21 2230   --   --   64   --   176/88   --   95 03/12/21 2215   --   --   60   --   171/78   --   96    03/12/21 2200   --   --   64   --   173/75   --   95 03/12/21 2155   --   --   62   --   167/74   --   93 03/12/21 21:38:31    --   --   60   20   --   --   93    03/12/21 2130   --   --   62   --   161/83   --   92    03/12/21 2115   --   --   62   --   161/78   --   94    03/12/21 2100   --   --   62   --   166/81   --   93    03/12/21 2030   --   --   62   --   171/76   --   94    03/12/21 2015   --   --   60   --   171/59   --   99    03/12/21 20:09:54   --   --   55   20   --   --   97    03/12/21 1945   --   --   57   --   169/79   --   92    03/12/21 1930   --   --   63   --   156/69   --   94    03/12/21 1915   --   --   --   --   166/76   --   --    03/12/21 1900   98.8 (37.1)   Oral   63   20   170/80   Lying   94    03/12/21 1859   --   --   --   --   170/80   --   100                Current Facility-Administered Medications   Medication Dose Route Frequency Provider Last Rate Last Admin   • aspirin chewable tablet 81 mg  81 mg Oral Daily Juan Jose Valle MD   81 mg at 03/13/21 0827   • dextrose (D50W) 25 g/ 50mL Intravenous Solution 25 g  25 g Intravenous Q15 Min PRN Sherie Parnell DNP, APRN       • dextrose (GLUTOSE) oral gel 15 g  15 g Oral Q15 Min PRN Sherie Parnell DNP, APRN       • glucagon (human recombinant) (GLUCAGEN DIAGNOSTIC) injection 1 mg  1 mg Subcutaneous Q15 Min PRN Sherie Parnell DNP, APRN       • insulin lispro (humaLOG) injection 0-9 Units  0-9 Units Subcutaneous 4x Daily With Meals & Nightly Sherie Parnell DNP, APRN   2 Units at 03/13/21 0931   • ipratropium-albuterol (DUO-NEB) nebulizer solution 3 mL  3 mL Nebulization Q6H PRN Sherie Parnell DNP, APRN       • levETIRAcetam (KEPPRA) 750 mg in sodium chloride 0.9 % 100 mL IVPB  750 mg Intravenous Q12H Kylah Pina MD       • Magnesium Sulfate 2 gram Bolus, followed by 8 gram infusion (total Mg dose 10 grams)- Mg less than or equal to 1mg/dL  2 g Intravenous PRN Sherie Parnell, JEAN MARIE, APRN        Or   • Magnesium Sulfate 2 gram / 50mL Infusion (GIVE X 3 BAGS TO EQUAL 6GM TOTAL DOSE) - Mg 1.1 - 1.5 mg/dl  2 g  Intravenous PRN Sherie Parnell DNP, APRN        Or   • Magnesium Sulfate 4 gram infusion- Mg 1.6-1.9 mg/dL  4 g Intravenous PRN Sherie Parnell DNP, APRN 25 mL/hr at 03/12/21 2145 4 g at 03/12/21 2145   • magnesium sulfate 4g/100mL (PREMIX) infusion  4 g Intravenous Once Sherie Parnell DNP, APRN       • metoprolol tartrate (LOPRESSOR) tablet 12.5 mg  12.5 mg Oral Q12H Juan Jose Valle MD       • nitroglycerin (TRIDIL) 200 mcg/ml infusion  5-200 mcg/min Intravenous Titrated Juan Jose Valle MD 27 mL/hr at 03/13/21 0930 90 mcg/min at 03/13/21 0930   • potassium chloride (KLOR-CON) packet 40 mEq  40 mEq Oral PRN Alejo Taylor DO       • potassium chloride (MICRO-K) CR capsule 40 mEq  40 mEq Oral PRN Alejo Taylor DO       • potassium chloride 10 mEq in 100 mL IVPB  10 mEq Intravenous Q1H PRN Alejo Taylor DO       • rosuvastatin (CRESTOR) tablet 20 mg  20 mg Oral Daily Juan Jose Valle MD   20 mg at 03/13/21 0827   • sacubitril-valsartan (ENTRESTO) 24-26 MG tablet 1 tablet  1 tablet Oral BID Juan Jose Valle MD   1 tablet at 03/13/21 0827   • sodium chloride 0.9 % flush 10 mL  10 mL Intravenous PRN Sudarshan Hernandez MD       • sodium chloride 0.9 % flush 10 mL  10 mL Intravenous Q12H Sherie Parnell DNP, APRN   10 mL at 03/13/21 0828   • sodium chloride 0.9 % flush 10 mL  10 mL Intravenous PRN Sherie Parnell DNP, APRN         Orders (active)      Start     Ordered    03/14/21 2325  Auto Discontinue in 48 Hours if not Collected  ONCE CDIFF      03/12/21 2324 03/14/21 2054  Auto Discontinue GI Panel in 48 Hours if not Collected  ONCE GI PANEL      03/12/21 2053 03/13/21 2100  levETIRAcetam (KEPPRA) 750 mg in sodium chloride 0.9 % 100 mL IVPB  Every 12 Hours Scheduled      03/13/21 1024    03/13/21 1026  potassium chloride (MICRO-K) CR capsule 40 mEq  As Needed      03/13/21 1026    03/13/21  1026  potassium chloride (KLOR-CON) packet 40 mEq  As Needed      03/13/21 1026    03/13/21 1025  potassium chloride 10 mEq in 100 mL IVPB  Every 1 Hour PRN     Note to Pharmacy: Keep potassium >4    03/13/21 1025    03/13/21 0900  aspirin chewable tablet 81 mg  Daily      03/12/21 2322 03/13/21 0900  rosuvastatin (CRESTOR) tablet 20 mg  Daily      03/12/21 2322 03/13/21 0800  insulin lispro (humaLOG) injection 0-9 Units  4 Times Daily With Meals & Nightly      03/12/21 2300 03/13/21 0702  Inpatient Neurology Consult General  IN AM     Specialty:  Neurology  Provider:  Teresita Rondon MD    03/12/21 2325 03/13/21 0702  Inpatient Cardiology Consult  IN AM     Specialty:  Cardiology  Provider:  (Not yet assigned)    03/12/21 2325 03/13/21 0700  POC Glucose 4x Daily AC & at Bedtime  4 Times Daily Before Meals & at Bedtime     Comments: If bedtime blood glucose is greater than 350 mg/dl, call MD.      03/12/21 2300 03/13/21 0500  EEG  Once      03/12/21 2301 03/13/21 0000  SLP Consult: Eval & Treat Swallow Disorder; Low Sodium, Cardiac, Consistent Carbohydrate  Once      03/12/21 2326 03/13/21 0000  Case Management  Consult  Once     Provider:  (Not yet assigned)    03/12/21 2326 03/12/21 2345  sacubitril-valsartan (ENTRESTO) 24-26 MG tablet 1 tablet  2 Times Daily      03/12/21 2322 03/12/21 2327  metoprolol tartrate (LOPRESSOR) tablet 12.5 mg  Every 12 Hours Scheduled      03/12/21 2325 03/12/21 2325  Clostridium Difficile Toxin - Stool, Per Rectum  Once      03/12/21 2324 03/12/21 2325  Clostridium Difficile Toxin, PCR - Stool, Per Rectum  PROCEDURE ONCE      03/12/21 2324 03/12/21 2313  nitroglycerin (TRIDIL) 200 mcg/ml infusion  Titrated      03/12/21 2311 03/12/21 2302  sodium chloride 0.9 % flush 10 mL  Every 12 Hours Scheduled      03/12/21 2300    03/12/21 2301  Daily Weights  Daily      03/12/21 2300 03/12/21 2301  Seizure Precautions   Continuous      03/12/21 2300    03/12/21 2300  Vital Signs Every Hour and Per Hospital Policy Based on Patient Condition  Every Hour      03/12/21 2300    03/12/21 2300  Intake and Output  Every Hour      03/12/21 2300 03/12/21 2259  ipratropium-albuterol (DUO-NEB) nebulizer solution 3 mL  Every 6 Hours PRN      03/12/21 2300    03/12/21 2259  Adult Transthoracic Echo Complete W/ Cont if Necessary Per Protocol  Once      03/12/21 2300 03/12/21 2255  Place Sequential Compression Device  Once      03/12/21 2300 03/12/21 2255  Maintain Sequential Compression Device  Continuous      03/12/21 2300 03/12/21 2254  Code Status and Medical Interventions:  Continuous      03/12/21 2300 03/12/21 2254  Use Mobility Guidelines for Advancement of Activity  Continuous      03/12/21 2300 03/12/21 2254  Insert Peripheral IV  Once      03/12/21 2300 03/12/21 2253  dextrose (D50W) 25 g/ 50mL Intravenous Solution 25 g  Every 15 Minutes PRN      03/12/21 2300    03/12/21 2253  glucagon (human recombinant) (GLUCAGEN DIAGNOSTIC) injection 1 mg  Every 15 Minutes PRN      03/12/21 2300    03/12/21 2253  sodium chloride 0.9 % flush 10 mL  As Needed      03/12/21 2300 03/12/21 2253  dextrose (GLUTOSE) oral gel 15 g  Every 15 Minutes PRN      03/12/21 2300    03/12/21 2222  magnesium sulfate 4g/100mL (PREMIX) infusion  Once      03/12/21 2221 03/12/21 2113  Urine Culture - Urine, Urine, Catheter  Once      03/12/21 2112 03/12/21 2054  Gastrointestinal Panel, PCR - Stool, Per Rectum  Once      03/12/21 2053 03/12/21 2009  Patient Currently On Electrolyte Replacement Protocol - Please Refer to MAR for Protocol Details  Misc Nursing Order (Specify)  Daily     Comments: Patient Currently On Electrolyte Replacement Protocol - Please Refer to MAR for Protocol Details    03/12/21 2008 03/12/21 2008  Magnesium Sulfate 2 gram Bolus, followed by 8 gram infusion (total Mg dose 10 grams)- Mg less than or equal to  1mg/dL  As Needed      03/12/21 2008 03/12/21 2008  Magnesium Sulfate 2 gram / 50mL Infusion (GIVE X 3 BAGS TO EQUAL 6GM TOTAL DOSE) - Mg 1.1 - 1.5 mg/dl  As Needed      03/12/21 2008 03/12/21 2008  Magnesium Sulfate 4 gram infusion- Mg 1.6-1.9 mg/dL  As Needed      03/12/21 2008 03/12/21 1900  NPO Diet  Diet Effective Now      03/12/21 1859 03/12/21 1900  Insert peripheral IV  Once      03/12/21 1859 03/12/21 1859  sodium chloride 0.9 % flush 10 mL  As Needed      03/12/21 1859    Unscheduled  Oxygen Therapy- Nasal Cannula; 2 LPM; Titrate for SPO2: equal to or greater than, 92%  Continuous PRN      03/12/21 1859    Unscheduled  ECG 12 Lead  As Needed      03/12/21 1859    Unscheduled  Potassium  As Needed     Comments: Release/collect/run 4 hours after completion of last potassium dose.      03/12/21 1922    Unscheduled  Magnesium  As Needed      03/12/21 2008    Unscheduled  Potassium  As Needed      03/12/21 2008                Ventilator/Non-Invasive Ventilation Settings (From admission, onward) Comment    None        Operative/Procedure Notes (all)    No notes of this type exist for this encounter.         Physician Progress Notes (all)    No notes of this type exist for this encounter.            Consult Notes (all)      Kylah Pina MD at 03/13/21 1027          Subjective:    CC: Narendra Cochran is seen today in consultation at the request of Dr. Valle for seizure    HPI:  80-year-old -American male with a history of hypertension, hyperlipidemia, CAD, diabetes mellitus type 2, acute on chronic systolic and diastolic CHF, EF of 36 to 40% on Entresto, marijuana smoker, seizure disorder was brought to the hospital yesterday after [place]  They perform CPR on him for 5 minutes and AED was used.  Patient began to arouse by the time EMS arrived.  There was question of a possible seizure as the patient bit his tongue and also had urinary  incontinence.  Patient states that he was standing up one moment in the mall and then remembers waking up in the hospital.  He was loaded with 1 g of IV Keppra and continued on his home dose of Keppra 500 mg twice a day.  EKG showed first-degree AV block with PVCs.  His troponin was negative, proBNP was 68462, , mag 1.6 and potassium 2.5, blood glucose of 186.  UDS was positive for THC.  Chest x-ray showed bilateral bibasilar opacities with pulmonary vascular congestion suggestive of CHF exacerbation although infection/pneumonia not excluded.  He also had a CT head that did not show any acute intracranial abnormalities.  Patient seizures started in 2019 when he presented multiple episodes of generalized tonic-clonic shaking.  He was admitted to [facility] at that time and had a MRI brain that I personally reviewed today that showed minimal chronic ischemic changes but no acute abnormalities as well as an EEG that showed left hemispheric slowing as well as right central temporal sharp waves.  He was started on Keppra 500 mg twice daily at the time.  Patient denies having any family history of seizures or any febrile seizures as a child.  I asked him if he has any aura/warning signs prior to his seizures but he did not understand as he is extremely hard of hearing.      Current Facility-Administered Medications:   •  aspirin chewable tablet 81 mg, 81 mg, Oral, Daily, Juan Jose Valle MD, 81 mg at 03/13/21 0827  •  dextrose (D50W) 25 g/ 50mL Intravenous Solution 25 g, 25 g, Intravenous, Q15 Min PRN, Sherie Parnell DNP, APRN  •  dextrose (GLUTOSE) oral gel 15 g, 15 g, Oral, Q15 Min PRN, Sherie Parnell DNP, APRN  •  glucagon (human recombinant) (GLUCAGEN DIAGNOSTIC) injection 1 mg, 1 mg, Subcutaneous, Q15 Min PRN, Sherie Parnell DNP, APRN  •  insulin lispro (humaLOG) injection 0-9 Units, 0-9 Units, Subcutaneous, 4x Daily With Meals & Nightly, Sherie Parnell DNP, APRN, 2  Units at 03/13/21 0931  •  ipratropium-albuterol (DUO-NEB) nebulizer solution 3 mL, 3 mL, Nebulization, Q6H PRN, Sherie Parnell DNP, APRN  •  levETIRAcetam (KEPPRA) 750 mg in sodium chloride 0.9 % 100 mL IVPB, 750 mg, Intravenous, Q12H, Kylah Pina MD  •  Magnesium Sulfate 2 gram Bolus, followed by 8 gram infusion (total Mg dose 10 grams)- Mg less than or equal to 1mg/dL, 2 g, Intravenous, PRN **OR** Magnesium Sulfate 2 gram / 50mL Infusion (GIVE X 3 BAGS TO EQUAL 6GM TOTAL DOSE) - Mg 1.1 - 1.5 mg/dl, 2 g, Intravenous, PRN **OR** Magnesium Sulfate 4 gram infusion- Mg 1.6-1.9 mg/dL, 4 g, Intravenous, PRN, Sherie Parnell DNP, APRNATALYA, Last Rate: 25 mL/hr at 03/12/21 2145, 4 g at 03/12/21 2145  •  magnesium sulfate 4g/100mL (PREMIX) infusion, 4 g, Intravenous, Once, Sherie Parnell DNP, APRN  •  metoprolol tartrate (LOPRESSOR) tablet 12.5 mg, 12.5 mg, Oral, Q12H, Juan Jose Valle MD  •  nitroglycerin (TRIDIL) 200 mcg/ml infusion, 5-200 mcg/min, Intravenous, Titrated, Juan Jose Valle MD, Last Rate: 27 mL/hr at 03/13/21 0930, 90 mcg/min at 03/13/21 0930  •  potassium chloride (KLOR-CON) packet 40 mEq, 40 mEq, Oral, PRN, Alejo Taylor, DO  •  potassium chloride (MICRO-K) CR capsule 40 mEq, 40 mEq, Oral, PRN, Alejo Taylor, DO  •  potassium chloride 10 mEq in 100 mL IVPB, 10 mEq, Intravenous, Q1H PRN, Alejo Taylor, DO  •  rosuvastatin (CRESTOR) tablet 20 mg, 20 mg, Oral, Daily, Juan Jose Valle MD, 20 mg at 03/13/21 0827  •  sacubitril-valsartan (ENTRESTO) 24-26 MG tablet 1 tablet, 1 tablet, Oral, BID, Juan Jose Valle MD, 1 tablet at 03/13/21 0827  •  sodium chloride 0.9 % flush 10 mL, 10 mL, Intravenous, PRN, Sudarshan Hernandez MD  •  sodium chloride 0.9 % flush 10 mL, 10 mL, Intravenous, Q12H, Sherie Parnell, JEAN MARIE, APRN, 10 mL at 03/13/21 0828  •  sodium chloride 0.9 % flush 10 mL, 10 mL, Intravenous,  "Soheila JONES Amanda Nicole, DNP, APRN       Past Medical History:   Diagnosis Date   • Arthritis    • Diabetes mellitus (CMS/HCC)    • Diverticulitis    • GERD (gastroesophageal reflux disease)    • History of transfusion    • Hyperlipidemia    • Hypertension    • Hypertensive urgency, malignant 2017   • Paget disease of bone         Past Surgical History:   Procedure Laterality Date   • APPENDECTOMY     • CARDIAC CATHETERIZATION N/A 3/18/2020    Procedure: Left Heart Cath;  Surgeon: Sonya Garibay MD;  Location: [facility] CATH INVASIVE LOCATION;  Service: Cardiology;  Laterality: N/A;  First availabel provider     • COLON RESECTION      second to diverticulitis    • FOOT SURGERY Left         Family History   Problem Relation Age of Onset   • No Known Problems Daughter    • No Known Problems Son    • No Known Problems Son    • No Known Problems Son    • Diabetes Sister    • Clotting disorder Sister    • No Known Problems Mother    • No Known Problems Sister    • No Known Problems Brother    • Fainting Brother         Social History     Socioeconomic History   • Marital status:      Spouse name: Not on file   • Number of children: Not on file   • Years of education: Not on file   • Highest education level: Not on file   Tobacco Use   • Smoking status: Current Every Day Smoker     Packs/day: 0.25     Years: 65.00     Pack years: 16.25     Types: Cigars, Cigarettes     Last attempt to quit: 2019     Years since quittin.5   • Smokeless tobacco: Never Used   Substance and Sexual Activity   • Alcohol use: Yes     Comment: rarely   • Drug use: Yes     Types: Marijuana     Comment: Pt states used weekly but now quitting    • Sexual activity: Defer       Review of Systems Pertinent items are noted in HPI     Objective:    /63   Pulse 55   Temp 98.5 °F (36.9 °C) (Oral)   Resp 22   Ht 180.3 cm (70.98\")   Wt 80.7 kg (178 lb)   SpO2 96%   BMI 24.84 kg/m²       Neurology Exam:    General appearance: " Lying in bed.  Currently getting an EEG    Mental status: Alert, awake and oriented to time place and person.  Could tell me the month, year and his date of birth    Recent and Remote memory: Intact.    Attention span and Concentration: Normal.     Language and Speech: Intact- No dysarthria.    Fluency, Naming, Repetition and Comprehension:  Intact    Cranial Nerves:   CN II: Visual fields are full. Intact. Fundi - Normal, No papilledema, Pupils - CHARLY  CN III, IV and VI: Extraocular movements are intact. Normal saccades.   CN V: Facial sensation is intact.   CN VII: Muscles of facial expression reveal no asymmetry. Intact.   CN VIII: Hearing is severely impaired  CN IX and X: Palate elevates symmetrically. Intact  CN XI: Shoulder shrug is intact.   CN XII: Tongue is midline without evidence of atrophy or fasciculation.     Motor:  Right UE muscle strength 5/5. Normal tone.     Left UE muscle strength 5/5. Normal tone.      Right LE muscle strength5/5. Normal tone.     Left LE muscle strength 5/5. Normal tone.      Sensory: Normal light touch, vibration and pinprick sensation bilaterally.    DTRs: 2+ bilaterally in upper and lower extremities.    Babinski: Negative bilaterally.    Coordination: Normal finger-to-nose, heel to shin B/L.    Gait: Deferred    Ophthalmoscopic examination-no papilledema noted    Cardiac examination -normal rate and rhythm    Labs:  Most recent labs have been reviewed.    Results from last 7 days   Lab Units 03/13/21  0453   WBC 10*3/mm3 3.67   HEMOGLOBIN g/dL 9.6*   HEMATOCRIT % 31.7*   PLATELETS 10*3/mm3 107*     Results from last 7 days   Lab Units 03/13/21  0453   SODIUM mmol/L 139   POTASSIUM mmol/L 3.7   CHLORIDE mmol/L 106   CO2 mmol/L 21.0*   BUN mg/dL 11   CREATININE mg/dL 0.71*   CALCIUM mg/dL 8.0*       Radiology: CT Head Without Contrast    Result Date: 3/12/2021  1. No acute intracranial abnormality. 2. Stable mild diffuse chronic changes as noted above. 3. No change since  11/13/2019. Signer Name: Chris Buitrago MD  Signed: 3/12/2021 8:34 PM  Workstation Name: [id number]  [facility]    XR Chest 1 View    Result Date: 3/13/2021  Bibasilar airspace opacities and vascular congestion appears slightly improved. No new acute findings.  This report was finalized on 3/13/2021 8:36 AM by Duncan Burr.      XR Chest 1 View    Result Date: 3/12/2021  Bilateral basilar opacities with pulmonary vascular congestion and potential edema may suggest CHF exacerbation although underlying infection/pneumonia is not excluded. Signer Name: Sharron Nettles MD  Signed: 3/12/2021 7:26 PM  Workstation Name: [id number]  [facility]        Assessment and Plan:  80-year-old male that presented with a possible out of hospital cardiac arrest versus seizure.  Was found to have electrolyte abnormalities that could have triggered either of the two.    1.  Seizure disorder  Patient most likely has focal seizures with secondary generalization as previous EEG showed right central temporal sharp waves  -I will increase his Keppra to 750 mg twice a day  -Electrolytes are back to normal now  -EEG currently going on but from review I see no seizure activity.  We will follow up on the official report    2.OHCA  Echocardiogram still pending.  Management as per cardiology    Kylah Pina MD 03/13/21 10:28 EST        Electronically signed by Kylah Pina MD at 03/13/21 5343

## 2021-03-13 NOTE — PROGRESS NOTES
Intensive Care Follow-up     Hospital:  LOS: 1 day   Mr. Narendra Cochran, 80 y.o. male is followed for:   Cardiac arrest (CMS/HCC)        History of present illness:   80 y.o. male who presents to [facility] ED 03/12/21 S/P witnessed OHCA and possible seizure.     Patient has a PMH of prior tobacco use, marijuana use, Paget's disease, T2DM, dyslipidemia, HTN, CAD, VHD (mild-mod AR, mild MR), chronic combined systolic and diastolic CHF, NICM (EF 36-40%) on Entresto, frequent PVC's, NSVT, seizures on Keppra, and GERD. He reports additional recent ~2 month history of intermittent diarrhea and abdominal pain as well as poor appetite.      Patient has no recollection of the events that occurred today. Per EMS report, patient was [place] unresponsive [place]. They performed CPR ~5 min and AED was placed with shock advised - following defibrillation patient apparently began to arouse by the time EMS arrived.  An IO was placed and lidocaine was administered, and patient was brought to our ED for further evaluation.     On ED arrival patient afebrile, hypertensive with SBP 170s, and oxygenating appropriately on room air. EKG with 1st degree AVB and unifocal PVC's with bigeminy (appears to be baseline). Lab work-up significant for negative troponin, , proBNP 27092, K 2.5, mag 1.6, , ALT 42, total bili 1.4, , lactate 3.2, PCT negative, TSH 6.72, free T4 1.64. UDS + THC. COVID-19 and Flu A/B PCR testing negative. CXR with bilateral basilar opacifications and pulmonary edema pattern although underlying infection/PNA unable to be excluded. CTH with stable mild diffuse chronic changes but negative for an acute intracranial abnormality.  He was loaded with 1 g Keppra and electrolyte replacement protocol initiated.  Mr. Cochran will be admitted to the ICU for further management.     Of note, patient was referred to Dr. Boyd of [facility] by Dr. Mercedes in August of last year due to  concerns of possible severe sinus node dysfunction and frequent PVC's with 12.3% burden. He was not felt to be a candidate for PPM insertion and was instead trialed on Amiodarone with no significant decrease in burden - this was D/C'd and Toprol-XL was increased instead.     Subjective   Interval History:  Patient doing better this morning.  No further signs of arrhythmias.  Having the EEG performed this morning.  Seems to have intermittent PVCs while monitoring for electrolytes to be aggressively replaced.  Currently nitroglycerin for blood pressure control.  Cardiology consult pending.  Patient with a frankly bloody bowel movement this morning.         The patient's past medical, surgical and social history were reviewed and updated in Epic as appropriate.       Objective     Infusions:  nitroglycerin, 5-200 mcg/min, Last Rate: 90 mcg/min (03/13/21 0930)      Medications:  aspirin, 81 mg, Oral, Daily  insulin lispro, 0-9 Units, Subcutaneous, 4x Daily With Meals & Nightly  levETIRAcetam, 750 mg, Intravenous, Q12H  magnesium sulfate, 4 g, Intravenous, Once  metoprolol tartrate, 12.5 mg, Oral, Q12H  pantoprazole, 40 mg, Intravenous, Q12H  rosuvastatin, 20 mg, Oral, Daily  sacubitril-valsartan, 1 tablet, Oral, BID  sodium chloride, 10 mL, Intravenous, Q12H      I reviewed the patient's medications.    Vital Sign Min/Max for last 24 hours  Temp  Min: 97.6 °F (36.4 °C)  Max: 98.8 °F (37.1 °C)   BP  Min: 114/57  Max: 189/94   Pulse  Min: 54  Max: 70   Resp  Min: 20  Max: 24   SpO2  Min: 92 %  Max: 100 %   Flow (L/min)  Min: 3  Max: 3       Input/Output for last 24 hour shift  03/12 0701 - 03/13 0700  In: 917.7 [I.V.:447.6]  Out: 400 [Urine:400]      GENERAL : NAD, conversant  RESPIRATORY/THORAX : normal respiratory effort and no intercostal retractions, Coarse crackles bilaterally  CARDIOVASCULAR : Normal S1/S2, RRR. 1+ lower ext edema.  GASTROINTESTINAL : Soft, NT/ND. BS x 4 normoactive. No  hepatosplenomegaly.  MUSCULOSKELETAL : No cyanosis, clubbing, or ischemia  NEUROLOGICAL: alert and oriented to person, place and time  PSYCHOLOGICAL : Appropriate affect    Results from last 7 days   Lab Units 03/13/21 0453 03/12/21 1914 03/12/21 1911   WBC 10*3/mm3 3.67  --  3.19*   HEMOGLOBIN g/dL 9.6*  --  12.4*   HEMOGLOBIN, POC g/dL  --  13.3  --    PLATELETS 10*3/mm3 107*  --  112*     Results from last 7 days   Lab Units 03/13/21 0453 03/12/21 1911   SODIUM mmol/L 139 141   POTASSIUM mmol/L 3.7 2.5*   CO2 mmol/L 21.0* 23.0   BUN mg/dL 11 7*   CREATININE mg/dL 0.71* 0.90   MAGNESIUM mg/dL 2.3 1.6   PHOSPHORUS mg/dL 2.1* 2.7   GLUCOSE mg/dL 140* 186*     Estimated Creatinine Clearance: 81.3 mL/min (A) (by C-G formula based on SCr of 0.71 mg/dL (L)).    Results from last 7 days   Lab Units 03/12/21 1914   PH, ARTERIAL pH units 7.40       I reviewed the patient's new clinical results.  I reviewed the patient's new imaging results/reports including actual images and agree with reports.              Imaging Results (Last 24 Hours)     Procedure Component Value Units Date/Time    XR Chest 1 View [[id number]] Collected: 03/13/21 0835     Updated: 03/13/21 0839    Narrative:      EXAMINATION: XR CHEST 1 VW-      INDICATION: f/u; I50.9-Heart failure, unspecified; E87.6-Hypokalemia      COMPARISON: 3/12/2021     FINDINGS: Pads overlying the chest wall are redemonstrated. Degenerative  osseous changes. Markedly dilated aortic arch and enlarged cardiac  silhouette. No pleural effusion. No pneumothorax. Basilar opacities are  redemonstrated and slightly improved. Mild degree of vascular  congestion.        Impression:      Bibasilar airspace opacities and vascular congestion appears  slightly improved. No new acute findings.     This report was finalized on 3/13/2021 8:36 AM by Duncan Burr.       CT Head Without Contrast [[id number]] Collected: 03/12/21 2034     Updated: 03/12/21 2036    Narrative:      CT HEAD,  NONCONTRAST, 3/12/2021    HISTORY:  80-year-old male in the ED after an episode of altered mental status.    TECHNIQUE:  CT imaging of the head without IV contrast. Radiation dose reduction techniques included automated exposure control. Radiation audit for CT and nuclear cardiology exams in the last 12 months: 0.    COMPARISON:  *  CT head, 11/13/2019.    FINDINGS:  No acute intracranial abnormality is identified.    Minimal generalized age-appropriate cerebral volume loss. Mild diffuse low-attenuation white matter changes, nonspecific but most typically seen in the setting of chronic small vessel disease. These findings are stable.    No evidence of intracranial hemorrhage, mass, mass effect, cerebral edema, extra-axial fluid collection or hydrocephalus. Visualized upper paranasal sinuses and mastoid air spaces are clear.      Impression:      1. No acute intracranial abnormality.  2. Stable mild diffuse chronic changes as noted above.  3. No change since 11/13/2019.    Signer Name: Chris Buitrago MD   Signed: 3/12/2021 8:34 PM   Workstation Name: [id number]    [facility]    XR Chest 1 View [[id number]] Collected: 03/12/21 1926     Updated: 03/12/21 1928    Narrative:      CR Chest 1 Vw    INDICATION:   Chest pain, altered mental status    COMPARISON:    Chest x-ray from 11/12/2019    FINDINGS:  Single portable AP view(s) of the chest.  The exam is limited. There are bibasilar opacities with left basilar atelectasis. No definite pneumothorax. The heart appears somewhat enlarged. Pulmonary vasculature appears congested with potential edema.      Impression:      Bilateral basilar opacities with pulmonary vascular congestion and potential edema may suggest CHF exacerbation although underlying infection/pneumonia is not excluded.    Signer Name: Sharron Nettles MD   Signed: 3/12/2021 7:26 PM   Workstation Name: [id number]    [facility]          Assessment/Plan    Impression        OHCA    Hypertension    Hyperlipidemia LDL goal <70    T2DM    Paget disease of bone    Tobacco abuse    H/O seizures on Keppra    Coronary artery disease involving native coronary artery of native heart with angina pectoris (CMS/HCC)    Hypokalemia    Hypomagnesemia    VHD; mild-mod AR, mild MR    Chronic systolic congestive heart failure (CMS/HCC)    GERD    Marijuana use    Frequent PVCs       Plan        80 y.o.male with relevant PMH of tobacco use, marijuana use, Paget's disease, T2DM, dyslipidemia, HTN, CAD, VHD (mild-mod AR, mild MR), chronic combined systolic and diastolic CHF, NICM (EF 36-40%) on Entresto, frequent PVC's, NSVT, seizures on Keppra, and GERD, admitted 3/12/2021 after witnessed cardiac arrest at the [facility].  Received bystander CPR and had ROSC with AED.  Unclear how long CPR lasted or how many shocks but patient is alert and hemodynamically stable off pressors / oxygen / vent / etc. on admission there was questionable signs of seizures.  EEG from 3/13/2021 currently pending was placed on Keppra and neurology did evaluate the patient.  Electrolytes were aggressively replaced as potassium was 2.5 on admission and magnesium was 1.6.    · Continue to monitor for arrhythmias, QTC on admission was 532, and echocardiogram shows a EF of 50% with concentric LVH   · Replace electrolytes aggressively to potassium of 4 and a magnesium of 2  · Holding amiodarone, cardiology to see the patient  · Patient on EEG this morning and seen by neurology, appreciate recommendations.  Keppra increased to 750 twice daily  · Continue nitro for hypertensive urgency, goal systolic blood pressure less than 140, will adjust p.o. medications as clinically necessary.  · Continue insulin for blood sugar control   · Patient with bloody stools this morning, given his hemodynamic stability this is possibly secondary to lower GI bleed.  Hemoglobin has dropped from 12-9 on admission though, therefore we  will continue to monitor hemoglobin every 6 hours for now and I will start Protonix twice daily.  If changes drop will consider evaluation by GI.  Transfuse for hemoglobin less than 7.  · P.o. diet  · Aggressive pulmonary toilet  · Mobilize patient    Plan of care and goals reviewed with multidisciplinary/antibiotic stewardship team during rounds.   I discussed the patient's findings and my recommendations with patient and nursing staff     Critical Care time spent in direct patient care: 35 minutes (excluding procedure time, if applicable) including high complexity decision making to assess, manipulate, and support vital organ system failure in this individual who has impairment of one or more vital organ systems such that there is a high probability of imminent or life threatening deterioration in the patient's condition.      Maura Taylor, DO  Pulmonary, Critical care and Sleep Medicine

## 2021-03-13 NOTE — PLAN OF CARE
Goal Outcome Evaluation:  Plan of Care Reviewed With: patient  Progress: no change       Electrolyte replacement  Nose bleed intermittent   Nitro for blood pressure control   SB with freq PVCs  Gi panel needed for R/O Cdiff   Echo planned for today  Cards and Neuro to see

## 2021-03-13 NOTE — H&P
INTENSIVIST / PULMONARY INITIAL VISIT (CONSULT / H&P) NOTE     Hospital:  LOS: 0 days   Mr. Narendra Cochran, 80 y.o. male is followed for:   Chief Complaint   Patient presents with   • Syncope       OHCA    NICM     Hypokalemia    Hypomagnesemia    Acute on chronic combined systolic and diastolic CHF     Frequent PVCs    Hypertension    T2DM    Paget disease of bone    Tobacco abuse    H/O seizures on Keppra    CAD    VHD; mild-mod AR, mild MR    Former tobacco user    Marijuana use    Dyslipidemia    GERD       History of Present Illness   Narendra Cochran is a 80 y.o. male who presents to [facility] ED 03/12/21 S/P witnessed OHCA and possible seizure.    Patient has a PMH of prior tobacco use, marijuana use, Paget's disease, T2DM, dyslipidemia, HTN, CAD, VHD (mild-mod AR, mild MR), chronic combined systolic and diastolic CHF, NICM (EF 36-40%) on Entresto, frequent PVC's, NSVT, seizures on Keppra, and GERD. He reports additional recent ~2 month history of intermittent diarrhea and abdominal pain as well as poor appetite.     Patient has no recollection of the events that occurred today. Per EMS report, patient was found down unresponsive by bystanders in the mall earlier today. They performed CPR ~5 min and AED was placed with shock advised - following defibrillation patient apparently began to arouse by the time EMS arrived.  An IO was placed and lidocaine was administered, and patient was brought to our ED for further evaluation.    On ED arrival patient afebrile, hypertensive with SBP 170s, and oxygenating appropriately on room air. EKG with 1st degree AVB and unifocal PVC's with bigeminy (appears to be baseline). Lab work-up significant for negative troponin, , proBNP 34428, K 2.5, mag 1.6, , ALT 42, total bili 1.4, , lactate 3.2, PCT negative, TSH 6.72, free T4 1.64. UDS + THC. COVID-19 and Flu A/B PCR testing negative. CXR with bilateral basilar opacifications and pulmonary edema pattern although  underlying infection/PNA unable to be excluded. CTH with stable mild diffuse chronic changes but negative for an acute intracranial abnormality.  He was loaded with 1 g Keppra and electrolyte replacement protocol initiated.  Mr. Cochran will be admitted to the ICU for further management.    Of note, patient was referred to Dr. Boyd of EP Cardiology by Dr. Mercedes in August of last year due to concerns of possible severe sinus node dysfunction and frequent PVC's with 12.3% burden. He was not felt to be a candidate for PPM insertion and was instead trialed on Amiodarone with no significant decrease in burden - this was D/C'd and Toprol-XL was increased instead.     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   • Paget disease of bone      Past Surgical History:   Procedure Laterality Date   • APPENDECTOMY     • CARDIAC CATHETERIZATION N/A 3/18/2020    Procedure: Left Heart Cath;  Surgeon: Sonya Garibay MD;  Location: [facility] CATH INVASIVE LOCATION;  Service: Cardiology;  Laterality: N/A;  First availabel provider     • COLON RESECTION      second to diverticulitis    • FOOT SURGERY Left      Family History   Problem Relation Age of Onset   • No Known Problems Daughter    • No Known Problems Son    • No Known Problems Son    • No Known Problems Son    • Diabetes Sister    • Clotting disorder Sister    • No Known Problems Mother    • No Known Problems Sister    • No Known Problems Brother    • Fainting Brother      Social History     Socioeconomic History   • Marital status:      Spouse name: Not on file   • Number of children: Not on file   • Years of education: Not on file   • Highest education level: Not on file   Tobacco Use   • Smoking status: Former Smoker     Packs/day: 0.25     Years: 65.00     Pack years: 16.25     Types: Cigars, Cigarettes     Quit date: 09/2019      Years since quittin.5   • Smokeless tobacco: Never Used   Substance and Sexual Activity   • Alcohol use: Yes     Comment: rarely   • Drug use: Yes     Types: Marijuana     Comment: Pt states used weekly but now quitting    • Sexual activity: Defer     No Known Allergies  No current facility-administered medications on file prior to encounter.     Current Outpatient Medications on File Prior to Encounter   Medication Sig Dispense Refill   • aspirin 81 MG chewable tablet Chew 1 tablet Daily.     • Blood Pressure Monitoring (BLOOD PRESSURE MONITOR/L CUFF) misc 1 Units Daily. 1 each 0   • diclofenac (VOLTAREN) 1 % gel gel Apply 4 g topically to the appropriate area as directed 4 (Four) Times a Day As Needed (prn for pain). 60 tube 0   • Empagliflozin 25 MG tablet Take 25 mg by mouth Daily. 30 tablet 5   • famotidine (PEPCID) 20 MG tablet Take 1 tablet by mouth At Night As Needed for Heartburn. 90 tablet 1   • glucose blood test strip Use to check blood glucose once a day. DX:E11.65 100 each 3   • glucose monitor monitoring kit 1 each Daily. DX: E11.65 1 each 0   • Lancets 30G misc Use to check blood glucose once a day. DX: E11.65 100 each 3   • levETIRAcetam (KEPPRA) 250 MG tablet TAKE ONE TABLET BY MOUTH TWICE A  tablet 0   • metFORMIN ER (GLUCOPHAGE-XR) 500 MG 24 hr tablet TAKE ONE TABLET BY MOUTH TWICE A DAY WITH MEALS 180 tablet 0   • metoprolol succinate XL (TOPROL-XL) 25 MG 24 hr tablet Take 1 tablet by mouth Daily. 30 tablet 6   • omeprazole (priLOSEC) 40 MG capsule Take 1 capsule by mouth Every Morning Before Breakfast. 90 capsule 1   • rosuvastatin (CRESTOR) 20 MG tablet TAKE ONE TABLET BY MOUTH DAILY 90 tablet 0   • sacubitril-valsartan (ENTRESTO) 24-26 MG tablet Take 1 tablet by mouth 2 (Two) Times a Day. 60 tablet 3       ROS:  Per HPI, all other systems were reviewed and were negative        OBJECTIVE     Vital Sign Min/Max for last 24 hours  Temp  Min: 98.8 °F (37.1 °C)  Max: 98.8 °F (37.1 °C)    BP  Min: 170/80  Max: 176/88   Pulse  Min: 55  Max: 64   Resp  Min: 20  Max: 20   SpO2  Min: 93 %  Max: 97 %   No data recorded     Telemetry:              General Appearance:  Conversant, in no acute distress  Eyes:  No scleral icterus or pallor, pupils normal  Ears, Nose, Mouth, Throat: macerated lower lip  Neck:  Trachea midline, thyroid normal  Respiratory:  Clear to auscultation bilaterally, normal effort, no tenderness to palpation  Cardiovascular:  Regular rate and rhythm, no murmurs, no peripheral edema, no thrill  Gastrointestinal:  Soft, nontender, nondistended, no hepatosplenomegaly  Skin:  Normal temperature, no rash  Psychiatric:  Alert and oriented x 3, normal judgement and insight  Neuro:  No new focal neurologic deficits observed    SpO2: 95 % SpO2  Min: 93 %  Max: 97 %   Device:      Flow Rate:   No data recorded     Mechanical Ventilator Settings:                                         Intake/Ouptut 24 hrs (7:00AM - 6:59 AM)  Intake & Output (last 3 days)     None            Lines, Drains & Airways    Active LDAs     Name:   Placement date:   Placement time:   Site:   Days:    Peripheral IV 03/12/21 1905 Right Forearm   03/12/21 1905    Forearm   less than 1                Hematology:  Results from last 7 days   Lab Units 03/12/21 1914 03/12/21 1911   WBC 10*3/mm3  --  3.19*   HEMOGLOBIN g/dL  --  12.4*   HEMOGLOBIN, POC g/dL 13.3  --    HEMATOCRIT %  --  40.4   HEMATOCRIT POC % 39  --    PLATELETS 10*3/mm3  --  112*     Electrolytes, Magnesium and Phosphorus:  Results from last 7 days   Lab Units 03/12/21 1911   SODIUM mmol/L 141   POTASSIUM mmol/L 2.5*   CO2 mmol/L 23.0   MAGNESIUM mg/dL 1.6   PHOSPHORUS mg/dL 2.7     Renal:  Results from last 7 days   Lab Units 03/12/21 1911   CREATININE mg/dL 0.90   BUN mg/dL 7*     Estimated Creatinine Clearance: 80 mL/min (by C-G formula based on SCr of 0.9 mg/dL).  Estimated Creatinine Clearance: 80 mL/min (by C-G formula based on SCr of 0.9  mg/dL).  Hepatic:  Results from last 7 days   Lab Units 03/12/21 1911   ALK PHOS U/L 112   BILIRUBIN mg/dL 1.4*   ALT (SGPT) U/L 42*   AST (SGOT) U/L 101*     Arterial Blood Gases:  Results from last 7 days   Lab Units 03/12/21 1914   PH, ARTERIAL pH units 7.40             Lab Results   Component Value Date    LACTATE 3.2 (C) 03/12/2021       CT Head Without Contrast    Result Date: 3/12/2021  Narrative: CT HEAD, NONCONTRAST, 3/12/2021 HISTORY: 80-year-old male in the ED after an episode of altered mental status. TECHNIQUE: CT imaging of the head without IV contrast. Radiation dose reduction techniques included automated exposure control. Radiation audit for CT and nuclear cardiology exams in the last 12 months: 0. COMPARISON: *  CT head, 11/13/2019. FINDINGS: No acute intracranial abnormality is identified. Minimal generalized age-appropriate cerebral volume loss. Mild diffuse low-attenuation white matter changes, nonspecific but most typically seen in the setting of chronic small vessel disease. These findings are stable. No evidence of intracranial hemorrhage, mass, mass effect, cerebral edema, extra-axial fluid collection or hydrocephalus. Visualized upper paranasal sinuses and mastoid air spaces are clear.     Impression: 1. No acute intracranial abnormality. 2. Stable mild diffuse chronic changes as noted above. 3. No change since 11/13/2019. Signer Name: Chris Buitrago MD  Signed: 3/12/2021 8:34 PM  Workstation [facility]: [email or web address]  [facility]    XR Chest 1 View    Result Date: 3/12/2021  Narrative: CR Chest 1 Vw INDICATION: Chest pain, altered mental status COMPARISON:  Chest x-ray from 11/12/2019 FINDINGS: Single portable AP view(s) of the chest.  The exam is limited. There are bibasilar opacities with left basilar atelectasis. No definite pneumothorax. The heart appears somewhat enlarged. Pulmonary vasculature appears congested with potential edema.     Impression: Bilateral  basilar opacities with pulmonary vascular congestion and potential edema may suggest CHF exacerbation although underlying infection/pneumonia is not excluded. Signer Name: Sharron Nettles MD  Signed: 3/12/2021 7:26 PM  Workstation Name: [id number]  [facility] [place]      Relevant imaging studies and labs from 03/12/21 were reviewed and interpreted by me    Medications (drips):       magnesium sulfate, 4 g, Intravenous, Once  potassium chloride, 10 mEq, Intravenous, Once   And  potassium chloride, 10 mEq, Intravenous, Once   And  [START ON 3/13/2021] potassium chloride, 10 mEq, Intravenous, Once   And  [START ON 3/13/2021] potassium chloride, 10 mEq, Intravenous, Once        Assessment/Plan   IMPRESSION / PLAN     Inpatient Problem List:  80 y.o.male:  Active Hospital Problems    Diagnosis    • **OHCA    • Hypokalemia    • Hypomagnesemia    • Acute on chronic combined systolic and diastolic CHF     • Frequent PVCs      Holter monitor February 2020:  PVCs burden 12.3% with some of the counted PVCs appear to be PACs with aberrancy. One 4 beat run of nonsustained VT versus SVT with aberrancy.  Amiodarone therapy initiated at 200 mg daily for PVCs.  24-hour Holter 7/15/2020 HR 43-79, average 55 bpm, PVC burden 10.2%.     • NICM       [facility] 3-18-20:   Nonischemic cardiomyopathy with moderate left ventricular systolic dysfunction, estimated EF 36 to 40%.       • VHD; mild-mod AR, mild MR      TTE 11/13/2019  Mild to moderate aortic valve regurgitation is present.  Mild mitral valve regurgitation is present     • Former tobacco user    • Marijuana use    • CAD      [facility] 3-18-20:   Single-vessel CAD involving a small posterolateral branch of the right coronary artery  No evidence of any significant disease involving any major vessels     • H/O seizures on Keppra    • Hypertension    • T2DM    • Tobacco abuse    • Paget disease of bone      History of Paget's disease, treated with Reclast.  Last saw Dr. Lui  2014.     • GERD    • Dyslipidemia         Impression:  80 y.o.male with relevant PMH of tobacco use, marijuana use, Paget's disease, T2DM, dyslipidemia, HTN, CAD, VHD (mild-mod AR, mild MR), chronic combined systolic and diastolic CHF, NICM (EF 36-40%) on Entresto, frequent PVC's, NSVT, seizures on Keppra, and GERD, admitted 3/12/2021 after witnessed cardiac arrest at the [facility].  Received bystander CPR and had ROSC with AED.  Unclear how long CPR lasted or how many shocks but patient is alert and hemodynamically stable off pressors / oxygen / vent / etc.  He also bit his lip and had urinary incontinence raising possibility of seizures.  Noted to be profoundly hypokalemic 2.5 and also with borderline mag at 1.6.    Plan:  Cardiac Arrest - trend troponins.  Replace electrolytes aggressively.  Hold diuretics - on room air - dont want to exacerbate hypokalemia.  Echo in am. Qtc 532.  Hold amio.  If further ventricular arrythmias start lidocaine gtt.  Cardiology consult in am.    Possible Seizures - loaded with Keppra in ED.  EEG in am.  Neurology consult.  Microvascular disease on CTH.    Hypertensive Urgency - 's, start NTG gtt to keep SBP < 140    Diarrhea - stool studies.  No signs of peritonitis on exam.    Drug Abuse - social work consult for resources    DM - titrate SQ insulin    DVT / PUD prophylaxis    NPO    Critical Care time spent in direct patient care: 45 minutes (excluding procedure time, if applicable) including high complexity decision making to assess, manipulate, and support vital organ system failure in this individual who has impairment of one or more vital organ systems such that there is a high probability of imminent or life threatening deterioration in the patient’s condition.       Jaun Jose Valle MD  Intensive Care Medicine

## 2021-03-13 NOTE — PLAN OF CARE
Goal Outcome Evaluation:  Plan of Care Reviewed With: patient        SLP evaluation completed. Will address dysphagia. Please see note for further details and recommendations.

## 2021-03-13 NOTE — ED PROVIDER NOTES
Subjective   80-year-old male brought to us by EMS postcode.  The patient reportedly was walking [place] the mall when he had sudden loss of consciousness that was witnessed by other bystanders.  Bystander CPR was initiated and the AED device was applied.  Ultimately the AED device shocked the patient and he had return of spontaneous circulation.  He has brought in her awake at his baseline mentation.  He reports a known history of CHF, denies a known history of abnormal rhythm in the past.  He also reportedly has a known history of seizures.  He reports that he takes all his medications as prescribed and has not missed any doses.  In addition to biting his tongue he also had urinary incontinence.  He denies recent fever, bodies, or chills.  No acute respiratory symptoms at this time.  No other acute complaints.          Review of Systems   Constitutional: Negative for chills, fatigue and fever.   HENT: Negative for congestion, ear pain, postnasal drip, sinus pressure and sore throat.         Tongue injury   Eyes: Negative for pain, redness and visual disturbance.   Respiratory: Negative for cough, chest tightness and shortness of breath.    Cardiovascular: Negative for chest pain, palpitations and leg swelling.   Gastrointestinal: Negative for abdominal pain, anal bleeding, blood in stool, diarrhea, nausea and vomiting.   Endocrine: Negative for polydipsia and polyuria.   Genitourinary: Negative for difficulty urinating, dysuria, frequency and urgency.        Urinary incontinence   Musculoskeletal: Negative for arthralgias, back pain and neck pain.   Skin: Negative for pallor and rash.   Allergic/Immunologic: Negative for environmental allergies and immunocompromised state.   Neurological: Positive for syncope. Negative for dizziness, weakness and headaches.   Hematological: Negative for adenopathy.   Psychiatric/Behavioral: Positive for decreased concentration. Negative for confusion, self-injury and suicidal ideas.  The patient is not nervous/anxious.    All other systems reviewed and are negative.      Past Medical History:   Diagnosis Date   • Arthritis    • Diabetes mellitus (CMS/HCC)    • Diverticulitis    • GERD (gastroesophageal reflux disease)    • History of transfusion    • Hyperlipidemia    • Hypertension    • Hypertensive urgency, malignant 2017   • Paget disease of bone        No Known Allergies    Past Surgical History:   Procedure Laterality Date   • APPENDECTOMY     • CARDIAC CATHETERIZATION N/A 3/18/2020    Procedure: Left Heart Cath;  Surgeon: Sonya Garibay MD;  Location: [facility] CATH INVASIVE LOCATION;  Service: Cardiology;  Laterality: N/A;  First availabel provider     • COLON RESECTION      second to diverticulitis    • FOOT SURGERY Left        Family History   Problem Relation Age of Onset   • No Known Problems Daughter    • No Known Problems Son    • No Known Problems Son    • No Known Problems Son    • Diabetes Sister    • Clotting disorder Sister    • No Known Problems Mother    • No Known Problems Sister    • No Known Problems Brother    • Fainting Brother        Social History     Socioeconomic History   • Marital status:      Spouse name: Not on file   • Number of children: Not on file   • Years of education: Not on file   • Highest education level: Not on file   Tobacco Use   • Smoking status: Current Every Day Smoker     Packs/day: 0.25     Years: 65.00     Pack years: 16.25     Types: Cigars, Cigarettes     Last attempt to quit: 2019     Years since quittin.5   • Smokeless tobacco: Never Used   Substance and Sexual Activity   • Alcohol use: Yes     Comment: rarely   • Drug use: Yes     Types: Marijuana     Comment: Pt states used weekly but now quitting    • Sexual activity: Defer           Objective   Physical Exam  Vitals and nursing note reviewed.   Constitutional:       General: He is not in acute distress.     Appearance: Normal appearance. He is well-developed. He is not  toxic-appearing or diaphoretic.   HENT:      Head: Normocephalic and atraumatic.      Right Ear: External ear normal.      Left Ear: External ear normal.      Nose: Nose normal.      Mouth/Throat:     Eyes:      General: Lids are normal.      Pupils: Pupils are equal, round, and reactive to light.   Neck:      Trachea: No tracheal deviation.   Cardiovascular:      Rate and Rhythm: Normal rate and regular rhythm.      Pulses: No decreased pulses.      Heart sounds: Normal heart sounds. No murmur. No friction rub. No gallop.    Pulmonary:      Effort: Pulmonary effort is normal. No respiratory distress.      Breath sounds: Normal breath sounds. No decreased breath sounds, wheezing, rhonchi or rales.       Abdominal:      General: Bowel sounds are normal.      Palpations: Abdomen is soft.      Tenderness: There is no abdominal tenderness. There is no guarding or rebound.       Musculoskeletal:         General: No deformity. Normal range of motion.      Cervical back: Normal range of motion and neck supple.   Lymphadenopathy:      Cervical: No cervical adenopathy.   Skin:     General: Skin is warm and dry.      Findings: No rash.   Neurological:      Mental Status: He is oriented to person, place, and time. He is lethargic.      Cranial Nerves: No cranial nerve deficit.      Sensory: No sensory deficit.   Psychiatric:         Speech: Speech normal.         Behavior: Behavior normal.         Thought Content: Thought content normal.         Judgment: Judgment normal.         Critical Care  Performed by: Sudarshan Hernandez MD  Authorized by: Sudarshan Hernandez MD     Critical care provider statement:     Critical care time (minutes):  45    Critical care time was exclusive of:  Separately billable procedures and treating other patients    Critical care was necessary to treat or prevent imminent or life-threatening deterioration of the following conditions:  Cardiac failure    Critical care was time spent personally  by me on the following activities:  Development of treatment plan with patient or surrogate, discussions with consultants, evaluation of patient's response to treatment, examination of patient, obtaining history from patient or surrogate, ordering and performing treatments and interventions, ordering and review of laboratory studies, ordering and review of radiographic studies, pulse oximetry, re-evaluation of patient's condition and review of old charts               ED Course                                           MDM  Number of Diagnoses or Management Options  Acute on chronic congestive heart failure, unspecified heart failure type (CMS/HCC): new and requires workup  Hypokalemia: new and requires workup  Diagnosis management comments: Lab evaluation showed low potassium.  Potassium runs have been initiated.    Labs also showed elevated lactic acid level, and elevated BNP.  Chest x-ray shows concern for CHF with increased pulmonary venous congestion.    The patient had PVCs, and LVH with repolarization abnormalities on EKG.    Given the patient's history of CHF I am concerned patient had an arrhythmia contributing to this pulseless episode that required defibrillation with AED.    Seizure is a possibility given the patient's seizure history along with a bleeding tongue and urinary incontinence.       Amount and/or Complexity of Data Reviewed  Clinical lab tests: ordered and reviewed  Tests in the radiology section of CPT®: ordered and reviewed  Review and summarize past medical records: yes  Discuss the patient with other providers: yes  Independent visualization of images, tracings, or specimens: yes        Final diagnoses:   Acute on chronic congestive heart failure, unspecified heart failure type (CMS/HCC)   Hypokalemia            Sudarshan Hernandez MD  03/13/21 023

## 2021-03-13 NOTE — CONSULTS
Subjective:    CC: Narendra Cochran is seen today in consultation at the request of Dr. Valle for seizure    HPI:  80-year-old -American male with a history of hypertension, hyperlipidemia, CAD, diabetes mellitus type 2, acute on chronic systolic and diastolic CHF, EF of 36 to 40% on Entresto, marijuana smoker, seizure disorder was brought to the hospital yesterday after he was [place] unresponsive [place].  They perform CPR on him for 5 minutes and AED was used.  Patient began to arouse by the time EMS arrived.  There was question of a possible seizure as the patient bit his tongue and also had urinary incontinence.  Patient states that he was standing up one moment in the mall and then remembers waking up in the hospital.  He was loaded with 1 g of IV Keppra and continued on his home dose of Keppra 500 mg twice a day.  EKG showed first-degree AV block with PVCs.  His troponin was negative, proBNP was 94602, , mag 1.6 and potassium 2.5, blood glucose of 186.  UDS was positive for THC.  Chest x-ray showed bilateral bibasilar opacities with pulmonary vascular congestion suggestive of CHF exacerbation although infection/pneumonia not excluded.  He also had a CT head that did not show any acute intracranial abnormalities.  Patient seizures started in 2019 when he presented multiple episodes of generalized tonic-clonic shaking.  He was admitted to [facility] at that time and had a MRI brain that I personally reviewed today that showed minimal chronic ischemic changes but no acute abnormalities as well as an EEG that showed left hemispheric slowing as well as right central temporal sharp waves.  He was started on Keppra 500 mg twice daily at the time.  Patient denies having any family history of seizures or any febrile seizures as a child.  I asked him if he has any aura/warning signs prior to his seizures but he did not understand as he is extremely hard of hearing.      Current  Facility-Administered Medications:   •  aspirin chewable tablet 81 mg, 81 mg, Oral, Daily, Juan Jose Valle MD, 81 mg at 03/13/21 0827  •  dextrose (D50W) 25 g/ 50mL Intravenous Solution 25 g, 25 g, Intravenous, Q15 Min PRN, Sherie Parnell DNP, APRNATALYA  •  dextrose (GLUTOSE) oral gel 15 g, 15 g, Oral, Q15 Min PRN, Sherie Parnell DNP APRNATALYA  •  glucagon (human recombinant) (GLUCAGEN DIAGNOSTIC) injection 1 mg, 1 mg, Subcutaneous, Q15 Min PRN, Sherie Parnell DNP APRNATALYA  •  insulin lispro (humaLOG) injection 0-9 Units, 0-9 Units, Subcutaneous, 4x Daily With Meals & Nightly, Sherie Parnell DNP, APRN, 2 Units at 03/13/21 0931  •  ipratropium-albuterol (DUO-NEB) nebulizer solution 3 mL, 3 mL, Nebulization, Q6H PRN, Sherie Parnell DNP, APRN  •  levETIRAcetam (KEPPRA) 750 mg in sodium chloride 0.9 % 100 mL IVPB, 750 mg, Intravenous, Q12H, Kylah Pina MD  •  Magnesium Sulfate 2 gram Bolus, followed by 8 gram infusion (total Mg dose 10 grams)- Mg less than or equal to 1mg/dL, 2 g, Intravenous, PRN **OR** Magnesium Sulfate 2 gram / 50mL Infusion (GIVE X 3 BAGS TO EQUAL 6GM TOTAL DOSE) - Mg 1.1 - 1.5 mg/dl, 2 g, Intravenous, PRN **OR** Magnesium Sulfate 4 gram infusion- Mg 1.6-1.9 mg/dL, 4 g, Intravenous, PRN, Sherie Parnell DNP, APRN, Last Rate: 25 mL/hr at 03/12/21 2145, 4 g at 03/12/21 2145  •  magnesium sulfate 4g/100mL (PREMIX) infusion, 4 g, Intravenous, Once, Sherie Parnell DNP, APRN  •  metoprolol tartrate (LOPRESSOR) tablet 12.5 mg, 12.5 mg, Oral, Q12H, Juan Jose Valle MD  •  nitroglycerin (TRIDIL) 200 mcg/ml infusion, 5-200 mcg/min, Intravenous, Titrated, Juan Jose Valle MD, Last Rate: 27 mL/hr at 03/13/21 0930, 90 mcg/min at 03/13/21 0930  •  potassium chloride (KLOR-CON) packet 40 mEq, 40 mEq, Oral, PRN, Alejo Taylor, DO  •  potassium chloride (MICRO-K) CR capsule 40 mEq, 40 mEq, Oral, PRN, Alejo Taylor,  DO  •  potassium chloride 10 mEq in 100 mL IVPB, 10 mEq, Intravenous, Q1H PRN, Alejo Taylor, DO  •  rosuvastatin (CRESTOR) tablet 20 mg, 20 mg, Oral, Daily, Juan Jose Valle MD, 20 mg at 03/13/21 0827  •  sacubitril-valsartan (ENTRESTO) 24-26 MG tablet 1 tablet, 1 tablet, Oral, BID, Juan Jose Valle MD, 1 tablet at 03/13/21 0827  •  sodium chloride 0.9 % flush 10 mL, 10 mL, Intravenous, PRN, Sudarshan Hernandez MD  •  sodium chloride 0.9 % flush 10 mL, 10 mL, Intravenous, Q12H, Sherie Parnell DNP, APRN, 10 mL at 03/13/21 0828  •  sodium chloride 0.9 % flush 10 mL, 10 mL, Intravenous, PRN, Sherie Parnell DNP, APRN       Past Medical History:   Diagnosis Date   • Arthritis    • Diabetes mellitus (CMS/HCC)    • Diverticulitis    • GERD (gastroesophageal reflux disease)    • History of transfusion    • Hyperlipidemia    • Hypertension    • Hypertensive urgency, 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   • Paget disease of bone         Past Surgical History:   Procedure Laterality Date   • APPENDECTOMY     • CARDIAC CATHETERIZATION N/A 3/18/2020    Procedure: Left Heart Cath;  Surgeon: Sonya Garibay MD;  Location: [facility] CATH INVASIVE LOCATION;  Service: Cardiology;  Laterality: N/A;  First availabel provider     • COLON RESECTION      second to diverticulitis    • FOOT SURGERY Left         Family History   Problem Relation Age of Onset   • No Known Problems Daughter    • No Known Problems Son    • No Known Problems Son    • No Known Problems Son    • Diabetes Sister    • Clotting disorder Sister    • No Known Problems Mother    • No Known Problems Sister    • No Known Problems Brother    • Fainting Brother         Social History     Socioeconomic History   • Marital status:      Spouse name: Not on file   • Number of children: Not on file   • Years of education: Not on file   • Highest education level: Not on file   Tobacco Use   • Smoking status: Current Every Day Smoker      "Packs/day: 0.25     Years: 65.00     Pack years: 16.25     Types: Cigars, Cigarettes     Last attempt to quit: 2019     Years since quittin.5   • Smokeless tobacco: Never Used   Substance and Sexual Activity   • Alcohol use: Yes     Comment: rarely   • Drug use: Yes     Types: Marijuana     Comment: Pt states used weekly but now quitting    • Sexual activity: Defer       Review of Systems Pertinent items are noted in HPI     Objective:    /63   Pulse 55   Temp 98.5 °F (36.9 °C) (Oral)   Resp 22   Ht 180.3 cm (70.98\")   Wt 80.7 kg (178 lb)   SpO2 96%   BMI 24.84 kg/m²       Neurology Exam:    General appearance: Lying in bed.  Currently getting an EEG    Mental status: Alert, awake and oriented to time place and person.  Could tell me the month, year and his date of birth    Recent and Remote memory: Intact.    Attention span and Concentration: Normal.     Language and Speech: Intact- No dysarthria.    Fluency, Naming, Repetition and Comprehension:  Intact    Cranial Nerves:   CN II: Visual fields are full. Intact. Fundi - Normal, No papilledema, Pupils - CHARLY  CN III, IV and VI: Extraocular movements are intact. Normal saccades.   CN V: Facial sensation is intact.   CN VII: Muscles of facial expression reveal no asymmetry. Intact.   CN VIII: Hearing is severely impaired  CN IX and X: Palate elevates symmetrically. Intact  CN XI: Shoulder shrug is intact.   CN XII: Tongue is midline without evidence of atrophy or fasciculation.     Motor:  Right UE muscle strength 5/5. Normal tone.     Left UE muscle strength 5/5. Normal tone.      Right LE muscle strength5/5. Normal tone.     Left LE muscle strength 5/5. Normal tone.      Sensory: Normal light touch, vibration and pinprick sensation bilaterally.    DTRs: 2+ bilaterally in upper and lower extremities.    Babinski: Negative bilaterally.    Coordination: Normal finger-to-nose, heel to shin B/L.    Gait: Deferred    Ophthalmoscopic examination-no " papilledema noted    Cardiac examination -normal rate and rhythm    Labs:  Most recent labs have been reviewed.    Results from last 7 days   Lab Units 03/13/21  0453   WBC 10*3/mm3 3.67   HEMOGLOBIN g/dL 9.6*   HEMATOCRIT % 31.7*   PLATELETS 10*3/mm3 107*     Results from last 7 days   Lab Units 03/13/21  0453   SODIUM mmol/L 139   POTASSIUM mmol/L 3.7   CHLORIDE mmol/L 106   CO2 mmol/L 21.0*   BUN mg/dL 11   CREATININE mg/dL 0.71*   CALCIUM mg/dL 8.0*       Radiology: CT Head Without Contrast    Result Date: 3/12/2021  1. No acute intracranial abnormality. 2. Stable mild diffuse chronic changes as noted above. 3. No change since 11/13/2019. Signer Name: Chris Buitrago MD  Signed: 3/12/2021 8:34 PM  Workstation Name: [id number]  [facility]    XR Chest 1 View    Result Date: 3/13/2021  Bibasilar airspace opacities and vascular congestion appears slightly improved. No new acute findings.  This report was finalized on 3/13/2021 8:36 AM by Duncan Burr.      XR Chest 1 View    Result Date: 3/12/2021  Bilateral basilar opacities with pulmonary vascular congestion and potential edema may suggest CHF exacerbation although underlying infection/pneumonia is not excluded. Signer Name: Sharron Nettles MD  Signed: 3/12/2021 7:26 PM  Workstation Name: [id number]  [facility] [place]        Assessment and Plan:  80-year-old male that presented with a possible out of hospital cardiac arrest versus seizure.  Was found to have electrolyte abnormalities that could have triggered either of the two.    1.  Seizure disorder  Patient most likely has focal seizures with secondary generalization as previous EEG showed right central temporal sharp waves  -I will increase his Keppra to 750 mg twice a day  -Electrolytes are back to normal now  -EEG currently going on but from review I see no seizure activity.  We will follow up on the official report    2.OHCA  Echocardiogram still  pending.  Management as per cardiology    Kylah Pina MD 03/13/21 10:28 EST

## 2021-03-14 PROBLEM — I10 ESSENTIAL HYPERTENSION: Status: ACTIVE | Noted: 2017-05-28

## 2021-03-14 LAB
ANION GAP SERPL CALCULATED.3IONS-SCNC: 11 MMOL/L (ref 5–15)
BASOPHILS # BLD AUTO: 0.01 10*3/MM3 (ref 0–0.2)
BASOPHILS NFR BLD AUTO: 0.3 % (ref 0–1.5)
BUN SERPL-MCNC: 13 MG/DL (ref 8–23)
BUN/CREAT SERPL: 18.8 (ref 7–25)
CALCIUM SPEC-SCNC: 8.3 MG/DL (ref 8.6–10.5)
CHLORIDE SERPL-SCNC: 109 MMOL/L (ref 98–107)
CHOLEST SERPL-MCNC: 91 MG/DL (ref 0–200)
CO2 SERPL-SCNC: 22 MMOL/L (ref 22–29)
CREAT SERPL-MCNC: 0.69 MG/DL (ref 0.76–1.27)
DEPRECATED RDW RBC AUTO: 55 FL (ref 37–54)
EOSINOPHIL # BLD AUTO: 0.03 10*3/MM3 (ref 0–0.4)
EOSINOPHIL NFR BLD AUTO: 0.9 % (ref 0.3–6.2)
ERYTHROCYTE [DISTWIDTH] IN BLOOD BY AUTOMATED COUNT: 16.5 % (ref 12.3–15.4)
GFR SERPL CREATININE-BSD FRML MDRD: 133 ML/MIN/1.73
GLUCOSE BLDC GLUCOMTR-MCNC: 128 MG/DL (ref 70–130)
GLUCOSE BLDC GLUCOMTR-MCNC: 140 MG/DL (ref 70–130)
GLUCOSE BLDC GLUCOMTR-MCNC: 240 MG/DL (ref 70–130)
GLUCOSE BLDC GLUCOMTR-MCNC: 87 MG/DL (ref 70–130)
GLUCOSE SERPL-MCNC: 92 MG/DL (ref 65–99)
HCT VFR BLD AUTO: 32.9 % (ref 37.5–51)
HCT VFR BLD AUTO: 33.2 % (ref 37.5–51)
HCT VFR BLD AUTO: 33.2 % (ref 37.5–51)
HDLC SERPL-MCNC: 40 MG/DL (ref 40–60)
HGB BLD-MCNC: 8.8 G/DL (ref 13–17.7)
HGB BLD-MCNC: 9.9 G/DL (ref 13–17.7)
HGB BLD-MCNC: 9.9 G/DL (ref 13–17.7)
IMM GRANULOCYTES # BLD AUTO: 0.01 10*3/MM3 (ref 0–0.05)
IMM GRANULOCYTES NFR BLD AUTO: 0.3 % (ref 0–0.5)
LDLC SERPL CALC-MCNC: 31 MG/DL (ref 0–100)
LDLC/HDLC SERPL: 0.76 {RATIO}
LYMPHOCYTES # BLD AUTO: 0.73 10*3/MM3 (ref 0.7–3.1)
LYMPHOCYTES NFR BLD AUTO: 21.2 % (ref 19.6–45.3)
MAGNESIUM SERPL-MCNC: 2 MG/DL (ref 1.6–2.4)
MCH RBC QN AUTO: 27.5 PG (ref 26.6–33)
MCHC RBC AUTO-ENTMCNC: 29.8 G/DL (ref 31.5–35.7)
MCV RBC AUTO: 92.2 FL (ref 79–97)
MONOCYTES # BLD AUTO: 0.24 10*3/MM3 (ref 0.1–0.9)
MONOCYTES NFR BLD AUTO: 7 % (ref 5–12)
NEUTROPHILS NFR BLD AUTO: 2.43 10*3/MM3 (ref 1.7–7)
NEUTROPHILS NFR BLD AUTO: 70.3 % (ref 42.7–76)
NRBC BLD AUTO-RTO: 0 /100 WBC (ref 0–0.2)
PHOSPHATE SERPL-MCNC: 1.7 MG/DL (ref 2.5–4.5)
PHOSPHATE SERPL-MCNC: 1.9 MG/DL (ref 2.5–4.5)
PHOSPHATE SERPL-MCNC: 2.1 MG/DL (ref 2.5–4.5)
PLATELET # BLD AUTO: 93 10*3/MM3 (ref 140–450)
PMV BLD AUTO: 12.6 FL (ref 6–12)
POTASSIUM SERPL-SCNC: 3.9 MMOL/L (ref 3.5–5.2)
POTASSIUM SERPL-SCNC: 4 MMOL/L (ref 3.5–5.2)
RBC # BLD AUTO: 3.6 10*6/MM3 (ref 4.14–5.8)
SODIUM SERPL-SCNC: 142 MMOL/L (ref 136–145)
TRIGL SERPL-MCNC: 104 MG/DL (ref 0–150)
VLDLC SERPL-MCNC: 20 MG/DL (ref 5–40)
WBC # BLD AUTO: 3.45 10*3/MM3 (ref 3.4–10.8)

## 2021-03-14 PROCEDURE — 85018 HEMOGLOBIN: CPT | Performed by: INTERNAL MEDICINE

## 2021-03-14 PROCEDURE — 99232 SBSQ HOSP IP/OBS MODERATE 35: CPT | Performed by: INTERNAL MEDICINE

## 2021-03-14 PROCEDURE — 92610 EVALUATE SWALLOWING FUNCTION: CPT

## 2021-03-14 PROCEDURE — 84100 ASSAY OF PHOSPHORUS: CPT | Performed by: NURSE PRACTITIONER

## 2021-03-14 PROCEDURE — 82962 GLUCOSE BLOOD TEST: CPT

## 2021-03-14 PROCEDURE — 63710000001 INSULIN LISPRO (HUMAN) PER 5 UNITS: Performed by: NURSE PRACTITIONER

## 2021-03-14 PROCEDURE — 25010000002 KETOROLAC TROMETHAMINE PER 15 MG: Performed by: INTERNAL MEDICINE

## 2021-03-14 PROCEDURE — 84100 ASSAY OF PHOSPHORUS: CPT | Performed by: INTERNAL MEDICINE

## 2021-03-14 PROCEDURE — 25010000002 LEVETRIRACETAM PER 10 MG: Performed by: PSYCHIATRY & NEUROLOGY

## 2021-03-14 PROCEDURE — 85014 HEMATOCRIT: CPT | Performed by: INTERNAL MEDICINE

## 2021-03-14 PROCEDURE — C1894 INTRO/SHEATH, NON-LASER: HCPCS

## 2021-03-14 PROCEDURE — 93005 ELECTROCARDIOGRAM TRACING: CPT | Performed by: EMERGENCY MEDICINE

## 2021-03-14 PROCEDURE — 85025 COMPLETE CBC W/AUTO DIFF WBC: CPT | Performed by: INTERNAL MEDICINE

## 2021-03-14 PROCEDURE — C1751 CATH, INF, PER/CENT/MIDLINE: HCPCS

## 2021-03-14 PROCEDURE — 84132 ASSAY OF SERUM POTASSIUM: CPT | Performed by: NURSE PRACTITIONER

## 2021-03-14 PROCEDURE — 83735 ASSAY OF MAGNESIUM: CPT | Performed by: INTERNAL MEDICINE

## 2021-03-14 PROCEDURE — 80061 LIPID PANEL: CPT | Performed by: NURSE PRACTITIONER

## 2021-03-14 PROCEDURE — 99232 SBSQ HOSP IP/OBS MODERATE 35: CPT | Performed by: PSYCHIATRY & NEUROLOGY

## 2021-03-14 PROCEDURE — 80048 BASIC METABOLIC PNL TOTAL CA: CPT | Performed by: INTERNAL MEDICINE

## 2021-03-14 PROCEDURE — 02HV33Z INSERTION OF INFUSION DEVICE INTO SUPERIOR VENA CAVA, PERCUTANEOUS APPROACH: ICD-10-PCS | Performed by: FAMILY MEDICINE

## 2021-03-14 RX ORDER — SODIUM CHLORIDE 0.9 % (FLUSH) 0.9 %
10 SYRINGE (ML) INJECTION AS NEEDED
Status: DISCONTINUED | OUTPATIENT
Start: 2021-03-14 | End: 2021-03-18

## 2021-03-14 RX ORDER — HYDROCODONE BITARTRATE AND ACETAMINOPHEN 7.5; 325 MG/1; MG/1
1 TABLET ORAL EVERY 6 HOURS PRN
Status: DISCONTINUED | OUTPATIENT
Start: 2021-03-14 | End: 2021-03-20 | Stop reason: HOSPADM

## 2021-03-14 RX ORDER — SODIUM CHLORIDE 0.9 % (FLUSH) 0.9 %
10 SYRINGE (ML) INJECTION EVERY 12 HOURS SCHEDULED
Status: DISCONTINUED | OUTPATIENT
Start: 2021-03-14 | End: 2021-03-20 | Stop reason: HOSPADM

## 2021-03-14 RX ORDER — KETOROLAC TROMETHAMINE 15 MG/ML
15 INJECTION, SOLUTION INTRAMUSCULAR; INTRAVENOUS EVERY 6 HOURS PRN
Status: DISCONTINUED | OUTPATIENT
Start: 2021-03-14 | End: 2021-03-14

## 2021-03-14 RX ADMIN — LEVETIRACETAM 750 MG: 100 INJECTION, SOLUTION INTRAVENOUS at 08:07

## 2021-03-14 RX ADMIN — LEVETIRACETAM 750 MG: 100 INJECTION, SOLUTION INTRAVENOUS at 21:05

## 2021-03-14 RX ADMIN — POTASSIUM & SODIUM PHOSPHATES POWDER PACK 280-160-250 MG 2 PACKET: 280-160-250 PACK at 11:08

## 2021-03-14 RX ADMIN — PANTOPRAZOLE SODIUM 40 MG: 40 INJECTION, POWDER, FOR SOLUTION INTRAVENOUS at 08:07

## 2021-03-14 RX ADMIN — SODIUM CHLORIDE, PRESERVATIVE FREE 10 ML: 5 INJECTION INTRAVENOUS at 15:58

## 2021-03-14 RX ADMIN — POTASSIUM & SODIUM PHOSPHATES POWDER PACK 280-160-250 MG 2 PACKET: 280-160-250 PACK at 04:11

## 2021-03-14 RX ADMIN — SODIUM CHLORIDE, PRESERVATIVE FREE 10 ML: 5 INJECTION INTRAVENOUS at 21:05

## 2021-03-14 RX ADMIN — INSULIN LISPRO 4 UNITS: 100 INJECTION, SOLUTION INTRAVENOUS; SUBCUTANEOUS at 11:42

## 2021-03-14 RX ADMIN — ASPIRIN 81 MG: 81 TABLET, CHEWABLE ORAL at 09:09

## 2021-03-14 RX ADMIN — POTASSIUM & SODIUM PHOSPHATES POWDER PACK 280-160-250 MG 2 PACKET: 280-160-250 PACK at 21:05

## 2021-03-14 RX ADMIN — SODIUM CHLORIDE, PRESERVATIVE FREE 10 ML: 5 INJECTION INTRAVENOUS at 08:07

## 2021-03-14 RX ADMIN — SACUBITRIL AND VALSARTAN 1 TABLET: 49; 51 TABLET, FILM COATED ORAL at 21:06

## 2021-03-14 RX ADMIN — SACUBITRIL AND VALSARTAN 1 TABLET: 49; 51 TABLET, FILM COATED ORAL at 08:07

## 2021-03-14 RX ADMIN — POTASSIUM CHLORIDE 40 MEQ: 1.5 POWDER, FOR SOLUTION ORAL at 00:36

## 2021-03-14 RX ADMIN — PANTOPRAZOLE SODIUM 40 MG: 40 INJECTION, POWDER, FOR SOLUTION INTRAVENOUS at 21:05

## 2021-03-14 RX ADMIN — METOPROLOL SUCCINATE 25 MG: 25 TABLET, EXTENDED RELEASE ORAL at 08:07

## 2021-03-14 RX ADMIN — KETOROLAC TROMETHAMINE 15 MG: 15 INJECTION, SOLUTION INTRAMUSCULAR; INTRAVENOUS at 09:04

## 2021-03-14 RX ADMIN — SPIRONOLACTONE 25 MG: 25 TABLET ORAL at 08:07

## 2021-03-14 RX ADMIN — POTASSIUM CHLORIDE 40 MEQ: 1.5 POWDER, FOR SOLUTION ORAL at 04:11

## 2021-03-14 RX ADMIN — ROSUVASTATIN CALCIUM 20 MG: 20 TABLET, COATED ORAL at 08:07

## 2021-03-14 NOTE — PROGRESS NOTES
Intensive Care Follow-up     Hospital:  LOS: 2 days   Mr. Narendra Cochran, 80 y.o. male is followed for:   Cardiac arrest (CMS/HCC)            History of present illness:   80 y.o. male who presents to [facility] ED 03/12/21 S/P witnessed OHCA and possible seizure.     Patient has a PMH of prior tobacco use, marijuana use, Paget's disease, T2DM, dyslipidemia, HTN, CAD, VHD (mild-mod AR, mild MR), chronic combined systolic and diastolic CHF, NICM (EF 36-40%) on Entresto, frequent PVC's, NSVT, seizures on Keppra, and GERD. He reports additional recent ~2 month history of intermittent diarrhea and abdominal pain as well as poor appetite.      Patient has no recollection of the events that occurred today. Per EMS report, patient was found down unresponsive by bystanders in the [place] earlier [place]. They performed CPR ~5 min and AED was placed with shock advised - following defibrillation patient apparently began to arouse by the time EMS arrived.  An IO was placed and lidocaine was administered, and patient was brought to our ED for further evaluation.     On ED arrival patient afebrile, hypertensive with SBP 170s, and oxygenating appropriately on room air. EKG with 1st degree AVB and unifocal PVC's with bigeminy (appears to be baseline). Lab work-up significant for negative troponin, , proBNP 50211, K 2.5, mag 1.6, , ALT 42, total bili 1.4, , lactate 3.2, PCT negative, TSH 6.72, free T4 1.64. UDS + THC. COVID-19 and Flu A/B PCR testing negative. CXR with bilateral basilar opacifications and pulmonary edema pattern although underlying infection/PNA unable to be excluded. CTH with stable mild diffuse chronic changes but negative for an acute intracranial abnormality.  He was loaded with 1 g Keppra and electrolyte replacement protocol initiated.  Mr. Cochran will be admitted to the ICU for further management.     Of note, patient was referred to Dr. Boyd of [facility] by Dr. Mercedes in August of last year due to  concerns of possible severe sinus node dysfunction and frequent PVC's with 12.3% burden. He was not felt to be a candidate for PPM insertion and was instead trialed on Amiodarone with no significant decrease in burden - this was D/C'd and Toprol-XL was increased instead.       Subjective   Interval History:  Patient is doing better this morning.  No further signs of arrhythmias.  No seizure activity.  Cardiology planning to do a left heart cath tomorrow and potential ICD placement on Tuesday.  Patient occasionally does have some bloody bowel movements hemoglobin though is fairly stable currently at 8.8 down from 9.3.  He currently is remaining hemodynamically stable.  Holding on any scope at this point time.             The patient's past medical, surgical and social history were reviewed and updated in Epic as appropriate.       Objective     Infusions:     Medications:  aspirin, 81 mg, Oral, Daily  insulin lispro, 0-9 Units, Subcutaneous, 4x Daily With Meals & Nightly  levETIRAcetam, 750 mg, Intravenous, Q12H  magnesium sulfate, 4 g, Intravenous, Once  metoprolol succinate XL, 25 mg, Oral, Q24H  pantoprazole, 40 mg, Intravenous, Q12H  rosuvastatin, 20 mg, Oral, Daily  sacubitril-valsartan, 1 tablet, Oral, Q12H  sodium chloride, 10 mL, Intravenous, Q12H  spironolactone, 25 mg, Oral, Daily      I reviewed the patient's medications.    Vital Sign Min/Max for last 24 hours  Temp  Min: 97.8 °F (36.6 °C)  Max: 99.1 °F (37.3 °C)   BP  Min: 112/77  Max: 151/70   Pulse  Min: 54  Max: 77   Resp  Min: 18  Max: 24   SpO2  Min: 89 %  Max: 100 %   No data recorded       Input/Output for last 24 hour shift  03/13 0701 - 03/14 0700  In: 369 [I.V.:269]  Out: 1750 [Urine:1350]      GENERAL : NAD, conversant  RESPIRATORY/THORAX : normal respiratory effort and no intercostal retractions, Coarse crackles bilaterally  CARDIOVASCULAR : Normal S1/S2, RRR. 1+ lower ext edema.  GASTROINTESTINAL : Soft, NT/ND. BS x 4 normoactive. No  hepatosplenomegaly.  MUSCULOSKELETAL : No cyanosis, clubbing, or ischemia  NEUROLOGICAL: alert and oriented to person, place and time  PSYCHOLOGICAL : Appropriate affect    Results from last 7 days   Lab Units 03/14/21  0745 03/14/21  0130 03/13/21 1805 03/13/21 0453 03/12/21 1911   WBC 10*3/mm3  --  3.45  --  3.67 3.19*   HEMOGLOBIN g/dL 8.8* 9.9*  9.9* 9.3* 9.6* 12.4*   PLATELETS 10*3/mm3  --  93*  --  107* 112*     Results from last 7 days   Lab Units 03/14/21  0916 03/14/21  0130 03/13/21 1805 03/13/21 0453 03/12/21 1911   SODIUM mmol/L  --  142  --  139 141   POTASSIUM mmol/L 4.0 3.9 3.1* 3.7 2.5*   CO2 mmol/L  --  22.0  --  21.0* 23.0   BUN mg/dL  --  13  --  11 7*   CREATININE mg/dL  --  0.69*  --  0.71* 0.90   MAGNESIUM mg/dL  --  2.0  --  2.3 1.6   PHOSPHORUS mg/dL 1.9* 1.7* 1.6* 2.1* 2.7   GLUCOSE mg/dL  --  92  --  140* 186*     Estimated Creatinine Clearance: 81.3 mL/min (A) (by C-G formula based on SCr of 0.69 mg/dL (L)).    Results from last 7 days   Lab Units 03/12/21 1914   PH, ARTERIAL pH units 7.40       I reviewed the patient's new clinical results.  I reviewed the patient's new imaging results/reports including actual images and agree with reports.         Assessment/Plan   Impression        OHCA    Essential hypertension    Hyperlipidemia LDL goal <70    T2DM    Paget disease of bone    Tobacco abuse    H/O seizures on Keppra    Coronary artery disease involving native coronary artery of native heart with angina pectoris (CMS/HCC)    Hypokalemia    Hypomagnesemia    VHD; mild-mod AR, mild MR    Chronic systolic congestive heart failure (CMS/HCC)    GERD    Marijuana use    Frequent PVCs    Acute GI bleeding       Plan        80 y.o.male with relevant PMH of tobacco use, marijuana use, Paget's disease, T2DM, dyslipidemia, HTN, CAD, VHD (mild-mod AR, mild MR), chronic combined systolic and diastolic CHF, NICM (EF 36-40%) on Entresto, frequent PVC's, NSVT, seizures on Keppra, and  ADAM, admitted 3/12/2021 after witnessed cardiac arrest at the [facility].  Received bystander CPR and had ROSC with AED.  Unclear how long CPR lasted or how many shocks but patient is alert and hemodynamically stable off pressors / oxygen / vent / etc. on admission there was questionable signs of seizures.  EEG from 3/13/2021 currently pending was placed on Keppra and neurology did evaluate the patient.  Electrolytes were aggressively replaced as potassium was 2.5 on admission and magnesium was 1.6.     · Continue to monitor for arrhythmias, QTC on admission was 532, and echocardiogram shows a EF of 50% with concentric LVH   · 19 potassium of 4 and a magnesium of 2  · Antiarrhythmic treatments per cardiology  · Plan for left heart cath on 3/15/2021, and then potentially a ICD placement on 3/16/2021  · Continue seizure management per neurology  · Continue current blood pressure regimen  · Continue insulin for blood sugar control   · Hemoglobin fairly stable at this point, we will continue to monitor, continue Protonix twice daily.  It appears the patient is bleeding suspect the patient will need a scope at some point time specially depending on what cardiology has to do with regards to potential stent placement or anticoagulation.  Given his arrhythmias that we have been dealing with an event that he has not hemodynamically unstable and not showing aggressive signs of bleeding that we attempt to avoid anesthesia until after his cardiac issues have been resolved.  · P.o. diet, n.p.o. at midnight  · Aggressive pulmonary toilet  · Mobilize patient     Plan of care and goals reviewed with multidisciplinary/antibiotic stewardship team during rounds.   I discussed the patient's findings and my recommendations with patient and nursing staff       Maura Taylor DO  Pulmonary, Critical care and Sleep Medicine

## 2021-03-14 NOTE — THERAPY RE-EVALUATION
Acute Care - Speech Language Pathology   Swallow Re-Evaluation  [place]     Clinical Swallow Re-Evaluation       Patient Name: Narendra Cochran  : 1940  MRN: [id number]  Today's Date: 3/14/2021               Admit Date: 3/12/2021    Visit Dx:     ICD-10-CM ICD-9-CM   1. Acute on chronic congestive heart failure, unspecified heart failure type (CMS/[facility])  I50.9 428.0   2. Hypokalemia  E87.6 276.8   3. Coronary artery disease involving native coronary artery of native heart with angina pectoris (CMS/[facility])  I25.119 414.01     413.9   4. OHCA  I46.9 427.5   5. NSVT (nonsustained ventricular tachycardia) (CMS/HCC)  I47.2 427.1     Patient Active Problem List   Diagnosis   • Essential hypertension   • Hyperlipidemia LDL goal <70   • T2DM   • Paget disease of bone   • Tobacco abuse   • H/O seizures on Keppra   • Gonalgia   • Osteitis deformans   • Syncope   • NSVT (nonsustained ventricular tachycardia) (CMS/HCC)   • Coronary artery disease involving native coronary artery of native heart with angina pectoris (CMS/[facility])   • NICM    • OHCA   • Hypokalemia   • Hypomagnesemia   • VHD; mild-mod AR, mild MR   • Chronic systolic congestive heart failure (CMS/[facility])   • Former tobacco user   • GERD   • Marijuana use   • Frequent PVCs   • Acute GI bleeding   • Acute blood loss anemia     Past Medical History:   Diagnosis Date   • Arthritis    • Diabetes mellitus (CMS/[facility])    • Diverticulitis    • GERD (gastroesophageal reflux disease)    • History of transfusion    • Hyperlipidemia    • Hypertension    • Hypertensive urgency, malignant 2017   • Paget disease of bone      Past Surgical History:   Procedure Laterality Date   • APPENDECTOMY     • CARDIAC CATHETERIZATION N/A 3/18/2020    Procedure: Left Heart Cath;  Surgeon: Sonya Garibay MD;  Location:  [facility];  Service: Cardiology;  Laterality: N/A;  First availabel provider     • COLON RESECTION      second to diverticulitis    • FOOT SURGERY Left          SWALLOW EVALUATION (last 72 hours)      SLP Adult Swallow Evaluation     Row Name 03/14/21 1500 03/13/21 1030                Rehab Evaluation    Document Type  re-evaluation  -SG  evaluation  -MP       Subjective Information  no complaints  -SG  no complaints  -MP       Patient Observations  alert;cooperative  -SG  alert;cooperative  -MP       Patient/Family/Caregiver Comments/Observations  wife present for evaluation  -SG  No family present, pt very [place]  -MP       Care Plan Review  care plan/treatment goals reviewed;evaluation/treatment results reviewed;risks/benefits reviewed;current/potential barriers reviewed;patient/other agree to care plan  -SG  evaluation/treatment results reviewed;patient/other agree to care plan  -MP       Patient Effort  excellent  -SG  good  -MP       Symptoms Noted During/After Treatment  none  -SG  --          General Information    Patient Profile Reviewed  yes  -SG  yes  -MP       Pertinent History Of Current Problem  see inital eval  -SG  Adm 3/12 w/ OHCA & seizure - [place], performed CPR & defib. CXR w/ bilateral basilar opacifications & pulmonary edema. Hx prior tobacco use, Paget's dz, DM2, HLD, HTN, VHD, systolic & diastolic CHF, seizures, GERD, ? drug abuse.   -MP       Current Method of Nutrition  pureed;thin liquids  -SG  NPO  -MP       Precautions/Limitations, Vision  WFL;for purposes of eval  -SG  WFL;for purposes of eval  -MP       Precautions/Limitations, Hearing  hearing impairment, bilaterally  -SG  hearing impairment, bilaterally  -MP       Prior Level of Function-Communication  unknown  -SG  unknown  -MP       Prior Level of Function-Swallowing  no diet consistency restrictions  -SG  no diet consistency restrictions  -MP       Plans/Goals Discussed with  patient;spouse/S.O.;agreed upon  -SG  patient;agreed upon  -MP       Barriers to Rehab  medically complex  -SG  medically complex  -MP       Patient's Goals for Discharge  return  to regular diet  -SG  patient did not state  -MP       Family Goals for Discharge  patient able to return to regular diet  -SG  --          Pain    Additional Documentation  Pain Scale: Numbers Pre/Post-Treatment (Group)  -SG  Pain Scale: FACES Pre/Post-Treatment (Group)  -MP          Pain Scale: Numbers Pre/Post-Treatment    Pretreatment Pain Rating  0/10 - no pain  -SG  --       Posttreatment Pain Rating  0/10 - no pain  -SG  --          Pain Scale: FACES Pre/Post-Treatment    Pain: FACES Scale, Pretreatment  --  0-->no hurt  -MP       Posttreatment Pain Rating  --  0-->no hurt  -MP          Oral Motor Structure and Function    Dentition Assessment  natural, present and adequate  -SG  natural, present and adequate  -MP       Secretion Management  WNL/WFL  -SG  WNL/WFL  -MP       Mucosal Quality  moist, healthy  -SG  moist, healthy  -MP       Gag Response  WFL  -SG  --       Volitional Swallow  WFL  -SG  --       Volitional Cough  WFL  -SG  --          Oral Musculature and Cranial Nerve Assessment    Oral Motor General Assessment  WFL  -SG  WFL  -MP          General Eating/Swallowing Observations    Respiratory Support Currently in Use  room air  -SG  room air  -MP       Eating/Swallowing Skills  self-fed;fed by SLP;appropriate self-feeding skills observed  -SG  self-fed;fed by SLP  -MP       Positioning During Eating  upright 90 degree;upright in chair  -SG  upright in bed  -MP       Utensils Used  spoon;cup;straw  -SG  spoon;cup;straw  -MP       Consistencies Trialed  regular textures;pureed;ice chips;thin liquids  -SG  thin liquids;pureed;regular textures  -MP          Respiratory    Respiratory Status  WFL  -SG  --          Clinical Swallow Eval    Oral Prep Phase  WFL  -SG  --       Oral Transit  WFL  -SG  --       Oral Residue  WFL  -SG  --       Pharyngeal Phase  no overt signs/symptoms of pharyngeal impairment  -SG  suspected pharyngeal impairment  -MP       Esophageal Phase  unremarkable  -SG  --        Clinical Swallow Evaluation Summary  CSE re-eval completed this date. No s/s w/ any PO even when pushed (including large portions or kelsie cracker). Pt able to independently manage and clear any oral residue w/ independent liquid wash. No coughing, discomfort or difficulty noted w/ cracker. Ok for diet upgrade to regular and thins. Rec: Reg and thins, no further SLP needs identified at this time. ST signing off.   -SG  Hard cough w/ kelsie cracker. No overt clinical s/sxs aspiration w/ thin or puree, even when pushed. Will initiate puree diet & thin liquids. Meds whole w/ thin or puree, as tolerated. SLP will f/u for re-eval v. FEES.  -MP          Pharyngeal Phase Concerns    Pharyngeal Phase Concerns  --  cough  -MP       Cough  --  regular consistencies  -MP          Clinical Impression    Daily Summary of Progress (SLP)  progress toward functional goals as expected  -SG  --       SLP Swallowing Diagnosis  functional oral phase;functional pharyngeal phase;swallow WFL  -SG  R/O pharyngeal dysphagia  -MP       Functional Impact  no impact on function  -SG  risk of aspiration/pneumonia  -MP       Rehab Potential/Prognosis, Swallowing  --  good, to achieve stated therapy goals  -       Swallow Criteria for Skilled Therapeutic Interventions Met  no problems identified which require skilled intervention;baseline status  -SG  demonstrates skilled criteria  -MP       Plan for Continued Treatment (SLP)  d/c ST at this time, no further needs identified. Pt, family and RN in agreement  -SG  --          Recommendations    Therapy Frequency (Swallow)  evaluation only  -SG  --       Predicted Duration Therapy Intervention (Days)  --  until discharge  -MP       SLP Diet Recommendation  regular textures;thin liquids  -SG  puree;thin liquids  -MP       Recommended Diagnostics  No further SLP services recommended  -SG  reassess via clinical swallow evaluation  -MP       Recommended Precautions and Strategies  upright posture  during/after eating;general aspiration precautions  -SG  upright posture during/after eating;general aspiration precautions  -MP       Oral Care Recommendations  Oral Care BID/PRN  -SG  Oral Care BID/PRN  -MP       SLP Rec. for Method of Medication Administration  meds whole;with thin liquids  -SG  meds whole;with thin liquids;with pudding or applesauce;as tolerated  -MP       Monitor for Signs of Aspiration  yes;notify SLP if any concerns  -SG  yes;notify SLP if any concerns  -MP       Anticipated Discharge Disposition (SLP)  unknown  -SG  unknown;anticipate therapy at next level of care  -MP         User Key  (r) = Recorded By, (t) = Taken By, (c) = Cosigned By    Initials Name Effective Dates    SG Lima-Rosey Crouch, MS CCC-SLP 06/22/15 -     Terrence Mix, MS CCC-SLP 06/19/19 -           EDUCATION  The patient has been educated in the following areas:   Dysphagia (Swallowing Impairment) Oral Care/Hydration Modified Diet Instruction.    SLP Recommendation and Plan  SLP Swallowing Diagnosis: functional oral phase, functional pharyngeal phase, swallow WFL  SLP Diet Recommendation: regular textures, thin liquids  Recommended Precautions and Strategies: upright posture during/after eating, general aspiration precautions  SLP Rec. for Method of Medication Administration: meds whole, with thin liquids     Monitor for Signs of Aspiration: yes, notify SLP if any concerns  Recommended Diagnostics: No further SLP services recommended  Swallow Criteria for Skilled Therapeutic Interventions Met: no problems identified which require skilled intervention, baseline status  Anticipated Discharge Disposition (SLP): unknown     Therapy Frequency (Swallow): evaluation only        Daily Summary of Progress (SLP): progress toward functional goals as expected    Plan for Continued Treatment (SLP): d/c ST at this time, no further needs identified. Pt, family and RN in agreement                          Time  Calculation:   Time Calculation- SLP     Row Name 03/14/21 1527             Time Calculation- SLP    SLP Start Time  1445  -      SLP Received On  03/14/21  -        User Key  (r) = Recorded By, (t) = Taken By, (c) = Cosigned By    Initials Name Provider Type    Rosey Nuñez MS CCC-SLP Speech and Language Pathologist          Therapy Charges for Today     Code Description Service Date Service Provider Modifiers Qty    [id number] HC ST EVAL ORAL PHARYNG SWALLOW 4 3/14/2021 Rosey Salugero MS CCC-JENY GN 1        Patient was not wearing a face mask and did not exhibit coughing during this therapy encounter.  Procedure performed was aerosolizing, involved close contact (within 6 feet for at least 15 minutes or longer), and involved contact with infectious secretions or specimens.  Therapist used appropriate personal protective equipment including gloves, standard procedure mask, eye protection and gown.  Appropriate PPE was worn during the entire therapy session.  Hand hygiene was completed before and after therapy session.          MS SWATHI Akhtar  3/14/2021

## 2021-03-14 NOTE — PLAN OF CARE
Goal Outcome Evaluation:  Plan of Care Reviewed With: patient     Outcome Summary: Pt's day largely uneventful with stable vital signs. K maintained above 4, Phos replacement given x1 for level of 1.9. Swallow evaluation repeated and given a regular consistency diet. Discussed with the patient his plan of care that includes a heart cath tomorrow and NPO status @ midnight. UO sufficient @ 425. PICC placed today without complication. Orientation and neuro status remain intact. Afebrile. Pt in the chair for the majority of the day.

## 2021-03-14 NOTE — PLAN OF CARE
Goal Outcome Evaluation:  Plan of Care Reviewed With: patient  Progress: no change  Outcome Summary: K replacement completed, Phos recheck 1.7 so another dose KPhos given. Frequent PVCs, occasional bigeminy. H/H stable, 1 bloody BM. Afebrile, VSS.

## 2021-03-14 NOTE — PROGRESS NOTES
Cardiology Progress Note      Reason for visit:    · Out of hospital cardiac arrest    IDENTIFICATION: 80-year-old male who resides in [place], [place]    Active Hospital Problems    Diagnosis  POA   • **OHCA [I46.9]  Yes     Priority: High     · Out of hospital cardiac arrest with shockable rhythm, 3/12/2021  · History of frequent PVCs not improved with amiodarone, 2020  · Echo (3/13/2021): LVEF 50%.  Moderate MR.  Moderate aortic insufficiency.     • Acute GI bleeding [K92.2]  Yes   • Hypokalemia [E87.6]  Yes   • Hypomagnesemia [E83.42]  Yes   • VHD; mild-mod AR, mild MR [I38]  Yes     · Echo (3/13/2021): LVEF 50%.  Moderate MR.  Moderate aortic insufficiency.     • Chronic systolic congestive heart failure (CMS/HCC) [I50.22]  Yes     · Cardiac catheterization (3/18/2020): 1 vessel CAD involving small posterior lateral branch of the RCA.  LVEF 35%  · Echo (3/13/2021): LVEF 50%.  Moderate MR.  Moderate aortic insufficiency.     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 One 4 beat run of nonsustained VT versus SVT with aberrancy.  · Amiodarone therapy initiated at 200 mg daily for PVCs.  · 24-hour Holter 7/15/2020 HR 43-79, average 55 bpm, PVC burden 10.2%.     • Coronary artery disease involving native coronary artery of native heart with angina pectoris (CMS/HCC) [I25.119]  Yes     · Cardiac catheterization (3/18/2020): 1 vessel CAD involving small posterior lateral branch of the RCA.     • H/O seizures on Keppra [R56.9]  Yes   • Essential hypertension [I10]  Yes   • Hyperlipidemia LDL goal <70 [E78.5]  Yes   • T2DM [E11.65]  Yes   • Tobacco abuse [Z72.0]  Yes   • Paget disease of bone [M88.9]  Yes     · History of Paget's disease, treated with Reclast.  Last saw Dr. Lui 2014.              · No issues overnight  · Chest wall pain from chest  compressions           Vital Sign Min/Max for last 24 hours  Temp  Min: 97.8 °F (36.6 °C)  Max: 99.1 °F (37.3 °C)   BP  Min: 112/77  Max: 151/70   Pulse  Min: 54  Max: 77   Resp  Min: 18  Max: 24   SpO2  Min: 89 %  Max: 100 %   No data recorded      Intake/Output Summary (Last 24 hours) at 3/14/2021 1123  Last data filed at 3/14/2021 0900  Gross per 24 hour   Intake 220 ml   Output 1325 ml   Net -1105 ml           Physical Exam  Constitutional:       Appearance: Normal appearance.   HENT:      Head: Normocephalic.   Cardiovascular:      Rate and Rhythm: Normal rate and regular rhythm.   Pulmonary:      Effort: Pulmonary effort is normal.   Neurological:      Mental Status: He is alert.       Tele: Sinus     DATA REVIEW:     EKG (3/12/2021):  Sinus bradycardia with first-degree AV block     CXR (3/12/2021): Mild increase pulmonary vascularity     ECHO (3/13/2021): LVEF 50%.  Moderate aortic insufficiency.  Moderate mitral regurgitation      Results from last 7 days   Lab Units 03/14/21  0916 03/14/21  0130 03/13/21 1805 03/13/21 0453 03/12/21 1911   SODIUM mmol/L  --  142  --  139 141   POTASSIUM mmol/L 4.0 3.9 3.1* 3.7 2.5*   CHLORIDE mmol/L  --  109*  --  106 103   BUN mg/dL  --  13  --  11 7*   CREATININE mg/dL  --  0.69*  --  0.71* 0.90   MAGNESIUM mg/dL  --  2.0  --  2.3 1.6     Results from last 7 days   Lab Units 03/13/21  0453 03/12/21 2215 03/12/21 1911   TROPONIN T ng/mL 0.159* 0.026 <0.010     Results from last 7 days   Lab Units 03/14/21  0745 03/14/21  0130 03/13/21 1805 03/13/21 0453 03/12/21 1911   WBC 10*3/mm3  --  3.45  --  3.67 3.19*   HEMOGLOBIN g/dL 8.8* 9.9*  9.9* 9.3* 9.6* 12.4*   HEMATOCRIT % 32.9* 33.2*  33.2* 29.4* 31.7* 40.4   PLATELETS 10*3/mm3  --  93*  --  107* 112*       Lab Results   Component Value Date    HGBA1C 5.6 01/18/2021       Lab Results   Component Value Date    CHOL 91 03/14/2021    TRIG 104 03/14/2021    HDL 40 03/14/2021    LDL 31 03/14/2021              Out of  hospital cardiac arrest/VF arrest  · Patient collapsed with shockable rhythm with AED  · History of frequent ventricular ectopy but no previous history of sustained ventricular arrhythmia  · LVEF normal on echocardiogram  · Patient candidate for ICD for primary prevention  · Replete potassium >4 and magnesium >2      Coronary artery disease  · Cath 3/2020 single-vessel CAD of a small posterior lateral wall branch of RCA.  · Patient complains of chest pain felt likely to be related to chest wall pain from CPR  · Continue aspirin 81 mg daily     HFrEF with normalized LVEF  · Appears nearly euvolemic  · Echocardiogram 3/13/2021 shows LVEF of 50%  · Guideline directed medical therapy for heart failure to include Entresto, spironolactone and metoprolol succinate     Elevated troponin, likely demand  · Elevated troponin likely in context to shock/VF arrest  · Cardiac catheterization 1 year ago showed nonobstructive CAD of the major epicardial arteries  · Patient will require cardiac catheterization prior to undergoing ICD     Type 2 diabetes mellitus  · Well-controlled  · Consider Farxiga at discharge for history of heart failure and diabetes    Hypertension  · Controlled currently 122/66    Hyperlipidemia  · Continue rosuvastatin     Possible GI bleed  · On Protonix         · N.p.o. after midnight   · Cardiac cath in the a.m.  · ICD likely Tuesday  · Norco for chest pain  · Cecy Mercedes and Deb to see tomorrow    Electronically signed by Doc Sahni IV, MD, 03/14/21, 11:25 AM EDT.

## 2021-03-14 NOTE — PLAN OF CARE
Goal Outcome Evaluation:  Plan of Care Reviewed With: patient, spouse  Progress: no change  Outcome Summary: Echo and EEG done, neuro consulted, increased keppra to 750ml q12, cardiology consulted, nitro gtt dced and toprol xl 25mg added, HR 55-62, cards APRN advised to administer toprol, tolerated. plan for heart cath on monday and possible ICD on tuesday. Keep k >4 and mag >2, k and phos replaced per protocol, pt had 3 bloody BM, protonix added, serial H/H ordered, stable so far, up in the chair for 4 hours, PICC consult placed for in am, wife updated. Nasal bleed subsided t/o the day.

## 2021-03-14 NOTE — PROGRESS NOTES
Subjective:    CC: Narendra Cochran is seen today for seizure    HPI:  Patient did not have any acute events overnight.  His EEG done yesterday showed mild generalized slowing but no epileptiform activity.  Today he also tells me that he did not have any seizures between 2019 till now.  Yesterday we increased his Keppra to 750 mg twice a day and he tolerated it well.  He was seen by cardiology and they have scheduled him for a cardiac cath on Monday.  His echocardiogram showed an EF of 50% with moderate aortic and mitral valve regurg and mild dilatation of the left atrium.      Current Facility-Administered Medications:   •  aspirin chewable tablet 81 mg, 81 mg, Oral, Daily, Juan Jose Valle MD, 81 mg at 03/14/21 0909  •  dextrose (D50W) 25 g/ 50mL Intravenous Solution 25 g, 25 g, Intravenous, Q15 Min PRN, Sherie Parnell DNP, APRN  •  dextrose (GLUTOSE) oral gel 15 g, 15 g, Oral, Q15 Min PRN, Sherie Parnell DNP, APRN  •  glucagon (human recombinant) (GLUCAGEN DIAGNOSTIC) injection 1 mg, 1 mg, Subcutaneous, Q15 Min PRN, Sherie Parnell DNP, APRN  •  insulin lispro (humaLOG) injection 0-9 Units, 0-9 Units, Subcutaneous, 4x Daily With Meals & Nightly, Sherie Parnell DNP, LEANDRO, 4 Units at 03/13/21 1149  •  ipratropium-albuterol (DUO-NEB) nebulizer solution 3 mL, 3 mL, Nebulization, Q6H PRN, Sherie Parnell DNP, APRN  •  ketorolac (TORADOL) injection 15 mg, 15 mg, Intravenous, Q6H PRN, Doc Sahni IV, MD, 15 mg at 03/14/21 0904  •  levETIRAcetam (KEPPRA) 750 mg in sodium chloride 0.9 % 100 mL IVPB, 750 mg, Intravenous, Q12H, Kylah Pina MD, 750 mg at 03/14/21 0807  •  Magnesium Sulfate 2 gram Bolus, followed by 8 gram infusion (total Mg dose 10 grams)- Mg less than or equal to 1mg/dL, 2 g, Intravenous, PRN **OR** Magnesium Sulfate 2 gram / 50mL Infusion (GIVE X 3 BAGS TO EQUAL 6GM TOTAL DOSE) - Mg 1.1 - 1.5 mg/dl, 2 g, Intravenous, PRN **OR** Magnesium Sulfate 4  gram infusion- Mg 1.6-1.9 mg/dL, 4 g, Intravenous, PRN, Sherie Parnell, JEAN MARIE, APRN, Last Rate: 25 mL/hr at 03/12/21 2145, 4 g at 03/12/21 2145  •  magnesium sulfate 4g/100mL (PREMIX) infusion, 4 g, Intravenous, Once, Sherie Parnell, JEAN MARIE, APRN  •  metoprolol succinate XL (TOPROL-XL) 24 hr tablet 25 mg, 25 mg, Oral, Q24H, Doc Sahni IV, MD, 25 mg at 03/14/21 0807  •  pantoprazole (PROTONIX) injection 40 mg, 40 mg, Intravenous, Q12H, Alejo Taylor, , 40 mg at 03/14/21 0807  •  potassium & sodium phosphates (PHOS-NAK) 280-160-250 MG packet - for Phosphorus less than 1.25 mg/dL, 2 packet, Oral, Q6H PRN **OR** potassium & sodium phosphates (PHOS-NAK) 280-160-250 MG packet - for Phosphorus 1.25 - 2.5 mg/dL, 2 packet, Oral, Q6H PRN, Alejo Taylor, DO, 2 packet at 03/14/21 0411  •  potassium chloride (KLOR-CON) packet 40 mEq, 40 mEq, Oral, PRN, Alejo Taylor, DO, 40 mEq at 03/14/21 0411  •  potassium chloride (MICRO-K) CR capsule 40 mEq, 40 mEq, Oral, PRN, Alejo Taylor, DO  •  potassium chloride 10 mEq in 100 mL IVPB, 10 mEq, Intravenous, Q1H PRN, Alejo Taylor, DO  •  rosuvastatin (CRESTOR) tablet 20 mg, 20 mg, Oral, Daily, Juan Jose Valle MD, 20 mg at 03/14/21 0807  •  sacubitril-valsartan (ENTRESTO) 49-51 MG tablet 1 tablet, 1 tablet, Oral, Q12H, Doc Sahni IV, MD, 1 tablet at 03/14/21 0807  •  sodium chloride 0.9 % flush 10 mL, 10 mL, Intravenous, PRN, Sudarshan Hernandez MD  •  sodium chloride 0.9 % flush 10 mL, 10 mL, Intravenous, Q12H, Sherie Parnell DNP, APRNATALYA, 10 mL at 03/14/21 0807  •  sodium chloride 0.9 % flush 10 mL, 10 mL, Intravenous, PRN, Sherie Parnell DNP, APRN  •  spironolactone (ALDACTONE) tablet 25 mg, 25 mg, Oral, Daily, Doc Sahni IV, MD, 25 mg at 03/14/21 0807       Past Medical History:   Diagnosis Date   • Arthritis    • Diabetes mellitus (CMS/HCC)   "  • Diverticulitis    • GERD (gastroesophageal reflux disease)    • History of transfusion    • Hyperlipidemia    • Hypertension    • Hypertensive urgency, malignant 2017   • Paget disease of bone         Past Surgical History:   Procedure Laterality Date   • APPENDECTOMY     • CARDIAC CATHETERIZATION N/A 3/18/2020    Procedure: Left Heart Cath;  Surgeon: Sonya Garibay MD;  Location:  [facility];  Service: Cardiology;  Laterality: N/A;  First availabel provider     • COLON RESECTION      second to diverticulitis    • FOOT SURGERY Left         Family History   Problem Relation Age of Onset   • No Known Problems Daughter    • No Known Problems Son    • No Known Problems Son    • No Known Problems Son    • Diabetes Sister    • Clotting disorder Sister    • No Known Problems Mother    • No Known Problems Sister    • No Known Problems Brother    • Fainting Brother         Social History     Socioeconomic History   • Marital status:      Spouse name: Not on file   • Number of children: Not on file   • Years of education: Not on file   • Highest education level: Not on file   Tobacco Use   • Smoking status: Current Every Day Smoker     Packs/day: 0.25     Years: 65.00     Pack years: 16.25     Types: Cigars, Cigarettes     Last attempt to quit: 2019     Years since quittin.5   • Smokeless tobacco: Never Used   Substance and Sexual Activity   • Alcohol use: Yes     Comment: rarely   • Drug use: Yes     Types: Marijuana     Comment: Pt states used weekly but now quitting    • Sexual activity: Defer       Review of Systems Pertinent items are noted in HPI, all other systems reviewed and negative     Objective:    /70   Pulse 61   Temp 98 °F (36.7 °C) (Axillary)   Resp 23   Ht 180.3 cm (70.98\")   Wt 80.7 kg (178 lb)   SpO2 95%   BMI 24.84 kg/m²       Neurology Exam:    General appearance: NAD.     Mental status: Alert, awake and oriented to time place and person.    Recent and " Remote memory: Intact.    Attention span and Concentration: Normal.     Language and Speech: Intact- No dysarthria.    Fluency, Naming, Repetition and Comprehension:  Intact    Cranial Nerves:   CN II: Visual fields are full. Intact. Fundi - Normal, No papilledema, Pupils - CHARLY  CN III, IV and VI: Extraocular movements are intact. Normal saccades.   CN V: Facial sensation is intact.   CN VII: Muscles of facial expression reveal no asymmetry. Intact.   CN VIII: Hearing is impaired  CN IX and X: Palate elevates symmetrically. Intact  CN XI: Shoulder shrug is intact.   CN XII: Tongue is midline without evidence of atrophy or fasciculation.     Motor:  Right UE muscle strength 5/5. Normal tone.     Left UE muscle strength 5/5. Normal tone.      Right LE muscle strength5/5. Normal tone.     Left LE muscle strength 5/5. Normal tone.      Sensory: Normal light touch, vibration and pinprick sensation bilaterally.    DTRs: 2+ bilaterally in upper and lower extremities.    Babinski: Negative bilaterally.    Coordination: Normal finger-to-nose, heel to shin B/L.    Gait: Deferred    Ophthalmoscopic examination-no papilledema noted    Cardiac examination -normal rate and rhythm    Labs:  Most recent labs have been reviewed.    Results from last 7 days   Lab Units 03/14/21  0745 03/14/21  0130   WBC 10*3/mm3  --  3.45   HEMOGLOBIN g/dL 8.8* 9.9*  9.9*   HEMATOCRIT % 32.9* 33.2*  33.2*   PLATELETS 10*3/mm3  --  93*     Results from last 7 days   Lab Units 03/14/21  0916 03/14/21  0130   SODIUM mmol/L  --  142   POTASSIUM mmol/L 4.0 3.9   CHLORIDE mmol/L  --  109*   CO2 mmol/L  --  22.0   BUN mg/dL  --  13   CREATININE mg/dL  --  0.69*   CALCIUM mg/dL  --  8.3*       Radiology: Adult Transthoracic Echo Complete W/ Cont if Necessary Per Protocol    Addendum Date: 3/13/2021    · Left ventricular systolic function is normal. Estimated left ventricular EF = 50% · Left ventricular wall thickness is consistent with mild concentric  hypertrophy. · Left atrial volume is mildly increased. · Moderate aortic valve regurgitation is present. · Moderate mitral valve regurgitation is present.      EEG    Result Date: 3/13/2021  Mild generalized slow No epileptiform activity is seen This report is transcribed using the Dragon dictation system.      CT Head Without Contrast    Result Date: 3/12/2021  1. No acute intracranial abnormality. 2. Stable mild diffuse chronic changes as noted above. 3. No change since 11/13/2019. Signer Name: Chris Buitrago MD  Signed: 3/12/2021 8:34 PM  Workstation Name: [id number]  [facility]    XR Chest 1 View    Result Date: 3/13/2021  Bibasilar airspace opacities and vascular congestion appears slightly improved. No new acute findings.  This report was finalized on 3/13/2021 8:36 AM by Duncan Burr.      XR Chest 1 View    Result Date: 3/12/2021  Bilateral basilar opacities with pulmonary vascular congestion and potential edema may suggest CHF exacerbation although underlying infection/pneumonia is not excluded. Signer Name: Sharron Nettles MD  Signed: 3/12/2021 7:26 PM  Workstation Name: [id number]  [facility]        Assessment and Plan:    80-year-old male that presented with a possible out of hospital cardiac arrest versus seizure.  Was found to have electrolyte abnormalities that could have triggered either of the two.     1.  Seizure disorder  Patient most likely has focal seizures with secondary generalization as previous EEG showed right central temporal sharp waves.  Current EEG showed mild generalized slowing but no epileptiform activity  -Continue Keppra  750 mg twice a day  -Electrolytes are back to normal now       2.OHCA  Patient will be going for cardiac catheterization on Monday    Neurology to sign off.  Call if needed  Kylah Pina MD 03/14/21 10:59 EDT

## 2021-03-14 NOTE — PLAN OF CARE
Problem: Adult Inpatient Plan of Care  Goal: Plan of Care Review  Outcome: Ongoing, Progressing   Goal Outcome Evaluation:         SLP re-evaluation completed. Will sign-off dysphagia. Please see note for further details and recommendations.

## 2021-03-15 PROBLEM — K92.2 ACUTE GI BLEEDING: Status: RESOLVED | Noted: 2021-03-13 | Resolved: 2021-03-15

## 2021-03-15 LAB
ANION GAP SERPL CALCULATED.3IONS-SCNC: 6 MMOL/L (ref 5–15)
BASOPHILS # BLD AUTO: 0.01 10*3/MM3 (ref 0–0.2)
BASOPHILS NFR BLD AUTO: 0.4 % (ref 0–1.5)
BUN SERPL-MCNC: 14 MG/DL (ref 8–23)
BUN/CREAT SERPL: 16.9 (ref 7–25)
CA-I SERPL ISE-MCNC: 1.24 MMOL/L (ref 1.12–1.32)
CALCIUM SPEC-SCNC: 8.4 MG/DL (ref 8.6–10.5)
CHLORIDE SERPL-SCNC: 109 MMOL/L (ref 98–107)
CK MB SERPL-CCNC: 2.1 NG/ML
CK SERPL-CCNC: 190 U/L (ref 20–200)
CO2 SERPL-SCNC: 27 MMOL/L (ref 22–29)
CREAT SERPL-MCNC: 0.83 MG/DL (ref 0.76–1.27)
DEPRECATED RDW RBC AUTO: 53.6 FL (ref 37–54)
EOSINOPHIL # BLD AUTO: 0.04 10*3/MM3 (ref 0–0.4)
EOSINOPHIL NFR BLD AUTO: 1.5 % (ref 0.3–6.2)
ERYTHROCYTE [DISTWIDTH] IN BLOOD BY AUTOMATED COUNT: 16.5 % (ref 12.3–15.4)
GFR SERPL CREATININE-BSD FRML MDRD: 108 ML/MIN/1.73
GLUCOSE BLDC GLUCOMTR-MCNC: 110 MG/DL (ref 70–130)
GLUCOSE BLDC GLUCOMTR-MCNC: 127 MG/DL (ref 70–130)
GLUCOSE BLDC GLUCOMTR-MCNC: 144 MG/DL (ref 70–130)
GLUCOSE BLDC GLUCOMTR-MCNC: 156 MG/DL (ref 70–130)
GLUCOSE SERPL-MCNC: 105 MG/DL (ref 65–99)
HCT VFR BLD AUTO: 26.1 % (ref 37.5–51)
HCT VFR BLD AUTO: 28.1 % (ref 37.5–51)
HGB BLD-MCNC: 8 G/DL (ref 13–17.7)
HGB BLD-MCNC: 8.6 G/DL (ref 13–17.7)
IMM GRANULOCYTES # BLD AUTO: 0.01 10*3/MM3 (ref 0–0.05)
IMM GRANULOCYTES NFR BLD AUTO: 0.4 % (ref 0–0.5)
LYMPHOCYTES # BLD AUTO: 0.71 10*3/MM3 (ref 0.7–3.1)
LYMPHOCYTES NFR BLD AUTO: 25.9 % (ref 19.6–45.3)
MAGNESIUM SERPL-MCNC: 1.8 MG/DL (ref 1.6–2.4)
MCH RBC QN AUTO: 27.5 PG (ref 26.6–33)
MCHC RBC AUTO-ENTMCNC: 30.7 G/DL (ref 31.5–35.7)
MCV RBC AUTO: 89.7 FL (ref 79–97)
MONOCYTES # BLD AUTO: 0.24 10*3/MM3 (ref 0.1–0.9)
MONOCYTES NFR BLD AUTO: 8.8 % (ref 5–12)
NEUTROPHILS NFR BLD AUTO: 1.73 10*3/MM3 (ref 1.7–7)
NEUTROPHILS NFR BLD AUTO: 63 % (ref 42.7–76)
NRBC BLD AUTO-RTO: 0 /100 WBC (ref 0–0.2)
PHOSPHATE SERPL-MCNC: 3 MG/DL (ref 2.5–4.5)
PLATELET # BLD AUTO: 82 10*3/MM3 (ref 140–450)
PMV BLD AUTO: 13 FL (ref 6–12)
POTASSIUM SERPL-SCNC: 3.3 MMOL/L (ref 3.5–5.2)
POTASSIUM SERPL-SCNC: 3.7 MMOL/L (ref 3.5–5.2)
QT INTERVAL: 508 MS
QTC INTERVAL: 540 MS
RBC # BLD AUTO: 2.91 10*6/MM3 (ref 4.14–5.8)
SODIUM SERPL-SCNC: 142 MMOL/L (ref 136–145)
TROPONIN T SERPL-MCNC: 0.02 NG/ML (ref 0–0.03)
WBC # BLD AUTO: 2.74 10*3/MM3 (ref 3.4–10.8)

## 2021-03-15 PROCEDURE — 25010000002 FENTANYL CITRATE (PF) 100 MCG/2ML SOLUTION: Performed by: INTERNAL MEDICINE

## 2021-03-15 PROCEDURE — 97162 PT EVAL MOD COMPLEX 30 MIN: CPT

## 2021-03-15 PROCEDURE — 93458 L HRT ARTERY/VENTRICLE ANGIO: CPT | Performed by: INTERNAL MEDICINE

## 2021-03-15 PROCEDURE — 82330 ASSAY OF CALCIUM: CPT | Performed by: NURSE PRACTITIONER

## 2021-03-15 PROCEDURE — 25010000002 HEPARIN (PORCINE) PER 1000 UNITS: Performed by: INTERNAL MEDICINE

## 2021-03-15 PROCEDURE — 99232 SBSQ HOSP IP/OBS MODERATE 35: CPT | Performed by: INTERNAL MEDICINE

## 2021-03-15 PROCEDURE — 82553 CREATINE MB FRACTION: CPT | Performed by: NURSE PRACTITIONER

## 2021-03-15 PROCEDURE — 85025 COMPLETE CBC W/AUTO DIFF WBC: CPT | Performed by: INTERNAL MEDICINE

## 2021-03-15 PROCEDURE — 82550 ASSAY OF CK (CPK): CPT | Performed by: NURSE PRACTITIONER

## 2021-03-15 PROCEDURE — 84484 ASSAY OF TROPONIN QUANT: CPT | Performed by: NURSE PRACTITIONER

## 2021-03-15 PROCEDURE — 82962 GLUCOSE BLOOD TEST: CPT

## 2021-03-15 PROCEDURE — 97165 OT EVAL LOW COMPLEX 30 MIN: CPT

## 2021-03-15 PROCEDURE — 85018 HEMOGLOBIN: CPT | Performed by: INTERNAL MEDICINE

## 2021-03-15 PROCEDURE — 83735 ASSAY OF MAGNESIUM: CPT | Performed by: NURSE PRACTITIONER

## 2021-03-15 PROCEDURE — 25010000003 LIDOCAINE 1 % SOLUTION: Performed by: INTERNAL MEDICINE

## 2021-03-15 PROCEDURE — 99291 CRITICAL CARE FIRST HOUR: CPT | Performed by: INTERNAL MEDICINE

## 2021-03-15 PROCEDURE — 84132 ASSAY OF SERUM POTASSIUM: CPT | Performed by: INTERNAL MEDICINE

## 2021-03-15 PROCEDURE — 4A023N7 MEASUREMENT OF CARDIAC SAMPLING AND PRESSURE, LEFT HEART, PERCUTANEOUS APPROACH: ICD-10-PCS | Performed by: INTERNAL MEDICINE

## 2021-03-15 PROCEDURE — 85014 HEMATOCRIT: CPT | Performed by: INTERNAL MEDICINE

## 2021-03-15 PROCEDURE — 25010000003 MAGNESIUM SULFATE 4 GM/100ML SOLUTION: Performed by: NURSE PRACTITIONER

## 2021-03-15 PROCEDURE — 25010000003 POTASSIUM CHLORIDE 10 MEQ/100ML SOLUTION: Performed by: INTERNAL MEDICINE

## 2021-03-15 PROCEDURE — 25010000002 MORPHINE PER 10 MG: Performed by: INTERNAL MEDICINE

## 2021-03-15 PROCEDURE — C1769 GUIDE WIRE: HCPCS | Performed by: INTERNAL MEDICINE

## 2021-03-15 PROCEDURE — 25010000002 LEVETRIRACETAM PER 10 MG: Performed by: PSYCHIATRY & NEUROLOGY

## 2021-03-15 PROCEDURE — 80048 BASIC METABOLIC PNL TOTAL CA: CPT | Performed by: NURSE PRACTITIONER

## 2021-03-15 PROCEDURE — C1894 INTRO/SHEATH, NON-LASER: HCPCS | Performed by: INTERNAL MEDICINE

## 2021-03-15 PROCEDURE — 0 IOPAMIDOL PER 1 ML: Performed by: INTERNAL MEDICINE

## 2021-03-15 PROCEDURE — 25010000002 LEVETRIRACETAM PER 10 MG: Performed by: INTERNAL MEDICINE

## 2021-03-15 PROCEDURE — 84100 ASSAY OF PHOSPHORUS: CPT | Performed by: NURSE PRACTITIONER

## 2021-03-15 PROCEDURE — 93005 ELECTROCARDIOGRAM TRACING: CPT | Performed by: INTERNAL MEDICINE

## 2021-03-15 PROCEDURE — C1887 CATHETER, GUIDING: HCPCS | Performed by: INTERNAL MEDICINE

## 2021-03-15 PROCEDURE — 93010 ELECTROCARDIOGRAM REPORT: CPT | Performed by: INTERNAL MEDICINE

## 2021-03-15 PROCEDURE — B2111ZZ FLUOROSCOPY OF MULTIPLE CORONARY ARTERIES USING LOW OSMOLAR CONTRAST: ICD-10-PCS | Performed by: INTERNAL MEDICINE

## 2021-03-15 PROCEDURE — 25010000002 MIDAZOLAM PER 1 MG: Performed by: INTERNAL MEDICINE

## 2021-03-15 RX ORDER — MIDAZOLAM HYDROCHLORIDE 1 MG/ML
INJECTION INTRAMUSCULAR; INTRAVENOUS AS NEEDED
Status: DISCONTINUED | OUTPATIENT
Start: 2021-03-15 | End: 2021-03-15 | Stop reason: HOSPADM

## 2021-03-15 RX ORDER — MORPHINE SULFATE 2 MG/ML
2 INJECTION, SOLUTION INTRAMUSCULAR; INTRAVENOUS ONCE
Status: COMPLETED | OUTPATIENT
Start: 2021-03-15 | End: 2021-03-15

## 2021-03-15 RX ORDER — ACETAMINOPHEN 325 MG/1
650 TABLET ORAL EVERY 4 HOURS PRN
Status: DISCONTINUED | OUTPATIENT
Start: 2021-03-15 | End: 2021-03-20 | Stop reason: HOSPADM

## 2021-03-15 RX ORDER — MORPHINE SULFATE 2 MG/ML
2 INJECTION, SOLUTION INTRAMUSCULAR; INTRAVENOUS ONCE
Status: DISCONTINUED | OUTPATIENT
Start: 2021-03-15 | End: 2021-03-15 | Stop reason: SDUPTHER

## 2021-03-15 RX ORDER — FENTANYL CITRATE 50 UG/ML
INJECTION, SOLUTION INTRAMUSCULAR; INTRAVENOUS AS NEEDED
Status: DISCONTINUED | OUTPATIENT
Start: 2021-03-15 | End: 2021-03-15 | Stop reason: HOSPADM

## 2021-03-15 RX ORDER — LIDOCAINE HYDROCHLORIDE 10 MG/ML
INJECTION, SOLUTION INFILTRATION; PERINEURAL AS NEEDED
Status: DISCONTINUED | OUTPATIENT
Start: 2021-03-15 | End: 2021-03-15 | Stop reason: HOSPADM

## 2021-03-15 RX ADMIN — LEVETIRACETAM 750 MG: 100 INJECTION, SOLUTION INTRAVENOUS at 08:02

## 2021-03-15 RX ADMIN — SACUBITRIL AND VALSARTAN 1 TABLET: 49; 51 TABLET, FILM COATED ORAL at 20:09

## 2021-03-15 RX ADMIN — POTASSIUM CHLORIDE 10 MEQ: 7.46 INJECTION, SOLUTION INTRAVENOUS at 10:24

## 2021-03-15 RX ADMIN — POTASSIUM CHLORIDE 10 MEQ: 7.46 INJECTION, SOLUTION INTRAVENOUS at 06:35

## 2021-03-15 RX ADMIN — SACUBITRIL AND VALSARTAN 1 TABLET: 49; 51 TABLET, FILM COATED ORAL at 08:01

## 2021-03-15 RX ADMIN — SPIRONOLACTONE 25 MG: 25 TABLET ORAL at 08:01

## 2021-03-15 RX ADMIN — POTASSIUM CHLORIDE 10 MEQ: 7.46 INJECTION, SOLUTION INTRAVENOUS at 09:16

## 2021-03-15 RX ADMIN — ROSUVASTATIN CALCIUM 20 MG: 20 TABLET, COATED ORAL at 08:01

## 2021-03-15 RX ADMIN — POTASSIUM CHLORIDE 40 MEQ: 750 CAPSULE, EXTENDED RELEASE ORAL at 17:40

## 2021-03-15 RX ADMIN — PANTOPRAZOLE SODIUM 40 MG: 40 INJECTION, POWDER, FOR SOLUTION INTRAVENOUS at 20:09

## 2021-03-15 RX ADMIN — POTASSIUM CHLORIDE 40 MEQ: 750 CAPSULE, EXTENDED RELEASE ORAL at 22:34

## 2021-03-15 RX ADMIN — MAGNESIUM SULFATE HEPTAHYDRATE 4 G: 40 INJECTION, SOLUTION INTRAVENOUS at 06:35

## 2021-03-15 RX ADMIN — PANTOPRAZOLE SODIUM 40 MG: 40 INJECTION, POWDER, FOR SOLUTION INTRAVENOUS at 08:01

## 2021-03-15 RX ADMIN — SODIUM CHLORIDE, PRESERVATIVE FREE 10 ML: 5 INJECTION INTRAVENOUS at 08:03

## 2021-03-15 RX ADMIN — MORPHINE SULFATE 2 MG: 2 INJECTION, SOLUTION INTRAMUSCULAR; INTRAVENOUS at 02:15

## 2021-03-15 RX ADMIN — POTASSIUM CHLORIDE 10 MEQ: 7.46 INJECTION, SOLUTION INTRAVENOUS at 07:43

## 2021-03-15 RX ADMIN — ASPIRIN 81 MG: 81 TABLET, CHEWABLE ORAL at 09:16

## 2021-03-15 RX ADMIN — LEVETIRACETAM 750 MG: 100 INJECTION, SOLUTION INTRAVENOUS at 20:09

## 2021-03-15 RX ADMIN — METOPROLOL SUCCINATE 25 MG: 25 TABLET, EXTENDED RELEASE ORAL at 08:01

## 2021-03-15 NOTE — PLAN OF CARE
Goal Outcome Evaluation:  Plan of Care Reviewed With: patient     Outcome Summary: Pt recieved K replacement (4 runs) before heart cath today. Heart cath performed and no intervention was done at this time. TR band being progressively deflated without adverse event thus far. Pt to be NPO at midnight again for AICD implant tomorrow. Recheck of K = 3.7 where oral replacement was initiated.

## 2021-03-15 NOTE — PROGRESS NOTES
Multidisciplinary Rounds    Time: 20min  Patient Name: Narendra Cochran  Date of Encounter: 03/15/21 09:05 EDT  MRN: [id number]  Admission date: 3/12/2021      Reason for visit: MDR. RD to continue to follow per protocol.     Additional information obtained during MDR: Pt NPO since midnight as planning for heart cath today. Prior to this, pt was on a regular/cardiac/consistent carbohydrate diet with 69% average PO intake of the past 4 documented meals. Pt having bloody bowel movements. Replacing K+ and Mg++.     Current diet: NPO Diet NPO Except: Sips With Meds since midnight      Intervention:  Follow treatment plan  Care plan reviewed    Follow up:   Per protocol      Antonietta Isbell MS RD[facility]LD [facility]  09:05 EDT

## 2021-03-15 NOTE — PLAN OF CARE
Goal Outcome Evaluation:  Plan of Care Reviewed With: patient  Progress: no change       *c/o medial chest pain, freq PVCs,   • EKG obtained, labs drawn, APRN notified and given 2mg Morphine, chest pain improved  • NPO at midnight  • One bloody BM  • Plan for heart cath today   • H/h trending down still  • K, Mag replacement started this am

## 2021-03-15 NOTE — PLAN OF CARE
Problem: Adult Inpatient Plan of Care  Goal: Plan of Care Review  Recent Flowsheet Documentation  Taken 3/15/2021 1107 by Phyllis Bowser, ESTELA  Progress: no change  Plan of Care Reviewed With:  • patient  • spouse  Outcome Summary: PT initial evaluation complete. Pt limited by generalized weakness, balance deficit, and decreased functional endurance. Pt required CGA for STS and ambulated 50ft with min A progressing to mod Ax1+1. Pt could benefit from continued skilled IP PT to improve functional mobility and increase indep. D/c rec for IRF.

## 2021-03-15 NOTE — THERAPY EVALUATION
Patient Name: Narendra Cochran  : 1940    MRN: [id number]                              Today's Date: 3/15/2021       Admit Date: 3/12/2021    Visit Dx:     ICD-10-CM ICD-9-CM   1. Acute on chronic congestive heart failure, unspecified heart failure type (CMS/HCC)  I50.9 428.0   2. Hypokalemia  E87.6 276.8   3. 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 OHCA  I46.9 427.5   5. NSVT (nonsustained ventricular tachycardia) (CMS/HCC)  I47.2 427.1     Patient Active Problem List   Diagnosis   • Essential hypertension   • Hyperlipidemia LDL goal <70   • T2DM   • Paget disease of bone   • Tobacco abuse   • H/O seizures on Keppra   • Gonalgia   • Osteitis deformans   • Syncope   • NSVT (nonsustained ventricular tachycardia) (CMS/HCC)   • Coronary artery disease involving native coronary artery of native heart with angina pectoris (CMS/HCC)   • NICM    • OHCA   • Hypokalemia   • Hypomagnesemia   • VHD; mild-mod AR, mild MR   • Chronic systolic congestive heart failure (CMS/HCC)   • Former tobacco user   • GERD   • Marijuana use   • Frequent PVCs   • Acute GI bleeding   • Acute blood loss anemia     Past Medical History:   Diagnosis Date   • Arthritis    • Diabetes mellitus (CMS/[facility])    • Diverticulitis    • GERD (gastroesophageal reflux disease)    • History of transfusion    • Hyperlipidemia    • Hypertension    • Hypertensive urgency, malignant 2017   • Paget disease of bone      Past Surgical History:   Procedure Laterality Date   • APPENDECTOMY     • CARDIAC CATHETERIZATION N/A 3/18/2020    Procedure: Left Heart Cath;  Surgeon: Sonya Garibay MD;  Location:  [facility];  Service: Cardiology;  Laterality: N/A;  First availabel provider     • COLON RESECTION      second to diverticulitis    • FOOT SURGERY Left      General Information     Row Name 03/15/21 1052          OT Time and Intention    Document Type   evaluation  -CL     Mode of Treatment  occupational therapy  -CL     Row Name 03/15/21 1052          General Information    Patient Profile Reviewed  yes  -CL     Prior Level of Function  independent:;all household mobility;ADL's  -CL     Existing Precautions/Restrictions  fall;seizures;cardiac;other (see comments) [place]  -CL     Barriers to Rehab  medically complex  -CL     Row Name 03/15/21 1052          Living Environment    Lives With  spouse  -CL     Row Name 03/15/21 1052          Home Main Entrance    Number of Stairs, Main Entrance  none  -CL     Row Name 03/15/21 1052          Cognition    Orientation Status (Cognition)  oriented x 4  -CL     Row Name 03/15/21 1052          Safety Issues, Functional Mobility    Impairments Affecting Function (Mobility)  balance;endurance/activity tolerance;strength  -CL       User Key  (r) = Recorded By, (t) = Taken By, (c) = Cosigned By    Initials Name Provider Type    CL Camryn Guzman OT Occupational Therapist          Mobility/ADL's     Row Name 03/15/21 1052          Bed Mobility    Bed Mobility  supine-sit  -CL     Supine-Sit [place] (Bed Mobility)  contact guard;verbal cues  -CL     Row Name 03/15/21 1052          Transfers    Comment (Transfers)  Defer further activity to PT  -CL     Sit-Stand [place] (Transfers)  contact guard;verbal cues  -CL     Row Name 03/15/21 1052          Sit-Stand Transfer    Assistive Device (Sit-Stand Transfers)  walker, front-wheeled  -CL     Row Name 03/15/21 1052          Activities of Daily Living    BADL Assessment/Intervention  lower body dressing  -CL     Row Name 03/15/21 1052          Lower Body Dressing Assessment/Training    [place] Level (Lower Body Dressing)  don;socks;dependent (less than 25% patient effort)  -CL     Position (Lower Body Dressing)  supine  -CL       User Key  (r) = Recorded By, (t) = Taken By, (c) = Cosigned By    Initials Name Provider Type    CL Camryn Guzman OT Occupational Therapist         Obj/Interventions     Row Name 03/15/21 1052          Sensory Assessment (Somatosensory)    Sensory Assessment (Somatosensory)  UE sensation intact  -CL     Row Name 03/15/21 1052          Range of Motion Comprehensive    General Range of Motion  bilateral upper extremity ROM WFL  -CL     Row Name 03/15/21 1052          Strength Comprehensive (MMT)    Comment, General Manual Muscle Testing (MMT) Assessment  BUE grossly 3+/5  -CL     Row Name 03/15/21 1052          Balance    Balance Assessment  sitting static balance;sitting dynamic balance;standing static balance  -CL     Static Sitting Balance  WFL;sitting, edge of bed  -CL     Dynamic Sitting Balance  WFL;sitting, edge of bed  -CL     Static Standing Balance  mild impairment;supported  -CL       User Key  (r) = Recorded By, (t) = Taken By, (c) = Cosigned By    Initials Name Provider Type    CL Camryn Guzman, HUMBERTO Occupational Therapist        Goals/Plan     Row Name 03/15/21 1055          Transfer Goal 1 (OT)    Activity/Assistive Device (Transfer Goal 1, OT)  sit-to-stand/stand-to-sit;toilet  -CL     [place] Level/Cues Needed (Transfer Goal 1, OT)  supervision required  -CL     Time Frame (Transfer Goal 1, OT)  long term goal (LTG);10 days  -CL     Progress/Outcome (Transfer Goal 1, OT)  goal ongoing  -CL     Row Name 03/15/21 1055          Dressing Goal 1 (OT)    Activity/Device (Dressing Goal 1, OT)  lower body dressing don/doff socks/pants  -CL     [place]/Cues Needed (Dressing Goal 1, OT)  supervision required  -CL     Time Frame (Dressing Goal 1, OT)  long term goal (LTG);10 days  -CL     Progress/Outcome (Dressing Goal 1, OT)  goal ongoing  -CL     Row Name 03/15/21 1055          Toileting Goal 1 (OT)    Activity/Device (Toileting Goal 1, OT)  adjust/manage clothing;perform perineal hygiene  -CL     [place] Level/Cues Needed (Toileting Goal 1, OT)  supervision required  -CL     Time Frame (Toileting Goal 1, OT)  long term goal (LTG);10 days   -CL     Progress/Outcome (Toileting Goal 1, OT)  goal ongoing  -CL       User Key  (r) = Recorded By, (t) = Taken By, (c) = Cosigned By    Initials Name Provider Type    CL Camryn Guzman, HUMBERTO Occupational Therapist        Clinical Impression     Row Name 03/15/21 1053          Pain Scale: Numbers Pre/Post-Treatment    Pretreatment Pain Rating  0/10 - no pain  -CL     Posttreatment Pain Rating  0/10 - no pain  -CL     Row Name 03/15/21 1053          Plan of Care Review    Plan of Care Reviewed With  patient  -CL     Progress  improving  -CL     Outcome Summary  OT eval complete. Pt is CGA for bed mobility and Dep for LB ADL performance. Pt limited d/t generalized weakness and decreased balance from baseline though demo good effort w/ session. Recommend cont skilled IPOT POC. Recommend pt DC to IP rehab.  -CL     Row Name 03/15/21 1053          Therapy Assessment/Plan (OT)    Patient/Family Therapy Goal Statement (OT)  [place] to PLOF  -CL     Rehab Potential (OT)  good, to achieve stated therapy goals  -CL     Criteria for Skilled Therapeutic Interventions Met (OT)  yes;skilled treatment is necessary  -CL     Therapy Frequency (OT)  daily  -CL     Row Name 03/15/21 1053          Therapy Plan Review/Discharge Plan (OT)    Anticipated Discharge Disposition (OT)  inpatient rehabilitation facility  -CL     Row Name 03/15/21 1053          Vital Signs    Pre Systolic BP Rehab  -- VSS  -CL     O2 Delivery Pre Treatment  room air  -CL     O2 Delivery Intra Treatment  room air  -CL     O2 Delivery Post Treatment  room air  -CL     Pre Patient Position  Supine  -CL     Intra Patient Position  Standing  -CL     Post Patient Position  Sitting  -CL     Row Name 03/15/21 1053          Positioning and Restraints    Pre-Treatment Position  in bed  -CL     Post Treatment Position  bed  -CL     In Bed  notified nsg;sitting EOB;call light within reach;encouraged to call for assist;with PT;with family/caregiver  -CL       User Key  (r) =  Recorded By, (t) = Taken By, (c) = Cosigned By    Initials Name Provider Type    Camryn Good OT Occupational Therapist        Outcome Measures     Row Name 03/15/21 1056          How much help from another is currently needed...    Putting on and taking off regular lower body clothing?  1  -CL     Bathing (including washing, rinsing, and drying)  2  -CL     Toileting (which includes using toilet bed pan or urinal)  2  -CL     Putting on and taking off regular upper body clothing  3  -CL     Taking care of personal grooming (such as brushing teeth)  3  -CL     Eating meals  4  -CL     AM-PAC 6 Clicks Score (OT)  15  -CL     Row Name 03/15/21 1056          Functional Assessment    Outcome Measure Options  AM-PAC 6 Clicks Daily Activity (OT)  -CL       User Key  (r) = Recorded By, (t) = Taken By, (c) = Cosigned By    Initials Name Provider Type    Camryn Good OT Occupational Therapist        Occupational Therapy Education                 Title: PT OT SLP Therapies (In Progress)     Topic: Occupational Therapy (In Progress)     Point: ADL training (In Progress)     Description:   Instruct learner(s) on proper safety adaptation and remediation techniques during self care or transfers.   Instruct in proper use of assistive devices.              Learning Progress Summary           Patient Acceptance, E, NR by CL at 3/15/2021 1057                   Point: Home exercise program (Not Started)     Description:   Instruct learner(s) on appropriate technique for monitoring, assisting and/or progressing therapeutic exercises/activities.              Learner Progress:  Not documented in this visit.          Point: Precautions (In Progress)     Description:   Instruct learner(s) on prescribed precautions during self-care and functional transfers.              Learning Progress Summary           Patient Acceptance, E, NR by CL at 3/15/2021 1057                   Point: Body mechanics (In Progress)     Description:    Instruct learner(s) on proper positioning and spine alignment during self-care, functional mobility activities and/or exercises.              Learning Progress Summary           Patient Acceptance, E, NR by  at 3/15/2021 1057                               User Key     Initials Effective Dates Name Provider Type Discipline     04/03/18 -  Camryn Guzman OT Occupational Therapist OT              OT Recommendation and Plan  Therapy Frequency (OT): daily  Plan of Care Review  Plan of Care Reviewed With: patient  Progress: improving  Outcome Summary: OT eval complete. Pt is CGA for bed mobility and Dep for LB ADL performance. Pt limited d/t generalized weakness and decreased balance from baseline though demo good effort w/ session. Recommend cont skilled IPOT POC. Recommend pt DC to IP rehab.     Time Calculation:   Time Calculation- OT     Row Name 03/15/21 1058             Time Calculation- OT    OT Start Time  0918  -      OT Received On  03/15/21  -      OT Goal Re-Cert Due Date  03/25/21  -        User Key  (r) = Recorded By, (t) = Taken By, (c) = Cosigned By    Initials Name Provider Type     Camryn Guzman OT Occupational Therapist        Therapy Charges for Today     Code Description Service Date Service Provider Modifiers Qty    [id number] HC OT EVAL LOW COMPLEXITY 3 3/15/2021 Camryn Guzman OT GO 1               Camryn Guzman OT  3/15/2021

## 2021-03-15 NOTE — PROGRESS NOTES
Intensive Care Follow-up     Hospital:  LOS: 3 days   Mr. Narendra Cochran, 80 y.o. male is followed for:   Cardiac arrest (CMS/HCC)            History of present illness:   80-year-old male with previous history of tobacco abuse, marijuana use, Paget's disease of the bone, pancytopenia, Barraza's esophagus, coronary disease, hypertension, dyslipidemia, type 2 diabetes, seizures on Keppra, presented with witnessed cardiac arrest.  Patient received bystander CPR and had return of spontaneous occlusion in the ED.  Patient presented to the emergency room and was hemodynamically stable and awake.  Patient was seen by cardiology team and plan is to do angiogram and possible AICD.  Patient was also seen by neurology for possible seizures and kept on Keppra.      Subjective   Interval History:  Overnight patient had some melanotic stools initially with intermittent bright red blood per rectum.  Hemoglobin is slowly trending down.  Patient denies any ongoing history of GI bleed before coming in.  Denies any chest pain.  No arrhythmias noted.  Plan is for coronary angiogram today.                 The patient's past medical, surgical and social history were reviewed and updated in Epic as appropriate.       Objective     Infusions:     Medications:  aspirin, 81 mg, Oral, Daily  insulin lispro, 0-9 Units, Subcutaneous, 4x Daily With Meals & Nightly  levETIRAcetam, 750 mg, Intravenous, Q12H  metoprolol succinate XL, 25 mg, Oral, Q24H  pantoprazole, 40 mg, Intravenous, Q12H  rosuvastatin, 20 mg, Oral, Daily  sacubitril-valsartan, 1 tablet, Oral, Q12H  sodium chloride, 10 mL, Intravenous, Q12H  sodium chloride, 10 mL, Intravenous, Q12H  spironolactone, 25 mg, Oral, Daily        Vital Sign Min/Max for last 24 hours  Temp  Min: 97.9 °F (36.6 °C)  Max: 98.5 °F (36.9 °C)   BP  Min: 98/47  Max: 161/89   Pulse  Min: 49  Max: 75   Resp  Min: 16  Max: 28   SpO2  Min: 90 %  Max: 100 %   No data recorded       Input/Output for last 24 hour  shift  03/14 0701 - 03/15 0700  In: 525 [P.O.:320; I.V.:5]  Out: 550 [Urine:550]      Objective     General Appearance: Awake, alert, in no acute distress  Head:    Atraumatic, Normocephalic, without obvious abnormality  Lungs:   B/L Breath sounds present with decreased breath sounds on bases, no wheezing heard, no crackles.   Heart: S1 and S2 present, no murmur  Abdomen: Soft, nontender, no guarding or rigidity, bowel sounds positive  Extremities: Atraumatic, no cyanosis or clubbing,  + edema, warm to touch.  Pulses: Positive and symmetric.  Neurologic:  Moving all four extremities. Good strength bilaterally.     Results from last 7 days   Lab Units 03/15/21  0524 03/14/21  0745 03/14/21  0130 03/13/21  0453   WBC 10*3/mm3 2.74*  --  3.45 3.67   HEMOGLOBIN g/dL 8.0* 8.8* 9.9*  9.9* 9.6*   PLATELETS 10*3/mm3 82*  --  93* 107*     Results from last 7 days   Lab Units 03/15/21  0524 03/14/21  1922 03/14/21  0916 03/14/21  0130 03/13/21  0453   SODIUM mmol/L 142  --   --  142 139   POTASSIUM mmol/L 3.3*  --  4.0 3.9 3.7   CO2 mmol/L 27.0  --   --  22.0 21.0*   BUN mg/dL 14  --   --  13 11   CREATININE mg/dL 0.83  --   --  0.69* 0.71*   MAGNESIUM mg/dL 1.8  --   --  2.0 2.3   PHOSPHORUS mg/dL 3.0 2.1* 1.9* 1.7* 2.1*   GLUCOSE mg/dL 105*  --   --  92 140*     Estimated Creatinine Clearance: 79.8 mL/min (by C-G formula based on SCr of 0.83 mg/dL).    Results from last 7 days   Lab Units 03/12/21  1914   PH, ARTERIAL pH units 7.40       Images:   None new.     I reviewed the patient's results and images.     Assessment/Plan   Impression        OHCA    Essential hypertension    Hyperlipidemia LDL goal <70    T2DM    Paget disease of bone    Tobacco abuse    H/O seizures on Keppra    Coronary artery disease involving native coronary artery of native heart with angina pectoris (CMS/HCC)    Hypokalemia    Hypomagnesemia    VHD; mild-mod AR, mild MR    Chronic systolic congestive heart failure (CMS/HCC)    GERD    Marijuana  use    Frequent PVCs    Acute GI bleeding       Plan        1.  Patient presenting here with out of hospital cardiac arrest.  Cardiology work-up is in progress.  Has history of nonischemic cardiomyopathy but recent echocardiogram showed ejection fraction 50%.  LVH noted.  Continue aspirin, statin for now.  Plan is for coronary angiogram today.  2.  Monitor electrolytes and replace aggressively per ICU protocol.  Replace potassium and mag.  3.  No overt seizures noted.  Continue on Keppra.  Neurology team is following.  4.  Ongoing GI bleed issues.  Has history of Barraza's esophagus.  Will await cardiology work-up.  If continues to have issues we will have GI evaluate the patient.  Has not had colonoscopy for some time and has been having problem with alternating constipation and diarrhea at home apparently.  Continue Protonix twice a day for now.  Will follow hemoglobin later on today and if drops further will get blood transfusion.  Currently hemoglobin at 8 and no acute need for blood transfusion.  5.  Blood sugar monitoring and insulin as needed for hyperglycemia management.  6.  SCDs for DVT prophylaxis.  Hold pharmacologic prophylaxis till GI work-up.  7.  Out of bed as tolerated.  8.  Tolerating oral diet well but currently n.p.o. for coronary angiogram.    We will continue to closely monitor in intensive care unit.  Remains guarded prognosis with ongoing cardiac and GI issues.    Plan of care and goals reviewed with multidisciplinary/antibiotic stewardship team during rounds.   I discussed the patient's findings and my recommendations with patient, family and nursing staff     High level of risk due to:  illness with threat to life or bodily function.    Time spent Critical care 35 min (exclusive of procedure time)  including high complexity decision making to assess, manipulate, and support vital organ system failure in this individual who has impairment of one or more vital organ systems such that there is  a high probability of imminent or life threatening deterioration in the patient’s condition.      Gilson Zambrano MD, [facility]  Pulmonary, Critical care and Sleep Medicine

## 2021-03-15 NOTE — PAYOR COMM NOTE
"Sherie Holliday, RN Utilization Review [phone]  Fax # [phone]    Notification of admission faxed in on 3/13/21. Please see updated clinicals. Auth still pending.      Narendra Reynolds (80 y.o. Male)     Date of Birth Social Security Number Address Home Phone MRN    [id number]  [id number] [place] [address] [phone] [id number]    Christianity Marital Status          Jainism        Admission Date Admission Type Admitting Provider Attending Provider Department, Room/Bed    3/12/21 Emergency Juan Jose Valle MD Meenach, Christopher Caleb, DO [facility] 2A ICU, N216/1    Discharge Date Discharge Disposition Discharge Destination                       Attending Provider: Alejo Taylor DO    Allergies: No Known Allergies    Isolation: None   Infection: None   Code Status: CPR    Ht: 180.3 cm (70.98\")   Wt: 82.2 kg (181 lb 3.5 oz)    Admission Cmt: None   Principal Problem: OHCA [I46.9] More...                 Active Insurance as of 3/12/2021     Primary Coverage     Payor Plan Insurance Group Employer/Plan Group    [facility]     Payor Plan Address Payor Plan Phone Number Payor Plan Fax Number Effective Dates    [address] [address] [phone]  1/6/2020 - None Entered    [facility] [id number]       Subscriber Name Subscriber Birth Date Member ID       NARENDRA REYNOLDS [id number]                 Emergency Contacts      (Rel.) Home Phone Work Phone Mobile Phone    DimasJessica kaur (Spouse) -- -- [phone]    colette campos (Daughter) -- -- [phone]    Narendra Reynolds (Son) -- -- [phone]            Vital Signs (last day)     Date/Time   Temp   Temp src   Pulse   Resp   BP   Patient Position   SpO2    03/15/21 1300   --   --   56   19   137/96   Lying   95    03/15/21 1200   98.3 (36.8)   Oral   62   18   145/60   Lying   95    03/15/21 1100   --   --   53   20   " 122/62   Lying   99    03/15/21 1000   --   --   (!) 49   20   138/67   Lying   100    03/15/21 0900   --   --   52   20   115/63   Lying   97    03/15/21 0800   97.9 (36.6)   Oral   61   22   121/80   Lying   90    03/15/21 0700   --   --   62   23   141/49   Lying   97    03/15/21 0600   --   --   62   --   134/62   --   93    03/15/21 0530   --   --   56   --   147/86   --   99    03/15/21 0400   98.3 (36.8)   Oral   68   24   --   --   91    03/15/21 0330   --   --   58   --   156/82   --   --    03/15/21 0300   --   --   60   --   161/89   --   91    03/15/21 0215   --   --   65   28   --   --   94    03/15/21 0200   --   --   75   --   153/59   --   --    03/15/21 0000   98.3 (36.8)   Oral   57   20   132/57   Lying   99    03/14/21 2200   --   --   54   20   132/70   --   97    03/14/21 2106   --   --   56   --   156/67   --   --    03/14/21 2100   --   --   62   --   156/67   --   98    03/14/21 2000   98.5 (36.9)   Oral   50   20   124/57   Lying   98    03/14/21 1700   --   --   (!) 49   17   124/59   Lying   100    03/14/21 1600   97.9 (36.6)   Oral   59   16   122/60   Lying   99    03/14/21 1500   --   --   57   18   120/65   Lying   100    03/14/21 1400   --   --   56   18   116/62   Lying   100    03/14/21 1300   --   --   58   18   98/47   Lying   96    03/14/21 1200   98 (36.7)   Oral   56   18   90/69   Lying   99    03/14/21 1100   --   --   57   19   103/57   Lying   100    03/14/21 1000   --   --   74   19   106/74   Lying   93    03/14/21 0900   --   --   61   20   140/70   Lying   95    03/14/21 0800   98 (36.7)   Axillary   71   23   134/56   Lying   98    03/14/21 0700   --   --   57   22   126/62   Lying   98    03/14/21 0600   --   --   65   18   122/66   Lying   95    03/14/21 0500   --   --   77   --   140/74   --   (!) 89    03/14/21 0400   98.4 (36.9)   Axillary   57   22   129/62   Lying   96    03/14/21 0315   --   --   63   --   118/55   --   94    03/14/21 0300   --   --   61   20    --   Lying   95    03/14/21 0130   --   --   71   --   132/65   --   95    03/14/21 0100   --   --   68   --   129/78   --   94    03/14/21 0000   97.8 (36.6)   Axillary   63   18   --   --   96                Current Facility-Administered Medications   Medication Dose Route Frequency Provider Last Rate Last Admin   • aspirin chewable tablet 81 mg  81 mg Oral Daily Juan Jose Valle MD   81 mg at 03/15/21 0916   • dextrose (D50W) 25 g/ 50mL Intravenous Solution 25 g  25 g Intravenous Q15 Min PRN Sherie Parnell DNP, APRN       • dextrose (GLUTOSE) oral gel 15 g  15 g Oral Q15 Min PRN Sherie Parnell DNP, APRN       • glucagon (human recombinant) (GLUCAGEN DIAGNOSTIC) injection 1 mg  1 mg Subcutaneous Q15 Min PRN Sherie Parnell DNP, APRN       • HYDROcodone-acetaminophen (NORCO) 7.5-325 MG per tablet 1 tablet  1 tablet Oral Q6H PRN Doc Sahni IV, MD       • insulin lispro (humaLOG) injection 0-9 Units  0-9 Units Subcutaneous 4x Daily With Meals & Nightly Sherie Parnell DNP, APRN   4 Units at 03/14/21 1142   • ipratropium-albuterol (DUO-NEB) nebulizer solution 3 mL  3 mL Nebulization Q6H PRN Sherie Parnell DNP, APRN       • levETIRAcetam (KEPPRA) 750 mg in sodium chloride 0.9 % 100 mL IVPB  750 mg Intravenous Q12H Kylah Pina MD   750 mg at 03/15/21 0802   • Magnesium Sulfate 2 gram Bolus, followed by 8 gram infusion (total Mg dose 10 grams)- Mg less than or equal to 1mg/dL  2 g Intravenous PRN Sherie Parnell DNP, APRN        Or   • Magnesium Sulfate 2 gram / 50mL Infusion (GIVE X 3 BAGS TO EQUAL 6GM TOTAL DOSE) - Mg 1.1 - 1.5 mg/dl  2 g Intravenous PRN Sherie Parnell DNP, APRN        Or   • Magnesium Sulfate 4 gram infusion- Mg 1.6-1.9 mg/dL  4 g Intravenous PRN Sherie Parnell, DNP, APRN 25 mL/hr at 03/15/21 0635 4 g at 03/15/21 0635   • metoprolol succinate XL (TOPROL-XL) 24 hr tablet 25 mg  25 mg Oral Q24H Doc Sahni  MD JESSICA   25 mg at 03/15/21 0801   • pantoprazole (PROTONIX) injection 40 mg  40 mg Intravenous Q12H Alejo Taylor, DO   40 mg at 03/15/21 0801   • potassium & sodium phosphates (PHOS-NAK) 280-160-250 MG packet - for Phosphorus less than 1.25 mg/dL  2 packet Oral Q6H PRN Alejo Taylor, DO        Or   • potassium & sodium phosphates (PHOS-NAK) 280-160-250 MG packet - for Phosphorus 1.25 - 2.5 mg/dL  2 packet Oral Q6H PRN Alejo Taylor, DO   2 packet at 03/14/21 2105   • potassium chloride (KLOR-CON) packet 40 mEq  40 mEq Oral PRN Alejo Taylor, DO   40 mEq at 03/14/21 0411   • potassium chloride (MICRO-K) CR capsule 40 mEq  40 mEq Oral PRN Alejo Taylor, DO       • potassium chloride 10 mEq in 100 mL IVPB  10 mEq Intravenous Q1H PRN Alejo Taylor,  mL/hr at 03/15/21 1024 10 mEq at 03/15/21 1024   • rosuvastatin (CRESTOR) tablet 20 mg  20 mg Oral Daily Juan Jose Valle MD   20 mg at 03/15/21 0801   • sacubitril-valsartan (ENTRESTO) 49-51 MG tablet 1 tablet  1 tablet Oral Q12H Doc Sahni IV, MD   1 tablet at 03/15/21 0801   • sodium chloride 0.9 % flush 10 mL  10 mL Intravenous PRN Sudarshan Hernandez MD       • sodium chloride 0.9 % flush 10 mL  10 mL Intravenous Q12H Sherie Parnell DNP APRNATALYA   10 mL at 03/15/21 0803   • sodium chloride 0.9 % flush 10 mL  10 mL Intravenous PRN Sherie Parnell DNP APRNATALYA       • sodium chloride 0.9 % flush 10 mL  10 mL Intravenous Q12H Alejo Taylor, DO   10 mL at 03/15/21 0803   • sodium chloride 0.9 % flush 10 mL  10 mL Intravenous PRN Alejo Taylor, DO       • spironolactone (ALDACTONE) tablet 25 mg  25 mg Oral Daily Doc Sahni IV, MD   25 mg at 03/15/21 0801     Orders (active)      Start     Ordered    03/16/21 0600  CBC & Differential  Morning Draw      03/15/21 1232    03/16/21 0600  Comprehensive Metabolic Panel   Morning Draw      03/15/21 1232    03/16/21 0600  Magnesium  Morning Draw      03/15/21 1232    03/16/21 0600  Phosphorus  Morning Draw      03/15/21 1232    03/16/21 0600  CBC & Differential  Morning Draw      03/15/21 1254    03/16/21 0600  Basic Metabolic Panel  Morning Draw      03/15/21 1254    03/16/21 0600  Magnesium  Morning Draw      03/15/21 1254    03/16/21 0600  Phosphorus  Morning Draw      03/15/21 1254    03/16/21 0001  NPO Diet  Diet Effective Midnight      03/15/21 1237    03/15/21 1700  Hemoglobin & Hematocrit, Blood  Once      03/15/21 1232    03/15/21 1530  Potassium  Timed      03/15/21 1028    03/15/21 0657  Cardiac Catheterization/Vascular Study  Once      03/15/21 0656    03/15/21 0600  PICC Site Care  Weekly     Comments: Dressing Changes to PICC Site Every 7 Days and As Needed    03/14/21 1240    03/15/21 0001  NPO Diet NPO Except: Sips With Meds  Diet Effective Midnight      03/13/21 1259    03/14/21 1630  PT Consult: Eval & Treat Functional Mobility Below Baseline  Once      03/14/21 1631    03/14/21 1630  OT Consult: Eval & Treat  Once      03/14/21 1631    03/14/21 1330  sodium chloride 0.9 % flush 10 mL  Every 12 Hours Scheduled      03/14/21 1240    03/14/21 1241  Notify Provider if PICC is Occluded  Until Discontinued      03/14/21 1240    03/14/21 1240  sodium chloride 0.9 % flush 10 mL  As Needed      03/14/21 1240    03/14/21 1240  Catheter Care PICC  Per Order Details     Comments: 1) Follow PICC Protocol For Care  2) No Blood Pressure in Arm With PICC  3) Warm Packs to PICC Arm As Needed For Discomfort  4) Measure & Record Arm Circumference if Patient Experiences Pain, Swelling, Redness or Warmth in PICC Arm; Compare Measurement to Initial Measure, Report to Provider if Greater Than 3cm Difference  5) Follow Flush Protocol Per Facility    03/14/21 1240    03/14/21 1240  Ensure PICC Positive Pressure Cap is In Place  Per Order Details     Comments: Change Every 3 Days & As Needed  For Evidence of Infusate or Blood in Cap    03/14/21 1240    03/14/21 1240  No Venipuncture or BP in PICC Arm  Continuous     Comments: Right arm    03/14/21 1240    03/14/21 1240  Ensure PICC Securing Device is in Use  Continuous      03/14/21 1240    03/14/21 1240  Do NOT Draw From PICC  Continuous      03/14/21 1240    03/14/21 1240  May Use PICC for Power Injected CT  Continuous      03/14/21 1240    03/14/21 1240  Remove PICC Cap for CT  Continuous      03/14/21 1240    03/14/21 1240  No Dilantin Through PICC  Continuous      03/14/21 1240    03/14/21 1125  HYDROcodone-acetaminophen (NORCO) 7.5-325 MG per tablet 1 tablet  Every 6 Hours PRN      03/14/21 1126    03/13/21 2100  levETIRAcetam (KEPPRA) 750 mg in sodium chloride 0.9 % 100 mL IVPB  Every 12 Hours Scheduled      03/13/21 1024    03/13/21 2100  sacubitril-valsartan (ENTRESTO) 49-51 MG tablet 1 tablet  Every 12 Hours Scheduled      03/13/21 1253    03/13/21 1345  spironolactone (ALDACTONE) tablet 25 mg  Daily      03/13/21 1253    03/13/21 1345  metoprolol succinate XL (TOPROL-XL) 24 hr tablet 25 mg  Every 24 Hours Scheduled      03/13/21 1253    03/13/21 1245  pantoprazole (PROTONIX) injection 40 mg  Every 12 Hours Scheduled      03/13/21 1149    03/13/21 1211  potassium & sodium phosphates (PHOS-NAK) 280-160-250 MG packet - for Phosphorus less than 1.25 mg/dL  Every 6 Hours PRN      03/13/21 1211    03/13/21 1211  potassium & sodium phosphates (PHOS-NAK) 280-160-250 MG packet - for Phosphorus 1.25 - 2.5 mg/dL  Every 6 Hours PRN      03/13/21 1211    03/13/21 1132  Inpatient Heart Failure Navigator Consult  Once     Provider:  (Not yet assigned)    03/13/21 1131    03/13/21 1026  potassium chloride (MICRO-K) CR capsule 40 mEq  As Needed      03/13/21 1026    03/13/21 1026  potassium chloride (KLOR-CON) packet 40 mEq  As Needed      03/13/21 1026    03/13/21 1025  potassium chloride 10 mEq in 100 mL IVPB  Every 1 Hour PRN     Note to Pharmacy: Keep  potassium >4    03/13/21 1025    03/13/21 0900  aspirin chewable tablet 81 mg  Daily      03/12/21 2322 03/13/21 0900  rosuvastatin (CRESTOR) tablet 20 mg  Daily      03/12/21 2322 03/13/21 0800  insulin lispro (humaLOG) injection 0-9 Units  4 Times Daily With Meals & Nightly      03/12/21 2300 03/13/21 0700  POC Glucose 4x Daily AC & at Bedtime  4 Times Daily Before Meals & at Bedtime     Comments: If bedtime blood glucose is greater than 350 mg/dl, call MD.      03/12/21 2300 03/12/21 2302  sodium chloride 0.9 % flush 10 mL  Every 12 Hours Scheduled      03/12/21 2300 03/12/21 2301  Daily Weights  Daily      03/12/21 2300 03/12/21 2301  Seizure Precautions  Continuous      03/12/21 2300 03/12/21 2300  Vital Signs Every Hour and Per Hospital Policy Based on Patient Condition  Every Hour      03/12/21 2300 03/12/21 2300  Intake and Output  Every Hour      03/12/21 2300 03/12/21 2259  ipratropium-albuterol (DUO-NEB) nebulizer solution 3 mL  Every 6 Hours PRN      03/12/21 2300 03/12/21 2254  Code Status and Medical Interventions:  Continuous      03/12/21 2300    03/12/21 2254  Insert Peripheral IV  Once      03/12/21 2300 03/12/21 2253  dextrose (D50W) 25 g/ 50mL Intravenous Solution 25 g  Every 15 Minutes PRN      03/12/21 2300 03/12/21 2253  glucagon (human recombinant) (GLUCAGEN DIAGNOSTIC) injection 1 mg  Every 15 Minutes PRN      03/12/21 2300    03/12/21 2253  sodium chloride 0.9 % flush 10 mL  As Needed      03/12/21 2300 03/12/21 2253  dextrose (GLUTOSE) oral gel 15 g  Every 15 Minutes PRN      03/12/21 2300 03/12/21 2054  Gastrointestinal Panel, PCR - Stool, Per Rectum  Once      03/12/21 2053 03/12/21 2009  Patient Currently On Electrolyte Replacement Protocol - Please Refer to MAR for Protocol Details  Misc Nursing Order (Specify)  Daily     Comments: Patient Currently On Electrolyte Replacement Protocol - Please Refer to MAR for Protocol Details    03/12/21  2008 03/12/21 2008  Magnesium Sulfate 2 gram Bolus, followed by 8 gram infusion (total Mg dose 10 grams)- Mg less than or equal to 1mg/dL  As Needed      03/12/21 2008 03/12/21 2008  Magnesium Sulfate 2 gram / 50mL Infusion (GIVE X 3 BAGS TO EQUAL 6GM TOTAL DOSE) - Mg 1.1 - 1.5 mg/dl  As Needed      03/12/21 2008 03/12/21 2008  Magnesium Sulfate 4 gram infusion- Mg 1.6-1.9 mg/dL  As Needed      03/12/21 2008 03/12/21 1900  Insert peripheral IV  Once      03/12/21 1859 03/12/21 1859  sodium chloride 0.9 % flush 10 mL  As Needed      03/12/21 1859    Unscheduled  Oxygen Therapy- Nasal Cannula; 2 LPM; Titrate for SPO2: equal to or greater than, 92%  Continuous PRN      03/12/21 1859    Unscheduled  ECG 12 Lead  As Needed      03/12/21 1859    Unscheduled  Potassium  As Needed     Comments: Release/collect/run 4 hours after completion of last potassium dose.      03/12/21 1922    Unscheduled  Magnesium  As Needed      03/12/21 2008    Signed and Held  Obtain Informed Consent  Once      Signed and Held                Ventilator/Non-Invasive Ventilation Settings (From admission, onward) Comment    None        Operative/Procedure Notes (last 24 hours) (Notes from 03/14/21 1359 through 03/15/21 1359)    No notes of this type exist for this encounter.            Physician Progress Notes (last 24 hours) (Notes from 03/14/21 1359 through 03/15/21 1359)      Javad Mercedes MD at 03/15/21 1233          [facility] Progress Note     LOS: 3 days   Patient Care Team:  Marguerite Moore PA-C as PCP - General (Physician Assistant)  Som Boyd DO as Consulting Physician (Cardiology)  Javad Mercedes MD as Consulting Physician (Cardiology)  Thomas Lui MD as Consulting Physician (Hematology and Oncology)  PCP:  Marguerite Moore PA-C    Chief Complaint: Follow-up outside hospital cardiac arrest, nonischemic cardiomyopathy    Subjective:  "Patient feels fairly well.  No complaints of chest pain or dyspnea.  He has noted blood in his stools n.p.o. for left heart catheterization today      Review of Systems:   All systems have been reviewed and are negative with the exception of those mentioned above.      Objective:    Vital Sign Min/Max for last 24 hours  Temp  Min: 97.9 °F (36.6 °C)  Max: 98.5 °F (36.9 °C)   BP  Min: 98/47  Max: 161/89   Pulse  Min: 49  Max: 75   Resp  Min: 16  Max: 28   SpO2  Min: 90 %  Max: 100 %   No data recorded   Weight  Min: 82.2 kg (181 lb 3.5 oz)  Max: 82.2 kg (181 lb 3.5 oz)     Flowsheet Rows      First Filed Value   Admission Height  180.3 cm (71\") Documented at 03/12/2021 1900   Admission Weight  89.4 kg (197 lb) Documented at 03/12/2021 1900          Telemetry: Sinus bradycardia with frequent PVCs      Intake/Output Summary (Last 24 hours) at 3/15/2021 1233  Last data filed at 3/15/2021 1024  Gross per 24 hour   Intake 750 ml   Output 450 ml   Net 300 ml     Intake & Output (last 3 days)       03/12 0701 - 03/13 0700 03/13 0701 - 03/14 0700 03/14 0701 - 03/15 0700 03/15 0701 - 03/16 0700    P.O.   320 45    I.V. (mL/kg) 447.6 (5.5) 269 (3.3) 5 (0.1)     IV Piggyback 470.1 100 200 400    Total Intake(mL/kg) 917.7 (11.3) 369 (4.6) 525 (6.4) 445 (5.4)    Urine (mL/kg/hr) 400 1350 (0.7) 550 (0.3) 125 (0.3)    Stool  400 0     Total Output 400 1750 550 125    Net +517.7 -1381 -25 +320            Urine Unmeasured Occurrence   1 x     Stool Unmeasured Occurrence  4 x 1 x            Physical Exam:  Constitutional:       Appearance: Not in distress.   Pulmonary:      Effort: Pulmonary effort is normal.      Breath sounds: No rales.   Cardiovascular:      Bradycardia present. Frequent ectopic beats. Irregular rhythm.      Murmurs: There is a grade 2/6 systolic murmur.   Pulses:     Intact distal pulses.   Edema:     Peripheral edema absent.   Abdominal:      Palpations: Abdomen is soft.   Skin:     General: Skin is warm and dry. "   Neurological:      Mental Status: Alert and oriented to person, place and time.          LABS/DIAGNOSTIC DATA:  Results from last 7 days   Lab Units 03/15/21  0524 03/14/21  0745 03/14/21  0130 03/13/21 0453   WBC 10*3/mm3 2.74*  --  3.45 3.67   HEMOGLOBIN g/dL 8.0* 8.8* 9.9*  9.9* 9.6*   HEMATOCRIT % 26.1* 32.9* 33.2*  33.2* 31.7*   PLATELETS 10*3/mm3 82*  --  93* 107*     Lab Results   Lab Value Date/Time    TROPONINT 0.017 03/15/2021 0524    TROPONINT 0.159 (C) 03/13/2021 0453    TROPONINT 0.026 03/12/2021 2215    TROPONINT <0.010 03/12/2021 1911    TROPONINT <0.010 11/13/2019 0233    TROPONINT <0.010 11/12/2019 2259     Results from last 7 days   Lab Units 03/12/21 1911   INR  1.13     Results from last 7 days   Lab Units 03/15/21  0524 03/14/21  0916 03/14/21 0130 03/13/21 0453 03/12/21 1911   SODIUM mmol/L 142  --  142 139 141   POTASSIUM mmol/L 3.3* 4.0 3.9 3.7 2.5*   CHLORIDE mmol/L 109*  --  109* 106 103   CO2 mmol/L 27.0  --  22.0 21.0* 23.0   BUN mg/dL 14  --  13 11 7*   CREATININE mg/dL 0.83  --  0.69* 0.71* 0.90   CALCIUM mg/dL 8.4*  --  8.3* 8.0* 8.9   BILIRUBIN mg/dL  --   --   --  1.1 1.4*   ALK PHOS U/L  --   --   --  74 112   ALT (SGPT) U/L  --   --   --  28 42*   AST (SGOT) U/L  --   --   --  53* 101*   GLUCOSE mg/dL 105*  --  92 140* 186*         Results from last 7 days   Lab Units 03/14/21 0130   CHOLESTEROL mg/dL 91   TRIGLYCERIDES mg/dL 104   HDL CHOL mg/dL 40   LDL CHOL mg/dL 31     Results from last 7 days   Lab Units 03/12/21  1911   TSH uIU/mL 6.720*   FREE T4 ng/dL 1.64           Medication Review:   aspirin, 81 mg, Oral, Daily  insulin lispro, 0-9 Units, Subcutaneous, 4x Daily With Meals & Nightly  levETIRAcetam, 750 mg, Intravenous, Q12H  metoprolol succinate XL, 25 mg, Oral, Q24H  pantoprazole, 40 mg, Intravenous, Q12H  rosuvastatin, 20 mg, Oral, Daily  sacubitril-valsartan, 1 tablet, Oral, Q12H  sodium chloride, 10 mL, Intravenous, Q12H  sodium chloride, 10 mL, Intravenous,  Q12H  spironolactone, 25 mg, Oral, Daily               OHCA    Essential hypertension    Hyperlipidemia LDL goal <70    T2DM    Paget disease of bone    Tobacco abuse    H/O seizures on Keppra    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  Outside hospital cardiac arrest  -Patient received bystander CPR and shock from an external AED  -Previous heart catheterization showing single-vessel CAD and a small posterior lateral branch of the RCA  -Repeat heart catheterization today pending  -Potassium was significantly decreased 2.5 at time of admission  -Noted frequent PVCs  -If no culprit lesions identified on heart catheterization would be candidate for ICD for secondary prevention, keep n.p.o. tonight    2.  Nonischemic cardiomyopathy, frequent PVCs  -Continue Entresto, Toprol-XL, spironolactone  -Appears euvolemic  -EF on echo 50% this admission    3.  Anemia, suspected lower GI bleed  -Continue to trend hemoglobins, possible EGD/colonoscopy after cardiac issues addressed   -Continue Protonix    4.  Diabetes  -Per primary team    5.  Hypertension  -Within normal limits at this time    6.  Hyperlipidemia  -Lipids well controlled on rosuvastatin    Javad Mercedes MD [facility]  03/15/21      Electronically signed by Javad Mercedes MD at 03/15/21 1239     Gilson Zambrano MD at 03/15/21 1225          Intensive Care Follow-up     Hospital:  LOS: 3 days   Mr. Narendra Cochran, 80 y.o. male is followed for:   Cardiac arrest (CMS/[facility])     Subjective       History of present illness:   80-year-old male with previous history of tobacco abuse, marijuana use, Paget's disease of the bone, pancytopenia, Barraza's esophagus, coronary disease, hypertension,  dyslipidemia, type 2 diabetes, seizures on Keppra, presented with witnessed cardiac arrest.  Patient received bystander CPR and had return of spontaneous occlusion in the ED.  Patient presented to the emergency room and was hemodynamically stable and awake.  Patient was seen by cardiology team and plan is to do angiogram and possible AICD.  Patient was also seen by neurology for possible seizures and kept on Keppra.      Subjective   Interval History:  Overnight patient had some melanotic stools initially with intermittent bright red blood per rectum.  Hemoglobin is slowly trending down.  Patient denies any ongoing history of GI bleed before coming in.  Denies any chest pain.  No arrhythmias noted.  Plan is for coronary angiogram today.                 The patient's past medical, surgical and social history were reviewed and updated in Epic as appropriate.    Objective   Objective     Infusions:     Medications:  aspirin, 81 mg, Oral, Daily  insulin lispro, 0-9 Units, Subcutaneous, 4x Daily With Meals & Nightly  levETIRAcetam, 750 mg, Intravenous, Q12H  metoprolol succinate XL, 25 mg, Oral, Q24H  pantoprazole, 40 mg, Intravenous, Q12H  rosuvastatin, 20 mg, Oral, Daily  sacubitril-valsartan, 1 tablet, Oral, Q12H  sodium chloride, 10 mL, Intravenous, Q12H  sodium chloride, 10 mL, Intravenous, Q12H  spironolactone, 25 mg, Oral, Daily        Vital Sign Min/Max for last 24 hours  Temp  Min: 97.9 °F (36.6 °C)  Max: 98.5 °F (36.9 °C)   BP  Min: 98/47  Max: 161/89   Pulse  Min: 49  Max: 75   Resp  Min: 16  Max: 28   SpO2  Min: 90 %  Max: 100 %   No data recorded       Input/Output for last 24 hour shift  03/14 0701 - 03/15 0700  In: 525 [P.O.:320; I.V.:5]  Out: 550 [Urine:550]      Objective     General Appearance: Awake, alert, in no acute distress  Head:    Atraumatic, Normocephalic, without obvious abnormality  Lungs:   B/L Breath sounds present with decreased breath sounds on bases, no wheezing heard, no crackles.    Heart: S1 and S2 present, no murmur  Abdomen: Soft, nontender, no guarding or rigidity, bowel sounds positive  Extremities: Atraumatic, no cyanosis or clubbing,  + edema, warm to touch.  Pulses: Positive and symmetric.  Neurologic:  Moving all four extremities. Good strength bilaterally.     Results from last 7 days   Lab Units 03/15/21  0524 03/14/21  0745 03/14/21  0130 03/13/21  0453   WBC 10*3/mm3 2.74*  --  3.45 3.67   HEMOGLOBIN g/dL 8.0* 8.8* 9.9*  9.9* 9.6*   PLATELETS 10*3/mm3 82*  --  93* 107*     Results from last 7 days   Lab Units 03/15/21  0524 03/14/21  1922 03/14/21  0916 03/14/21 0130 03/13/21  0453   SODIUM mmol/L 142  --   --  142 139   POTASSIUM mmol/L 3.3*  --  4.0 3.9 3.7   CO2 mmol/L 27.0  --   --  22.0 21.0*   BUN mg/dL 14  --   --  13 11   CREATININE mg/dL 0.83  --   --  0.69* 0.71*   MAGNESIUM mg/dL 1.8  --   --  2.0 2.3   PHOSPHORUS mg/dL 3.0 2.1* 1.9* 1.7* 2.1*   GLUCOSE mg/dL 105*  --   --  92 140*     Estimated Creatinine Clearance: 79.8 mL/min (by C-G formula based on SCr of 0.83 mg/dL).    Results from last 7 days   Lab Units 03/12/21  1914   PH, ARTERIAL pH units 7.40       Images:   None new.     I reviewed the patient's results and images.     Assessment/Plan   Impression        OHCA    Essential hypertension    Hyperlipidemia LDL goal <70    T2DM    Paget disease of bone    Tobacco abuse    H/O seizures on Keppra    Coronary artery disease involving native coronary artery of native heart with angina pectoris (CMS/HCC)    Hypokalemia    Hypomagnesemia    VHD; mild-mod AR, mild MR    Chronic systolic congestive heart failure (CMS/HCC)    GERD    Marijuana use    Frequent PVCs    Acute GI bleeding       Plan        1.  Patient presenting here with out of hospital cardiac arrest.  Cardiology work-up is in progress.  Has history of nonischemic cardiomyopathy but recent echocardiogram showed ejection fraction 50%.  LVH noted.  Continue aspirin, statin for now.  Plan is for coronary  angiogram today.  2.  Monitor electrolytes and replace aggressively per ICU protocol.  Replace potassium and mag.  3.  No overt seizures noted.  Continue on Keppra.  Neurology team is following.  4.  Ongoing GI bleed issues.  Has history of Barraza's esophagus.  Will await cardiology work-up.  If continues to have issues we will have GI evaluate the patient.  Has not had colonoscopy for some time and has been having problem with alternating constipation and diarrhea at home apparently.  Continue Protonix twice a day for now.  Will follow hemoglobin later on today and if drops further will get blood transfusion.  Currently hemoglobin at 8 and no acute need for blood transfusion.  5.  Blood sugar monitoring and insulin as needed for hyperglycemia management.  6.  SCDs for DVT prophylaxis.  Hold pharmacologic prophylaxis till GI work-up.  7.  Out of bed as tolerated.  8.  Tolerating oral diet well but currently n.p.o. for coronary angiogram.    We will continue to closely monitor in intensive care unit.  Remains guarded prognosis with ongoing cardiac and GI issues.    Plan of care and goals reviewed with multidisciplinary/antibiotic stewardship team during rounds.   I discussed the patient's findings and my recommendations with patient, family and nursing staff     High level of risk due to:  illness with threat to life or bodily function.    Time spent Critical care 35 min (exclusive of procedure time)  including high complexity decision making to assess, manipulate, and support vital organ system failure in this individual who has impairment of one or more vital organ systems such that there is a high probability of imminent or life threatening deterioration in the patient’s condition.      Gilson Zambrano MD, [facility]  Pulmonary, Critical care and Sleep Medicine         Electronically signed by Gilson Zambrano MD at 03/15/21 9078       Consult Notes (last 24 hours) (Notes from 03/14/21 6348 through 03/15/21 2125)     No notes of this type exist for this encounter.

## 2021-03-15 NOTE — THERAPY EVALUATION
Patient Name: Narendra Cochran  : 1940    MRN: [id number]                              Today's Date: 3/15/2021       Admit Date: 3/12/2021    Visit Dx:     ICD-10-CM ICD-9-CM   1. Acute on chronic congestive heart failure, unspecified heart failure type (CMS/HCC)  I50.9 428.0   2. Hypokalemia  E87.6 276.8   3. 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 OHCA  I46.9 427.5   5. NSVT (nonsustained ventricular tachycardia) (CMS/HCC)  I47.2 427.1     Patient Active Problem List   Diagnosis   • Essential hypertension   • Hyperlipidemia LDL goal <70   • T2DM   • Paget disease of bone   • Tobacco abuse   • H/O seizures on Keppra   • Gonalgia   • Osteitis deformans   • Syncope   • NSVT (nonsustained ventricular tachycardia) (CMS/HCC)   • Coronary artery disease involving native coronary artery of native heart with angina pectoris (CMS/HCC)   • NICM    • OHCA   • Hypokalemia   • Hypomagnesemia   • VHD; mild-mod AR, mild MR   • Chronic systolic congestive heart failure (CMS/HCC)   • Former tobacco user   • GERD   • Marijuana use   • Frequent PVCs   • Acute GI bleeding   • Acute blood loss anemia     Past Medical History:   Diagnosis Date   • Arthritis    • Diabetes mellitus (CMS/[facility])    • Diverticulitis    • GERD (gastroesophageal reflux disease)    • History of transfusion    • Hyperlipidemia    • Hypertension    • Hypertensive urgency, malignant 2017   • Paget disease of bone      Past Surgical History:   Procedure Laterality Date   • APPENDECTOMY     • CARDIAC CATHETERIZATION N/A 3/18/2020    Procedure: Left Heart Cath;  Surgeon: Sonya Garibay MD;  Location: [facility] CATH INVASIVE LOCATION;  Service: Cardiology;  Laterality: N/A;  First availabel provider     • COLON RESECTION      second to diverticulitis    • FOOT SURGERY Left      General Information     Row Name 03/15/21 1105 03/15/21 1000       Physical Therapy Time and  Intention    Document Type  evaluation  -AY  evaluation  -AY    Mode of Treatment  physical therapy  -AY  physical therapy;individual therapy  -AY    Row Name 03/15/21 0959          Physical Therapy Time and Intention    Document Type  evaluation  -AY     Mode of Treatment  physical therapy;individual therapy  -AY     Row Name 03/15/21 1105 03/15/21 1000       General Information    Patient Profile Reviewed  yes  -AY  yes  -AY    Prior Level of Function  --  independent:;all household mobility;community mobility;transfer;gait;bed mobility pt reportedly indep with all mobility with occational use of SPC. No other DME owned.  -AY    Existing Precautions/Restrictions  fall;seizures;cardiac;other (see comments) very [place]  -AY  fall;seizures  -AY    Barriers to Rehab  --  medically complex  -AY    Row Name 03/15/21 0959          General Information    Patient Profile Reviewed  yes  -AY     Row Name 03/15/21 1000          Living Environment    Lives With  spouse  -AY     Row Name 03/15/21 1000          Home Main Entrance    Number of Stairs, Main Entrance  none  -AY     Row Name 03/15/21 1000          Stairs Within Home, Primary    Stairs, Within Home, Primary  singel level home  -AY     Number of Stairs, Within Home, Primary  none  -AY     Row Name 03/15/21 1105 03/15/21 1000       Cognition    Orientation Status (Cognition)  oriented x 4  -AY  oriented x 4  -AY    Row Name 03/15/21 1105 03/15/21 1000       Safety Issues, Functional Mobility    Safety Issues Affecting Function (Mobility)  insight into deficits/self-awareness;awareness of need for assistance;safety precaution awareness;sequencing abilities;safety precautions follow-through/compliance  -AY  awareness of need for assistance;safety precautions follow-through/compliance;safety precaution awareness;positioning of assistive device;insight into deficits/self-awareness;sequencing abilities  -AY    Impairments Affecting Function (Mobility)   balance;endurance/activity tolerance;strength  -AY  balance;endurance/activity tolerance;strength  -AY      User Key  (r) = Recorded By, (t) = Taken By, (c) = Cosigned By    Initials Name Provider Type    AY Phyllis Bowser, PT Physical Therapist        Mobility     Row Name 03/15/21 1105 03/15/21 1003       Bed Mobility    Comment (Bed Mobility)  Pt recieved sitting EOB  -AY  Pt recieved sitting EOB  -AY    Row Name 03/15/21 1105 03/15/21 1003       Transfers    Comment (Transfers)  Verbal cueing for hand placement and sequencing.  -AY  Verbal cueing for hand placement and sequencing.  -AY    Row Name 03/15/21 1105 03/15/21 1003       Sit-Stand Transfer    Sit-Stand [place] (Transfers)  contact guard;verbal cues  -AY  contact guard;verbal cues  -AY    Assistive Device (Sit-Stand Transfers)  walker, front-wheeled  -AY  walker, front-wheeled  -AY    Row Name 03/15/21 1105 03/15/21 1003       Gait/Stairs (Locomotion)    [place] Level (Gait)  moderate assist (50% patient effort);1 person assist;1 person to manage equipment min Ax1+1 progressing to mod Ax1+1  -AY  moderate assist (50% patient effort);1 person assist;1 person to manage equipment min Ax1 progressing to mod Ax1 +1 for equiptment  -AY    Assistive Device (Gait)  walker, front-wheeled  -AY  walker, front-wheeled  -AY    Distance in Feet (Gait)  50  -AY  50  -AY    Deviations/Abnormal Patterns (Gait)  kristy decreased;festinating/shuffling;stride length decreased;gait speed decreased;bilateral deviations  -AY  kristy decreased;festinating/shuffling;stride length decreased;gait speed decreased;bilateral deviations  -AY    Bilateral Gait Deviations  forward flexed posture;heel strike decreased;weight shift ability decreased  -AY  forward flexed posture;heel strike decreased;weight shift ability decreased  -AY    Comment (Gait/Stairs)  Pt ambulated 50ft with RWx and min A progressing to mod Ax1+1. Pt demonstrated step through gait pattern with mild to  moderated instability noted. Pt requied manual assist to direct walker and cueing for posture. Gait distance limited by fatigue.  -AY  Pt ambulated 50ft with RWx and min A progressing to mod Ax1+1. Pt demonstrated mild progressing to moderated instability, ambulating with a step through gait pattern  -AY      User Key  (r) = Recorded By, (t) = Taken By, (c) = Cosigned By    Initials Name Provider Type    Phyllis Caputo PT Physical Therapist        Obj/Interventions     Row Name 03/15/21 1107          Range of Motion Comprehensive    General Range of Motion  bilateral lower extremity ROM WFL  -AY     Row Name 03/15/21 1107          Strength Comprehensive (MMT)    General Manual Muscle Testing (MMT) Assessment  lower extremity strength deficits identified  -AY     Comment, General Manual Muscle Testing (MMT) Assessment  BLE grossly 3+/5  -AY     Row Name 03/15/21 1107          Balance    Balance Assessment  sitting static balance;sitting dynamic balance;standing static balance;standing dynamic balance  -AY     Static Sitting Balance  WFL;sitting, edge of bed  -AY     Dynamic Sitting Balance  WFL;sitting, edge of bed  -AY     Static Standing Balance  mild impairment;supported;standing  -AY     Dynamic Standing Balance  supported;standing;moderate impairment  -AY     Row Name 03/15/21 1107          Sensory Assessment (Somatosensory)    Sensory Assessment (Somatosensory)  LE sensation intact  -AY       User Key  (r) = Recorded By, (t) = Taken By, (c) = Cosigned By    Initials Name Provider Type    Phyllis Caputo PT Physical Therapist        Goals/Plan     Row Name 03/15/21 1110          Bed Mobility Goal 1 (PT)    Activity/Assistive Device (Bed Mobility Goal 1, PT)  bed mobility activities, all  -AY     [place] Level/Cues Needed (Bed Mobility Goal 1, PT)  independent  -AY     Time Frame (Bed Mobility Goal 1, PT)  long term goal (LTG);1 week  -AY     Progress/Outcomes (Bed Mobility Goal 1, PT)  goal ongoing   -AY     Row Name 03/15/21 1110          Transfer Goal 1 (PT)    Activity/Assistive Device (Transfer Goal 1, PT)  sit-to-stand/stand-to-sit  -AY     [place] Level/Cues Needed (Transfer Goal 1, PT)  independent  -AY     Time Frame (Transfer Goal 1, PT)  long term goal (LTG);10 days  -AY     Progress/Outcome (Transfer Goal 1, PT)  goal ongoing  -AY     Row Name 03/15/21 1110          Gait Training Goal 1 (PT)    Activity/Assistive Device (Gait Training Goal 1, PT)  gait (walking locomotion);assistive device use;walker, rolling  -AY     [place] Level (Gait Training Goal 1, PT)  contact guard assist  -AY     Distance (Gait Training Goal 1, PT)  250  -AY     Time Frame (Gait Training Goal 1, PT)  long term goal (LTG);10 days  -AY     Progress/Outcome (Gait Training Goal 1, PT)  goal ongoing  -AY       User Key  (r) = Recorded By, (t) = Taken By, (c) = Cosigned By    Initials Name Provider Type    AY Phyllis Bowser, PT Physical Therapist        Clinical Impression     Row Name 03/15/21 1107          Pain    Additional Documentation  Pain Scale: Numbers Pre/Post-Treatment (Group)  -AY     Row Name 03/15/21 1107          Pain Scale: Numbers Pre/Post-Treatment    Pretreatment Pain Rating  0/10 - no pain  -AY     Posttreatment Pain Rating  0/10 - no pain  -AY     Pre/Posttreatment Pain Comment  Denied pain.  -AY     Pain Intervention(s)  Ambulation/increased activity;Repositioned  -AY     Row Name 03/15/21 1107          Plan of Care Review    Plan of Care Reviewed With  patient;spouse  -AY     Progress  no change  -AY     Outcome Summary  PT initial evaluation complete. Pt limited by generalized weakness, balance deficit, and decreased functional endurance. Pt required CGA for STS and ambulated 50ft with min A progressing to mod Ax1+1. Pt could benefit from continued skilled IP PT to improve functional mobility and increase indep. D/c rec for IRF.  -AY     Row Name 03/15/21 1107          Therapy Assessment/Plan (PT)     Rehab Potential (PT)  good, to achieve stated therapy goals  -AY     Criteria for Skilled Interventions Met (PT)  yes;meets criteria;skilled treatment is necessary  -AY     Row Name 03/15/21 1107          Vital Signs    Pre Systolic BP Rehab  132  -AY     Pre Treatment Diastolic BP  62  -AY     Post Systolic BP Rehab  139  -AY     Post Treatment Diastolic BP  72  -AY     Pretreatment Heart Rate (beats/min)  53  -AY     Posttreatment Heart Rate (beats/min)  57  -AY     Pre SpO2 (%)  99  -AY     O2 Delivery Pre Treatment  room air  -AY     Intra SpO2 (%)  97  -AY     O2 Delivery Intra Treatment  room air  -AY     Post SpO2 (%)  99  -AY     O2 Delivery Post Treatment  room air  -AY     Pre Patient Position  Supine  -AY     Intra Patient Position  Standing  -AY     Post Patient Position  Supine  -AY     Row Name 03/15/21 1107          Positioning and Restraints    Pre-Treatment Position  in bed  -AY     Post Treatment Position  bed  -AY     In Bed  notified nsg;supine;call light within reach;encouraged to call for assist;exit alarm on;patient within staff view;with family/caregiver;side rails up x2;legs elevated  -AY       User Key  (r) = Recorded By, (t) = Taken By, (c) = Cosigned By    Initials Name Provider Type    Phyllis Caputo, PT Physical Therapist        Outcome Measures     Row Name 03/15/21 1110          How much help from another person do you currently need...    Turning from your back to your side while in flat bed without using bedrails?  3  -AY     Moving from lying on back to sitting on the side of a flat bed without bedrails?  3  -AY     Moving to and from a bed to a chair (including a wheelchair)?  3  -AY     Standing up from a chair using your arms (e.g., wheelchair, bedside chair)?  3  -AY     Climbing 3-5 steps with a railing?  2  -AY     To walk in hospital room?  2  -AY     AM-PAC 6 Clicks Score (PT)  16  -AY     Row Name 03/15/21 1110          Functional Assessment    Outcome Measure Options   AM-PAC 6 Clicks Basic Mobility (PT)  -AY       User Key  (r) = Recorded By, (t) = Taken By, (c) = Cosigned By    Initials Name Provider Type    AY Phyllis Bowser PT Physical Therapist        Physical Therapy Education                 Title: PT OT SLP Therapies (In Progress)     Topic: Physical Therapy (In Progress)     Point: Mobility training (Done)     Learning Progress Summary           Patient Acceptance, E,TB, VU,NR by AY at 3/15/2021 1111   Significant Other Acceptance, E,TB, VU,NR by AY at 3/15/2021 1111                   Point: Home exercise program (Not Started)     Learner Progress:  Not documented in this visit.          Point: Body mechanics (Done)     Learning Progress Summary           Patient Acceptance, E,TB, VU,NR by AY at 3/15/2021 1111   Significant Other Acceptance, E,TB, VU,NR by AY at 3/15/2021 1111                   Point: Precautions (Done)     Learning Progress Summary           Patient Acceptance, E,TB, VU,NR by AY at 3/15/2021 1111   Significant Other Acceptance, E,TB, VU,NR by AY at 3/15/2021 1111                               User Key     Initials Effective Dates Name Provider Type Discipline    AY 11/10/20 -  Phyllis Bowser PT Physical Therapist PT              PT Recommendation and Plan  Planned Therapy Interventions (PT): balance training, bed mobility training, gait training, patient/family education, neuromuscular re-education, home exercise program, postural re-education, ROM (range of motion), stair training, strengthening, stretching, transfer training  Plan of Care Reviewed With: patient, spouse  Progress: no change  Outcome Summary: PT initial evaluation complete. Pt limited by generalized weakness, balance deficit, and decreased functional endurance. Pt required CGA for STS and ambulated 50ft with min A progressing to mod Ax1+1. Pt could benefit from continued skilled IP PT to improve functional mobility and increase indep. D/c rec for IRF.     Time Calculation:   PT Charges      Row Name 03/15/21 1111             Time Calculation    Start Time  0933  -AY      PT Received On  03/15/21  -AY      PT Goal Re-Cert Due Date  03/25/21  -AY        User Key  (r) = Recorded By, (t) = Taken By, (c) = Cosigned By    Initials Name Provider Type    Phyllis Caputo PT Physical Therapist        Therapy Charges for Today     Code Description Service Date Service Provider Modifiers Qty    [id number] HC PT EVAL MOD COMPLEXITY 4 3/15/2021 Phyllis Bowser, PT GP 1          PT G-Codes  Outcome Measure Options: AM-PAC 6 Clicks Basic Mobility (PT)  AM-PAC 6 Clicks Score (PT): 16  AM-PAC 6 Clicks Score (OT): 15    Phyllis Bowser PT  3/15/2021

## 2021-03-15 NOTE — PROGRESS NOTES
Discharge Planning Assessment  [facility]     Patient Name: Narendra Cochran  MRN: [id number]  Today's Date: 3/15/2021    Admit Date: 3/12/2021    Discharge Needs Assessment     Row Name 03/15/21 1241       Living Environment    Lives With  spouse    Current Living Arrangements  home/apartment/condo    Primary Care Provided by  self    Provides Primary Care For  no one, unable/limited ability to care for self    Family Caregiver if Needed  spouse    Quality of Family Relationships  supportive    Able to Return to Prior Arrangements  yes       Resource/Environmental Concerns    Resource/Environmental Concerns  none    Transportation Concerns  car, none       Transition Planning    Patient/Family Anticipates Transition to  home with family    Patient/Family Anticipated Services at Transition  none    Transportation Anticipated  family or friend will provide       Discharge Needs Assessment    Readmission Within the Last 30 Days  no previous admission in last 30 days    Equipment Currently Used at Home  glucometer    Concerns to be Addressed  denies needs/concerns at this time    Anticipated Changes Related to Illness  none    Equipment Needed After Discharge  none        Discharge Plan     Row Name 03/15/21 1241       Plan    Plan  Home    Patient/Family in Agreement with Plan  yes    Plan Comments  Spoke with patients wife at bedside.  Patient dozing and hard of hearing.  Patient resides in [facility] and lives with his wife.  Prior to admission patient was independent with ADL's and mobility.  Patient has a glucometer at home and is scheduled to get new hearing aids next week.  Patient has never utilized home health.  Patient plans to return home and wife denies any needs for him at this time.  CM will continue to follow.        Continued Care and Services - Admitted Since 3/12/2021    Coordination has not been started for this encounter.       Expected Discharge Date and Time     Expected Discharge Date Expected  Discharge Time    Mar 20, 2021         Demographic Summary     Row Name 03/15/21 9367       General Information    Admission Type  inpatient    Arrived From  home    Referral Source  admission list    Reason for Consult  discharge planning    Preferred Language  English       Contact Information    Permission Granted to Share Info With          Functional Status    No documentation.       Psychosocial    No documentation.       Abuse/Neglect    No documentation.       Legal    No documentation.       Substance Abuse    No documentation.       Patient Forms    No documentation.           Vida Cabrera RN

## 2021-03-15 NOTE — PLAN OF CARE
Goal Outcome Evaluation:  Plan of Care Reviewed With: patient  Progress: improving  Outcome Summary: OT eval complete. Pt is CGA for bed mobility and Dep for LB ADL performance. Pt limited d/t generalized weakness and decreased balance from baseline though demo good effort w/ session. Recommend cont skilled IPOT POC. Recommend pt DC to IP rehab.

## 2021-03-15 NOTE — PROGRESS NOTES
"[facility] at [facility] Progress Note     LOS: 3 days   Patient Care Team:  Marguerite Moore PA-C as PCP - General (Physician Assistant)  Som Boyd DO as Consulting Physician (Cardiology)  Javad Mercedes MD as Consulting Physician (Cardiology)  Thomas Lui MD as Consulting Physician (Hematology and Oncology)  PCP:  Marguerite Moore PA-C    Chief Complaint: Follow-up outside hospital cardiac arrest, nonischemic cardiomyopathy    Subjective: Patient feels fairly well.  No complaints of chest pain or dyspnea.  He has noted blood in his stools n.p.o. for left heart catheterization today      Review of Systems:   All systems have been reviewed and are negative with the exception of those mentioned above.      Objective:    Vital Sign Min/Max for last 24 hours  Temp  Min: 97.9 °F (36.6 °C)  Max: 98.5 °F (36.9 °C)   BP  Min: 98/47  Max: 161/89   Pulse  Min: 49  Max: 75   Resp  Min: 16  Max: 28   SpO2  Min: 90 %  Max: 100 %   No data recorded   Weight  Min: 82.2 kg (181 lb 3.5 oz)  Max: 82.2 kg (181 lb 3.5 oz)     Flowsheet Rows      First Filed Value   Admission Height  180.3 cm (71\") Documented at 03/12/2021 1900   Admission Weight  89.4 kg (197 lb) Documented at 03/12/2021 1900          Telemetry: Sinus bradycardia with frequent PVCs      Intake/Output Summary (Last 24 hours) at 3/15/2021 1233  Last data filed at 3/15/2021 1024  Gross per 24 hour   Intake 750 ml   Output 450 ml   Net 300 ml     Intake & Output (last 3 days)       03/12 0701 - 03/13 0700 03/13 0701 - 03/14 0700 03/14 0701 - 03/15 0700 03/15 0701 - 03/16 0700    P.O.   320 45    I.V. (mL/kg) 447.6 (5.5) 269 (3.3) 5 (0.1)     IV Piggyback 470.1 100 200 400    Total Intake(mL/kg) 917.7 (11.3) 369 (4.6) 525 (6.4) 445 (5.4)    Urine (mL/kg/hr) 400 1350 (0.7) 550 (0.3) 125 (0.3)    Stool  400 0     Total Output 400 1750 550 125    Net +517.7 -1381 -25 +320            Urine Unmeasured Occurrence   " 1 x     Stool Unmeasured Occurrence  4 x 1 x            Physical Exam:  Constitutional:       Appearance: Not in distress.   Pulmonary:      Effort: Pulmonary effort is normal.      Breath sounds: No rales.   Cardiovascular:      Bradycardia present. Frequent ectopic beats. Irregular rhythm.      Murmurs: There is a grade 2/6 systolic murmur.   Pulses:     Intact distal pulses.   Edema:     Peripheral edema absent.   Abdominal:      Palpations: Abdomen is soft.   Skin:     General: Skin is warm and dry.   Neurological:      Mental Status: Alert and oriented to person, place and time.          LABS/DIAGNOSTIC DATA:  Results from last 7 days   Lab Units 03/15/21  0524 03/14/21  0745 03/14/21  0130 03/13/21  0453   WBC 10*3/mm3 2.74*  --  3.45 3.67   HEMOGLOBIN g/dL 8.0* 8.8* 9.9*  9.9* 9.6*   HEMATOCRIT % 26.1* 32.9* 33.2*  33.2* 31.7*   PLATELETS 10*3/mm3 82*  --  93* 107*     Lab Results   Lab Value Date/Time    TROPONINT 0.017 03/15/2021 0524    TROPONINT 0.159 (C) 03/13/2021 0453    TROPONINT 0.026 03/12/2021 2215    TROPONINT <0.010 03/12/2021 1911    TROPONINT <0.010 11/13/2019 0233    TROPONINT <0.010 11/12/2019 2259     Results from last 7 days   Lab Units 03/12/21 1911   INR  1.13     Results from last 7 days   Lab Units 03/15/21  0524 03/14/21  0916 03/14/21  0130 03/13/21  0453 03/12/21 1911   SODIUM mmol/L 142  --  142 139 141   POTASSIUM mmol/L 3.3* 4.0 3.9 3.7 2.5*   CHLORIDE mmol/L 109*  --  109* 106 103   CO2 mmol/L 27.0  --  22.0 21.0* 23.0   BUN mg/dL 14  --  13 11 7*   CREATININE mg/dL 0.83  --  0.69* 0.71* 0.90   CALCIUM mg/dL 8.4*  --  8.3* 8.0* 8.9   BILIRUBIN mg/dL  --   --   --  1.1 1.4*   ALK PHOS U/L  --   --   --  74 112   ALT (SGPT) U/L  --   --   --  28 42*   AST (SGOT) U/L  --   --   --  53* 101*   GLUCOSE mg/dL 105*  --  92 140* 186*         Results from last 7 days   Lab Units 03/14/21  0130   CHOLESTEROL mg/dL 91   TRIGLYCERIDES mg/dL 104   HDL CHOL mg/dL 40   LDL CHOL mg/dL 31      Results from last 7 days   Lab Units 03/12/21  1911   TSH uIU/mL 6.720*   FREE T4 ng/dL 1.64           Medication Review:   aspirin, 81 mg, Oral, Daily  insulin lispro, 0-9 Units, Subcutaneous, 4x Daily With Meals & Nightly  levETIRAcetam, 750 mg, Intravenous, Q12H  metoprolol succinate XL, 25 mg, Oral, Q24H  pantoprazole, 40 mg, Intravenous, Q12H  rosuvastatin, 20 mg, Oral, Daily  sacubitril-valsartan, 1 tablet, Oral, Q12H  sodium chloride, 10 mL, Intravenous, Q12H  sodium chloride, 10 mL, Intravenous, Q12H  spironolactone, 25 mg, Oral, Daily               OHCA    Essential hypertension    Hyperlipidemia LDL goal <70    T2DM    Paget disease of bone    Tobacco abuse    H/O seizures on Keppra    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  Outside hospital cardiac arrest  -Patient received bystander CPR and shock from an external AED  -Previous heart catheterization showing single-vessel CAD and a small posterior lateral branch of the RCA  -Repeat heart catheterization today pending  -Potassium was significantly decreased 2.5 at time of admission  -Noted frequent PVCs  -If no culprit lesions identified on heart catheterization would be candidate for ICD for secondary prevention, keep n.p.o. tonight    2.  Nonischemic cardiomyopathy, frequent PVCs  -Continue Entresto, Toprol-XL, spironolactone  -Appears euvolemic  -EF on echo 50% this admission    3.  Anemia, suspected lower GI bleed  -Continue to trend hemoglobins, possible EGD/colonoscopy after cardiac issues addressed   -Continue Protonix    4.  Diabetes  -Per primary team    5.  Hypertension  -Within normal limits at this time    6.  Hyperlipidemia  -Lipids well controlled on  rosuvastatin    Javad Mercedes MD [facility]  03/15/21

## 2021-03-16 LAB
ADV 40+41 DNA STL QL NAA+NON-PROBE: NOT DETECTED
ALBUMIN SERPL-MCNC: 3.3 G/DL (ref 3.5–5.2)
ALBUMIN/GLOB SERPL: 1.3 G/DL
ALP SERPL-CCNC: 69 U/L (ref 39–117)
ALT SERPL W P-5'-P-CCNC: 17 U/L (ref 1–41)
ANION GAP SERPL CALCULATED.3IONS-SCNC: 4 MMOL/L (ref 5–15)
AST SERPL-CCNC: 16 U/L (ref 1–40)
ASTRO TYP 1-8 RNA STL QL NAA+NON-PROBE: NOT DETECTED
BASOPHILS # BLD AUTO: 0.02 10*3/MM3 (ref 0–0.2)
BASOPHILS NFR BLD AUTO: 0.8 % (ref 0–1.5)
BILIRUB SERPL-MCNC: 0.7 MG/DL (ref 0–1.2)
BUN SERPL-MCNC: 9 MG/DL (ref 8–23)
BUN/CREAT SERPL: 10.8 (ref 7–25)
C CAYETANENSIS DNA STL QL NAA+NON-PROBE: NOT DETECTED
C COLI+JEJ+UPSA DNA STL QL NAA+NON-PROBE: NOT DETECTED
CALCIUM SPEC-SCNC: 8.7 MG/DL (ref 8.6–10.5)
CHLORIDE SERPL-SCNC: 110 MMOL/L (ref 98–107)
CO2 SERPL-SCNC: 26 MMOL/L (ref 22–29)
CREAT SERPL-MCNC: 0.83 MG/DL (ref 0.76–1.27)
CRYPTOSP DNA STL QL NAA+NON-PROBE: NOT DETECTED
DEPRECATED RDW RBC AUTO: 55.8 FL (ref 37–54)
EAEC PAA PLAS AGGR+AATA ST NAA+NON-PRB: NOT DETECTED
EAEC PAA PLAS AGGR+AATA ST NAA+NON-PRB: NOT DETECTED
EC STX1+STX2 GENES STL QL NAA+NON-PROBE: NOT DETECTED
EOSINOPHIL # BLD AUTO: 0.05 10*3/MM3 (ref 0–0.4)
EOSINOPHIL NFR BLD AUTO: 1.9 % (ref 0.3–6.2)
EPEC EAE GENE STL QL NAA+NON-PROBE: NOT DETECTED
ERYTHROCYTE [DISTWIDTH] IN BLOOD BY AUTOMATED COUNT: 16.9 % (ref 12.3–15.4)
ETEC LTA+ST1A+ST1B TOX ST NAA+NON-PROBE: NOT DETECTED
G LAMBLIA DNA STL QL NAA+NON-PROBE: NOT DETECTED
GFR SERPL CREATININE-BSD FRML MDRD: 108 ML/MIN/1.73
GLOBULIN UR ELPH-MCNC: 2.5 GM/DL
GLUCOSE BLDC GLUCOMTR-MCNC: 102 MG/DL (ref 70–130)
GLUCOSE BLDC GLUCOMTR-MCNC: 118 MG/DL (ref 70–130)
GLUCOSE BLDC GLUCOMTR-MCNC: 128 MG/DL (ref 70–130)
GLUCOSE BLDC GLUCOMTR-MCNC: 130 MG/DL (ref 70–130)
GLUCOSE BLDC GLUCOMTR-MCNC: 149 MG/DL (ref 70–130)
GLUCOSE SERPL-MCNC: 110 MG/DL (ref 65–99)
HCT VFR BLD AUTO: 29.7 % (ref 37.5–51)
HGB BLD-MCNC: 9.2 G/DL (ref 13–17.7)
IMM GRANULOCYTES # BLD AUTO: 0 10*3/MM3 (ref 0–0.05)
IMM GRANULOCYTES NFR BLD AUTO: 0 % (ref 0–0.5)
LYMPHOCYTES # BLD AUTO: 0.83 10*3/MM3 (ref 0.7–3.1)
LYMPHOCYTES NFR BLD AUTO: 32.3 % (ref 19.6–45.3)
MAGNESIUM SERPL-MCNC: 2.1 MG/DL (ref 1.6–2.4)
MCH RBC QN AUTO: 27.9 PG (ref 26.6–33)
MCHC RBC AUTO-ENTMCNC: 31 G/DL (ref 31.5–35.7)
MCV RBC AUTO: 90 FL (ref 79–97)
MONOCYTES # BLD AUTO: 0.23 10*3/MM3 (ref 0.1–0.9)
MONOCYTES NFR BLD AUTO: 8.9 % (ref 5–12)
NEUTROPHILS NFR BLD AUTO: 1.44 10*3/MM3 (ref 1.7–7)
NEUTROPHILS NFR BLD AUTO: 56.1 % (ref 42.7–76)
NOROVIRUS GI+II RNA STL QL NAA+NON-PROBE: NOT DETECTED
NRBC BLD AUTO-RTO: 0 /100 WBC (ref 0–0.2)
P SHIGELLOIDES DNA STL QL NAA+NON-PROBE: NOT DETECTED
PHOSPHATE SERPL-MCNC: 3 MG/DL (ref 2.5–4.5)
PLATELET # BLD AUTO: 85 10*3/MM3 (ref 140–450)
PMV BLD AUTO: 12.4 FL (ref 6–12)
POTASSIUM SERPL-SCNC: 4.2 MMOL/L (ref 3.5–5.2)
POTASSIUM SERPL-SCNC: 4.6 MMOL/L (ref 3.5–5.2)
PROT SERPL-MCNC: 5.8 G/DL (ref 6–8.5)
QT INTERVAL: 488 MS
QTC INTERVAL: 495 MS
RBC # BLD AUTO: 3.3 10*6/MM3 (ref 4.14–5.8)
RVA RNA STL QL NAA+NON-PROBE: NOT DETECTED
S ENT+BONG DNA STL QL NAA+NON-PROBE: NOT DETECTED
SAPO I+II+IV+V RNA STL QL NAA+NON-PROBE: NOT DETECTED
SHIGELLA SP+EIEC IPAH ST NAA+NON-PROBE: NOT DETECTED
SODIUM SERPL-SCNC: 140 MMOL/L (ref 136–145)
V CHOL+PARA+VUL DNA STL QL NAA+NON-PROBE: NOT DETECTED
V CHOLERAE DNA STL QL NAA+NON-PROBE: NOT DETECTED
WBC # BLD AUTO: 2.57 10*3/MM3 (ref 3.4–10.8)
Y ENTEROCOL DNA STL QL NAA+NON-PROBE: NOT DETECTED

## 2021-03-16 PROCEDURE — 82962 GLUCOSE BLOOD TEST: CPT

## 2021-03-16 PROCEDURE — 93641 EP EVL 1/2CHMB PAC CVDFB TST: CPT | Performed by: INTERNAL MEDICINE

## 2021-03-16 PROCEDURE — C1892 INTRO/SHEATH,FIXED,PEEL-AWAY: HCPCS | Performed by: INTERNAL MEDICINE

## 2021-03-16 PROCEDURE — 99152 MOD SED SAME PHYS/QHP 5/>YRS: CPT | Performed by: INTERNAL MEDICINE

## 2021-03-16 PROCEDURE — 97110 THERAPEUTIC EXERCISES: CPT

## 2021-03-16 PROCEDURE — 25010000003 LIDOCAINE 1 % SOLUTION: Performed by: INTERNAL MEDICINE

## 2021-03-16 PROCEDURE — 93005 ELECTROCARDIOGRAM TRACING: CPT | Performed by: INTERNAL MEDICINE

## 2021-03-16 PROCEDURE — 80053 COMPREHEN METABOLIC PANEL: CPT | Performed by: INTERNAL MEDICINE

## 2021-03-16 PROCEDURE — C1898 LEAD, PMKR, OTHER THAN TRANS: HCPCS | Performed by: INTERNAL MEDICINE

## 2021-03-16 PROCEDURE — 25010000002 LEVETRIRACETAM PER 10 MG: Performed by: INTERNAL MEDICINE

## 2021-03-16 PROCEDURE — 99232 SBSQ HOSP IP/OBS MODERATE 35: CPT | Performed by: INTERNAL MEDICINE

## 2021-03-16 PROCEDURE — 85025 COMPLETE CBC W/AUTO DIFF WBC: CPT | Performed by: INTERNAL MEDICINE

## 2021-03-16 PROCEDURE — 25010000002 MIDAZOLAM PER 1 MG: Performed by: INTERNAL MEDICINE

## 2021-03-16 PROCEDURE — 83735 ASSAY OF MAGNESIUM: CPT | Performed by: INTERNAL MEDICINE

## 2021-03-16 PROCEDURE — 84132 ASSAY OF SERUM POTASSIUM: CPT | Performed by: INTERNAL MEDICINE

## 2021-03-16 PROCEDURE — 02HK3KZ INSERTION OF DEFIBRILLATOR LEAD INTO RIGHT VENTRICLE, PERCUTANEOUS APPROACH: ICD-10-PCS | Performed by: INTERNAL MEDICINE

## 2021-03-16 PROCEDURE — 25010000002 FENTANYL CITRATE (PF) 100 MCG/2ML SOLUTION: Performed by: INTERNAL MEDICINE

## 2021-03-16 PROCEDURE — 33249 INSJ/RPLCMT DEFIB W/LEAD(S): CPT | Performed by: INTERNAL MEDICINE

## 2021-03-16 PROCEDURE — 02H63KZ INSERTION OF DEFIBRILLATOR LEAD INTO RIGHT ATRIUM, PERCUTANEOUS APPROACH: ICD-10-PCS | Performed by: INTERNAL MEDICINE

## 2021-03-16 PROCEDURE — 93010 ELECTROCARDIOGRAM REPORT: CPT | Performed by: INTERNAL MEDICINE

## 2021-03-16 PROCEDURE — 99153 MOD SED SAME PHYS/QHP EA: CPT | Performed by: INTERNAL MEDICINE

## 2021-03-16 PROCEDURE — 84100 ASSAY OF PHOSPHORUS: CPT | Performed by: INTERNAL MEDICINE

## 2021-03-16 PROCEDURE — 99291 CRITICAL CARE FIRST HOUR: CPT | Performed by: INTERNAL MEDICINE

## 2021-03-16 PROCEDURE — 25010000003 CEFAZOLIN IN DEXTROSE 2-4 GM/100ML-% SOLUTION: Performed by: INTERNAL MEDICINE

## 2021-03-16 PROCEDURE — C1777 LEAD, AICD, ENDO SINGLE COIL: HCPCS | Performed by: INTERNAL MEDICINE

## 2021-03-16 PROCEDURE — 0JH608Z INSERTION OF DEFIBRILLATOR GENERATOR INTO CHEST SUBCUTANEOUS TISSUE AND FASCIA, OPEN APPROACH: ICD-10-PCS | Performed by: INTERNAL MEDICINE

## 2021-03-16 PROCEDURE — C1721 AICD, DUAL CHAMBER: HCPCS | Performed by: INTERNAL MEDICINE

## 2021-03-16 DEVICE — IMPLANTABLE DEVICE: Type: IMPLANTABLE DEVICE | Status: FUNCTIONAL

## 2021-03-16 DEVICE — PACE/SENSE LEAD
Type: IMPLANTABLE DEVICE | Status: FUNCTIONAL
Brand: INGEVITY™+

## 2021-03-16 DEVICE — IMPLANTABLE CARDIOVERTER DEFIBRILLATOR DR
Type: IMPLANTABLE DEVICE | Status: FUNCTIONAL
Brand: VIGILANT™ EL ICD DR

## 2021-03-16 RX ORDER — CEFAZOLIN SODIUM 2 G/100ML
2 INJECTION, SOLUTION INTRAVENOUS ONCE
Status: DISCONTINUED | OUTPATIENT
Start: 2021-03-16 | End: 2021-03-16 | Stop reason: HOSPADM

## 2021-03-16 RX ORDER — SODIUM CHLORIDE 0.9 % (FLUSH) 0.9 %
3 SYRINGE (ML) INJECTION EVERY 12 HOURS SCHEDULED
Status: DISCONTINUED | OUTPATIENT
Start: 2021-03-16 | End: 2021-03-20 | Stop reason: HOSPADM

## 2021-03-16 RX ORDER — FENTANYL CITRATE 50 UG/ML
INJECTION, SOLUTION INTRAMUSCULAR; INTRAVENOUS AS NEEDED
Status: DISCONTINUED | OUTPATIENT
Start: 2021-03-16 | End: 2021-03-16 | Stop reason: HOSPADM

## 2021-03-16 RX ORDER — ACETAMINOPHEN 325 MG/1
650 TABLET ORAL EVERY 4 HOURS PRN
Status: DISCONTINUED | OUTPATIENT
Start: 2021-03-16 | End: 2021-03-16 | Stop reason: HOSPADM

## 2021-03-16 RX ORDER — LIDOCAINE HYDROCHLORIDE 10 MG/ML
INJECTION, SOLUTION INFILTRATION; PERINEURAL AS NEEDED
Status: DISCONTINUED | OUTPATIENT
Start: 2021-03-16 | End: 2021-03-16 | Stop reason: HOSPADM

## 2021-03-16 RX ORDER — SODIUM CHLORIDE 9 MG/ML
INJECTION, SOLUTION INTRAVENOUS CONTINUOUS PRN
Status: COMPLETED | OUTPATIENT
Start: 2021-03-16 | End: 2021-03-16

## 2021-03-16 RX ORDER — IBUPROFEN 600 MG/1
600 TABLET ORAL EVERY 6 HOURS SCHEDULED
Status: DISCONTINUED | OUTPATIENT
Start: 2021-03-16 | End: 2021-03-17

## 2021-03-16 RX ORDER — ACETAMINOPHEN 325 MG/1
650 TABLET ORAL EVERY 6 HOURS
Status: DISCONTINUED | OUTPATIENT
Start: 2021-03-16 | End: 2021-03-20 | Stop reason: HOSPADM

## 2021-03-16 RX ORDER — SODIUM CHLORIDE 0.9 % (FLUSH) 0.9 %
3 SYRINGE (ML) INJECTION EVERY 12 HOURS SCHEDULED
Status: DISCONTINUED | OUTPATIENT
Start: 2021-03-16 | End: 2021-03-16 | Stop reason: HOSPADM

## 2021-03-16 RX ORDER — BUPIVACAINE HYDROCHLORIDE 5 MG/ML
INJECTION, SOLUTION EPIDURAL; INTRACAUDAL AS NEEDED
Status: DISCONTINUED | OUTPATIENT
Start: 2021-03-16 | End: 2021-03-16 | Stop reason: HOSPADM

## 2021-03-16 RX ORDER — NITROGLYCERIN 0.4 MG/1
0.4 TABLET SUBLINGUAL
Status: DISCONTINUED | OUTPATIENT
Start: 2021-03-16 | End: 2021-03-16 | Stop reason: HOSPADM

## 2021-03-16 RX ORDER — SODIUM CHLORIDE 0.9 % (FLUSH) 0.9 %
10 SYRINGE (ML) INJECTION AS NEEDED
Status: DISCONTINUED | OUTPATIENT
Start: 2021-03-16 | End: 2021-03-18

## 2021-03-16 RX ORDER — MIDAZOLAM HYDROCHLORIDE 1 MG/ML
INJECTION INTRAMUSCULAR; INTRAVENOUS AS NEEDED
Status: DISCONTINUED | OUTPATIENT
Start: 2021-03-16 | End: 2021-03-16 | Stop reason: HOSPADM

## 2021-03-16 RX ORDER — SODIUM CHLORIDE 0.9 % (FLUSH) 0.9 %
1-10 SYRINGE (ML) INJECTION AS NEEDED
Status: DISCONTINUED | OUTPATIENT
Start: 2021-03-16 | End: 2021-03-16 | Stop reason: HOSPADM

## 2021-03-16 RX ORDER — CEFAZOLIN SODIUM 2 G/100ML
2 INJECTION, SOLUTION INTRAVENOUS ONCE
Status: DISCONTINUED | OUTPATIENT
Start: 2021-03-16 | End: 2021-03-16 | Stop reason: SDUPTHER

## 2021-03-16 RX ADMIN — PANTOPRAZOLE SODIUM 40 MG: 40 INJECTION, POWDER, FOR SOLUTION INTRAVENOUS at 20:00

## 2021-03-16 RX ADMIN — METOPROLOL SUCCINATE 25 MG: 25 TABLET, EXTENDED RELEASE ORAL at 08:06

## 2021-03-16 RX ADMIN — CEFAZOLIN SODIUM 2 G: 2 INJECTION, SOLUTION INTRAVENOUS at 17:03

## 2021-03-16 RX ADMIN — ROSUVASTATIN CALCIUM 20 MG: 20 TABLET, COATED ORAL at 08:06

## 2021-03-16 RX ADMIN — PANTOPRAZOLE SODIUM 40 MG: 40 INJECTION, POWDER, FOR SOLUTION INTRAVENOUS at 08:06

## 2021-03-16 RX ADMIN — LEVETIRACETAM 750 MG: 100 INJECTION, SOLUTION INTRAVENOUS at 20:00

## 2021-03-16 RX ADMIN — ASPIRIN 81 MG: 81 TABLET, CHEWABLE ORAL at 08:06

## 2021-03-16 RX ADMIN — ACETAMINOPHEN 650 MG: 325 TABLET ORAL at 18:22

## 2021-03-16 RX ADMIN — ACETAMINOPHEN 650 MG: 325 TABLET ORAL at 23:04

## 2021-03-16 RX ADMIN — SODIUM CHLORIDE, PRESERVATIVE FREE 10 ML: 5 INJECTION INTRAVENOUS at 08:08

## 2021-03-16 RX ADMIN — SACUBITRIL AND VALSARTAN 1 TABLET: 49; 51 TABLET, FILM COATED ORAL at 08:06

## 2021-03-16 RX ADMIN — SACUBITRIL AND VALSARTAN 1 TABLET: 49; 51 TABLET, FILM COATED ORAL at 20:00

## 2021-03-16 RX ADMIN — IBUPROFEN 600 MG: 600 TABLET, FILM COATED ORAL at 20:00

## 2021-03-16 RX ADMIN — SODIUM CHLORIDE, PRESERVATIVE FREE 10 ML: 5 INJECTION INTRAVENOUS at 20:01

## 2021-03-16 RX ADMIN — SODIUM CHLORIDE, PRESERVATIVE FREE 10 ML: 5 INJECTION INTRAVENOUS at 20:00

## 2021-03-16 RX ADMIN — SODIUM CHLORIDE, PRESERVATIVE FREE 10 ML: 5 INJECTION INTRAVENOUS at 08:06

## 2021-03-16 RX ADMIN — SPIRONOLACTONE 25 MG: 25 TABLET ORAL at 08:06

## 2021-03-16 RX ADMIN — HYDROCODONE BITARTRATE AND ACETAMINOPHEN 1 TABLET: 7.5; 325 TABLET ORAL at 02:31

## 2021-03-16 RX ADMIN — LEVETIRACETAM 750 MG: 100 INJECTION, SOLUTION INTRAVENOUS at 08:06

## 2021-03-16 NOTE — PLAN OF CARE
Goal Outcome Evaluation:  Plan of Care Reviewed With: patient  Progress: no change  Outcome Summary: Pt limited today by fatigue (NPO status for pending procedure), declining ambulation. Performed STS with CGA, RW. Gave good effort with LE ther ex. Will cont to progress mobility as clinically appropriate.

## 2021-03-16 NOTE — THERAPY TREATMENT NOTE
Patient Name: Narendra Cochran  : 1940    MRN: [id number]                              Today's Date: 3/16/2021       Admit Date: 3/12/2021    Visit Dx:     ICD-10-CM ICD-9-CM   1. Acute on chronic congestive heart failure, unspecified heart failure type (CMS/HCC)  I50.9 428.0   2. Hypokalemia  E87.6 276.8   3. 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 OHCA  I46.9 427.5   5. NSVT (nonsustained ventricular tachycardia) (CMS/HCC)  I47.2 427.1   6. Cardiac arrest (CMS/HCC)  I46.9 427.5     Patient Active Problem List   Diagnosis   • Essential hypertension   • Hyperlipidemia LDL goal <70   • T2DM   • Paget disease of bone   • Tobacco abuse   • H/O seizures on Keppra   • Gonalgia   • Osteitis deformans   • Syncope   • NSVT (nonsustained ventricular tachycardia) (CMS/HCC)   • Coronary artery disease involving native coronary artery of native heart with angina pectoris (CMS/HCC)   • NICM    • OHCA   • Hypokalemia   • Hypomagnesemia   • VHD; mild-mod AR, mild MR   • Chronic systolic congestive heart failure (CMS/HCC)   • Former tobacco user   • GERD   • Marijuana use   • Frequent PVCs   • Acute blood loss anemia     Past Medical History:   Diagnosis Date   • Arthritis    • Diabetes mellitus (CMS/HCC)    • Diverticulitis    • GERD (gastroesophageal reflux disease)    • History of transfusion    • Hyperlipidemia    • Hypertension    • Hypertensive urgency, malignant 2017   • Paget disease of bone      Past Surgical History:   Procedure Laterality Date   • APPENDECTOMY     • CARDIAC CATHETERIZATION N/A 3/18/2020    Procedure: Left Heart Cath;  Surgeon: Sonya Garibay MD;  Location:  [facility];  Service: Cardiology;  Laterality: N/A;  First availabel provider     • CARDIAC CATHETERIZATION N/A 3/15/2021    Procedure: LEFT HEART CATH;  Surgeon: Doc Sahni IV, MD;  Location:  [facility]  LOCATION;  Service: Cardiovascular;  Laterality: N/A;   • CARDIAC CATHETERIZATION N/A 3/15/2021    Procedure: Coronary angiography;  Surgeon: Doc Sahni IV, MD;  Location: [facility] INVASIVE LOCATION;  Service: Cardiovascular;  Laterality: N/A;   • COLON RESECTION      second to diverticulitis    • FOOT SURGERY Left      General Information     Row Name 03/16/21 1429          Physical Therapy Time and Intention    Document Type  therapy note (daily note)  -     Mode of Treatment  physical therapy  -     Row Name 03/16/21 1429          General Information    Existing Precautions/Restrictions  fall;seizures;cardiac;other (see comments) very [place]; amplifier present in room  -     Row Name 03/16/21 1429          Cognition    Orientation Status (Cognition)  oriented x 3  -       User Key  (r) = Recorded By, (t) = Taken By, (c) = Cosigned By    Initials Name Provider Type    LS Sharron Saba, PT Physical Therapist        Mobility     Row Name 03/16/21 1430          Bed Mobility    Comment (Bed Mobility)  UIC upon arrival  -     Row Name 03/16/21 1430          Transfers    Comment (Transfers)  VC for hand placement.  -     Row Name 03/16/21 1430          Sit-Stand Transfer    Sit-Stand [place] (Transfers)  contact guard;verbal cues  -     Assistive Device (Sit-Stand Transfers)  walker, front-wheeled  -     Row Name 03/16/21 1430          Gait/Stairs (Locomotion)    [place] Level (Gait)  unable to assess  -     Comment (Gait/Stairs)  Pt declined ambulation today despite encouragement due to c/o fatigue (NPO for pending procedure).  -       User Key  (r) = Recorded By, (t) = Taken By, (c) = Cosigned By    Initials Name Provider Type    LS Sharron Saba, PT Physical Therapist        Obj/Interventions     Row Name 03/16/21 1431          Motor Skills    Therapeutic Exercise  knee;ankle  -     Row Name 03/16/21 1431          Knee (Therapeutic Exercise)    Knee (Therapeutic  Exercise)  strengthening exercise  -LS     Knee Strengthening (Therapeutic Exercise)  bilateral;marching while seated;LAQ (long arc quad);10 repetitions;sitting  -LS     Row Name 03/16/21 1431          Ankle (Therapeutic Exercise)    Ankle (Therapeutic Exercise)  AROM (active range of motion)  -LS     Ankle AROM (Therapeutic Exercise)  bilateral;dorsiflexion;plantarflexion;10 repetitions;sitting  -LS     Row Name 03/16/21 1431          Balance    Static Sitting Balance  WFL;sitting in chair  -LS     Static Standing Balance  mild impairment;supported;standing  -LS     Balance Interventions  standing;static  -LS     Comment, Balance  30s standing edge of recliner for changing of linens under pt; decline forward gait  -LS       User Key  (r) = Recorded By, (t) = Taken By, (c) = Cosigned By    Initials Name Provider Type    Sharron Payne, PT Physical Therapist        Goals/Plan    No documentation.       Clinical Impression     Row Name 03/16/21 1432          Pain Scale: Numbers Pre/Post-Treatment    Pretreatment Pain Rating  0/10 - no pain  -LS     Posttreatment Pain Rating  0/10 - no pain  -LS     Row Name 03/16/21 1432          Plan of Care Review    Plan of Care Reviewed With  patient  -LS     Progress  no change  -LS     Outcome Summary  Pt limited today by fatigue (NPO status for pending procedure), declining ambulation. Performed STS with CGA, RW. Gave good effort with LE ther ex. Will cont to progress mobility as clinically appropriate.  -     Row Name 03/16/21 1432          Vital Signs    Pre Systolic BP Rehab  144  -LS     Pre Treatment Diastolic BP  61  -LS     Pretreatment Heart Rate (beats/min)  57  -LS     Pre SpO2 (%)  97  -LS     O2 Delivery Pre Treatment  room air  -LS     O2 Delivery Intra Treatment  room air  -LS     O2 Delivery Post Treatment  room air  -LS     Pre Patient Position  Sitting  -LS     Intra Patient Position  Standing  -LS     Post Patient Position  Sitting  -LS     Row Name  03/16/21 1432          Positioning and Restraints    Pre-Treatment Position  sitting in chair/recliner  -LS     Post Treatment Position  chair  -LS     In Chair  notified nsg;reclined;call light within reach;encouraged to call for assist;exit alarm on;waffle cushion;legs elevated;with family/caregiver  -       User Key  (r) = Recorded By, (t) = Taken By, (c) = Cosigned By    Initials Name Provider Type    Sharron Payne, PT Physical Therapist        Outcome Measures     Row Name 03/16/21 1435          How much help from another person do you currently need...    Turning from your back to your side while in flat bed without using bedrails?  3  -LS     Moving from lying on back to sitting on the side of a flat bed without bedrails?  3  -LS     Moving to and from a bed to a chair (including a wheelchair)?  3  -LS     Standing up from a chair using your arms (e.g., wheelchair, bedside chair)?  3  -LS     Climbing 3-5 steps with a railing?  2  -LS     To walk in hospital room?  2  -LS     AM-PAC 6 Clicks Score (PT)  16  -LS     Row Name 03/16/21 1435          Functional Assessment    Outcome Measure Options  AM-PAC 6 Clicks Basic Mobility (PT)  -       User Key  (r) = Recorded By, (t) = Taken By, (c) = Cosigned By    Initials Name Provider Type    Sharron Payne, PT Physical Therapist        Physical Therapy Education                 Title: PT OT SLP Therapies (In Progress)     Topic: Physical Therapy (Done)     Point: Mobility training (Done)     Learning Progress Summary           Patient Acceptance, E,D, NR,VU by  at 3/16/2021 1436    Acceptance, E,TB, VU,NR by AY at 3/15/2021 1111   Significant Other Acceptance, E,D, NR,VU by LS at 3/16/2021 1436    Acceptance, E,TB, VU,NR by AY at 3/15/2021 1111                   Point: Home exercise program (Done)     Learning Progress Summary           Patient Acceptance, E,D, NR,VU by LS at 3/16/2021 1436   Significant Other Acceptance, E,D, NR,VU by LS at 3/16/2021  1436                   Point: Body mechanics (Done)     Learning Progress Summary           Patient Acceptance, E,D, NR,VU by LS at 3/16/2021 1436    Acceptance, E,TB, VU,NR by AY at 3/15/2021 1111   Significant Other Acceptance, E,D, NR,VU by LS at 3/16/2021 1436    Acceptance, E,TB, VU,NR by AY at 3/15/2021 1111                   Point: Precautions (Done)     Learning Progress Summary           Patient Acceptance, E,D, NR,VU by LS at 3/16/2021 1436    Acceptance, E,TB, VU,NR by AY at 3/15/2021 1111   Significant Other Acceptance, E,D, NR,VU by LS at 3/16/2021 1436    Acceptance, E,TB, VU,NR by AY at 3/15/2021 1111                               User Key     Initials Effective Dates Name Provider Type Discipline     06/19/15 -  Sharron Saba, PT Physical Therapist PT     11/10/20 -  Phyllis Bowser, ESTELA Physical Therapist PT              PT Recommendation and Plan     Plan of Care Reviewed With: patient  Progress: no change  Outcome Summary: Pt limited today by fatigue (NPO status for pending procedure), declining ambulation. Performed STS with CGA, RW. Gave good effort with LE ther ex. Will cont to progress mobility as clinically appropriate.     Time Calculation:   PT Charges     Row Name 03/16/21 1436             Time Calculation    Start Time  1140  -      PT Received On  03/16/21  -         Time Calculation- PT    Total Timed Code Minutes- PT  15 minute(s)  -         Timed Charges    09441 - PT Therapeutic Exercise Minutes  8  -      72059 - PT Therapeutic Activity Minutes  7  -        User Key  (r) = Recorded By, (t) = Taken By, (c) = Cosigned By    Initials Name Provider Type     Sharron Saba, PT Physical Therapist        Therapy Charges for Today     Code Description Service Date Service Provider Modifiers Qty    27647272747 HC PT THER PROC EA 15 MIN 3/16/2021 Sharron Saba, PT GP 1          PT G-Codes  Outcome Measure Options: AM-PAC 6 Clicks Basic Mobility (PT)  AM-PAC 6 Clicks Score (PT):  16  AM-PAC 6 Clicks Score (OT): 15    Sharron Saba, PT  3/16/2021

## 2021-03-16 NOTE — PROGRESS NOTES
"[facility] at [facility] Progress Note     LOS: 4 days   Patient Care Team:  Marguerite Moore PA-C as PCP - General (Physician Assistant)  Som Boyd DO as Consulting Physician (Cardiology)  Javad Mercedes MD as Consulting Physician (Cardiology)  Thomas Lui MD as Consulting Physician (Hematology and Oncology)  PCP:  Marguerite Moore PA-C    Chief Complaint: Follow-up outside hospital cardiac arrest, nonischemic cardiomyopathy    Subjective: Patient underwent heart catheterization yesterday which showed previously known right PL lesion but no other new obstructive CAD.  No complaints this morning.  Right wrist without pain      Review of Systems:   All systems have been reviewed and are negative with the exception of those mentioned above.      Objective:    Vital Sign Min/Max for last 24 hours  Temp  Min: 97.6 °F (36.4 °C)  Max: 98.7 °F (37.1 °C)   BP  Min: 115/63  Max: 165/71   Pulse  Min: 48  Max: 72   Resp  Min: 17  Max: 24   SpO2  Min: 91 %  Max: 100 %   No data recorded   Weight  Min: 85.4 kg (188 lb 4.4 oz)  Max: 85.4 kg (188 lb 4.4 oz)     Flowsheet Rows      First Filed Value   Admission Height  180.3 cm (71\") Documented at 03/12/2021 1900   Admission Weight  89.4 kg (197 lb) Documented at 03/12/2021 1900          Telemetry: Sinus bradycardia, PVCs, 3 beats NSVT      Intake/Output Summary (Last 24 hours) at 3/16/2021 0849  Last data filed at 3/16/2021 0806  Gross per 24 hour   Intake 1009.38 ml   Output 1050 ml   Net -40.62 ml     Intake & Output (last 3 days)       03/13 0701 - 03/14 0700 03/14 0701 - 03/15 0700 03/15 0701 - 03/16 0700 03/16 0701 - 03/17 0700    P.O.  320 45 30    I.V. (mL/kg) 269 (3.3) 5 (0.1) 679.4 (8)     IV Piggyback 100 200 400 100    Total Intake(mL/kg) 369 (4.6) 525 (6.4) 1124.4 (13.2) 130 (1.5)    Urine (mL/kg/hr) 1350 (0.7) 550 (0.3) 1050 (0.5)     Stool 400 0      Total Output 1051 107 9014     Net -1381 -25 +74.4 " +130            Urine Unmeasured Occurrence  1 x      Stool Unmeasured Occurrence 4 x 1 x             Physical Exam:  Constitutional:       Appearance: Not in distress.   Pulmonary:      Effort: Pulmonary effort is normal.      Breath sounds: No rales.   Cardiovascular:      Bradycardia present. Frequent ectopic beats. Irregular rhythm.      Murmurs: There is a grade 2/6 systolic murmur.   Pulses:     Intact distal pulses.  Right radial cath site clean dry intact  Edema:     Peripheral edema absent.   Abdominal:      Palpations: Abdomen is soft.   Skin:     General: Skin is warm and dry.   Neurological:      Mental Status: Alert and oriented to person, place and time     LABS/DIAGNOSTIC DATA:  Results from last 7 days   Lab Units 03/15/21  2141 03/15/21  0524 03/14/21  0745 03/14/21  0130 03/13/21 0453   WBC 10*3/mm3  --  2.74*  --  3.45 3.67   HEMOGLOBIN g/dL 8.6* 8.0* 8.8* 9.9*  9.9* 9.6*   HEMATOCRIT % 28.1* 26.1* 32.9* 33.2*  33.2* 31.7*   PLATELETS 10*3/mm3  --  82*  --  93* 107*     Lab Results   Lab Value Date/Time    TROPONINT 0.017 03/15/2021 0524    TROPONINT 0.159 (C) 03/13/2021 0453    TROPONINT 0.026 03/12/2021 2215    TROPONINT <0.010 03/12/2021 1911    TROPONINT <0.010 11/13/2019 0233    TROPONINT <0.010 11/12/2019 2259     Results from last 7 days   Lab Units 03/12/21 1911   INR  1.13     Results from last 7 days   Lab Units 03/16/21  0234 03/15/21  1604 03/15/21  0524 03/14/21  0130 03/13/21  0453 03/12/21 1911   SODIUM mmol/L  --   --  142 142 139 141   POTASSIUM mmol/L 4.2 3.7 3.3* 3.9 3.7 2.5*   CHLORIDE mmol/L  --   --  109* 109* 106 103   CO2 mmol/L  --   --  27.0 22.0 21.0* 23.0   BUN mg/dL  --   --  14 13 11 7*   CREATININE mg/dL  --   --  0.83 0.69* 0.71* 0.90   CALCIUM mg/dL  --   --  8.4* 8.3* 8.0* 8.9   BILIRUBIN mg/dL  --   --   --   --  1.1 1.4*   ALK PHOS U/L  --   --   --   --  74 112   ALT (SGPT) U/L  --   --   --   --  28 42*   AST (SGOT) U/L  --   --   --   --  53* 101*    GLUCOSE mg/dL  --   --  105* 92 140* 186*         Results from last 7 days   Lab Units 03/14/21  0130   CHOLESTEROL mg/dL 91   TRIGLYCERIDES mg/dL 104   HDL CHOL mg/dL 40   LDL CHOL mg/dL 31     Results from last 7 days   Lab Units 03/12/21  1911   TSH uIU/mL 6.720*   FREE T4 ng/dL 1.64           Medication Review:   aspirin, 81 mg, Oral, Daily  insulin lispro, 0-9 Units, Subcutaneous, 4x Daily With Meals & Nightly  levETIRAcetam, 750 mg, Intravenous, Q12H  metoprolol succinate XL, 25 mg, Oral, Q24H  pantoprazole, 40 mg, Intravenous, Q12H  rosuvastatin, 20 mg, Oral, Daily  sacubitril-valsartan, 1 tablet, Oral, Q12H  sodium chloride, 10 mL, Intravenous, Q12H  sodium chloride, 10 mL, Intravenous, Q12H  spironolactone, 25 mg, Oral, Daily               OHCA    Essential hypertension    Hyperlipidemia LDL goal <70    T2DM    Paget disease of bone    Tobacco abuse    H/O seizures on Keppra    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  Outside hospital cardiac arrest  -Patient received bystander CPR and shock from an external AED  - heart catheterization showing single-vessel CAD and a small posterior lateral branch of the RCA, unchanged from prior  -Potassium was significantly decreased 2.5 at time of admission  -Noted frequent PVCs  -EP evaluation with Dr. Boyd with consideration of ICD placement for secondary prevention     2.  Nonischemic cardiomyopathy, frequent PVCs  -Continue Entresto, Toprol-XL, spironolactone  -Appears euvolemic  -EF on echo 50% this admission     3.  Anemia, suspected lower GI bleed  -Continue to trend hemoglobins, possible EGD/colonoscopy after cardiac issues addressed   -Continue Protonix  -Hemoglobin stable today, no hematochezia noted  overnight     4.  Diabetes  -Per primary team     5.  Hypertension  -Within normal limits at this time     6.  Hyperlipidemia  -Lipids well controlled on rosuvastatin    Discussed with patient's RN at bedside, keep patient n.p.o. for now while awaiting EP opinion      Javad Mercedes MD [facility]  03/16/21

## 2021-03-16 NOTE — PROGRESS NOTES
Continued Stay Note   [place]     Patient Name: Narendra Cochran  MRN: [id number]  Today's Date: 3/16/2021    Admit Date: 3/12/2021    Discharge Plan     Row Name 03/16/21 1324       Plan    Plan  Ongoing    Plan Comments  Consult received through CM.  Pt has a HX of daily marijuana usage, he states that he has decided he will be quitting this habit.  We will follow through hospital course and have a more in-depth conversation with him regarding this when appropriate.        Discharge Codes    No documentation.       Expected Discharge Date and Time     Expected Discharge Date Expected Discharge Time    Mar 20, 2021             Kayla Bach RN ,BSN   Addiction Coordinator

## 2021-03-16 NOTE — PLAN OF CARE
Goal Outcome Evaluation:  Plan of Care Reviewed With: patient  Progress: improving       • C/o chest discomfort intermittently--given Norco x1 for pain  • K recheck 4.2  • Right wrist cath site C/D/I   • No bloody Bms  • H/H stable   • NPO for ICD placement

## 2021-03-16 NOTE — PROGRESS NOTES
Clinical Nutrition     Reason for Visit:   MDR, Follow-up protocol      Patient Name: Narendra Cochran  YOB: 1940  MRN: [id number]  Date of Encounter: 03/16/21 11:51 EDT  Admission date: 3/12/2021        Nutrition Assessment   Assessment     OHCA  Possible seizure during arrest  A/C CHF  Nosebleed  S/p heart cath (3/15)- no intervention    h/o: T2DM, CHF, NICM, HTN, CAD, HLP, VHD: MR, AR, Paget diease of bone, Seizures, Diverticulitis, GERD, Constipation, Diarrhea, Abdominal Pain     PMH: He  has a past medical history of Arthritis, Diabetes mellitus (CMS/HCC), Diverticulitis, GERD (gastroesophageal reflux disease), History of transfusion, Hyperlipidemia, Hypertension, Hypertensive urgency, 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   PSxH: He  has a past surgical history that includes Appendectomy; Foot surgery (Left); Colectomy; Cardiac catheterization (N/A, 3/18/2020); Cardiac catheterization (N/A, 3/15/2021); and Cardiac catheterization (N/A, 3/15/2021).     Substance history: + THC, Tobacco remote    Applicable nutrition-related information:  (3/14) SLP bedside eval rec regular texture, thin liquid    Reported/Observed/Food/Nutrition Related History:     Pt currently NPO for possible AICD placement today (3/16).     Anthropometrics     Height: 71in  Last filed wt: 188 lb per bed scale (3/16)  BMI: 26.3  Overweight: 25.0-29.9kg/m2     Per RD note (3/13):  Pt thinks he has lost weight but unsure how much  ? Wt loss 19 lb's  Need accurate scale weight    Date Weight (kg) Weight (lbs) Weight Method   3/16/2021 85.4 kg 188 lb 4.4 oz Bed scale   3/15/2021 82.2 kg 181 lb 3.5 oz Bed scale   3/13/2021 80.74 kg 178 lb -   3/12/2021 80.9 kg 178 lb 5.6 oz -   3/12/2021 89.359 kg 197 lb Stated   1/18/2021 89.359 kg 197 lb -   12/30/2020 93.895 kg 207 lb -   8/13/2020 95.89 kg 211 lb 6.4 oz -   8/3/2020 92.534 kg 204 lb -   7/15/2020 89.359 kg 197 lb -   4/28/2020 90.266 kg 199 lb -    3/18/2020 92.942 kg 204 lb 14.4 oz Standing scale   2/4/2020 93.895 kg 207 lb -   1/6/2020 92.08 kg 203 lb -   12/9/2019 92.625 kg 204 lb 3.2 oz -     12/9/2019 92.625 kg 204 lb 3.2 oz -   11/18/2019 86.818 kg 191 lb 6.4 oz -   11/13/2019 85.412 kg 188 lb 4.8 oz Bed scale       Labs reviewed     Results from last 7 days   Lab Units 03/16/21  0806 03/16/21  0234 03/15/21  1604 03/15/21  0524 03/14/21  1922 03/14/21  0130 03/13/21  0453 03/12/21  1911   GLUCOSE mg/dL 110*  --   --  105*  --  92 140* 186*   BUN mg/dL 9  --   --  14  --  13 11 7*   CREATININE mg/dL 0.83  --   --  0.83  --  0.69* 0.71* 0.90   SODIUM mmol/L 140  --   --  142  --  142 139 141   CHLORIDE mmol/L 110*  --   --  109*  --  109* 106 103   POTASSIUM mmol/L 4.6 4.2 3.7 3.3*  --  3.9 3.7 2.5*   PHOSPHORUS mg/dL 3.0  --   --  3.0 2.1* 1.7* 2.1* 2.7   MAGNESIUM mg/dL 2.1  --   --  1.8  --  2.0 2.3 1.6   ALT (SGPT) U/L 17  --   --   --   --   --  28 42*     Results from last 7 days   Lab Units 03/16/21  0806 03/15/21  0524 03/14/21  0130 03/13/21  0453 03/12/21  1911   ALBUMIN g/dL 3.30*  --   --  3.00* 3.70   IONIZED CALCIUM mmol/L  --  1.24  --   --   --    CHOLESTEROL mg/dL  --   --  91  --   --    TRIGLYCERIDES mg/dL  --   --  104  --   --        Results from last 7 days   Lab Units 03/16/21  1104 03/16/21  0702 03/15/21  1958 03/15/21  1558 03/15/21  1105 03/15/21  0706   GLUCOSE mg/dL 128 130 127 110 156* 144*     Lab Results   Lab Value Date/Time    HGBA1C 5.6 01/18/2021 1523    HGBA1C 6.0 08/03/2020 1351    HGBA1C 5.90 (H) 03/18/2020 0835    HGBA1C 6.50 (H) 01/26/2019 0436       Medications reviewed   Pertinent: insulin, keppra, protonix  PRN: [email or web address]        Current Nutrition Prescription     PO: NPO Diet  Orders Placed This Encounter      DIET MESSAGE Pt request fruit cups with all meals. Thanks!      Evaluation of received nutrient/fluid intake-PO:  75%/5 meals -- reflects PO intake prior to pt being NPO, pt was on a cardiac/consistent  carbohydrate diet    Nutrition Diagnosis     3-13-21, updated 3/16  Problem Predicted suboptimal energy intake   Etiology Per Clinical Status   Signs/Symptoms Reported wt loss, chronic abdominal pain, diarrhea, constipation   Status: resolved, 75%/5 meals    Nutrition Intervention    Follow treatment progress, Care plan reviewed   -Rec restarting cardiac/consistent carbohydrate diet as appropriate      Goal:   General: Nutrition support treatment    Monitoring/Evaluation:   Per protocol, PO intake, Pertinent labs, Symptoms      Antonietta Isbell MS RD/LD CNSC  Time Spent: 20 minutes

## 2021-03-16 NOTE — OP NOTE
Cardiac Electrophysiology Procedure Note      [facility]    PROCEDURE:  IMPLANTED CARDIOVERTER DEFIBRILLATOR    OPERATIONS PERFORMED: Implantation of a [employer] dual-chamber ICD 039499 serial number pulse generator at the left prepectoral site.  Atrial lead 7841 52 cm, serial number [id number].  Right ventricular lead [employer] model 0273, 64 cm, serial number [id number]. Fluoroscopy for ICD system in situ.     ATTENDING SURGEON:  Som Boyd DO    MODERATE SEDATION FOR PROCEDURE:    Moderate sedation was given during this procedure.    I supervised and directed RN to administer this sedation.  This staff member also monitored the patient's hemodynamic and respiratory status and response to these medications.  Please see the full detailed procedure report generated by the electrophysiology laboratory staff.  The patient tolerated moderate sedation well.  There were no complications regarding sedation.  The total dose of fentanyl was 50 mcg and the total dose of midazolam was 1 mg.  The total dose of Brevital was 80 mg.  First sedation was administered at 1707 and continued through 1731.    ESTIMATED BLOOD LOSS: Minimal    COMPLICATIONS: None    TIME OUT: Time out was completed with verification of the correct patient identity, procedure to be performed, procedure site and implanted equipment.    INDICATION FOR PROCEDURE:   Briefly,  Narendra Cochran is a 80 y.o. year old male with a history of nonischemic cardiomyopathy with a QRS duration of 120 milliseconds, mild systolic dysfunction with an LVEF of 45% and NYHA functional class 2 heart failure.  Patient had documented ventricular fibrillation cardiac arrest and [place]  He is no clear identifiable reversible cause.  He has mild symptomatic sinus node dysfunction.  He has an indication for atrial pacing for sinus node dysfunction.  This is a secondary prevention ICD.    The  patient was able to give written informed consent after revisiting the key portions of the risk versus benefit profile of the procedure.  This discussion was framed by our lengthy conversations  (please see our detailed notes).  Patient verbalized strong understanding of this discussion and a strong desire to proceed with the procedure.  Please note that this detailed informed consent process utilized mutual and shared decision making process between all parties involved, principally the physician and patient, but also potentially with input from the patient's selected family and friends.    This patient who is a candidate for an ICD has a life expectancy greater than 12 months.    PROCEDURE AND FINDINGS:     The patient was brought to the electrophysiology suite in a post-absorptive state.  Informed consent was obtained prior to the procedure and confirmed.  Intravenous prophylactic antibiotics were administered and confirmed to be completely infused prior to start of the procedure.  After the site of implantation was prepped and draped in the usual sterile fashion and after adequate anesthesia was given, the skin was infiltrated with 1% lidocaine and 0.5% bupivicaine 50/50 mixture.  The skin was incised with a #10 scalpel.  Blunt and electrosurgical dissection was carried out to the level of the prepectoral fascia with careful attention paid to hemostasis.  A pocket to house the pulse generator was formed between the prepectoral fascia and subcutaneous fat with blunt and electrosurgical dissection.  The pocket was copiously irrigated with antibiotic containing normal saline and subsequently observed.  Once adequate hemostasis was confirmed within the pocket, venous access was obtained.  The axillary vein was accessed via a contrast guided Seldinger technique.  J tip 0.035 inch guide wires were introduced and their course through the venous system was confirmed by their presence under fluoroscopy in the inferior  vena (excluding inadvertent arterial puncture.)      RIGHT VENTRICULAR LEAD:    A peel away sheath was brought to the field and placed into the venous system via over the wire technique.  The right ventricular lead was placed via this sheath into the right ventricle.  Adequate sensing and threshold parameters were obtained.  The lead was attached via active fixation.  There was no evidence of diaphragmatic stimulation at 10 V output.  The peel away sheath was removed and the lead collar was advanced to the pectoral muscle and sutured with non absorbable suture.  Tug testing of this lead confirmed stability of the lead and the length of the lead's slack was assessed as optimal with fluoroscopy.     RIGHT ATRIAL LEAD:  A peel away sheath was brought to the field and placed into the venous system via over the wire technique.  The right atrial lead was placed via this sheath into the right atrium.   The lead was attached via active fixation.  Adequate sensing and threshold parameters were obtained.  There was no evidence of diaphragmatic stimulation at 10 V output.  The peel away sheath was removed and the lead collar was advanced to the pectoral muscle and sutured with non absorbable suture.  Tug testing of this lead confirmed stability of the lead and the length of the lead's slack was assessed as optimal with fluoroscopy.     DEFIBRILLATION THRESHOLD TESTING:    Once adequate level of anesthesia was obtained, defibrillation testing was performed using Shock on T induction of ventricular fibrillation.  The patient was successfully internal defibrillated with 21 Joules with the least sensitivity.  There were no significant dropouts.  After five minutes of observation with stable hemodynamics adequate anesthesia was re-confirmed.  The patient after DFT testing remained hemodynamically stable.  The defibrillation threshold was 21 Joules.     The pulse generator was then sutured to the fascia in a medial location within  the pocket using non absorbable suture.  The pocket was closed with two layers of suture using 2-0 then 3-0 Vicryl, followed by a layer of surgical adhesive and finally a sterile occlusive dressing.    UNDERLYING RHYTHM: sinus bradycadia                     MEASURED DEVICE DATA:    Atrial lead                   sensin.6 mV                   impedance:           550 Ohms                   Threshold:           0.8 Volts at 0.5 ms  RV lead                   sensin.5 mV                   pacing impedance:    468 Ohms                   Threshold:           1 Volts at 0.5 ms                     RV Coil impedance:   60 Ohms                                                                   PROGRAMMED PARAMETERS:    Mode:                          DDDR  Lower Rate:                60 pulses per minute  Upper Sensor Rate:         130 pulses per minute  Upper Tracking Rate:     130  pulses per minute  Mode Switch Rate:          170 bpm                  Tachy Therapy Programming.                  VT 1 zone 30 second detection interval               Detection:  171 beats per minute                Therapy:    This is a monitor zone    VT 2 zone 9 second section interval               Detection:  200 beats per minute               Therapy:    ATP x2 followed by maximal output defibrillation    VF zone 5 seconds detection interval               Detection:  250 beats per minute               Therapy:    ATP during charge followed by maximal output defibrillation.      CONCLUSION:  Successful implantation of ICD system.  Successful DFT testing.    Patient is to follow up with our clinic in approximately one week and then myself in 3 months.    No lovenox or heparin at any dose is to be given.    FOR THE PATIENT    Please do not lift more than 10 pounds or abduct the shoulder joint / or raise the arm above the shoulder for 6 weeks after device was implanted (this does not apply to subcutaneous  ICDs).    Avoid activities that involve heavy lifting or rough contact that could result in blows to your implant site and to allow your incision time to heal.    No shower or getting device incision wet for 2 days post-operatively.    Keep wound exposed to air unless otherwise instructed, the surgical glue is the bandage.    No creams, lotions, or powders to incision.    Please avoid allowing bra strap or suspenders to lay over incision until completely healed.    Avoid hot tubs or pools until incision completely healed.    No driving for 24 hours post-operatively after device implant.    Call your doctor if you have any swelling, redness or discharge around your incision, notice anything unusual or unexpected, or you develop a fever that does not go away in two or three days.    Call your doctor if you hear any beeping sounds / vibratory alerts from your device as this indicates your device needs to be checked immediately.    Carry your Medical Device ID Card with you at all times.  Please call our office () with any questions about the device or the wounds.            Som Boyd, DO, FACC, RS  Cardiac Electrophysiologist  [facility] / [facility]

## 2021-03-16 NOTE — PROGRESS NOTES
Patient identified as qualifier for Phase II Cardiac Rehab. Patient unavailable at time of consult. Staff will follow up.

## 2021-03-16 NOTE — CONSULTS
Cardiac Electrophysiology In Patient Consultation        [facility]     Consultation      PATIENT NAME:  Narendra Cochran    :  1940 AGE: 80 y.o.     Date of Admission:  3/12/2021  Date of Consultation:  3/16/2021    Primary Electrophysiologist: Dr. Boyd    Primary Cardiologist:  Dr. Mercedes     Subjective      REASON FOR CONSULT:  Cardiac Arrest     CHIEF COMPLAINT:  Chief Complaint   Patient presents with   • Syncope     Problem List:         1. Nonischemic cardiomyopathy / systolic congestive heart failure / Cardiac Arrest   a. Echo 2019 EF 60%, mild LVH, mild MR, mild to moderate AR, LA 4.6 cm  b. [facility] 2020 with mild nonobstructive CAD, EF 36-40%  c. Witnessed out of hospital cardiac arrest 3/12/2021 with documented bystander CPR with a single shock from AED with transfer to [facility] per EMS.  d. Echo 3/13/2021 EF 50%, no significant VHD  e. [facility] 3/14/2021 per Dr. Sahni with documented severe 1-vessel CAD involving a small posterior lateral branch of the of the RCA with no interval change to prior angiography  f. Scheduled DDD ICD implant 3/16/2021 for secondary prevention   2. Premature ventricular contractions  a. Echo 2019 EF 60%, mild LVH, mild MR, mild to moderate AR, LA 4.6 cm  b. Stress test 2020 PVCs at both rest and stress, no perfusion evidence of ischemia, ejection fraction 31% with dilated LV cavity  c. C 2020 with mild nonobstructive CAD, EF 36-40%  d. Holter monitor 2020:  PVCs burden 12.3% with some of the counted PVCs appear to be PACs with aberrancy. One 4 beat run of nonsustained VT versus SVT with aberrancy  e. Amiodarone therapy initiated at 200 mg daily for PVCs 2020  f. Amiodarone discontinued 2020  3. Sinus node dysfunction  1. 24-hour Holter 7/15/2020 HR 43-79, average 55 bpm, PVC burden 10.2%.  2. EP consultation 2020 with patient felt to be asymptomatic with recommendation  for continued monitoring  4. First-degree AV block  5. Essential hypertension  6. Dyslipidemia  7. Diabetes mellitus  8. COPD  9. Seizures  10. Paget's disease  11. Hearing loss      HISTORY OF PRESENT ILLNESS:  Mr. Narendra Cochran is a 80 year old  male patient who is very hard of hearing seen in EP consultation today at the request of Dr. Daren MD for consideration of ICD placement for secondary prevention of sudden cardiac death.  Patient was admitted 3/12/2021 following a witnessed cardiac arrest out of hospital at the [facility] court at [facility].  Patient reports he was in his usual state of health without any preceding complications or symptoms.  He reports last thing he remembers was putting his bags down on the table and then waking up in the ambulance sometime later.  Patient with documented bystander CPR with a single shock from AED at the [facility].  Echocardiogram this admission revealed an EF of 50% with noted history of nonischemic cardiomyopathy with reduced EF in the past.  Left heart catheterization was performed revealing nonflow limiting known CAD without intervention.  Patient denies proceeding or current chest pain, palpitations, dizziness, or presyncope.      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     SOCIAL HISTORY  Social History     Socioeconomic History   • Marital status:      Spouse name: Not on file   • Number of children: Not on file   • Years of  education: Not on file   • Highest education level: Not on file   Tobacco Use   • Smoking status: Current Every Day Smoker     Packs/day: 0.25     Years: 65.00     Pack years: 16.25     Types: Cigars, Cigarettes     Last attempt to quit: 2019     Years since quittin.5   • Smokeless tobacco: Never Used   Substance and Sexual Activity   • Alcohol use: Yes     Comment: rarely   • Drug use: Yes     Types: Marijuana     Comment: Pt states used weekly but now quitting    • Sexual activity: Defer       FAMILY HISTORY  family history includes Clotting disorder in his sister; Diabetes in his sister; Fainting in his brother; No Known Problems in his brother, daughter, mother, sister, son, son, and son.     MEDICATIONS  Prior to Admission medications    Medication Sig Start Date End Date Taking? Authorizing Provider   aspirin 81 MG chewable tablet Chew 1 tablet Daily. 17  Yes Glenna Nuno APRN   famotidine (PEPCID) 20 MG tablet Take 1 tablet by mouth At Night As Needed for Heartburn. 19  Yes Marguerite Moore PA-C   levETIRAcetam (KEPPRA) 250 MG tablet TAKE ONE TABLET BY MOUTH TWICE A DAY 3/1/21  Yes Marguerite Moore PA-C   metoprolol succinate XL (TOPROL-XL) 25 MG 24 hr tablet Take 1 tablet by mouth Daily. 20  Yes Javad Mercedes MD   omeprazole (priLOSEC) 40 MG capsule Take 1 capsule by mouth Every Morning Before Breakfast. 21  Yes Marguerite Moore PA-C   rosuvastatin (CRESTOR) 20 MG tablet TAKE ONE TABLET BY MOUTH DAILY 20  Yes Jen Staples APRN   Blood Pressure Monitoring (BLOOD PRESSURE MONITOR/L CUFF) misc 1 Units Daily. 19   Marguerite Moore PA-C   diclofenac (VOLTAREN) 1 % gel gel Apply 4 g topically to the appropriate area as directed 4 (Four) Times a Day As Needed (prn for pain). 20   Marguerite Moore PA-C   Empagliflozin 25 MG tablet Take 25 mg by mouth Daily. 21   Marguerite Moore PA-C   glucose blood test  "strip Use to check blood glucose once a day. DX:E11.65 8/3/20   Marguerite Moore PA-C   glucose monitor monitoring kit 1 each Daily. DX: E11.65 8/3/20   Marguerite Moore PA-C   Lancets 30G misc Use to check blood glucose once a day. DX: E11.65 8/3/20   Marguerite Moore PA-C   metFORMIN ER (GLUCOPHAGE-XR) 500 MG 24 hr tablet TAKE ONE TABLET BY MOUTH TWICE A DAY WITH MEALS  Patient taking differently: Take 500 mg by mouth Daily With Breakfast. 1/28/21   Marguerite Moore PA-C   sacubitril-valsartan (ENTRESTO) 24-26 MG tablet Take 1 tablet by mouth 2 (Two) Times a Day. 7/15/20   Javad Mercedes MD       ALLERGIES  No Known Allergies    REVIEW OF SYSTEMS  Constitutional: No fevers or chills, no recent weight gain or weight loss or fatigue  Eyes: No visual loss, blurred vision, double vision, yellow sclerae.  ENT: No headaches, hearing loss, vertigo, congestion or sore throat.   Cardiovascular: Per HPI  Respiratory: No cough or wheezing, no sputum production, no hematemesis   Gastrointestinal: No abdominal pain, no nausea, vomiting, constipation, diarrhea, melena.   Genitourinary: No dysuria, hematuria or increased frequency.  Musculoskeletal:  No gait disturbance, weakness or joint pain or stiffness  Integumentary: No rashes, urticaria, ulcers or sores.   Neurological: No headache, dizziness, syncope, paralysis, ataxia, no prior CVA/TIA  Psychiatric: No anxiety, or depression  Endocrine: No diaphoresis, cold or heat intolerance. No polyuria or polydipsia.   Hematologic/Lymphatic: No anemia, abnormal bruising or bleeding. No history of DVT/PE.      Objective     VITAL SIGNS: /74 (BP Location: Left arm, Patient Position: Lying)   Pulse 58   Temp 97.9 °F (36.6 °C) (Oral)   Resp 19   Ht 180.3 cm (70.98\")   Wt 85.4 kg (188 lb 4.4 oz)   SpO2 98%   BMI 26.27 kg/m²  O2 Flow Rate (L/min): 2 L/min   ADMIT WEIGHT:  89.4 kg (197 lb)  BMI: Body mass index is 26.27 kg/m².    Admission " "Weight: 89.4 kg (197 lb)     PHYSICAL EXAM  General appearance: Awake, alert, cooperative  Head: Normocephalic, without obvious abnormality, atraumatic  Eyes: Conjunctivae/corneas clear, EOMs intact  Neck: no adenopathy, no carotid bruit, no JVD and thyroid: not enlarged  Lungs: clear to auscultation bilaterally and no rhonchi or crackles\", ' symmetric  Heart: regular rate and rhythm, S1, S2 normal, no murmur, click, rub or gallop  Abdomen: Soft, non-tender, bowel sounds normal,  no organomegaly  Extremities: extremities normal, atraumatic, no cyanosis or edema  Skin: Skin color, turgor normal, no rashes or lesions  Neurologic: Grossly normal     CBC:   Results from last 7 days   Lab Units 03/16/21  0806 03/15/21  2141 03/15/21  0524 03/14/21  0130   WBC 10*3/mm3 2.57*  --  2.74* 3.45   HEMOGLOBIN g/dL 9.2* 8.6* 8.0* 9.9*  9.9*   HEMATOCRIT % 29.7* 28.1* 26.1* 33.2*  33.2*   MCV fL 90.0  --  89.7 92.2   PLATELETS 10*3/mm3 85*  --  82* 93*         BMP:  Results from last 7 days   Lab Units 03/16/21  0806 03/16/21  0234 03/15/21  1604 03/15/21  0524 03/14/21  0130   POTASSIUM mmol/L 4.6 4.2 3.7 3.3* 3.9   CHLORIDE mmol/L 110*  --   --  109* 109*   CO2 mmol/L 26.0  --   --  27.0 22.0   BUN mg/dL 9  --   --  14 13   CREATININE mg/dL 0.83  --   --  0.83 0.69*   GLUCOSE mg/dL 110*  --   --  105* 92   CALCIUM mg/dL 8.7  --   --  8.4* 8.3*   MAGNESIUM mg/dL 2.1  --   --  1.8 2.0     PT/INR:   Results from last 7 days   Lab Units 03/12/21  1911   PROTIME Seconds 14.2*   INR  1.13     MAG:   Results from last 7 days   Lab Units 03/16/21  0806 03/15/21  0524 03/14/21  0130   MAGNESIUM mg/dL 2.1 1.8 2.0     Lipid Panel:    Total Cholesterol   Date Value Ref Range Status   03/14/2021 91 0 - 200 mg/dL Final     Triglycerides   Date Value Ref Range Status   03/14/2021 104 0 - 150 mg/dL Final     HDL Cholesterol   Date Value Ref Range Status   03/14/2021 40 40 - 60 mg/dL Final       CURRENT MEDICATIONS:    Current " Facility-Administered Medications:   •  acetaminophen (TYLENOL) tablet 650 mg, 650 mg, Oral, Q4H PRN, Doc Sahni IV, MD  •  aspirin chewable tablet 81 mg, 81 mg, Oral, Daily, Doc Sahni IV, MD, 81 mg at 03/16/21 0806  •  dextrose (D50W) 25 g/ 50mL Intravenous Solution 25 g, 25 g, Intravenous, Q15 Min PRN, Doc Sahni IV, MD  •  dextrose (GLUTOSE) oral gel 15 g, 15 g, Oral, Q15 Min PRN, Doc Sahni IV, MD  •  glucagon (human recombinant) (GLUCAGEN DIAGNOSTIC) injection 1 mg, 1 mg, Subcutaneous, Q15 Min PRN, Doc Sahni IV, MD  •  HYDROcodone-acetaminophen (NORCO) 7.5-325 MG per tablet 1 tablet, 1 tablet, Oral, Q6H PRN, Doc Sahni IV, MD, 1 tablet at 03/16/21 0231  •  insulin lispro (humaLOG) injection 0-9 Units, 0-9 Units, Subcutaneous, 4x Daily With Meals & Nightly, Doc Sahni IV, MD, 4 Units at 03/14/21 1142  •  ipratropium-albuterol (DUO-NEB) nebulizer solution 3 mL, 3 mL, Nebulization, Q6H PRN, Doc Sahni IV, MD  •  levETIRAcetam (KEPPRA) 750 mg in sodium chloride 0.9 % 100 mL IVPB, 750 mg, Intravenous, Q12H, Doc Sahni IV, MD, 750 mg at 03/16/21 0806  •  Magnesium Sulfate 2 gram Bolus, followed by 8 gram infusion (total Mg dose 10 grams)- Mg less than or equal to 1mg/dL, 2 g, Intravenous, PRN **OR** Magnesium Sulfate 2 gram / 50mL Infusion (GIVE X 3 BAGS TO EQUAL 6GM TOTAL DOSE) - Mg 1.1 - 1.5 mg/dl, 2 g, Intravenous, PRN **OR** Magnesium Sulfate 4 gram infusion- Mg 1.6-1.9 mg/dL, 4 g, Intravenous, PRN, Doc Sahni IV, MD, Last Rate: 25 mL/hr at 03/15/21 0635, 4 g at 03/15/21 0635  •  metoprolol succinate XL (TOPROL-XL) 24 hr tablet 25 mg, 25 mg, Oral, Q24H, Doc Sahni IV, MD, 25 mg at 03/16/21 0806  •  pantoprazole (PROTONIX) injection 40 mg, 40 mg, Intravenous, Q12H, Doc Sahni IV, MD, 40 mg at 03/16/21 0806  •  potassium & sodium  phosphates (PHOS-NAK) 280-160-250 MG packet - for Phosphorus less than 1.25 mg/dL, 2 packet, Oral, Q6H PRN **OR** potassium & sodium phosphates (PHOS-NAK) 280-160-250 MG packet - for Phosphorus 1.25 - 2.5 mg/dL, 2 packet, Oral, Q6H PRN, Doc Sahni IV, MD, 2 packet at 03/14/21 2105  •  potassium chloride (KLOR-CON) packet 40 mEq, 40 mEq, Oral, PRN, Doc Sahni IV, MD, 40 mEq at 03/14/21 0411  •  potassium chloride (MICRO-K) CR capsule 40 mEq, 40 mEq, Oral, PRN, Doc Sahni IV, MD, 40 mEq at 03/15/21 2234  •  potassium chloride 10 mEq in 100 mL IVPB, 10 mEq, Intravenous, Q1H PRN, Doc Sahni IV, MD, Last Rate: 100 mL/hr at 03/15/21 1024, 10 mEq at 03/15/21 1024  •  rosuvastatin (CRESTOR) tablet 20 mg, 20 mg, Oral, Daily, Doc Sahni IV, MD, 20 mg at 03/16/21 0806  •  sacubitril-valsartan (ENTRESTO) 49-51 MG tablet 1 tablet, 1 tablet, Oral, Q12H, Doc Sahni IV, MD, 1 tablet at 03/16/21 0806  •  sodium chloride 0.9 % flush 10 mL, 10 mL, Intravenous, PRN, Doc Sahni IV, MD  •  sodium chloride 0.9 % flush 10 mL, 10 mL, Intravenous, Q12H, Doc Sahni IV, MD, 10 mL at 03/16/21 0806  •  sodium chloride 0.9 % flush 10 mL, 10 mL, Intravenous, PRN, Doc Sahni IV, MD  •  sodium chloride 0.9 % flush 10 mL, 10 mL, Intravenous, Q12H, Doc Sahni IV, MD, 10 mL at 03/16/21 0808  •  sodium chloride 0.9 % flush 10 mL, 10 mL, Intravenous, PRN, Doc Sahni IV, MD  •  spironolactone (ALDACTONE) tablet 25 mg, 25 mg, Oral, Daily, Doc Sahni IV, MD, 25 mg at 03/16/21 0806    TELEMETRY: NSR 60 bpm    Assessment & Plan     Mr. Narendra Cochran is a 80 year old  male patient seen in EP consultation today at the request of Dr. Daren MD for consideration of ICD placement for secondary prevention of sudden cardiac death.  Patient was admitted 3/12/2021  following a witnessed cardiac arrest out of hospital at the food court at [facility].  Patient reports he was in his usual state of health without any preceding complications or symptoms.  He reports last thing he remembers was putting his bags down on the table and then waking up in the ambulance sometime later.  Patient with documented bystander CPR with a single shock from AED at the [facility].  Echocardiogram this admission revealed an EF of 50% with noted history of nonischemic cardiomyopathy with reduced EF in the past.  Left heart catheterization was performed revealing nonflow limiting known CAD without intervention.  Patient potassium was noted to be decreased on admission but felt secondary to cardiac arrest and not a contributing factor to initial arrest per Dr. Boyd.  It is recommended that patient undergo dual-chamber ICD implant for secondary prevention of sudden cardiac death for witnessed cardiac arrest responding to single AED shock not felt secondary to transient or reversible causes.  Patient also noted history of sinus node dysfunction with recommendation for dual-chamber device implant.  A shared decision making encounter utilizing [facility] shared decision-making tool outlining device implant, generator changes, and end-of-life decision-making was reviewed.  All risk, benefits, and alternatives to procedure were outlined viewed with patient in detail.  Patient verbalizes complete understanding of the procedure and is willing to proceed with scheduling today.  All preprocedure lab evaluation reviewed and acceptable.  Patient with a negative Covid screening documented this admission.    Electronically signed by LEANDRO Delgadillo, 03/16/21, 1:18 PM EDT.     This note was completed using a voice transcription system. Every effort was made to ensure accuracy. However, inadvertent computerized transcription errors may be present.          Cardiac Electrophysiology Procedure Note       [facility]          CARDIAC ELECTROPHYSIOLOGIST ADDENDUM    I have personally performed a face to face diagnostic evaluation on this patient.  I have reviewed the note authored by the advanced practice provider and agree with the history of present illness, past medical history, surgical history, social history, family history and review of systems.. I have reviewed current medications, and lab values.  My findings are below:  .    History of Present Illness:  80 y.o. male who I had the pleasure meeting at the bedside today for the second time.  I saw him in my arrhythmia clinic last August when he was referred for consideration for pacemaker.  At that time he was not a candidate for a pacemaker as he did not have a indication due to asymptomatic mild sinus bradycardia.  He was admitted to the hospital on this admission after having witnessed cardiac arrest with rescue by AED from ventricular fibrillation occurring on March 12, 2021.  Patient underwent cardiac catheterization which demonstrated no obstructive coronary disease and relatively preserved LV systolic function.  He feels well.  He is a bit hard of hearing but he has no complaints.  He is hungry but other than this feels fine.  He does not remember any of the events around this.  He is here with his wife at the bedside.  He denies chest pain nausea vomit fevers or chills any kind of palpitations he in general says he has been doing pretty well since I last saw him in part from of course the [place] mall where he had cardiac arrest    Review of Systems:   · Constitutional: No Fevers/Chills, No recent weight gain weight loss, No fatigue.  · Cardiovascular: Per HPI  · Respiratory: No cough or wheezing, no sputum production. No hematemesis.    · Gastrointestinal: No Nausea, Vomiting, Diarrhea, or Constipation. No bloody or dark stools.  · All others negative except what is noted above and in my RA's  "note.     Physical Exam   Vital Signs: /74 (BP Location: Left arm, Patient Position: Lying)   Pulse 58   Temp 97.9 °F (36.6 °C) (Oral)   Resp 18   Ht 180.3 cm (70.98\")   Wt 78.3 kg (172 lb 9.6 oz)   SpO2 93%   BMI 24.08 kg/m²  O2 Flow Rate (L/min): 2 L/min     Admission Weight: 89.4 kg (197 lb)     General appearance: Alert oriented and cooperative.  In no acute distress  Skin:  Warm and Dry to touch  Head: Normocephalic, without obvious abnormality, atraumatic.   Eyes: Conjunctivae unremarkable, EOM's intact.Sclera non icteric.  Neck: No JVD, No carotid bruit. Neck supple, trachea midline  Lungs: Clear to auscultation bilaterally, no use of accessory muscles.    Heart:: RRR with Normal S1 and S2 . No murmurs and no gallops.  Abdomen: Soft, non-tender. Bowel sounds normal.  Extremities: No edema.  Neurologic: Oriented to time, person and place, affect appropriate.  No focal/major motor or sensory defects noted.    Psychiatric:  Appropriate mood, memory and judgment.  Good use of semantics and logic.    Echocardiogram personally reviewed.  Borderline normal LV systolic function probably more like an ejection fraction 45% without significant regional wall motion abnormality.    Cardiac catheterization personally reviewed.  No evidence of obstructive disease.  Mild LV systolic dysfunction.    KG personally reviewed.    Laboratory studies personally reviewed.    PLAN:    Very pleasant 80-year-old gentleman is a survivor sudden cardiac arrest for unclear reasons.  This is idiopathic documented VF.  He has borderline normal LV systolic function.  He has a mild nonischemic cardiomyopathy.  He has mild sinus bradycardia.  He has no clear identifiable reversible cause for his cardiac arrest.  An ICD is very reasonable.  I fully reviewed with the patient the options of receiving a defibrillator versus not receiving a defibrillator.  He is very clear in his mind he wishes to proceed with a defibrillator.  We " discussed the risks benefit profile of proceeding.  I quoted him a 1 to 2% chance of significant procedural complication including but not limited to cardiac perforation tamponade stroke myocardial infarction pneumothorax hemothorax inappropriate ICD shocks.  We also discussed the possibility of device recall.  He would be best served by dual-chamber transvenous ICD system which would allow for atrial pacing support.    This is a secondary prevention ICD system.      The patient was able to give written informed consent after revisiting the key portions of the risk versus benefit profile of the procedure.  This discussion was framed by our lengthy conversations (please see our detailed notes).  Patient verbalized strong understanding of this discussion and a strong desire to proceed with the procedure.  Please note that this detailed informed consent process utilized mutual and shared decision making process ( [facility] Sharing Decision Making Tool ) between all parties involved, principally the physician and patient, but also potentially with input from the patient's selected family and friends.         Som Boyd, DO

## 2021-03-16 NOTE — PLAN OF CARE
Goal Outcome Evaluation:  Plan of Care Reviewed With: patient, spouse  Progress: improving  Outcome Summary: Pt NPO for majority of the day anticipating AICD placement. BG good. Reported fatigue, likely from hunger that prevented him from feeling well enough to participate in PT/OT. Electrolytes within expected parameters. Procedure performed without unexpected event. UO = 410. Afebrile.

## 2021-03-16 NOTE — PROGRESS NOTES
Intensive Care Follow-up     Hospital:  LOS: 4 days   Mr. Narendra Cochran, 80 y.o. male is followed for:   Cardiac arrest (CMS/HCC)            History of present illness:   80-year-old male with previous history of tobacco abuse, marijuana use, Paget's disease of the bone, pancytopenia, Barraza's esophagus, coronary disease, hypertension, dyslipidemia, type 2 diabetes, seizures on Keppra, presented with witnessed cardiac arrest.  Patient received bystander CPR and had return of spontaneous occlusion in the ED.  Patient presented to the emergency room and was hemodynamically stable and awake.  Patient was seen by cardiology team and plan is to do angiogram and possible AICD.  Patient was also seen by neurology for possible seizures and kept on Keppra.    Patient underwent coronary angiogram yesterday and no new findings noted.  Patient has known lesions which are stable.      Subjective   Interval History:  Overnight did fair.  Today morning sitting out in chair.  Denies any chest pain or shortness of breath.  No seizures noted otherwise.  Plan is for AICD today.                 The patient's past medical, surgical and social history were reviewed and updated in Epic as appropriate.       Objective     Infusions:     Medications:  aspirin, 81 mg, Oral, Daily  insulin lispro, 0-9 Units, Subcutaneous, 4x Daily With Meals & Nightly  levETIRAcetam, 750 mg, Intravenous, Q12H  metoprolol succinate XL, 25 mg, Oral, Q24H  pantoprazole, 40 mg, Intravenous, Q12H  rosuvastatin, 20 mg, Oral, Daily  sacubitril-valsartan, 1 tablet, Oral, Q12H  sodium chloride, 10 mL, Intravenous, Q12H  sodium chloride, 10 mL, Intravenous, Q12H  spironolactone, 25 mg, Oral, Daily        Vital Sign Min/Max for last 24 hours  Temp  Min: 97.6 °F (36.4 °C)  Max: 98.7 °F (37.1 °C)   BP  Min: 118/85  Max: 165/71   Pulse  Min: 48  Max: 72   Resp  Min: 17  Max: 24   SpO2  Min: 91 %  Max: 100 %   Flow (L/min)  Min: 1  Max: 2       Input/Output for last 24 hour  "shift  03/15 0701 - 03/16 0700  In: 1124.4 [P.O.:45; I.V.:679.4]  Out: 1050 [Urine:1050]      Objective:  Vital signs: (most recent): Blood pressure 141/61, pulse 62, temperature 97.7 °F (36.5 °C), temperature source Oral, resp. rate 21, height 180.3 cm (70.98\"), weight 85.4 kg (188 lb 4.4 oz), SpO2 99 %.               General Appearance: Awake, alert, in no acute distress  Lungs:   B/L Breath sounds present with decreased breath sounds on bases, no wheezing heard, no crackles.   Heart: S1 and S2 present, no murmur  Abdomen: Soft, nontender, no guarding or rigidity, bowel sounds positive  Extremities: Right wrist dressing intact, no cyanosis or clubbing,  + edema, warm to touch.  Pulses: Positive and symmetric.  Neurologic:  Moving all four extremities. Good strength bilaterally.     Results from last 7 days   Lab Units 03/16/21  0806 03/15/21  2141 03/15/21  0524 03/14/21  0130   WBC 10*3/mm3 2.57*  --  2.74* 3.45   HEMOGLOBIN g/dL 9.2* 8.6* 8.0* 9.9*  9.9*   PLATELETS 10*3/mm3 85*  --  82* 93*     Results from last 7 days   Lab Units 03/16/21  0806 03/16/21  0234 03/15/21  1604 03/15/21  0524 03/14/21  1922 03/14/21  0130   SODIUM mmol/L 140  --   --  142  --  142   POTASSIUM mmol/L 4.6 4.2 3.7 3.3*  --  3.9   CO2 mmol/L 26.0  --   --  27.0  --  22.0   BUN mg/dL 9  --   --  14  --  13   CREATININE mg/dL 0.83  --   --  0.83  --  0.69*   MAGNESIUM mg/dL 2.1  --   --  1.8  --  2.0   PHOSPHORUS mg/dL 3.0  --   --  3.0 2.1* 1.7*   GLUCOSE mg/dL 110*  --   --  105*  --  92     Estimated Creatinine Clearance: 82.9 mL/min (by C-G formula based on SCr of 0.83 mg/dL).    Results from last 7 days   Lab Units 03/12/21  1914   PH, ARTERIAL pH units 7.40       Images:   None new.     I reviewed the patient's results and images.     Assessment/Plan   Impression        OHCA    Essential hypertension    Hyperlipidemia LDL goal <70    T2DM    Paget disease of bone    Tobacco abuse    H/O seizures on Keppra    Coronary artery " disease involving native coronary artery of native heart with angina pectoris (CMS/HCC)    Hypokalemia    Hypomagnesemia    VHD; mild-mod AR, mild MR    Chronic systolic congestive heart failure (CMS/HCC)    GERD    Marijuana use    Frequent PVCs       Plan        1.  Patient presented here with out of hospital cardiac arrest.  Cardiology work-up is in progress.  Has history of nonischemic cardiomyopathy but recent echocardiogram showed ejection fraction 50%.  LVH noted.  Continue aspirin, statin for now.  Underwent coronary angiogram yesterday and with no new cardiac disease noted.  Plan is for EP evaluation and AICD placement today.  2.  Monitor electrolytes and replace aggressively per ICU protocol.  Electrolytes are stable for today.  3.  No overt seizures noted.  Continue on Keppra.  Neurology team is following.  4.  No further episodes of GI bleed.  Hemoglobin is stable.  Previous history of Barraza's esophagus.  Will need GI work-up eventually.  No need for blood transfusion at this time.    5.  Blood sugar monitoring and insulin as needed for hyperglycemia management.  6.  SCDs for DVT prophylaxis.  Hold pharmacologic prophylaxis for now with concern for GI bleed.  7.  Out of bed as tolerated.  8.  Tolerating oral diet well but currently n.p.o. for AICD.  9.  Patient has previous history of pancytopenia.  Has seen by hematology in the past.  Will continue close monitoring for now.    We will continue to closely monitor in intensive care unit.  Remains guarded prognosis with ongoing cardiac and GI issues.    Plan of care and goals reviewed with multidisciplinary/antibiotic stewardship team during rounds.   I discussed the patient's findings and my recommendations with patient, family and nursing staff     High level of risk due to:  illness with threat to life or bodily function.    Time spent Critical care 30 min (exclusive of procedure time)  including high complexity decision making to assess, manipulate,  and support vital organ system failure in this individual who has impairment of one or more vital organ systems such that there is a high probability of imminent or life threatening deterioration in the patient’s condition.      Gilson Zambrano MD, [facility]  Pulmonary, Critical care and Sleep Medicine

## 2021-03-17 ENCOUNTER — APPOINTMENT (OUTPATIENT)
Dept: GENERAL RADIOLOGY | Facility: HOSPITAL | Age: 81
End: 2021-03-17

## 2021-03-17 LAB
ANION GAP SERPL CALCULATED.3IONS-SCNC: 7 MMOL/L (ref 5–15)
BASOPHILS # BLD AUTO: 0.01 10*3/MM3 (ref 0–0.2)
BASOPHILS NFR BLD AUTO: 0.3 % (ref 0–1.5)
BUN SERPL-MCNC: 9 MG/DL (ref 8–23)
BUN/CREAT SERPL: 10.7 (ref 7–25)
CALCIUM SPEC-SCNC: 8.3 MG/DL (ref 8.6–10.5)
CHLORIDE SERPL-SCNC: 110 MMOL/L (ref 98–107)
CO2 SERPL-SCNC: 24 MMOL/L (ref 22–29)
CREAT SERPL-MCNC: 0.84 MG/DL (ref 0.76–1.27)
DEPRECATED RDW RBC AUTO: 54.9 FL (ref 37–54)
EOSINOPHIL # BLD AUTO: 0.06 10*3/MM3 (ref 0–0.4)
EOSINOPHIL NFR BLD AUTO: 2 % (ref 0.3–6.2)
ERYTHROCYTE [DISTWIDTH] IN BLOOD BY AUTOMATED COUNT: 16.7 % (ref 12.3–15.4)
GFR SERPL CREATININE-BSD FRML MDRD: 106 ML/MIN/1.73
GLUCOSE BLDC GLUCOMTR-MCNC: 110 MG/DL (ref 70–130)
GLUCOSE BLDC GLUCOMTR-MCNC: 123 MG/DL (ref 70–130)
GLUCOSE BLDC GLUCOMTR-MCNC: 149 MG/DL (ref 70–130)
GLUCOSE BLDC GLUCOMTR-MCNC: 158 MG/DL (ref 70–130)
GLUCOSE SERPL-MCNC: 99 MG/DL (ref 65–99)
HCT VFR BLD AUTO: 27.4 % (ref 37.5–51)
HCT VFR BLD AUTO: 30.8 % (ref 37.5–51)
HCT VFR BLD AUTO: 31.7 % (ref 37.5–51)
HGB BLD-MCNC: 8.4 G/DL (ref 13–17.7)
HGB BLD-MCNC: 9.7 G/DL (ref 13–17.7)
HGB BLD-MCNC: 9.8 G/DL (ref 13–17.7)
IMM GRANULOCYTES # BLD AUTO: 0.01 10*3/MM3 (ref 0–0.05)
IMM GRANULOCYTES NFR BLD AUTO: 0.3 % (ref 0–0.5)
LYMPHOCYTES # BLD AUTO: 0.94 10*3/MM3 (ref 0.7–3.1)
LYMPHOCYTES NFR BLD AUTO: 30.7 % (ref 19.6–45.3)
MAGNESIUM SERPL-MCNC: 1.8 MG/DL (ref 1.6–2.4)
MCH RBC QN AUTO: 28.1 PG (ref 26.6–33)
MCHC RBC AUTO-ENTMCNC: 30.7 G/DL (ref 31.5–35.7)
MCV RBC AUTO: 91.6 FL (ref 79–97)
MONOCYTES # BLD AUTO: 0.27 10*3/MM3 (ref 0.1–0.9)
MONOCYTES NFR BLD AUTO: 8.8 % (ref 5–12)
NEUTROPHILS NFR BLD AUTO: 1.77 10*3/MM3 (ref 1.7–7)
NEUTROPHILS NFR BLD AUTO: 57.9 % (ref 42.7–76)
NRBC BLD AUTO-RTO: 0 /100 WBC (ref 0–0.2)
PHOSPHATE SERPL-MCNC: 3.1 MG/DL (ref 2.5–4.5)
PLATELET # BLD AUTO: 84 10*3/MM3 (ref 140–450)
PMV BLD AUTO: 13 FL (ref 6–12)
POTASSIUM SERPL-SCNC: 4.3 MMOL/L (ref 3.5–5.2)
RBC # BLD AUTO: 2.99 10*6/MM3 (ref 4.14–5.8)
SODIUM SERPL-SCNC: 141 MMOL/L (ref 136–145)
WBC # BLD AUTO: 3.06 10*3/MM3 (ref 3.4–10.8)

## 2021-03-17 PROCEDURE — 85018 HEMOGLOBIN: CPT | Performed by: INTERNAL MEDICINE

## 2021-03-17 PROCEDURE — 85014 HEMATOCRIT: CPT | Performed by: INTERNAL MEDICINE

## 2021-03-17 PROCEDURE — 84100 ASSAY OF PHOSPHORUS: CPT | Performed by: INTERNAL MEDICINE

## 2021-03-17 PROCEDURE — 63710000001 INSULIN LISPRO (HUMAN) PER 5 UNITS: Performed by: INTERNAL MEDICINE

## 2021-03-17 PROCEDURE — 85025 COMPLETE CBC W/AUTO DIFF WBC: CPT | Performed by: INTERNAL MEDICINE

## 2021-03-17 PROCEDURE — 25010000002 LEVETRIRACETAM PER 10 MG: Performed by: INTERNAL MEDICINE

## 2021-03-17 PROCEDURE — 97530 THERAPEUTIC ACTIVITIES: CPT

## 2021-03-17 PROCEDURE — 83735 ASSAY OF MAGNESIUM: CPT | Performed by: INTERNAL MEDICINE

## 2021-03-17 PROCEDURE — 99232 SBSQ HOSP IP/OBS MODERATE 35: CPT | Performed by: INTERNAL MEDICINE

## 2021-03-17 PROCEDURE — 71046 X-RAY EXAM CHEST 2 VIEWS: CPT

## 2021-03-17 PROCEDURE — 25010000003 MAGNESIUM SULFATE 4 GM/100ML SOLUTION: Performed by: INTERNAL MEDICINE

## 2021-03-17 PROCEDURE — 80048 BASIC METABOLIC PNL TOTAL CA: CPT | Performed by: INTERNAL MEDICINE

## 2021-03-17 PROCEDURE — 99024 POSTOP FOLLOW-UP VISIT: CPT | Performed by: INTERNAL MEDICINE

## 2021-03-17 PROCEDURE — 82962 GLUCOSE BLOOD TEST: CPT

## 2021-03-17 PROCEDURE — 99222 1ST HOSP IP/OBS MODERATE 55: CPT | Performed by: INTERNAL MEDICINE

## 2021-03-17 PROCEDURE — 25010000002 FUROSEMIDE PER 20 MG: Performed by: INTERNAL MEDICINE

## 2021-03-17 RX ORDER — FUROSEMIDE 20 MG/1
20 TABLET ORAL DAILY
Status: DISCONTINUED | OUTPATIENT
Start: 2021-03-18 | End: 2021-03-18

## 2021-03-17 RX ORDER — FUROSEMIDE 10 MG/ML
40 INJECTION INTRAMUSCULAR; INTRAVENOUS ONCE
Status: COMPLETED | OUTPATIENT
Start: 2021-03-17 | End: 2021-03-17

## 2021-03-17 RX ADMIN — PANTOPRAZOLE SODIUM 40 MG: 40 INJECTION, POWDER, FOR SOLUTION INTRAVENOUS at 08:50

## 2021-03-17 RX ADMIN — INSULIN LISPRO 2 UNITS: 100 INJECTION, SOLUTION INTRAVENOUS; SUBCUTANEOUS at 12:11

## 2021-03-17 RX ADMIN — FUROSEMIDE 40 MG: 10 INJECTION, SOLUTION INTRAVENOUS at 10:08

## 2021-03-17 RX ADMIN — LEVETIRACETAM 750 MG: 100 INJECTION, SOLUTION INTRAVENOUS at 08:50

## 2021-03-17 RX ADMIN — IBUPROFEN 600 MG: 600 TABLET, FILM COATED ORAL at 08:50

## 2021-03-17 RX ADMIN — METOPROLOL SUCCINATE 25 MG: 25 TABLET, EXTENDED RELEASE ORAL at 08:50

## 2021-03-17 RX ADMIN — SACUBITRIL AND VALSARTAN 1 TABLET: 49; 51 TABLET, FILM COATED ORAL at 08:50

## 2021-03-17 RX ADMIN — ASPIRIN 81 MG: 81 TABLET, CHEWABLE ORAL at 08:50

## 2021-03-17 RX ADMIN — PANTOPRAZOLE SODIUM 40 MG: 40 INJECTION, POWDER, FOR SOLUTION INTRAVENOUS at 20:39

## 2021-03-17 RX ADMIN — ROSUVASTATIN CALCIUM 20 MG: 20 TABLET, COATED ORAL at 08:51

## 2021-03-17 RX ADMIN — SPIRONOLACTONE 25 MG: 25 TABLET ORAL at 08:50

## 2021-03-17 RX ADMIN — MAGNESIUM SULFATE HEPTAHYDRATE 4 G: 40 INJECTION, SOLUTION INTRAVENOUS at 07:20

## 2021-03-17 RX ADMIN — ACETAMINOPHEN 650 MG: 325 TABLET ORAL at 20:39

## 2021-03-17 RX ADMIN — LEVETIRACETAM 750 MG: 100 INJECTION, SOLUTION INTRAVENOUS at 21:55

## 2021-03-17 RX ADMIN — SACUBITRIL AND VALSARTAN 1 TABLET: 49; 51 TABLET, FILM COATED ORAL at 20:53

## 2021-03-17 RX ADMIN — SODIUM CHLORIDE, PRESERVATIVE FREE 3 ML: 5 INJECTION INTRAVENOUS at 20:54

## 2021-03-17 RX ADMIN — SODIUM CHLORIDE, PRESERVATIVE FREE 10 ML: 5 INJECTION INTRAVENOUS at 08:51

## 2021-03-17 RX ADMIN — ACETAMINOPHEN 650 MG: 325 TABLET ORAL at 12:11

## 2021-03-17 RX ADMIN — SODIUM CHLORIDE, PRESERVATIVE FREE 10 ML: 5 INJECTION INTRAVENOUS at 20:44

## 2021-03-17 RX ADMIN — ACETAMINOPHEN 650 MG: 325 TABLET ORAL at 06:27

## 2021-03-17 NOTE — THERAPY TREATMENT NOTE
Patient Name: Narendra Cochran  : 1940    MRN: [id number]                              Today's Date: 3/17/2021       Admit Date: 3/12/2021    Visit Dx:     ICD-10-CM ICD-9-CM   1. Acute on chronic congestive heart failure, unspecified heart failure type (CMS/HCC)  I50.9 428.0   2. Hypokalemia  E87.6 276.8   3. 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 OHCA  I46.9 427.5   5. NSVT (nonsustained ventricular tachycardia) (CMS/HCC)  I47.2 427.1   6. Cardiac arrest (CMS/HCC)  I46.9 427.5     Patient Active Problem List   Diagnosis   • Essential hypertension   • Hyperlipidemia LDL goal <70   • T2DM   • Paget disease of bone   • Tobacco abuse   • H/O seizures on Keppra   • Gonalgia   • Osteitis deformans   • Syncope   • NSVT (nonsustained ventricular tachycardia) (CMS/HCC)   • Coronary artery disease involving native coronary artery of native heart with angina pectoris (CMS/HCC)   • NICM    • OHCA   • Hypokalemia   • Hypomagnesemia   • VHD; mild-mod AR, mild MR   • Chronic systolic congestive heart failure (CMS/HCC)   • Former tobacco user   • GERD   • Marijuana use   • Frequent PVCs   • Acute blood loss anemia     Past Medical History:   Diagnosis Date   • Arthritis    • Diabetes mellitus (CMS/HCC)    • Diverticulitis    • GERD (gastroesophageal reflux disease)    • History of transfusion    • Hyperlipidemia    • Hypertension    • Hypertensive urgency, malignant 2017   • Paget disease of bone      Past Surgical History:   Procedure Laterality Date   • APPENDECTOMY     • CARDIAC CATHETERIZATION N/A 3/18/2020    Procedure: Left Heart Cath;  Surgeon: Sonya Garibay MD;  Location:  [facility];  Service: Cardiology;  Laterality: N/A;  First availabel provider     • CARDIAC CATHETERIZATION N/A 3/15/2021    Procedure: LEFT HEART CATH;  Surgeon: Doc Sahni IV, MD;  Location:  [facility]  [facility];  Service: Cardiovascular;  Laterality: N/A;   • CARDIAC CATHETERIZATION N/A 3/15/2021    Procedure: Coronary angiography;  Surgeon: Doc Shani IV, MD;  Location: [facility] INVASIVE LOCATION;  Service: Cardiovascular;  Laterality: N/A;   • COLON RESECTION      second to diverticulitis    • FOOT SURGERY Left      General Information     Row Name 03/17/21 1034          OT Time and Intention    Document Type  therapy note (daily note)  -MA     Mode of Treatment  occupational therapy  -MA     Row Name 03/17/21 1034          General Information    Patient Profile Reviewed  yes  -MA     Existing Precautions/Restrictions  fall;seizures;cardiac;other (see comments) very [place], amplifier present in room; s/p AICD placement  -MA     Barriers to Rehab  medically complex  -MA     Row Name 03/17/21 1034          Cognition    Orientation Status (Cognition)  oriented x 3  -MA     Row Name 03/17/21 1034          Safety Issues, Functional Mobility    Safety Issues Affecting Function (Mobility)  insight into deficits/self-awareness;awareness of need for assistance;safety precaution awareness;safety precautions follow-through/compliance;sequencing abilities;judgment  -MA     Impairments Affecting Function (Mobility)  balance;endurance/activity tolerance;strength  -MA       User Key  (r) = Recorded By, (t) = Taken By, (c) = Cosigned By    Initials Name Provider Type    MA Sisi Mayorga, HUMBERTO Occupational Therapist          Mobility/ADL's     Row Name 03/17/21 1035          Bed Mobility    Comment (Bed Mobility)  Pt UIC upon arrival  -MA     Row Name 03/17/21 1035          Transfers    Transfers  sit-stand transfer  -MA     Comment (Transfers)  Pt req min A d/t LUE sling, unable to push from chair w/ LUE s/p AICD placement  -MA     Sit-Stand [place] (Transfers)  minimum assist (75% patient effort);1 person assist;verbal cues  -MA     Row Name 03/17/21 1035          Sit-Stand Transfer    Assistive Device  (Sit-Stand Transfers)  walker, front-wheeled  -[facility] Name 03/17/21 1035          Functional Mobility    Functional Mobility- Comment  Pt declined any mobility once standing, agreeable to march in place x10  -MA     Row Name 03/17/21 1035          Lower Body Dressing Assessment/Training    Assistive Devices (Lower Body Dressing)  -- Pt declined any functional activity  -MA       User Key  (r) = Recorded By, (t) = Taken By, (c) = Cosigned By    Initials Name Provider Type    Sisi Erickson OT Occupational Therapist        Obj/Interventions     Row Name 03/17/21 1037          Shoulder (Therapeutic Exercise)    Shoulder (Therapeutic Exercise)  AROM (active range of motion)  -MA     Shoulder AROM (Therapeutic Exercise)  right;flexion;extension;aBduction;aDduction;10 repetitions;sitting  -MA     Row Name 03/17/21 1037          Balance    Static Standing Balance  mild impairment;supported;standing  -MA     Row Name 03/17/21 1037          Therapeutic Exercise    Therapeutic Exercise  shoulder  -MA       User Key  (r) = Recorded By, (t) = Taken By, (c) = Cosigned By    Initials Name Provider Type    Sisi Erickson OT Occupational Therapist        Goals/Plan    No documentation.       Clinical Impression     Row Name 03/17/21 1044          Pain Assessment    Additional Documentation  Pain Scale: Numbers Pre/Post-Treatment (Group)  -MA     Row Name 03/17/21 1044          Pain Scale: Numbers Pre/Post-Treatment    Pretreatment Pain Rating  0/10 - no pain  -MA     Posttreatment Pain Rating  0/10 - no pain  -MA     Row Name 03/17/21 1044          Plan of Care Review    Plan of Care Reviewed With  patient  -MA     Progress  no change  -MA     Outcome Summary  Pt presents w/ decreased activity tolerance, strength, and balance limiting his ADL independence. Pt s/p ICD placement w/ LUE in sling. Pt min A for STS, CGA for marching in place x10, tolerated 10 reps of RUE TE, pt declined any further functional activity.  Recommend IRF at discharge.  -MA     Row Name 03/17/21 1044          Therapy Assessment/Plan (OT)    Rehab Potential (OT)  good, to achieve stated therapy goals  -MA     Criteria for Skilled Therapeutic Interventions Met (OT)  yes;skilled treatment is necessary  -MA     Therapy Frequency (OT)  daily  -MA     Row Name 03/17/21 1044          Therapy Plan Review/Discharge Plan (OT)    Anticipated Discharge Disposition (OT)  inpatient rehabilitation facility  -MA     Row Name 03/17/21 1044          Vital Signs    Pre Systolic BP Rehab  139  -MA     Pre Treatment Diastolic BP  63  -MA     Pretreatment Heart Rate (beats/min)  74  -MA     Pre SpO2 (%)  96  -MA     O2 Delivery Pre Treatment  room air  -MA     O2 Delivery Intra Treatment  room air  -MA     Post SpO2 (%)  97  -MA     O2 Delivery Post Treatment  room air  -MA     Pre Patient Position  Sitting  -MA     Intra Patient Position  Standing  -MA     Post Patient Position  Sitting  -MA     Row Name 03/17/21 1044          Positioning and Restraints    Pre-Treatment Position  sitting in chair/recliner  -MA     Post Treatment Position  chair  -MA     In Chair  reclined;call light within reach;encouraged to call for assist;exit alarm on;waffle cushion;legs elevated  -MA       User Key  (r) = Recorded By, (t) = Taken By, (c) = Cosigned By    Initials Name Provider Type    Sisi Erickson, OT Occupational Therapist        Outcome Measures     Row Name 03/17/21 1048          How much help from another is currently needed...    Putting on and taking off regular lower body clothing?  1  -MA     Bathing (including washing, rinsing, and drying)  2  -MA     Toileting (which includes using toilet bed pan or urinal)  2  -MA     Putting on and taking off regular upper body clothing  3  -MA     Taking care of personal grooming (such as brushing teeth)  3  -MA     Eating meals  4  -MA     AM-PAC 6 Clicks Score (OT)  15  -MA     Row Name 03/17/21 1048          Functional Assessment     Outcome Measure Options  AM-PAC 6 Clicks Daily Activity (OT)  -MA       User Key  (r) = Recorded By, (t) = Taken By, (c) = Cosigned By    Initials Name Provider Type    Sisi Erickson OT Occupational Therapist        Occupational Therapy Education                 Title: PT OT SLP Therapies (In Progress)     Topic: Occupational Therapy (In Progress)     Point: ADL training (In Progress)     Description:   Instruct learner(s) on proper safety adaptation and remediation techniques during self care or transfers.   Instruct in proper use of assistive devices.              Learning Progress Summary           Patient Acceptance, E, NR by CL at 3/15/2021 1057                   Point: Home exercise program (In Progress)     Description:   Instruct learner(s) on appropriate technique for monitoring, assisting and/or progressing therapeutic exercises/activities.              Learning Progress Summary           Patient Acceptance, E, NR by MA at 3/17/2021 1012                   Point: Precautions (In Progress)     Description:   Instruct learner(s) on prescribed precautions during self-care and functional transfers.              Learning Progress Summary           Patient Acceptance, E, NR by MA at 3/17/2021 1012    Acceptance, E, NR by CL at 3/15/2021 1057                   Point: Body mechanics (In Progress)     Description:   Instruct learner(s) on proper positioning and spine alignment during self-care, functional mobility activities and/or exercises.              Learning Progress Summary           Patient Acceptance, E, NR by MA at 3/17/2021 1012    Acceptance, E, NR by CL at 3/15/2021 1057                               User Key     Initials Effective Dates Name Provider Type Discipline     04/03/18 -  Camryn Guzman OT Occupational Therapist OT    MA 11/19/20 -  Sisi Mayorga OT Occupational Therapist OT              OT Recommendation and Plan  Therapy Frequency (OT): daily  Plan of Care Review  Plan of Care  Reviewed With: patient  Progress: no change  Outcome Summary: Pt presents w/ decreased activity tolerance, strength, and balance limiting his ADL independence. Pt s/p ICD placement w/ LUE in sling. Pt min A for STS, CGA for marching in place x10, tolerated 10 reps of RUE TE, pt declined any further functional activity. Recommend IRF at discharge.     Time Calculation:   Time Calculation- OT     Row Name 03/17/21 1048             Time Calculation- OT    OT Start Time  1012  -MA      OT Stop Time  1027  -MA      OT Time Calculation (min)  15 min  -MA      OT Received On  03/17/21  -MA      OT Goal Re-Cert Due Date  03/25/21  -MA         Timed Charges    71154 - OT Therapeutic Exercise Minutes  6  -MA      53170 - OT Therapeutic Activity Minutes  9  -MA        User Key  (r) = Recorded By, (t) = Taken By, (c) = Cosigned By    Initials Name Provider Type    Sisi Erickson OT Occupational Therapist        Therapy Charges for Today     Code Description Service Date Service Provider Modifiers Qty    43203042434  OT THERAPEUTIC ACT EA 15 MIN 3/17/2021 Sisi Mayorga OT GO 1               Sisi Mayorga OT  3/17/2021

## 2021-03-17 NOTE — PROGRESS NOTES
Continued Stay Note   [facility]     Patient Name: Narendra Cochran  MRN: [id number]  Today's Date: 3/17/2021    Admit Date: 3/12/2021    Discharge Plan     Row Name 03/17/21 1311       Plan    Plan  Home with family    Patient/Family in Agreement with Plan  yes    Plan Comments  Spoke with patient at bedside.  Patient transferring to telemetry floor today.  States that he plans to discharge home with family when medically ready..  Will continue to follow for discharge planning    Final Discharge Disposition Code  01 - home or self-care        Discharge Codes    No documentation.       Expected Discharge Date and Time     Expected Discharge Date Expected Discharge Time    Mar 20, 2021             Petra Barajas RN

## 2021-03-17 NOTE — CONSULTS
[facility]    Referring Provider: No ref. provider found    Primary Care Provider: Marguerite Moore PA-C    Reason for Consultation: Anemia, blood in stool    Chief complaint: Cardiac arrest    History of present illness:  Narendra Cochran is a 80 y.o. male who is admitted with cardiac arrest.  He underwent ICD placement yesterday.  He has had some lightly bloody colored stool since admission.  Hemoglobin on admission was 12.4.  It dropped to 8.0 on 315.  Today after transfusion is 9.8 and 31.7.  Platelet counts 84,000.  He states he had a recent colonoscopy but is unsure the results.  In reviewing the media files he had EGD in 2018 by REID CARMONA.  Possible helical back at that time.  Currently denies a chest pain or shortness of breath.  No melena.  No nausea vomiting hematemesis.  States he has lost some weight.  But unsure how much    Allergies:  Patient has no known allergies.    Scheduled Meds:  acetaminophen, 650 mg, Oral, Q6H  aspirin, 81 mg, Oral, Daily  [START ON 3/18/2021] furosemide, 20 mg, Oral, Daily  insulin lispro, 0-9 Units, Subcutaneous, 4x Daily With Meals & Nightly  levETIRAcetam, 750 mg, Intravenous, Q12H  metoprolol succinate XL, 25 mg, Oral, Q24H  pantoprazole, 40 mg, Intravenous, Q12H  rosuvastatin, 20 mg, Oral, Daily  sacubitril-valsartan, 1 tablet, Oral, Q12H  sodium chloride, 10 mL, Intravenous, Q12H  sodium chloride, 10 mL, Intravenous, Q12H  sodium chloride, 3 mL, Intravenous, Q12H  spironolactone, 25 mg, Oral, Daily         Infusions:       PRN Meds:  •  acetaminophen  •  dextrose  •  dextrose  •  glucagon (human recombinant)  •  HYDROcodone-acetaminophen  •  ipratropium-albuterol  •  magnesium sulfate **OR** magnesium sulfate **OR** magnesium sulfate  •  potassium & sodium phosphates **OR** potassium & sodium phosphates  •  potassium chloride  •  potassium chloride  •  potassium chloride  •  sodium chloride  •  sodium chloride  •  sodium chloride  •  sodium chloride    Home  Meds:  Medications Prior to Admission   Medication Sig Dispense Refill Last Dose   • aspirin 81 MG chewable tablet Chew 1 tablet Daily.   3/12/2021 at Unknown time   • famotidine (PEPCID) 20 MG tablet Take 1 tablet by mouth At Night As Needed for Heartburn. 90 tablet 1 Patient Taking Differently at Unknown time   • levETIRAcetam (KEPPRA) 250 MG tablet TAKE ONE TABLET BY MOUTH TWICE A  tablet 0 3/12/2021 at Unknown time   • metoprolol succinate XL (TOPROL-XL) 25 MG 24 hr tablet Take 1 tablet by mouth Daily. 30 tablet 6 3/12/2021 at Unknown time   • omeprazole (priLOSEC) 40 MG capsule Take 1 capsule by mouth Every Morning Before Breakfast. 90 capsule 1 3/12/2021 at Unknown time   • rosuvastatin (CRESTOR) 20 MG tablet TAKE ONE TABLET BY MOUTH DAILY 90 tablet 0 3/12/2021 at Unknown time   • Blood Pressure Monitoring (BLOOD PRESSURE MONITOR/L CUFF) misc 1 Units Daily. 1 each 0    • diclofenac (VOLTAREN) 1 % gel gel Apply 4 g topically to the appropriate area as directed 4 (Four) Times a Day As Needed (prn for pain). 60 tube 0    • Empagliflozin 25 MG tablet Take 25 mg by mouth Daily. 30 tablet 5 Unknown at Unknown time   • glucose blood test strip Use to check blood glucose once a day. DX:E11.65 100 each 3    • glucose monitor monitoring kit 1 each Daily. DX: E11.65 1 each 0    • Lancets 30G misc Use to check blood glucose once a day. DX: E11.65 100 each 3    • metFORMIN ER (GLUCOPHAGE-XR) 500 MG 24 hr tablet TAKE ONE TABLET BY MOUTH TWICE A DAY WITH MEALS (Patient taking differently: Take 500 mg by mouth Daily With Breakfast.) 180 tablet 0    • sacubitril-valsartan (ENTRESTO) 24-26 MG tablet Take 1 tablet by mouth 2 (Two) Times a Day. 60 tablet 3        ROS: Review of Systems   Constitutional: Negative for appetite change, chills and fever.   HENT: Negative for trouble swallowing.    Respiratory: Negative for shortness of breath.    Cardiovascular: Negative for chest pain.   Gastrointestinal: Positive for blood  "in stool.   All other systems reviewed and are negative.      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  PAST SURG HX: Pt  has a past surgical history that includes Appendectomy; Foot surgery (Left); Colectomy; Cardiac catheterization (N/A, 3/18/2020); Cardiac catheterization (N/A, 3/15/2021); Cardiac catheterization (N/A, 3/15/2021); and Cardiac electrophysiology procedure (N/A, 3/16/2021).  FAM HX: family history includes Clotting disorder in his sister; Diabetes in his sister; Fainting in his brother; No Known Problems in his brother, daughter, mother, sister, son, son, and son.  SOC HX: Pt  reports that he has been smoking cigars and cigarettes. He has a 16.25 pack-year smoking history. He has never used smokeless tobacco. He reports current alcohol use. He reports current drug use. Drug: Marijuana.    /73   Pulse 63   Temp 97.7 °F (36.5 °C) (Oral)   Resp 18   Ht 180.3 cm (70.98\")   Wt 78.3 kg (172 lb 9.6 oz)   SpO2 100%   BMI 24.08 kg/m²     Physical Exam  Wt Readings from Last 3 Encounters:   03/16/21 78.3 kg (172 lb 9.6 oz)   01/18/21 89.4 kg (197 lb)   12/30/20 93.9 kg (207 lb)   ,body mass index is 24.08 kg/m².    General Well developed; well nourished; no acute distress.   ENT Good dentition.  Oral mucosa pink & moist without thrush or lesions.    Neck Neck supple; trachea midline. No thyromegaly  Resp CTA; no rhonchi, rales, or wheezes.  Respiration effort normal  CV RRR; ; no M/R/G. No lower extremity edema  GI Abd soft, NT, ND, normal active bowel sounds.  No HSM.  No abd hernia  Skin No rash; no lesions; no bruises.  Skin turgor normal  MSK No clubbing; no cyanosis.    Psych Oriented to time, place, and person.  Appropriate affect      Results Review:   I reviewed " the patient's new clinical results.  I reviewed the patient's new imaging results and agree with the interpretation.    Lab Results   Component Value Date    WBC 3.06 (L) 03/17/2021    HGB 9.8 (L) 03/17/2021    HCT 31.7 (L) 03/17/2021    MCV 91.6 03/17/2021    PLT 84 (L) 03/17/2021       Lab Results   Component Value Date    GLUCOSE 99 03/17/2021    BUN 9 03/17/2021    CREATININE 0.84 03/17/2021    EGFRIFNONA 64 10/21/2019    EGFRIFAFRI 106 03/17/2021    BCR 10.7 03/17/2021    CO2 24.0 03/17/2021    CALCIUM 8.3 (L) 03/17/2021    PROTENTOTREF 7.4 10/21/2019    ALBUMIN 3.30 (L) 03/16/2021    LABIL2 1.6 10/21/2019    AST 16 03/16/2021    ALT 17 03/16/2021   Results for SAMREEN REYNOLDS (MRN [id number]) as of 3/17/2021 14:30   Ref. Range 3/12/2021 19:11 3/12/2021 19:14 3/13/2021 04:53 3/13/2021 18:05 3/14/2021 01:30 3/14/2021 01:30 3/14/2021 07:45   WBC Latest Ref Range: 3.40 - 10.80 10*3/mm3 3.19 (L)  3.67  3.45     RBC Latest Ref Range: 4.14 - 5.80 10*6/mm3 4.45  3.43 (L)  3.60 (L)     Hemoglobin Latest Ref Range: 13.0 - 17.7 g/dL 12.4 (L)  9.6 (L) 9.3 (L) 9.9 (L) 9.9 (L) 8.8 (L)   Hemoglobin Latest Ref Range: 12.0 - 17.0 g/dL  13.3        Hematocrit Latest Ref Range: 37.5 - 51.0 % 40.4  31.7 (L) 29.4 (L) 33.2 (L) 33.2 (L) 32.9 (L)       ASSESSMENTS/PLANS  1.  Anemia  2.  Blood in stool    Patient had a rapid decline in his hemoglobin from 3/12-3/13.  Bloody stools were noted at that time but no description whether melena or bright red.  Hemoglobin has stabilized since that time and are now starting to rise.  He has not been transfused.  Looking at the chart he did have an EGD by SHANON RDZ in 2018 was positive for Helicobacter.  He currently has no upper or lower GI symptoms.  No BM today.  He did have a doubling of his BUN during the same time.  This also has come back normal.    Will obtain records from [facility] as patient is unclear about his last colonoscopy.  Depending on the results of this may or may not need endoscopic  evaluation as his hemoglobin is stabilized on PPI.  It is possible he may have had an ischemic event during his cardiac arrest which is resolved.    Will discussed the risk of endoscopic procedures with his cardiologist.          I discussed the patient's findings and my recommendations with patient    Duncan Lane MD  03/17/21  15:23 EDT

## 2021-03-17 NOTE — PLAN OF CARE
Goal Outcome Evaluation:  Plan of Care Reviewed With: patient  Progress: no change  Outcome Summary: Pt presents w/ decreased activity tolerance, strength, and balance limiting his ADL independence. Pt s/p ICD placement w/ LUE in sling. Pt min A for STS, CGA for marching in place x10, tolerated 10 reps of RUE TE, pt declined any further functional activity. Recommend IRF at discharge.

## 2021-03-17 NOTE — PROGRESS NOTES
"[facility] Progress Note     LOS: 5 days   Patient Care Team:  Marguerite Moore PA-C as PCP - General (Physician Assistant)  Som Boyd DO as Consulting Physician (Cardiology)  Javad Mercedes MD as Consulting Physician (Cardiology)  Thomas Lui MD as Consulting Physician (Hematology and Oncology)  PCP:  Marguerite Moore PA-C    Chief Complaint: Follow-up outside hospital cardiac arrest    Subjective: Patient is status post ICD placement yesterday.  Denies any pain at implant site.  Does endorse shortness of breath from time to time.  Has not noted any blood in his bowel movements today.  1 was reported at shift change yesterday      Review of Systems:   All systems have been reviewed and are negative with the exception of those mentioned above.      Objective:    Vital Sign Min/Max for last 24 hours  Temp  Min: 97.6 °F (36.4 °C)  Max: 98.7 °F (37.1 °C)   BP  Min: 123/58  Max: 162/69   Pulse  Min: 49  Max: 80   Resp  Min: 18  Max: 22   SpO2  Min: 87 %  Max: 100 %   No data recorded   Weight  Min: 78.3 kg (172 lb 9.6 oz)  Max: 78.3 kg (172 lb 9.6 oz)     Flowsheet Rows      First Filed Value   Admission Height  180.3 cm (71\") Documented at 03/12/2021 1900   Admission Weight  89.4 kg (197 lb) Documented at 03/12/2021 1900          Telemetry: Paced      Intake/Output Summary (Last 24 hours) at 3/17/2021 1006  Last data filed at 3/17/2021 0400  Gross per 24 hour   Intake 100 ml   Output 485 ml   Net -385 ml     Intake & Output (last 3 days)       03/14 0701 - 03/15 0700 03/15 0701 - 03/16 0700 03/16 0701 - 03/17 0700 03/17 0701 - 03/18 0700    P.O. 320 45 130     I.V. (mL/kg) 5 (0.1) 679.4 (8) 8 (0.1)     IV Piggyback 200 400 100     Total Intake(mL/kg) 525 (6.4) 1124.4 (13.2) 238 (3)     Urine (mL/kg/hr) 550 (0.3) 1050 (0.5) 585 (0.3)     Stool 0       Total Output 550 1050 585     Net -25 +74.4 -347             Urine Unmeasured Occurrence 1 x "       Stool Unmeasured Occurrence 1 x              Physical Exam:  Constitutional:       Appearance: Not in distress.   Pulmonary:      Effort: Pulmonary effort is normal.      Breath sounds: No rales.   Cardiovascular:   Left chest implant site clean dry intact.  Regular rate and rhythm, soft systolic murmur  Pulses:     Intact distal pulses.  Right radial cath site clean dry intact  Edema:     Peripheral edema absent.   Abdominal:      Palpations: Abdomen is soft.   Skin:     General: Skin is warm and dry.   Neurological:      Mental Status: Alert and oriented to person, place and time    LABS/DIAGNOSTIC DATA:  Results from last 7 days   Lab Units 03/17/21  0456 03/16/21  0806 03/15/21  2141 03/15/21  0524   WBC 10*3/mm3 3.06* 2.57*  --  2.74*   HEMOGLOBIN g/dL 8.4* 9.2* 8.6* 8.0*   HEMATOCRIT % 27.4* 29.7* 28.1* 26.1*   PLATELETS 10*3/mm3 84* 85*  --  82*     Lab Results   Lab Value Date/Time    TROPONINT 0.017 03/15/2021 0524    TROPONINT 0.159 (C) 03/13/2021 0453    TROPONINT 0.026 03/12/2021 2215    TROPONINT <0.010 03/12/2021 1911    TROPONINT <0.010 11/13/2019 0233    TROPONINT <0.010 11/12/2019 2259     Results from last 7 days   Lab Units 03/12/21 1911   INR  1.13     Results from last 7 days   Lab Units 03/17/21  0456 03/16/21  0806 03/16/21  0234 03/15/21  0524 03/13/21  1805 03/13/21  0453 03/12/21 1911   SODIUM mmol/L 141 140  --  142   < > 139 141   POTASSIUM mmol/L 4.3 4.6 4.2 3.3*  --  3.7 2.5*   CHLORIDE mmol/L 110* 110*  --  109*   < > 106 103   CO2 mmol/L 24.0 26.0  --  27.0   < > 21.0* 23.0   BUN mg/dL 9 9  --  14   < > 11 7*   CREATININE mg/dL 0.84 0.83  --  0.83   < > 0.71* 0.90   CALCIUM mg/dL 8.3* 8.7  --  8.4*   < > 8.0* 8.9   BILIRUBIN mg/dL  --  0.7  --   --   --  1.1 1.4*   ALK PHOS U/L  --  69  --   --   --  74 112   ALT (SGPT) U/L  --  17  --   --   --  28 42*   AST (SGOT) U/L  --  16  --   --   --  53* 101*   GLUCOSE mg/dL 99 110*  --  105*   < > 140* 186*    < > = values in this  interval not displayed.         Results from last 7 days   Lab Units 03/14/21  0130   CHOLESTEROL mg/dL 91   TRIGLYCERIDES mg/dL 104   HDL CHOL mg/dL 40   LDL CHOL mg/dL 31     Results from last 7 days   Lab Units 03/12/21  1911   TSH uIU/mL 6.720*   FREE T4 ng/dL 1.64           Medication Review:   acetaminophen, 650 mg, Oral, Q6H  aspirin, 81 mg, Oral, Daily  furosemide, 40 mg, Intravenous, Once  [START ON 3/18/2021] furosemide, 20 mg, Oral, Daily  ibuprofen, 600 mg, Oral, Q6H  insulin lispro, 0-9 Units, Subcutaneous, 4x Daily With Meals & Nightly  levETIRAcetam, 750 mg, Intravenous, Q12H  metoprolol succinate XL, 25 mg, Oral, Q24H  pantoprazole, 40 mg, Intravenous, Q12H  rosuvastatin, 20 mg, Oral, Daily  sacubitril-valsartan, 1 tablet, Oral, Q12H  sodium chloride, 10 mL, Intravenous, Q12H  sodium chloride, 10 mL, Intravenous, Q12H  sodium chloride, 3 mL, Intravenous, Q12H  spironolactone, 25 mg, Oral, Daily               OHCA    Essential hypertension    Hyperlipidemia LDL goal <70    T2DM    Paget disease of bone    Tobacco abuse    H/O seizures on Keppra    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  Outside hospital cardiac arrest, status post [employer] dual-chamber ICD  -Patient received bystander CPR and shock from an external AED  - heart catheterization showing single-vessel CAD and a a small posterior lateral branch of the RCA, unchanged from prior  -Status post dual-chamber [employer] dual-chamber ICD 3/16/2021       2.  Nonischemic cardiomyopathy, frequent PVCs  -Continue Entresto, Toprol-XL, spironolactone  -1 dose of IV Lasix for increased pulmonary congestion seen on chest x-ray  -We will add Lasix 20 mg p.o. daily to his med  regimen     3.  Anemia, suspected lower GI bleed, thrombocytopenia  -Continue to trend hemoglobins, possible EGD/colonoscopy after cardiac issues addressed   -Continue Protonix  -Hemoglobin stable 8.4  -Platelets are in the 80s today     4.  Diabetes  -Per primary team     5.  Hypertension  -Within normal limits at this time     6.  Hyperlipidemia  -Lipids well controlled on rosuvastatin    Patient is fairly stable from a cardiac standpoint and can move towards discharge planning without that he is status post ICD.  Consider GI consult for ongoing hematochezia      Javad Mercedes MD [facility]  03/17/21

## 2021-03-17 NOTE — PLAN OF CARE
Problem: Adult Inpatient Plan of Care  Goal: Plan of Care Review  Flowsheets (Taken 3/17/2021 6641)  Progress: improving  Plan of Care Reviewed With: patient  Outcome Summary: VSS. Afebrile adequate UOP. Patient denies pain. 2vw xray this am, as well as PPM interrogation. One bloody BM at shift change yesterday evening. Trending HH. Electrolytes replaced this am.

## 2021-03-17 NOTE — PROGRESS NOTES
Intensive Care Follow-up     Hospital:  LOS: 5 days   Mr. Narendra Cochran, 80 y.o. male is followed for:   Cardiac arrest (CMS/[facility])            History of present illness:   80-year-old male with previous history of tobacco abuse, marijuana use, Paget's disease of the bone, pancytopenia, Barraza's esophagus, coronary disease, hypertension, dyslipidemia, type 2 diabetes, seizures on Keppra, presented with witnessed cardiac arrest.  Patient received bystander CPR and had return of spontaneous occlusion in the ED.  Patient presented to the emergency room and was hemodynamically stable and awake.  Patient was seen by cardiology team and plan is to do angiogram and possible AICD.  Patient was also seen by neurology for possible seizures and kept on Keppra.    Patient underwent coronary angiogram yesterday and no new findings noted.  Patient has known coronary lesions which are stable.  Subsequently patient underwent AICD placement on March 16.      Subjective   Interval History:  Overnight did fair.   patient seen sitting out in chair and without any complaints currently.  Hemodynamically stable.  Had another round of bloody bowel movement overnight.  Hemoglobin dropped to 8.2. Will have GI team evaluate the patient.             The patient's past medical, surgical and social history were reviewed and updated in Epic as appropriate.       Objective     Infusions:     Medications:  acetaminophen, 650 mg, Oral, Q6H  aspirin, 81 mg, Oral, Daily  [START ON 3/18/2021] furosemide, 20 mg, Oral, Daily  ibuprofen, 600 mg, Oral, Q6H  insulin lispro, 0-9 Units, Subcutaneous, 4x Daily With Meals & Nightly  levETIRAcetam, 750 mg, Intravenous, Q12H  metoprolol succinate XL, 25 mg, Oral, Q24H  pantoprazole, 40 mg, Intravenous, Q12H  rosuvastatin, 20 mg, Oral, Daily  sacubitril-valsartan, 1 tablet, Oral, Q12H  sodium chloride, 10 mL, Intravenous, Q12H  sodium chloride, 10 mL, Intravenous, Q12H  sodium chloride, 3 mL, Intravenous,  "Q12H  spironolactone, 25 mg, Oral, Daily        Vital Sign Min/Max for last 24 hours  Temp  Min: 97.6 °F (36.4 °C)  Max: 98.7 °F (37.1 °C)   BP  Min: 123/58  Max: 162/69   Pulse  Min: 49  Max: 80   Resp  Min: 18  Max: 22   SpO2  Min: 87 %  Max: 100 %   Flow (L/min)  Min: 2  Max: 2       Input/Output for last 24 hour shift  03/16 0701 - 03/17 0700  In: 238 [P.O.:130; I.V.:8]  Out: 585 [Urine:585]      Objective:  Vital signs: (most recent): Blood pressure 162/69, pulse 70, temperature 97.6 °F (36.4 °C), temperature source Oral, resp. rate 18, height 180.3 cm (70.98\"), weight 78.3 kg (172 lb 9.6 oz), SpO2 96 %.               General Appearance: Awake, alert, in no acute distress  Lungs:   B/L Breath sounds present with decreased breath sounds on bases, no wheezing heard, no crackles.   Heart: S1 and S2 present, no murmur, pacemaker site appears without any hematoma.  Abdomen: Soft, nontender, no guarding or rigidity, bowel sounds positive  Extremities: Right wrist dressing intact, no hematoma noted, no cyanosis or clubbing,  + edema, warm to touch.  Pulses: Positive and symmetric.  Neurologic:  Moving all four extremities. Good strength bilaterally.     Results from last 7 days   Lab Units 03/17/21 0456 03/16/21  0806 03/15/21  2141 03/15/21  0524   WBC 10*3/mm3 3.06* 2.57*  --  2.74*   HEMOGLOBIN g/dL 8.4* 9.2* 8.6* 8.0*   PLATELETS 10*3/mm3 84* 85*  --  82*     Results from last 7 days   Lab Units 03/17/21 0456 03/16/21  0806 03/16/21  0234 03/15/21  0524   SODIUM mmol/L 141 140  --  142   POTASSIUM mmol/L 4.3 4.6 4.2 3.3*   CO2 mmol/L 24.0 26.0  --  27.0   BUN mg/dL 9 9  --  14   CREATININE mg/dL 0.84 0.83  --  0.83   MAGNESIUM mg/dL 1.8 2.1  --  1.8   PHOSPHORUS mg/dL 3.1 3.0  --  3.0   GLUCOSE mg/dL 99 110*  --  105*     Estimated Creatinine Clearance: 75.1 mL/min (by C-G formula based on SCr of 0.84 mg/dL).    Results from last 7 days   Lab Units 03/12/21  1914   PH, ARTERIAL pH units 7.40       Images: "   Chest x-ray reviewed personally and showed pacemaker in good position.  No postprocedure pneumothorax noted.  Patchy opacities right base along with probable small pleural effusion.    I reviewed the patient's results and images.     Assessment/Plan   Impression        OHCA    Essential hypertension    Hyperlipidemia LDL goal <70    T2DM    Paget disease of bone    Tobacco abuse    H/O seizures on Keppra    Coronary artery disease involving native coronary artery of native heart with angina pectoris (CMS/HCC)    Hypokalemia    Hypomagnesemia    VHD; mild-mod AR, mild MR    Chronic systolic congestive heart failure (CMS/HCC)    GERD    Marijuana use    Frequent PVCs       Plan        1.  Patient presented here with out of hospital cardiac arrest.  Cardiac work-up was undertaken.  Current angiogram did not reveal any new coronary artery disease.  Subsequently underwent AICD placement per EP.  Doing well clinically from that standpoint.  No obvious hematoma noted at pacemaker site.  Patient is currently paced rhythm.  No other malignant arrhythmias noted at this time.  2.  Monitor electrolytes and replace aggressively per ICU protocol.  Magnesium replaced today.  3.  No overt seizures noted.  Continue on Keppra.    4.  Patient had another episode of hematochezia overnight.  Patient has had problems with alternating constipation and diarrhea as well as bloody bowel movements at home as well.  Hemoglobin dropped by one-point.  No hemodynamic instability at this point.  Continue Protonix for now.  Will have GI team evaluate the patient.  Monitor H&H every 6 hours for now.    5.  Blood sugar monitoring and insulin as needed for hyperglycemia management.  6.  SCDs for DVT prophylaxis.  Hold pharmacologic prophylaxis for now with concern for GI bleed.  7.  Out of bed as tolerated.  8.  Tolerating oral diet okay.  9.  Patient has previous history of pancytopenia.  Has seen by hematology in the past.  Will continue close  monitoring for now.    Patient can be downgraded to telemetry floor for closer monitoring.  Hopefully should be able to discharge soon pending GI work-up.    Plan of care and goals reviewed with multidisciplinary/antibiotic stewardship team during rounds.   I discussed the patient's findings and my recommendations with patient, family and nursing staff     High level of risk due to:  illness with threat to life or bodily function.    Time spent 25 min (exclusive of procedure time)  including high complexity decision making to assess, manipulate, and support vital organ system failure in this individual who has impairment of one or more vital organ systems such that there is a high probability of imminent or life threatening deterioration in the patient’s condition.      Gilson Zambrano MD, FCCP  Pulmonary, Critical care and Sleep Medicine

## 2021-03-17 NOTE — PROGRESS NOTES
Multidisciplinary Rounds    Time: 20min  Patient Name: Narendra Cochran  Date of Encounter: 03/17/21 09:08 EDT  MRN: [id number]  Admission date: 3/12/2021      Reason for visit: MDR. RD to continue to follow per protocol.     Additional information obtained during MDR: 75% PO intake of past 5 documented meals. S/p ICD placement yesterday (3/16). Pt with bloody bowel movement over night, planning to consult GI.     Current diet: Diet Regular; Cardiac, Consistent Carbohydrate      Intervention:  Follow treatment plan  Care plan reviewed    Follow up:   Per protocol      Antonietta Isbell MS RD/LD CNSC  09:08 EDT

## 2021-03-18 LAB
ACANTHOCYTES BLD QL SMEAR: ABNORMAL
ANION GAP SERPL CALCULATED.3IONS-SCNC: 10 MMOL/L (ref 5–15)
BASOPHILS # BLD AUTO: 0.01 10*3/MM3 (ref 0–0.2)
BASOPHILS # BLD MANUAL: 0 10*3/MM3 (ref 0–0.2)
BASOPHILS NFR BLD AUTO: 0 % (ref 0–1.5)
BASOPHILS NFR BLD AUTO: 0.4 % (ref 0–1.5)
BUN SERPL-MCNC: 10 MG/DL (ref 8–23)
BUN/CREAT SERPL: 12.7 (ref 7–25)
BURR CELLS BLD QL SMEAR: ABNORMAL
CALCIUM SPEC-SCNC: 7.5 MG/DL (ref 8.6–10.5)
CHLORIDE SERPL-SCNC: 109 MMOL/L (ref 98–107)
CO2 SERPL-SCNC: 21 MMOL/L (ref 22–29)
CREAT SERPL-MCNC: 0.79 MG/DL (ref 0.76–1.27)
DEPRECATED RDW RBC AUTO: 53.9 FL (ref 37–54)
DEPRECATED RDW RBC AUTO: 55 FL (ref 37–54)
EOSINOPHIL # BLD AUTO: 0.08 10*3/MM3 (ref 0–0.4)
EOSINOPHIL # BLD MANUAL: 0.03 10*3/MM3 (ref 0–0.4)
EOSINOPHIL NFR BLD AUTO: 2.9 % (ref 0.3–6.2)
EOSINOPHIL NFR BLD MANUAL: 1 % (ref 0.3–6.2)
ERYTHROCYTE [DISTWIDTH] IN BLOOD BY AUTOMATED COUNT: 16.5 % (ref 12.3–15.4)
ERYTHROCYTE [DISTWIDTH] IN BLOOD BY AUTOMATED COUNT: 16.9 % (ref 12.3–15.4)
GFR SERPL CREATININE-BSD FRML MDRD: 114 ML/MIN/1.73
GLUCOSE BLDC GLUCOMTR-MCNC: 112 MG/DL (ref 70–130)
GLUCOSE BLDC GLUCOMTR-MCNC: 132 MG/DL (ref 70–130)
GLUCOSE BLDC GLUCOMTR-MCNC: 147 MG/DL (ref 70–130)
GLUCOSE BLDC GLUCOMTR-MCNC: 198 MG/DL (ref 70–130)
GLUCOSE SERPL-MCNC: 97 MG/DL (ref 65–99)
HCT VFR BLD AUTO: 26.6 % (ref 37.5–51)
HCT VFR BLD AUTO: 27.3 % (ref 37.5–51)
HCT VFR BLD AUTO: 27.4 % (ref 37.5–51)
HGB BLD-MCNC: 8.1 G/DL (ref 13–17.7)
HGB BLD-MCNC: 8.4 G/DL (ref 13–17.7)
HGB BLD-MCNC: 8.5 G/DL (ref 13–17.7)
IMM GRANULOCYTES # BLD AUTO: 0.02 10*3/MM3 (ref 0–0.05)
IMM GRANULOCYTES NFR BLD AUTO: 0.7 % (ref 0–0.5)
LYMPHOCYTES # BLD AUTO: 0.81 10*3/MM3 (ref 0.7–3.1)
LYMPHOCYTES # BLD MANUAL: 1.03 10*3/MM3 (ref 0.7–3.1)
LYMPHOCYTES NFR BLD AUTO: 28.9 % (ref 19.6–45.3)
LYMPHOCYTES NFR BLD MANUAL: 37 % (ref 19.6–45.3)
LYMPHOCYTES NFR BLD MANUAL: 7 % (ref 5–12)
MAGNESIUM SERPL-MCNC: 2 MG/DL (ref 1.6–2.4)
MCH RBC QN AUTO: 27.5 PG (ref 26.6–33)
MCH RBC QN AUTO: 27.7 PG (ref 26.6–33)
MCHC RBC AUTO-ENTMCNC: 30.5 G/DL (ref 31.5–35.7)
MCHC RBC AUTO-ENTMCNC: 30.8 G/DL (ref 31.5–35.7)
MCV RBC AUTO: 90.1 FL (ref 79–97)
MCV RBC AUTO: 90.2 FL (ref 79–97)
MONOCYTES # BLD AUTO: 0.2 10*3/MM3 (ref 0.1–0.9)
MONOCYTES # BLD AUTO: 0.21 10*3/MM3 (ref 0.1–0.9)
MONOCYTES NFR BLD AUTO: 7.5 % (ref 5–12)
NEUTROPHILS # BLD AUTO: 1.31 10*3/MM3 (ref 1.7–7)
NEUTROPHILS NFR BLD AUTO: 1.67 10*3/MM3 (ref 1.7–7)
NEUTROPHILS NFR BLD AUTO: 59.6 % (ref 42.7–76)
NEUTROPHILS NFR BLD MANUAL: 46 % (ref 42.7–76)
NEUTS BAND NFR BLD MANUAL: 1 % (ref 0–5)
NRBC BLD AUTO-RTO: 0 /100 WBC (ref 0–0.2)
OVALOCYTES BLD QL SMEAR: ABNORMAL
PLATELET # BLD AUTO: 94 10*3/MM3 (ref 140–450)
PLATELET # BLD AUTO: 99 10*3/MM3 (ref 140–450)
PMV BLD AUTO: 12.5 FL (ref 6–12)
PMV BLD AUTO: 13 FL (ref 6–12)
POLYCHROMASIA BLD QL SMEAR: ABNORMAL
POTASSIUM SERPL-SCNC: 3.6 MMOL/L (ref 3.5–5.2)
RBC # BLD AUTO: 2.95 10*6/MM3 (ref 4.14–5.8)
RBC # BLD AUTO: 3.03 10*6/MM3 (ref 4.14–5.8)
SMALL PLATELETS BLD QL SMEAR: ABNORMAL
SMUDGE CELLS BLD QL SMEAR: ABNORMAL
SODIUM SERPL-SCNC: 140 MMOL/L (ref 136–145)
VARIANT LYMPHS NFR BLD MANUAL: 8 % (ref 0–5)
WBC # BLD AUTO: 2.79 10*3/MM3 (ref 3.4–10.8)
WBC # BLD AUTO: 2.8 10*3/MM3 (ref 3.4–10.8)

## 2021-03-18 PROCEDURE — 93005 ELECTROCARDIOGRAM TRACING: CPT | Performed by: INTERNAL MEDICINE

## 2021-03-18 PROCEDURE — 83735 ASSAY OF MAGNESIUM: CPT | Performed by: INTERNAL MEDICINE

## 2021-03-18 PROCEDURE — 99233 SBSQ HOSP IP/OBS HIGH 50: CPT | Performed by: FAMILY MEDICINE

## 2021-03-18 PROCEDURE — 97116 GAIT TRAINING THERAPY: CPT

## 2021-03-18 PROCEDURE — 85025 COMPLETE CBC W/AUTO DIFF WBC: CPT | Performed by: INTERNAL MEDICINE

## 2021-03-18 PROCEDURE — 85025 COMPLETE CBC W/AUTO DIFF WBC: CPT | Performed by: FAMILY MEDICINE

## 2021-03-18 PROCEDURE — 63710000001 INSULIN LISPRO (HUMAN) PER 5 UNITS: Performed by: INTERNAL MEDICINE

## 2021-03-18 PROCEDURE — 97530 THERAPEUTIC ACTIVITIES: CPT

## 2021-03-18 PROCEDURE — 99024 POSTOP FOLLOW-UP VISIT: CPT | Performed by: NURSE PRACTITIONER

## 2021-03-18 PROCEDURE — 80048 BASIC METABOLIC PNL TOTAL CA: CPT | Performed by: INTERNAL MEDICINE

## 2021-03-18 PROCEDURE — 93010 ELECTROCARDIOGRAM REPORT: CPT | Performed by: INTERNAL MEDICINE

## 2021-03-18 PROCEDURE — 99232 SBSQ HOSP IP/OBS MODERATE 35: CPT | Performed by: INTERNAL MEDICINE

## 2021-03-18 PROCEDURE — 82962 GLUCOSE BLOOD TEST: CPT

## 2021-03-18 PROCEDURE — 85007 BL SMEAR W/DIFF WBC COUNT: CPT | Performed by: INTERNAL MEDICINE

## 2021-03-18 RX ORDER — FUROSEMIDE 20 MG/1
20 TABLET ORAL DAILY
Status: DISCONTINUED | OUTPATIENT
Start: 2021-03-18 | End: 2021-03-20 | Stop reason: HOSPADM

## 2021-03-18 RX ORDER — FUROSEMIDE 40 MG/1
40 TABLET ORAL DAILY
Status: DISCONTINUED | OUTPATIENT
Start: 2021-03-18 | End: 2021-03-18

## 2021-03-18 RX ADMIN — SODIUM CHLORIDE, PRESERVATIVE FREE 3 ML: 5 INJECTION INTRAVENOUS at 21:14

## 2021-03-18 RX ADMIN — ROSUVASTATIN CALCIUM 20 MG: 20 TABLET, COATED ORAL at 09:20

## 2021-03-18 RX ADMIN — ACETAMINOPHEN 650 MG: 325 TABLET ORAL at 19:46

## 2021-03-18 RX ADMIN — FUROSEMIDE 20 MG: 20 TABLET ORAL at 09:20

## 2021-03-18 RX ADMIN — LEVETIRACETAM 750 MG: 500 TABLET, FILM COATED ORAL at 21:12

## 2021-03-18 RX ADMIN — INSULIN LISPRO 2 UNITS: 100 INJECTION, SOLUTION INTRAVENOUS; SUBCUTANEOUS at 19:47

## 2021-03-18 RX ADMIN — SODIUM CHLORIDE, PRESERVATIVE FREE 10 ML: 5 INJECTION INTRAVENOUS at 21:12

## 2021-03-18 RX ADMIN — SODIUM CHLORIDE, PRESERVATIVE FREE 10 ML: 5 INJECTION INTRAVENOUS at 09:20

## 2021-03-18 RX ADMIN — PANTOPRAZOLE SODIUM 40 MG: 40 INJECTION, POWDER, FOR SOLUTION INTRAVENOUS at 09:20

## 2021-03-18 RX ADMIN — PANTOPRAZOLE SODIUM 40 MG: 40 INJECTION, POWDER, FOR SOLUTION INTRAVENOUS at 21:12

## 2021-03-18 RX ADMIN — LEVETIRACETAM 750 MG: 500 TABLET, FILM COATED ORAL at 09:19

## 2021-03-18 RX ADMIN — SACUBITRIL AND VALSARTAN 1 TABLET: 49; 51 TABLET, FILM COATED ORAL at 09:21

## 2021-03-18 RX ADMIN — METOPROLOL SUCCINATE 25 MG: 25 TABLET, EXTENDED RELEASE ORAL at 09:20

## 2021-03-18 RX ADMIN — SPIRONOLACTONE 25 MG: 25 TABLET ORAL at 09:20

## 2021-03-18 RX ADMIN — SACUBITRIL AND VALSARTAN 1 TABLET: 49; 51 TABLET, FILM COATED ORAL at 21:14

## 2021-03-18 RX ADMIN — ACETAMINOPHEN 650 MG: 325 TABLET ORAL at 13:21

## 2021-03-18 RX ADMIN — ASPIRIN 81 MG: 81 TABLET, CHEWABLE ORAL at 09:19

## 2021-03-18 NOTE — PLAN OF CARE
Goal Outcome Evaluation:  Plan of Care Reviewed With: patient  Progress: improving  Outcome Summary: INCREASED DISTANCE AMBULATED. PT GIVES GOOD EFFORT AND IS MOTIVATED TO WORK WITH THERAPY. AMBULATED 350 FEET WITH R WALKER AND CGA. DOES WELL MAINTAINING L UE PRECAUTIONS S/P AICD. RECOMMEND HOME WITH FAMILY AND HHPT AT D/C.

## 2021-03-18 NOTE — PROGRESS NOTES
Cardiac Electrophysiology Inpatient Follow Up Note         [facility] at [facility]    Progress Note      CC:  Cardiac Arrest    Problem List:        1. Nonischemic cardiomyopathy / systolic congestive heart failure / Cardiac Arrest   a. Echo 11/13/2019 EF 60%, mild LVH, mild MR, mild to moderate AR, LA 4.6 cm  b. [facility] March 2020 with mild nonobstructive CAD, EF 36-40%  c. Witnessed out of hospital cardiac arrest 3/12/2021 with documented bystander CPR with a single shock from AED with transfer to [facility] per EMS.  d. Echo 3/13/2021 EF 50%, no significant VHD  e. [facility] 3/14/2021 per Dr. Sahni with documented severe 1-vessel CAD involving a small posterior lateral branch of the of the RCA with no interval change to prior angiography  f. Scheduled DDD ICD implant 3/16/2021 for secondary prevention   2. Premature ventricular contractions  a. Echo 11/13/2019 EF 60%, mild LVH, mild MR, mild to moderate AR, LA 4.6 cm  b. Stress test February 2020 PVCs at both rest and stress, no perfusion evidence of ischemia, ejection fraction 31% with dilated LV cavity  c. [facility] March 2020 with mild nonobstructive CAD, EF 36-40%  d. Holter monitor February 2020:  PVCs burden 12.3% with some of the counted PVCs appear to be PACs with aberrancy. One 4 beat run of nonsustained VT versus SVT with aberrancy  e. Amiodarone therapy initiated at 200 mg daily for PVCs April 2020  f. Amiodarone discontinued December 2020  3. Sinus node dysfunction  1. 24-hour Holter 7/15/2020 HR 43-79, average 55 bpm, PVC burden 10.2%.  2. EP consultation August 2020 with patient felt to be asymptomatic with recommendation for continued monitoring  4. First-degree AV block  5. Essential hypertension  6. Dyslipidemia  7. Diabetes mellitus  8. COPD  9. Seizures  10. Paget's disease  11. Hearing loss    Subjective     Mr. Narendra Cochran is an 80-year-old -American male seen in EP follow-up status post dual-chamber ICD implant 3/16/2021  "for the secondary prevention of sudden cardiac death following presentation to hospital following cardiac arrest.  On evaluation patient is resting comfortably in bed without complaints.  Patient's vital signs are stable.  Patient's ICD bandages removed with incision clean, dry, and approximated with occlusive Dermabond covering and without hematoma, ecchymosis, or drainage noted.    REVIEW OF SYSTEMS  ROS no change from admission H&P except for: above    Objective   VITAL SIGNS  /68 (BP Location: Left arm, Patient Position: Sitting)   Pulse 73   Temp 97.6 °F (36.4 °C) (Oral)   Resp 20   Ht 180.3 cm (70.98\")   Wt 78.3 kg (172 lb 9.6 oz)   SpO2 96%   BMI 24.08 kg/m²     Pulse Ox: SpO2  Av.9 %  Min: 89 %  Max: 100 %  Supplemental O2: O2 Flow Rate (L/min): 2 L/min     Admit Weight  Weight: 89.4 kg (197 lb)  Last 3 Weights    Body mass index is 24.08 kg/m².    INTAKE/OUTPUT  I/O last 3 completed shifts:  In: 767.7 [P.O.:600; I.V.:67.7; IV Piggyback:100]  Out: 2425 [Urine:2425]    Intake/Output Summary (Last 24 hours) at 3/18/2021 1357  Last data filed at 3/18/2021 0900  Gross per 24 hour   Intake 360 ml   Output 550 ml   Net -190 ml       PHYSICAL EXAM  General appearance: awake, alert, oriented, moves all extremities  Lungs: no rhonchi, no wheezes, no rales  Heart: S1-S2, RRR  Abdomen: positive bowel sounds, no bruits, no masses  Extremities: warm and dry, no cyanosis, no clubbing    LABS  Results from last 7 days   Lab Units 21  0811 21  0555 21  2353 21  0456   WBC 10*3/mm3 2.80* 2.79*  --  3.06*   HEMOGLOBIN g/dL 8.4* 8.1* 8.5* 8.4*   HEMATOCRIT % 27.3* 26.6* 27.4* 27.4*   MCV fL 90.1 90.2  --  91.6   PLATELETS 10*3/mm3 99* 94*  --  84*         Results from last 7 days   Lab Units 21  0555 21  0456 21  0806   POTASSIUM mmol/L 3.6 4.3 4.6   CHLORIDE mmol/L 109* 110* 110*   CO2 mmol/L 21.0* 24.0 26.0   BUN mg/dL 10 9 9   CREATININE mg/dL 0.79 0.84 0.83 "   GLUCOSE mg/dL 97 99 110*   CALCIUM mg/dL 7.5* 8.3* 8.7   MAGNESIUM mg/dL 2.0 1.8 2.1     Results from last 7 days   Lab Units 03/12/21  1911   PROTIME Seconds 14.2*   INR  1.13         Results from last 7 days   Lab Units 03/18/21  0555 03/17/21  0456 03/16/21  0806   MAGNESIUM mg/dL 2.0 1.8 2.1       CURRENT MEDICATIONS  acetaminophen, 650 mg, Oral, Q6H  aspirin, 81 mg, Oral, Daily  furosemide, 20 mg, Oral, Daily  insulin lispro, 0-9 Units, Subcutaneous, 4x Daily With Meals & Nightly  levETIRAcetam, 750 mg, Oral, BID  metoprolol succinate XL, 25 mg, Oral, Q24H  pantoprazole, 40 mg, Intravenous, Q12H  rosuvastatin, 20 mg, Oral, Daily  sacubitril-valsartan, 1 tablet, Oral, Q12H  sodium chloride, 10 mL, Intravenous, Q12H  sodium chloride, 10 mL, Intravenous, Q12H  sodium chloride, 3 mL, Intravenous, Q12H  spironolactone, 25 mg, Oral, Daily      CXR: Device and leads in acceptable position without pneumothorax noted.    TELEMETRY: Sinus rhythm 70 bpm with intermittent pacing    Assessment and plan:    Mr. Narendra Cochran is an 80-year-old -American male seen in EP follow-up status post dual-chamber ICD implant 3/16/2021 for the secondary prevention of sudden cardiac death.  Patient's chest x-ray reviewed with device and leads in acceptable position without pneumothorax noted.  Device interrogation yesterday with noted normal function.  Patient instructed on all activity restrictions and wound care instructions.  Patient maintained on guideline directed medical therapy with Entresto, Toprol, Lasix, and Aldactone.  Patient is stable from a EP standpoint however remains inpatient for GI bleeding and anemia being evaluated by GI services.  We will continue to follow with general cardiology.      Electronically signed by LEANDRO Delgadillo, 03/18/21, 2:09 PM EDT.   [facility] / [facility]

## 2021-03-18 NOTE — PROGRESS NOTES
Continued Stay Note  [facility]     Patient Name: Narendra Cochran  MRN: [id number]  Today's Date: 3/18/2021    Admit Date: 3/12/2021    Discharge Plan     Row Name 03/18/21 1352       Plan    Plan  Signing off    Plan Comments  Pt has stated on numerous occasions that he plans to cut down or completely stop marijuana usage.  No treatments required for this.  We will sign off.        Discharge Codes    No documentation.       Expected Discharge Date and Time     Expected Discharge Date Expected Discharge Time    Mar 20, 2021             Kayla Bahc RN ,BSN   Addiction Coordinator

## 2021-03-18 NOTE — PROGRESS NOTES
[facility] Medicine Services  PROGRESS NOTE    Patient Name: Narendra Cochran  : 1940  MRN: [id number]    Date of Admission: 3/12/2021  Primary Care Physician: Marguerite Moore PA-C    Subjective   Subjective     CC:  F/u cardiac arrest     HPI:  Patient states he feels just slightly short of breath. He has a little bit of abdominal pain.     ROS:  Gen- No fevers, chills  CV- No chest pain, palpitations  Resp- No cough, + dyspnea  GI- No N/V/D, + abd pain        Objective   Objective     Vital Signs:   Temp:  [97.5 °F (36.4 °C)-98.2 °F (36.8 °C)] 97.6 °F (36.4 °C)  Heart Rate:  [63-90] 73  Resp:  [16-20] 20  BP: (102-139)/(58-75) 137/68        Physical Exam:  Constitutional: No acute distress, awake, alert, elderly male sitting up in bed, hearing deficient   HENT: NCAT, mucous membranes moist  Respiratory: Clear to auscultation bilaterally, respiratory effort normal   Cardiovascular: paced/ irregular , no murmurs, rubs, or gallops  Gastrointestinal: Positive bowel sounds, soft, nontender, nondistended  Musculoskeletal: No bilateral ankle edema  Psychiatric: Appropriate affect, cooperative  Neurologic: Oriented x 3, speech clear  Skin: No rashes      Results Reviewed:  Results from last 7 days   Lab Units 21  0811 21  0555 21  2353 21  0456 21  1914 21  191   WBC 10*3/mm3 2.80* 2.79*  --  3.06*   < > 3.19*   HEMOGLOBIN g/dL 8.4* 8.1* 8.5* 8.4*   < > 12.4*   HEMATOCRIT % 27.3* 26.6* 27.4* 27.4*   < > 40.4   PLATELETS 10*3/mm3 99* 94*  --  84*   < > 112*   INR   --   --   --   --   --  1.13   PROCALCITONIN ng/mL  --   --   --   --   --  0.08    < > = values in this interval not displayed.     Results from last 7 days   Lab Units 21  0555 21  0456 21  0806 03/15/21  0524 21  1805 21  0453 21  2215 21  1911   SODIUM mmol/L 140 141 140 142   < > 139  --  141   POTASSIUM mmol/L 3.6 4.3 4.6 3.3*  --  3.7  --   2.5*   CHLORIDE mmol/L 109* 110* 110* 109*   < > 106  --  103   CO2 mmol/L 21.0* 24.0 26.0 27.0   < > 21.0*  --  23.0   BUN mg/dL 10 9 9 14   < > 11  --  7*   CREATININE mg/dL 0.79 0.84 0.83 0.83   < > 0.71*  --  0.90   GLUCOSE mg/dL 97 99 110* 105*   < > 140*  --  186*   CALCIUM mg/dL 7.5* 8.3* 8.7 8.4*   < > 8.0*  --  8.9   ALT (SGPT) U/L  --   --  17  --   --  28  --  42*   AST (SGOT) U/L  --   --  16  --   --  53*  --  101*   TROPONIN T ng/mL  --   --   --  0.017  --  0.159* 0.026 <0.010   PROBNP pg/mL  --   --   --   --   --  15,701.0*  --  12,921.0*    < > = values in this interval not displayed.     Estimated Creatinine Clearance: 78.8 mL/min (by C-G formula based on SCr of 0.79 mg/dL).    Microbiology Results Abnormal     Procedure Component Value - Date/Time    Gastrointestinal Panel, PCR - Stool, Per Rectum [[id number]]  (Normal) Collected: 03/13/21 1403    Lab Status: Final result Specimen: Stool from Per Rectum Updated: 03/16/21 0044     Campylobacter Not Detected     Plesiomonas shigelloides Not Detected     Salmonella Not Detected     Vibrio Not Detected     Vibrio cholerae Not Detected     Yersinia enterocolitica Not Detected     Enteroaggregative E. coli (EAEC) Not Detected     Enteropathogenic E. coli (EPEC) Not Detected     Enterotoxigenic E. coli (ETEC) lt/st Not Detected     Shiga-like toxin-producing E. coli (STEC) stx1/stx2 Not Detected     Shigella/Enteroinvasive E. coli (EIEC) Not Detected     Cryptosporidium Not Detected     Cyclospora cayetanensis Not Detected     Entamoeba histolytica Not Detected     Giardia lamblia Not Detected     Adenovirus F40/41 Not Detected     Astrovirus Not Detected     Norovirus GI/GII Not Detected     Rotavirus A Not Detected     Sapovirus (I, II, IV or V) Not Detected    Narrative:      If Aeromonas, Staphylococcus aureus or Bacillus cereus are suspected, please order [id number]: Stool Culture, Aeromonas, S aureus, B Cereus.    Urine Culture - Urine, Urine,  Catheter [[id number]]  (Normal) Collected: 03/12/21 2043    Lab Status: Final result Specimen: Urine, Catheter Updated: 03/13/21 1807     Urine Culture No growth    Clostridium Difficile Toxin - Stool, Per Rectum [[id number]]  (Normal) Collected: 03/13/21 1403    Lab Status: Final result Specimen: Stool from Per Rectum Updated: 03/13/21 1530    Narrative:      The following orders were created for panel order Clostridium Difficile Toxin - Stool, Per Rectum.  Procedure                               Abnormality         Status                     ---------                               -----------         ------                     Clostridium Difficile To...[[id number]]  Normal              Final result                 Please view results for these tests on the individual orders.    Clostridium Difficile Toxin, PCR - Stool, Per Rectum [[id number]]  (Normal) Collected: 03/13/21 1403    Lab Status: Final result Specimen: Stool from Per Rectum Updated: 03/13/21 1530     C. Difficile Toxins by PCR Not Detected    Narrative:      Performance characteristics of test not established for patients <2 years of age.  Negative for Toxigenic C. Difficile    COVID PRE-OP / PRE-PROCEDURE SCREENING ORDER (NO ISOLATION) - Swab, Nasopharynx [[id number]]  (Normal) Collected: 03/12/21 2025    Lab Status: Final result Specimen: Swab from Nasopharynx Updated: 03/12/21 2142    Narrative:      The following orders were created for panel order COVID PRE-OP / PRE-PROCEDURE SCREENING ORDER (NO ISOLATION) - Swab, Nasopharynx.  Procedure                               Abnormality         Status                     ---------                               -----------         ------                     COVID-19 and FLU A/B PCR...[[id number]]  Normal              Final result                 Please view results for these tests on the individual orders.    COVID-19 and FLU A/B PCR - Swab, Nasopharynx [[id number]]  (Normal) Collected: 03/12/21 2025    Lab  Status: Final result Specimen: Swab from Nasopharynx Updated: 03/12/21 2142     COVID19 Not Detected     Influenza A PCR Not Detected     Influenza B PCR Not Detected    Narrative:      Fact sheet for providers: https://www.fda.gov/media/231324/download    Fact sheet for patients: https://www.fda.gov/media/645430/download    Test performed by PCR.          Imaging Results (Last 24 Hours)     ** No results found for the last 24 hours. **          Results for orders placed during the hospital encounter of 03/12/21    Adult Transthoracic Echo Complete W/ Cont if Necessary Per Protocol    Interpretation Summary  · Left ventricular systolic function is normal. Estimated left ventricular EF = 50%  · Left ventricular wall thickness is consistent with mild concentric hypertrophy.  · Left atrial volume is mildly increased.  · Moderate aortic valve regurgitation is present.  · Moderate mitral valve regurgitation is present.      I have reviewed the medications:  Scheduled Meds:acetaminophen, 650 mg, Oral, Q6H  aspirin, 81 mg, Oral, Daily  furosemide, 20 mg, Oral, Daily  insulin lispro, 0-9 Units, Subcutaneous, 4x Daily With Meals & Nightly  levETIRAcetam, 750 mg, Oral, BID  metoprolol succinate XL, 25 mg, Oral, Q24H  pantoprazole, 40 mg, Intravenous, Q12H  rosuvastatin, 20 mg, Oral, Daily  sacubitril-valsartan, 1 tablet, Oral, Q12H  sodium chloride, 10 mL, Intravenous, Q12H  sodium chloride, 10 mL, Intravenous, Q12H  sodium chloride, 3 mL, Intravenous, Q12H  spironolactone, 25 mg, Oral, Daily      Continuous Infusions:   PRN Meds:.•  acetaminophen  •  dextrose  •  dextrose  •  glucagon (human recombinant)  •  HYDROcodone-acetaminophen  •  ipratropium-albuterol  •  magnesium sulfate **OR** magnesium sulfate **OR** magnesium sulfate  •  potassium & sodium phosphates **OR** potassium & sodium phosphates  •  potassium chloride  •  potassium chloride  •  potassium chloride  •  sodium chloride    Assessment/Plan   Assessment & Plan      Active Hospital Problems    Diagnosis  POA   • **OHCA [I46.9]  Yes   • Hypokalemia [E87.6]  Yes   • Hypomagnesemia [E83.42]  Yes   • VHD; mild-mod AR, mild MR [I38]  Yes   • Chronic systolic congestive heart failure (CMS/HCC) [I50.22]  Yes   • GERD [K21.9]  Yes   • Marijuana use [F12.90]  Yes   • 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]  Yes   • Coronary artery disease involving native coronary artery of native heart with angina pectoris (CMS/[facility]) [I25.119]  Yes   • H/O seizures on Keppra [R56.9]  Yes   • Essential hypertension [I10]  Yes   • Hyperlipidemia LDL goal <70 [E78.5]  Yes   • T2DM [E11.65]  Yes   • Tobacco abuse [Z72.0]  Yes   • Paget disease of bone [M88.9]  Yes      Resolved Hospital Problems    Diagnosis Date Resolved POA   • Acute GI bleeding [K92.2] 03/15/2021 Yes   • Acute on chronic congestive heart failure (CMS/HCC) [I50.9] 03/13/2021 Yes        Brief Hospital Course to date:  Narendra Cochran is a 80 y.o. male with PMH of HLD, HTN, seizure disorder, CAD, VHD, chronic systolic heart failure, and paget disease was admitted to ICU after an out of hospital cardiac arrest. He had heart cath that did not show any new artery disease and AICD was placed. He improved quickly improved and was transferred to hospitalist service 3/18.     S/P OHCA  Cardiomyopathy  VHD  -[facility] shows no changes from prior exam  -SP AICD placement  -Entresto, toprol XL, spironolactone  -cardiology following  -EF 50%, moderate VHD   Pancytopenia  H/c paget disease  -was previously treated with reclast. Saw dr mathew in 2014  -wbc 2.8, plt 99  -he is currently on ASA   Possible GI Bleed  -GI following  -PPI BID   -had a couple episodes of hematochezia in ICU  Hyperglycemia  -received one dose of insulin coverage so far   -last a1c 1/18/21, 5.6  -will check a1c in am   Seizure disorder  -keppra   THC use  Former Tobacco use  -addiction team is following         DVT Prophylaxis:  Mechanical as his plt are 99       Disposition: I expect the patient to be  discharged TBD, will need IRF     CODE STATUS:   Code Status and Medical Interventions:   Ordered at: 03/12/21 2300     Level Of Support Discussed With:    Patient     Code Status:    CPR     Medical Interventions (Level of Support Prior to Arrest):    Full       Rosey Mclean,   03/18/21

## 2021-03-18 NOTE — THERAPY TREATMENT NOTE
Patient Name: Narendra Cochran  : 1940    MRN: [id number]                              Today's Date: 3/18/2021       Admit Date: 3/12/2021    Visit Dx:     ICD-10-CM ICD-9-CM   1. Acute on chronic congestive heart failure, unspecified heart failure type (CMS/HCC)  I50.9 428.0   2. Hypokalemia  E87.6 276.8   3. 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 OHCA  I46.9 427.5   5. NSVT (nonsustained ventricular tachycardia) (CMS/HCC)  I47.2 427.1   6. Cardiac arrest (CMS/HCC)  I46.9 427.5     Patient Active Problem List   Diagnosis   • Essential hypertension   • Hyperlipidemia LDL goal <70   • T2DM   • Paget disease of bone   • Tobacco abuse   • H/O seizures on Keppra   • Gonalgia   • Osteitis deformans   • Syncope   • NSVT (nonsustained ventricular tachycardia) (CMS/HCC)   • Coronary artery disease involving native coronary artery of native heart with angina pectoris (CMS/HCC)   • NICM    • OHCA   • Hypokalemia   • Hypomagnesemia   • VHD; mild-mod AR, mild MR   • Chronic systolic congestive heart failure (CMS/HCC)   • Former tobacco user   • GERD   • Marijuana use   • Frequent PVCs   • Acute blood loss anemia     Past Medical History:   Diagnosis Date   • Arthritis    • Diabetes mellitus (CMS/HCC)    • Diverticulitis    • GERD (gastroesophageal reflux disease)    • History of transfusion    • Hyperlipidemia    • Hypertension    • Hypertensive urgency, malignant 2017   • Paget disease of bone      Past Surgical History:   Procedure Laterality Date   • APPENDECTOMY     • CARDIAC CATHETERIZATION N/A 3/18/2020    Procedure: Left Heart Cath;  Surgeon: Sonya Garibay MD;  Location:  [facility];  Service: Cardiology;  Laterality: N/A;  First availabel provider     • CARDIAC CATHETERIZATION N/A 3/15/2021    Procedure: LEFT HEART CATH;  Surgeon: Doc Sahni IV, MD;  Location:  [facility];  Service: Cardiovascular;  Laterality: N/A;   • CARDIAC CATHETERIZATION N/A 3/15/2021    Procedure: Coronary angiography;  Surgeon: Doc Sahni IV, MD;  Location:  [facility];  Service: Cardiovascular;  Laterality: N/A;   • CARDIAC ELECTROPHYSIOLOGY PROCEDURE N/A 3/16/2021    Procedure: ICD DC new;  Surgeon: Som oByd DO;  Location:  [facility];  Service: Cardiology;  Laterality: N/A;   • COLON RESECTION      second to diverticulitis    • FOOT SURGERY Left      General Information     Row Name 03/18/21 1638          Physical Therapy Time and Intention    Document Type  therapy note (daily note)  -CD     Mode of Treatment  physical therapy  -CD     Row Name 03/18/21 1638          General Information    Patient Profile Reviewed  yes  -CD     Existing Precautions/Restrictions  fall;seizures;cardiac Elim IRA, AMPLIFIER IN ROOM, S/P AICD PLACEMENT 3/6/21  -CD     Barriers to Rehab  medically complex  -CD     Row Name 03/18/21 1638          Cognition    Orientation Status (Cognition)  oriented x 3  -CD     Row Name 03/18/21 1638          Safety Issues, Functional Mobility    Safety Issues Affecting Function (Mobility)  safety precautions follow-through/compliance;sequencing abilities  -CD     Impairments Affecting Function (Mobility)  strength;balance;other (see comments) L UE PRECAUTIONS S/P AICD PLACEMENT 3/16.  -CD     Comment, Safety Issues/Impairments (Mobility)  PT AWARE OF NO LIFT/PUSH/PULL OR RAISE ABOVE 90 DEGREES PRECAUTIONS LUE,  S/P AICD PLACEMENT.  -CD       User Key  (r) = Recorded By, (t) = Taken By, (c) = Cosigned By    Initials Name Provider Type    CD Xuan Zhang PT Physical Therapist        Mobility     Row Name 03/18/21 1640          Bed Mobility    Bed Mobility  supine-sit  -CD     Supine-Sit [place] (Bed Mobility)  verbal cues;contact guard  -CD     Comment (Bed Mobility)  CUES FOR SEQUENCING TO AVOID USING L UE TO ASSIST.  -CD      Row Name 03/18/21 1640          Transfers    Comment (Transfers)  CUES FOR USE OF R UE ONLY TO ASSIST WITH SIT<>STAND TRANSITION,.  -CD     Row Name 03/18/21 1640          Sit-Stand Transfer    Sit-Stand [place] (Transfers)  minimum assist (75% patient effort);verbal cues  -CD     Assistive Device (Sit-Stand Transfers)  walker, front-wheeled  -CD     Row Name 03/18/21 1640          Gait/Stairs (Locomotion)    Distance in Feet (Gait)  350 FEET.  -CD     Deviations/Abnormal Patterns (Gait)  stride length decreased;gait speed decreased  -CD     Bilateral Gait Deviations  forward flexed posture;heel strike decreased  -CD     Comment (Gait/Stairs)  PT MOTIVATED TO WALK AND DID WELL MAINTAINING L UE AICD PRECAUTIONS. ONE STANDING REST BREAK.  -CD       User Key  (r) = Recorded By, (t) = Taken By, (c) = Cosigned By    Initials Name Provider Type    CD Xuan Zhang, PT Physical Therapist        Obj/Interventions     Row Name 03/18/21 1643          Balance    Balance Assessment  sitting static balance;sitting dynamic balance;standing static balance;standing dynamic balance  -CD     Static Sitting Balance  WFL;sitting, edge of bed  -CD     Dynamic Sitting Balance  WFL;sitting, edge of bed  -CD     Static Standing Balance  mild impairment;standing;supported  -CD     Dynamic Standing Balance  mild impairment;supported;standing  -CD     Balance Interventions  sitting;standing;sit to stand;supported  -CD     Comment, Balance  STS AND GAIT IN [place] WITH R WALKER.  -CD       User Key  (r) = Recorded By, (t) = Taken By, (c) = Cosigned By    Initials Name Provider Type    CD Xuan Zhang, PT Physical Therapist        Goals/Plan    No documentation.       Clinical Impression     Row Name 03/18/21 1647          Pain Scale: Numbers Pre/Post-Treatment    Pretreatment Pain Rating  0/10 - no pain  -CD     Posttreatment Pain Rating  0/10 - no pain  -CD     Row Name 03/18/21 1648          Plan of Care Review    Plan of Care Reviewed  With  patient  -CD     Progress  improving  -CD     Outcome Summary  INCREASED DISTANCE AMBULATED. PT GIVES GOOD EFFORT AND IS MOTIVATED TO WORK WITH THERAPY. AMBULATED 350 FEET WITH R WALKER AND CGA. DOES WELL MAINTAINING L UE PRECAUTIONS S/P AICD. RECOMMEND HOME WITH FAMILY AND HHPT AT D/C.  -CD     Row Name 03/18/21 164          Therapy Assessment/Plan (PT)    Patient/Family Therapy Goals Statement (PT)  DID NOT STATE.  -CD     Rehab Potential (PT)  good, to achieve stated therapy goals  -CD     Criteria for Skilled Interventions Met (PT)  yes;skilled treatment is necessary  -CD     Row Name 03/18/21 1644          Vital Signs    Pre Systolic BP Rehab  146  -CD     Pre Treatment Diastolic BP  68  -CD     Post Systolic BP Rehab  141  -CD     Post Treatment Diastolic BP  65  -CD     Posttreatment Heart Rate (beats/min)  75  -CD     Pre SpO2 (%)  96  -CD     O2 Delivery Pre Treatment  room air  -CD     O2 Delivery Intra Treatment  room air  -CD     Post SpO2 (%)  97  -CD     O2 Delivery Post Treatment  room air  -CD     Pre Patient Position  Supine  -CD     Intra Patient Position  Standing  -CD     Post Patient Position  Sitting  -CD     Row Name 03/18/21 164          Positioning and Restraints    Pre-Treatment Position  in bed  -CD     Post Treatment Position  chair  -CD     In Chair  notified nsg;reclined;call light within reach;encouraged to call for assist;exit alarm on;legs elevated  -CD       User Key  (r) = Recorded By, (t) = Taken By, (c) = Cosigned By    Initials Name Provider Type    CD Xuan Zhang, PT Physical Therapist        Outcome Measures     Row Name 03/18/21 6170          How much help from another person do you currently need...    Turning from your back to your side while in flat bed without using bedrails?  3  -CD     Moving from lying on back to sitting on the side of a flat bed without bedrails?  3  -CD     Moving to and from a bed to a chair (including a wheelchair)?  3  -CD     Standing  up from a chair using your arms (e.g., wheelchair, bedside chair)?  3  -CD     Climbing 3-5 steps with a railing?  2  -CD     To walk in hospital room?  3  -CD     AM-PAC 6 Clicks Score (PT)  17  -CD       User Key  (r) = Recorded By, (t) = Taken By, (c) = Cosigned By    Initials Name Provider Type    CD Xuan Zhang PT Physical Therapist        Physical Therapy Education                 Title: PT OT SLP Therapies (In Progress)     Topic: Physical Therapy (Done)     Point: Mobility training (Done)     Learning Progress Summary           Patient Acceptance, E, VU,NR by CD at 3/18/2021 1647    Comment: SEE FLOW SHEET.    Acceptance, E,D, NR,VU by LS at 3/16/2021 1436    Acceptance, E,TB, VU,NR by AY at 3/15/2021 1111   Significant Other Acceptance, E,D, NR,VU by LS at 3/16/2021 1436    Acceptance, E,TB, VU,NR by AY at 3/15/2021 1111                   Point: Home exercise program (Done)     Learning Progress Summary           Patient Acceptance, E, VU,NR by CD at 3/18/2021 1647    Comment: SEE FLOW SHEET.    Acceptance, E,D, NR,VU by LS at 3/16/2021 1436   Significant Other Acceptance, E,D, NR,VU by LS at 3/16/2021 1436                   Point: Body mechanics (Done)     Learning Progress Summary           Patient Acceptance, E, VU,NR by CD at 3/18/2021 1647    Comment: SEE FLOW SHEET.    Acceptance, E,D, NR,VU by LS at 3/16/2021 1436    Acceptance, E,TB, VU,NR by AY at 3/15/2021 1111   Significant Other Acceptance, E,D, NR,VU by LS at 3/16/2021 1436    Acceptance, E,TB, VU,NR by AY at 3/15/2021 1111                   Point: Precautions (Done)     Learning Progress Summary           Patient Acceptance, E, VU,NR by CD at 3/18/2021 1647    Comment: SEE FLOW SHEET.    Acceptance, E,D, NR,VU by LS at 3/16/2021 1436    Acceptance, E,TB, VU,NR by AY at 3/15/2021 1111   Significant Other Acceptance, E,D, NR,VU by LS at 3/16/2021 1436    Acceptance, E,TB, VU,NR by AY at 3/15/2021 1111                               User Key      Initials Effective Dates Name Provider Type Discipline    CD 06/19/15 -  Xuan Zhang, PT Physical Therapist PT    LS 06/19/15 -  Sharron Saba, PT Physical Therapist PT    AY 11/10/20 -  Phyllis Bowser, ESTELA Physical Therapist PT              PT Recommendation and Plan     Plan of Care Reviewed With: patient  Progress: improving  Outcome Summary: INCREASED DISTANCE AMBULATED. PT GIVES GOOD EFFORT AND IS MOTIVATED TO WORK WITH THERAPY. AMBULATED 350 FEET WITH R WALKER AND CGA. DOES WELL MAINTAINING L UE PRECAUTIONS S/P AICD. RECOMMEND HOME WITH FAMILY AND HHPT AT D/C.     Time Calculation:   PT Charges     Row Name 03/18/21 1648             Time Calculation    Start Time  1600  -CD      PT Received On  03/18/21  -CD      PT Goal Re-Cert Due Date  03/25/21  -CD         Time Calculation- PT    Total Timed Code Minutes- PT  25 minute(s)  -CD         Timed Charges    90979 - Gait Training Minutes   17  -CD      93556 - PT Therapeutic Activity Minutes  8  -CD        User Key  (r) = Recorded By, (t) = Taken By, (c) = Cosigned By    Initials Name Provider Type    CD Xuan Zhang, PT Physical Therapist        Therapy Charges for Today     Code Description Service Date Service Provider Modifiers Qty    72889772225 HC GAIT TRAINING EA 15 MIN 3/18/2021 Xuan Zhang, PT GP 1    95884009016 HC PT THERAPEUTIC ACT EA 15 MIN 3/18/2021 Xuan Zhang, PT GP 1          PT G-Codes  Outcome Measure Options: AM-PAC 6 Clicks Daily Activity (OT)  AM-PAC 6 Clicks Score (PT): 17  AM-PAC 6 Clicks Score (OT): 15    Xuan Zhang, PT  3/18/2021

## 2021-03-18 NOTE — PROGRESS NOTES
"GI Daily Progress Note  Subjective     Narendra Reynolds is a 80 y.o. male who was admitted with Cardiac arrest (CMS/[facility]).   States he is feeling better.  States he had a brown stool today.  Have not received records yet    Chief Complaint:  anemia    Objective     /68 (BP Location: Left arm, Patient Position: Lying)   Pulse 69   Temp 98.1 °F (36.7 °C) (Oral)   Resp 18   Ht 180.3 cm (70.98\")   Wt 78.3 kg (172 lb 9.6 oz)   SpO2 96%   BMI 24.08 kg/m²     Intake/Output last 3 shifts:  I/O last 3 completed shifts:  In: 887.7 [P.O.:720; I.V.:67.7; IV Piggyback:100]  Out: 2450 [Urine:2450]  Intake/Output this shift:  No intake/output data recorded.      Physical Exam  Wt Readings from Last 3 Encounters:   03/16/21 78.3 kg (172 lb 9.6 oz)   01/18/21 89.4 kg (197 lb)   12/30/20 93.9 kg (207 lb)   ,body mass index is 24.08 kg/m².,@FLOWAMB(6)@,@FLOWAMB(5)@,@FLOWAMB(8)@   CONSTITUTIONAL:sitting in chair  Resp CTA; no rhonchi, rales, or wheezes.  Respiration effort normal  CV RRR; no M/R/G. No lower extremity edema  GI Abd soft, NT, ND, normal active bowel sounds.    Psych: Awake alert and oriented    DATA:  Results for NARENDRA REYNOLDS (MRN [id number]) as of 3/18/2021 19:39   Ref. Range 3/17/2021 12:04 3/17/2021 18:27 3/17/2021 23:53 3/18/2021 05:55 3/18/2021 08:11   Hemoglobin Latest Ref Range: 13.0 - 17.7 g/dL 9.8 (L) 9.7 (L) 8.5 (L) 8.1 (L) 8.4 (L)     Assessment/Plan     1.  Anemia  2.  Blood in stool  3. S/p cardiac arrest  4. S/P ICDM      Hg stable.  Will try to get records tomorrow but if Hg remains stable will probably recommend empiric  Therapy for PUD          OHCA    Essential hypertension    Hyperlipidemia LDL goal <70    T2DM    Paget disease of bone    Tobacco abuse    H/O seizures on Keppra    Coronary artery disease involving native coronary artery of native heart with angina pectoris (CMS/HCC)    Hypokalemia    Hypomagnesemia    VHD; mild-mod AR, mild MR    Chronic systolic congestive heart failure " (CMS/[facility])    GERD    Marijuana use    Frequent PVCs       LOS: 6 days     Duncan Lane MD  03/18/21  19:40 EDT

## 2021-03-19 LAB
ANION GAP SERPL CALCULATED.3IONS-SCNC: 10 MMOL/L (ref 5–15)
BUN SERPL-MCNC: 9 MG/DL (ref 8–23)
BUN/CREAT SERPL: 10.5 (ref 7–25)
CALCIUM SPEC-SCNC: 8.1 MG/DL (ref 8.6–10.5)
CHLORIDE SERPL-SCNC: 108 MMOL/L (ref 98–107)
CO2 SERPL-SCNC: 24 MMOL/L (ref 22–29)
CREAT SERPL-MCNC: 0.86 MG/DL (ref 0.76–1.27)
DEPRECATED RDW RBC AUTO: 60.9 FL (ref 37–54)
ERYTHROCYTE [DISTWIDTH] IN BLOOD BY AUTOMATED COUNT: 17.1 % (ref 12.3–15.4)
GFR SERPL CREATININE-BSD FRML MDRD: 103 ML/MIN/1.73
GLUCOSE BLDC GLUCOMTR-MCNC: 144 MG/DL (ref 70–130)
GLUCOSE BLDC GLUCOMTR-MCNC: 151 MG/DL (ref 70–130)
GLUCOSE BLDC GLUCOMTR-MCNC: 155 MG/DL (ref 70–130)
GLUCOSE BLDC GLUCOMTR-MCNC: 94 MG/DL (ref 70–130)
GLUCOSE SERPL-MCNC: 87 MG/DL (ref 65–99)
HCT VFR BLD AUTO: 31 % (ref 37.5–51)
HGB BLD-MCNC: 8.9 G/DL (ref 13–17.7)
MCH RBC QN AUTO: 28.2 PG (ref 26.6–33)
MCHC RBC AUTO-ENTMCNC: 28.7 G/DL (ref 31.5–35.7)
MCV RBC AUTO: 98.1 FL (ref 79–97)
PLATELET # BLD AUTO: 109 10*3/MM3 (ref 140–450)
PMV BLD AUTO: 12.8 FL (ref 6–12)
POTASSIUM SERPL-SCNC: 3.6 MMOL/L (ref 3.5–5.2)
RBC # BLD AUTO: 3.16 10*6/MM3 (ref 4.14–5.8)
SODIUM SERPL-SCNC: 142 MMOL/L (ref 136–145)
WBC # BLD AUTO: 3.19 10*3/MM3 (ref 3.4–10.8)

## 2021-03-19 PROCEDURE — 99232 SBSQ HOSP IP/OBS MODERATE 35: CPT | Performed by: NURSE PRACTITIONER

## 2021-03-19 PROCEDURE — 97110 THERAPEUTIC EXERCISES: CPT

## 2021-03-19 PROCEDURE — 82962 GLUCOSE BLOOD TEST: CPT

## 2021-03-19 PROCEDURE — 80048 BASIC METABOLIC PNL TOTAL CA: CPT | Performed by: FAMILY MEDICINE

## 2021-03-19 PROCEDURE — 99232 SBSQ HOSP IP/OBS MODERATE 35: CPT | Performed by: INTERNAL MEDICINE

## 2021-03-19 PROCEDURE — 97116 GAIT TRAINING THERAPY: CPT

## 2021-03-19 PROCEDURE — 85027 COMPLETE CBC AUTOMATED: CPT | Performed by: FAMILY MEDICINE

## 2021-03-19 PROCEDURE — 97530 THERAPEUTIC ACTIVITIES: CPT

## 2021-03-19 PROCEDURE — 99232 SBSQ HOSP IP/OBS MODERATE 35: CPT | Performed by: FAMILY MEDICINE

## 2021-03-19 PROCEDURE — 63710000001 INSULIN LISPRO (HUMAN) PER 5 UNITS: Performed by: INTERNAL MEDICINE

## 2021-03-19 RX ORDER — METOPROLOL SUCCINATE 50 MG/1
50 TABLET, EXTENDED RELEASE ORAL
Status: DISCONTINUED | OUTPATIENT
Start: 2021-03-19 | End: 2021-03-20 | Stop reason: HOSPADM

## 2021-03-19 RX ADMIN — PANTOPRAZOLE SODIUM 40 MG: 40 INJECTION, POWDER, FOR SOLUTION INTRAVENOUS at 20:03

## 2021-03-19 RX ADMIN — INSULIN LISPRO 2 UNITS: 100 INJECTION, SOLUTION INTRAVENOUS; SUBCUTANEOUS at 20:10

## 2021-03-19 RX ADMIN — SACUBITRIL AND VALSARTAN 1 TABLET: 49; 51 TABLET, FILM COATED ORAL at 20:10

## 2021-03-19 RX ADMIN — SPIRONOLACTONE 25 MG: 25 TABLET ORAL at 08:13

## 2021-03-19 RX ADMIN — ACETAMINOPHEN 650 MG: 325 TABLET ORAL at 06:25

## 2021-03-19 RX ADMIN — SODIUM CHLORIDE, PRESERVATIVE FREE 3 ML: 5 INJECTION INTRAVENOUS at 20:04

## 2021-03-19 RX ADMIN — ACETAMINOPHEN 650 MG: 325 TABLET ORAL at 13:11

## 2021-03-19 RX ADMIN — ROSUVASTATIN CALCIUM 20 MG: 20 TABLET, COATED ORAL at 08:13

## 2021-03-19 RX ADMIN — SODIUM CHLORIDE, PRESERVATIVE FREE 10 ML: 5 INJECTION INTRAVENOUS at 20:04

## 2021-03-19 RX ADMIN — LEVETIRACETAM 750 MG: 500 TABLET, FILM COATED ORAL at 08:13

## 2021-03-19 RX ADMIN — FUROSEMIDE 20 MG: 20 TABLET ORAL at 08:13

## 2021-03-19 RX ADMIN — ASPIRIN 81 MG: 81 TABLET, CHEWABLE ORAL at 08:13

## 2021-03-19 RX ADMIN — SODIUM CHLORIDE, PRESERVATIVE FREE 3 ML: 5 INJECTION INTRAVENOUS at 08:13

## 2021-03-19 RX ADMIN — METOPROLOL SUCCINATE 50 MG: 50 TABLET, EXTENDED RELEASE ORAL at 08:15

## 2021-03-19 RX ADMIN — INSULIN LISPRO 2 UNITS: 100 INJECTION, SOLUTION INTRAVENOUS; SUBCUTANEOUS at 13:10

## 2021-03-19 RX ADMIN — ACETAMINOPHEN 650 MG: 325 TABLET ORAL at 20:04

## 2021-03-19 RX ADMIN — SODIUM CHLORIDE, PRESERVATIVE FREE 10 ML: 5 INJECTION INTRAVENOUS at 20:03

## 2021-03-19 RX ADMIN — SACUBITRIL AND VALSARTAN 1 TABLET: 49; 51 TABLET, FILM COATED ORAL at 08:13

## 2021-03-19 RX ADMIN — LEVETIRACETAM 750 MG: 500 TABLET, FILM COATED ORAL at 20:03

## 2021-03-19 RX ADMIN — PANTOPRAZOLE SODIUM 40 MG: 40 INJECTION, POWDER, FOR SOLUTION INTRAVENOUS at 08:12

## 2021-03-19 NOTE — PROGRESS NOTES
[facility] Medicine Services  PROGRESS NOTE    Patient Name: Narendra Cochran  : 1940  MRN: [id number]    Date of Admission: 3/12/2021  Primary Care Physician: Marguerite Moroe PA-C    Subjective   Subjective     CC:  F/u cardiac arrest     HPI:  Patient states he feels better no longer short of breath. His legs hurt but that is chronic.      ROS:  Gen- No fevers, chills  CV- No chest pain, palpitations  Resp- No cough, dyspnea  GI- No N/V/D,  abd pain        Objective   Objective     Vital Signs:   Temp:  [97.4 °F (36.3 °C)-98.5 °F (36.9 °C)] 97.6 °F (36.4 °C)  Heart Rate:  [64-74] 70  Resp:  [16-18] 18  BP: (131-143)/(53-72) 131/53        Physical Exam:  Constitutional: No acute distress, awake, alert, elderly male sitting up in chair , hearing deficient   HENT: NCAT, mucous membranes moist  Respiratory: Clear to auscultation bilaterally, respiratory effort normal   Cardiovascular: paced , no murmurs, rubs, or gallops  Gastrointestinal: Positive bowel sounds, soft, nontender, nondistended  Musculoskeletal: No bilateral ankle edema  Psychiatric: Appropriate affect, cooperative  Neurologic: Oriented x 3, speech clear  Skin: No rashes      Results Reviewed:  Results from last 7 days   Lab Units 21  0731 21  0811 21  0555 21   WBC 10*3/mm3 3.19* 2.80* 2.79*   < > 3.19*   HEMOGLOBIN g/dL 8.9* 8.4* 8.1*   < > 12.4*   HEMATOCRIT % 31.0* 27.3* 26.6*   < > 40.4   PLATELETS 10*3/mm3 109* 99* 94*   < > 112*   INR   --   --   --   --  1.13   PROCALCITONIN ng/mL  --   --   --   --  0.08    < > = values in this interval not displayed.     Results from last 7 days   Lab Units 21  0731 21  0555 21  0456 21  0806 03/15/21  0524 21  1805 21  0453 21  2215 21  1911   SODIUM mmol/L 142 140 141 140 142   < > 139  --  141   POTASSIUM mmol/L 3.6 3.6 4.3 4.6 3.3*  --  3.7  --  2.5*   CHLORIDE mmol/L 108*  109* 110* 110* 109*   < > 106  --  103   CO2 mmol/L 24.0 21.0* 24.0 26.0 27.0   < > 21.0*  --  23.0   BUN mg/dL 9 10 9 9 14   < > 11  --  7*   CREATININE mg/dL 0.86 0.79 0.84 0.83 0.83   < > 0.71*  --  0.90   GLUCOSE mg/dL 87 97 99 110* 105*   < > 140*  --  186*   CALCIUM mg/dL 8.1* 7.5* 8.3* 8.7 8.4*   < > 8.0*  --  8.9   ALT (SGPT) U/L  --   --   --  17  --   --  28  --  42*   AST (SGOT) U/L  --   --   --  16  --   --  53*  --  101*   TROPONIN T ng/mL  --   --   --   --  0.017  --  0.159* 0.026 <0.010   PROBNP pg/mL  --   --   --   --   --   --  15,701.0*  --  12,921.0*    < > = values in this interval not displayed.     Estimated Creatinine Clearance: 74.9 mL/min (by C-G formula based on SCr of 0.86 mg/dL).    Microbiology Results Abnormal     Procedure Component Value - Date/Time    Gastrointestinal Panel, PCR - Stool, Per Rectum [[id number]]  (Normal) Collected: 03/13/21 1403    Lab Status: Final result Specimen: Stool from Per Rectum Updated: 03/16/21 0044     Campylobacter Not Detected     Plesiomonas shigelloides Not Detected     Salmonella Not Detected     Vibrio Not Detected     Vibrio cholerae Not Detected     Yersinia enterocolitica Not Detected     Enteroaggregative E. coli (EAEC) Not Detected     Enteropathogenic E. coli (EPEC) Not Detected     Enterotoxigenic E. coli (ETEC) lt/st Not Detected     Shiga-like toxin-producing E. coli (STEC) stx1/stx2 Not Detected     Shigella/Enteroinvasive E. coli (EIEC) Not Detected     Cryptosporidium Not Detected     Cyclospora cayetanensis Not Detected     Entamoeba histolytica Not Detected     Giardia lamblia Not Detected     Adenovirus F40/41 Not Detected     Astrovirus Not Detected     Norovirus GI/GII Not Detected     Rotavirus A Not Detected     Sapovirus (I, II, IV or V) Not Detected    Narrative:      If Aeromonas, Staphylococcus aureus or Bacillus cereus are suspected, please order [id number]: Stool Culture, Aeromonas, S aureus, B Cereus.    Urine Culture -  Urine, Urine, Catheter [[id number]]  (Normal) Collected: 03/12/21 2043    Lab Status: Final result Specimen: Urine, Catheter Updated: 03/13/21 1807     Urine Culture No growth    Clostridium Difficile Toxin - Stool, Per Rectum [[id number]]  (Normal) Collected: 03/13/21 1403    Lab Status: Final result Specimen: Stool from Per Rectum Updated: 03/13/21 1530    Narrative:      The following orders were created for panel order Clostridium Difficile Toxin - Stool, Per Rectum.  Procedure                               Abnormality         Status                     ---------                               -----------         ------                     Clostridium Difficile To...[[id number]]  Normal              Final result                 Please view results for these tests on the individual orders.    Clostridium Difficile Toxin, PCR - Stool, Per Rectum [[id number]]  (Normal) Collected: 03/13/21 1403    Lab Status: Final result Specimen: Stool from Per Rectum Updated: 03/13/21 1530     C. Difficile Toxins by PCR Not Detected    Narrative:      Performance characteristics of test not established for patients <2 years of age.  Negative for Toxigenic C. Difficile    COVID PRE-OP / PRE-PROCEDURE SCREENING ORDER (NO ISOLATION) - Swab, Nasopharynx [[id number]]  (Normal) Collected: 03/12/21 2025    Lab Status: Final result Specimen: Swab from Nasopharynx Updated: 03/12/21 2142    Narrative:      The following orders were created for panel order COVID PRE-OP / PRE-PROCEDURE SCREENING ORDER (NO ISOLATION) - Swab, Nasopharynx.  Procedure                               Abnormality         Status                     ---------                               -----------         ------                     COVID-19 and FLU A/B PCR...[[id number]]  Normal              Final result                 Please view results for these tests on the individual orders.    COVID-19 and FLU A/B PCR - Swab, Nasopharynx [[id number]]  (Normal) Collected: 03/12/21  2025    Lab Status: Final result Specimen: Swab from Nasopharynx Updated: 03/12/21 2142     COVID19 Not Detected     Influenza A PCR Not Detected     Influenza B PCR Not Detected    Narrative:      Fact sheet for providers: https://www.fda.gov/media/623842/download    Fact sheet for patients: https://www.fda.gov/media/102801/download    Test performed by PCR.          Imaging Results (Last 24 Hours)     ** No results found for the last 24 hours. **          Results for orders placed during the hospital encounter of 03/12/21    Adult Transthoracic Echo Complete W/ Cont if Necessary Per Protocol    Interpretation Summary  · Left ventricular systolic function is normal. Estimated left ventricular EF = 50%  · Left ventricular wall thickness is consistent with mild concentric hypertrophy.  · Left atrial volume is mildly increased.  · Moderate aortic valve regurgitation is present.  · Moderate mitral valve regurgitation is present.      I have reviewed the medications:  Scheduled Meds:acetaminophen, 650 mg, Oral, Q6H  aspirin, 81 mg, Oral, Daily  furosemide, 20 mg, Oral, Daily  insulin lispro, 0-9 Units, Subcutaneous, 4x Daily With Meals & Nightly  levETIRAcetam, 750 mg, Oral, BID  metoprolol succinate XL, 50 mg, Oral, Q24H  pantoprazole, 40 mg, Intravenous, Q12H  rosuvastatin, 20 mg, Oral, Daily  sacubitril-valsartan, 1 tablet, Oral, Q12H  sodium chloride, 10 mL, Intravenous, Q12H  sodium chloride, 10 mL, Intravenous, Q12H  sodium chloride, 3 mL, Intravenous, Q12H  spironolactone, 25 mg, Oral, Daily      Continuous Infusions:   PRN Meds:.•  acetaminophen  •  dextrose  •  dextrose  •  glucagon (human recombinant)  •  HYDROcodone-acetaminophen  •  ipratropium-albuterol  •  magnesium sulfate **OR** magnesium sulfate **OR** magnesium sulfate  •  potassium & sodium phosphates **OR** potassium & sodium phosphates  •  potassium chloride  •  potassium chloride  •  potassium chloride  •  sodium chloride    Assessment/Plan    Assessment & Plan     Active Hospital Problems    Diagnosis  POA   • **OHCA [I46.9]  Yes   • Hypokalemia [E87.6]  Yes   • Hypomagnesemia [E83.42]  Yes   • VHD; mild-mod AR, mild MR [I38]  Yes   • Chronic systolic congestive heart failure (CMS/HCC) [I50.22]  Yes   • GERD [K21.9]  Yes   • Marijuana use [F12.90]  Yes   • 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]  Yes   • Coronary artery disease involving native coronary artery of native heart with angina pectoris (CMS/HCC) [I25.119]  Yes   • H/O seizures on Keppra [R56.9]  Yes   • Essential hypertension [I10]  Yes   • Hyperlipidemia LDL goal <70 [E78.5]  Yes   • T2DM [E11.65]  Yes   • Tobacco abuse [Z72.0]  Yes   • Paget disease of bone [M88.9]  Yes      Resolved Hospital Problems    Diagnosis Date Resolved POA   • Acute GI bleeding [K92.2] 03/15/2021 Yes   • Acute on chronic congestive heart failure (CMS/HCC) [I50.9] 03/13/2021 Yes        Brief Hospital Course to date:  Narendra Cochran is a 80 y.o. male with PMH of HLD, HTN, seizure disorder, CAD, VHD, chronic systolic heart failure, and paget disease was admitted to ICU after an out of hospital cardiac arrest. He had heart cath that did not show any new artery disease and AICD was placed. He improved quickly improved and was transferred to hospitalist service 3/18.     S/P OHCA  Cardiomyopathy  D  -[facility] shows no changes from prior exam  -SP AICD placement  -Entresto, toprol XL, spironolactone  -cardiology following, ok with discharge, he will need wound check for follow up   -EF 50%, moderate VHD   Pancytopenia  H/c paget disease  -was previously treated with reclast. Saw dr mathew in 2014  -wbc 3.19, plt 109  -he is currently on ASA   Possible GI Bleed  -GI following  -PPI BID   -had a couple episodes of hematochezia in ICU  -H7H stable at 8.9/31.0  Hyperglycemia  -received one dose of insulin coverage so far   -last a1c 1/18/21, 5.6  Seizure disorder  -keppra   THC use  Former Tobacco use  -addiction team is following         DVT  Prophylaxis:  Mechanical as his plt are 99       Disposition: I expect the patient to be discharged home with home health, tomorrow if ok with GI     CODE STATUS:   Code Status and Medical Interventions:   Ordered at: 03/12/21 2300     Level Of Support Discussed With:    Patient     Code Status:    CPR     Medical Interventions (Level of Support Prior to Arrest):    Full       Rosey Mclean,   03/19/21

## 2021-03-19 NOTE — NURSING NOTE
Cardiology Navigator Note      Patient Name:  Narendra Cochran  :  1940  DOS:  21    Active Hospital Problems    Diagnosis    • **OHCA      · Out of hospital cardiac arrest with shockable rhythm, 3/12/2021  · History of frequent PVCs not improved with amiodarone,   · Echo (3/13/2021): LVEF 50%.  Moderate MR.  Moderate aortic insufficiency.  · Cardiac catheterization (3/15/2021): Severe 1-vessel CAD (small RPL).     • Hypokalemia    • Hypomagnesemia    • VHD; mild-mod AR, mild MR      · Echo (3/13/2021): LVEF 50%.  Moderate MR.  Moderate aortic insufficiency.     • Chronic systolic congestive heart failure (CMS/HCC)      · Cardiac catheterization (3/18/2020): 1 vessel CAD involving small posterior lateral branch of the RCA.  LVEF 35%  · Echo (3/13/2021): LVEF 50%.  Moderate MR.  Moderate aortic insufficiency.     • GERD    • Marijuana use    • Frequent PVCs      · Holter monitor 2020:  PVCs burden 12.3% with some of the counted PVCs appear to be PACs with aberrancy. One 4 beat run of nonsustained VT versus SVT with aberrancy.  · Amiodarone therapy initiated at 200 mg daily for PVCs.  · 24-hour Holter 7/15/2020 HR 43-79, average 55 bpm, PVC burden 10.2%.     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  · Cardiac catheterization for OHCA (3/15/2021): Severe 1-vessel CAD involving small posterior lateral branch of the RCA.     • H/O seizures on Keppra    • Essential hypertension    • Hyperlipidemia LDL goal <70    • T2DM    • Tobacco abuse    • Paget disease of bone      · History of Paget's disease, treated with Reclast.  Last saw Dr. Lui .         Consult has been received.  Medical records have been reviewed. Patient to be scheduled follow up 3-5 days  post discharge.    Echo Results:    · Left ventricular systolic  function is normal. Estimated left ventricular EF = 50%  · Left ventricular wall thickness is consistent with mild concentric hypertrophy.  · Left atrial volume is mildly increased.  · Moderate aortic valve regurgitation is present.  · Moderate mitral valve regurgitation is present.       Heart Failure Quality Measures    ACE I, ARB, ARNI (if EF <40%)     Yes      Evidence-based Beta Blocker (EF<40%)    (Bisoprolol, Carvedilol, Metoprolol Succinate)  Yes      Aldosterone Antagonist (EF <40%)  Yes

## 2021-03-19 NOTE — PROGRESS NOTES
Continued Stay Note   [facility]     Patient Name: Narendra Cochran  MRN: [id number]  Today's Date: 3/19/2021    Admit Date: 3/12/2021    Discharge Plan     Row Name 03/19/21 4575       Plan    Plan  Home with home health    Patient/Family in Agreement with Plan  yes    Plan Comments  Met with patient and spouse in the room.  Alert and oriented.  Ambulating 350 ft with therapy and plan is home with home health care assistance at discharge, per therapy recommendation.  Spouse states she has used [facility] in the past and would use them again.   orders obtained and referral called to Theresa at [facility].  She requested faxed referral and they will review.  No discharge order at this time. Will notify [facility] of discharge.  Update:  [facility] unable to take.  Referral to [facility] .      Final Discharge Disposition Code  06 - home with home health care        Discharge Codes    No documentation.       Expected Discharge Date and Time     Expected Discharge Date Expected Discharge Time    Mar 20, 2021             Angella Pappas RN

## 2021-03-19 NOTE — PROGRESS NOTES
"GI Daily Progress Note  Subjective     Narendra Reynolds is a 80 y.o. male who was admitted with Cardiac arrest (CMS/[facility]).   Feeling well.  Eating supper.  No blood in stool. Wife in room    Chief Complaint:  GIB    Objective     /53 (BP Location: Left arm, Patient Position: Sitting)   Pulse 73   Temp 97.6 °F (36.4 °C) (Oral)   Resp 18   Ht 180.3 cm (70.98\")   Wt 80 kg (176 lb 5.9 oz)   SpO2 98%   BMI 24.61 kg/m²     Intake/Output last 3 shifts:  I/O last 3 completed shifts:  In: 600 [P.O.:600]  Out: 1450 [Urine:1450]  Intake/Output this shift:  I/O this shift:  In: 720 [P.O.:720]  Out: 100 [Urine:100]      Physical Exam  Wt Readings from Last 3 Encounters:   03/19/21 80 kg (176 lb 5.9 oz)   01/18/21 89.4 kg (197 lb)   12/30/20 93.9 kg (207 lb)   ,body mass index is 24.61 kg/m².,@FLOWAMB(6)@,@FLOWAMB(5)@,@FLOWAMB(8)@   CONSTITUTIONAL:sitting in chair eating.  [place]  Resp CTA; no rhonchi, rales, or wheezes.  Respiration effort normal  CV RRR; no M/R/G. No lower extremity edema  GI Abd soft, NT, ND, normal active bowel sounds.    Psych: Awake and alert    DATA:Results for NARENDRA REYNOLDS (MRN [id number]) as of 3/19/2021 18:06   Ref. Range 3/17/2021 18:27 3/17/2021 23:53 3/18/2021 05:55 3/18/2021 08:11 3/19/2021 07:31   Hemoglobin Latest Ref Range: 13.0 - 17.7 g/dL 9.7 (L) 8.5 (L) 8.1 (L) 8.4 (L) 8.9 (L)     REVIEW iof colonoscopy 12/20 showed mild diverticulosis and hemorrhoids.  Mild gastritis on EGD  Assessment/Plan     1.  Anemia  2.  Blood in stool  3. S/p cardiac arrest  4. S/P ICDM      Patient's hemoglobin is stabilized.  Reviewed his EGD and colon records [facility].  His last colonoscopy was in December 2020.  EGD shows mild gastritis.    Will defer further procedures at this time.  Would recommend continue PPI twice daily for a month and once daily for month then DC.    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

## 2021-03-19 NOTE — PLAN OF CARE
Problem: Adult Inpatient Plan of Care  Goal: Plan of Care Review  Recent Flowsheet Documentation  Taken 3/19/2021 1554 by Phyllis Bowser, PT  Progress: improving  Outcome Summary: Pt showing improvement as he increased ambulation distance to 400ft with min A and RWx demonstrating R lean throughout. Pt required multiple verbal cues and manual assist for obsticle avoidance throughout ambulation, mostly due to visual impairment. Seated HEP performed. D/c rec for IRF, but if pt/family refuses, HW24A and  PT/OT remains appropriate, but pt will need BSC and RWx.

## 2021-03-19 NOTE — THERAPY TREATMENT NOTE
Patient Name: Narendra Cochran  : 1940    MRN: [id number]                              Today's Date: 3/19/2021       Admit Date: 3/12/2021    Visit Dx:     ICD-10-CM ICD-9-CM   1. Acute on chronic congestive heart failure, unspecified heart failure type (CMS/[facility])  I50.9 428.0   2. Hypokalemia  E87.6 276.8   3. Coronary artery disease involving native coronary artery of native heart with angina pectoris (CMS/[facility])  I25.119 414.01     413.9   4. OHCA  I46.9 427.5   5. NSVT (nonsustained ventricular tachycardia) (CMS/[facility])  I47.2 427.1   6. Cardiac arrest (CMS/[facility])  I46.9 427.5   7. Paget disease of bone  M88.9 731.0   8. H/O seizures on Keppra  R56.9 780.39     Patient Active Problem List   Diagnosis   • Essential hypertension   • Hyperlipidemia LDL goal <70   • T2DM   • Paget disease of bone   • Tobacco abuse   • H/O seizures on Keppra   • Gonalgia   • Osteitis deformans   • Syncope   • NSVT (nonsustained ventricular tachycardia) (CMS/[facility])   • Coronary artery disease involving native coronary artery of native heart with angina pectoris (CMS/[facility])   • NICM    • OHCA   • Hypokalemia   • Hypomagnesemia   • VHD; mild-mod AR, mild MR   • Chronic systolic congestive heart failure (CMS/[facility])   • Former tobacco user   • GERD   • Marijuana use   • Frequent PVCs   • Acute blood loss anemia     Past Medical History:   Diagnosis Date   • Arthritis    • Diabetes mellitus (CMS/[facility])    • Diverticulitis    • GERD (gastroesophageal reflux disease)    • History of transfusion    • Hyperlipidemia    • Hypertension    • Hypertensive urgency, malignant 2017   • Paget disease of bone      Past Surgical History:   Procedure Laterality Date   • APPENDECTOMY     • CARDIAC CATHETERIZATION N/A 3/18/2020    Procedure: Left Heart Cath;  Surgeon: Sonya Garibay MD;  Location: [facility] CATH INVASIVE LOC[facility];  Service: Cardiology;  Laterality: N/A;  First availabel provider     • CARDIAC CATHETERIZATION N/A 3/15/2021    Procedure: LEFT HEART  CATH;  Surgeon: Doc Sahni IV, MD;  Location:  [facility] CATH INVASIVE LOCATION;  Service: Cardiovascular;  Laterality: N/A;   • CARDIAC CATHETERIZATION N/A 3/15/2021    Procedure: Coronary angiography;  Surgeon: Doc Sahni IV, MD;  Location:  [facility] CATH INVASIVE LOCATION;  Service: Cardiovascular;  Laterality: N/A;   • CARDIAC ELECTROPHYSIOLOGY PROCEDURE N/A 3/16/2021    Procedure: ICD DC new;  Surgeon: Som Boyd DO;  Location:  [facility] EP INVASIVE LOCATION;  Service: Cardiology;  Laterality: N/A;   • COLON RESECTION      second to diverticulitis    • FOOT SURGERY Left      General Information     Row Name 03/19/21 1545          Physical Therapy Time and Intention    Document Type  therapy note (daily note)  -AY     Mode of Treatment  individual therapy;physical therapy  -AY     Row Name 03/19/21 1545          General Information    Existing Precautions/Restrictions  fall;seizures;cardiac;other (see comments) [place] (amplifier in room), s/p AIDC placement 3/6/21 (Left)  -AY     Row Name 03/19/21 1545          Cognition    Orientation Status (Cognition)  oriented x 3  -AY     Row Name 03/19/21 1543          Safety Issues, Functional Mobility    Safety Issues Affecting Function (Mobility)  other (see comments);safety precautions follow-through/compliance;safety precaution awareness;awareness of need for assistance No lift/push/pull and AROM L shoulder < 90 degrees, s/p AICD placement (Left)  -AY     Impairments Affecting Function (Mobility)  strength;balance;other (see comments);visual/perceptual  -AY       User Key  (r) = Recorded By, (t) = Taken By, (c) = Cosigned By    Initials Name Provider Type    AY Phyllis Bowser, PT Physical Therapist        Mobility     Row Name 03/19/21 1547          Bed Mobility    Comment (Bed Mobility)  Pt received and left sitting up in chair  -AY     Row Name 03/19/21 1547          Transfers    Comment (Transfers)  verbal cueing for pacemaker precautions of  LUE, sequencing, and hand placement. Pt requried use of rocking technique to initiate STS.  -AY     Row Name 03/19/21 1541          Sit-Stand Transfer    Sit-Stand [place] (Transfers)  contact guard  -AY     Assistive Device (Sit-Stand Transfers)  walker, front-wheeled  -AY     Row Name 03/19/21 1547          Gait/Stairs (Locomotion)    [place] Level (Gait)  minimum assist (75% patient effort);1 person assist;verbal cues  -AY     Assistive Device (Gait)  walker, front-wheeled  -AY     Distance in Feet (Gait)  400  -AY     Deviations/Abnormal Patterns (Gait)  stride length decreased;gait speed decreased  -AY     Bilateral Gait Deviations  forward flexed posture;heel strike decreased  -AY     Comment (Gait/Stairs)  Pt ambulated 400ft with RWx and min A demonstrating step through gait pattern with decreased kristy and flexed posture. Pt had R lean present throughout and requried assistance steering walker. One brief standing rest break required d/t fatigue. Visual deficits impairing ambulation ability, as pt required manual assistance to avoid obsticles in page on 4 occations. Verbal cueing for posture, walker management, increased step/stride length, and heel strike bilaterally. Gait distance limited by fatigue.  -AY       User Key  (r) = Recorded By, (t) = Taken By, (c) = Cosigned By    Initials Name Provider Type    AY Phyllis Bowser, PT Physical Therapist        Obj/Interventions     Row Name 03/19/21 7793          Motor Skills    Therapeutic Exercise  hip;knee;ankle;other (see comments) verbal cueing for muscle activation and technique.  -AY     Row Name 03/19/21 7012          Hip (Therapeutic Exercise)    Hip (Therapeutic Exercise)  strengthening exercise;isometric exercises  -AY     Hip Isometrics (Therapeutic Exercise)  bilateral;gluteal sets;10 repetitions;sitting  -AY     Hip Strengthening (Therapeutic Exercise)  bilateral;marching while seated;10 repetitions;sitting  -AY     Row Name 03/19/21 3775           Knee (Therapeutic Exercise)    Knee (Therapeutic Exercise)  isometric exercises;strengthening exercise  -AY     Knee Isometrics (Therapeutic Exercise)  bilateral;sitting;quad sets;10 repetitions  -AY     Knee Strengthening (Therapeutic Exercise)  bilateral;SLR (straight leg raise);LAQ (long arc quad);sitting;10 repetitions  -AY     Row Name 03/19/21 1552          Ankle (Therapeutic Exercise)    Ankle (Therapeutic Exercise)  AROM (active range of motion)  -AY     Ankle AROM (Therapeutic Exercise)  bilateral;dorsiflexion;plantarflexion;10 repetitions;sitting  -AY     Row Name 03/19/21 1552          Balance    Balance Assessment  sitting static balance;sitting dynamic balance;standing static balance;standing dynamic balance  -AY     Static Sitting Balance  WFL;supported;sitting in chair  -AY     Dynamic Sitting Balance  WFL;unsupported;sitting in chair  -AY     Static Standing Balance  supported;standing;mild impairment  -AY     Dynamic Standing Balance  mild impairment;supported;standing  -AY       User Key  (r) = Recorded By, (t) = Taken By, (c) = Cosigned By    Initials Name Provider Type    AY Phyllis Bowser, PT Physical Therapist        Goals/Plan     Row Name 03/19/21 1558          Bed Mobility Goal 1 (PT)    Activity/Assistive Device (Bed Mobility Goal 1, PT)  bed mobility activities, all  -AY     [place] Level/Cues Needed (Bed Mobility Goal 1, PT)  independent  -AY     Time Frame (Bed Mobility Goal 1, PT)  long term goal (LTG);1 week  -AY     Progress/Outcomes (Bed Mobility Goal 1, PT)  goal ongoing;continuing progress toward goal  -AY     Row Name 03/19/21 1558          Transfer Goal 1 (PT)    Activity/Assistive Device (Transfer Goal 1, PT)  sit-to-stand/stand-to-sit;walker, rolling  -AY     [place] Level/Cues Needed (Transfer Goal 1, PT)  modified independence  -AY     Time Frame (Transfer Goal 1, PT)  long term goal (LTG);10 days  -AY     Progress/Outcome (Transfer Goal 1, PT)  goal revised  this date;goal ongoing;continuing progress toward goal  -AY     Row Name 03/19/21 8507          Gait Training Goal 1 (PT)    Activity/Assistive Device (Gait Training Goal 1, PT)  gait (walking locomotion);assistive device use;walker, rolling  -AY     [place] Level (Gait Training Goal 1, PT)  supervision required  -AY     Distance (Gait Training Goal 1, PT)  500  -AY     Time Frame (Gait Training Goal 1, PT)  long term goal (LTG);10 days  -AY     Progress/Outcome (Gait Training Goal 1, PT)  goal ongoing;continuing progress toward goal;goal revised this date;goal met  -AY       User Key  (r) = Recorded By, (t) = Taken By, (c) = Cosigned By    Initials Name Provider Type    AY Phyllis Bowser, PT Physical Therapist        Clinical Impression     Row Name 03/19/21 2400          Pain    Additional Documentation  Pain Scale: Numbers Pre/Post-Treatment (Group)  -AY     Row Name 03/19/21 1381          Pain Scale: Numbers Pre/Post-Treatment    Pretreatment Pain Rating  0/10 - no pain  -AY     Posttreatment Pain Rating  0/10 - no pain  -AY     Pre/Posttreatment Pain Comment  denied pain.  -AY     Pain Intervention(s)  Ambulation/increased activity;Repositioned  -AY     Row Name 03/19/21 5723          Plan of Care Review    Progress  improving  -AY     Outcome Summary  Pt showing improvement as he increased ambulation distance to 400ft with min A and RWx demonstrating R lean throughout. Pt required multiple verbal cues and manual assist for obsticle avoidance throughout ambulation, mostly due to visual impairment. Seated HEP performed. D/c rec for IRF, but if pt/family refuses, HW24A and HH PT/OT remains appropriate, but pt will need BSC and RWx.  -AY     Row Name 03/19/21 5156          Vital Signs    Pre Systolic BP Rehab  -- VSS, RN cleared for PT  -AY     Pre SpO2 (%)  96  -AY     O2 Delivery Pre Treatment  room air  -AY     O2 Delivery Intra Treatment  room air  -AY     Post SpO2 (%)  95  -AY     O2 Delivery Post  Treatment  room air  -AY     Pre Patient Position  Sitting  -AY     Intra Patient Position  Standing  -AY     Post Patient Position  Sitting  -AY     Row Name 03/19/21 1554          Positioning and Restraints    Pre-Treatment Position  sitting in chair/recliner  -AY     Post Treatment Position  chair  -AY     In Chair  notified nsg;reclined;sitting;call light within reach;encouraged to call for assist;exit alarm on;with family/caregiver;waffle cushion;legs elevated  -AY       User Key  (r) = Recorded By, (t) = Taken By, (c) = Cosigned By    Initials Name Provider Type    Phyllis Caputo PT Physical Therapist        Outcome Measures     Row Name 03/19/21 1559          How much help from another person do you currently need...    Turning from your back to your side while in flat bed without using bedrails?  3  -AY     Moving from lying on back to sitting on the side of a flat bed without bedrails?  3  -AY     Moving to and from a bed to a chair (including a wheelchair)?  3  -AY     Standing up from a chair using your arms (e.g., wheelchair, bedside chair)?  3  -AY     Climbing 3-5 steps with a railing?  2  -AY     To walk in hospital room?  3  -AY     AM-PAC 6 Clicks Score (PT)  17  -AY     Row Name 03/19/21 1559          Functional Assessment    Outcome Measure Options  AM-PAC 6 Clicks Basic Mobility (PT)  -AY       User Key  (r) = Recorded By, (t) = Taken By, (c) = Cosigned By    Initials Name Provider Type    Phlylis Caputo PT Physical Therapist        Physical Therapy Education                 Title: PT OT SLP Therapies (Done)     Topic: Physical Therapy (Done)     Point: Mobility training (Done)     Learning Progress Summary           Patient Acceptance, E,TB, VU,NR by AY at 3/19/2021 1559    Comment: pt educated on pacemaker precautions, posture, proper gait mechanics, STS transfer technqiue, HEP, POC progression, and home equiptment needs.    Acceptance, E, VU,NR by CD at 3/18/2021 1647    Comment: SEE  FLOW SHEET.    Acceptance, E,D, NR,VU by LS at 3/16/2021 1436    Acceptance, E,TB, VU,NR by AY at 3/15/2021 1111   Significant Other Acceptance, E,TB, VU,NR by AY at 3/19/2021 1559    Comment: pt educated on pacemaker precautions, posture, proper gait mechanics, STS transfer technqiue, HEP, POC progression, and home equiptment needs.    Acceptance, E,D, NR,VU by LS at 3/16/2021 1436    Acceptance, E,TB, VU,NR by AY at 3/15/2021 1111                   Point: Home exercise program (Done)     Learning Progress Summary           Patient Acceptance, E,TB, VU,NR by AY at 3/19/2021 1559    Comment: pt educated on pacemaker precautions, posture, proper gait mechanics, STS transfer technqiue, HEP, POC progression, and home equiptment needs.    Acceptance, E, VU,NR by CD at 3/18/2021 1647    Comment: SEE FLOW SHEET.    Acceptance, E,D, NR,VU by LS at 3/16/2021 1436   Significant Other Acceptance, E,TB, VU,NR by AY at 3/19/2021 1559    Comment: pt educated on pacemaker precautions, posture, proper gait mechanics, STS transfer technqiue, HEP, POC progression, and home equiptment needs.    Acceptance, E,D, NR,VU by LS at 3/16/2021 1436                   Point: Body mechanics (Done)     Learning Progress Summary           Patient Acceptance, E,TB, VU,NR by AY at 3/19/2021 1559    Comment: pt educated on pacemaker precautions, posture, proper gait mechanics, STS transfer technqiue, HEP, POC progression, and home equiptment needs.    Acceptance, E, VU,NR by CD at 3/18/2021 1647    Comment: SEE FLOW SHEET.    Acceptance, E,D, NR,VU by LS at 3/16/2021 1436    Acceptance, E,TB, VU,NR by AY at 3/15/2021 1111   Significant Other Acceptance, E,TB, VU,NR by AY at 3/19/2021 1559    Comment: pt educated on pacemaker precautions, posture, proper gait mechanics, STS transfer technqiue, HEP, POC progression, and home equiptment needs.    Acceptance, E,D, NR,VU by LS at 3/16/2021 1436    Acceptance, E,TB, VU,NR by AY at 3/15/2021 1111                    Point: Precautions (Done)     Learning Progress Summary           Patient Acceptance, E,TB, VU,NR by AY at 3/19/2021 1559    Comment: pt educated on pacemaker precautions, posture, proper gait mechanics, STS transfer technqiue, HEP, POC progression, and home equiptment needs.    Acceptance, E, VU,NR by CD at 3/18/2021 1647    Comment: SEE FLOW SHEET.    Acceptance, E,D, NR,VU by LS at 3/16/2021 1436    Acceptance, E,TB, VU,NR by AY at 3/15/2021 1111   Significant Other Acceptance, E,TB, VU,NR by AY at 3/19/2021 1559    Comment: pt educated on pacemaker precautions, posture, proper gait mechanics, STS transfer technqiue, HEP, POC progression, and home equiptment needs.    Acceptance, E,D, NR,VU by  at 3/16/2021 1436    Acceptance, E,TB, VU,NR by AY at 3/15/2021 1111                               User Key     Initials Effective Dates Name Provider Type Discipline    CD 06/19/15 -  Xuan Zhang, PT Physical Therapist PT     06/19/15 -  Sharron Saba PT Physical Therapist PT    AY 11/10/20 -  Phyllis Bowser, PT Physical Therapist PT              PT Recommendation and Plan  Planned Therapy Interventions (PT): balance training, bed mobility training, gait training, patient/family education, neuromuscular re-education, home exercise program, postural re-education, ROM (range of motion), stair training, strengthening, stretching, transfer training  Plan of Care Reviewed With: patient, spouse  Progress: improving  Outcome Summary: Pt showing improvement as he increased ambulation distance to 400ft with min A and RWx demonstrating R lean throughout. Pt required multiple verbal cues and manual assist for obsticle avoidance throughout ambulation, mostly due to visual impairment. Seated HEP performed. D/c rec for IRF, but if pt/family refuses, HW24A and HH PT/OT remains appropriate, but pt will need BSC and RWx.     Time Calculation:   PT Charges     Row Name 03/19/21 1600             Time Calculation    Start  Time  1437  -AY      PT Received On  03/19/21  -AY         Time Calculation- PT    Total Timed Code Minutes- PT  39 minute(s)  -AY         Timed Charges    77324 - PT Therapeutic Exercise Minutes  23  -AY      13062 - Gait Training Minutes   16  -AY        User Key  (r) = Recorded By, (t) = Taken By, (c) = Cosigned By    Initials Name Provider Type    AY Phyllis Bowser PT Physical Therapist        Therapy Charges for Today     Code Description Service Date Service Provider Modifiers Qty    95441350502 HC PT THER PROC EA 15 MIN 3/19/2021 Phyllis Bowser, PT GP 2    71021857352 HC GAIT TRAINING EA 15 MIN 3/19/2021 Phyllis Bowser, PT GP 1          PT G-Codes  Outcome Measure Options: AM-PAC 6 Clicks Basic Mobility (PT)  AM-PAC 6 Clicks Score (PT): 17  AM-PAC 6 Clicks Score (OT): 20    Phyllis Bowser PT  3/19/2021

## 2021-03-19 NOTE — THERAPY TREATMENT NOTE
Patient Name: Narendra Cochran  : 1940    MRN: [id number]                              Today's Date: 3/19/2021       Admit Date: 3/12/2021    Visit Dx:     ICD-10-CM ICD-9-CM   1. Acute on chronic congestive heart failure, unspecified heart failure type (CMS/HCC)  I50.9 428.0   2. Hypokalemia  E87.6 276.8   3. 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 OHCA  I46.9 427.5   5. NSVT (nonsustained ventricular tachycardia) (CMS/HCC)  I47.2 427.1   6. Cardiac arrest (CMS/HCC)  I46.9 427.5     Patient Active Problem List   Diagnosis   • Essential hypertension   • Hyperlipidemia LDL goal <70   • T2DM   • Paget disease of bone   • Tobacco abuse   • H/O seizures on Keppra   • Gonalgia   • Osteitis deformans   • Syncope   • NSVT (nonsustained ventricular tachycardia) (CMS/HCC)   • Coronary artery disease involving native coronary artery of native heart with angina pectoris (CMS/HCC)   • NICM    • OHCA   • Hypokalemia   • Hypomagnesemia   • VHD; mild-mod AR, mild MR   • Chronic systolic congestive heart failure (CMS/HCC)   • Former tobacco user   • GERD   • Marijuana use   • Frequent PVCs   • Acute blood loss anemia     Past Medical History:   Diagnosis Date   • Arthritis    • Diabetes mellitus (CMS/HCC)    • Diverticulitis    • GERD (gastroesophageal reflux disease)    • History of transfusion    • Hyperlipidemia    • Hypertension    • Hypertensive urgency, malignant 2017   • Paget disease of bone      Past Surgical History:   Procedure Laterality Date   • APPENDECTOMY     • CARDIAC CATHETERIZATION N/A 3/18/2020    Procedure: Left Heart Cath;  Surgeon: Sonya Garibay MD;  Location:  [facility];  Service: Cardiology;  Laterality: N/A;  First availabel provider     • CARDIAC CATHETERIZATION N/A 3/15/2021    Procedure: LEFT HEART CATH;  Surgeon: Doc Sahni IV, MD;  Location:  [facility];  Service: Cardiovascular;  Laterality: N/A;   • CARDIAC CATHETERIZATION N/A 3/15/2021    Procedure: Coronary angiography;  Surgeon: Doc Sahni IV, MD;  Location:  [facility];  Service: Cardiovascular;  Laterality: N/A;   • CARDIAC ELECTROPHYSIOLOGY PROCEDURE N/A 3/16/2021    Procedure: ICD DC new;  Surgeon: Som Boyd DO;  Location:  [facility];  Service: Cardiology;  Laterality: N/A;   • COLON RESECTION      second to diverticulitis    • FOOT SURGERY Left      General Information     Row Name 03/19/21 0947          OT Time and Intention    Document Type  therapy note (daily note)  -CS     Mode of Treatment  occupational therapy  -CS     Row Name 03/19/21 0947          General Information    Patient Profile Reviewed  yes  -CS     Existing Precautions/Restrictions  fall;seizures;cardiac;other (see comments) [place] (amplifier in room), s/p AIDC placement 3/6/21 (Left)  -CS     Barriers to Rehab  medically complex  -CS     Row Name 03/19/21 0947          Cognition    Orientation Status (Cognition)  oriented x 3  -CS     Row Name 03/19/21 0947          Safety Issues, Functional Mobility    Impairments Affecting Function (Mobility)  strength;balance;other (see comments);visual/perceptual LUE precautions s/p AICD placment 3/6/2021  -CS     Comment, Safety Issues/Impairments (Mobility)  No lift/push/pull and AROM L shoulder < 90 degrees, s/p AICD placement (Left)  -CS       User Key  (r) = Recorded By, (t) = Taken By, (c) = Cosigned By    Initials Name Provider Type    CS Morgan Osorio OT Occupational Therapist          Mobility/ADL's     Row Name 03/19/21 0974          Bed Mobility    Comment (Bed Mobility)  Pt received UIC and returned to chair  -CS     Row Name 03/19/21 0944          Transfers    Transfers  sit-stand transfer  -CS     Comment (Transfers)  Verbal cues and demonstration for maintenance of LUE precautions during STS, sequencing, hand  placement, and safety awareness, Pt demonstrates rocking method in preparation  -CS     Sit-Stand [place] (Transfers)  contact guard  -CS     Row Name 03/19/21 0949          Sit-Stand Transfer    Assistive Device (Sit-Stand Transfers)  walker, front-wheeled  -CS     Row Name 03/19/21 0949          Functional Mobility    Functional Mobility- Comment  CGA and tactile cues for AD management during in-room and hallway functional distances  -     Row Name 03/19/21 0949          Activities of Daily Living    BADL Assessment/Intervention  lower body dressing;upper body dressing;grooming  -CS     Row Name 03/19/21 0949          Lower Body Dressing Assessment/Training    [place] Level (Lower Body Dressing)  don;pants/bottoms;minimum assist (75% patient effort)  -CS     Position (Lower Body Dressing)  supported sitting;supported standing  -CS     Comment (Lower Body Dressing)  Pt demonstrates fxl dynamic sitting balance and ROM for initiation of donning pants, requires Jose Carlos for task completion and sit-stand dynamic balance  -CS     Row Name 03/19/21 0949          Upper Body Dressing Assessment/Training    [place] Level (Upper Body Dressing)  doff;pajama/robe;standby assist  -CS     Position (Upper Body Dressing)  supported sitting  -CS     Comment (Upper Body Dressing)  Therapist carlos'bar and reviewed UB dressing techniques w/in LUE AROM precautions, Pt verbalized understanding  -     Row Name 03/19/21 0949          Grooming Assessment/Training    [place] Level (Grooming)  wash face, hands;set up  -CS     Position (Grooming)  supported sitting  -CS       User Key  (r) = Recorded By, (t) = Taken By, (c) = Cosigned By    Initials Name Provider Type     Morgan Osorio OT Occupational Therapist        Obj/Interventions     Row Name 03/19/21 0954          Balance    Balance Assessment  sitting static balance;standing static balance;standing dynamic balance;sitting dynamic balance  -CS     Static Sitting  Balance  WFL;unsupported;sitting in chair  -CS     Dynamic Sitting Balance  WFL;unsupported;sitting in chair  -CS     Static Standing Balance  mild impairment;supported;standing  -CS     Dynamic Standing Balance  mild impairment;supported;standing  -CS     Balance Interventions  sitting;sit to stand;standing;occupation based/functional task;weight shifting activity;dynamic reaching  -CS     Comment, Balance  Pt maintained dynamic sitting balance w/ SBA during LB dressing tasks, Pt Jose Carlos for sit-stand dynamic balance during donning pants, Pt performed 1/20reps dynamic targeted reaching in supported standing w/ CGA and moderate challenge  -CS     Row Name 03/19/21 0954          Therapeutic Exercise    Therapeutic Exercise  shoulder;elbow/forearm;wrist;hand;other (see comments) Pt performed 1/10reps BUE AROM ther-ex for should/wrist/elbow and hands w/in LUE precautions in supported sitting  -CS       User Key  (r) = Recorded By, (t) = Taken By, (c) = Cosigned By    Initials Name Provider Type    Morgan Sheehan OT Occupational Therapist        Goals/Plan    No documentation.       Clinical Impression     Row Name 03/19/21 0957          Pain Assessment    Additional Documentation  Pain Scale: Numbers Pre/Post-Treatment (Group)  -CS     Row Name 03/19/21 0957          Pain Scale: Numbers Pre/Post-Treatment    Pretreatment Pain Rating  0/10 - no pain  -CS     Posttreatment Pain Rating  0/10 - no pain  -CS     Pre/Posttreatment Pain Comment  tolerated tx  -CS     Row Name 03/19/21 0977          Plan of Care Review    Plan of Care Reviewed With  patient  -CS     Progress  improving  -     Outcome Summary  Pt received UIC and agreeable to therapy. Pt progressed to CGA during STS x 7 at RW, progressed to Jose Carlos for LB dressing task, and CGA for functional distances in room and hallway. Pt verbalized understanding of LUE precautions during ADL completion but continues to require cues for maintenance. Rec d/c to home w/  assist and HH.  -CS     Row Name 03/19/21 0957          Therapy Plan Review/Discharge Plan (OT)    Anticipated Discharge Disposition (OT)  home with assist;home with home health  -CS     Row Name 03/19/21 0957          Vital Signs    Pre Systolic BP Rehab  -- RN cleared for tx, VSS on RA  -CS     O2 Delivery Pre Treatment  room air  -CS     O2 Delivery Intra Treatment  room air  -CS     O2 Delivery Post Treatment  room air  -CS     Pre Patient Position  Sitting  -CS     Intra Patient Position  Standing  -CS     Post Patient Position  Sitting  -CS     Row Name 03/19/21 0957          Positioning and Restraints    Pre-Treatment Position  sitting in chair/recliner  -CS     Post Treatment Position  chair  -CS     In Chair  notified nsg;reclined;sitting;call light within reach;encouraged to call for assist;exit alarm on;waffle cushion;legs elevated  -CS       User Key  (r) = Recorded By, (t) = Taken By, (c) = Cosigned By    Initials Name Provider Type    Morgan Sheehan OT Occupational Therapist        Outcome Measures     Row Name 03/19/21 1001          How much help from another is currently needed...    Putting on and taking off regular lower body clothing?  3  -CS     Bathing (including washing, rinsing, and drying)  3  -CS     Toileting (which includes using toilet bed pan or urinal)  3  -CS     Putting on and taking off regular upper body clothing  3  -CS     Taking care of personal grooming (such as brushing teeth)  4  -CS     Eating meals  4  -CS     AM-PAC 6 Clicks Score (OT)  20  -CS     Row Name 03/19/21 1001          Functional Assessment    Outcome Measure Options  AM-PAC 6 Clicks Daily Activity (OT)  -CS       User Key  (r) = Recorded By, (t) = Taken By, (c) = Cosigned By    Initials Name Provider Type    Morgan Sheehan OT Occupational Therapist        Occupational Therapy Education                 Title: PT OT SLP Therapies (Done)     Topic: Occupational Therapy (Done)     Point: ADL training  (Done)     Description:   Instruct learner(s) on proper safety adaptation and remediation techniques during self care or transfers.   Instruct in proper use of assistive devices.              Learning Progress Summary           Patient Acceptance, E, VU,NR by CS at 3/19/2021 1002    Acceptance, E, NR by CL at 3/15/2021 1057                   Point: Home exercise program (Done)     Description:   Instruct learner(s) on appropriate technique for monitoring, assisting and/or progressing therapeutic exercises/activities.              Learning Progress Summary           Patient Acceptance, E, VU,NR by CS at 3/19/2021 1002    Acceptance, E, NR by MA at 3/17/2021 1012                   Point: Precautions (Done)     Description:   Instruct learner(s) on prescribed precautions during self-care and functional transfers.              Learning Progress Summary           Patient Acceptance, E, VU,NR by CS at 3/19/2021 1002    Acceptance, E, NR by MA at 3/17/2021 1012    Acceptance, E, NR by CL at 3/15/2021 1057                   Point: Body mechanics (Done)     Description:   Instruct learner(s) on proper positioning and spine alignment during self-care, functional mobility activities and/or exercises.              Learning Progress Summary           Patient Acceptance, E, VU,NR by CS at 3/19/2021 1002    Acceptance, E, NR by MA at 3/17/2021 1012    Acceptance, E, NR by CL at 3/15/2021 1057                               User Key     Initials Effective Dates Name Provider Type Discipline     04/03/18 -  Camryn Guzman, OT Occupational Therapist OT    SHANON 10/21/20 -  Mogran Osorio OT Occupational Therapist OT    MA 11/19/20 -  Sisi Mayorga OT Occupational Therapist OT              OT Recommendation and Plan     Plan of Care Review  Plan of Care Reviewed With: patient  Progress: improving  Outcome Summary: Pt received UIC and agreeable to therapy. Pt progressed to CGA during STS x 7 at RW, progressed to Jose Carlos for LB dressing  task, and CGA for functional distances in room and hallway. Pt verbalized understanding of LUE precautions during ADL completion but continues to require cues for maintenance. Rec d/c to home w/ assist and HH.     Time Calculation:   Time Calculation- OT     Row Name 03/19/21 1002             Time Calculation- OT    OT Start Time  0921  -CS      OT Received On  03/19/21  -CS      OT Goal Re-Cert Due Date  03/25/21  -         Timed Charges    75468 - OT Therapeutic Exercise Minutes  7  -CS      73288 - OT Therapeutic Activity Minutes  18  -CS        User Key  (r) = Recorded By, (t) = Taken By, (c) = Cosigned By    Initials Name Provider Type    CS Morgan Osorio, OT Occupational Therapist        Therapy Charges for Today     Code Description Service Date Service Provider Modifiers Qty    96765032448  OT THER PROC EA 15 MIN 3/19/2021 Morgan Osorio OT GO 1    80886507927 HC OT THERAPEUTIC ACT EA 15 MIN 3/19/2021 Morgan Osorio OT GO 1               Morgan Osorio OT  3/19/2021

## 2021-03-19 NOTE — PLAN OF CARE
Problem: Adult Inpatient Plan of Care  Goal: Plan of Care Review  Recent Flowsheet Documentation  Taken 3/19/2021 0957 by Morgan Osorio OT  Progress: improving  Plan of Care Reviewed With: patient  Outcome Summary: Pt received UIC and agreeable to therapy. Pt progressed to CGA during STS x 7 at RW, progressed to Jose Carlos for LB dressing task, and CGA for functional distances in room and hallway. Pt verbalized understanding of LUE precautions during ADL completion but continues to require cues for maintenance. Rec d/c to home w/ assist and HH.

## 2021-03-19 NOTE — PROGRESS NOTES
"[facility] Progress Note     LOS: 7 days   Patient Care Team:  Marguerite Moore PA-C as PCP - General (Physician Assistant)  Som Boyd DO as Consulting Physician (Cardiology)  Javad Mercedes MD as Consulting Physician (Cardiology)  Thmoas Lui MD as Consulting Physician (Hematology and Oncology)  PCP:  Marguerite Moore PA-C    Chief Complaint: Follow-up systolic heart failure, OHCA    Subjective: Patient doing well today with no complaints      Review of Systems:   All systems have been reviewed and are negative with the exception of those mentioned above.      Objective:    Vital Sign Min/Max for last 24 hours  Temp  Min: 97.4 °F (36.3 °C)  Max: 98.5 °F (36.9 °C)   BP  Min: 136/70  Max: 146/68   Pulse  Min: 64  Max: 87   Resp  Min: 16  Max: 18   SpO2  Min: 96 %  Max: 100 %   No data recorded   Weight  Min: 80 kg (176 lb 5.9 oz)  Max: 80 kg (176 lb 5.9 oz)     Flowsheet Rows      First Filed Value   Admission Height  180.3 cm (71\") Documented at 03/12/2021 1900   Admission Weight  89.4 kg (197 lb) Documented at 03/12/2021 1900          Telemetry: Atrial paced rhythm with ventricular bigeminy      Intake/Output Summary (Last 24 hours) at 3/19/2021 1312  Last data filed at 3/19/2021 1130  Gross per 24 hour   Intake 720 ml   Output 1000 ml   Net -280 ml     Intake & Output (last 3 days)       03/16 0701 - 03/17 0700 03/17 0701 - 03/18 0700 03/18 0701 - 03/19 0700 03/19 0701 - 03/20 0700    P.O. 130 600 360 480    I.V. (mL/kg) 8 (0.1) 67.7 (0.9)      IV Piggyback 100 100      Total Intake(mL/kg) 238 (3) 767.7 (9.8) 360 (4.5) 480 (6)    Urine (mL/kg/hr) 585 (0.3) 2150 (1.1) 1000 (0.5) 100 (0.2)    Stool  0      Total Output 585 2150 1000 100    Net -347 -1382.3 -640 +380            Urine Unmeasured Occurrence  1 x      Stool Unmeasured Occurrence  1 x             Physical Exam:  Constitutional:       Appearance: Not in distress.   Pulmonary: "      Effort: Pulmonary effort is normal.      Breath sounds: No rales.   Cardiovascular:   Left chest implant site clean dry intact.  Regular rate and rhythm, soft systolic murmur  Pulses:     Intact distal pulses.  Right radial cath site clean dry intact  Edema:     Peripheral edema absent.   Abdominal:      Palpations: Abdomen is soft.   Skin:     General: Skin is warm and dry.   Neurological:      Mental Status: Alert and oriented to person, place and time     LABS/DIAGNOSTIC DATA:  Results from last 7 days   Lab Units 03/19/21  0731 03/18/21  0811 03/18/21  0555   WBC 10*3/mm3 3.19* 2.80* 2.79*   HEMOGLOBIN g/dL 8.9* 8.4* 8.1*   HEMATOCRIT % 31.0* 27.3* 26.6*   PLATELETS 10*3/mm3 109* 99* 94*     Lab Results   Lab Value Date/Time    TROPONINT 0.017 03/15/2021 0524    TROPONINT 0.159 (C) 03/13/2021 0453    TROPONINT 0.026 03/12/2021 2215    TROPONINT <0.010 03/12/2021 1911    TROPONINT <0.010 11/13/2019 0233    TROPONINT <0.010 11/12/2019 2259     Results from last 7 days   Lab Units 03/12/21  1911   INR  1.13     Results from last 7 days   Lab Units 03/19/21  0731 03/18/21  0555 03/17/21  0456 03/16/21  0806 03/13/21  1805 03/13/21  0453 03/12/21  1911   SODIUM mmol/L 142 140 141 140   < > 139 141   POTASSIUM mmol/L 3.6 3.6 4.3 4.6  --  3.7 2.5*   CHLORIDE mmol/L 108* 109* 110* 110*   < > 106 103   CO2 mmol/L 24.0 21.0* 24.0 26.0   < > 21.0* 23.0   BUN mg/dL 9 10 9 9   < > 11 7*   CREATININE mg/dL 0.86 0.79 0.84 0.83   < > 0.71* 0.90   CALCIUM mg/dL 8.1* 7.5* 8.3* 8.7   < > 8.0* 8.9   BILIRUBIN mg/dL  --   --   --  0.7  --  1.1 1.4*   ALK PHOS U/L  --   --   --  69  --  74 112   ALT (SGPT) U/L  --   --   --  17  --  28 42*   AST (SGOT) U/L  --   --   --  16  --  53* 101*   GLUCOSE mg/dL 87 97 99 110*   < > 140* 186*    < > = values in this interval not displayed.         Results from last 7 days   Lab Units 03/14/21  0130   CHOLESTEROL mg/dL 91   TRIGLYCERIDES mg/dL 104   HDL CHOL mg/dL 40   LDL CHOL mg/dL 31      Results from last 7 days   Lab Units 03/12/21  1911   TSH uIU/mL 6.720*   FREE T4 ng/dL 1.64           Medication Review:   acetaminophen, 650 mg, Oral, Q6H  aspirin, 81 mg, Oral, Daily  furosemide, 20 mg, Oral, Daily  insulin lispro, 0-9 Units, Subcutaneous, 4x Daily With Meals & Nightly  levETIRAcetam, 750 mg, Oral, BID  metoprolol succinate XL, 50 mg, Oral, Q24H  pantoprazole, 40 mg, Intravenous, Q12H  rosuvastatin, 20 mg, Oral, Daily  sacubitril-valsartan, 1 tablet, Oral, Q12H  sodium chloride, 10 mL, Intravenous, Q12H  sodium chloride, 10 mL, Intravenous, Q12H  sodium chloride, 3 mL, Intravenous, Q12H  spironolactone, 25 mg, Oral, Daily               OHCA    Essential hypertension    Hyperlipidemia LDL goal <70    T2DM    Paget disease of bone    Tobacco abuse    H/O seizures on Keppra    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  Outside hospital cardiac arrest, status post [employer] dual-chamber ICD  -Patient received bystander CPR and shock from an external AED  - heart catheterization showing single-vessel CAD and a a small posterior lateral branch of the RCA, unchanged from prior  -Status post dual-chamber [employer] dual-chamber ICD 3/16/2021  -We will request wound check in 1 week        2.  Nonischemic cardiomyopathy, frequent PVCs  -Continue Entresto, Toprol-XL, spironolactone, Lasix  -Increase Toprol-XL to 50 mg     3.  Anemia, suspected lower GI bleed, thrombocytopenia  -GI following, possible colonoscopy  -Continue Protonix  -Hemoglobin stable 8.4  -Also with thrombocytopenia     4.  Diabetes  -Per primary team     5.  Hypertension  -Within normal limits at this time     6.  Hyperlipidemia  -Lipids well controlled on  rosuvastatin       Patient is stable from a cardiac standpoint.  Will request wound check in 1 week, follow-up with me in 1 month.  Stable for discharge from a cardiology standpoint.      Javad Mercedes MD Located within [facility]  03/19/21

## 2021-03-19 NOTE — PROGRESS NOTES
Cardiac Electrophysiology Inpatient Follow Up Note         [facility] at [facility]    Progress Note      CC:  Cardiac Arrest     Problem List:        1. Nonischemic cardiomyopathy / systolic congestive heart failure / Cardiac Arrest   a. Echo 11/13/2019 EF 60%, mild LVH, mild MR, mild to moderate AR, LA 4.6 cm  b. [facility] March 2020 with mild nonobstructive CAD, EF 36-40%  c. Witnessed out of hospital cardiac arrest 3/12/2021 with documented bystander CPR with a single shock from AED with transfer to [facility] per EMS.  d. Echo 3/13/2021 EF 50%, no significant VHD  e. [facility] 3/14/2021 per Dr. Sahni with documented severe 1-vessel CAD involving a small posterior lateral branch of the of the RCA with no interval change to prior angiography  f. [facility] [place] 3/16/2021 for secondary prevention of SCD  2. Premature ventricular contractions  a. Echo 11/13/2019 EF 60%, mild LVH, mild MR, mild to moderate AR, LA 4.6 cm  b. Stress test February 2020 PVCs at both rest and stress, no perfusion evidence of ischemia, ejection fraction 31% with dilated LV cavity  c. [facility] March 2020 with mild nonobstructive CAD, EF 36-40%  d. Holter monitor February 2020:  PVCs burden 12.3% with some of the counted PVCs appear to be PACs with aberrancy. One 4 beat run of nonsustained VT versus SVT with aberrancy  e. Amiodarone therapy initiated at 200 mg daily for PVCs April 2020  f. Amiodarone discontinued December 2020  3. Sinus node dysfunction  1. 24-hour Holter 7/15/2020 HR 43-79, average 55 bpm, PVC burden 10.2%.  2. EP consultation August 2020 with patient felt to be asymptomatic with recommendation for continued monitoring  4. First-degree AV block  5. Essential hypertension  6. Dyslipidemia  7. Diabetes mellitus  8. COPD  9. Seizures  10. Paget's disease  11. Hearing loss      Subjective     Mr. Narendra Cochran is an 80-year-old -American male seen in EP follow-up status post dual-chamber ICD implant  "3/16/2021 for the secondary prevention of sudden cardiac death following presentation to hospital following cardiac arrest.  Upon evaluation patient resting comfortably at bedside in recliner without complaints.  Patient's device incision remains clean, dry, and approximated with occlusive Dermabond covering and without hematoma, ecchymosis, or drainage noted.  Patient has been up ambulating in room and voiding without difficulty.  Patient states he is ready for discharge home today if possible.      REVIEW OF SYSTEMS  ROS no change from admission H&P except for: above    Objective   VITAL SIGNS  /62 (BP Location: Left arm, Patient Position: Sitting)   Pulse 68   Temp 97.8 °F (36.6 °C) (Oral)   Resp 18   Ht 180.3 cm (70.98\")   Wt 80 kg (176 lb 5.9 oz)   SpO2 96%   BMI 24.61 kg/m²     Pulse Ox: SpO2  Av.8 %  Min: 96 %  Max: 100 %  Supplemental O2: O2 Flow Rate (L/min): 2 L/min     Admit Weight  Weight: 89.4 kg (197 lb)  Last 3 Weights    Body mass index is 24.61 kg/m².    INTAKE/OUTPUT  I/O last 3 completed shifts:  In: 600 [P.O.:600]  Out: 1450 [Urine:1450]    Intake/Output Summary (Last 24 hours) at 3/19/2021 1333  Last data filed at 3/19/2021 1130  Gross per 24 hour   Intake 720 ml   Output 1000 ml   Net -280 ml       PHYSICAL EXAM  General appearance: awake, alert, oriented, moves all extremities  Lungs: no rhonchi, no wheezes, no rales  Heart: S1-S2, RRR  Abdomen: positive bowel sounds, no bruits, no masses  Extremities: warm and dry, no cyanosis, no clubbing    LABS  Results from last 7 days   Lab Units 21  0731 21  0811 21  0555   WBC 10*3/mm3 3.19* 2.80* 2.79*   HEMOGLOBIN g/dL 8.9* 8.4* 8.1*   HEMATOCRIT % 31.0* 27.3* 26.6*   MCV fL 98.1* 90.1 90.2   PLATELETS 10*3/mm3 109* 99* 94*         Results from last 7 days   Lab Units 21  0731 21  0555 21  0456 21  0806   POTASSIUM mmol/L 3.6 3.6 4.3 4.6   CHLORIDE mmol/L 108* 109* 110* 110*   CO2 mmol/L " 24.0 21.0* 24.0 26.0   BUN mg/dL 9 10 9 9   CREATININE mg/dL 0.86 0.79 0.84 0.83   GLUCOSE mg/dL 87 97 99 110*   CALCIUM mg/dL 8.1* 7.5* 8.3* 8.7   MAGNESIUM mg/dL  --  2.0 1.8 2.1     Results from last 7 days   Lab Units 03/12/21  1911   PROTIME Seconds 14.2*   INR  1.13         Results from last 7 days   Lab Units 03/18/21  0555 03/17/21  0456 03/16/21  0806   MAGNESIUM mg/dL 2.0 1.8 2.1       CURRENT MEDICATIONS  acetaminophen, 650 mg, Oral, Q6H  aspirin, 81 mg, Oral, Daily  furosemide, 20 mg, Oral, Daily  insulin lispro, 0-9 Units, Subcutaneous, 4x Daily With Meals & Nightly  levETIRAcetam, 750 mg, Oral, BID  metoprolol succinate XL, 50 mg, Oral, Q24H  pantoprazole, 40 mg, Intravenous, Q12H  rosuvastatin, 20 mg, Oral, Daily  sacubitril-valsartan, 1 tablet, Oral, Q12H  sodium chloride, 10 mL, Intravenous, Q12H  sodium chloride, 10 mL, Intravenous, Q12H  sodium chloride, 3 mL, Intravenous, Q12H  spironolactone, 25 mg, Oral, Daily      TELEMETRY: AV paced 65 bpm w/ PVC's    Assessment and plan:    Mr. Narendra Cochran is an 80-year-old -American male seen in EP follow-up status post dual-chamber ICD implant 3/16/2021 for the secondary prevention of sudden cardiac death with recent presentation to hospital following cardiac arrest.  Patient is stable and okay for discharge home today from an EP standpoint with follow-up in 1 week for wound check and in 3 months with Dr. Boyd.  Education reinforcement on all activity restrictions and wound care instructions reviewed.  Patient verbalized complete understanding of the above instruction education and is ready for discharge home today from an EP standpoint.    Electronically signed by LEANDRO Delgadillo, 03/19/21, 1:34 PM EDT.   [facility] / [facility]

## 2021-03-19 NOTE — PLAN OF CARE
Goal Outcome Evaluation:  Plan of Care Reviewed With: patient, spouse  Progress: improving  Outcome Summary: VSS. AOx4. Reports arthritic pain in knees, scheduled tylenol given. Hopeful of discharge tomorrow. Records of EGD and colonoscopies obtained and placed in chart. A paced with frequent PVCs. Up in chair all shift.

## 2021-03-20 ENCOUNTER — READMISSION MANAGEMENT (OUTPATIENT)
Dept: CALL CENTER | Facility: HOSPITAL | Age: 81
End: 2021-03-20

## 2021-03-20 VITALS
DIASTOLIC BLOOD PRESSURE: 66 MMHG | WEIGHT: 181.5 LBS | HEART RATE: 78 BPM | TEMPERATURE: 97.7 F | BODY MASS INDEX: 25.41 KG/M2 | SYSTOLIC BLOOD PRESSURE: 135 MMHG | OXYGEN SATURATION: 92 % | HEIGHT: 71 IN | RESPIRATION RATE: 18 BRPM

## 2021-03-20 LAB
ANION GAP SERPL CALCULATED.3IONS-SCNC: 5 MMOL/L (ref 5–15)
BUN SERPL-MCNC: 10 MG/DL (ref 8–23)
BUN/CREAT SERPL: 14.5 (ref 7–25)
CALCIUM SPEC-SCNC: 8.7 MG/DL (ref 8.6–10.5)
CHLORIDE SERPL-SCNC: 109 MMOL/L (ref 98–107)
CO2 SERPL-SCNC: 26 MMOL/L (ref 22–29)
CREAT SERPL-MCNC: 0.69 MG/DL (ref 0.76–1.27)
DEPRECATED RDW RBC AUTO: 55.6 FL (ref 37–54)
ERYTHROCYTE [DISTWIDTH] IN BLOOD BY AUTOMATED COUNT: 16.8 % (ref 12.3–15.4)
GFR SERPL CREATININE-BSD FRML MDRD: 133 ML/MIN/1.73
GLUCOSE BLDC GLUCOMTR-MCNC: 102 MG/DL (ref 70–130)
GLUCOSE BLDC GLUCOMTR-MCNC: 118 MG/DL (ref 70–130)
GLUCOSE SERPL-MCNC: 80 MG/DL (ref 65–99)
HCT VFR BLD AUTO: 29.8 % (ref 37.5–51)
HGB BLD-MCNC: 8.9 G/DL (ref 13–17.7)
MCH RBC QN AUTO: 27.6 PG (ref 26.6–33)
MCHC RBC AUTO-ENTMCNC: 29.9 G/DL (ref 31.5–35.7)
MCV RBC AUTO: 92.3 FL (ref 79–97)
PLATELET # BLD AUTO: 111 10*3/MM3 (ref 140–450)
PMV BLD AUTO: 12.6 FL (ref 6–12)
POTASSIUM SERPL-SCNC: 3.5 MMOL/L (ref 3.5–5.2)
RBC # BLD AUTO: 3.23 10*6/MM3 (ref 4.14–5.8)
SODIUM SERPL-SCNC: 140 MMOL/L (ref 136–145)
WBC # BLD AUTO: 2.98 10*3/MM3 (ref 3.4–10.8)

## 2021-03-20 PROCEDURE — 82962 GLUCOSE BLOOD TEST: CPT

## 2021-03-20 PROCEDURE — 99239 HOSP IP/OBS DSCHRG MGMT >30: CPT | Performed by: NURSE PRACTITIONER

## 2021-03-20 PROCEDURE — 85027 COMPLETE CBC AUTOMATED: CPT | Performed by: FAMILY MEDICINE

## 2021-03-20 PROCEDURE — G0008 ADMIN INFLUENZA VIRUS VAC: HCPCS | Performed by: NURSE PRACTITIONER

## 2021-03-20 PROCEDURE — 90686 IIV4 VACC NO PRSV 0.5 ML IM: CPT | Performed by: NURSE PRACTITIONER

## 2021-03-20 PROCEDURE — 80048 BASIC METABOLIC PNL TOTAL CA: CPT | Performed by: FAMILY MEDICINE

## 2021-03-20 PROCEDURE — 25010000002 INFLUENZA VAC SPLIT QUAD 0.5 ML SUSPENSION PREFILLED SYRINGE: Performed by: NURSE PRACTITIONER

## 2021-03-20 RX ORDER — FUROSEMIDE 20 MG/1
20 TABLET ORAL DAILY
Qty: 30 TABLET | Refills: 1 | Status: SHIPPED | OUTPATIENT
Start: 2021-03-21 | End: 2021-05-17 | Stop reason: SDUPTHER

## 2021-03-20 RX ORDER — LEVETIRACETAM 750 MG/1
750 TABLET ORAL 2 TIMES DAILY
Qty: 60 TABLET | Refills: 1 | Status: SHIPPED | OUTPATIENT
Start: 2021-03-20 | End: 2021-05-17 | Stop reason: SDUPTHER

## 2021-03-20 RX ORDER — SPIRONOLACTONE 25 MG/1
25 TABLET ORAL DAILY
Qty: 30 TABLET | Refills: 1 | Status: SHIPPED | OUTPATIENT
Start: 2021-03-21 | End: 2021-05-17 | Stop reason: SDUPTHER

## 2021-03-20 RX ORDER — METOPROLOL SUCCINATE 50 MG/1
50 TABLET, EXTENDED RELEASE ORAL
Qty: 30 TABLET | Refills: 1 | Status: SHIPPED | OUTPATIENT
Start: 2021-03-21 | End: 2021-05-17 | Stop reason: SDUPTHER

## 2021-03-20 RX ORDER — PANTOPRAZOLE SODIUM 40 MG/1
40 TABLET, DELAYED RELEASE ORAL 2 TIMES DAILY
Qty: 60 TABLET | Refills: 0 | Status: SHIPPED | OUTPATIENT
Start: 2021-03-20 | End: 2021-05-17 | Stop reason: SDUPTHER

## 2021-03-20 RX ADMIN — METOPROLOL SUCCINATE 50 MG: 50 TABLET, EXTENDED RELEASE ORAL at 09:08

## 2021-03-20 RX ADMIN — ACETAMINOPHEN 650 MG: 325 TABLET ORAL at 00:54

## 2021-03-20 RX ADMIN — FUROSEMIDE 20 MG: 20 TABLET ORAL at 09:08

## 2021-03-20 RX ADMIN — INFLUENZA VIRUS VACCINE 0.5 ML: 15; 15; 15; 15 SUSPENSION INTRAMUSCULAR at 14:19

## 2021-03-20 RX ADMIN — ROSUVASTATIN CALCIUM 20 MG: 20 TABLET, COATED ORAL at 09:08

## 2021-03-20 RX ADMIN — POTASSIUM CHLORIDE 40 MEQ: 750 CAPSULE, EXTENDED RELEASE ORAL at 12:26

## 2021-03-20 RX ADMIN — SODIUM CHLORIDE, PRESERVATIVE FREE 10 ML: 5 INJECTION INTRAVENOUS at 09:08

## 2021-03-20 RX ADMIN — SPIRONOLACTONE 25 MG: 25 TABLET ORAL at 09:08

## 2021-03-20 RX ADMIN — PANTOPRAZOLE SODIUM 40 MG: 40 INJECTION, POWDER, FOR SOLUTION INTRAVENOUS at 09:08

## 2021-03-20 RX ADMIN — SODIUM CHLORIDE, PRESERVATIVE FREE 10 ML: 5 INJECTION INTRAVENOUS at 09:09

## 2021-03-20 RX ADMIN — ACETAMINOPHEN 650 MG: 325 TABLET ORAL at 12:26

## 2021-03-20 RX ADMIN — POTASSIUM CHLORIDE 40 MEQ: 750 CAPSULE, EXTENDED RELEASE ORAL at 09:07

## 2021-03-20 RX ADMIN — SACUBITRIL AND VALSARTAN 1 TABLET: 49; 51 TABLET, FILM COATED ORAL at 09:07

## 2021-03-20 RX ADMIN — LEVETIRACETAM 750 MG: 500 TABLET, FILM COATED ORAL at 09:08

## 2021-03-20 RX ADMIN — ASPIRIN 81 MG: 81 TABLET, CHEWABLE ORAL at 09:08

## 2021-03-20 RX ADMIN — ACETAMINOPHEN 650 MG: 325 TABLET ORAL at 06:27

## 2021-03-20 NOTE — OUTREACH NOTE
Prep Survey      Responses   [facility]?  [facility]   Is LACE score < 7 ?  No   Emergency Room discharge w/ pulse ox?  No   Eligibility  TCM   Hospital  [facility]   Date of Admission  03/12/21   Date of Discharge  03/20/21   Discharge Disposition  Home-Health Care Svc   Discharge diagnosis  Acute on chronic congestive heart failure, unspecified heart failure ty   Does the patient have one of the following disease processes/diagnoses(primary or secondary)?  CHF   Does the patient have Home health ordered?  Yes   What is the Home health agency?   [facility]   Is there a DME ordered?  No   Prep survey completed?  Yes          Corinne Hope RN

## 2021-03-20 NOTE — DISCHARGE SUMMARY
[facility] Medicine Services  DISCHARGE SUMMARY    Patient Name: Narendra Cochran  : 1940  MRN: [id number]    Date of Admission: 3/12/2021  6:55 PM  Date of Discharge: 3/20/2021  Primary Care Physician: Marguerite Moore PA-C    Consults     Date and Time Order Name Status Description    3/17/2021  9:09 AM Inpatient Gastroenterology Consult Completed     3/13/2021 12:34 AM Inpatient Cardiology Consult Completed     3/13/2021 12:34 AM Inpatient Neurology Consult General Completed         Hospital Course   Presenting Problem:   Acute on chronic congestive heart failure, unspecified heart failure type (CMS/HCC) [I50.9]    Active Hospital Problems    Diagnosis  POA   • **OHCA [I46.9]  Yes   • Hypokalemia [E87.6]  Yes   • Hypomagnesemia [E83.42]  Yes   • VHD; mild-mod AR, mild MR [I38]  Yes   • Chronic systolic congestive heart failure (CMS/HCC) [I50.22]  Yes   • GERD [K21.9]  Yes   • Marijuana use [F12.90]  Yes   • 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 male with PMH of HLD, HTN, seizure disorder, CAD, VHD, chronic systolic heart failure, and paget disease was admitted to ICU after an out of hospital cardiac arrest. He had heart cath that did not show any new artery disease and AICD was placed. He improved quickly improved and was transferred to hospitalist service 3/18.      S/P OHCA  Cardiomyopathy  D  -[facility] shows no changes  from prior exam  -SP AICD placement  -Entresto, toprol XL, spironolactone  -cardiology following, ok with discharge  -EF 50%, moderate VHD   --Follow-up with Dr. Welch in 1 month  --Follow-up with Dr. Boyd in 3 months  --Follow-up with cardiology in 1 week for wound check    Pancytopenia  H/c paget disease  -was previously treated with reclast. Saw dr mathew in 2014  -wbc 2.98, plt 111  -he is currently on ASA     Possible GI Bleed  -GI following  -PPI BID   -had a couple episodes of hematochezia in ICU  -H7H stable at 8.9/30.0    Hyperglycemia  -received one dose of insulin coverage so far   -last a1c 1/18/21, 5.6    Seizure disorder  -keppra     THC use  Former Tobacco use  -addiction team is following     Patient sitting up in a chair doing well and ready to be discharged home with home health.  Family will be transporting discharge follow-up recommendations and appointments are listed below.      Discharge Follow Up Recommendations for outpatient labs/diagnostics:  --Follow-up with your family doctor in 1 week  --Follow-up with Dr. Mercedes in 1 month  --Wound check over ICD site in 1 week  --Dr Boyd in 3 months  --Activity restrictions per Dr. Boyd  --Take Protonix 2 times a day x1 month then daily  Day of Discharge   HPI:   Patient is sitting up in a chair in NAD without any complaints.  Patient states he is very ready to be discharged home. +BM.  Eating without any difficulty.  No family in the room.    Review of Systems  Gen- No fevers, chills  CV- No chest pain, palpitations  Resp- No cough, dyspnea  GI- No N/V/D, abd pain  All other systems have been reviewed the pertinent positives and negatives are listed above in the HPI and ROS    Vital Signs:   Temp:  [97.6 °F (36.4 °C)-98 °F (36.7 °C)] 97.7 °F (36.5 °C)  Heart Rate:  [63-80] 69  Resp:  [17-18] 18  BP: (123-155)/(53-66) 155/63   Physical Exam:  Constitutional: Alert, ill-appearing male sitting up in a chair relaxing in NAD  Eyes: EOMI, sclerae  anicteric, no conjunctival injection  Head: NCAT  ENT: Pink, moist mucous membranes, [place]  Respiratory: Nonlabored, symmetrical chest expansion, CTAB  Cardiovascular: RRR, no M/R/G, +DP pulses bilaterally  Gastrointestinal: Soft, NT, ND +BS  Musculoskeletal: ROSENBERG; no LE edema bilaterally  Neurologic: Oriented x4, strength symmetric in all extremities, follows all commands, speech clear  Skin: No rashes on exposed skin  Psychiatric: Pleasant and cooperative; normal affect  Pertinent  and/or Most Recent Results     LAB RESULTS:      Lab 03/20/21  0509 03/19/21  0731 03/18/21  0811 03/18/21  0555 03/17/21  2353 03/17/21  0456 03/16/21  0806 03/15/21  0524 03/14/21  0130   WBC 2.98* 3.19* 2.80* 2.79*  --  3.06* 2.57* 2.74* 3.45   HEMOGLOBIN 8.9* 8.9* 8.4* 8.1* 8.5* 8.4* 9.2* 8.0* 9.9*  9.9*   HEMATOCRIT 29.8* 31.0* 27.3* 26.6* 27.4* 27.4* 29.7* 26.1* 33.2*  33.2*   PLATELETS 111* 109* 99* 94*  --  84* 85* 82* 93*   NEUTROS ABS  --   --  1.67* 1.31*  --  1.77 1.44* 1.73 2.43   IMMATURE GRANS (ABS)  --   --  0.02  --   --  0.01 0.00 0.01 0.01   LYMPHS ABS  --   --  0.81  --   --  0.94 0.83 0.71 0.73   MONOS ABS  --   --  0.21  --   --  0.27 0.23 0.24 0.24   EOS ABS  --   --  0.08 0.03  --  0.06 0.05 0.04 0.03   MCV 92.3 98.1* 90.1 90.2  --  91.6 90.0 89.7 92.2         Lab 03/20/21  0509 03/19/21  0731 03/18/21  0555 03/17/21  0456 03/16/21  0806 03/15/21  0524 03/14/21  1922 03/14/21  0916 03/14/21  0130 03/14/21  0130   SODIUM 140 142 140 141 140 142  --   --   --  142   POTASSIUM 3.5 3.6 3.6 4.3 4.6 3.3*  --  4.0   < > 3.9   CHLORIDE 109* 108* 109* 110* 110* 109*  --   --   --  109*   CO2 26.0 24.0 21.0* 24.0 26.0 27.0  --   --   --  22.0   ANION GAP 5.0 10.0 10.0 7.0 4.0* 6.0  --   --   --  11.0   BUN 10 9 10 9 9 14  --   --   --  13   CREATININE 0.69* 0.86 0.79 0.84 0.83 0.83  --   --   --  0.69*   GLUCOSE 80 87 97 99 110* 105*  --   --   --  92   CALCIUM 8.7 8.1* 7.5* 8.3* 8.7 8.4*  --   --   --  8.3*   IONIZED  CALCIUM  --   --   --   --   --  1.24  --   --   --   --    MAGNESIUM  --   --  2.0 1.8 2.1 1.8  --   --   --  2.0   PHOSPHORUS  --   --   --  3.1 3.0 3.0 2.1* 1.9*  --  1.7*    < > = values in this interval not displayed.         Lab 03/16/21  0806   TOTAL PROTEIN 5.8*   ALBUMIN 3.30*   GLOBULIN 2.5   ALT (SGPT) 17   AST (SGOT) 16   BILIRUBIN 0.7   ALK PHOS 69         Lab 03/15/21  0524   TROPONIN T 0.017         Lab 03/14/21  0130   CHOLESTEROL 91   LDL CHOL 31   HDL CHOL 40   TRIGLYCERIDES 104             Brief Urine Lab Results  (Last result in the past 365 days)      Color   Clarity   Blood   Leuk Est   Nitrite   Protein   CREAT   Urine HCG        03/12/21 2043 Yellow Cloudy Moderate (2+) Negative Negative >=300 mg/dL (3+)             Microbiology Results (last 10 days)     Procedure Component Value - Date/Time    Gastrointestinal Panel, PCR - Stool, Per Rectum [[id number]]  (Normal) Collected: 03/13/21 1403    Lab Status: Final result Specimen: Stool from Per Rectum Updated: 03/16/21 0044     Campylobacter Not Detected     Plesiomonas shigelloides Not Detected     Salmonella Not Detected     Vibrio Not Detected     Vibrio cholerae Not Detected     Yersinia enterocolitica Not Detected     Enteroaggregative E. coli (EAEC) Not Detected     Enteropathogenic E. coli (EPEC) Not Detected     Enterotoxigenic E. coli (ETEC) lt/st Not Detected     Shiga-like toxin-producing E. coli (STEC) stx1/stx2 Not Detected     Shigella/Enteroinvasive E. coli (EIEC) Not Detected     Cryptosporidium Not Detected     Cyclospora cayetanensis Not Detected     Entamoeba histolytica Not Detected     Giardia lamblia Not Detected     Adenovirus F40/41 Not Detected     Astrovirus Not Detected     Norovirus GI/GII Not Detected     Rotavirus A Not Detected     Sapovirus (I, II, IV or V) Not Detected    Narrative:      If Aeromonas, Staphylococcus aureus or Bacillus cereus are suspected, please order [id number]: Stool Culture, Aeromonas, S aureus,  B Cereus.    Clostridium Difficile Toxin - Stool, Per Rectum [[id number]]  (Normal) Collected: 03/13/21 1403    Lab Status: Final result Specimen: Stool from Per Rectum Updated: 03/13/21 1530    Narrative:      The following orders were created for panel order Clostridium Difficile Toxin - Stool, Per Rectum.  Procedure                               Abnormality         Status                     ---------                               -----------         ------                     Clostridium Difficile To...[[id number]]  Normal              Final result                 Please view results for these tests on the individual orders.    Clostridium Difficile Toxin, PCR - Stool, Per Rectum [[id number]]  (Normal) Collected: 03/13/21 1403    Lab Status: Final result Specimen: Stool from Per Rectum Updated: 03/13/21 1530     C. Difficile Toxins by PCR Not Detected    Narrative:      Performance characteristics of test not established for patients <2 years of age.  Negative for Toxigenic C. Difficile    Urine Culture - Urine, Urine, Catheter [[id number]]  (Normal) Collected: 03/12/21 2043    Lab Status: Final result Specimen: Urine, Catheter Updated: 03/13/21 1807     Urine Culture No growth    COVID PRE-OP / PRE-PROCEDURE SCREENING ORDER (NO ISOLATION) - Swab, Nasopharynx [[id number]]  (Normal) Collected: 03/12/21 2025    Lab Status: Final result Specimen: Swab from Nasopharynx Updated: 03/12/21 2142    Narrative:      The following orders were created for panel order COVID PRE-OP / PRE-PROCEDURE SCREENING ORDER (NO ISOLATION) - Swab, Nasopharynx.  Procedure                               Abnormality         Status                     ---------                               -----------         ------                     COVID-19 and FLU A/B PCR...[[id number]]  Normal              Final result                 Please view results for these tests on the individual orders.    COVID-19 and FLU A/B PCR - Swab, Nasopharynx [[id number]]   (Normal) Collected: 03/12/21 2025    Lab Status: Final result Specimen: Swab from Nasopharynx Updated: 03/12/21 2142     COVID19 Not Detected     Influenza A PCR Not Detected     Influenza B PCR Not Detected    Narrative:      Fact sheet for providers: https://www.fda.gov/media/922471/download    Fact sheet for patients: https://www.fda.gov/media/391111/download    Test performed by PCR.          Adult Transthoracic Echo Complete W/ Cont if Necessary Per Protocol    Addendum Date: 3/13/2021    · Left ventricular systolic function is normal. Estimated left ventricular EF = 50% · Left ventricular wall thickness is consistent with mild concentric hypertrophy. · Left atrial volume is mildly increased. · Moderate aortic valve regurgitation is present. · Moderate mitral valve regurgitation is present.      Result Date: 3/13/2021  · Left ventricular systolic function is low normal. Estimated left ventricular EF = 45% · Left ventricular wall thickness is consistent with mild concentric hypertrophy. · Left atrial volume is mildly increased. · Moderate aortic valve regurgitation is present. · Moderate mitral valve regurgitation is present.      EEG    Result Date: 3/13/2021  Reason for referral: 80 y.o.male with seizure Technical Summary:  A 19 channel digital EEG was performed using the international 10-20 placement system, including eye leads and EKG leads. Duration: 22 minutes Video: Off Findings: The awake tracing shows a well-regulated 8 Hz posterior rhythm present symmetrically over the occipital head leads.  Low to medium amplitude theta and intermixed EMG artifact is present anteriorly.  Photic stimulation does not change the background.  Drowsiness is seen with mild slowing of the background and rare vertex waves.  Stage II sleep is not clearly seen.  No focal slowing is seen.  No epileptiform activity is seen. EKG: Regular, 60 bpm     Mild generalized slow No epileptiform activity is seen This report is transcribed  using the Dragon dictation system.      CT Head Without Contrast    Result Date: 3/12/2021  CT HEAD, NONCONTRAST, 3/12/2021 HISTORY: 80-year-old male in the ED after an episode of altered mental status. TECHNIQUE: CT imaging of the head without IV contrast. Radiation dose reduction techniques included automated exposure control. Radiation audit for CT and nuclear cardiology exams in the last 12 months: 0. COMPARISON: *  CT head, 11/13/2019. FINDINGS: No acute intracranial abnormality is identified. Minimal generalized age-appropriate cerebral volume loss. Mild diffuse low-attenuation white matter changes, nonspecific but most typically seen in the setting of chronic small vessel disease. These findings are stable. No evidence of intracranial hemorrhage, mass, mass effect, cerebral edema, extra-axial fluid collection or hydrocephalus. Visualized upper paranasal sinuses and mastoid air spaces are clear.     1. No acute intracranial abnormality. 2. Stable mild diffuse chronic changes as noted above. 3. No change since 11/13/2019. Signer Name: Chris Buitrago MD  Signed: 3/12/2021 8:34 PM  Workstation Name: [id number]  [facility]    Cardiac Catheterization/Vascular Study    Result Date: 3/15/2021  · Severe 1-vessel CAD involving a small posterior lateral branch of the of the RCA. · Compared angiography performed 1 year ago, there has been no interval change. · No left ventriculography performed. However, an echocardiogram performed yesterday demonstrated LVEF 50%. · Mildly elevated LV filling pressure      XR Chest 1 View    Result Date: 3/13/2021  EXAMINATION: XR CHEST 1 VW-  INDICATION: f/u; I50.9-Heart failure, unspecified; E87.6-Hypokalemia  COMPARISON: 3/12/2021  FINDINGS: Pads overlying the chest wall are redemonstrated. Degenerative osseous changes. Markedly dilated aortic arch and enlarged cardiac silhouette. No pleural effusion. No pneumothorax. Basilar opacities are  redemonstrated and slightly improved. Mild degree of vascular congestion.      Bibasilar airspace opacities and vascular congestion appears slightly improved. No new acute findings.  This report was finalized on 3/13/2021 8:36 AM by Duncan Burr.      XR Chest 1 View    Result Date: 3/12/2021  CR Chest 1 Vw INDICATION: Chest pain, altered mental status COMPARISON:  Chest x-ray from 11/12/2019 FINDINGS: Single portable AP view(s) of the chest.  The exam is limited. There are bibasilar opacities with left basilar atelectasis. No definite pneumothorax. The heart appears somewhat enlarged. Pulmonary vasculature appears congested with potential edema.     Bilateral basilar opacities with pulmonary vascular congestion and potential edema may suggest CHF exacerbation although underlying infection/pneumonia is not excluded. Signer Name: Sharron Nettles MD  Signed: 3/12/2021 7:26 PM  Workstation Name: [id number]  [facility]    EP/CRM Study    Result Date: 3/16/2021    Cardiac Electrophysiology Procedure Note    [facility] at  PROCEDURE:  IMPLANTED CARDIOVERTER DEFIBRILLATOR OPERATIONS PERFORMED: Implantation of a [employer] dual-chamber ICD 697584 serial number pulse generator at the left prepectoral site.  Atrial lead 7841 52 cm, serial number [id number].  Right ventricular lead [employer] model 0273, 64 cm, serial number [id number]. Fluoroscopy for ICD system in situ. ATTENDING SURGEON:  Som Boyd DO MODERATE SEDATION FOR PROCEDURE: Moderate sedation was given during this procedure.    I supervised and directed RN to administer this sedation.  This staff member also monitored the patient's hemodynamic and respiratory status and response to these medications.  Please see the full detailed procedure report generated by the electrophysiology laboratory staff.  The patient tolerated moderate sedation well.  There were no complications regarding sedation.  The  total dose of fentanyl was 50 mcg and the total dose of midazolam was 1 mg.  The total dose of Brevital was 80 mg.  First sedation was administered at 1707 and continued through 1731. ESTIMATED BLOOD LOSS: Minimal COMPLICATIONS: None TIME OUT: Time out was completed with verification of the correct patient identity, procedure to be performed, procedure site and implanted equipment. INDICATION FOR PROCEDURE: Briefly,  Narendra Cochran is a 80 y.o. year old male with a history of nonischemic cardiomyopathy with a QRS duration of 120 milliseconds, mild systolic dysfunction with an LVEF of 45% and NYHA functional class 2 heart failure.  Patient had documented ventricular fibrillation cardiac arrest and [place]  He is no clear identifiable reversible cause.  He has mild symptomatic sinus node dysfunction.  He has an indication for atrial pacing for sinus node dysfunction.  This is a secondary prevention ICD. The patient was able to give written informed consent after revisiting the key portions of the risk versus benefit profile of the procedure.  This discussion was framed by our lengthy conversations  (please see our detailed notes).  Patient verbalized strong understanding of this discussion and a strong desire to proceed with the procedure.  Please note that this detailed informed consent process utilized mutual and shared decision making process between all parties involved, principally the physician and patient, but also potentially with input from the patient's selected family and friends. This patient who is a candidate for an ICD has a life expectancy greater than 12 months. PROCEDURE AND FINDINGS: The patient was brought to the electrophysiology suite in a post-absorptive state.  Informed consent was obtained prior to the procedure and confirmed.  Intravenous prophylactic antibiotics were administered and confirmed to be completely infused prior to start of the procedure.  After  the site of implantation was prepped and draped in the usual sterile fashion and after adequate anesthesia was given, the skin was infiltrated with 1% lidocaine and 0.5% bupivicaine 50/50 mixture.  The skin was incised with a #10 scalpel.  Blunt and electrosurgical dissection was carried out to the level of the prepectoral fascia with careful attention paid to hemostasis.  A pocket to house the pulse generator was formed between the prepectoral fascia and subcutaneous fat with blunt and electrosurgical dissection.  The pocket was copiously irrigated with antibiotic containing normal saline and subsequently observed.  Once adequate hemostasis was confirmed within the pocket, venous access was obtained.  The axillary vein was accessed via a contrast guided Seldinger technique.  J tip 0.035 inch guide wires were introduced and their course through the venous system was confirmed by their presence under fluoroscopy in the inferior vena (excluding inadvertent arterial puncture.)  RIGHT VENTRICULAR LEAD: A peel away sheath was brought to the field and placed into the venous system via over the wire technique.  The right ventricular lead was placed via this sheath into the right ventricle.  Adequate sensing and threshold parameters were obtained.  The lead was attached via active fixation.  There was no evidence of diaphragmatic stimulation at 10 V output.  The peel away sheath was removed and the lead collar was advanced to the pectoral muscle and sutured with non absorbable suture.  Tug testing of this lead confirmed stability of the lead and the length of the lead's slack was assessed as optimal with fluoroscopy. RIGHT ATRIAL LEAD: A peel away sheath was brought to the field and placed into the venous system via over the wire technique.  The right atrial lead was placed via this sheath into the right atrium.   The lead was attached via active fixation.  Adequate sensing and threshold parameters were obtained.  There was  no evidence of diaphragmatic stimulation at 10 V output.  The peel away sheath was removed and the lead collar was advanced to the pectoral muscle and sutured with non absorbable suture.  Tug testing of this lead confirmed stability of the lead and the length of the lead's slack was assessed as optimal with fluoroscopy. DEFIBRILLATION THRESHOLD TESTING: Once adequate level of anesthesia was obtained, defibrillation testing was performed using Shock on T induction of ventricular fibrillation.  The patient was successfully internal defibrillated with 21 Joules with the least sensitivity.  There were no significant dropouts.  After five minutes of observation with stable hemodynamics adequate anesthesia was re-confirmed.  The patient after DFT testing remained hemodynamically stable.  The defibrillation threshold was 21 Joules.  The pulse generator was then sutured to the fascia in a medial location within the pocket using non absorbable suture.  The pocket was closed with two layers of suture using 2-0 then 3-0 Vicryl, followed by a layer of surgical adhesive and finally a sterile occlusive dressing. UNDERLYING RHYTHM: sinus bradycadia                  MEASURED DEVICE DATA: Atrial lead                  sensin.6 mV                  impedance:           550 Ohms                  Threshold:           0.8 Volts at 0.5 ms RV lead                  sensin.5 mV                  pacing impedance:    468 Ohms                  Threshold:           1 Volts at 0.5 ms                  RV Coil impedance:   60 Ohms                                                                PROGRAMMED PARAMETERS: Mode:                          DDDR Lower Rate:                60 pulses per minute Upper Sensor Rate:         130 pulses per minute Upper Tracking Rate:     130  pulses per minute Mode Switch Rate:          170 bpm             Tachy Therapy Programming.              VT 1 zone 30 second detection interval               Detection:  171 beats per minute              Therapy:    This is a monitor zone VT 2 zone 9 second section interval              Detection:  200 beats per minute              Therapy:    ATP x2 followed by maximal output defibrillation VF zone 5 seconds detection interval              Detection:  250 beats per minute              Therapy:    ATP during charge followed by maximal output defibrillation. CONCLUSION:  Successful implantation of ICD system.  Successful DFT testing. Patient is to follow up with our clinic in approximately one week and then myself in 3 months. No lovenox or heparin at any dose is to be given. FOR THE PATIENT Please do not lift more than 10 pounds or abduct the shoulder joint / or raise the arm above the shoulder for 6 weeks after device was implanted (this does not apply to subcutaneous ICDs). Avoid activities that involve heavy lifting or rough contact that could result in blows to your implant site and to allow your incision time to heal. No shower or getting device incision wet for 2 days post-operatively. Keep wound exposed to air unless otherwise instructed, the surgical glue is the bandage. No creams, lotions, or powders to incision. Please avoid allowing bra strap or suspenders to lay over incision until completely healed. Avoid hot tubs or pools until incision completely healed. No driving for 24 hours post-operatively after device implant. Call your doctor if you have any swelling, redness or discharge around your incision, notice anything unusual or unexpected, or you develop a fever that does not go away in two or three days. Call your doctor if you hear any beeping sounds / vibratory alerts from your device as this indicates your device needs to be checked immediately. Carry your Medical Device ID Card with you at all times. Please call our office () with any questions about the device or the wounds. Som Boyd, DO, FACC, RS Cardiac  Electrophysiologist [facility] / [facility]     XR Chest PA & Lateral    Result Date: 3/18/2021  EXAMINATION: XR CHEST PA AND LATERAL- 03/17/2021  INDICATION: VENTRICULAR TACHYARRHYTHMIA  COMPARISON: Chest x-ray 03/13/2021  FINDINGS: Status post implantation left chest wall pacing device/ICD with leads intact. No postprocedure pneumothorax. Interval placement of right arm PICC terminating within the distal SVC. Cardiac size grossly unchanged borderline enlarged with increased opacifications at the right lung base along with a development of small right pleural effusion in the interim.        Status post implantation left chest wall pacing device/ICD with leads intact. No postprocedural pneumothorax. Interval placement of right arm PICC terminating within the distal SVC. Cardiac size grossly unchanged borderline enlarged with increased opacifications at the right lung base along with a development of small right pleural effusion in the interim.  D:  03/17/2021 E:  03/17/2021  This report was finalized on 3/18/2021 2:35 PM by Dr. Clement Butcher.        Results for orders placed in visit on 12/08/17    SCANNED VASCULAR STUDIES      Results for orders placed in visit on 12/08/17    SCANNED VASCULAR STUDIES      Results for orders placed during the hospital encounter of 03/12/21    Adult Transthoracic Echo Complete W/ Cont if Necessary Per Protocol    Interpretation Summary  · Left ventricular systolic function is normal. Estimated left ventricular EF = 50%  · Left ventricular wall thickness is consistent with mild concentric hypertrophy.  · Left atrial volume is mildly increased.  · Moderate aortic valve regurgitation is present.  · Moderate mitral valve regurgitation is present.      Plan for Follow-up of Pending Labs/Results:   Discharge Details        Discharge Medications      New Medications      Instructions Start Date   furosemide 20 MG tablet  Commonly known as: LASIX   20 mg, Oral,  Daily   Start Date: March 21, 2021     pantoprazole 40 MG EC tablet  Commonly known as: PROTONIX   40 mg, Oral, 2 times daily      spironolactone 25 MG tablet  Commonly known as: ALDACTONE   25 mg, Oral, Daily   Start Date: March 21, 2021        Changes to Medications      Instructions Start Date   levETIRAcetam 750 MG tablet  Commonly known as: KEPPRA  What changed:   medication strength  how much to take   750 mg, Oral, 2 Times Daily      metFORMIN  MG 24 hr tablet  Commonly known as: GLUCOPHAGE-XR  What changed: when to take this   TAKE ONE TABLET BY MOUTH TWICE A DAY WITH MEALS      metoprolol succinate XL 50 MG 24 hr tablet  Commonly known as: TOPROL-XL  What changed:   medication strength  how much to take  when to take this   50 mg, Oral, Every 24 Hours Scheduled   Start Date: March 21, 2021        Continue These Medications      Instructions Start Date   aspirin 81 MG chewable tablet   81 mg, Oral, Daily      Blood Pressure Monitor/L Cuff misc   1 Units, Does not apply, Daily      diclofenac 1 % gel gel  Commonly known as: VOLTAREN   4 g, Topical, 4 Times Daily PRN      Empagliflozin 25 MG tablet   25 mg, Oral, Daily      famotidine 20 MG tablet  Commonly known as: Pepcid   20 mg, Oral, Nightly PRN      glucose blood test strip   Use to check blood glucose once a day. DX:E11.65      glucose monitor monitoring kit   1 each, Does not apply, Daily, DX: E11.65      Lancets 30G misc   Use to check blood glucose once a day. DX: E11.65      omeprazole 40 MG capsule  Commonly known as: priLOSEC   40 mg, Oral, Every Morning Before Breakfast      rosuvastatin 20 MG tablet  Commonly known as: CRESTOR   TAKE ONE TABLET BY MOUTH DAILY      sacubitril-valsartan 24-26 MG tablet  Commonly known as: ENTRESTO   1 tablet, Oral, 2 Times Daily             No Known Allergies      Discharge Disposition:  Home or Self Care    Diet:  Hospital:  Diet Order   Procedures   • Diet Regular; Cardiac, Consistent Carbohydrate        Activity:  Activity Instructions     Activity as Tolerated            Restrictions or Other Recommendations:  None       CODE STATUS:    Code Status and Medical Interventions:   Ordered at: 03/12/21 2300     Level Of Support Discussed With:    Patient     Code Status:    CPR     Medical Interventions (Level of Support Prior to Arrest):    Full       Future Appointments   Date Time Provider Department Center   3/23/2021  1:45 PM Pari García APRN [facility]   3/25/2021  2:15 PM Jen Staples APRN [facility]   3/26/2021  2:00 PM WOUND CHECK [facility] None   4/27/2021  2:00 PM Javad Mercedes MD [facility] None   6/21/2021  1:30 PM Som Boyd DO [facility] None       Additional Instructions for the Follow-ups that You Need to Schedule     Ambulatory Referral to Home Health   As directed      Face to Face Visit Date: 3/19/2021    Follow-up provider for Plan of Care?: I treated the patient in an acute care facility and will not continue treatment after discharge.    Follow-up provider: MARGUERITE BARRERA [[id number]]    Reason/Clinical Findings: Patient had out of hospital caridac arrest.  ICD placed.  Would benefit from HH at discharge.    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  Out of hospital cardiac arrest. New ICD.    Nursing/Therapeutic Services Requested: Skilled Nursing Physical Therapy Occupational Therapy    Skilled nursing orders: Cardiopulmonary assessments    PT orders: Strengthening Home safety assessment Gait Training    Weight Bearing Status: Full Weight Bearing    Occupational orders: Activities of daily living Strengthening Fine motor Home safety assessment    Frequency: 1 Week 1         Discharge Follow-up with PCP   As directed       Currently Documented PCP:    Marguerite Barrera PAHATTIE    PCP Phone Number:    [phone]     Follow Up Details: 1 week; please  call the office Monday to schedule an appointment          Discharge Follow-up with Specialty: Follow-up in 1 week for device wound check; 1 Week   As directed      Specialty: Follow-up in 1 week for device wound check    Follow Up: 1 Week         Discharge Follow-up with Specified Provider: Dr Mercedes; 1 month; please call the office on Monday to schedule an appointment   As directed      To: Dr Mercedes; 1 month; please call the office on Monday to schedule an appointment         Discharge Follow-up with Specified Provider: Follow-up in 3 months with Dr. Boyd in the clinic with [employer] device check; 3 Months   As directed      To: Follow-up in 3 months with Dr. Boyd in the clinic with [employer] device check    Follow Up: 3 Months             Antoinette Mendes, LEANDRO  03/20/21    Time Spent on Discharge:  I spent 45 minutes on this discharge activity which included: face-to-face encounter with the patient, reviewing the data in the system, coordination of the care with the nursing staff as well as consultants, documentation, and entering orders.

## 2021-03-20 NOTE — PLAN OF CARE
Goal Outcome Evaluation:  Plan of Care Reviewed With: patient, spouse     Outcome Summary: VSS. PPM sensing & capturing. No defibrillations per device. Pt denies c/o's. NAD. Discharge teaching done. Home w/ wife & home health w/ followup appointments and device communicator. Flu vaccine given today.

## 2021-03-22 ENCOUNTER — TRANSITIONAL CARE MANAGEMENT TELEPHONE ENCOUNTER (OUTPATIENT)
Dept: CALL CENTER | Facility: HOSPITAL | Age: 81
End: 2021-03-22

## 2021-03-22 NOTE — OUTREACH NOTE
Call Center TCM Note      Responses   [facility] patient discharged from?  [place]   Does the patient have one of the following disease processes/diagnoses(primary or secondary)?  CHF   TCM attempt successful?  No   Unsuccessful attempts  Attempt 1           Kelly Santiago RN    3/22/2021, 15:42 EDT

## 2021-03-22 NOTE — PROGRESS NOTES
Case Management Discharge Note      Final Note: Home with home health         Selected Continued Care - Discharged on 3/20/2021 Admission date: 3/12/2021 - Discharge disposition: Home or Self Care    Destination    No services have been selected for the patient.              Durable Medical Equipment    No services have been selected for the patient.              Dialysis/Infusion    No services have been selected for the patient.              Home Medical Care Coordination complete    Service Provider Selected Services Address Phone Fax Patient Preferred    [facility]  [facility] [address] [address] [phone] --          Therapy    No services have been selected for the patient.              Community Resources    No services have been selected for the patient.                       Final Discharge Disposition Code: 06 - home with home health care

## 2021-03-23 ENCOUNTER — TELEPHONE (OUTPATIENT)
Dept: FAMILY MEDICINE CLINIC | Facility: CLINIC | Age: 81
End: 2021-03-23

## 2021-03-23 ENCOUNTER — OFFICE VISIT (OUTPATIENT)
Dept: CARDIOLOGY | Facility: HOSPITAL | Age: 81
End: 2021-03-23

## 2021-03-23 ENCOUNTER — TRANSITIONAL CARE MANAGEMENT TELEPHONE ENCOUNTER (OUTPATIENT)
Dept: CALL CENTER | Facility: HOSPITAL | Age: 81
End: 2021-03-23

## 2021-03-23 ENCOUNTER — LAB (OUTPATIENT)
Dept: LAB | Facility: HOSPITAL | Age: 81
End: 2021-03-23

## 2021-03-23 VITALS
RESPIRATION RATE: 15 BRPM | DIASTOLIC BLOOD PRESSURE: 79 MMHG | WEIGHT: 179.25 LBS | SYSTOLIC BLOOD PRESSURE: 134 MMHG | OXYGEN SATURATION: 98 % | HEIGHT: 71 IN | BODY MASS INDEX: 25.1 KG/M2 | TEMPERATURE: 97.5 F | HEART RATE: 60 BPM

## 2021-03-23 DIAGNOSIS — I25.10 CORONARY ARTERY DISEASE INVOLVING NATIVE CORONARY ARTERY OF NATIVE HEART WITHOUT ANGINA PECTORIS: ICD-10-CM

## 2021-03-23 DIAGNOSIS — I50.22 CHRONIC SYSTOLIC CONGESTIVE HEART FAILURE (HCC): ICD-10-CM

## 2021-03-23 DIAGNOSIS — I10 ESSENTIAL HYPERTENSION: ICD-10-CM

## 2021-03-23 DIAGNOSIS — Z95.810 ICD (IMPLANTABLE CARDIOVERTER-DEFIBRILLATOR), DUAL, IN SITU: ICD-10-CM

## 2021-03-23 DIAGNOSIS — I50.22 CHRONIC SYSTOLIC CONGESTIVE HEART FAILURE (HCC): Primary | ICD-10-CM

## 2021-03-23 LAB
ANION GAP SERPL CALCULATED.3IONS-SCNC: 11 MMOL/L (ref 5–15)
BASOPHILS # BLD AUTO: 0.01 10*3/MM3 (ref 0–0.2)
BASOPHILS NFR BLD AUTO: 0.3 % (ref 0–1.5)
BUN SERPL-MCNC: 12 MG/DL (ref 8–23)
BUN/CREAT SERPL: 12.6 (ref 7–25)
CALCIUM SPEC-SCNC: 9 MG/DL (ref 8.6–10.5)
CHLORIDE SERPL-SCNC: 107 MMOL/L (ref 98–107)
CO2 SERPL-SCNC: 23 MMOL/L (ref 22–29)
CREAT SERPL-MCNC: 0.95 MG/DL (ref 0.76–1.27)
DEPRECATED RDW RBC AUTO: 50.2 FL (ref 37–54)
EOSINOPHIL # BLD AUTO: 0.03 10*3/MM3 (ref 0–0.4)
EOSINOPHIL NFR BLD AUTO: 0.9 % (ref 0.3–6.2)
ERYTHROCYTE [DISTWIDTH] IN BLOOD BY AUTOMATED COUNT: 15.5 % (ref 12.3–15.4)
GFR SERPL CREATININE-BSD FRML MDRD: 92 ML/MIN/1.73
GLUCOSE SERPL-MCNC: 101 MG/DL (ref 65–99)
HCT VFR BLD AUTO: 31.6 % (ref 37.5–51)
HGB BLD-MCNC: 9.8 G/DL (ref 13–17.7)
IMM GRANULOCYTES # BLD AUTO: 0.02 10*3/MM3 (ref 0–0.05)
IMM GRANULOCYTES NFR BLD AUTO: 0.6 % (ref 0–0.5)
LYMPHOCYTES # BLD AUTO: 0.87 10*3/MM3 (ref 0.7–3.1)
LYMPHOCYTES NFR BLD AUTO: 25.1 % (ref 19.6–45.3)
MCH RBC QN AUTO: 27.9 PG (ref 26.6–33)
MCHC RBC AUTO-ENTMCNC: 31 G/DL (ref 31.5–35.7)
MCV RBC AUTO: 90 FL (ref 79–97)
MONOCYTES # BLD AUTO: 0.25 10*3/MM3 (ref 0.1–0.9)
MONOCYTES NFR BLD AUTO: 7.2 % (ref 5–12)
NEUTROPHILS NFR BLD AUTO: 2.29 10*3/MM3 (ref 1.7–7)
NEUTROPHILS NFR BLD AUTO: 65.9 % (ref 42.7–76)
NRBC BLD AUTO-RTO: 0 /100 WBC (ref 0–0.2)
PLATELET # BLD AUTO: 154 10*3/MM3 (ref 140–450)
PMV BLD AUTO: 12.2 FL (ref 6–12)
POTASSIUM SERPL-SCNC: 3.9 MMOL/L (ref 3.5–5.2)
RBC # BLD AUTO: 3.51 10*6/MM3 (ref 4.14–5.8)
SODIUM SERPL-SCNC: 141 MMOL/L (ref 136–145)
WBC # BLD AUTO: 3.47 10*3/MM3 (ref 3.4–10.8)

## 2021-03-23 PROCEDURE — 36415 COLL VENOUS BLD VENIPUNCTURE: CPT

## 2021-03-23 PROCEDURE — 80048 BASIC METABOLIC PNL TOTAL CA: CPT

## 2021-03-23 PROCEDURE — 85025 COMPLETE CBC W/AUTO DIFF WBC: CPT

## 2021-03-23 PROCEDURE — 99214 OFFICE O/P EST MOD 30 MIN: CPT | Performed by: NURSE PRACTITIONER

## 2021-03-23 NOTE — PROGRESS NOTES
"[facility]    Chief Complaint  Congestive Heart Failure (hospital f/u)    Subjective    History of Present Illness {CC  Problem List  Visit  Diagnosis   Encounters  Notes  Medications  Labs  Result Review Imaging  Media :23}     Narendra Cochran presents to [facility] CARDIOLOGY for   History of Present Illness     80-year-old male admitted to [facility] on 3/12/2021 with acute heart failure.  Patient with hyperlipidemia, hypertension, seizure disorder, CAD, valvular heart disease, chronic systolic heart failure, Paget's disease.  Left heart catheterization completed with no new coronary artery disease.  ICD was placed due to cardiac arrest.     Patient continued on Entresto, metoprolol succinate, spironolactone.  EF now 50%, moderate valvular heart disease.    Patient was followed by GI and started on PPI twice a day due to hematochezia while in the ICU.  Hemoglobin 8.5 during hospital stay.  Pancytopenia with history of Paget's disease.    Pt reports his dyspnea and fatigue are improvement.  Denies CP, pressure, dizziness, syncope, ICD shock, edema, PND/orthopnea.  Sleeping well.  Good appetite. No fevers chills.  ICD bandage has already been removed.  No drainage, erythema.  Mild tenderness.  No concerns.  Overall patient and daughter who is present feels that he is doing better.     Objective     Vital Signs:   Vitals:    03/23/21 1344 03/23/21 1349 03/23/21 1407   BP: 143/66 153/68 134/79   BP Location: Right arm Right arm    Patient Position: Sitting Standing    Cuff Size: Adult Adult    Pulse: 60 60    Resp:  15    Temp:  97.5 °F (36.4 °C)    TempSrc:  Temporal    SpO2: 100% 98%    Weight:  81.3 kg (179 lb 4 oz)    Height:  180.3 cm (70.98\")      Body mass index is 25.01 kg/m².  Physical Exam  Vitals reviewed.   Constitutional:       General: He is not in acute distress.  HENT:      Mouth/Throat:      Mouth: Mucous " membranes are moist.   Eyes:      General: No scleral icterus.     Conjunctiva/sclera: Conjunctivae normal.   Neck:      Vascular: No carotid bruit.   Cardiovascular:      Rate and Rhythm: Normal rate and regular rhythm.      Pulses:           Radial pulses are 2+ on the right side.        Dorsalis pedis pulses are 2+ on the right side.        Posterior tibial pulses are 2+ on the right side.      Heart sounds: Normal heart sounds.   Pulmonary:      Effort: Pulmonary effort is normal.      Breath sounds: Normal breath sounds.   Abdominal:      General: There is no distension.      Palpations: Abdomen is soft.      Tenderness: There is no abdominal tenderness.   Musculoskeletal:      Right lower leg: No edema.      Left lower leg: No edema.   Skin:     General: Skin is warm and dry.      Coloration: Skin is not jaundiced.      Comments: Left chest incision approximated, mild edema.  no drainage, erythema, ecchymosis, hematoma noted   Neurological:      Mental Status: He is alert and oriented to person, place, and time.   Psychiatric:         Attention and Perception: Attention normal.         Mood and Affect: Mood and affect normal.         Speech: Speech normal.         Behavior: Behavior normal. Behavior is cooperative.         Thought Content: Thought content normal.              Result Review  Data Reviewed:{ Labs  Result Review  Imaging  Med Tab  Media :23}     Lab Results   Component Value Date    WBC 2.98 (L) 03/20/2021    HGB 8.9 (L) 03/20/2021    HCT 29.8 (L) 03/20/2021    MCV 92.3 03/20/2021     (L) 03/20/2021     Lab Results   Component Value Date    GLUCOSE 80 03/20/2021    CALCIUM 8.7 03/20/2021     03/20/2021    K 3.5 03/20/2021    CO2 26.0 03/20/2021     (H) 03/20/2021    BUN 10 03/20/2021    CREATININE 0.69 (L) 03/20/2021    EGFRIFAFRI 133 03/20/2021    EGFRIFNONA 64 10/21/2019    BCR 14.5 03/20/2021    ANIONGAP 5.0 03/20/2021       Assessment and Plan {CC Problem List  Visit  Diagnosis  ROS  Review (Popup)  [facility] Maintenance  Quality  BestPractice  Medications  SmartSets  SnapShot Encounters  Media :23}   1. Chronic systolic congestive heart failure (CMS/HCC)  Continue Entresto, spironolactone, metoprolol succinate,    Currently on Lasix daily.    Appears to be euvolemic    Reviewed signs and symptoms of heart failure worsening role the heart valve center and when to call    EF 50%    - CBC & Differential; Future  - Basic Metabolic Panel; Future    2. Coronary artery disease involving native coronary artery of native heart without angina pectoris  Aspirin, statin  Patient with recent cardiac arrest.  ICD implanted for prevention    3. Essential hypertension  Blood pressure initially elevated.  Repeat blood pressure improved  134/79  No signs and symptoms of hypotension    4.  ICD  Wound dressing had already been removed  No signs and symptoms of infection  Discussed signs and symptoms of infection, wound care, reasons to call    Patient will follow-up with EP and general cardiology as scheduled.  Follow-up in the heart valve center as needed or as determined by cardiology    Follow Up {Instructions Charge Capture  Follow-up Communications :23}   No follow-ups on file.    Patient was given instructions and counseling regarding his condition or for health maintenance advice. Please see specific information pulled into the AVS if appropriate.  Patient was instructed to call the Heart and Valve Center with any questions, concerns, or worsening symptoms.    *Please note that portions of this note were completed with a voice recognition program. Efforts were made to edit the dictations, but occasionally words are mistranscribed.

## 2021-03-23 NOTE — OUTREACH NOTE
Call Center TCM Note      Responses   [facility] patient discharged from?  [place]   Does the patient have one of the following disease processes/diagnoses(primary or secondary)?  CHF   TCM attempt successful?  Yes   Call start time  1409   Call end time  1412   Discharge diagnosis  Acute on chronic congestive heart failure, unspecified heart failure ty   Is patient permission given to speak with other caregiver?  Yes   List who call center can speak with  spouse- Jessica   Person spoke with today (if not patient) and relationship  spouse- Jessica   Does the patient have all medications ordered at discharge?  Yes   Is the patient taking all medications as directed (includes completed medication regime)?  Yes   Comments regarding appointments  had an appt with heart and valve today   Does the patient have a primary care provider?   Yes   Does the patient have an appointment with their PCP within 7 days of discharge?  Yes   Comments regarding PCP  f/u with Marguerite Moore PA-C on 3/25/21   Has the patient kept scheduled appointments due by today?  Yes   What is the Home health agency?   [facility]   Has home health visited the patient within 72 hours of discharge?  Yes   Did the patient receive a copy of their discharge instructions?  Yes   What is the patient's perception of their health status since discharge?  Improving   Nursing interventions  Nurse provided patient education   Is the patient able to teach back signs and symptoms of worsening condition? (i.e. weight gain, shortness of air, etc.)  Yes   Is the patient/caregiver able to teach back the hierarchy of who to call/visit for symptoms/problems? PCP, Specialist, Home health nurse, Urgent Care, ED, 911  Yes   TCM call completed?  Yes   Wrap up additional comments  Per spouse, patient is doing well, he had an appt with heart and valve clinic today, confirmed f/u with PCP for 3/25/21, no questions or concerns at this time.           Kelly CARMONA  MERI Santiago    3/23/2021, 14:12 EDT

## 2021-03-25 ENCOUNTER — OFFICE VISIT (OUTPATIENT)
Dept: FAMILY MEDICINE CLINIC | Facility: CLINIC | Age: 81
End: 2021-03-25

## 2021-03-25 VITALS
WEIGHT: 179 LBS | HEIGHT: 71 IN | BODY MASS INDEX: 25.06 KG/M2 | HEART RATE: 60 BPM | SYSTOLIC BLOOD PRESSURE: 138 MMHG | TEMPERATURE: 97.5 F | DIASTOLIC BLOOD PRESSURE: 80 MMHG | OXYGEN SATURATION: 98 %

## 2021-03-25 DIAGNOSIS — D64.9 ANEMIA, UNSPECIFIED TYPE: ICD-10-CM

## 2021-03-25 DIAGNOSIS — I10 ESSENTIAL HYPERTENSION: ICD-10-CM

## 2021-03-25 DIAGNOSIS — K21.9 GASTROESOPHAGEAL REFLUX DISEASE, UNSPECIFIED WHETHER ESOPHAGITIS PRESENT: Chronic | ICD-10-CM

## 2021-03-25 DIAGNOSIS — E78.2 MIXED HYPERLIPIDEMIA: ICD-10-CM

## 2021-03-25 DIAGNOSIS — I50.22 CHRONIC SYSTOLIC CONGESTIVE HEART FAILURE (HCC): ICD-10-CM

## 2021-03-25 DIAGNOSIS — E11.65 TYPE 2 DIABETES MELLITUS WITH HYPERGLYCEMIA, WITHOUT LONG-TERM CURRENT USE OF INSULIN (HCC): ICD-10-CM

## 2021-03-25 DIAGNOSIS — Z09 HOSPITAL DISCHARGE FOLLOW-UP: Primary | ICD-10-CM

## 2021-03-25 DIAGNOSIS — R56.9 SEIZURE (HCC): Chronic | ICD-10-CM

## 2021-03-25 DIAGNOSIS — M88.9 PAGET DISEASE OF BONE: Chronic | ICD-10-CM

## 2021-03-25 PROCEDURE — 99214 OFFICE O/P EST MOD 30 MIN: CPT | Performed by: PHYSICIAN ASSISTANT

## 2021-03-25 PROCEDURE — 1111F DSCHRG MED/CURRENT MED MERGE: CPT | Performed by: PHYSICIAN ASSISTANT

## 2021-03-25 RX ORDER — ROSUVASTATIN CALCIUM 20 MG/1
20 TABLET, COATED ORAL DAILY
Qty: 90 TABLET | Refills: 3 | Status: SHIPPED | OUTPATIENT
Start: 2021-03-25 | End: 2022-01-13 | Stop reason: SDUPTHER

## 2021-03-25 NOTE — PROGRESS NOTES
Transitional Care Follow Up Visit  Subjective     Narendra Cochran is a 80 y.o. male who presents for a transitional care management visit.    Within 48 business hours after discharge our office contacted him via telephone to coordinate his care and needs.      I reviewed and discussed the details of that call along with the discharge summary, hospital problems, inpatient lab results, inpatient diagnostic studies, and consultation reports with Narendra.     Current outpatient and discharge medications have been reconciled for the patient.    Date of TCM Phone Call 11/15/2019 3/20/2021   [facility]   Date of Admission 11/12/2019 3/12/2021   Date of Discharge 11/14/2019 3/20/2021   Discharge Disposition Home or Self Care Home-Health Care [facility]     Risk for Readmission (LACE) Score: 14 (3/20/2021  6:01 AM)      Patient presents to office for routine hospital follow-up.  Patient was [place] [facility] [place]  He reports having a seizure, syncopal episode and going into cardiac arrest.  [place] that mall were [place] and he was taken by ambulance to [facility].  He was hospitalized from 3/12-3/20.  A defibrillator was placed by cardiology.  He was recently seen by cardiology and the incision was evaluated at that time.  He has also been started on an additional Lasix 20 mg daily and metoprolol 50 mg daily.  He has an upcoming follow-up with cardiology on 4/27.  His blood pressure is stable and well-controlled today.  He was found to be anemic and had hematochezia while in the hospital.  He was started on protonix 40 mg BID and his recent hemoglobin/hematocrit was improved.  He has ongoing exertional dyspnea, weakness and fatigue.  His Keppra was increased to 750 mg twice daily.  He is compliant on all new medications and continues to take his old medication.  He is taking Jardiance 25 mg daily and Metformin 500 mg daily for his diabetes.  His sugars are generally  well controlled.  His AST remains normal and there are no signs of active Paget's disease.  He has previously been seen by Dr. Lui in 2019 and is not scheduled to return as he has an elevation in his AST urinary symptoms.  He has received his hearing aids but is not currently wearing them today.  His wife is present today.     Duration of Hospital Stay:  03/12 to 03/20     The following portions of the patient's history were reviewed and updated as appropriate: allergies, current medications, past family history, past medical history, past social history, past surgical history and problem list.    Review of Systems   Constitutional: Positive for fatigue. Negative for chills, diaphoresis and fever.   HENT: Negative for congestion, postnasal drip and rhinorrhea.    Respiratory: Positive for shortness of breath (exertional). Negative for cough and wheezing.    Cardiovascular: Negative for chest pain and leg swelling.   Musculoskeletal: Positive for arthralgias and myalgias.   Neurological: Negative for dizziness and headache.   Psychiatric/Behavioral: Positive for stress. Negative for self-injury, sleep disturbance, suicidal ideas and depressed mood. The patient is not nervous/anxious.        Current Outpatient Medications on File Prior to Visit   Medication Sig Dispense Refill   • aspirin 81 MG chewable tablet Chew 1 tablet Daily.     • Blood Pressure Monitoring (BLOOD PRESSURE MONITOR/L CUFF) misc 1 Units Daily. 1 each 0   • diclofenac (VOLTAREN) 1 % gel gel Apply 4 g topically to the appropriate area as directed 4 (Four) Times a Day As Needed (prn for pain). 60 tube 0   • Empagliflozin 25 MG tablet Take 25 mg by mouth Daily. 30 tablet 5   • furosemide (LASIX) 20 MG tablet Take 1 tablet by mouth Daily. 30 tablet 1   • glucose blood test strip Use to check blood glucose once a day. DX:E11.65 100 each 3   • glucose monitor monitoring kit 1 each Daily. DX: E11.65 1 each 0   • Lancets 30G misc Use to check blood  glucose once a day. DX: E11.65 100 each 3   • levETIRAcetam (KEPPRA) 750 MG tablet Take 1 tablet by mouth 2 (Two) Times a Day. 60 tablet 1   • metFORMIN ER (GLUCOPHAGE-XR) 500 MG 24 hr tablet TAKE ONE TABLET BY MOUTH TWICE A DAY WITH MEALS (Patient taking differently: Take 500 mg by mouth Daily With Breakfast.) 180 tablet 0   • metoprolol succinate XL (TOPROL-XL) 50 MG 24 hr tablet Take 1 tablet by mouth Daily. 30 tablet 1   • pantoprazole (PROTONIX) 40 MG EC tablet Take 1 tablet by mouth 2 (two) times a day. 60 tablet 0   • sacubitril-valsartan (ENTRESTO) 24-26 MG tablet Take 1 tablet by mouth 2 (Two) Times a Day. 60 tablet 3   • spironolactone (ALDACTONE) 25 MG tablet Take 1 tablet by mouth Daily. 30 tablet 1   • [DISCONTINUED] omeprazole (priLOSEC) 40 MG capsule Take 1 capsule by mouth Every Morning Before Breakfast. 90 capsule 1   • [DISCONTINUED] rosuvastatin (CRESTOR) 20 MG tablet TAKE ONE TABLET BY MOUTH DAILY 90 tablet 0   • [DISCONTINUED] famotidine (PEPCID) 20 MG tablet Take 1 tablet by mouth At Night As Needed for Heartburn. 90 tablet 1     No current facility-administered medications on file prior to visit.       Results for orders placed or performed in visit on 03/23/21   Basic Metabolic Panel    Specimen: Blood   Result Value Ref Range    Glucose 101 (H) 65 - 99 mg/dL    BUN 12 8 - 23 mg/dL    Creatinine 0.95 0.76 - 1.27 mg/dL    Sodium 141 136 - 145 mmol/L    Potassium 3.9 3.5 - 5.2 mmol/L    Chloride 107 98 - 107 mmol/L    CO2 23.0 22.0 - 29.0 mmol/L    Calcium 9.0 8.6 - 10.5 mg/dL    eGFR  African Amer 92 >60 mL/min/1.73    BUN/Creatinine Ratio 12.6 7.0 - 25.0    Anion Gap 11.0 5.0 - 15.0 mmol/L   CBC Auto Differential    Specimen: Blood   Result Value Ref Range    WBC 3.47 3.40 - 10.80 10*3/mm3    RBC 3.51 (L) 4.14 - 5.80 10*6/mm3    Hemoglobin 9.8 (L) 13.0 - 17.7 g/dL    Hematocrit 31.6 (L) 37.5 - 51.0 %    MCV 90.0 79.0 - 97.0 fL    MCH 27.9 26.6 - 33.0 pg    MCHC 31.0 (L) 31.5 - 35.7 g/dL     "RDW 15.5 (H) 12.3 - 15.4 %    RDW-SD 50.2 37.0 - 54.0 fl    MPV 12.2 (H) 6.0 - 12.0 fL    Platelets 154 140 - 450 10*3/mm3    Neutrophil % 65.9 42.7 - 76.0 %    Lymphocyte % 25.1 19.6 - 45.3 %    Monocyte % 7.2 5.0 - 12.0 %    Eosinophil % 0.9 0.3 - 6.2 %    Basophil % 0.3 0.0 - 1.5 %    Immature Grans % 0.6 (H) 0.0 - 0.5 %    Neutrophils, Absolute 2.29 1.70 - 7.00 10*3/mm3    Lymphocytes, Absolute 0.87 0.70 - 3.10 10*3/mm3    Monocytes, Absolute 0.25 0.10 - 0.90 10*3/mm3    Eosinophils, Absolute 0.03 0.00 - 0.40 10*3/mm3    Basophils, Absolute 0.01 0.00 - 0.20 10*3/mm3    Immature Grans, Absolute 0.02 0.00 - 0.05 10*3/mm3    nRBC 0.0 0.0 - 0.2 /100 WBC       Visit Vitals  /80   Pulse 60   Temp 97.5 °F (36.4 °C)   Ht 180.3 cm (70.98\")   Wt 81.2 kg (179 lb)   SpO2 98%   BMI 24.98 kg/m²     Body mass index is 24.98 kg/m².    Current outpatient and discharge medications have been reconciled for the patient.  Reviewed by: Marguerite Moore PA-C    Objective   Physical Exam  Vitals reviewed.   Constitutional:       General: He is not in acute distress.     Appearance: Normal appearance. He is well-developed and normal weight. He is not ill-appearing or diaphoretic.   HENT:      Head: Normocephalic and atraumatic.      Right Ear: Decreased hearing noted.      Left Ear: Decreased hearing noted.   Eyes:      Extraocular Movements: Extraocular movements intact.      Conjunctiva/sclera: Conjunctivae normal.   Cardiovascular:      Rate and Rhythm: Normal rate and regular rhythm.      Heart sounds: Heart sounds are distant.   Pulmonary:      Effort: Pulmonary effort is normal.      Breath sounds: Normal breath sounds.   Musculoskeletal:         General: Normal range of motion.      Cervical back: Normal range of motion.      Right lower leg: No edema.      Left lower leg: No edema.   Skin:     General: Skin is warm.      Findings: No erythema or rash.   Neurological:      General: No focal deficit present.      " Mental Status: He is alert.   Psychiatric:         Attention and Perception: He is attentive.         Mood and Affect: Mood normal.         Speech: Speech normal.         Behavior: Behavior normal. Behavior is cooperative.         Thought Content: Thought content normal.         Judgment: Judgment normal.         Assessment/Plan   Diagnoses and all orders for this visit:    1. Hospital discharge follow-up (Primary)  Hospitalized from 03/12 to 03/20 after suffering cardiac arrest at [facility].  Defibrillator placed by cardiology.  Has follow-up on 4/27.    2. H/O seizures on Keppra  Reports seizure at [facility] prior to cardiac arrest.  No additional seizures since this last episode.  His keppra was increased to 750 mg BID and he is compliant on this.    3. Essential hypertension  Stable, well-controlled.  Compliant on medications.    4. Chronic systolic congestive heart failure (CMS/HCC)  Followed by cardiology.  Recently placed on lasix 20 mg and metoprolol 50 mg daily.  Keep follow-up appointment on 4/27.  No leg swelling today, weight is stable.  Ongoing exertional dyspnea.    5. Mixed hyperlipidemia  -     rosuvastatin (CRESTOR) 20 MG tablet; Take 1 tablet by mouth Daily.  Dispense: 90 tablet; Refill: 3    6. T2DM  -     Ambulatory Referral to Podiatry  On metformin 500 mg QD and Jardiance 25 mg QD.  Samples of Jardiance given today.  Recent hemoglobin AIC well-controlled.    7. Gastroesophageal reflux disease, unspecified whether esophagitis present  On protonix 40 mg BID.    8. Anemia, unspecified type  H&H has improved compared to recent hospitalization.  Return in 1 month with repeat CBC (no diff).  Call if he has hematochezia or blood in stool.    9. Paget disease of bone  Recent AST is normal.  Established with Dr. Lui.

## 2021-03-29 ENCOUNTER — READMISSION MANAGEMENT (OUTPATIENT)
Dept: CALL CENTER | Facility: HOSPITAL | Age: 81
End: 2021-03-29

## 2021-03-29 LAB
QT INTERVAL: 512 MS
QTC INTERVAL: 523 MS

## 2021-03-29 NOTE — OUTREACH NOTE
CHF Week 2 Survey      Responses   [facility] patient discharged from?  [place]   Does the patient have one of the following disease processes/diagnoses(primary or secondary)?  CHF   Week 2 attempt successful?  Yes   Call start time  1633   Call end time  1634   Discharge diagnosis  Acute on chronic congestive heart failure, unspecified heart failure ty   Is patient permission given to speak with other caregiver?  Yes   List who call center can speak with  spouse- Jessica   Person spoke with today (if not patient) and relationship  spouse- Jessica   Meds reviewed with patient/caregiver?  Yes   Is the patient having any side effects they believe may be caused by any medication additions or changes?  No   Does the patient have all medications ordered at discharge?  Yes   Is the patient taking all medications as directed (includes completed medication regime)?  Yes   Does the patient have an appointment with their PCP within 7 days of discharge?  Yes   Has the patient kept scheduled appointments due by today?  Yes   Pulse Ox monitoring  None   Psychosocial issues?  No   Did the patient receive a copy of their discharge instructions?  Yes   Nursing interventions  Reviewed instructions with patient, Educated on MyChart   What is the patient's perception of their health status since discharge?  Improving   Nursing interventions  Nurse provided patient education   Is the patient weighing daily?  Yes   Does the patient have scales?  Yes   Daily weight interventions  Education provided on importance of daily weight   Is the patient able to teach back Heart Failure diet management?  Yes   Is the patient able to teach back Heart Failure Zones?  Yes   Is the patient able to teach back signs and symptoms of worsening condition? (i.e. weight gain, shortness of air, etc.)  Yes   If the patient is a current smoker, are they able to teach back resources for cessation?  Not a smoker   Is the patient/caregiver able to teach  back the hierarchy of who to call/visit for symptoms/problems? PCP, Specialist, Home health nurse, Urgent Care, ED, 911  Yes   CHF Week 2 call completed?  Yes          Melina Freitas RN

## 2021-04-07 ENCOUNTER — READMISSION MANAGEMENT (OUTPATIENT)
Dept: CALL CENTER | Facility: HOSPITAL | Age: 81
End: 2021-04-07

## 2021-04-07 NOTE — OUTREACH NOTE
CHF Week 3 Survey      Responses   [facility] patient discharged from?  [place]   Does the patient have one of the following disease processes/diagnoses(primary or secondary)?  CHF   Week 3 attempt successful?  Yes   Call start time  1239   Call end time  1241   Discharge diagnosis  Acute on chronic congestive heart failure, unspecified heart failure ty   Person spoke with today (if not patient) and relationship  spouse- Jessica   What is the Home health agency?   [facility]   Pulse Ox monitoring  None   Comments  Wife reports that the pt still has SOA with exertion but doing better. Denies edema.   What is the patient's perception of their health status since discharge?  Improving   Is the patient weighing daily?  No [planning to discuss getting a scale through .]   Does the patient have scales?  No   Daily weight interventions  Education provided on importance of daily weight   Is the patient able to teach back Heart Failure Zones?  Yes   Is the patient able to teach back signs and symptoms of worsening condition? (i.e. weight gain, shortness of air, etc.)  Yes   Is the patient/caregiver able to teach back the hierarchy of who to call/visit for symptoms/problems? PCP, Specialist, Home health nurse, Urgent Care, ED, 911  Yes   CHF Week 3 call completed?  Yes          Vida Pagan RN

## 2021-04-12 ENCOUNTER — APPOINTMENT (OUTPATIENT)
Dept: WOUND CARE | Facility: HOSPITAL | Age: 81
End: 2021-04-12

## 2021-04-12 ENCOUNTER — TRANSCRIBE ORDERS (OUTPATIENT)
Dept: WOUND CARE | Facility: HOSPITAL | Age: 81
End: 2021-04-12

## 2021-04-12 DIAGNOSIS — Z78.9 UNDER CARE OF OSTOMY NURSE: Primary | ICD-10-CM

## 2021-04-16 ENCOUNTER — READMISSION MANAGEMENT (OUTPATIENT)
Dept: CALL CENTER | Facility: HOSPITAL | Age: 81
End: 2021-04-16

## 2021-04-16 NOTE — OUTREACH NOTE
CHF Week 4 Survey      Responses   [facility] [place] patient discharged from?  [place]   Does the patient have one of the following disease processes/diagnoses(primary or secondary)?  CHF   Week 4 attempt successful?  Yes   Call end time  1436   Discharge diagnosis  Acute on chronic congestive heart failure, unspecified heart failure ty   Is patient permission given to speak with other caregiver?  Yes   List who call center can speak with  spouse- Jessica   Person spoke with today (if not patient) and relationship  spouse- Jessica   Meds reviewed with patient/caregiver?  Yes   Is the patient having any side effects they believe may be caused by any medication additions or changes?  No   Is the patient taking all medications as directed (includes completed medication regime)?  Yes   Has the patient kept scheduled appointments due by today?  Yes   Pulse Ox monitoring  None   Psychosocial issues?  No   Comments  Patient is in a boot for foot pain but wife reports he is able to walk with it and doing fine.    What is the patient's perception of their health status since discharge?  Improving   Nursing interventions  Nurse provided patient education   Is the patient weighing daily?  No   Does the patient have scales?  No   Daily weight interventions  Education provided on importance of daily weight   Is the patient able to teach back Heart Failure diet management?  Yes   Is the patient able to teach back Heart Failure Zones?  Yes   Is the patient able to teach back signs and symptoms of worsening condition? (i.e. weight gain, shortness of air, etc.)  Yes   If the patient is a current smoker, are they able to teach back resources for cessation?  Not a smoker   Is the patient/caregiver able to teach back the hierarchy of who to call/visit for symptoms/problems? PCP, Specialist, Home health nurse, Urgent Care, ED, 911  Yes   Week 4 Call Completed?  Yes   Would the patient like one additional call?  No   Graduated  Yes   Is  the patient interested in additional calls from an ambulatory ?  NOTE:  applies to high risk patients requiring additional follow-up.  No   Did the patient feel the follow up calls were helpful during their recovery period?  Yes   Was the number of calls appropriate?  Yes          Josue White RN

## 2021-04-21 ENCOUNTER — TELEPHONE (OUTPATIENT)
Dept: CARDIOLOGY | Facility: CLINIC | Age: 81
End: 2021-04-21

## 2021-04-21 NOTE — TELEPHONE ENCOUNTER
Called and spoke to wife in regards to Mr Cochran getting a monitor that monitors his defibrillator.  She was unaware but asked her  and they found the monitor and will plug it up.

## 2021-04-23 NOTE — PROGRESS NOTES
[facility] Cardiology  Follow Up Visit  Narendra Cochran  1940    VISIT DATE:  04/27/21    PCP:   Marguerite Moore PA-C  [id number] [facility] [address]          CC:  NICM, GRANDE, Congestive Heart Failure, and NSVT      Problem List:  1. Nonischemic cardiomyopathy / systolic congestive heart failure / Cardiac Arrest   a. Echo 11/13/2019 EF 60%, mild LVH, mild MR, mild to moderate AR, LA 4.6 cm  b. [facility] March 2020 with mild nonobstructive CAD, EF 36-40%  c. Witnessed out of hospital cardiac arrest 3/12/2021 with documented bystander CPR with a single shock from AED with transfer to [facility] per EMS.  d. Echo 3/13/2021 EF 50%, no significant VHD  e. [facility] 3/14/2021 per Dr. Sahni with documented severe 1-vessel CAD involving a small posterior lateral branch of the of the RCA with no interval change to prior angiography  f. [facility] DDD ICD implant 3/16/2021 for secondary prevention of SCD  2. Premature ventricular contractions  a. Echo 11/13/2019 EF 60%, mild LVH, mild MR, mild to moderate AR, LA 4.6 cm  b. Stress test February 2020 PVCs at both rest and stress, no perfusion evidence of ischemia, ejection fraction 31% with dilated LV cavity  c. [facility] March 2020 with mild nonobstructive CAD, EF 36-40%  d. Holter monitor February 2020:  PVCs burden 12.3% with some of the counted PVCs appear to be PACs with aberrancy. One 4 beat run of nonsustained VT versus SVT with aberrancy  e. Amiodarone therapy initiated at 200 mg daily for PVCs April 2020  f. Amiodarone discontinued December 2020  3. Sinus node dysfunction  1. 24-hour Holter 7/15/2020 HR 43-79, average 55 bpm, PVC burden 10.2%.  2. EP consultation August 2020 with patient felt to be asymptomatic with recommendation for continued monitoring  4. First-degree AV block  5. Essential hypertension  6. Dyslipidemia  7. Diabetes mellitus  8. COPD  9. Seizures  10. Paget's disease  11. Hearing loss    History of Present Illness:  Narendra Cochran  Is a 80 y.o.  male with pertinent cardiac history detailed above.  Patient following up after hospitalization in March with outside hospital cardiac arrest.  He is now status post [employer] dual-chamber pacemaker/ICD.  Aside from left foot pain which has him in a walking boot he is doing well.  No cardiac issues.  Blood pressures well controlled.  Has his remote monitoring hooked up now that showed a normal device check transmitted earlier this week.  The device site has healed up nicely.  He has not had any therapies from his ICD.    Patient Active Problem List    Diagnosis Date Noted   • Acute blood loss anemia 03/13/2021   • OHCA 03/12/2021     Note Last Updated: 3/15/2021     · Out of hospital cardiac arrest with shockable rhythm, 3/12/2021  · History of frequent PVCs not improved with amiodarone, 2020  · Echo (3/13/2021): LVEF 50%.  Moderate MR.  Moderate aortic insufficiency.  · Cardiac catheterization (3/15/2021): Severe 1-vessel CAD (small RPL).     • Hypokalemia 03/12/2021   • Hypomagnesemia 03/12/2021   • VHD; mild-mod AR, mild MR 03/12/2021     Note Last Updated: 3/13/2021     · Echo (3/13/2021): LVEF 50%.  Moderate MR.  Moderate aortic insufficiency.     • Chronic systolic congestive heart failure (CMS/HCC) 03/12/2021     Note Last Updated: 3/13/2021     · Cardiac catheterization (3/18/2020): 1 vessel CAD involving small posterior lateral branch of the RCA.  LVEF 35%  · Echo (3/13/2021): LVEF 50%.  Moderate MR.  Moderate aortic insufficiency.     • Former tobacco user 03/12/2021   • GERD 03/12/2021   • Marijuana use 03/12/2021   • Frequent PVCs 03/12/2021     Note Last Updated: 3/13/2021     · Holter monitor February 2020:  PVCs burden 12.3% with some of the counted PVCs appear to be PACs with aberrancy. One 4 beat run of nonsustained VT versus SVT with aberrancy.  · Amiodarone therapy initiated at 200 mg daily for PVCs.  · 24-hour Holter 7/15/2020 HR 43-79, average 55 bpm, PVC burden 10.2%.     • NSVT  (nonsustained ventricular tachycardia) (CMS/[facility]) 2020     Note Last Updated: 3/18/2020     1. 48 Hour Holter 2020:   · PVCs present with burden of 12.3%  · Some of the counted PVCs appear to be PACs with aberrancy  · There was a 4 beat run of nonsustained VT versus SVT with aberrancy     • Coronary artery disease involving native coronary artery of native heart with angina pectoris (CMS/[facility]) 2020     Note Last Updated: 3/15/2021     · Cardiac catheterization (3/18/2020): 1 vessel CAD involving small posterior lateral branch of the RCA.  · Cardiac catheterization for OHCA (3/15/2021): Severe 1-vessel CAD involving small posterior lateral branch of the RCA.     • NICM  2020     Note Last Updated: 3/13/2021     · Cardiac catheterization (3-18-20): Severe one-vessel CAD involving small R PL disease.     • Syncope 2019   • Gonalgia 2019   • Osteitis deformans 2019   • H/O seizures on Keppra 2019   • Essential hypertension 2017   • Hyperlipidemia LDL goal <70 2017   • T2DM 2017   • Paget disease of bone 2017     Note Last Updated: 3/14/2021     · History of Paget's disease, treated with Reclast.  Last saw Dr. Lui .     • Tobacco abuse 2017       No Known Allergies    Social History     Socioeconomic History   • Marital status:      Spouse name: Not on file   • Number of children: Not on file   • Years of education: Not on file   • Highest education level: Not on file   Tobacco Use   • Smoking status: Former Smoker     Packs/day: 0.25     Years: 65.00     Pack years: 16.25     Types: Cigars, Cigarettes     Quit date: 2019     Years since quittin.6   • Smokeless tobacco: Never Used   Vaping Use   • Vaping Use: Never used   Substance and Sexual Activity   • Alcohol use: Yes     Comment: very rarely   • Drug use: Yes     Types: Marijuana     Comment: Pt states used weekly but now quitting    • Sexual activity: Defer       Family  History   Problem Relation Age of Onset   • No Known Problems Daughter    • No Known Problems Son    • No Known Problems Son    • No Known Problems Son    • Diabetes Sister    • Clotting disorder Sister    • No Known Problems Mother    • No Known Problems Sister    • No Known Problems Brother    • Fainting Brother        Current Medications:    Current Outpatient Medications:   •  aspirin 81 MG chewable tablet, Chew 1 tablet Daily., Disp: , Rfl:   •  Blood Pressure Monitoring (BLOOD PRESSURE MONITOR/L CUFF) misc, 1 Units Daily., Disp: 1 each, Rfl: 0  •  diclofenac (VOLTAREN) 1 % gel gel, Apply 4 g topically to the appropriate area as directed 4 (Four) Times a Day As Needed (prn for pain)., Disp: 60 tube, Rfl: 0  •  Empagliflozin 25 MG tablet, Take 25 mg by mouth Daily., Disp: 30 tablet, Rfl: 5  •  famotidine (PEPCID) 20 MG tablet, Take 20 mg by mouth 2 (Two) Times a Day., Disp: , Rfl:   •  furosemide (LASIX) 20 MG tablet, Take 1 tablet by mouth Daily., Disp: 30 tablet, Rfl: 1  •  glucose blood test strip, Use to check blood glucose once a day. DX:E11.65, Disp: 100 each, Rfl: 3  •  glucose monitor monitoring kit, 1 each Daily. DX: E11.65, Disp: 1 each, Rfl: 0  •  Lancets 30G misc, Use to check blood glucose once a day. DX: E11.65, Disp: 100 each, Rfl: 3  •  levETIRAcetam (KEPPRA) 750 MG tablet, Take 1 tablet by mouth 2 (Two) Times a Day., Disp: 60 tablet, Rfl: 1  •  metFORMIN ER (GLUCOPHAGE-XR) 500 MG 24 hr tablet, TAKE ONE TABLET BY MOUTH TWICE A DAY WITH MEALS (Patient taking differently: Take 500 mg by mouth Daily With Breakfast.), Disp: 180 tablet, Rfl: 0  •  metoprolol succinate XL (TOPROL-XL) 50 MG 24 hr tablet, Take 1 tablet by mouth Daily., Disp: 30 tablet, Rfl: 1  •  omeprazole (priLOSEC) 20 MG capsule, Take 20 mg by mouth Daily., Disp: , Rfl:   •  pantoprazole (PROTONIX) 40 MG EC tablet, Take 1 tablet by mouth 2 (two) times a day., Disp: 60 tablet, Rfl: 0  •  rosuvastatin (CRESTOR) 20 MG tablet, Take 1  tablet by mouth Daily., Disp: 90 tablet, Rfl: 3  •  sacubitril-valsartan (ENTRESTO) 24-26 MG tablet, Take 1 tablet by mouth 2 (Two) Times a Day., Disp: 60 tablet, Rfl: 3  •  spironolactone (ALDACTONE) 25 MG tablet, Take 1 tablet by mouth Daily., Disp: 30 tablet, Rfl: 1     Review of Systems   Cardiovascular: Negative for chest pain, dyspnea on exertion, leg swelling, near-syncope, palpitations and syncope.   Musculoskeletal: Positive for arthritis and joint pain.   Neurological: Positive for light-headedness.       Vitals:    04/27/21 1424   BP: 136/52   Pulse: 60   SpO2: 96%   Weight: 78.5 kg (173 lb)       Physical Exam  Constitutional:       Appearance: Normal appearance.   Cardiovascular:      Rate and Rhythm: Normal rate and regular rhythm.      Pulses: Normal pulses.      Heart sounds: Normal heart sounds.      Comments: Left chest ICD incision clean dry intact  Pulmonary:      Effort: Pulmonary effort is normal.      Breath sounds: Normal breath sounds.   Abdominal:      Palpations: Abdomen is soft.   Musculoskeletal:      Comments: Left foot in walking boot   Neurological:      Mental Status: He is alert.         Diagnostic Data:  Procedures  Lab Results   Component Value Date    TRIG 104 03/14/2021    HDL 40 03/14/2021     Lab Results   Component Value Date    GLUCOSE 101 (H) 03/23/2021    BUN 12 03/23/2021    CREATININE 0.95 03/23/2021     03/23/2021    K 3.9 03/23/2021     03/23/2021    CO2 23.0 03/23/2021     Lab Results   Component Value Date    HGBA1C 5.6 01/18/2021     Lab Results   Component Value Date    WBC 3.47 03/23/2021    HGB 9.8 (L) 03/23/2021    HCT 31.6 (L) 03/23/2021     03/23/2021       Assessment:  No diagnosis found.    Plan:    1.  Outside hospital cardiac arrest March 2021, status post [employer] dual-chamber ICD  -Patient received bystander CPR and shock from an external AED  - heart catheterization showing single-vessel CAD in small posterior lateral branch  of the RCA, unchanged from prior  -Status post dual-chamber [employer] dual-chamber ICD 3/16/2021, normal remote device check this week     2.  Nonischemic cardiomyopathy, frequent PVCs  -Continue Entresto, Toprol-XL, spironolactone, Lasix  -BP within normal limits  -Euvolemic  -Normal electrolytes and renal functions March 2021     3.  Anemia, suspected lower GI bleed, thrombocytopenia  Had colonoscopy December 2020, recommended to continue twice daily PPI twice daily GI     4.  Diabetes  -on jardiance, metformin    5.  Hypertension  -Within normal limits     6.  Hyperlipidemia  -Lipids well controlled on rosuvastatin    Follow-up 6 months      Javad Mercedes MD [facility]

## 2021-04-27 ENCOUNTER — OFFICE VISIT (OUTPATIENT)
Dept: CARDIOLOGY | Facility: CLINIC | Age: 81
End: 2021-04-27

## 2021-04-27 VITALS
OXYGEN SATURATION: 96 % | WEIGHT: 173 LBS | BODY MASS INDEX: 24.14 KG/M2 | SYSTOLIC BLOOD PRESSURE: 136 MMHG | DIASTOLIC BLOOD PRESSURE: 52 MMHG | HEART RATE: 60 BPM

## 2021-04-27 DIAGNOSIS — I42.8 NICM (NONISCHEMIC CARDIOMYOPATHY) (HCC): Primary | Chronic | ICD-10-CM

## 2021-04-27 PROCEDURE — 99214 OFFICE O/P EST MOD 30 MIN: CPT | Performed by: INTERNAL MEDICINE

## 2021-04-27 RX ORDER — FAMOTIDINE 20 MG/1
20 TABLET, FILM COATED ORAL 2 TIMES DAILY
COMMUNITY
End: 2021-06-21

## 2021-04-27 RX ORDER — OMEPRAZOLE 20 MG/1
20 CAPSULE, DELAYED RELEASE ORAL DAILY
COMMUNITY
End: 2021-05-18

## 2021-04-29 DIAGNOSIS — E11.65 TYPE 2 DIABETES MELLITUS WITH HYPERGLYCEMIA, WITHOUT LONG-TERM CURRENT USE OF INSULIN (HCC): Chronic | ICD-10-CM

## 2021-04-29 RX ORDER — METFORMIN HYDROCHLORIDE 500 MG/1
TABLET, EXTENDED RELEASE ORAL
Qty: 180 TABLET | Refills: 0 | Status: SHIPPED | OUTPATIENT
Start: 2021-04-29 | End: 2021-06-11

## 2021-05-02 PROCEDURE — 93296 REM INTERROG EVL PM/IDS: CPT | Performed by: INTERNAL MEDICINE

## 2021-05-02 PROCEDURE — 93295 DEV INTERROG REMOTE 1/2/MLT: CPT | Performed by: INTERNAL MEDICINE

## 2021-05-14 ENCOUNTER — TELEPHONE (OUTPATIENT)
Dept: FAMILY MEDICINE CLINIC | Facility: CLINIC | Age: 81
End: 2021-05-14

## 2021-05-14 NOTE — TELEPHONE ENCOUNTER
Called and spoke with pt. Pt stated she wants to schedule an appointment, but needs to wait for  to see what days she has available. Pt stated she will call back once she figures out which day works best.     OKAY FOR HUB TO SCHEDULE WELLNESS VISIT

## 2021-05-17 NOTE — TELEPHONE ENCOUNTER
Caller: Jessica Cochran    Relationship: Emergency Contact    Best call back number: [phone]    Medication needed:   Requested Prescriptions     Pending Prescriptions Disp Refills   • furosemide (LASIX) 20 MG tablet 30 tablet 1     Sig: Take 1 tablet by mouth Daily.   • levETIRAcetam (KEPPRA) 750 MG tablet 60 tablet 1     Sig: Take 1 tablet by mouth 2 (Two) Times a Day.   • metoprolol succinate XL (TOPROL-XL) 50 MG 24 hr tablet 30 tablet 1     Sig: Take 1 tablet by mouth Daily.   • pantoprazole (PROTONIX) 40 MG EC tablet 60 tablet 0     Sig: Take 1 tablet by mouth 2 (two) times a day.   • spironolactone (ALDACTONE) 25 MG tablet 30 tablet 1     Sig: Take 1 tablet by mouth Daily.       What additional details did the patient provide when requesting the medication:  PATIENT OUT OF MEDICATION.     Does the patient have less than a 3 day supply:  [x] Yes  [] No    What is the patient's preferred pharmacy: [facility] [address] AT [facility] & [address] - [phone]  - [phone] FX

## 2021-05-18 ENCOUNTER — LAB (OUTPATIENT)
Dept: LAB | Facility: HOSPITAL | Age: 81
End: 2021-05-18

## 2021-05-18 ENCOUNTER — OFFICE VISIT (OUTPATIENT)
Dept: FAMILY MEDICINE CLINIC | Facility: CLINIC | Age: 81
End: 2021-05-18

## 2021-05-18 VITALS
OXYGEN SATURATION: 97 % | SYSTOLIC BLOOD PRESSURE: 138 MMHG | WEIGHT: 187.4 LBS | HEART RATE: 60 BPM | BODY MASS INDEX: 26.23 KG/M2 | DIASTOLIC BLOOD PRESSURE: 54 MMHG | HEIGHT: 71 IN

## 2021-05-18 DIAGNOSIS — E11.65 TYPE 2 DIABETES MELLITUS WITH HYPERGLYCEMIA, WITHOUT LONG-TERM CURRENT USE OF INSULIN (HCC): Primary | ICD-10-CM

## 2021-05-18 DIAGNOSIS — E11.65 TYPE 2 DIABETES MELLITUS WITH HYPERGLYCEMIA, WITHOUT LONG-TERM CURRENT USE OF INSULIN (HCC): ICD-10-CM

## 2021-05-18 LAB — HBA1C MFR BLD: 5.7 %

## 2021-05-18 PROCEDURE — 82043 UR ALBUMIN QUANTITATIVE: CPT

## 2021-05-18 PROCEDURE — 83036 HEMOGLOBIN GLYCOSYLATED A1C: CPT | Performed by: FAMILY MEDICINE

## 2021-05-18 PROCEDURE — 99213 OFFICE O/P EST LOW 20 MIN: CPT | Performed by: FAMILY MEDICINE

## 2021-05-18 RX ORDER — METOPROLOL SUCCINATE 50 MG/1
50 TABLET, EXTENDED RELEASE ORAL
Qty: 30 TABLET | Refills: 1 | Status: SHIPPED | OUTPATIENT
Start: 2021-05-18 | End: 2021-07-14

## 2021-05-18 RX ORDER — FUROSEMIDE 20 MG/1
20 TABLET ORAL DAILY
Qty: 30 TABLET | Refills: 1 | Status: SHIPPED | OUTPATIENT
Start: 2021-05-18 | End: 2021-06-28 | Stop reason: DRUGHIGH

## 2021-05-18 RX ORDER — LEVETIRACETAM 750 MG/1
750 TABLET ORAL 2 TIMES DAILY
Qty: 60 TABLET | Refills: 1 | Status: SHIPPED | OUTPATIENT
Start: 2021-05-18 | End: 2021-07-14

## 2021-05-18 RX ORDER — SPIRONOLACTONE 25 MG/1
25 TABLET ORAL DAILY
Qty: 30 TABLET | Refills: 1 | Status: SHIPPED | OUTPATIENT
Start: 2021-05-18 | End: 2021-07-14

## 2021-05-18 RX ORDER — PANTOPRAZOLE SODIUM 40 MG/1
40 TABLET, DELAYED RELEASE ORAL 2 TIMES DAILY
Qty: 60 TABLET | Refills: 0 | Status: SHIPPED | OUTPATIENT
Start: 2021-05-18 | End: 2021-07-26 | Stop reason: SDUPTHER

## 2021-05-18 NOTE — PROGRESS NOTES
"Chief Complaint  Diabetes    Subjective          Narendra Cochran presents to [facility]  History of Present Illness     Daughter also present at appointment.     Followed by podiatry. Left ankle/foot had cast for 3 weeks, then air cast for 2 weeks, and most recent visit last week. Has another appt today with podiatry.     H/o foot ulcers between his toes had a sore years ago. No known neuropathy. He has had calluses shaved down.       Objective   Vital Signs:   /54 (BP Location: Left arm, Patient Position: Sitting, Cuff Size: Adult)   Pulse 60   Ht 180.3 cm (70.98\")   Wt 85 kg (187 lb 6.4 oz)   SpO2 97%   BMI 26.15 kg/m²     Physical Exam  Constitutional:       General: He is not in acute distress.  Musculoskeletal:      Right lower leg: Edema ( 1+ pedal) present.      Left lower leg: Edema ( 1+ pedal) present.      Right foot: Deformity ( hammer toes) present.      Left foot: Deformity ( hammer toes) present.   Feet:      Right foot:      Skin integrity: Dry skin present.      Toenail Condition: Right toenails are abnormally thick. Fungal disease present.     Left foot:      Toenail Condition: Left toenails are abnormally thick. Fungal disease present.     Comments: Diabetic Foot Exam Performed          Neurological:      Mental Status: He is alert.   Psychiatric:         Mood and Affect: Mood normal.        Result Review :   The following data was reviewed by: Destini Bay MD on 05/18/2021:  Common labs    Common Labsle 3/20/21 3/20/21 3/23/21 3/23/21 5/18/21    0509 0509 1418 1418    Glucose 80   101 (A)    BUN 10   12    Creatinine 0.69 (A)   0.95    eGFR African Am 133   92    Sodium 140   141    Potassium 3.5   3.9    Chloride 109 (A)   107    Calcium 8.7   9.0    WBC  2.98 (A) 3.47     Hemoglobin  8.9 (A) 9.8 (A)     Hematocrit  29.8 (A) 31.6 (A)     Platelets  111 (A) 154     Hemoglobin A1C     5.7   (A) Abnormal value       Comments are available for some " flowsheets but are not being displayed.                     Assessment and Plan    Diagnoses and all orders for this visit:    1. Type 2 diabetes mellitus with hyperglycemia, without long-term current use of insulin (CMS/[facility]) (Primary)  -     POC Glycosylated Hemoglobin (Hb A1C)  -     MicroAlbumin, Urine, Random - Urine, Clean Catch; Future    Stable. Continue metformin.   Diabetic therapeutic shoes and insert form signed.   Follow-up with PCP in 3 months for AWV.           Follow Up   Return in about 3 months (around 8/18/2021) for Medicare Wellness.  Patient was given instructions and counseling regarding his condition or for health maintenance advice. Please see specific information pulled into the AVS if appropriate.     Electronically signed by Destini Bay MD, 05/18/21, 10:08 AM EDT.

## 2021-05-19 LAB — ALBUMIN UR-MCNC: 4.7 MG/DL

## 2021-06-09 LAB
QT INTERVAL: 518 MS
QT INTERVAL: 556 MS
QTC INTERVAL: 582 MS
QTC INTERVAL: 604 MS

## 2021-06-10 DIAGNOSIS — E11.65 TYPE 2 DIABETES MELLITUS WITH HYPERGLYCEMIA, WITHOUT LONG-TERM CURRENT USE OF INSULIN (HCC): Chronic | ICD-10-CM

## 2021-06-11 RX ORDER — LANCETS 33 GAUGE
EACH MISCELLANEOUS
Qty: 100 EACH | Refills: 0 | Status: SHIPPED | OUTPATIENT
Start: 2021-06-11 | End: 2022-09-06

## 2021-06-11 RX ORDER — METFORMIN HYDROCHLORIDE 500 MG/1
TABLET, EXTENDED RELEASE ORAL
Qty: 180 TABLET | Refills: 0 | Status: SHIPPED | OUTPATIENT
Start: 2021-06-11 | End: 2021-06-21

## 2021-06-14 ENCOUNTER — TELEPHONE (OUTPATIENT)
Dept: CARDIOLOGY | Facility: CLINIC | Age: 81
End: 2021-06-14

## 2021-06-14 NOTE — TELEPHONE ENCOUNTER
Per alert from [employer], pt's HeartLogic Heart Failure Index is elevated @ 38. See report in [facility]UR[facility].     I spoke w/pt's spouse who reports pt is sleeping. Asked if pt is experiencing lower extremity or abd swelling-she denies (he does have swelling to Rt foot related to a separate medical issue).  Pt's spouse denies pt has experienced shortness of breath. Spouse also reports pt is very sedentary & doesn't ambulate a lot.     Per spouse, pt is compliant w/all cardiac meds & Jardiance.     Pt was seen by LEANDRO Billingsley, 3/23/2021.

## 2021-06-21 ENCOUNTER — OFFICE VISIT (OUTPATIENT)
Dept: CARDIOLOGY | Facility: CLINIC | Age: 81
End: 2021-06-21

## 2021-06-21 VITALS
WEIGHT: 193 LBS | HEART RATE: 67 BPM | BODY MASS INDEX: 27.63 KG/M2 | SYSTOLIC BLOOD PRESSURE: 140 MMHG | HEIGHT: 70 IN | DIASTOLIC BLOOD PRESSURE: 68 MMHG | OXYGEN SATURATION: 98 %

## 2021-06-21 DIAGNOSIS — I42.8 NICM (NONISCHEMIC CARDIOMYOPATHY) (HCC): Chronic | ICD-10-CM

## 2021-06-21 DIAGNOSIS — Z95.810 PRESENCE OF BIVENTRICULAR IMPLANTABLE CARDIOVERTER-DEFIBRILLATOR (ICD): ICD-10-CM

## 2021-06-21 DIAGNOSIS — I50.22 CHRONIC SYSTOLIC CONGESTIVE HEART FAILURE (HCC): Primary | ICD-10-CM

## 2021-06-21 DIAGNOSIS — I10 ESSENTIAL HYPERTENSION: ICD-10-CM

## 2021-06-21 DIAGNOSIS — I47.29 NSVT (NONSUSTAINED VENTRICULAR TACHYCARDIA) (HCC): ICD-10-CM

## 2021-06-21 PROCEDURE — 99214 OFFICE O/P EST MOD 30 MIN: CPT | Performed by: INTERNAL MEDICINE

## 2021-06-21 RX ORDER — METFORMIN HYDROCHLORIDE 500 MG/1
500 TABLET, EXTENDED RELEASE ORAL
COMMUNITY
End: 2021-07-26 | Stop reason: SDUPTHER

## 2021-06-21 NOTE — PROGRESS NOTES
Cardiac Electrophysiology Outpatient Follow Up Note            [facility]    Follow Up Office Visit      Narendra Cochran  [id number]  06/21/2021  [unfilled]  [unfilled]    Primary Care Physician: Marguerite Moore PA-C    Referred By: No ref. provider found    Subjective     Chief Complaint:   Diagnoses and all orders for this visit:    1. Chronic systolic congestive heart failure (CMS/HCC) (Primary)    2. NSVT (nonsustained ventricular tachycardia) (CMS/HCC)    3. Essential hypertension    4. NICM     5. Presence of biventricular implantable cardioverter-defibrillator (ICD)      Chief Complaint   Patient presents with   • NICM     Problem List:  Nonischemic cardiomyopathy / systolic congestive heart failure / Cardiac Arrest   Echo 11/13/2019 EF 60%, mild LVH, mild MR, mild to moderate AR, LA 4.6 cm  [facility] March 2020 with mild nonobstructive CAD, EF 36-40%  Witnessed out of hospital cardiac arrest 3/12/2021 with documented bystander CPR with a single shock from AED with transfer to [facility] per EMS.  Echo 3/13/2021 EF 50%, no significant VHD  C 3/14/2021 per Dr. Sahni with documented severe 1-vessel CAD involving a small posterior lateral branch of the of the RCA with no interval change to prior angiography  [facility] DDD ICD implant 3/16/2021 for secondary prevention of SCD  Premature ventricular contractions  Echo 11/13/2019 EF 60%, mild LVH, mild MR, mild to moderate AR, LA 4.6 cm  Stress test February 2020 PVCs at both rest and stress, no perfusion evidence of ischemia, ejection fraction 31% with dilated LV cavity  [facility] March 2020 with mild nonobstructive CAD, EF 36-40%  Holter monitor February 2020:  PVCs burden 12.3% with some of the counted PVCs appear to be PACs with aberrancy. One 4 beat run of nonsustained VT versus SVT with aberrancy  Amiodarone therapy initiated at 200 mg daily for PVCs April 2020  Amiodarone discontinued December 2020  Sinus node  dysfunction  24-hour Holter 7/15/2020 HR 43-79, average 55 bpm, PVC burden 10.2%.  EP consultation August 2020 with patient felt to be asymptomatic with recommendation for continued monitoring  First-degree AV block  Essential hypertension  Dyslipidemia  Diabetes mellitus  COPD  Seizures  Paget's disease  Hearing loss       History of Present Illness:   Narendra Cochran is a 80 y.o. male who presents to my electrophysiology clinic for follow up of above complaints.  Narendra's been drinking a lot of soda he enjoys it.  Not so much water but mostly soft drinks.  Does not eat that much salt.    He has been more short of breath for the last week or so he is having a hard time breathing while sitting still now.  He says his legs are swollen.  No chest pain nausea vomiting fevers or chills.  He is naïve to the heart failure clinic..      Review of Systems:   Constitutional: No fevers or chills, no recent weight gain or weight loss or fatigue  Eyes: No visual loss, blurred vision, double vision, yellow sclerae.  ENT: No headaches, hearing loss, vertigo, congestion or sore throat.   Cardiovascular: Per HPI  Respiratory: No cough or wheezing, no sputum production, no hematemesis   Gastrointestinal: No abdominal pain, no nausea, vomiting, constipation, diarrhea, melena.   Genitourinary: No dysuria, hematuria or increased frequency.  Musculoskeletal:  No gait disturbance, weakness or joint pain or stiffness  Integumentary: No rashes, urticaria, ulcers or sores.   Neurological: No headache, dizziness, syncope, paralysis, ataxia, no prior CVA/TIA  Psychiatric: No anxiety, or depression  Endocrine: No diaphoresis, cold or heat intolerance. No polyuria or polydipsia.   Hematologic/Lymphatic: No anemia, abnormal bruising or bleeding. No history of DVT/PE.      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   • Paget disease of bone        Past Surgical History:   Past Surgical History:   Procedure Laterality Date   • APPENDECTOMY     • CARDIAC CATHETERIZATION N/A 3/18/2020    Procedure: Left Heart Cath;  Surgeon: Sonya Garibay MD;  Location:  [facility];  Service: Cardiology;  Laterality: N/A;  First availabel provider     • CARDIAC CATHETERIZATION N/A 3/15/2021    Procedure: LEFT HEART CATH;  Surgeon: Doc Sahni IV, MD;  Location:  [facility];  Service: Cardiovascular;  Laterality: N/A;   • CARDIAC CATHETERIZATION N/A 3/15/2021    Procedure: Coronary angiography;  Surgeon: Doc Sahni IV, MD;  Location:  [facility];  Service: Cardiovascular;  Laterality: N/A;   • CARDIAC ELECTROPHYSIOLOGY PROCEDURE N/A 3/16/2021    Procedure: ICD DC new;  Surgeon: Som Boyd DO;  Location:  [facility];  Service: Cardiology;  Laterality: N/A;   • COLON RESECTION      second to diverticulitis    • FOOT SURGERY Left        Family History:   Family History   Problem Relation Age of Onset   • No Known Problems Daughter    • No Known Problems Son    • No Known Problems Son    • No Known Problems Son    • Diabetes Sister    • Clotting disorder Sister    • No Known Problems Mother    • No Known Problems Sister    • No Known Problems Brother    • Fainting Brother        Social History:   Social History     Socioeconomic History   • Marital status:      Spouse name: Not on file   • Number of children: Not on file   • Years of education: Not on file   • Highest education level: Not on file   Tobacco Use   • Smoking status: Former Smoker     Packs/day: 0.25     Years: 65.00     Pack years: 16.25     Types: Cigars, Cigarettes      Quit date: 2019     Years since quittin.8   • Smokeless tobacco: Never Used   Vaping Use   • Vaping Use: Never used   Substance and Sexual Activity   • Alcohol use: Yes     Comment: very rarely   • Drug use: Yes     Types: Marijuana     Comment: Pt states used weekly but now quitting    • Sexual activity: Defer       Medications:     Current Outpatient Medications:   •  aspirin 81 MG chewable tablet, Chew 1 tablet Daily., Disp: , Rfl:   •  Blood Pressure Monitoring (BLOOD PRESSURE MONITOR/L CUFF) misc, 1 Units Daily., Disp: 1 each, Rfl: 0  •  diclofenac (VOLTAREN) 1 % gel gel, Apply 4 g topically to the appropriate area as directed 4 (Four) Times a Day As Needed (prn for pain)., Disp: 60 tube, Rfl: 0  •  Empagliflozin 25 MG tablet, Take 25 mg by mouth Daily., Disp: 30 tablet, Rfl: 5  •  furosemide (LASIX) 20 MG tablet, Take 1 tablet by mouth Daily., Disp: 30 tablet, Rfl: 1  •  glucose blood test strip, Use to check blood glucose once a day. DX:E11.65, Disp: 100 each, Rfl: 3  •  glucose monitor monitoring kit, 1 each Daily. DX: E11.65, Disp: 1 each, Rfl: 0  •  Lancets (OneTouch Delica Plus Lpmtrk06C) misc, USE TO CHECK BLOOD GLUCOSE ONCE DAILY, Disp: 100 each, Rfl: 0  •  levETIRAcetam (KEPPRA) 750 MG tablet, Take 1 tablet by mouth 2 (Two) Times a Day., Disp: 60 tablet, Rfl: 1  •  metFORMIN ER (GLUCOPHAGE-XR) 500 MG 24 hr tablet, Take 500 mg by mouth Daily With Breakfast., Disp: , Rfl:   •  metoprolol succinate XL (TOPROL-XL) 50 MG 24 hr tablet, Take 1 tablet by mouth Daily., Disp: 30 tablet, Rfl: 1  •  pantoprazole (PROTONIX) 40 MG EC tablet, Take 1 tablet by mouth 2 (two) times a day., Disp: 60 tablet, Rfl: 0  •  rosuvastatin (CRESTOR) 20 MG tablet, Take 1 tablet by mouth Daily., Disp: 90 tablet, Rfl: 3  •  sacubitril-valsartan (ENTRESTO) 24-26 MG tablet, Take 1 tablet by mouth 2 (Two) Times a Day., Disp: 60 tablet, Rfl: 3  •  spironolactone (ALDACTONE) 25 MG tablet, Take 1 tablet by mouth Daily., Disp: 30  "tablet, Rfl: 1    Allergies:   No Known Allergies    Objective   Vital Signs:   Vitals:    06/21/21 1344   BP: 140/68   BP Location: Left arm   Patient Position: Sitting   Pulse: 67   SpO2: 98%   Weight: 87.5 kg (193 lb)   Height: 177.8 cm (70\")       PHYSICAL EXAM  General appearance: Awake, alert, cooperative  Head: Normocephalic, without obvious abnormality, atraumatic  Eyes: Conjunctivae/corneas clear, EOMs intact  Neck: no adenopathy, no carotid bruit, no JVD and thyroid: not enlarged  Lungs: Rales bilaterally  Heart: regular rate and rhythm, S1, S2 normal, no murmur, click, rub or gallop  Abdomen: Soft, non-tender, bowel sounds normal,  no organomegaly  Extremities: 2+ bilateral lower extremity edema  Skin: Skin color, turgor normal, no rashes or lesions  Neurologic: Grossly normal     Lab Results   Component Value Date    GLUCOSE 101 (H) 03/23/2021    CALCIUM 9.0 03/23/2021     03/23/2021    K 3.9 03/23/2021    CO2 23.0 03/23/2021     03/23/2021    BUN 12 03/23/2021    CREATININE 0.95 03/23/2021    EGFRIFAFRI 92 03/23/2021    EGFRIFNONA 64 10/21/2019    BCR 12.6 03/23/2021    ANIONGAP 11.0 03/23/2021     Lab Results   Component Value Date    WBC 3.47 03/23/2021    HGB 9.8 (L) 03/23/2021    HCT 31.6 (L) 03/23/2021    MCV 90.0 03/23/2021     03/23/2021     Lab Results   Component Value Date    INR 1.13 03/12/2021    INR 1.00 11/12/2019    INR 0.9 05/28/2017    PROTIME 14.2 (H) 03/12/2021    PROTIME 12.7 11/12/2019    PROTIME 11.4 (L) 05/28/2017     Lab Results   Component Value Date    TSH 6.720 (H) 03/12/2021       Cardiac Testing:     I personally viewed and interpreted the patient's EKG/Telemetry/lab data    Procedures    Tobacco Cessation: N/A  Obstructive Sleep Apnea Screening: Completed    Assessment & Plan    Diagnoses and all orders for this visit:    1. Chronic systolic congestive heart failure (CMS/HCC) (Primary)    2. NSVT (nonsustained ventricular tachycardia) (CMS/[facility])    3. " Essential hypertension    4. NICM     5. Presence of biventricular implantable cardioverter-defibrillator (ICD)         Diagnosis Plan   1. Chronic systolic congestive heart failure (CMS/HCC)   reasonable medical therapy.  Heart failure exacerbation today.  Needs more diuresis.  Continue Entresto and his metoprolol.   2. NSVT (nonsustained ventricular tachycardia) (CMS/HCC)  Continue B blocker   3. Essential hypertension   blood pressure well controlled.  [place] hypertension today   4. NICM    as above   5. Presence of biventricular implantable cardioverter-defibrillator (ICD)    This patient's Cardiac Implanted Electronic Device was manually interrogated and reprogrammed during the patient encounter today.  Iterative programming changes were manually made to determine the sensing threshold, pacing threshold, lead impedance as well as underlying cardiac rhythm.  These programming changes were not limited to but included some or all of the following when appropriate: pacing mode, programmed AV delays, blanking periods, and refractory periods.  Data obtained as a result of these manual programing changes informed the patient's CIED permanent programming.   FU with Dr. Mercedes.      Body mass index is 27.69 kg/m².    I spent 29 minutes in consultation with this patient which included more than 65% of this time in direct face-to-face counseling, physical examination and discussion of my assessment and findings and shared decision making with the patient.  The remainder of the time not spent face to face was performing one, some or all of the following actions:  preparing to see this patient ( eg. Review of tests),  ordering medications, tests or procedures ), care coordination, discussion of the plan with other healthcare providers, documenting clinical information in Epic well as independently interpreting results and communicating results to patient, family and or caregiver.  All time noted occurred on the date of  service.    Follow Up:       Thank you for allowing me to participate in the care of your patient. Please to not hesitate to contact me with additional questions or concerns.      Som Boyd DO, FACC, RS  Cardiac Electrophysiologist  [facility] / [facility]

## 2021-06-28 ENCOUNTER — OFFICE VISIT (OUTPATIENT)
Dept: CARDIOLOGY | Facility: HOSPITAL | Age: 81
End: 2021-06-28

## 2021-06-28 VITALS
HEART RATE: 60 BPM | RESPIRATION RATE: 15 BRPM | SYSTOLIC BLOOD PRESSURE: 134 MMHG | WEIGHT: 175.38 LBS | BODY MASS INDEX: 25.11 KG/M2 | OXYGEN SATURATION: 99 % | HEIGHT: 70 IN | DIASTOLIC BLOOD PRESSURE: 64 MMHG | TEMPERATURE: 97.8 F

## 2021-06-28 DIAGNOSIS — E78.5 HYPERLIPIDEMIA LDL GOAL <70: ICD-10-CM

## 2021-06-28 DIAGNOSIS — I50.23 ACUTE ON CHRONIC SYSTOLIC CHF (CONGESTIVE HEART FAILURE) (HCC): Primary | ICD-10-CM

## 2021-06-28 DIAGNOSIS — I10 ESSENTIAL HYPERTENSION: ICD-10-CM

## 2021-06-28 PROCEDURE — 99214 OFFICE O/P EST MOD 30 MIN: CPT | Performed by: NURSE PRACTITIONER

## 2021-06-28 RX ORDER — FUROSEMIDE 40 MG/1
40 TABLET ORAL 2 TIMES DAILY
Qty: 60 TABLET | Refills: 2 | Status: SHIPPED | OUTPATIENT
Start: 2021-06-28 | End: 2021-07-09 | Stop reason: SDUPTHER

## 2021-06-29 NOTE — PROGRESS NOTES
"Chief Complaint  Congestive Heart Failure and Establish Care    Subjective    History of Present Illness {CC  Problem List  Visit  Diagnosis   Encounters  Notes  Medications  Labs  Result Review Imaging  Media :23}       History of Present Illness   80 year old male presents to the office today for ongoing evaluation of his acute on chronic systolic heart failure. He recently had his lasix increased to 40 mg bid due to worsening hf symptoms as detected on his heart logic heart failure index.  He reports when his Lasix was increased to 40 mg twice daily he did experience symptoms improvement.  Reports dyspnea and pedal edema improved.  He reports however when he returned back to his normal Lasix dose, symptoms began to return quickly.  Does deny chest pain but notes significant dyspnea on exertion as well as fatigue.  Objective     Vital Signs:   Vitals:    06/28/21 1434 06/28/21 1437 06/28/21 1438   BP: 132/60 142/63 134/64   BP Location: Right arm Left arm Left arm   Patient Position: Sitting Standing Sitting   Cuff Size: Adult Adult Adult   Pulse: 61 59 60   Resp:   15   Temp:   97.8 °F (36.6 °C)   TempSrc:   Temporal   SpO2: 97% 97% 99%   Weight:   79.5 kg (175 lb 6 oz)   Height:   177.8 cm (70\")     Body mass index is 25.16 kg/m².  Physical Exam  Vitals and nursing note reviewed.   Constitutional:       Appearance: Normal appearance.   HENT:      Head: Normocephalic.   Eyes:      Pupils: Pupils are equal, round, and reactive to light.   Cardiovascular:      Rate and Rhythm: Normal rate and regular rhythm.      Pulses: Normal pulses.      Heart sounds: Normal heart sounds. No murmur heard.     Pulmonary:      Effort: Pulmonary effort is normal.      Breath sounds: Rales present.   Abdominal:      General: Bowel sounds are normal.      Palpations: Abdomen is soft.   Musculoskeletal:         General: Normal range of motion.      Cervical back: Normal range of motion.      Right lower leg: Edema present. "      Left lower leg: Edema present.   Skin:     General: Skin is warm and dry.      Capillary Refill: Capillary refill takes less than 2 seconds.   Neurological:      Mental Status: He is alert and oriented to person, place, and time.   Psychiatric:         Mood and Affect: Mood normal.         Thought Content: Thought content normal.              Result Review  Data Reviewed:{ Labs  Result Review  Imaging  Med Tab  Media :23}   Basic Metabolic Panel (03/23/2021 14:18)  CBC & Differential (03/23/2021 14:18)  Adult Transthoracic Echo Complete W/ Cont if Necessary Per Protocol (03/13/2021 09:44)  Cardiac Catheterization/Vascular Study (03/15/2021 16:51)             Assessment and Plan {CC Problem List  Visit Diagnosis  ROS  Review (Popup)  Health Maintenance  Quality  BestPractice  Medications  SmartSets  SnapShot Encounters  Media :23}   1. Acute on chronic systolic CHF (congestive heart failure) (CMS/[facility])  Continue lasix 40 mg bid   Continue toprol, entresto  Heart failure education today including signs and symptoms, the role of the heart failure center, daily weights, low sodium diet (less than 1500 mg per day), and daily physical activity. Reviewed HF Zones with patient and family.  Patient to continue current medications as previously ordered.     2. Essential hypertension  Well controlled   HTN Education provided today including signs and symptoms, medication management, daily blood pressure monitoring. Patient encouraged to call the Heart and Valve center with any abnormal readings.     3. Hyperlipidemia LDL goal <70  Statin therapy         Follow Up {Instructions Charge Capture  Follow-up Communications :23}   Return in about 1 week (around 7/5/2021) for Office visit, HF with Pari.    Patient was given instructions and counseling regarding his condition or for health maintenance advice. Please see specific information pulled into the AVS if appropriate.  Patient was instructed to call  the Heart and Valve Center with any questions, concerns, or worsening symptoms.    *Please note that portions of this note were completed with a voice recognition program. Efforts were made to edit the dictations, but occasionally words are mistranscribed.

## 2021-07-08 ENCOUNTER — OFFICE VISIT (OUTPATIENT)
Dept: CARDIOLOGY | Facility: HOSPITAL | Age: 81
End: 2021-07-08

## 2021-07-08 ENCOUNTER — LAB (OUTPATIENT)
Dept: LAB | Facility: HOSPITAL | Age: 81
End: 2021-07-08

## 2021-07-08 VITALS
HEIGHT: 70 IN | BODY MASS INDEX: 24.09 KG/M2 | RESPIRATION RATE: 18 BRPM | OXYGEN SATURATION: 97 % | TEMPERATURE: 97 F | DIASTOLIC BLOOD PRESSURE: 61 MMHG | SYSTOLIC BLOOD PRESSURE: 137 MMHG | HEART RATE: 63 BPM | WEIGHT: 168.31 LBS

## 2021-07-08 DIAGNOSIS — I50.22 CHRONIC SYSTOLIC CONGESTIVE HEART FAILURE (HCC): ICD-10-CM

## 2021-07-08 DIAGNOSIS — D64.9 ANEMIA, UNSPECIFIED TYPE: ICD-10-CM

## 2021-07-08 DIAGNOSIS — I50.22 CHRONIC SYSTOLIC CONGESTIVE HEART FAILURE (HCC): Primary | ICD-10-CM

## 2021-07-08 DIAGNOSIS — I25.10 CORONARY ARTERY DISEASE INVOLVING NATIVE CORONARY ARTERY OF NATIVE HEART WITHOUT ANGINA PECTORIS: ICD-10-CM

## 2021-07-08 DIAGNOSIS — I10 ESSENTIAL HYPERTENSION: ICD-10-CM

## 2021-07-08 LAB
ALBUMIN SERPL-MCNC: 4.3 G/DL (ref 3.5–5.2)
ALBUMIN/GLOB SERPL: 1.4 G/DL
ALP SERPL-CCNC: 67 U/L (ref 39–117)
ALT SERPL W P-5'-P-CCNC: 6 U/L (ref 1–41)
ANION GAP SERPL CALCULATED.3IONS-SCNC: 14.5 MMOL/L (ref 5–15)
AST SERPL-CCNC: 12 U/L (ref 1–40)
BASOPHILS # BLD AUTO: 0.01 10*3/MM3 (ref 0–0.2)
BASOPHILS NFR BLD AUTO: 0.3 % (ref 0–1.5)
BILIRUB SERPL-MCNC: 0.5 MG/DL (ref 0–1.2)
BUN SERPL-MCNC: 28 MG/DL (ref 8–23)
BUN/CREAT SERPL: 21.1 (ref 7–25)
CALCIUM SPEC-SCNC: 10 MG/DL (ref 8.6–10.5)
CHLORIDE SERPL-SCNC: 101 MMOL/L (ref 98–107)
CO2 SERPL-SCNC: 29.5 MMOL/L (ref 22–29)
CREAT SERPL-MCNC: 1.33 MG/DL (ref 0.76–1.27)
DEPRECATED RDW RBC AUTO: 45.5 FL (ref 37–54)
EOSINOPHIL # BLD AUTO: 0.02 10*3/MM3 (ref 0–0.4)
EOSINOPHIL NFR BLD AUTO: 0.7 % (ref 0.3–6.2)
ERYTHROCYTE [DISTWIDTH] IN BLOOD BY AUTOMATED COUNT: 17 % (ref 12.3–15.4)
FOLATE SERPL-MCNC: 5.94 NG/ML (ref 4.78–24.2)
GFR SERPL CREATININE-BSD FRML MDRD: 62 ML/MIN/1.73
GLOBULIN UR ELPH-MCNC: 3.1 GM/DL
GLUCOSE SERPL-MCNC: 109 MG/DL (ref 65–99)
HCT VFR BLD AUTO: 37.6 % (ref 37.5–51)
HGB BLD-MCNC: 11.1 G/DL (ref 13–17.7)
IRON 24H UR-MRATE: 49 MCG/DL (ref 59–158)
IRON SATN MFR SERPL: 11 % (ref 20–50)
LYMPHOCYTES # BLD AUTO: 0.81 10*3/MM3 (ref 0.7–3.1)
LYMPHOCYTES NFR BLD AUTO: 28.1 % (ref 19.6–45.3)
MCH RBC QN AUTO: 22.4 PG (ref 26.6–33)
MCHC RBC AUTO-ENTMCNC: 29.5 G/DL (ref 31.5–35.7)
MCV RBC AUTO: 75.8 FL (ref 79–97)
MONOCYTES # BLD AUTO: 0.25 10*3/MM3 (ref 0.1–0.9)
MONOCYTES NFR BLD AUTO: 8.7 % (ref 5–12)
NEUTROPHILS NFR BLD AUTO: 1.78 10*3/MM3 (ref 1.7–7)
NEUTROPHILS NFR BLD AUTO: 61.9 % (ref 42.7–76)
PLATELET # BLD AUTO: 118 10*3/MM3 (ref 140–450)
POTASSIUM SERPL-SCNC: 2.9 MMOL/L (ref 3.5–5.2)
PROT SERPL-MCNC: 7.4 G/DL (ref 6–8.5)
RBC # BLD AUTO: 4.96 10*6/MM3 (ref 4.14–5.8)
SODIUM SERPL-SCNC: 145 MMOL/L (ref 136–145)
TIBC SERPL-MCNC: 463 MCG/DL (ref 298–536)
TRANSFERRIN SERPL-MCNC: 311 MG/DL (ref 200–360)
VIT B12 BLD-MCNC: 321 PG/ML (ref 211–946)
WBC # BLD AUTO: 2.88 10*3/MM3 (ref 3.4–10.8)

## 2021-07-08 PROCEDURE — 99214 OFFICE O/P EST MOD 30 MIN: CPT | Performed by: NURSE PRACTITIONER

## 2021-07-08 PROCEDURE — 82746 ASSAY OF FOLIC ACID SERUM: CPT

## 2021-07-08 PROCEDURE — 85025 COMPLETE CBC W/AUTO DIFF WBC: CPT

## 2021-07-08 PROCEDURE — 36415 COLL VENOUS BLD VENIPUNCTURE: CPT

## 2021-07-08 PROCEDURE — 83540 ASSAY OF IRON: CPT

## 2021-07-08 PROCEDURE — 82607 VITAMIN B-12: CPT

## 2021-07-08 PROCEDURE — 80053 COMPREHEN METABOLIC PANEL: CPT

## 2021-07-08 PROCEDURE — 84466 ASSAY OF TRANSFERRIN: CPT

## 2021-07-08 NOTE — PROGRESS NOTES
"[facility], [facility]    Chief Complaint  Congestive Heart Failure and Follow-up    Subjective    History of Present Illness {CC  Problem List  Visit  Diagnosis   Encounters  Notes  Medications  Labs  Result Review Imaging  Media :23}     Narendra Cochran presents to [facility] C[facility] for   History of Present Illness   80-year-old male with chronic heart failure (reduced EF now improved to 50%, valvular heart disease, hypertension, seizure disorder, CAD, Paget's disease.  Patient with ICD.  Patient's Lasix recently increased on 40 mg twice daily due to increased heart logic index.  Patient reported improved dyspnea and pedal edema.  Patient was continued on 40 mg twice a day, Toprol, Entresto, aldactone.     Pt denies CP, pressure, palpitations, edema, PND/orthonea.  Dyspnea mildly improved but reports continued intermittent dyspnea.      Pt with hx of anemia.  Reports having a colonoscopy last year.  No bleeding.     Objective     Vital Signs:   Vitals:    07/08/21 1322   BP: 137/61   BP Location: Left arm   Patient Position: Sitting   Cuff Size: Adult   Pulse: 63   Resp: 18   Temp: 97 °F (36.1 °C)   TempSrc: Temporal   SpO2: 97%   Weight: 76.3 kg (168 lb 5 oz)   Height: 177.8 cm (70\")     Body mass index is 24.15 kg/m².  Physical Exam  Vitals reviewed.   Constitutional:       General: He is not in acute distress.     Appearance: Normal appearance.   Cardiovascular:      Rate and Rhythm: Normal rate and regular rhythm.      Pulses:           Radial pulses are 2+ on the right side.        Dorsalis pedis pulses are 2+ on the right side.        Posterior tibial pulses are 2+ on the right side.      Heart sounds: Normal heart sounds.   Pulmonary:      Effort: Pulmonary effort is normal.      Breath sounds: Normal breath sounds.   Abdominal:      Palpations: Abdomen is soft.      Tenderness: There is no abdominal tenderness.   Musculoskeletal: "      Right lower leg: No edema.      Left lower leg: No edema.   Skin:     General: Skin is warm and dry.   Neurological:      Mental Status: He is alert.   Psychiatric:         Mood and Affect: Mood normal.         Behavior: Behavior is cooperative.              Result Review  Data Reviewed:{ Labs  Result Review  Imaging  Med Tab  Media :23}     Lab Results   Component Value Date    WBC 3.47 03/23/2021    HGB 9.8 (L) 03/23/2021    HCT 31.6 (L) 03/23/2021    MCV 90.0 03/23/2021     03/23/2021     Lab Results   Component Value Date    GLUCOSE 101 (H) 03/23/2021    CALCIUM 9.0 03/23/2021     03/23/2021    K 3.9 03/23/2021    CO2 23.0 03/23/2021     03/23/2021    BUN 12 03/23/2021    CREATININE 0.95 03/23/2021    EGFRIFAFRI 92 03/23/2021    EGFRIFNONA 64 10/21/2019    BCR 12.6 03/23/2021    ANIONGAP 11.0 03/23/2021     Heart logic index 7/14/2021: 26.  Appears to be trending down from July 1 in the 50s.  Assessment and Plan {CC Problem List  Visit Diagnosis  ROS  Review (Popup)  Health Maintenance  Quality  BestPractice  Medications  SmartSets  SnapShot Encounters  Media :23}   1. Chronic systolic congestive heart failure (CMS/HCC)  EF improved 50% 03/13/21    Improving Heartlogic index.  LCTA  Continue enstresto, aldactone, metoprolol succinate  Lasix 40 mg BID  - Comprehensive Metabolic Panel; Future    2. Essential hypertension  stable    3. Coronary artery disease involving native coronary artery of native heart without angina pectoris  No CP or pressure    4. Anemia, unspecified type    - CBC & Differential; Future  - Iron Profile; Future  - Vitamin B12; Future  - Folate; Future          Follow Up {Instructions Charge Capture  Follow-up Communications :23}   Return in about 6 weeks (around 8/19/2021) for HF, Office visit.    Patient was given instructions and counseling regarding his condition or for health maintenance advice. Please see specific information pulled into the  AVS if appropriate.  Patient was instructed to call the [facility] and [facility] Center with any questions, concerns, or worsening symptoms.    *Please note that portions of this note were completed with a voice recognition program. Efforts were made to edit the dictations, but occasionally words are mistranscribed.

## 2021-07-09 ENCOUNTER — TELEPHONE (OUTPATIENT)
Dept: CARDIOLOGY | Facility: HOSPITAL | Age: 81
End: 2021-07-09

## 2021-07-09 DIAGNOSIS — I50.22 CHRONIC SYSTOLIC CONGESTIVE HEART FAILURE (HCC): ICD-10-CM

## 2021-07-09 DIAGNOSIS — D64.9 ANEMIA, UNSPECIFIED TYPE: Primary | ICD-10-CM

## 2021-07-09 DIAGNOSIS — E87.6 HYPOKALEMIA: ICD-10-CM

## 2021-07-09 DIAGNOSIS — N17.9 AKI (ACUTE KIDNEY INJURY) (HCC): ICD-10-CM

## 2021-07-09 RX ORDER — ERGOCALCIFEROL 1.25 MG/1
50000 CAPSULE ORAL WEEKLY
COMMUNITY
End: 2022-02-17

## 2021-07-09 RX ORDER — FUROSEMIDE 40 MG/1
40 TABLET ORAL DAILY
Qty: 60 TABLET | Refills: 2 | Status: SHIPPED | OUTPATIENT
Start: 2021-07-09 | End: 2021-09-22

## 2021-07-09 RX ORDER — FERROUS SULFATE 325(65) MG
325 TABLET ORAL
Qty: 30 TABLET | Refills: 1 | Status: SHIPPED | OUTPATIENT
Start: 2021-07-09 | End: 2022-01-13 | Stop reason: SDUPTHER

## 2021-07-09 RX ORDER — POTASSIUM CHLORIDE 1500 MG/1
20 TABLET, FILM COATED, EXTENDED RELEASE ORAL DAILY
Qty: 30 TABLET | Refills: 0 | Status: SHIPPED | OUTPATIENT
Start: 2021-07-09 | End: 2021-07-21 | Stop reason: SDUPTHER

## 2021-07-09 NOTE — TELEPHONE ENCOUNTER
Reviewed patient labs results.  Creatine worsened.  1.33.  Potassium 2.9.  Continue spironolactone.  Decrease Lasix to 40 mg daily (previous dosing), start potassium chloride 20 mEq daily.    Hemoglobin improved, continued low iron.  Patient will start ferrous sulfate 325 mg daily .  Pt will f/u with PCP for continued management    BMP 1 week.

## 2021-07-14 RX ORDER — LEVETIRACETAM 750 MG/1
TABLET ORAL
Qty: 60 TABLET | Refills: 0 | Status: SHIPPED | OUTPATIENT
Start: 2021-07-14 | End: 2021-08-03 | Stop reason: SDUPTHER

## 2021-07-14 RX ORDER — METOPROLOL SUCCINATE 50 MG/1
TABLET, EXTENDED RELEASE ORAL
Qty: 30 TABLET | Refills: 0 | Status: SHIPPED | OUTPATIENT
Start: 2021-07-14 | End: 2021-08-12

## 2021-07-14 RX ORDER — FUROSEMIDE 20 MG/1
TABLET ORAL
Qty: 30 TABLET | Refills: 0 | OUTPATIENT
Start: 2021-07-14

## 2021-07-14 RX ORDER — SPIRONOLACTONE 25 MG/1
TABLET ORAL
Qty: 30 TABLET | Refills: 0 | Status: SHIPPED | OUTPATIENT
Start: 2021-07-14 | End: 2021-08-12

## 2021-07-16 ENCOUNTER — LAB (OUTPATIENT)
Dept: LAB | Facility: HOSPITAL | Age: 81
End: 2021-07-16

## 2021-07-16 DIAGNOSIS — I10 ESSENTIAL HYPERTENSION: Chronic | ICD-10-CM

## 2021-07-16 DIAGNOSIS — D69.3 CHRONIC IDIOPATHIC THROMBOCYTOPENIA (HCC): Chronic | ICD-10-CM

## 2021-07-16 LAB
ALBUMIN SERPL-MCNC: 3.9 G/DL (ref 3.5–5.2)
ALBUMIN/GLOB SERPL: 1.3 G/DL
ALP SERPL-CCNC: 60 U/L (ref 39–117)
ALT SERPL W P-5'-P-CCNC: 6 U/L (ref 1–41)
ANION GAP SERPL CALCULATED.3IONS-SCNC: 11.2 MMOL/L (ref 5–15)
AST SERPL-CCNC: 13 U/L (ref 1–40)
BILIRUB SERPL-MCNC: 0.4 MG/DL (ref 0–1.2)
BUN SERPL-MCNC: 13 MG/DL (ref 8–23)
BUN/CREAT SERPL: 11.2 (ref 7–25)
CALCIUM SPEC-SCNC: 9.1 MG/DL (ref 8.6–10.5)
CHLORIDE SERPL-SCNC: 107 MMOL/L (ref 98–107)
CO2 SERPL-SCNC: 23.8 MMOL/L (ref 22–29)
CREAT SERPL-MCNC: 1.16 MG/DL (ref 0.76–1.27)
GFR SERPL CREATININE-BSD FRML MDRD: 73 ML/MIN/1.73
GLOBULIN UR ELPH-MCNC: 2.9 GM/DL
GLUCOSE SERPL-MCNC: 107 MG/DL (ref 65–99)
POTASSIUM SERPL-SCNC: 3.3 MMOL/L (ref 3.5–5.2)
PROT SERPL-MCNC: 6.8 G/DL (ref 6–8.5)
SODIUM SERPL-SCNC: 142 MMOL/L (ref 136–145)

## 2021-07-16 PROCEDURE — 80053 COMPREHEN METABOLIC PANEL: CPT

## 2021-07-16 PROCEDURE — 36415 COLL VENOUS BLD VENIPUNCTURE: CPT

## 2021-07-21 ENCOUNTER — TELEPHONE (OUTPATIENT)
Dept: CARDIOLOGY | Facility: HOSPITAL | Age: 81
End: 2021-07-21

## 2021-07-21 DIAGNOSIS — E87.6 HYPOKALEMIA: ICD-10-CM

## 2021-07-21 DIAGNOSIS — I50.22 CHRONIC SYSTOLIC CONGESTIVE HEART FAILURE (HCC): Primary | ICD-10-CM

## 2021-07-21 RX ORDER — POTASSIUM CHLORIDE 1500 MG/1
20 TABLET, FILM COATED, EXTENDED RELEASE ORAL 2 TIMES DAILY
Qty: 60 TABLET | Refills: 3 | Status: SHIPPED | OUTPATIENT
Start: 2021-07-21 | End: 2021-08-05

## 2021-07-21 NOTE — TELEPHONE ENCOUNTER
On 7/16/2021 patient's potassium level had improved but still considered low.      Please confirm that patient was taking spironolactone 25mg 1 tablet daily and potassium chloride 20 mEq daily.    If he is taking his medications as prescribed, then increase potassium chloride to 20 mEq twice a day.  With repeat lab work in 2 weeks to recheck potassium level

## 2021-07-21 NOTE — TELEPHONE ENCOUNTER
Patient's wife notified of results. Patient is currently taking Potassium 20meq once daily and Spironolactone 2mg QD. Patient's wife advised to start Potassium BID and new prescription has been sent to the pharmacy. Patient's wife verbalized understanding.

## 2021-07-26 ENCOUNTER — OFFICE VISIT (OUTPATIENT)
Dept: FAMILY MEDICINE CLINIC | Facility: CLINIC | Age: 81
End: 2021-07-26

## 2021-07-26 VITALS
DIASTOLIC BLOOD PRESSURE: 60 MMHG | WEIGHT: 181 LBS | HEART RATE: 78 BPM | SYSTOLIC BLOOD PRESSURE: 132 MMHG | BODY MASS INDEX: 25.91 KG/M2 | HEIGHT: 70 IN | OXYGEN SATURATION: 98 %

## 2021-07-26 DIAGNOSIS — K21.9 GASTROESOPHAGEAL REFLUX DISEASE, UNSPECIFIED WHETHER ESOPHAGITIS PRESENT: ICD-10-CM

## 2021-07-26 DIAGNOSIS — Z00.00 MEDICARE ANNUAL WELLNESS VISIT, SUBSEQUENT: Primary | ICD-10-CM

## 2021-07-26 DIAGNOSIS — R60.9 SUBMANDIBULAR GLAND SWELLING: ICD-10-CM

## 2021-07-26 DIAGNOSIS — E87.6 HYPOKALEMIA: ICD-10-CM

## 2021-07-26 DIAGNOSIS — E78.5 HYPERLIPIDEMIA LDL GOAL <70: ICD-10-CM

## 2021-07-26 DIAGNOSIS — Z87.891 FORMER SMOKER: Chronic | ICD-10-CM

## 2021-07-26 DIAGNOSIS — R56.9 SEIZURE (HCC): Chronic | ICD-10-CM

## 2021-07-26 DIAGNOSIS — E11.65 TYPE 2 DIABETES MELLITUS WITH HYPERGLYCEMIA, WITHOUT LONG-TERM CURRENT USE OF INSULIN (HCC): Chronic | ICD-10-CM

## 2021-07-26 DIAGNOSIS — H91.93 BILATERAL HEARING LOSS, UNSPECIFIED HEARING LOSS TYPE: ICD-10-CM

## 2021-07-26 DIAGNOSIS — I25.119 CORONARY ARTERY DISEASE INVOLVING NATIVE CORONARY ARTERY OF NATIVE HEART WITH ANGINA PECTORIS (HCC): ICD-10-CM

## 2021-07-26 DIAGNOSIS — I10 ESSENTIAL HYPERTENSION: ICD-10-CM

## 2021-07-26 PROCEDURE — G0439 PPPS, SUBSEQ VISIT: HCPCS | Performed by: PHYSICIAN ASSISTANT

## 2021-07-26 RX ORDER — METFORMIN HYDROCHLORIDE 500 MG/1
500 TABLET, EXTENDED RELEASE ORAL
Qty: 90 TABLET | Refills: 1
Start: 2021-07-26 | End: 2022-01-13 | Stop reason: SDUPTHER

## 2021-07-26 RX ORDER — PANTOPRAZOLE SODIUM 40 MG/1
40 TABLET, DELAYED RELEASE ORAL 2 TIMES DAILY
Qty: 180 TABLET | Refills: 0 | Status: SHIPPED | OUTPATIENT
Start: 2021-07-26 | End: 2021-11-11

## 2021-07-26 RX ORDER — METOPROLOL SUCCINATE 50 MG/1
50 TABLET, EXTENDED RELEASE ORAL DAILY
Qty: 30 TABLET | Refills: 0 | Status: CANCELLED | OUTPATIENT
Start: 2021-07-26

## 2021-07-26 RX ORDER — POTASSIUM CHLORIDE 1500 MG/1
20 TABLET, FILM COATED, EXTENDED RELEASE ORAL 2 TIMES DAILY
Qty: 60 TABLET | Refills: 3 | Status: CANCELLED | OUTPATIENT
Start: 2021-07-26

## 2021-07-26 RX ORDER — METFORMIN HYDROCHLORIDE 500 MG/1
500 TABLET, EXTENDED RELEASE ORAL
Qty: 180 TABLET | Refills: 1 | Status: SHIPPED | OUTPATIENT
Start: 2021-07-26 | End: 2021-07-26 | Stop reason: SDUPTHER

## 2021-07-26 RX ORDER — SPIRONOLACTONE 25 MG/1
25 TABLET ORAL DAILY
Qty: 30 TABLET | Refills: 0 | Status: CANCELLED | OUTPATIENT
Start: 2021-07-26

## 2021-08-01 PROCEDURE — 93295 DEV INTERROG REMOTE 1/2/MLT: CPT | Performed by: INTERNAL MEDICINE

## 2021-08-01 PROCEDURE — 93296 REM INTERROG EVL PM/IDS: CPT | Performed by: INTERNAL MEDICINE

## 2021-08-03 RX ORDER — LEVETIRACETAM 750 MG/1
750 TABLET ORAL 2 TIMES DAILY
Qty: 180 TABLET | Refills: 1 | Status: SHIPPED | OUTPATIENT
Start: 2021-08-03 | End: 2022-02-09

## 2021-08-03 NOTE — PROGRESS NOTES
The ABCs of the Annual Wellness Visit  Subsequent Medicare Wellness Visit    Chief Complaint   Patient presents with   • Medicare Wellness-subsequent   • Med Refill       Subjective   History of Present Illness:  Narendra Cochran is a 80 y.o. male who presents for a Subsequent Medicare Wellness Visit.  Patient presents for management of diabetes type 2, hearing loss, hypertension, hyperlipidemia, CAD, hypokalemia, seizures and tobacco abuse.  Blood pressure is stable and well-controlled.  Patient is compliant on all medications.  Followed by cardiology.  Denies any seizures since starting keppra.  Has no complaints today.  Patient's wife is present at appointment and helps with communication, patient has significant hearing deficit and is not wearing hearing aids today.    HEALTH RISK ASSESSMENT    Recent Hospitalizations:  No hospitalization(s) within the last year.    Current Medical Providers:  Patient Care Team:  Marguerite Moore PA-C as PCP - General (Physician Assistant)  Som Boyd DO as Consulting Physician (Cardiology)  Javad Mercedes MD as Consulting Physician (Cardiology)  Thomas Lui MD as Consulting Physician (Hematology and Oncology)    Smoking Status:  Social History     Tobacco Use   Smoking Status Former Smoker   • Packs/day: 0.25   • Years: 65.00   • Pack years: 16.25   • Types: Cigars, Cigarettes   • Quit date: 2019   • Years since quittin.9   Smokeless Tobacco Never Used       Alcohol Consumption:  Social History     Substance and Sexual Activity   Alcohol Use Yes    Comment: very rarely       Depression Screen:   PHQ-2/PHQ-9 Depression Screening 2021   Little interest or pleasure in doing things 0   Feeling down, depressed, or hopeless 0   Total Score 0       Fall Risk Screen:  STEADI Fall Risk Assessment was completed, and patient is at LOW risk for falls.Assessment completed on:2021    Health Habits and Functional and Cognitive Screening:  Functional &  Cognitive Status 7/26/2021   Do you have difficulty preparing food and eating? No   Do you have difficulty bathing yourself, getting dressed or grooming yourself? No   Do you have difficulty using the toilet? No   Do you have difficulty moving around from place to place? No   Do you have trouble with steps or getting out of a bed or a chair? No   Current Diet Well Balanced Diet   Dental Exam Up to date   Eye Exam Up to date   Exercise (times per week) 3 times per week   Current Exercises Include Walking   Current Exercise Activities Include -   Do you need help using the phone?  No   Are you deaf or do you have serious difficulty hearing?  Yes   Do you need help with transportation? No   Do you need help shopping? No   Do you need help preparing meals?  No   Do you need help with housework?  No   Do you need help with laundry? No   Do you need help taking your medications? No   Do you need help managing money? No   Do you ever drive or ride in a car without wearing a seat belt? No   Have you felt unusual stress, anger or loneliness in the last month? No   Who do you live with? Spouse   If you need help, do you have trouble finding someone available to you? No   Have you been bothered in the last four weeks by sexual problems? No   Do you have difficulty concentrating, remembering or making decisions? Yes         Does the patient have evidence of cognitive impairment? No    Asprin use counseling:Taking ASA appropriately as indicated    Age-appropriate Screening Schedule:  Refer to the list below for future screening recommendations based on patient's age, sex and/or medical conditions. Orders for these recommended tests are listed in the plan section. The patient has been provided with a written plan.    Health Maintenance   Topic Date Due   • TDAP/TD VACCINES (1 - Tdap) Never done   • ZOSTER VACCINE (1 of 2) Never done   • DIABETIC EYE EXAM  Never done   • INFLUENZA VACCINE  10/01/2021   • HEMOGLOBIN A1C  11/18/2021    • LIPID PANEL  03/14/2022   • URINE MICROALBUMIN  05/18/2022          The following portions of the patient's history were reviewed and updated as appropriate: allergies, current medications, past family history, past medical history, past social history, past surgical history and problem list.    Outpatient Medications Prior to Visit   Medication Sig Dispense Refill   • aspirin 81 MG chewable tablet Chew 1 tablet Daily.     • Blood Pressure Monitoring (BLOOD PRESSURE MONITOR/L CUFF) misc 1 Units Daily. 1 each 0   • diclofenac (VOLTAREN) 1 % gel gel Apply 4 g topically to the appropriate area as directed 4 (Four) Times a Day As Needed (prn for pain). 60 tube 0   • ferrous sulfate (FerrouSul) 325 (65 FE) MG tablet Take 1 tablet by mouth Daily With Breakfast. 30 tablet 1   • furosemide (LASIX) 40 MG tablet Take 1 tablet by mouth Daily. 60 tablet 2   • glucose blood test strip Use to check blood glucose once a day. DX:E11.65 100 each 3   • glucose monitor monitoring kit 1 each Daily. DX: E11.65 1 each 0   • Lancets (OneTouch Delica Plus [id number]) misc USE TO CHECK BLOOD GLUCOSE ONCE DAILY 100 each 0   • metoprolol succinate XL (TOPROL-XL) 50 MG 24 hr tablet TAKE ONE TABLET BY MOUTH DAILY 30 tablet 0   • potassium chloride ER (K-TAB) 20 MEQ tablet controlled-release ER tablet Take 1 tablet by mouth 2 (Two) Times a Day. 60 tablet 3   • rosuvastatin (CRESTOR) 20 MG tablet Take 1 tablet by mouth Daily. 90 tablet 3   • sacubitril-valsartan (ENTRESTO) 24-26 MG tablet Take 1 tablet by mouth 2 (Two) Times a Day. 60 tablet 3   • spironolactone (ALDACTONE) 25 MG tablet TAKE ONE TABLET BY MOUTH DAILY 30 tablet 0   • vitamin D (ERGOCALCIFEROL) 1.25 MG (36769 UT) capsule capsule Take 50,000 Units by mouth 1 (One) Time Per Week.     • Empagliflozin 25 MG tablet Take 25 mg by mouth Daily. 30 tablet 5   • levETIRAcetam (KEPPRA) 750 MG tablet TAKE ONE TABLET BY MOUTH TWICE A DAY 60 tablet 0   • metFORMIN ER (GLUCOPHAGE-XR) 500 MG 24  hr tablet Take 500 mg by mouth Daily With Breakfast.     • pantoprazole (PROTONIX) 40 MG EC tablet Take 1 tablet by mouth 2 (two) times a day. 60 tablet 0     No facility-administered medications prior to visit.       Patient Active Problem List   Diagnosis   • Essential hypertension   • Hyperlipidemia LDL goal <70   • T2DM   • Paget disease of bone   • Tobacco abuse   • H/O seizures on Keppra   • Gonalgia   • Osteitis deformans   • Syncope   • NSVT (nonsustained ventricular tachycardia) (CMS/Formerly [facility])   • Coronary artery disease involving native coronary artery of native heart with angina pectoris (CMS/Formerly [facility])   • NICM    • OHCA   • Hypokalemia   • Hypomagnesemia   • VHD; mild-mod AR, mild MR   • Chronic systolic congestive heart failure (CMS/Formerly [facility])   • Former tobacco user   • GERD   • Marijuana use   • Frequent PVCs   • Acute blood loss anemia   • Presence of biventricular implantable cardioverter-defibrillator (ICD)       Advanced Care Planning:  ACP discussion was held with the patient during this visit. Patient does not have an advance directive, declines further assistance.    Review of Systems   Constitutional: Positive for fatigue. Negative for chills and fever.   HENT: Positive for hearing loss. Negative for congestion, ear pain, rhinorrhea, sinus pain and sore throat.    Eyes: Negative for visual disturbance.   Respiratory: Negative for cough, shortness of breath and wheezing.    Cardiovascular: Negative for chest pain, palpitations and leg swelling.   Gastrointestinal: Negative for abdominal pain, blood in stool, constipation, diarrhea, nausea and vomiting.   Endocrine: Negative for polydipsia.   Genitourinary: Negative for dysuria and flank pain.   Musculoskeletal: Positive for arthralgias, gait problem and myalgias.   Skin: Negative for rash.   Neurological: Negative for dizziness, seizures, weakness, numbness and headaches.   Hematological: Negative for adenopathy. Does not bruise/bleed easily.  "  Psychiatric/Behavioral: Negative for agitation, confusion, self-injury, sleep disturbance and suicidal ideas. The patient is not nervous/anxious.        Compared to one year ago, the patient feels his physical health is the same.  Compared to one year ago, the patient feels his mental health is the same.    Reviewed chart for potential of high risk medication in the elderly: yes  Reviewed chart for potential of harmful drug interactions in the elderly:yes    Objective         Vitals:    07/26/21 1524   BP: 132/60   Pulse: 78   SpO2: 98%   Weight: 82.1 kg (181 lb)   Height: 177.8 cm (70\")       Body mass index is 25.97 kg/m².  Discussed the patient's BMI with him. The BMI is in the acceptable range.    Physical Exam  Vitals reviewed.   Constitutional:       General: He is not in acute distress.     Appearance: Normal appearance. He is well-developed and normal weight. He is not ill-appearing or diaphoretic.   HENT:      Head: Normocephalic and atraumatic.      Right Ear: Tympanic membrane, ear canal and external ear normal. Decreased hearing noted.      Left Ear: Tympanic membrane, ear canal and external ear normal. Decreased hearing noted.      Nose: Nose normal.      Right Sinus: No maxillary sinus tenderness or frontal sinus tenderness.      Left Sinus: No maxillary sinus tenderness or frontal sinus tenderness.      Mouth/Throat:      Pharynx: Uvula midline.   Eyes:      General: Lids are normal.      Extraocular Movements: Extraocular movements intact.      Conjunctiva/sclera: Conjunctivae normal.   Neck:      Thyroid: No thyroid mass or thyromegaly.      Trachea: Trachea and phonation normal.     Cardiovascular:      Rate and Rhythm: Normal rate and regular rhythm.      Heart sounds: Normal heart sounds.   Pulmonary:      Effort: Pulmonary effort is normal.      Breath sounds: Normal breath sounds.   Abdominal:      General: Bowel sounds are normal. There is no distension.      Palpations: Abdomen is soft. " Abdomen is not rigid.      Tenderness: There is no abdominal tenderness. There is no guarding.   Musculoskeletal:         General: Normal range of motion.      Cervical back: Normal range of motion.      Right lower leg: No edema.      Left lower leg: No edema.   Lymphadenopathy:      Cervical: No cervical adenopathy.      Right cervical: No superficial cervical adenopathy.     Left cervical: No superficial cervical adenopathy.   Skin:     General: Skin is warm.      Findings: No erythema or rash.      Nails: There is no clubbing.   Neurological:      Mental Status: He is alert and oriented to person, place, and time.      Coordination: Coordination normal.      Gait: Gait normal.      Deep Tendon Reflexes: Reflexes are normal and symmetric.      Comments: CN grossly intact   Psychiatric:         Attention and Perception: Attention and perception normal. He is attentive.         Mood and Affect: Mood and affect normal.         Speech: Speech normal.         Behavior: Behavior normal. Behavior is cooperative.         Thought Content: Thought content normal.         Cognition and Memory: Cognition and memory normal.         Judgment: Judgment normal.         Lab Results   Component Value Date    HGBA1C 5.7 05/18/2021        Assessment/Plan   Medicare Risks and Personalized Health Plan  CMS Preventative Services Quick Reference  Advance Directive Discussion  Colon Cancer Screening  Hearing Problem  Immunizations Discussed/Encouraged (specific immunizations; COVID19 )  Obesity/Overweight     The above risks/problems have been discussed with the patient.  Pertinent information has been shared with the patient in the After Visit Summary.  Follow up plans and orders are seen below in the Assessment/Plan Section.    Diagnoses and all orders for this visit:    1. Medicare annual wellness visit, subsequent (Primary)    2. Bilateral hearing loss, unspecified hearing loss type  Has hearing aids, not wearing them.  Patient has  significant hearing loss and it is difficult to communicate.  Wife is present and helps facilitate it.    3. Type 2 diabetes mellitus with hyperglycemia, without long-term current use of insulin (CMS/formerly [facility])  Patient's recent hemoglobin AIC is 5.7%.  He denies any symptoms of hypoglycemia.  Not monitoring glucose regularly.  On Jardiance for heart benefit.  On metformin 500 mg BID.  Return in 3 months for DM follow-up.  -     empagliflozin (JARDIANCE) 25 MG tablet tablet; Take 1 tablet by mouth Daily.  Dispense: 30 tablet; Refill: 5  -     metFORMIN ER (GLUCOPHAGE-XR) 500 MG 24 hr tablet; Take 1 tablet by mouth Daily With Breakfast.  Dispense: 90 tablet; Refill: 1    4. Essential hypertension  Stable, well-controlled today.  Encouraged patient to monitor at home.  Followed by cardiology.  Reports he is compliant on all medications.    5. Hyperlipidemia LDL goal <70  On statin.    6. Coronary artery disease involving native coronary artery of native heart with angina pectoris (CMS/formerly [facility])  -     Lipid panel; Future    7. Hypokalemia  Recent potassium was 2.9.  Prescribed potassium 20 mEq twice daily.  Repeat BMP today.    8. Gastroesophageal reflux disease, unspecified whether esophagitis present  -     pantoprazole (PROTONIX) 40 MG EC tablet; Take 1 tablet by mouth 2 (two) times a day.  Dispense: 180 tablet; Refill: 0  Stable with current PPI.    9. Submandibular gland swelling  -     US Head Neck Soft Tissue; Future  Enlarged submandibular gland.  No tenderness.  History of smoking.  Will order ultrasound.    10. H/O seizures on Keppra  Denies any seizures since being on Keppra.    11. Former tobacco user          Follow Up:  Return in about 3 months (around 10/26/2021) for Recheck, DM.     An After Visit Summary and PPPS were given to the patient.

## 2021-08-04 DIAGNOSIS — E87.6 HYPOKALEMIA: ICD-10-CM

## 2021-08-05 RX ORDER — POTASSIUM CHLORIDE 1500 MG/1
TABLET, FILM COATED, EXTENDED RELEASE ORAL
Qty: 30 TABLET | Refills: 1 | Status: SHIPPED | OUTPATIENT
Start: 2021-08-05 | End: 2022-01-13 | Stop reason: SDUPTHER

## 2021-08-10 ENCOUNTER — TELEPHONE (OUTPATIENT)
Dept: CARDIOLOGY | Facility: HOSPITAL | Age: 81
End: 2021-08-10

## 2021-08-10 NOTE — TELEPHONE ENCOUNTER
Patient had a BMP and a cholesterol panel ordered on 7/21/2021 to be completed in 2 weeks.  I have not received those results.  Please discussed with patient.  Remind them that labs need to be fasting for the lipid panel.  We are rechecking his kidney function and potassium level as well.

## 2021-08-12 ENCOUNTER — LAB (OUTPATIENT)
Dept: LAB | Facility: HOSPITAL | Age: 81
End: 2021-08-12

## 2021-08-12 ENCOUNTER — HOSPITAL ENCOUNTER (OUTPATIENT)
Dept: ULTRASOUND IMAGING | Facility: HOSPITAL | Age: 81
Discharge: HOME | End: 2021-08-12

## 2021-08-12 DIAGNOSIS — I25.119 CORONARY ARTERY DISEASE INVOLVING NATIVE CORONARY ARTERY OF NATIVE HEART WITH ANGINA PECTORIS (HCC): ICD-10-CM

## 2021-08-12 DIAGNOSIS — R60.9 SUBMANDIBULAR GLAND SWELLING: ICD-10-CM

## 2021-08-12 DIAGNOSIS — I50.22 CHRONIC SYSTOLIC CONGESTIVE HEART FAILURE (HCC): ICD-10-CM

## 2021-08-12 DIAGNOSIS — E87.6 HYPOKALEMIA: ICD-10-CM

## 2021-08-12 PROCEDURE — 80048 BASIC METABOLIC PNL TOTAL CA: CPT

## 2021-08-12 PROCEDURE — 36415 COLL VENOUS BLD VENIPUNCTURE: CPT

## 2021-08-12 PROCEDURE — 76536 US EXAM OF HEAD AND NECK: CPT

## 2021-08-12 PROCEDURE — 80061 LIPID PANEL: CPT

## 2021-08-12 RX ORDER — METOPROLOL SUCCINATE 50 MG/1
TABLET, EXTENDED RELEASE ORAL
Qty: 30 TABLET | Refills: 6 | Status: SHIPPED | OUTPATIENT
Start: 2021-08-12 | End: 2022-01-13 | Stop reason: SDUPTHER

## 2021-08-12 RX ORDER — SPIRONOLACTONE 25 MG/1
25 TABLET ORAL DAILY
Qty: 30 TABLET | Refills: 6 | Status: SHIPPED | OUTPATIENT
Start: 2021-08-12 | End: 2022-01-13 | Stop reason: SDUPTHER

## 2021-08-12 NOTE — TELEPHONE ENCOUNTER
Rx Refill Note  Requested Prescriptions     Pending Prescriptions Disp Refills   • metoprolol succinate XL (TOPROL-XL) 50 MG 24 hr tablet [Pharmacy Med Name: METOPROLOL SUCC ER 50 MG TAB] 30 tablet 0     Sig: TAKE ONE TABLET BY MOUTH DAILY   • spironolactone (ALDACTONE) 25 MG tablet [Pharmacy Med Name: SPIRONOLACTONE 25 MG TABLET] 30 tablet 0     Sig: TAKE ONE TABLET BY MOUTH DAILY      Last office visit with prescribing clinician: 7/26/2021      Next office visit with prescribing clinician: 10/26/2021     Last lab 7/16/2021       Kristi Mckeon MA  08/12/21, 10:05 EDT

## 2021-08-12 NOTE — TELEPHONE ENCOUNTER
Patients wife advised this nurse that patient went and had his labs drawn today. Labs are being processed.

## 2021-08-13 ENCOUNTER — TELEPHONE (OUTPATIENT)
Dept: CARDIOLOGY | Facility: HOSPITAL | Age: 81
End: 2021-08-13

## 2021-08-13 LAB
ANION GAP SERPL CALCULATED.3IONS-SCNC: 8.8 MMOL/L (ref 5–15)
BUN SERPL-MCNC: 35 MG/DL (ref 8–23)
BUN/CREAT SERPL: 19.2 (ref 7–25)
CALCIUM SPEC-SCNC: 9.5 MG/DL (ref 8.6–10.5)
CHLORIDE SERPL-SCNC: 106 MMOL/L (ref 98–107)
CHOLEST SERPL-MCNC: 172 MG/DL (ref 0–200)
CO2 SERPL-SCNC: 24.2 MMOL/L (ref 22–29)
CREAT SERPL-MCNC: 1.82 MG/DL (ref 0.76–1.27)
GFR SERPL CREATININE-BSD FRML MDRD: 43 ML/MIN/1.73
GLUCOSE SERPL-MCNC: 99 MG/DL (ref 65–99)
HDLC SERPL-MCNC: 57 MG/DL (ref 40–60)
LDLC SERPL CALC-MCNC: 96 MG/DL (ref 0–100)
LDLC/HDLC SERPL: 1.64 {RATIO}
POTASSIUM SERPL-SCNC: 5.2 MMOL/L (ref 3.5–5.2)
SODIUM SERPL-SCNC: 139 MMOL/L (ref 136–145)
TRIGL SERPL-MCNC: 108 MG/DL (ref 0–150)
VLDLC SERPL-MCNC: 19 MG/DL (ref 5–40)

## 2021-08-13 NOTE — TELEPHONE ENCOUNTER
Called patient able to review labs    Creatine 1.8 with normal potassium  Hx of KCL    Stop lasix  Decrease KCL 20 mg daily    F/u next week with visit and lab.     Able to teach back instructions.

## 2021-08-18 ENCOUNTER — TELEPHONE (OUTPATIENT)
Dept: CARDIOLOGY | Facility: CLINIC | Age: 81
End: 2021-08-18

## 2021-08-18 NOTE — PROGRESS NOTES
"[facility]    Chief Complaint  Congestive Heart Failure and Follow-up    Subjective    History of Present Illness {CC  Problem List  Visit  Diagnosis   Encounters  Notes  Medications  Labs  Result Review Imaging  Media :23}     Narendra Cochran presents to [facility] for   History of Present Illness     80-year-old male with chronic heart failure (history of reduced EF, now improved to 50%), valvular heart disease, hypertension, seizure disorder, CAD, Paget's disease, ICD.    Patient with dyspnea.  Recent increase of his diuretics due to his heart logic index.  With increase of diuretics noted worsening kidney function.    Patient currently on spironolactone, enstresto, potassium supplement for hypokalemia.  Last week Lasix was held with continuing worsening creatinine of 1.8.    Follow-up today.    No CP, pressure.  Mild intermittent dyspnea with exertion.  NOt worsened with stopping lasix.  No edema or abdominal fullness.  No dizzness, near syncope, palpitations.        Objective     Vital Signs:   Vitals:    08/19/21 1045   BP: 110/59   BP Location: Left arm   Patient Position: Sitting   Cuff Size: Adult   Pulse: 78   Resp: 18   Temp: 96.9 °F (36.1 °C)   TempSrc: Temporal   SpO2: 98%   Weight: 82.8 kg (182 lb 8 oz)   Height: 177.8 cm (70\")     Body mass index is 26.19 kg/m².  Physical Exam  Vitals reviewed.   Constitutional:       General: He is not in acute distress.     Appearance: Normal appearance.   Cardiovascular:      Rate and Rhythm: Normal rate and regular rhythm.      Pulses:           Radial pulses are 2+ on the right side.        Dorsalis pedis pulses are 2+ on the right side.        Posterior tibial pulses are 2+ on the right side.      Heart sounds: Normal heart sounds.   Pulmonary:      Effort: Pulmonary effort is normal.      Breath sounds: Normal breath sounds.   Abdominal:      Palpations: Abdomen is " soft.      Tenderness: There is no abdominal tenderness.   Musculoskeletal:      Right lower leg: No edema.      Left lower leg: No edema.   Skin:     General: Skin is warm and dry.   Neurological:      Mental Status: He is alert.   Psychiatric:         Mood and Affect: Mood normal.         Behavior: Behavior is cooperative.              Result Review  Data Reviewed:{ Labs  Result Review  Imaging  Med Tab  Media :23}     Lab Results   Component Value Date    GLUCOSE 99 08/12/2021    CALCIUM 9.5 08/12/2021     08/12/2021    K 5.2 08/12/2021    CO2 24.2 08/12/2021     08/12/2021    BUN 35 (H) 08/12/2021    CREATININE 1.82 (H) 08/12/2021    EGFRIFAFRI 43 (L) 08/12/2021    EGFRIFNONA 64 10/21/2019    BCR 19.2 08/12/2021    ANIONGAP 8.8 08/12/2021     Heart logic log 6,  08/18/21.     Heart logic index 7/14/2021: 26.  Appears to be trending down from July 1 in the 50s  Assessment and Plan {CC Problem List  Visit Diagnosis  ROS  Review (Popup)  Health Maintenance  Quality  BestPractice  Medications  SmartSets  SnapShot Encounters  Media :23}   1. Chronic systolic congestive heart failure (CMS/HCC)  EF improved 50% 03/13/21  Improving Heartlogic index.  LCTA  Continue enstresto, aldactone, metoprolol succinate  2. Essential hypertension  Stable      3. Coronary artery disease involving native coronary artery of native heart without angina pectoris  No CP or pressure    4. KENNEDY (acute kidney injury) (CMS/HCC)  Bmp today.              Follow Up {Instructions Charge Capture  Follow-up Communications :23}   Return in about 6 months (around 2/19/2022), or if symptoms worsen or fail to improve, for Office visit, HF.    Patient was given instructions and counseling regarding his condition or for health maintenance advice. Please see specific information pulled into the AVS if appropriate.  Patient was instructed to call the [facility] with any questions, concerns, or worsening  symptoms.    *Please note that portions of this note were completed with a voice recognition program. Efforts were made to edit the dictations, but occasionally words are mistranscribed.

## 2021-08-19 ENCOUNTER — LAB (OUTPATIENT)
Dept: LAB | Facility: HOSPITAL | Age: 81
End: 2021-08-19

## 2021-08-19 ENCOUNTER — OFFICE VISIT (OUTPATIENT)
Dept: CARDIOLOGY | Facility: HOSPITAL | Age: 81
End: 2021-08-19

## 2021-08-19 VITALS
WEIGHT: 182.5 LBS | OXYGEN SATURATION: 98 % | SYSTOLIC BLOOD PRESSURE: 110 MMHG | HEART RATE: 78 BPM | RESPIRATION RATE: 18 BRPM | BODY MASS INDEX: 26.13 KG/M2 | TEMPERATURE: 96.9 F | DIASTOLIC BLOOD PRESSURE: 59 MMHG | HEIGHT: 70 IN

## 2021-08-19 DIAGNOSIS — N17.9 AKI (ACUTE KIDNEY INJURY) (HCC): ICD-10-CM

## 2021-08-19 DIAGNOSIS — I50.22 CHRONIC SYSTOLIC CONGESTIVE HEART FAILURE (HCC): Primary | ICD-10-CM

## 2021-08-19 DIAGNOSIS — I25.10 CORONARY ARTERY DISEASE INVOLVING NATIVE CORONARY ARTERY OF NATIVE HEART WITHOUT ANGINA PECTORIS: ICD-10-CM

## 2021-08-19 DIAGNOSIS — I10 ESSENTIAL HYPERTENSION: ICD-10-CM

## 2021-08-19 DIAGNOSIS — I50.22 CHRONIC SYSTOLIC CONGESTIVE HEART FAILURE (HCC): ICD-10-CM

## 2021-08-19 LAB
ANION GAP SERPL CALCULATED.3IONS-SCNC: 10 MMOL/L (ref 5–15)
BUN SERPL-MCNC: 24 MG/DL (ref 8–23)
BUN/CREAT SERPL: 16.4 (ref 7–25)
CALCIUM SPEC-SCNC: 9.5 MG/DL (ref 8.6–10.5)
CHLORIDE SERPL-SCNC: 108 MMOL/L (ref 98–107)
CO2 SERPL-SCNC: 18 MMOL/L (ref 22–29)
CREAT SERPL-MCNC: 1.46 MG/DL (ref 0.76–1.27)
GFR SERPL CREATININE-BSD FRML MDRD: 56 ML/MIN/1.73
GLUCOSE SERPL-MCNC: 103 MG/DL (ref 65–99)
POTASSIUM SERPL-SCNC: 4.7 MMOL/L (ref 3.5–5.2)
SODIUM SERPL-SCNC: 136 MMOL/L (ref 136–145)

## 2021-08-19 PROCEDURE — 99214 OFFICE O/P EST MOD 30 MIN: CPT | Performed by: NURSE PRACTITIONER

## 2021-08-19 PROCEDURE — 80048 BASIC METABOLIC PNL TOTAL CA: CPT

## 2021-08-19 PROCEDURE — 36415 COLL VENOUS BLD VENIPUNCTURE: CPT

## 2021-08-20 ENCOUNTER — TELEPHONE (OUTPATIENT)
Dept: CARDIOLOGY | Facility: HOSPITAL | Age: 81
End: 2021-08-20

## 2021-08-20 DIAGNOSIS — N17.9 AKI (ACUTE KIDNEY INJURY) (HCC): Primary | ICD-10-CM

## 2021-08-20 DIAGNOSIS — E87.6 HYPOKALEMIA: ICD-10-CM

## 2021-08-20 NOTE — TELEPHONE ENCOUNTER
Called and reviewed lab results.  Kidney function improved to 1.4, from 1.8.  Potassium level normal.    We will continue potassium supplement, spironolactone, Entresto.  Continue to use Lasix only as needed.    Repeat BMP in 4 weeks.  Order to be mailed to patient    Wife able to teach back instructions.  Patient hard of hearing and unable to discuss over the telephone.  Spoke to patient's wife as requested.

## 2021-08-20 NOTE — TELEPHONE ENCOUNTER
Please mail BMP order to patient.  They are aware you will be mailing and I have already given them instructions.

## 2021-08-27 ENCOUNTER — TRANSCRIBE ORDERS (OUTPATIENT)
Dept: ADMINISTRATIVE | Facility: HOSPITAL | Age: 81
End: 2021-08-27

## 2021-08-27 DIAGNOSIS — M84.375A STRESS FRACTURE OF LEFT FOOT, INITIAL ENCOUNTER: Primary | ICD-10-CM

## 2021-09-20 ENCOUNTER — TELEPHONE (OUTPATIENT)
Dept: CARDIOLOGY | Facility: CLINIC | Age: 81
End: 2021-09-20

## 2021-09-20 NOTE — TELEPHONE ENCOUNTER
Per [facility] Latitude transmission received this AM:  HeartLogic Index elevated @ 29.     I spoke w/pt’s spouse who reports pt hasn’t been exhibiting shortness of breath or lower extremity edema. Also denies increased salt intake (no deli meat, fast food or extra salting of food).   Reviewed cardiac meds, pt compliant w/all meds. Spouse reports that pt is not taking Lasix, that Lasix had been discontinued.

## 2021-09-21 NOTE — TELEPHONE ENCOUNTER
Attempted to call patient to discuss POC with increase in Heart logic.    Unable to reach or leave message.    Please call patient (call patients wife, patient is [place]).      Have him take Lasix 40 mg daily for 3 day then go back to only as needed for 3-5 lb weight gain, edema, or worsening dyspnea.

## 2021-09-22 DIAGNOSIS — I50.22 CHRONIC SYSTOLIC CONGESTIVE HEART FAILURE (HCC): ICD-10-CM

## 2021-09-22 RX ORDER — FUROSEMIDE 40 MG/1
40 TABLET ORAL AS NEEDED
Qty: 90 TABLET | Refills: 3 | Status: SHIPPED | OUTPATIENT
Start: 2021-09-22 | End: 2021-11-02 | Stop reason: SDUPTHER

## 2021-09-28 ENCOUNTER — LAB (OUTPATIENT)
Dept: LAB | Facility: HOSPITAL | Age: 81
End: 2021-09-28

## 2021-09-28 ENCOUNTER — TELEPHONE (OUTPATIENT)
Dept: CARDIOLOGY | Facility: HOSPITAL | Age: 81
End: 2021-09-28

## 2021-09-28 DIAGNOSIS — N17.9 AKI (ACUTE KIDNEY INJURY) (HCC): ICD-10-CM

## 2021-09-28 DIAGNOSIS — E87.6 HYPOKALEMIA: ICD-10-CM

## 2021-09-28 PROCEDURE — 80048 BASIC METABOLIC PNL TOTAL CA: CPT

## 2021-09-28 PROCEDURE — 36415 COLL VENOUS BLD VENIPUNCTURE: CPT

## 2021-09-28 NOTE — TELEPHONE ENCOUNTER
Order placed on 8/20/2021 for a BMP to be completed in 4 weeks.  Have not received results.  Please follow-up with patient.

## 2021-09-28 NOTE — TELEPHONE ENCOUNTER
Spoke to patient's wife and patient has not had labs done yet but plans to go today to the diagnositic center and get labs drawn.

## 2021-09-29 LAB
ANION GAP SERPL CALCULATED.3IONS-SCNC: 11.4 MMOL/L (ref 5–15)
BUN SERPL-MCNC: 19 MG/DL (ref 8–23)
BUN/CREAT SERPL: 15.2 (ref 7–25)
CALCIUM SPEC-SCNC: 9.4 MG/DL (ref 8.6–10.5)
CHLORIDE SERPL-SCNC: 107 MMOL/L (ref 98–107)
CO2 SERPL-SCNC: 22.6 MMOL/L (ref 22–29)
CREAT SERPL-MCNC: 1.25 MG/DL (ref 0.76–1.27)
GFR SERPL CREATININE-BSD FRML MDRD: 67 ML/MIN/1.73
GLUCOSE SERPL-MCNC: 97 MG/DL (ref 65–99)
POTASSIUM SERPL-SCNC: 4.5 MMOL/L (ref 3.5–5.2)
SODIUM SERPL-SCNC: 141 MMOL/L (ref 136–145)

## 2021-10-05 ENCOUNTER — HOSPITAL ENCOUNTER (OUTPATIENT)
Dept: MRI IMAGING | Facility: HOSPITAL | Age: 81
Discharge: HOME | End: 2021-10-05
Admitting: PODIATRIST

## 2021-10-05 VITALS — HEART RATE: 77 BPM | OXYGEN SATURATION: 98 % | RESPIRATION RATE: 20 BRPM

## 2021-10-05 DIAGNOSIS — M84.375A STRESS FRACTURE OF LEFT FOOT, INITIAL ENCOUNTER: ICD-10-CM

## 2021-10-05 PROCEDURE — 73718 MRI LOWER EXTREMITY W/O DYE: CPT

## 2021-10-07 ENCOUNTER — TELEPHONE (OUTPATIENT)
Dept: FAMILY MEDICINE CLINIC | Facility: CLINIC | Age: 81
End: 2021-10-07

## 2021-10-07 NOTE — TELEPHONE ENCOUNTER
Caller: Jessica Cochran    Relationship to patient: Emergency Contact    Best call back number: [phone]    Patient is needing: JESSICA STATED THAT PATIENT IS OUT OF empagliflozin (JARDIANCE) 25 MG tablet tablet MEDICATION AND WOULD LIKE TO SEE ABOUT GETTING SAMPLES BECAUSE THE PRESCRIPTION COST TOO MUCH TO GET RIGHT NOW    PLEASE ADVISE

## 2021-10-26 ENCOUNTER — OFFICE VISIT (OUTPATIENT)
Dept: FAMILY MEDICINE CLINIC | Facility: CLINIC | Age: 81
End: 2021-10-26

## 2021-10-26 VITALS
TEMPERATURE: 97.3 F | HEIGHT: 70 IN | HEART RATE: 57 BPM | WEIGHT: 198.6 LBS | BODY MASS INDEX: 28.43 KG/M2 | DIASTOLIC BLOOD PRESSURE: 65 MMHG | OXYGEN SATURATION: 99 % | SYSTOLIC BLOOD PRESSURE: 115 MMHG

## 2021-10-26 DIAGNOSIS — I10 ESSENTIAL HYPERTENSION: ICD-10-CM

## 2021-10-26 DIAGNOSIS — E11.8 TYPE 2 DIABETES MELLITUS WITH COMPLICATION, WITHOUT LONG-TERM CURRENT USE OF INSULIN (HCC): Primary | ICD-10-CM

## 2021-10-26 DIAGNOSIS — Z23 FLU VACCINE NEED: ICD-10-CM

## 2021-10-26 LAB
EXPIRATION DATE: NORMAL
HBA1C MFR BLD: 5.7 %
Lab: NORMAL

## 2021-10-26 PROCEDURE — 90662 IIV NO PRSV INCREASED AG IM: CPT | Performed by: PHYSICIAN ASSISTANT

## 2021-10-26 PROCEDURE — 3044F HG A1C LEVEL LT 7.0%: CPT | Performed by: PHYSICIAN ASSISTANT

## 2021-10-26 PROCEDURE — 99214 OFFICE O/P EST MOD 30 MIN: CPT | Performed by: PHYSICIAN ASSISTANT

## 2021-10-26 PROCEDURE — G0008 ADMIN INFLUENZA VIRUS VAC: HCPCS | Performed by: PHYSICIAN ASSISTANT

## 2021-10-26 PROCEDURE — 83036 HEMOGLOBIN GLYCOSYLATED A1C: CPT | Performed by: PHYSICIAN ASSISTANT

## 2021-10-26 NOTE — PROGRESS NOTES
Chief Complaint   Patient presents with   • Diabetes   • Hypertension       HPI     Narendra Cochran is a pleasant 80 y.o. male who is here for routine follow-up of hypertension diabetes type 2.  Patient's blood pressure is stable and well controlled.  He has an upcoming appointment with cardiology.  He is compliant on all medications.  His last hemoglobin A1c was 5.7%.  He is compliant on Jardiance 25 mg daily and Metformin 500 mg nightly.  He denies any symptoms of hypoglycemia.  His wife is present today.  They report that his Jardiance is expensive.    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   • Paget disease of bone        Past Surgical History:   Procedure Laterality Date   • APPENDECTOMY     • CARDIAC CATHETERIZATION N/A 3/18/2020    Procedure: Left Heart Cath;  Surgeon: Sonya Garibay MD;  Location:  [facility];  Service: Cardiology;  Laterality: N/A;  First availabel provider     • CARDIAC CATHETERIZATION N/A 3/15/2021    Procedure: LEFT HEART CATH;  Surgeon: Doc Sahni IV, MD;  Location:  [facility];  Service: Cardiovascular;  Laterality: N/A;   • CARDIAC CATHETERIZATION N/A 3/15/2021    Procedure: Coronary angiography;  Surgeon: Doc Sahni IV, MD;  Location:  [facility];  Service: Cardiovascular;  Laterality: N/A;   • CARDIAC ELECTROPHYSIOLOGY PROCEDURE N/A 3/16/2021    Procedure: ICD DC new;  Surgeon: Som Boyd DO;  Location:  [facility];  Service: Cardiology;  Laterality: N/A;   • COLON RESECTION      second to diverticulitis    • FOOT SURGERY Left        Family History   Problem Relation Age of  "Onset   • No Known Problems Daughter    • No Known Problems Son    • No Known Problems Son    • No Known Problems Son    • Diabetes Sister    • Clotting disorder Sister    • No Known Problems Mother    • No Known Problems Sister    • No Known Problems Brother    • Fainting Brother        Social History     Socioeconomic History   • Marital status:    Tobacco Use   • Smoking status: Former Smoker     Packs/day: 0.25     Years: 65.00     Pack years: 16.25     Types: Cigars, Cigarettes     Quit date: 2019     Years since quittin.1   • Smokeless tobacco: Never Used   Vaping Use   • Vaping Use: Never used   Substance and Sexual Activity   • Alcohol use: Yes     Comment: very rarely   • Drug use: Yes     Types: Marijuana     Comment: Pt states used weekly but now quitting    • Sexual activity: Defer       No Known Allergies    ROS  Review of Systems   Constitutional: Negative for chills and fever.   Eyes: Negative for visual disturbance.   Respiratory: Negative for cough, shortness of breath and wheezing.    Cardiovascular: Negative for chest pain, palpitations and leg swelling.   Endocrine: Negative for polydipsia, polyphagia and polyuria.   Neurological: Negative for dizziness and headache.       Vitals:    10/26/21 1043   BP: 115/65   Pulse: 57   Temp: 97.3 °F (36.3 °C)   SpO2: 99%   Weight: 90.1 kg (198 lb 9.6 oz)   Height: 177.8 cm (70\")     Body mass index is 28.5 kg/m².    Current Outpatient Medications on File Prior to Visit   Medication Sig Dispense Refill   • aspirin 81 MG chewable tablet Chew 1 tablet Daily.     • Blood Pressure Monitoring (BLOOD PRESSURE MONITOR/L CUFF) misc 1 Units Daily. 1 each 0   • diclofenac (VOLTAREN) 1 % gel gel Apply 4 g topically to the appropriate area as directed 4 (Four) Times a Day As Needed (prn for pain). 60 tube 0   • ferrous sulfate (FerrouSul) 325 (65 FE) MG tablet Take 1 tablet by mouth Daily With Breakfast. 30 tablet 1   • furosemide (LASIX) 40 MG tablet Take " 1 tablet by mouth As Needed (for worsening dyspnea, edema, weight gain 3lbs or as directed by provider.). 90 tablet 3   • glucose blood test strip Use to check blood glucose once a day. DX:E11.65 100 each 3   • glucose monitor monitoring kit 1 each Daily. DX: E11.65 1 each 0   • Lancets (OneTouch Delica Plus [id number]) misc USE TO CHECK BLOOD GLUCOSE ONCE DAILY 100 each 0   • levETIRAcetam (KEPPRA) 750 MG tablet Take 1 tablet by mouth 2 (Two) Times a Day. 180 tablet 1   • metFORMIN ER (GLUCOPHAGE-XR) 500 MG 24 hr tablet Take 1 tablet by mouth Daily With Breakfast. 90 tablet 1   • metoprolol succinate XL (TOPROL-XL) 50 MG 24 hr tablet TAKE ONE TABLET BY MOUTH DAILY 30 tablet 6   • pantoprazole (PROTONIX) 40 MG EC tablet Take 1 tablet by mouth 2 (two) times a day. 180 tablet 0   • potassium chloride ER (K-TAB) 20 MEQ tablet controlled-release ER tablet TAKE ONE TABLET BY MOUTH DAILY 30 tablet 1   • rosuvastatin (CRESTOR) 20 MG tablet Take 1 tablet by mouth Daily. 90 tablet 3   • sacubitril-valsartan (ENTRESTO) 24-26 MG tablet Take 1 tablet by mouth 2 (Two) Times a Day. 60 tablet 3   • spironolactone (ALDACTONE) 25 MG tablet Take 1 tablet by mouth Daily. 30 tablet 6   • vitamin D (ERGOCALCIFEROL) 1.25 MG (43308 UT) capsule capsule Take 50,000 Units by mouth 1 (One) Time Per Week.     • [DISCONTINUED] empagliflozin (JARDIANCE) 25 MG tablet tablet Take 1 tablet by mouth Daily. 30 tablet 5     No current facility-administered medications on file prior to visit.       Results for orders placed or performed in visit on 10/26/21   POC Glycosylated Hemoglobin (Hb A1C)    Specimen: Blood   Result Value Ref Range    Hemoglobin A1C 5.7 %    Lot Number [id number]     Expiration Date 5/19/2023        PE    Physical Exam  Vitals reviewed.   Constitutional:       General: He is not in acute distress.     Appearance: Normal appearance. He is well-developed and normal weight. He is not ill-appearing or diaphoretic.   HENT:      Head:  Normocephalic and atraumatic.   Eyes:      Extraocular Movements: Extraocular movements intact.      Conjunctiva/sclera: Conjunctivae normal.   Cardiovascular:      Rate and Rhythm: Normal rate and regular rhythm.      Heart sounds: Normal heart sounds.   Pulmonary:      Effort: Pulmonary effort is normal.      Breath sounds: Normal breath sounds.   Musculoskeletal:         General: Normal range of motion.      Cervical back: Normal range of motion.      Right lower leg: No edema.      Left lower leg: No edema.   Skin:     General: Skin is warm.      Findings: No erythema or rash.   Neurological:      General: No focal deficit present.      Mental Status: He is alert.   Psychiatric:         Attention and Perception: He is attentive.         Mood and Affect: Mood normal.         Speech: Speech normal.         Behavior: Behavior normal. Behavior is cooperative.         Thought Content: Thought content normal.         Judgment: Judgment normal.         A/P    Diagnoses and all orders for this visit:    1. Type 2 diabetes mellitus with complication, without long-term current use of insulin (HCC) (Primary)  -     POC Glycosylated Hemoglobin (Hb A1C)  -     Ambulatory Referral for Diabetic Eye Exam-Ophthalmology  Previous hemoglobin A1c was 5.7%, hemoglobin A1c today is 5.7%.  We will discontinue Jardiance due to cost.  Continue Metformin 500 mg nightly with dinner.  Return in 4 months for repeat labs.  We will start referral for an eye exam given patient's diabetes.  He has not been seen by an eye doctor in a long time.    2. Essential hypertension  Stable, well-controlled.  Compliant on medication.  Has upcoming appointment with cardiology.    3. Flu vaccine need  -     Fluzone High-Dose 65+yrs ([id number])         Plan of care reviewed with patient at the conclusion of today's visit. Education was provided regarding diagnosis, management and any prescribed or recommended OTC medications.  Patient verbalizes  understanding of and agreement with management plan.    Return in about 4 months (around 2/26/2022) for Recheck, 4 month follow-up.     Marguerite Moore PA-C

## 2021-10-31 PROCEDURE — 93296 REM INTERROG EVL PM/IDS: CPT | Performed by: INTERNAL MEDICINE

## 2021-10-31 PROCEDURE — 93295 DEV INTERROG REMOTE 1/2/MLT: CPT | Performed by: INTERNAL MEDICINE

## 2021-11-02 ENCOUNTER — OFFICE VISIT (OUTPATIENT)
Dept: CARDIOLOGY | Facility: CLINIC | Age: 81
End: 2021-11-02

## 2021-11-02 VITALS
DIASTOLIC BLOOD PRESSURE: 46 MMHG | HEIGHT: 70 IN | WEIGHT: 194 LBS | OXYGEN SATURATION: 92 % | BODY MASS INDEX: 27.77 KG/M2 | HEART RATE: 51 BPM | SYSTOLIC BLOOD PRESSURE: 114 MMHG

## 2021-11-02 DIAGNOSIS — I50.22 CHRONIC SYSTOLIC CONGESTIVE HEART FAILURE (HCC): ICD-10-CM

## 2021-11-02 PROCEDURE — 99214 OFFICE O/P EST MOD 30 MIN: CPT | Performed by: INTERNAL MEDICINE

## 2021-11-02 PROCEDURE — 93283 PRGRMG EVAL IMPLANTABLE DFB: CPT | Performed by: INTERNAL MEDICINE

## 2021-11-02 RX ORDER — FUROSEMIDE 20 MG/1
20 TABLET ORAL DAILY
Qty: 30 TABLET | Refills: 6 | Status: SHIPPED | OUTPATIENT
Start: 2021-11-02 | End: 2021-11-10 | Stop reason: SDUPTHER

## 2021-11-02 NOTE — PROGRESS NOTES
[facility] Cardiology  Follow Up Visit  Narendra Cochran  1940    VISIT DATE:  11/02/21    PCP:   Marguerite Moore PA-C  [id number] [facility] [address]          CC:  JAI      Problem List:  1. Nonischemic cardiomyopathy / systolic congestive heart failure / Cardiac Arrest   1. Echo 11/13/2019 EF 60%, mild LVH, mild MR, mild to moderate AR, LA 4.6 cm  2. C March 2020 with mild nonobstructive CAD, EF 36-40%  3. Witnessed out of hospital cardiac arrest 3/12/2021 with documented bystander CPR with a single shock from AED with transfer to [facility] per EMS.  4. Echo 3/13/2021 EF 50%, no significant VHD  5. [facility] 3/14/2021 per Dr. Sahni with documented severe 1-vessel CAD involving a small posterior lateral branch of the of the RCA with no interval change to prior angiography  6. [facility] [place] 3/16/2021 for secondary prevention of SCD  2. Premature ventricular contractions  1. Echo 11/13/2019 EF 60%, mild LVH, mild MR, mild to moderate AR, LA 4.6 cm  2. Stress test February 2020 PVCs at both rest and stress, no perfusion evidence of ischemia, ejection fraction 31% with dilated LV cavity  3. [facility] March 2020 with mild nonobstructive CAD, EF 36-40%  4. Holter monitor February 2020:  PVCs burden 12.3% with some of the counted PVCs appear to be PACs with aberrancy. One 4 beat run of nonsustained VT versus SVT with aberrancy  5. Amiodarone therapy initiated at 200 mg daily for PVCs April 2020  6. Amiodarone discontinued December 2020  7. [employer] dual-chamber pacemaker/ICD implanted March 2021 after cardiac arrest  3. Sinus node dysfunction  1. 24-hour Holter 7/15/2020 HR 43-79, average 55 bpm, PVC burden 10.2%.  2. EP consultation August 2020 with patient felt to be asymptomatic with recommendation for continued monitoring  4. First-degree AV block  5. Essential hypertension  6. Dyslipidemia  7. Diabetes mellitus  8. COPD  9. Seizures  10. Paget's disease  11. Hearing  loss        History of Present Illness:  Narendra Cochran  Is a 81 y.o. male with pertinent cardiac history detailed above.  At EP visit in the summer the patient appeared to have a heart failure exacerbation and via his device his heart logic index was increased.  He saw heart valve clinic with attempts to increase diuresis although this caused some worsening creatinine.  His Lasix is currently as needed.  Patient and his wife stated they have mostly been going off of cues from clinic when his heart logic index on hisI CD monitoring would increase.  Currently he does not note significant increase in lower extremity edema.  He does have some dyspnea in the evenings which seems about stable.  Blood pressure is normal today.  There have been no ventricular arrhythmias on his ICD    Patient Active Problem List    Diagnosis Date Noted   • Presence of biventricular implantable cardioverter-defibrillator (ICD) 06/21/2021   • Acute blood loss anemia 03/13/2021   • OHCA 03/12/2021     Note Last Updated: 3/15/2021     · Out of hospital cardiac arrest with shockable rhythm, 3/12/2021  · History of frequent PVCs not improved with amiodarone, 2020  · Echo (3/13/2021): LVEF 50%.  Moderate MR.  Moderate aortic insufficiency.  · Cardiac catheterization (3/15/2021): Severe 1-vessel CAD (small RPL).     • Hypokalemia 03/12/2021   • Hypomagnesemia 03/12/2021   • VHD; mild-mod AR, mild MR 03/12/2021     Note Last Updated: 3/13/2021     · Echo (3/13/2021): LVEF 50%.  Moderate MR.  Moderate aortic insufficiency.     • Chronic systolic congestive heart failure (HCC) 03/12/2021     Note Last Updated: 3/13/2021     · Cardiac catheterization (3/18/2020): 1 vessel CAD involving small posterior lateral branch of the RCA.  LVEF 35%  · Echo (3/13/2021): LVEF 50%.  Moderate MR.  Moderate aortic insufficiency.     • Former tobacco user 03/12/2021   • GERD 03/12/2021   • Marijuana use 03/12/2021   • Frequent PVCs 03/12/2021     Note Last Updated:  3/13/2021     · Holter monitor 2020:  PVCs burden 12.3% with some of the counted PVCs appear to be PACs with aberrancy. One 4 beat run of nonsustained VT versus SVT with aberrancy.  · Amiodarone therapy initiated at 200 mg daily for PVCs.  · 24-hour Holter 7/15/2020 HR 43-79, average 55 bpm, PVC burden 10.2%.     • NSVT (nonsustained ventricular tachycardia) ([facility]) 2020     Note Last Updated: 3/18/2020     1. 48 Hour Holter 2020:   · PVCs present with burden of 12.3%  · Some of the counted PVCs appear to be PACs with aberrancy  · There was a 4 beat run of nonsustained VT versus SVT with aberrancy     • Coronary artery disease involving native coronary artery of native heart with angina pectoris ([facility]) 2020     Note Last Updated: 3/15/2021     · Cardiac catheterization (3/18/2020): 1 vessel CAD involving small posterior lateral branch of the RCA.  · Cardiac catheterization for OHCA (3/15/2021): Severe 1-vessel CAD involving small posterior lateral branch of the RCA.     • NICM  2020     Note Last Updated: 3/13/2021     · Cardiac catheterization (3-18-20): Severe one-vessel CAD involving small R PL disease.     • Syncope 2019   • Gonalgia 2019   • Osteitis deformans 2019   • H/O seizures on Keppra 2019   • Essential hypertension 2017   • Hyperlipidemia LDL goal <70 2017   • T2DM 2017   • Paget disease of bone 2017     Note Last Updated: 3/14/2021     · History of Paget's disease, treated with Reclast.  Last saw Dr. Lui .     • Tobacco abuse 2017       No Known Allergies    Social History     Socioeconomic History   • Marital status:    Tobacco Use   • Smoking status: Former Smoker     Packs/day: 0.25     Years: 65.00     Pack years: 16.25     Types: Cigars, Cigarettes     Quit date: 2019     Years since quittin.1   • Smokeless tobacco: Never Used   Vaping Use   • Vaping Use: Never used   Substance and Sexual Activity    • Alcohol use: Yes     Comment: very rarely   • Drug use: Yes     Types: Marijuana     Comment: Pt states used weekly but now quitting    • Sexual activity: Defer       Family History   Problem Relation Age of Onset   • No Known Problems Daughter    • No Known Problems Son    • No Known Problems Son    • No Known Problems Son    • Diabetes Sister    • Clotting disorder Sister    • No Known Problems Mother    • No Known Problems Sister    • No Known Problems Brother    • Fainting Brother        Current Medications:    Current Outpatient Medications:   •  aspirin 81 MG chewable tablet, Chew 1 tablet Daily., Disp: , Rfl:   •  Blood Pressure Monitoring (BLOOD PRESSURE MONITOR/L CUFF) misc, 1 Units Daily., Disp: 1 each, Rfl: 0  •  diclofenac (VOLTAREN) 1 % gel gel, Apply 4 g topically to the appropriate area as directed 4 (Four) Times a Day As Needed (prn for pain)., Disp: 60 tube, Rfl: 0  •  furosemide (LASIX) 40 MG tablet, Take 1 tablet by mouth As Needed (for worsening dyspnea, edema, weight gain 3lbs or as directed by provider.)., Disp: 90 tablet, Rfl: 3  •  glucose blood test strip, Use to check blood glucose once a day. DX:E11.65, Disp: 100 each, Rfl: 3  •  glucose monitor monitoring kit, 1 each Daily. DX: E11.65, Disp: 1 each, Rfl: 0  •  Lancets (OneTouch Delica Plus [id number]) misc, USE TO CHECK BLOOD GLUCOSE ONCE DAILY, Disp: 100 each, Rfl: 0  •  levETIRAcetam (KEPPRA) 750 MG tablet, Take 1 tablet by mouth 2 (Two) Times a Day., Disp: 180 tablet, Rfl: 1  •  metFORMIN ER (GLUCOPHAGE-XR) 500 MG 24 hr tablet, Take 1 tablet by mouth Daily With Breakfast., Disp: 90 tablet, Rfl: 1  •  metoprolol succinate XL (TOPROL-XL) 50 MG 24 hr tablet, TAKE ONE TABLET BY MOUTH DAILY, Disp: 30 tablet, Rfl: 6  •  pantoprazole (PROTONIX) 40 MG EC tablet, Take 1 tablet by mouth 2 (two) times a day., Disp: 180 tablet, Rfl: 0  •  potassium chloride ER (K-TAB) 20 MEQ tablet controlled-release ER tablet, TAKE ONE TABLET BY MOUTH DAILY,  "Disp: 30 tablet, Rfl: 1  •  rosuvastatin (CRESTOR) 20 MG tablet, Take 1 tablet by mouth Daily., Disp: 90 tablet, Rfl: 3  •  sacubitril-valsartan (ENTRESTO) 24-26 MG tablet, Take 1 tablet by mouth 2 (Two) Times a Day., Disp: 60 tablet, Rfl: 3  •  spironolactone (ALDACTONE) 25 MG tablet, Take 1 tablet by mouth Daily., Disp: 30 tablet, Rfl: 6  •  ferrous sulfate (FerrouSul) 325 (65 FE) MG tablet, Take 1 tablet by mouth Daily With Breakfast., Disp: 30 tablet, Rfl: 1  •  vitamin D (ERGOCALCIFEROL) 1.25 MG (50239 UT) capsule capsule, Take 50,000 Units by mouth 1 (One) Time Per Week., Disp: , Rfl:      Review of Systems   Constitutional: Positive for malaise/fatigue.   Cardiovascular: Positive for dyspnea on exertion, leg swelling and orthopnea. Negative for chest pain.   Respiratory: Negative for shortness of breath.        Vitals:    11/02/21 1450   BP: 114/46   BP Location: Right arm   Patient Position: Sitting   Cuff Size: Adult   Pulse: 51   SpO2: 92%   Weight: 88 kg (194 lb)   Height: 177.8 cm (70\")       Physical Exam  Constitutional:       Appearance: Normal appearance.   Neck:      Comments: Elevation in JVP noted  Cardiovascular:      Rate and Rhythm: Normal rate.      Heart sounds: No murmur heard.       Comments: Frequent ectopy  Pulmonary:      Breath sounds: No rales.   Abdominal:      Palpations: Abdomen is soft.   Musculoskeletal:      Right lower leg: Edema present.      Left lower leg: Edema present.      Comments: Mild lower extremity bilaterally   Skin:     General: Skin is warm and dry.   Neurological:      General: No focal deficit present.      Mental Status: He is alert.         Diagnostic Data:  Procedures  Lab Results   Component Value Date    TRIG 108 08/12/2021    HDL 57 08/12/2021     Lab Results   Component Value Date    GLUCOSE 97 09/28/2021    BUN 19 09/28/2021    CREATININE 1.25 09/28/2021     09/28/2021    K 4.5 09/28/2021     09/28/2021    CO2 22.6 09/28/2021     Lab Results "   Component Value Date    HGBA1C 5.7 10/26/2021     Lab Results   Component Value Date    WBC 2.88 (L) 07/08/2021    HGB 11.1 (L) 07/08/2021    HCT 37.6 07/08/2021     (L) 07/08/2021     [employer] dual-chamber ICD model   Mode DDDR rate 60  Right atrial lead: 69% paced, P wave amplitude 2.1 mV, threshold 0.6 V, at 0.5 ms, impedance 689 ohms  RV lead 12% paced, R wave amplitude 21.5 mV, threshold 1 V at 0.5 ms, impedance 435 ohms, high-voltage impedance 58 ohms  Battery voltage is 13 years  Charge time 9.4 seconds  Underlying rhythm a sensed V sense of 54 bpm  Events 1 mode switch episode that lasted 49 seconds: Appeared consistent with atrial flutter    Assessment:  No diagnosis found.    Plan:      1.  Outside hospital cardiac arrest March 2021, status post [employer] dual-chamber ICD  - heart catheterization showing single-vessel CAD in small posterior lateral branch of the RCA, unchanged from prior, no intervention  -Status post dual-chamber [employer] dual-chamber ICD 3/16/2021  -No ventricular therapy needed    2.  Nonischemic cardiomyopathy, frequent PVCs  -Continue Entresto, Toprol-XL, spironolactone  -Patient has some mild edema on exam today we will start back a daily Lasix at 20 mg  -Continue to monitor heart logic index on his ICD     3.  Anemia,   Had colonoscopy December 2020, recommended to continue twice daily PPI twice daily by GI  -Last hemoglobin was 11     4.  Diabetes  -on  metformin     5.  Hypertension  -Within normal limits     6.  Hyperlipidemia  -Lipids well controlled on rosuvastatin    Adding back Lasix 20 mg daily.  Patient not having significant increase in symptoms but believe he should be on a daily maintenance diuretic.  Will monitor heart logic index.  Other medications unchanged today      Javad Mercedes MD [facility]

## 2021-11-05 RX ORDER — BLOOD SUGAR DIAGNOSTIC
STRIP MISCELLANEOUS
Qty: 100 EACH | Refills: 0 | Status: SHIPPED | OUTPATIENT
Start: 2021-11-05 | End: 2022-03-07 | Stop reason: SDUPTHER

## 2021-11-05 NOTE — TELEPHONE ENCOUNTER
Rx Refill Note  Requested Prescriptions     Pending Prescriptions Disp Refills   • OneTouch Ultra test strip [Pharmacy Med Name: ONETOUCH ULTRA TEST STRIP]       Sig: USE ONE STRIP TO TEST DAILY      Last office visit with prescribing clinician: 10/26/2021      Next office visit with prescribing clinician: 3/1/2022            ALAN DEUTSCH MA  11/05/21, 07:48 EDT

## 2021-11-09 RX ORDER — SACUBITRIL AND VALSARTAN 24; 26 MG/1; MG/1
TABLET, FILM COATED ORAL
Qty: 60 TABLET | Refills: 6 | Status: SHIPPED | OUTPATIENT
Start: 2021-11-09 | End: 2022-01-31 | Stop reason: SDUPTHER

## 2021-11-10 DIAGNOSIS — I50.22 CHRONIC SYSTOLIC CONGESTIVE HEART FAILURE (HCC): ICD-10-CM

## 2021-11-10 RX ORDER — FUROSEMIDE 40 MG/1
40 TABLET ORAL DAILY
Qty: 30 TABLET | Refills: 6 | Status: SHIPPED | OUTPATIENT
Start: 2021-11-10 | End: 2022-01-13 | Stop reason: SDUPTHER

## 2021-11-11 ENCOUNTER — TELEPHONE (OUTPATIENT)
Dept: CARDIOLOGY | Facility: CLINIC | Age: 81
End: 2021-11-11

## 2021-11-11 DIAGNOSIS — K21.9 GASTROESOPHAGEAL REFLUX DISEASE, UNSPECIFIED WHETHER ESOPHAGITIS PRESENT: ICD-10-CM

## 2021-11-11 RX ORDER — PANTOPRAZOLE SODIUM 40 MG/1
TABLET, DELAYED RELEASE ORAL
Qty: 180 TABLET | Refills: 0 | Status: SHIPPED | OUTPATIENT
Start: 2021-11-11 | End: 2022-01-13 | Stop reason: SDUPTHER

## 2021-11-11 NOTE — TELEPHONE ENCOUNTER
Called patient's wife to let them know results and recommendations per NSK (see NSK note).    She agreed to plan and verbalized understanding

## 2021-11-11 NOTE — TELEPHONE ENCOUNTER
----- Message from Javad Mercedes MD sent at 11/10/2021  6:05 PM EST -----  Could we touch base with this patient tomorrow and ask him to increase his Lasix to 40 mg daily please thank you.  I already changed the order and prescription

## 2021-11-11 NOTE — TELEPHONE ENCOUNTER
Rx Refill Note  Requested Prescriptions     Pending Prescriptions Disp Refills   • pantoprazole (PROTONIX) 40 MG EC tablet [Pharmacy Med Name: PANTOPRAZOLE SOD DR 40 MG TAB] 180 tablet 0     Sig: TAKE ONE TABLET BY MOUTH TWICE A DAY      Last office visit with prescribing clinician: 10/26/2021      Next office visit with prescribing clinician: 3/1/2022   }  Keara Ferrer MA  11/11/21, 07:58 EST     Last fill: 07/26/2021

## 2021-11-16 ENCOUNTER — IMMUNIZATION (OUTPATIENT)
Dept: FAMILY MEDICINE CLINIC | Facility: CLINIC | Age: 81
End: 2021-11-16

## 2021-11-16 DIAGNOSIS — Z23 IMMUNIZATION DUE: Primary | ICD-10-CM

## 2021-11-16 PROCEDURE — 0003A COVID-19 (PFIZER): CPT | Performed by: FAMILY MEDICINE

## 2021-11-16 PROCEDURE — 91300 COVID-19 (PFIZER): CPT | Performed by: FAMILY MEDICINE

## 2021-11-23 DIAGNOSIS — E11.65 TYPE 2 DIABETES MELLITUS WITH HYPERGLYCEMIA, WITHOUT LONG-TERM CURRENT USE OF INSULIN (HCC): Chronic | ICD-10-CM

## 2021-11-23 RX ORDER — METFORMIN HYDROCHLORIDE 500 MG/1
TABLET, EXTENDED RELEASE ORAL
Qty: 180 TABLET | OUTPATIENT
Start: 2021-11-23

## 2021-12-29 ENCOUNTER — TELEPHONE (OUTPATIENT)
Dept: FAMILY MEDICINE CLINIC | Facility: CLINIC | Age: 81
End: 2021-12-29

## 2021-12-29 NOTE — TELEPHONE ENCOUNTER
Contacted patient's wife alerted her that samples have been set aside at . She will send her daughter to  from office tomorrow.

## 2021-12-29 NOTE — TELEPHONE ENCOUNTER
[facility] CALLED TO REQUEST SAMPLES OF ENTRESTO FOR PT HEART.    PLEASE ADVISE.  CALL BACK:[phone]

## 2022-01-13 ENCOUNTER — TELEPHONE (OUTPATIENT)
Dept: FAMILY MEDICINE CLINIC | Facility: CLINIC | Age: 82
End: 2022-01-13

## 2022-01-13 DIAGNOSIS — E78.2 MIXED HYPERLIPIDEMIA: ICD-10-CM

## 2022-01-13 DIAGNOSIS — E11.65 TYPE 2 DIABETES MELLITUS WITH HYPERGLYCEMIA, WITHOUT LONG-TERM CURRENT USE OF INSULIN: Chronic | ICD-10-CM

## 2022-01-13 DIAGNOSIS — K21.9 GASTROESOPHAGEAL REFLUX DISEASE, UNSPECIFIED WHETHER ESOPHAGITIS PRESENT: ICD-10-CM

## 2022-01-13 DIAGNOSIS — E87.6 HYPOKALEMIA: ICD-10-CM

## 2022-01-13 DIAGNOSIS — I50.22 CHRONIC SYSTOLIC CONGESTIVE HEART FAILURE: ICD-10-CM

## 2022-01-13 DIAGNOSIS — D64.9 ANEMIA, UNSPECIFIED TYPE: ICD-10-CM

## 2022-01-13 RX ORDER — METFORMIN HYDROCHLORIDE 500 MG/1
500 TABLET, EXTENDED RELEASE ORAL
Qty: 90 TABLET | Refills: 1
Start: 2022-01-13 | End: 2022-09-06 | Stop reason: SDUPTHER

## 2022-01-13 RX ORDER — SPIRONOLACTONE 25 MG/1
25 TABLET ORAL DAILY
Qty: 30 TABLET | Refills: 6 | Status: SHIPPED | OUTPATIENT
Start: 2022-01-13 | End: 2022-08-04 | Stop reason: HOSPADM

## 2022-01-13 RX ORDER — FERROUS SULFATE 325(65) MG
325 TABLET ORAL
Qty: 30 TABLET | Refills: 1 | Status: SHIPPED | OUTPATIENT
Start: 2022-01-13 | End: 2022-02-07

## 2022-01-13 RX ORDER — PANTOPRAZOLE SODIUM 40 MG/1
40 TABLET, DELAYED RELEASE ORAL 2 TIMES DAILY
Qty: 180 TABLET | Refills: 0 | Status: SHIPPED | OUTPATIENT
Start: 2022-01-13 | End: 2022-03-10

## 2022-01-13 RX ORDER — POTASSIUM CHLORIDE 1500 MG/1
20 TABLET, FILM COATED, EXTENDED RELEASE ORAL DAILY
Qty: 30 TABLET | Refills: 1 | Status: SHIPPED | OUTPATIENT
Start: 2022-01-13 | End: 2022-02-21

## 2022-01-13 RX ORDER — FUROSEMIDE 40 MG/1
40 TABLET ORAL DAILY
Qty: 30 TABLET | Refills: 6 | Status: SHIPPED | OUTPATIENT
Start: 2022-01-13 | End: 2022-05-10 | Stop reason: SDUPTHER

## 2022-01-13 RX ORDER — ROSUVASTATIN CALCIUM 20 MG/1
20 TABLET, COATED ORAL DAILY
Qty: 90 TABLET | Refills: 3 | Status: SHIPPED | OUTPATIENT
Start: 2022-01-13 | End: 2023-03-13

## 2022-01-13 RX ORDER — METOPROLOL SUCCINATE 50 MG/1
50 TABLET, EXTENDED RELEASE ORAL DAILY
Qty: 30 TABLET | Refills: 6 | Status: SHIPPED | OUTPATIENT
Start: 2022-01-13 | End: 2022-06-25

## 2022-01-13 NOTE — TELEPHONE ENCOUNTER
Caller: Jessica Cochran    Relationship: Emergency Contact    Best call back number: [phone]    Requested Prescriptions:   Requested Prescriptions     Pending Prescriptions Disp Refills   • potassium chloride ER (K-TAB) 20 MEQ tablet controlled-release ER tablet 30 tablet 1     Sig: Take 1 tablet by mouth Daily.   • ferrous sulfate (FerrouSul) 325 (65 FE) MG tablet 30 tablet 1     Sig: Take 1 tablet by mouth Daily With Breakfast.   • furosemide (LASIX) 40 MG tablet 30 tablet 6     Sig: Take 1 tablet by mouth Daily.   • metFORMIN ER (GLUCOPHAGE-XR) 500 MG 24 hr tablet 90 tablet 1     Sig: Take 1 tablet by mouth Daily With Breakfast.   • metoprolol succinate XL (TOPROL-XL) 50 MG 24 hr tablet 30 tablet 6     Sig: Take 1 tablet by mouth Daily.   • pantoprazole (PROTONIX) 40 MG EC tablet 180 tablet 0     Sig: Take 1 tablet by mouth 2 (Two) Times a Day.   • rosuvastatin (CRESTOR) 20 MG tablet 90 tablet 3     Sig: Take 1 tablet by mouth Daily.   • spironolactone (ALDACTONE) 25 MG tablet 30 tablet 6     Sig: Take 1 tablet by mouth Daily.        Pharmacy where request should be sent: [facility] #[id number] - [place], [place] - [address] [place] [address] AT [facility] - [phone] [facility] [phone]      Additional details provided by patient: PATIENT WOULD NEED REFILLS SENT TO PHARMACY     Does the patient have less than a 3 day supply:  [x] Yes  [] No    Odell Ramirez Rep   01/13/22 11:42 EST

## 2022-01-30 PROCEDURE — 93295 DEV INTERROG REMOTE 1/2/MLT: CPT | Performed by: INTERNAL MEDICINE

## 2022-01-30 PROCEDURE — 93296 REM INTERROG EVL PM/IDS: CPT | Performed by: INTERNAL MEDICINE

## 2022-01-31 RX ORDER — SACUBITRIL AND VALSARTAN 24; 26 MG/1; MG/1
1 TABLET, FILM COATED ORAL 2 TIMES DAILY
Qty: 60 TABLET | Refills: 6 | Status: SHIPPED | OUTPATIENT
Start: 2022-01-31 | End: 2022-12-13 | Stop reason: SDUPTHER

## 2022-02-05 DIAGNOSIS — D64.9 ANEMIA, UNSPECIFIED TYPE: ICD-10-CM

## 2022-02-07 RX ORDER — FERROUS SULFATE 325(65) MG
TABLET ORAL
Qty: 30 TABLET | Refills: 1 | Status: SHIPPED | OUTPATIENT
Start: 2022-02-07 | End: 2022-02-17

## 2022-02-07 NOTE — TELEPHONE ENCOUNTER
Rx Refill Note  Requested Prescriptions     Pending Prescriptions Disp Refills   • ferrous sulfate 325 (65 FE) MG tablet [Pharmacy Med Name: FERROUS SULFATE 325 MG TABLET] 30 tablet 1     Sig: TAKE 1 TABLET BY MOUTH EVERY DAY WITH BREAKFAST      Last office visit with prescribing clinician: 10/26/2021      Next office visit with prescribing clinician: 3/1/2022            Kristi Mckeon MA  02/07/22, 11:31 EST

## 2022-02-09 DIAGNOSIS — R56.9 SEIZURE: Chronic | ICD-10-CM

## 2022-02-09 RX ORDER — LEVETIRACETAM 750 MG/1
TABLET ORAL
Qty: 180 TABLET | Refills: 1 | Status: SHIPPED | OUTPATIENT
Start: 2022-02-09 | End: 2022-02-15 | Stop reason: SDUPTHER

## 2022-02-09 NOTE — TELEPHONE ENCOUNTER
Rx Refill Note  Requested Prescriptions     Pending Prescriptions Disp Refills   • levETIRAcetam (KEPPRA) 750 MG tablet [Pharmacy Med Name: levETIRAcetam 750 MG TABLET] 180 tablet 1     Sig: TAKE ONE TABLET BY MOUTH TWICE A DAY      Last office visit with prescribing clinician: 10/26/2021      Next office visit with prescribing clinician: 3/1/2022            Lora Odonnell MA  02/09/22, 08:16 EST

## 2022-02-15 DIAGNOSIS — R56.9 SEIZURE: Chronic | ICD-10-CM

## 2022-02-15 RX ORDER — LEVETIRACETAM 750 MG/1
750 TABLET ORAL 2 TIMES DAILY
Qty: 180 TABLET | Refills: 1 | Status: SHIPPED | OUTPATIENT
Start: 2022-02-15 | End: 2022-07-15

## 2022-02-15 NOTE — TELEPHONE ENCOUNTER
Caller: Jessica Cochran    Relationship: Emergency Contact    Best call back number: [phone]    Requested Prescriptions:   Requested Prescriptions     Pending Prescriptions Disp Refills   • levETIRAcetam (KEPPRA) 750 MG tablet 180 tablet 1     Sig: Take 1 tablet by mouth 2 (Two) Times a Day.        Pharmacy where request should be sent:  [facility] [phone]    Additional details provided by patient: PATIENT WILL BE OUT BY FRIDAY    Does the patient have less than a 3 day supply:  [x] Yes  [] No    Odell Werner Rep   02/15/22 10:44 EST

## 2022-02-17 ENCOUNTER — LAB (OUTPATIENT)
Dept: LAB | Facility: HOSPITAL | Age: 82
End: 2022-02-17

## 2022-02-17 ENCOUNTER — OFFICE VISIT (OUTPATIENT)
Dept: CARDIOLOGY | Facility: HOSPITAL | Age: 82
End: 2022-02-17

## 2022-02-17 VITALS
RESPIRATION RATE: 16 BRPM | OXYGEN SATURATION: 100 % | BODY MASS INDEX: 30.1 KG/M2 | HEIGHT: 70 IN | TEMPERATURE: 97.3 F | DIASTOLIC BLOOD PRESSURE: 58 MMHG | WEIGHT: 210.25 LBS | SYSTOLIC BLOOD PRESSURE: 126 MMHG | HEART RATE: 60 BPM

## 2022-02-17 DIAGNOSIS — I50.32 CHRONIC HEART FAILURE WITH PRESERVED EJECTION FRACTION: ICD-10-CM

## 2022-02-17 DIAGNOSIS — I25.10 CORONARY ARTERY DISEASE INVOLVING NATIVE CORONARY ARTERY OF NATIVE HEART WITHOUT ANGINA PECTORIS: ICD-10-CM

## 2022-02-17 DIAGNOSIS — I10 ESSENTIAL HYPERTENSION: ICD-10-CM

## 2022-02-17 DIAGNOSIS — I38 VALVULAR HEART DISEASE: ICD-10-CM

## 2022-02-17 DIAGNOSIS — I49.3 FREQUENT PVCS: ICD-10-CM

## 2022-02-17 DIAGNOSIS — I50.32 CHRONIC HEART FAILURE WITH PRESERVED EJECTION FRACTION: Primary | ICD-10-CM

## 2022-02-17 LAB
ANION GAP SERPL CALCULATED.3IONS-SCNC: 10 MMOL/L (ref 5–15)
BUN SERPL-MCNC: 24 MG/DL (ref 8–23)
BUN/CREAT SERPL: 17.8 (ref 7–25)
CALCIUM SPEC-SCNC: 9.6 MG/DL (ref 8.6–10.5)
CHLORIDE SERPL-SCNC: 104 MMOL/L (ref 98–107)
CO2 SERPL-SCNC: 25 MMOL/L (ref 22–29)
CREAT SERPL-MCNC: 1.35 MG/DL (ref 0.76–1.27)
GFR SERPL CREATININE-BSD FRML MDRD: 61 ML/MIN/1.73
GLUCOSE SERPL-MCNC: 79 MG/DL (ref 65–99)
NT-PROBNP SERPL-MCNC: 1882 PG/ML (ref 0–1800)
POTASSIUM SERPL-SCNC: 3.8 MMOL/L (ref 3.5–5.2)
SODIUM SERPL-SCNC: 139 MMOL/L (ref 136–145)

## 2022-02-17 PROCEDURE — 83880 ASSAY OF NATRIURETIC PEPTIDE: CPT

## 2022-02-17 PROCEDURE — 36415 COLL VENOUS BLD VENIPUNCTURE: CPT

## 2022-02-17 PROCEDURE — 99214 OFFICE O/P EST MOD 30 MIN: CPT | Performed by: NURSE PRACTITIONER

## 2022-02-17 PROCEDURE — 80048 BASIC METABOLIC PNL TOTAL CA: CPT

## 2022-02-17 NOTE — PROGRESS NOTES
Your lab results are stable.  Your heart failure number has significantly improved since last labs.  We will monitor your labs closely.  Continue your medications as prescribed.  (Released to [employer])

## 2022-02-21 DIAGNOSIS — E87.6 HYPOKALEMIA: ICD-10-CM

## 2022-02-21 RX ORDER — POTASSIUM CHLORIDE 1500 MG/1
TABLET, FILM COATED, EXTENDED RELEASE ORAL
Qty: 30 TABLET | Refills: 1 | Status: SHIPPED | OUTPATIENT
Start: 2022-02-21 | End: 2022-04-19

## 2022-03-01 ENCOUNTER — OFFICE VISIT (OUTPATIENT)
Dept: FAMILY MEDICINE CLINIC | Facility: CLINIC | Age: 82
End: 2022-03-01

## 2022-03-01 VITALS
TEMPERATURE: 97.9 F | SYSTOLIC BLOOD PRESSURE: 148 MMHG | HEIGHT: 70 IN | HEART RATE: 62 BPM | BODY MASS INDEX: 30.46 KG/M2 | WEIGHT: 212.8 LBS | DIASTOLIC BLOOD PRESSURE: 70 MMHG | OXYGEN SATURATION: 99 %

## 2022-03-01 DIAGNOSIS — N18.31 STAGE 3A CHRONIC KIDNEY DISEASE: Primary | ICD-10-CM

## 2022-03-01 DIAGNOSIS — R56.9 SEIZURE: Chronic | ICD-10-CM

## 2022-03-01 DIAGNOSIS — I10 ESSENTIAL HYPERTENSION: ICD-10-CM

## 2022-03-01 DIAGNOSIS — E11.22 TYPE 2 DIABETES MELLITUS WITH STAGE 3A CHRONIC KIDNEY DISEASE, WITHOUT LONG-TERM CURRENT USE OF INSULIN: ICD-10-CM

## 2022-03-01 DIAGNOSIS — J44.9 CHRONIC OBSTRUCTIVE PULMONARY DISEASE, UNSPECIFIED COPD TYPE: ICD-10-CM

## 2022-03-01 DIAGNOSIS — N18.31 TYPE 2 DIABETES MELLITUS WITH STAGE 3A CHRONIC KIDNEY DISEASE, WITHOUT LONG-TERM CURRENT USE OF INSULIN: ICD-10-CM

## 2022-03-01 LAB
EXPIRATION DATE: NORMAL
HBA1C MFR BLD: 6.3 %
Lab: NORMAL

## 2022-03-01 PROCEDURE — 3044F HG A1C LEVEL LT 7.0%: CPT | Performed by: PHYSICIAN ASSISTANT

## 2022-03-01 PROCEDURE — 99214 OFFICE O/P EST MOD 30 MIN: CPT | Performed by: PHYSICIAN ASSISTANT

## 2022-03-01 PROCEDURE — 83036 HEMOGLOBIN GLYCOSYLATED A1C: CPT | Performed by: PHYSICIAN ASSISTANT

## 2022-03-01 RX ORDER — TIOTROPIUM BROMIDE INHALATION SPRAY 3.12 UG/1
2 SPRAY, METERED RESPIRATORY (INHALATION)
Qty: 4 G | Refills: 5 | Status: ON HOLD | OUTPATIENT
Start: 2022-03-01 | End: 2022-09-15

## 2022-03-02 NOTE — PROGRESS NOTES
Chief Complaint   Patient presents with   • Follow-up     4 month       HPI     Narendra Cochran is a pleasant 81 y.o. male who is here for routine follow-up of diabetes type 2, hypertension, seizures and CKD stage 3.  Patient's wife is present at appointment as well.    Patient is compliant on all medications.  No seizures since starting keppra.  BP is slightly elevated today.  He is compliant on medications.  Followed by cardiology.      He does report exertional dyspnea.  No leg swelling or cough today.  Did smoke for years.  Has never tried an inhaler.    He discontinued jardiance due to cost.  Does admit to drinking 2-3 sprites a day.    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   • Paget disease of bone        Past Surgical History:   Procedure Laterality Date   • APPENDECTOMY     • CARDIAC CATHETERIZATION N/A 3/18/2020    Procedure: Left Heart Cath;  Surgeon: Sonya Garibay MD;  Location:  [facility];  Service: Cardiology;  Laterality: N/A;  First availabel provider     • CARDIAC CATHETERIZATION N/A 3/15/2021    Procedure: LEFT HEART CATH;  Surgeon: Doc Sahni IV, MD;  Location:  [facility];  Service: Cardiovascular;  Laterality: N/A;   • CARDIAC CATHETERIZATION N/A 3/15/2021    Procedure: Coronary angiography;  Surgeon: Doc Sahni IV, MD;  Location:  [facility];  Service: Cardiovascular;  Laterality: N/A;   • CARDIAC ELECTROPHYSIOLOGY PROCEDURE N/A 3/16/2021    Procedure: ICD DC new;  Surgeon: Som Boyd DO;  Location:  [facility];  Service: Cardiology;  Laterality: N/A;   • COLON RESECTION      second  "to diverticulitis    • FOOT SURGERY Left        Family History   Problem Relation Age of Onset   • No Known Problems Daughter    • No Known Problems Son    • No Known Problems Son    • No Known Problems Son    • Diabetes Sister    • Clotting disorder Sister    • No Known Problems Mother    • No Known Problems Sister    • No Known Problems Brother    • Fainting Brother        Social History     Socioeconomic History   • Marital status:    Tobacco Use   • Smoking status: Former Smoker     Packs/day: 0.25     Years: 65.00     Pack years: 16.25     Types: Cigars, Cigarettes     Quit date: 2019     Years since quittin.4   • Smokeless tobacco: Never Used   Vaping Use   • Vaping Use: Never used   Substance and Sexual Activity   • Alcohol use: Yes     Comment: very rarely   • Drug use: Yes     Types: Marijuana     Comment: Pt states used weekly but now quitting    • Sexual activity: Defer       No Known Allergies    ROS  Review of Systems   Constitutional: Negative for chills and fever.   HENT: Positive for hearing loss.    Respiratory: Positive for shortness of breath. Negative for cough and wheezing.    Cardiovascular: Negative for chest pain, palpitations and leg swelling.   Endocrine: Negative for polyuria.   Neurological: Negative for dizziness and headache.       Vitals:    22 1056   BP: 148/70   BP Location: Left arm   Patient Position: Sitting   Cuff Size: Adult   Pulse: 62   Temp: 97.9 °F (36.6 °C)   SpO2: 99%   Weight: 96.5 kg (212 lb 12.8 oz)   Height: 177.8 cm (70\")   PainSc: 0-No pain     Body mass index is 30.53 kg/m².    Current Outpatient Medications on File Prior to Visit   Medication Sig Dispense Refill   • aspirin 81 MG chewable tablet Chew 1 tablet Daily.     • Blood Pressure Monitoring (BLOOD PRESSURE MONITOR/L CUFF) misc 1 Units Daily. 1 each 0   • diclofenac (VOLTAREN) 1 % gel gel Apply 4 g topically to the appropriate area as directed 4 (Four) Times a Day As Needed (prn for pain). " 60 tube 0   • furosemide (LASIX) 40 MG tablet Take 1 tablet by mouth Daily. 30 tablet 6   • glucose monitor monitoring kit 1 each Daily. DX: E11.65 1 each 0   • Lancets (OneTouch Delica Plus [id number]) misc USE TO CHECK BLOOD GLUCOSE ONCE DAILY 100 each 0   • levETIRAcetam (KEPPRA) 750 MG tablet Take 1 tablet by mouth 2 (Two) Times a Day. 180 tablet 1   • metFORMIN ER (GLUCOPHAGE-XR) 500 MG 24 hr tablet Take 1 tablet by mouth Daily With Breakfast. 90 tablet 1   • metoprolol succinate XL (TOPROL-XL) 50 MG 24 hr tablet Take 1 tablet by mouth Daily. 30 tablet 6   • OneTouch Ultra test strip USE ONE STRIP TO TEST DAILY 100 each 0   • pantoprazole (PROTONIX) 40 MG EC tablet Take 1 tablet by mouth 2 (Two) Times a Day. 180 tablet 0   • potassium chloride ER (K-TAB) 20 MEQ tablet controlled-release ER tablet TAKE 1 TABLET BY MOUTH EVERY DAY 30 tablet 1   • rosuvastatin (CRESTOR) 20 MG tablet Take 1 tablet by mouth Daily. 90 tablet 3   • sacubitril-valsartan (Entresto) 24-26 MG tablet Take 1 tablet by mouth 2 (Two) Times a Day. 60 tablet 6   • spironolactone (ALDACTONE) 25 MG tablet Take 1 tablet by mouth Daily. 30 tablet 6     No current facility-administered medications on file prior to visit.       Results for orders placed or performed in visit on 03/01/22   POC Glycosylated Hemoglobin (Hb A1C)    Specimen: Blood   Result Value Ref Range    Hemoglobin A1C 6.3 %    Lot Number [id number]     Expiration Date 11/02/23        PE    Physical Exam  Vitals reviewed.   Constitutional:       General: He is not in acute distress.     Appearance: Normal appearance. He is well-developed. He is obese. He is not ill-appearing or diaphoretic.   HENT:      Head: Normocephalic and atraumatic.      Right Ear: Decreased hearing noted.      Left Ear: Decreased hearing noted.      Ears:      Comments: Wearing hearing aids.  Eyes:      Extraocular Movements: Extraocular movements intact.      Conjunctiva/sclera: Conjunctivae normal.    Musculoskeletal:         General: Normal range of motion.      Cervical back: Normal range of motion.      Right lower leg: No edema.      Left lower leg: No edema.   Skin:     General: Skin is warm.      Findings: No erythema or rash.   Neurological:      General: No focal deficit present.      Mental Status: He is alert.   Psychiatric:         Attention and Perception: He is attentive.         Mood and Affect: Mood normal.         Speech: Speech normal.         Behavior: Behavior normal. Behavior is cooperative.         Thought Content: Thought content normal.         Judgment: Judgment normal.         A/P    Diagnoses and all orders for this visit:    1. Stage 3a chronic kidney disease (HCC) (Primary)  Reviewed recent labs.    Kidney function is stable for patient.    2. Essential hypertension  Slightly elevated today.  Compliant on medication.  Followed by cardiology.    3. H/O seizures on Keppra  No seizures since starting Keppra.    4. Type 2 diabetes mellitus with stage 3a chronic kidney disease, without long-term current use of insulin ([facility])  -     POC Glycosylated Hemoglobin (Hb A1C)  Previous hemoglobin AIC was 5.7%, hemoglobin AIC today is 6.3%.  Had to stop Jardiance due to cost.  Compliant on Metformin 500 mg QD.  Admits to drinking 2-3 Sprites a day.  Encouraged patient to reduce this to 1 a day.     5. COPD  Patient has exertional dyspnea.  Does have cardiac issues but suspect a COPD component given patient's long history of smoking.  His recent BNP was reassuring.  He has no leg swelling or cough today.  Will trial daily inhaler.  Samples of spiriva given to patient. Sent in prescription to pharmacy, will see if this is affordable for patient.      Plan of care reviewed with patient at the conclusion of today's visit. Education was provided regarding diagnosis, management and any prescribed or recommended OTC medications.  Patient verbalizes understanding of and agreement with management  plan.    Return in about 3 months (around 6/1/2022).     Marguerite Moore PA-C

## 2022-03-07 RX ORDER — BLOOD SUGAR DIAGNOSTIC
STRIP MISCELLANEOUS
Qty: 100 EACH | Refills: 0 | Status: SHIPPED | OUTPATIENT
Start: 2022-03-07 | End: 2022-03-14

## 2022-03-07 NOTE — TELEPHONE ENCOUNTER
Caller: Jessica Cochran    Relationship: Emergency Contact    Best call back number: [phone]    Requested Prescriptions:   Requested Prescriptions     Pending Prescriptions Disp Refills   • glucose blood (OneTouch Ultra) test strip 100 each 0     Sig: Use as instructed        Pharmacy where request should be sent: [id number] - [place], [place] - [address] [place] [address] AT [facility] [phone] [facility] [phone] FX     Does the patient have less than a 3 day supply:  [] Yes  [x] No    Odell Cabrera Rep   03/07/22 13:45 EST

## 2022-03-07 NOTE — TELEPHONE ENCOUNTER
Rx Refill Note  Requested Prescriptions     Pending Prescriptions Disp Refills   • glucose blood (OneTouch Ultra) test strip 100 each 0     Sig: Use as instructed      Last office visit with prescribing clinician: 3/1/2022      Next office visit with prescribing clinician: 6/2/2022            Annie Morales MA  03/07/22, 13:51 EST

## 2022-03-10 DIAGNOSIS — K21.9 GASTROESOPHAGEAL REFLUX DISEASE, UNSPECIFIED WHETHER ESOPHAGITIS PRESENT: ICD-10-CM

## 2022-03-10 RX ORDER — PANTOPRAZOLE SODIUM 40 MG/1
TABLET, DELAYED RELEASE ORAL
Qty: 180 TABLET | Refills: 0 | Status: SHIPPED | OUTPATIENT
Start: 2022-03-10 | End: 2022-03-14

## 2022-03-10 NOTE — TELEPHONE ENCOUNTER
Rx Refill Note  Requested Prescriptions     Pending Prescriptions Disp Refills   • pantoprazole (PROTONIX) 40 MG EC tablet [Pharmacy Med Name: PANTOPRAZOLE SOD DR 40 MG TAB] 180 tablet 0     Sig: TAKE ONE TABLET BY MOUTH TWICE A DAY      Last office visit with prescribing clinician: 3/1/2022      Next office visit with prescribing clinician: 6/2/2022            Annie Morales MA  03/10/22, 08:29 EST

## 2022-03-14 DIAGNOSIS — K21.9 GASTROESOPHAGEAL REFLUX DISEASE, UNSPECIFIED WHETHER ESOPHAGITIS PRESENT: ICD-10-CM

## 2022-03-14 RX ORDER — BLOOD SUGAR DIAGNOSTIC
STRIP MISCELLANEOUS
Qty: 100 EACH | Refills: 0 | Status: SHIPPED | OUTPATIENT
Start: 2022-03-14 | End: 2022-04-28

## 2022-03-14 RX ORDER — PANTOPRAZOLE SODIUM 40 MG/1
TABLET, DELAYED RELEASE ORAL
Qty: 180 TABLET | Refills: 0 | Status: ON HOLD | OUTPATIENT
Start: 2022-03-14 | End: 2022-08-01

## 2022-03-14 NOTE — TELEPHONE ENCOUNTER
Rx Refill Note  Requested Prescriptions     Pending Prescriptions Disp Refills   • pantoprazole (PROTONIX) 40 MG EC tablet [Pharmacy Med Name: PANTOPRAZOLE SOD DR 40 MG TAB] 180 tablet 0     Sig: TAKE 1 TABLET BY MOUTH TWICE A DAY   • OneTouch Ultra test strip [Pharmacy Med Name: ONE TOUCH ULTRA BLUE TEST STRP]       Sig: USE AS INSTRUCTED      Last office visit with prescribing clinician: 3/1/2022      Next office visit with prescribing clinician: 6/2/2022            Annie Morales MA  03/14/22, 15:41 EDT

## 2022-03-21 DIAGNOSIS — E78.2 MIXED HYPERLIPIDEMIA: ICD-10-CM

## 2022-03-21 RX ORDER — ROSUVASTATIN CALCIUM 20 MG/1
TABLET, COATED ORAL
Qty: 90 TABLET | Refills: 3 | OUTPATIENT
Start: 2022-03-21

## 2022-04-11 NOTE — PROGRESS NOTES
[facility] Cardiology  Follow Up Visit  Narendra Cochran  1940    VISIT DATE:  04/12/22    PCP:   Marguerite Moore, BHAVANI  [address] [facility] [address]          CC:  Chronic systolic congestive heart failure (HCC) (4 month f/u)      Problem List:    1. Nonischemic cardiomyopathy / systolic congestive heart failure / Cardiac Arrest   -[facility] March 2020 with mild nonobstructive CAD, EF 36-40%  -Witnessed out of hospital cardiac arrest 3/12/2021 with documented bystander CPR with a single shock from AED with transfer to [facility] per EMS.  -Echo 3/13/2021 EF 50%, no significant VHD  -[facility] 3/14/2021 per Dr. Sahni with documented severe 1-vessel CAD involving a small posterior lateral branch of the of the RCA with no interval change to prior angiography  -[facility] DDD ICD implant 3/16/2021 for secondary prevention of SCD    2. Premature ventricular contractions  -Holter monitor February 2020:  PVCs burden 12.3%   -Amiodarone therapy initiated at 200 mg daily for PVCs April 2020  -Amiodarone discontinued December 2020  -[employer] dual-chamber pacemaker/ICD implanted March 2021 after cardiac arrest    3. Sinus node dysfunction  4. HTN  5. Essential hypertension  6. Dyslipidemia  7. Diabetes mellitus  8. COPD  9. Seizures  10. Paget's disease  11. Hearing loss        History of Present Illness:  Narendra Cochran  Is a 81 y.o. male with pertinent cardiac history detailed above.  Patient accompanied by his wife today.  She does endorse that he breathes heavier on occasion.  He saw his PCP recently and was prescribed an inhaler for COPD but she states it was not covered by his insurance.  He does have smoking history and PFTs in 2020 showing decreased DLCO.  He was seen in the heart valve clinic in February at which time was doing fairly well.  He is on Lasix 40 mg daily and his heart logic index which monitored heart failure on his defibrillator is within normal limits.  BNP in February was just  mildly elevated.  His weight is stable he is not having edema or chest pain.      Patient Active Problem List    Diagnosis Date Noted   • Stage 3a chronic kidney disease (HCC) 03/01/2022   • Presence of biventricular implantable cardioverter-defibrillator (ICD) 06/21/2021   • Acute blood loss anemia 03/13/2021   • OHCA 03/12/2021     Note Last Updated: 3/15/2021     · Out of hospital cardiac arrest with shockable rhythm, 3/12/2021  · History of frequent PVCs not improved with amiodarone, 2020  · Echo (3/13/2021): LVEF 50%.  Moderate MR.  Moderate aortic insufficiency.  · Cardiac catheterization (3/15/2021): Severe 1-vessel CAD (small RPL).     • Hypokalemia 03/12/2021   • Hypomagnesemia 03/12/2021   • VHD; mild-mod AR, mild MR 03/12/2021     Note Last Updated: 3/13/2021     · Echo (3/13/2021): LVEF 50%.  Moderate MR.  Moderate aortic insufficiency.     • Chronic systolic congestive heart failure (HCC) 03/12/2021     Note Last Updated: 3/13/2021     · Cardiac catheterization (3/18/2020): 1 vessel CAD involving small posterior lateral branch of the RCA.  LVEF 35%  · Echo (3/13/2021): LVEF 50%.  Moderate MR.  Moderate aortic insufficiency.     • Former tobacco user 03/12/2021   • GERD 03/12/2021   • Marijuana use 03/12/2021   • Frequent PVCs 03/12/2021     Note Last Updated: 3/13/2021     · Holter monitor February 2020:  PVCs burden 12.3% with some of the counted PVCs appear to be PACs with aberrancy. One 4 beat run of nonsustained VT versus SVT with aberrancy.  · Amiodarone therapy initiated at 200 mg daily for PVCs.  · 24-hour Holter 7/15/2020 HR 43-79, average 55 bpm, PVC burden 10.2%.     • NSVT (nonsustained ventricular tachycardia) ([facility]) 03/18/2020     Note Last Updated: 3/18/2020     1. 48 Hour Holter Feb 2020:   · PVCs present with burden of 12.3%  · Some of the counted PVCs appear to be PACs with aberrancy  · There was a 4 beat run of nonsustained VT versus SVT with aberrancy     • Coronary artery disease  involving native coronary artery of native heart with angina pectoris ([facility]) 2020     Note Last Updated: 3/15/2021     · Cardiac catheterization (3/18/2020): 1 vessel CAD involving small posterior lateral branch of the RCA.  · Cardiac catheterization for OHCA (3/15/2021): Severe 1-vessel CAD involving small posterior lateral branch of the RCA.     • NICM  2020     Note Last Updated: 3/13/2021     · Cardiac catheterization (3-18-20): Severe one-vessel CAD involving small R PL disease.     • Syncope 2019   • COPD (chronic obstructive pulmonary disease) ([facility]) 2019   • Gonalgia 2019   • Osteitis deformans 2019   • H/O seizures on Keppra 2019   • Essential hypertension 2017   • Hyperlipidemia LDL goal <70 2017   • T2DM 2017   • Paget disease of bone 2017     Note Last Updated: 3/14/2021     · History of Paget's disease, treated with Reclast.  Last saw Dr. Lui .     • Tobacco abuse 2017       No Known Allergies    Social History     Socioeconomic History   • Marital status:    Tobacco Use   • Smoking status: Former Smoker     Packs/day: 0.25     Years: 65.00     Pack years: 16.25     Types: Cigars, Cigarettes     Quit date: 2019     Years since quittin.6   • Smokeless tobacco: Never Used   Vaping Use   • Vaping Use: Never used   Substance and Sexual Activity   • Alcohol use: Yes     Comment: very rarely   • Drug use: Yes     Types: Marijuana     Comment: Pt states used weekly but now quitting    • Sexual activity: Defer       Family History   Problem Relation Age of Onset   • No Known Problems Daughter    • No Known Problems Son    • No Known Problems Son    • No Known Problems Son    • Diabetes Sister    • Clotting disorder Sister    • No Known Problems Mother    • No Known Problems Sister    • No Known Problems Brother    • Fainting Brother        Current Medications:    Current Outpatient Medications:   •  aspirin 81 MG chewable  tablet, Chew 1 tablet Daily., Disp: , Rfl:   •  Blood Pressure Monitoring (BLOOD PRESSURE MONITOR/L CUFF) misc, 1 Units Daily., Disp: 1 each, Rfl: 0  •  diclofenac (VOLTAREN) 1 % gel gel, Apply 4 g topically to the appropriate area as directed 4 (Four) Times a Day As Needed (prn for pain)., Disp: 60 tube, Rfl: 0  •  furosemide (LASIX) 40 MG tablet, Take 1 tablet by mouth Daily., Disp: 30 tablet, Rfl: 6  •  glucose monitor monitoring kit, 1 each Daily. DX: E11.65, Disp: 1 each, Rfl: 0  •  Lancets (OneTouch Delica Plus [id number]) misc, USE TO CHECK BLOOD GLUCOSE ONCE DAILY, Disp: 100 each, Rfl: 0  •  levETIRAcetam (KEPPRA) 750 MG tablet, Take 1 tablet by mouth 2 (Two) Times a Day., Disp: 180 tablet, Rfl: 1  •  metFORMIN ER (GLUCOPHAGE-XR) 500 MG 24 hr tablet, Take 1 tablet by mouth Daily With Breakfast., Disp: 90 tablet, Rfl: 1  •  metoprolol succinate XL (TOPROL-XL) 50 MG 24 hr tablet, Take 1 tablet by mouth Daily., Disp: 30 tablet, Rfl: 6  •  OneTouch Ultra test strip, USE AS INSTRUCTED, Disp: 100 each, Rfl: 0  •  pantoprazole (PROTONIX) 40 MG EC tablet, TAKE 1 TABLET BY MOUTH TWICE A DAY, Disp: 180 tablet, Rfl: 0  •  potassium chloride ER (K-TAB) 20 MEQ tablet controlled-release ER tablet, TAKE 1 TABLET BY MOUTH EVERY DAY, Disp: 30 tablet, Rfl: 1  •  rosuvastatin (CRESTOR) 20 MG tablet, Take 1 tablet by mouth Daily., Disp: 90 tablet, Rfl: 3  •  sacubitril-valsartan (Entresto) 24-26 MG tablet, Take 1 tablet by mouth 2 (Two) Times a Day., Disp: 60 tablet, Rfl: 6  •  spironolactone (ALDACTONE) 25 MG tablet, Take 1 tablet by mouth Daily., Disp: 30 tablet, Rfl: 6  •  tiotropium bromide monohydrate (Spiriva Respimat) 2.5 MCG/ACT aerosol solution inhaler, Inhale 2 puffs Daily., Disp: 4 g, Rfl: 5     Review of Systems   Constitutional: Negative for weight gain.   Cardiovascular: Positive for dyspnea on exertion. Negative for chest pain, leg swelling, orthopnea, palpitations and syncope.   Respiratory: Positive for  "shortness of breath.        Vitals:    04/12/22 1339   BP: 100/54   Pulse: 63   SpO2: 99%   Weight: 93.3 kg (205 lb 9.6 oz)   Height: 177.8 cm (70\")       Physical Exam  Constitutional:       Appearance: Normal appearance.   Cardiovascular:      Rate and Rhythm: Normal rate and regular rhythm.      Pulses: Normal pulses.      Heart sounds: Normal heart sounds.   Pulmonary:      Breath sounds: No wheezing or rales.      Comments: Prolonged expiratory phase  Abdominal:      Palpations: Abdomen is soft.   Musculoskeletal:      Right lower leg: No edema.      Left lower leg: No edema.   Skin:     General: Skin is warm and dry.   Neurological:      Mental Status: He is alert.         Diagnostic Data:  Procedures  Lab Results   Component Value Date    TRIG 108 08/12/2021    HDL 57 08/12/2021     Lab Results   Component Value Date    GLUCOSE 79 02/17/2022    BUN 24 (H) 02/17/2022    CREATININE 1.35 (H) 02/17/2022     02/17/2022    K 3.8 02/17/2022     02/17/2022    CO2 25.0 02/17/2022     Lab Results   Component Value Date    HGBA1C 6.3 03/01/2022     Lab Results   Component Value Date    WBC 2.88 (L) 07/08/2021    HGB 11.1 (L) 07/08/2021    HCT 37.6 07/08/2021     (L) 07/08/2021       [employer] ICD interrogation:Vigilant D 233  Mode DDD DR: Rate 6130  Right atrial lead: 70% paced, P wave amplitude 2.8 mV, threshold 0.5 V at 0.5 ms, impedance 760 ohms  RV lead: 12% paced, R wave amplitude greater than 25 mV, threshold 0.7 mV at 0.5 ms, impedance 525 ohms, high-voltage impedance 78 ohms  Battery voltage 13 years  Underlying rhythm a sensed/V sensed with PVCs  1 episode nonsustained V. tach lasting 20 seconds It was 185 bpm which is an 80 VT 1 zone 170, which is monitor only, therapies are initiated at 200 bpm  Frequent PVCs  Heart logic index is 4 which is normal      Assessment:  No diagnosis found.    Plan:    1.  Outside hospital cardiac arrest March 2021, status post [employer] " dual-chamber ICD  - heart catheterization showing single-vessel CAD in small posterior lateral branch of the RCA, unchanged from prior, no intervention  -Status post dual-chamber [employer] dual-chamber ICD 3/16/2021  -No ventricular therapy needed 1 episode nonsustained SVT     2.  Nonischemic cardiomyopathy, frequent PVCs  -Continue Entresto, Toprol-XL, spironolactone  -no jardiance due to cost  -Continue Lasix 40 mg  -Continue to monitor heart logic index on his ICD, it is normal today  -proBNP February 2022  1,882, creatinine 1.35.  We will repeat labs today     3.  Anemia,   Had colonoscopy December 2020, recommended to continue twice daily PPI twice daily by GI  -Last hemoglobin was 11, will repeat today     4.  Diabetes  -on  metformin     5.  Hypertension  -Low end of normal today, no changes made in medication     6.  Hyperlipidemia  -Lipids well controlled on rosuvastatin      Follow-up in about 4 months.  Javad Mercedes MD [facility]

## 2022-04-12 ENCOUNTER — LAB (OUTPATIENT)
Dept: LAB | Facility: HOSPITAL | Age: 82
End: 2022-04-12

## 2022-04-12 ENCOUNTER — OFFICE VISIT (OUTPATIENT)
Dept: CARDIOLOGY | Facility: CLINIC | Age: 82
End: 2022-04-12

## 2022-04-12 VITALS
DIASTOLIC BLOOD PRESSURE: 54 MMHG | SYSTOLIC BLOOD PRESSURE: 100 MMHG | HEIGHT: 70 IN | HEART RATE: 63 BPM | WEIGHT: 205.6 LBS | OXYGEN SATURATION: 99 % | BODY MASS INDEX: 29.43 KG/M2

## 2022-04-12 DIAGNOSIS — I42.8 NICM (NONISCHEMIC CARDIOMYOPATHY): ICD-10-CM

## 2022-04-12 DIAGNOSIS — I42.8 NICM (NONISCHEMIC CARDIOMYOPATHY): Primary | ICD-10-CM

## 2022-04-12 LAB
DEPRECATED RDW RBC AUTO: 43.2 FL (ref 37–54)
ERYTHROCYTE [DISTWIDTH] IN BLOOD BY AUTOMATED COUNT: 14.6 % (ref 12.3–15.4)
HCT VFR BLD AUTO: 44.8 % (ref 37.5–51)
HGB BLD-MCNC: 14.3 G/DL (ref 13–17.7)
MCH RBC QN AUTO: 26.6 PG (ref 26.6–33)
MCHC RBC AUTO-ENTMCNC: 31.9 G/DL (ref 31.5–35.7)
MCV RBC AUTO: 83.4 FL (ref 79–97)
PLATELET # BLD AUTO: 91 10*3/MM3 (ref 140–450)
RBC # BLD AUTO: 5.37 10*6/MM3 (ref 4.14–5.8)
WBC NRBC COR # BLD: 3.41 10*3/MM3 (ref 3.4–10.8)

## 2022-04-12 PROCEDURE — 99214 OFFICE O/P EST MOD 30 MIN: CPT | Performed by: INTERNAL MEDICINE

## 2022-04-12 PROCEDURE — 85027 COMPLETE CBC AUTOMATED: CPT

## 2022-04-12 PROCEDURE — 83880 ASSAY OF NATRIURETIC PEPTIDE: CPT

## 2022-04-12 PROCEDURE — 80053 COMPREHEN METABOLIC PANEL: CPT

## 2022-04-12 PROCEDURE — 36415 COLL VENOUS BLD VENIPUNCTURE: CPT

## 2022-04-12 PROCEDURE — 83735 ASSAY OF MAGNESIUM: CPT

## 2022-04-12 RX ORDER — ALBUTEROL SULFATE 90 UG/1
2 AEROSOL, METERED RESPIRATORY (INHALATION) EVERY 4 HOURS PRN
Qty: 8 G | Refills: 3 | Status: SHIPPED | OUTPATIENT
Start: 2022-04-12

## 2022-04-13 ENCOUNTER — TELEPHONE (OUTPATIENT)
Dept: CARDIOLOGY | Facility: CLINIC | Age: 82
End: 2022-04-13

## 2022-04-13 DIAGNOSIS — I50.22 CHRONIC SYSTOLIC CONGESTIVE HEART FAILURE: ICD-10-CM

## 2022-04-13 DIAGNOSIS — I46.9 CARDIAC ARREST: Primary | ICD-10-CM

## 2022-04-13 LAB
ALBUMIN SERPL-MCNC: 4.6 G/DL (ref 3.5–5.2)
ALBUMIN/GLOB SERPL: 1.2 G/DL
ALP SERPL-CCNC: 84 U/L (ref 39–117)
ALT SERPL W P-5'-P-CCNC: 6 U/L (ref 1–41)
ANION GAP SERPL CALCULATED.3IONS-SCNC: 17.6 MMOL/L (ref 5–15)
AST SERPL-CCNC: 12 U/L (ref 1–40)
BILIRUB SERPL-MCNC: 0.3 MG/DL (ref 0–1.2)
BUN SERPL-MCNC: 58 MG/DL (ref 8–23)
BUN/CREAT SERPL: 24.5 (ref 7–25)
CALCIUM SPEC-SCNC: 9.9 MG/DL (ref 8.6–10.5)
CHLORIDE SERPL-SCNC: 103 MMOL/L (ref 98–107)
CO2 SERPL-SCNC: 17.4 MMOL/L (ref 22–29)
CREAT SERPL-MCNC: 2.37 MG/DL (ref 0.76–1.27)
EGFRCR SERPLBLD CKD-EPI 2021: 26.5 ML/MIN/1.73
GLOBULIN UR ELPH-MCNC: 3.7 GM/DL
GLUCOSE SERPL-MCNC: 115 MG/DL (ref 65–99)
MAGNESIUM SERPL-MCNC: 2.1 MG/DL (ref 1.6–2.4)
NT-PROBNP SERPL-MCNC: 431 PG/ML (ref 0–1800)
POTASSIUM SERPL-SCNC: 5.4 MMOL/L (ref 3.5–5.2)
PROT SERPL-MCNC: 8.3 G/DL (ref 6–8.5)
SODIUM SERPL-SCNC: 138 MMOL/L (ref 136–145)

## 2022-04-13 NOTE — TELEPHONE ENCOUNTER
Called patient, spoke with patient's wife regarding results and recommendations per NSK.  She agreed to plan and verbalized understanding

## 2022-04-13 NOTE — TELEPHONE ENCOUNTER
----- Message from Javad Mercedes MD sent at 4/13/2022  8:38 AM EDT -----  Saw Mr. Cochran yesterday and ordered repeat labs.  His kidney function is up and potassium is elevated.  I think he might be dehydrated.  Lets have him stop Lasix, spironolactone, Entresto and potassium for now and I would like him to have a BMP beginning of next week.  Could we get him an appointment next week with Tierra García in heart valve to follow-up on how he is doing and potentially restart some of the above medications based on his kidney function.

## 2022-04-18 ENCOUNTER — LAB (OUTPATIENT)
Dept: LAB | Facility: HOSPITAL | Age: 82
End: 2022-04-18

## 2022-04-18 DIAGNOSIS — I42.8 NICM (NONISCHEMIC CARDIOMYOPATHY): Primary | ICD-10-CM

## 2022-04-18 DIAGNOSIS — I42.8 NICM (NONISCHEMIC CARDIOMYOPATHY): ICD-10-CM

## 2022-04-18 LAB
BASOPHILS # BLD AUTO: 0.01 10*3/MM3 (ref 0–0.2)
BASOPHILS NFR BLD AUTO: 0.3 % (ref 0–1.5)
DEPRECATED RDW RBC AUTO: 42.9 FL (ref 37–54)
EOSINOPHIL # BLD AUTO: 0.06 10*3/MM3 (ref 0–0.4)
EOSINOPHIL NFR BLD AUTO: 1.9 % (ref 0.3–6.2)
ERYTHROCYTE [DISTWIDTH] IN BLOOD BY AUTOMATED COUNT: 14.4 % (ref 12.3–15.4)
HCT VFR BLD AUTO: 40.1 % (ref 37.5–51)
HGB BLD-MCNC: 12.8 G/DL (ref 13–17.7)
IMM GRANULOCYTES # BLD AUTO: 0.02 10*3/MM3 (ref 0–0.05)
IMM GRANULOCYTES NFR BLD AUTO: 0.6 % (ref 0–0.5)
LYMPHOCYTES # BLD AUTO: 1.05 10*3/MM3 (ref 0.7–3.1)
LYMPHOCYTES NFR BLD AUTO: 33.7 % (ref 19.6–45.3)
MCH RBC QN AUTO: 26.6 PG (ref 26.6–33)
MCHC RBC AUTO-ENTMCNC: 31.9 G/DL (ref 31.5–35.7)
MCV RBC AUTO: 83.4 FL (ref 79–97)
MONOCYTES # BLD AUTO: 0.27 10*3/MM3 (ref 0.1–0.9)
MONOCYTES NFR BLD AUTO: 8.7 % (ref 5–12)
NEUTROPHILS NFR BLD AUTO: 1.71 10*3/MM3 (ref 1.7–7)
NEUTROPHILS NFR BLD AUTO: 54.8 % (ref 42.7–76)
NRBC BLD AUTO-RTO: 0 /100 WBC (ref 0–0.2)
PLATELET # BLD AUTO: 92 10*3/MM3 (ref 140–450)
PMV BLD AUTO: 12.7 FL (ref 6–12)
RBC # BLD AUTO: 4.81 10*6/MM3 (ref 4.14–5.8)
WBC NRBC COR # BLD: 3.12 10*3/MM3 (ref 3.4–10.8)

## 2022-04-18 PROCEDURE — 80053 COMPREHEN METABOLIC PANEL: CPT

## 2022-04-18 PROCEDURE — 83880 ASSAY OF NATRIURETIC PEPTIDE: CPT

## 2022-04-18 PROCEDURE — 36415 COLL VENOUS BLD VENIPUNCTURE: CPT

## 2022-04-18 PROCEDURE — 85025 COMPLETE CBC W/AUTO DIFF WBC: CPT

## 2022-04-19 ENCOUNTER — OFFICE VISIT (OUTPATIENT)
Dept: CARDIOLOGY | Facility: HOSPITAL | Age: 82
End: 2022-04-19

## 2022-04-19 VITALS
SYSTOLIC BLOOD PRESSURE: 153 MMHG | BODY MASS INDEX: 29.26 KG/M2 | RESPIRATION RATE: 20 BRPM | OXYGEN SATURATION: 98 % | TEMPERATURE: 96.5 F | DIASTOLIC BLOOD PRESSURE: 69 MMHG | HEIGHT: 70 IN | WEIGHT: 204.38 LBS | HEART RATE: 58 BPM

## 2022-04-19 DIAGNOSIS — I50.32 CHRONIC HEART FAILURE WITH PRESERVED EJECTION FRACTION: Primary | ICD-10-CM

## 2022-04-19 DIAGNOSIS — I10 ESSENTIAL HYPERTENSION: ICD-10-CM

## 2022-04-19 DIAGNOSIS — I38 VALVULAR HEART DISEASE: ICD-10-CM

## 2022-04-19 DIAGNOSIS — I25.10 CORONARY ARTERY DISEASE INVOLVING NATIVE CORONARY ARTERY OF NATIVE HEART WITHOUT ANGINA PECTORIS: ICD-10-CM

## 2022-04-19 DIAGNOSIS — E87.5 HYPERKALEMIA: ICD-10-CM

## 2022-04-19 DIAGNOSIS — N17.9 AKI (ACUTE KIDNEY INJURY): ICD-10-CM

## 2022-04-19 LAB
ALBUMIN SERPL-MCNC: 4.1 G/DL (ref 3.5–5.2)
ALBUMIN/GLOB SERPL: 1.2 G/DL
ALP SERPL-CCNC: 70 U/L (ref 39–117)
ALT SERPL W P-5'-P-CCNC: 5 U/L (ref 1–41)
ANION GAP SERPL CALCULATED.3IONS-SCNC: 12.2 MMOL/L (ref 5–15)
AST SERPL-CCNC: 15 U/L (ref 1–40)
BILIRUB SERPL-MCNC: 0.4 MG/DL (ref 0–1.2)
BUN SERPL-MCNC: 25 MG/DL (ref 8–23)
BUN/CREAT SERPL: 17 (ref 7–25)
CALCIUM SPEC-SCNC: 9.7 MG/DL (ref 8.6–10.5)
CHLORIDE SERPL-SCNC: 108 MMOL/L (ref 98–107)
CO2 SERPL-SCNC: 19.8 MMOL/L (ref 22–29)
CREAT SERPL-MCNC: 1.47 MG/DL (ref 0.76–1.27)
EGFRCR SERPLBLD CKD-EPI 2021: 47 ML/MIN/1.73
GLOBULIN UR ELPH-MCNC: 3.4 GM/DL
GLUCOSE SERPL-MCNC: 105 MG/DL (ref 65–99)
NT-PROBNP SERPL-MCNC: 2377 PG/ML (ref 0–1800)
POTASSIUM SERPL-SCNC: 5.1 MMOL/L (ref 3.5–5.2)
PROT SERPL-MCNC: 7.5 G/DL (ref 6–8.5)
SODIUM SERPL-SCNC: 140 MMOL/L (ref 136–145)

## 2022-04-19 PROCEDURE — 99214 OFFICE O/P EST MOD 30 MIN: CPT | Performed by: NURSE PRACTITIONER

## 2022-04-19 NOTE — PATIENT INSTRUCTIONS
Restart Enstresto twice a day    Labs in 1-2 weeks.     May take Lasix if needed for weight gain 3 lbs, swelling, or shortness of breath.  Use sparingly.

## 2022-04-19 NOTE — PROGRESS NOTES
"[facility], [facility] Heart and Vascular    Chief Complaint  Congestive Heart Failure    Subjective    History of Present Illness {CC  Problem List  Visit  Diagnosis   Encounters  Notes  Medications  Labs  Result Review Imaging  Media :23}     Narendar Cochran presents to [facility] CARDIOLOGY for   History of Present Illness       81-year-old male with heart failure with preserved EF, valvular heart disease.  EF had been 36% in 2020 echocardiogram 3/13/2021: EF 50%, moderate MR, moderate AI.  CAD with left heart catheterization March 2021 single one-vessel CAD (small posterior lateral branch of the RCA).  History of PVCs (amiodarone discontinued 2020), pacemaker/ICD ([employer] dual chamber ICD) after cardiac arrest March 2021, sinus node dysfunction, first-degree AV block, hypertension, dyslipidemia, diabetes, COPD      Patient had labs completed on 04/12/22 with elevated creatinine and elevated potassium.  Lasix, spironolactone, Entresto and potassium were held.  Plan to have repeat BMP next week with f[facility].    Pt denies worsening dyspnea, edema, weight gain, PND, orthopnea, CP or pressure.        Objective     Vital Signs:   Vitals:    04/19/22 1246   BP: 153/69   BP Location: Left arm   Patient Position: Sitting   Cuff Size: Adult   Pulse: 58   Resp: 20   Temp: 96.5 °F (35.8 °C)   TempSrc: Temporal   SpO2: 98%   Weight: 92.7 kg (204 lb 6 oz)   Height: 177.8 cm (70\")     Body mass index is 29.32 kg/m².  Physical Exam  Vitals reviewed.   Constitutional:       General: He is not in acute distress.     Appearance: Normal appearance.   Cardiovascular:      Rate and Rhythm: Normal rate and regular rhythm.      Pulses:           Radial pulses are 2+ on the right side.        Dorsalis pedis pulses are 2+ on the right side.        Posterior tibial pulses are 2+ on the right side.      Heart sounds: Normal heart sounds.   Pulmonary:      Effort: " Pulmonary effort is normal.      Breath sounds: Normal breath sounds.   Abdominal:      Palpations: Abdomen is soft.      Tenderness: There is no abdominal tenderness.   Musculoskeletal:      Right lower leg: No edema.      Left lower leg: No edema.   Skin:     General: Skin is warm and dry.   Neurological:      Mental Status: He is alert.   Psychiatric:         Mood and Affect: Mood normal.         Behavior: Behavior is cooperative.              Result Review  Data Reviewed:{ Labs  Result Review  Imaging  Med Tab  Media :23}   [facility] March 2020 with mild nonobstructive CAD, EF 36-40%  -Witnessed out of hospital cardiac arrest 3/12/2021 with documented bystander CPR with a single shock from AED with transfer to [facility] per EMS.  -Echo 3/13/2021 EF 50%, no significant VHD  -[facility] 3/14/2021 per Dr. Sahni with documented severe 1-vessel CAD involving a small posterior lateral branch of the of the RCA with no interval change to prior angiography  -[facility] DDD ICD implant 3/16/2021 for secondary prevention of SCD    Holter monitor February 2020:  PVCs burden 12.3%     Lab Results   Component Value Date    WBC 3.12 (L) 04/18/2022    HGB 12.8 (L) 04/18/2022    HCT 40.1 04/18/2022    MCV 83.4 04/18/2022    PLT 92 (L) 04/18/2022     Lab Results   Component Value Date    GLUCOSE 105 (H) 04/18/2022    CALCIUM 9.7 04/18/2022     04/18/2022    K 5.1 04/18/2022    CO2 19.8 (L) 04/18/2022     (H) 04/18/2022    BUN 25 (H) 04/18/2022    CREATININE 1.47 (H) 04/18/2022    EGFRIFAFRI 61 02/17/2022    EGFRIFNONA 64 10/21/2019    BCR 17.0 04/18/2022    ANIONGAP 12.2 04/18/2022     Lab Results   Component Value Date    TSH 6.720 (H) 03/12/2021     Lab Results   Component Value Date    CHOL 172 08/12/2021    CHOL 91 03/14/2021    CHOL 131 03/18/2020     Lab Results   Component Value Date    TRIG 108 08/12/2021    TRIG 104 03/14/2021    TRIG 77 03/18/2020     Lab Results   Component Value Date    HDL 57 08/12/2021    HDL 40 03/14/2021     HDL 58 03/18/2020     Lab Results   Component Value Date    LDL 96 08/12/2021    LDL 31 03/14/2021    LDL 58 03/18/2020          Assessment and Plan {CC Problem List  Visit Diagnosis  ROS  Review (Popup)  Health Maintenance  Quality  BestPractice  Medications  SmartSets  SnapShot Encounters  Media :23}   1. Chronic heart failure with preserved ejection fraction (HCC)  Restart Enstresto  Hold aldactone at this time  Lasix PRN for weight gain 3-5 lbs, dyspnea, edema.      - Basic Metabolic Panel; 1-2 weeks    2. Valvular heart disease  Stable today    3. Essential hypertension  Elevated but has been off meds    4. Coronary artery disease involving native coronary artery of native heart without angina pectoris  No CP or pressure    5. KENNEDY (acute kidney injury) (HCC)  Improved with hold spironolactone, Entresto, Lasix  - Basic Metabolic Panel; Future    6. Hyperkalemia  Improved.  Continue to hold spironolactone and potassium supplement.  - Basic Metabolic Panel; Future          Follow Up {Instructions Charge Capture  Follow-up Communications :23}   Return in about 4 weeks (around 5/17/2022) for Office visit, HTN, HF.    Patient was given instructions and counseling regarding his condition or for health maintenance advice. Please see specific information pulled into the AVS if appropriate.  Patient was instructed to call the [facility] with any questions, concerns, or worsening symptoms.

## 2022-04-27 DIAGNOSIS — E87.6 HYPOKALEMIA: ICD-10-CM

## 2022-04-27 RX ORDER — POTASSIUM CHLORIDE 1500 MG/1
TABLET, FILM COATED, EXTENDED RELEASE ORAL
Qty: 30 TABLET | Refills: 1 | OUTPATIENT
Start: 2022-04-27

## 2022-04-28 RX ORDER — BLOOD SUGAR DIAGNOSTIC
STRIP MISCELLANEOUS
Qty: 100 EACH | Refills: 2 | Status: SHIPPED | OUTPATIENT
Start: 2022-04-28 | End: 2022-09-06

## 2022-04-28 NOTE — TELEPHONE ENCOUNTER
Rx Refill Note  Requested Prescriptions     Pending Prescriptions Disp Refills   • glucose blood (OneTouch Ultra) test strip [Pharmacy Med Name: ONE TOUCH ULTRA BLUE TEST STRP]       Sig: USE AS INSTRUCTED PER MD      Last office visit with prescribing clinician: 3/1/2022      Next office visit with prescribing clinician: 6/2/2022            Elvira Treviño MA  04/28/22, 10:38 EDT

## 2022-05-01 PROCEDURE — 93295 DEV INTERROG REMOTE 1/2/MLT: CPT | Performed by: INTERNAL MEDICINE

## 2022-05-01 PROCEDURE — 93296 REM INTERROG EVL PM/IDS: CPT | Performed by: INTERNAL MEDICINE

## 2022-05-03 ENCOUNTER — TELEPHONE (OUTPATIENT)
Dept: CARDIOLOGY | Facility: HOSPITAL | Age: 82
End: 2022-05-03

## 2022-05-03 NOTE — TELEPHONE ENCOUNTER
Received message from [facility] reporting that patient's Latitude home monitoring reported elevated heart logic.  Recently patient's Lasix, Aldactone, Entresto were put on hold.  We recently restarted his Entresto.  We will continue to monitor at this time.  Patient has scheduled follow-up.   29-Sep-2018 17:22

## 2022-05-06 ENCOUNTER — LAB (OUTPATIENT)
Dept: LAB | Facility: HOSPITAL | Age: 82
End: 2022-05-06

## 2022-05-06 DIAGNOSIS — N17.9 AKI (ACUTE KIDNEY INJURY): ICD-10-CM

## 2022-05-06 DIAGNOSIS — I50.32 CHRONIC HEART FAILURE WITH PRESERVED EJECTION FRACTION: ICD-10-CM

## 2022-05-06 DIAGNOSIS — E87.5 HYPERKALEMIA: ICD-10-CM

## 2022-05-06 PROCEDURE — 36415 COLL VENOUS BLD VENIPUNCTURE: CPT

## 2022-05-06 PROCEDURE — 80048 BASIC METABOLIC PNL TOTAL CA: CPT

## 2022-05-07 LAB
ANION GAP SERPL CALCULATED.3IONS-SCNC: 11.6 MMOL/L (ref 5–15)
BUN SERPL-MCNC: 8 MG/DL (ref 8–23)
BUN/CREAT SERPL: 7.6 (ref 7–25)
CALCIUM SPEC-SCNC: 9.3 MG/DL (ref 8.6–10.5)
CHLORIDE SERPL-SCNC: 107 MMOL/L (ref 98–107)
CO2 SERPL-SCNC: 25.4 MMOL/L (ref 22–29)
CREAT SERPL-MCNC: 1.05 MG/DL (ref 0.76–1.27)
EGFRCR SERPLBLD CKD-EPI 2021: 70.4 ML/MIN/1.73
GLUCOSE SERPL-MCNC: 105 MG/DL (ref 65–99)
POTASSIUM SERPL-SCNC: 3.5 MMOL/L (ref 3.5–5.2)
SODIUM SERPL-SCNC: 144 MMOL/L (ref 136–145)

## 2022-05-09 ENCOUNTER — TELEPHONE (OUTPATIENT)
Dept: CARDIOLOGY | Facility: CLINIC | Age: 82
End: 2022-05-09

## 2022-05-09 NOTE — TELEPHONE ENCOUNTER
Per [facility] [place] cardiac device transmission received today, 5/9/2022:    HeartLogic Index elevated @ 26 (threshold 16) with a corresponding decrease in thoracic impedance 36.6?.     I spoke with patient’s spouse who reports that patient took a dose of Lasix Friday, 5/6/2022; additionally, spouse states pt does not perform daily weights.

## 2022-05-10 ENCOUNTER — TELEPHONE (OUTPATIENT)
Dept: CARDIOLOGY | Facility: HOSPITAL | Age: 82
End: 2022-05-10

## 2022-05-10 DIAGNOSIS — I50.22 CHRONIC SYSTOLIC CONGESTIVE HEART FAILURE: ICD-10-CM

## 2022-05-10 RX ORDER — FUROSEMIDE 40 MG/1
TABLET ORAL
Qty: 30 TABLET | Refills: 6 | Status: SHIPPED | OUTPATIENT
Start: 2022-05-10 | End: 2022-07-05 | Stop reason: SDUPTHER

## 2022-05-10 NOTE — TELEPHONE ENCOUNTER
Started lasix 40 mg 3 times per week Monday wed, Friday.  Wife able to teach back instructions.  F/u as scheduled.

## 2022-05-20 ENCOUNTER — OFFICE VISIT (OUTPATIENT)
Dept: CARDIOLOGY | Facility: HOSPITAL | Age: 82
End: 2022-05-20

## 2022-05-20 VITALS
HEART RATE: 61 BPM | DIASTOLIC BLOOD PRESSURE: 69 MMHG | OXYGEN SATURATION: 98 % | RESPIRATION RATE: 13 BRPM | TEMPERATURE: 96.9 F | HEIGHT: 70 IN | SYSTOLIC BLOOD PRESSURE: 142 MMHG | WEIGHT: 211.8 LBS | BODY MASS INDEX: 30.32 KG/M2

## 2022-05-20 DIAGNOSIS — I50.32 CHRONIC HEART FAILURE WITH PRESERVED EJECTION FRACTION: Primary | ICD-10-CM

## 2022-05-20 DIAGNOSIS — I38 VALVULAR HEART DISEASE: ICD-10-CM

## 2022-05-20 DIAGNOSIS — I10 ESSENTIAL HYPERTENSION: ICD-10-CM

## 2022-05-20 DIAGNOSIS — I25.10 CORONARY ARTERY DISEASE INVOLVING NATIVE CORONARY ARTERY OF NATIVE HEART WITHOUT ANGINA PECTORIS: ICD-10-CM

## 2022-05-20 PROCEDURE — 99214 OFFICE O/P EST MOD 30 MIN: CPT | Performed by: NURSE PRACTITIONER

## 2022-05-20 NOTE — PROGRESS NOTES
"[facility], [facility] Heart and Vascular    Chief Complaint  Follow-up (Heart failure/)    Subjective    History of Present Illness {  Problem List  Visit  Diagnosis   Encounters  Notes  Medications  Labs  Result Review Imaging  Media :23}     Narendra Cochran presents to [facility] CARDIOLOGY for   History of Present Illness     81-year-old male with history of heart failure with preserved EF, valvular heart disease.  EF had been 36% in 2020.  Echocardiogram 3/13/2021 showed improved EF of 50%, moderate MR, moderate AI.  History of CAD, PVCs, pacemaker/ICD ([employer] dual-chamber ICD) placed in March 2021 after cardiac arrest, sinus node dysfunction, first-degree AV block, hypertension, dyslipidemia, diabetes, COPD.    In March patient's creatinine was elevated.  Lasix spironolactone, Entresto and potassium supplement were held.  Last office visit creatinine improved with restarting Entresto.  Patient's heart logic index elevated at 26 (4 shoulder 16).  Patient restarted on Lasix 40 mg 3 times a week, Monday, Wednesday, Friday    Pt reports 7 lb weight gain.  Increased swelling.  No worsening dyspnea.  No CP or pressure, dizziness, near syncope syncope.       Objective     Vital Signs:   Vitals:    05/20/22 1021   BP: 142/69   BP Location: Left arm   Patient Position: Sitting   Cuff Size: Adult   Pulse: 61   Resp: 13   Temp: 96.9 °F (36.1 °C)   TempSrc: Temporal   SpO2: 98%   Weight: 96.1 kg (211 lb 12.8 oz)   Height: 177.8 cm (70\")     Body mass index is 30.39 kg/m².  Physical Exam  Vitals reviewed.   Constitutional:       General: He is not in acute distress.     Appearance: Normal appearance.   Cardiovascular:      Rate and Rhythm: Normal rate and regular rhythm.      Pulses:           Radial pulses are 2+ on the right side.        Dorsalis pedis pulses are 2+ on the right side.        Posterior tibial pulses are 2+ on the right side.      Heart " sounds: Normal heart sounds.   Pulmonary:      Effort: Pulmonary effort is normal.      Breath sounds: Normal breath sounds.   Abdominal:      Palpations: Abdomen is soft.      Tenderness: There is no abdominal tenderness.   Musculoskeletal:      Right lower leg: Edema present.      Left lower leg: Edema present.   Skin:     General: Skin is warm and dry.   Neurological:      Mental Status: He is alert.   Psychiatric:         Mood and Affect: Mood normal.         Behavior: Behavior is cooperative.              Result Review  Data Reviewed:{ Labs  Result Review  Imaging  Med Tab  Media :23}   Echocardiogram 3/13/2021 showed improved EF of 50%, moderate MR, moderate AI.     Lab Results   Component Value Date    GLUCOSE 105 (H) 05/06/2022    CALCIUM 9.3 05/06/2022     05/06/2022    K 3.5 05/06/2022    CO2 25.4 05/06/2022     05/06/2022    BUN 8 05/06/2022    CREATININE 1.05 05/06/2022    EGFRIFAFRI 61 02/17/2022    EGFRIFNONA 64 10/21/2019    BCR 7.6 05/06/2022    ANIONGAP 11.6 05/06/2022       Assessment and Plan {CC Problem List  Visit Diagnosis  ROS  Review (Popup)  Health Maintenance  Quality  BestPractice  Medications  SmartSets  SnapShot Encounters  Media :23}   1. Chronic heart failure with preserved ejection fraction (HCC)  Hx of low EF now improved    Enstresto, BB  Restart aldactone  Continue Lasix 40 mg 3 times per week  - Basic Metabolic Panel; Future    2. Valvular heart disease    - Basic Metabolic Panel; Future    3. Coronary artery disease involving native coronary artery of native heart without angina pectoris  No CP or pressure  Asa, statin    4. Essential hypertension  Stable continue to monitor.             Follow Up {Instructions Charge Capture  Follow-up Communications :23}   Return in about 6 weeks (around 7/1/2022) for Office visit, HF.    Patient was given instructions and counseling regarding his condition or for health maintenance advice. Please see specific  information pulled into the AVS if appropriate.  Patient was instructed to call the [facility] with any questions, concerns, or worsening symptoms.

## 2022-06-02 ENCOUNTER — LAB (OUTPATIENT)
Dept: LAB | Facility: HOSPITAL | Age: 82
End: 2022-06-02

## 2022-06-02 ENCOUNTER — OFFICE VISIT (OUTPATIENT)
Dept: FAMILY MEDICINE CLINIC | Facility: CLINIC | Age: 82
End: 2022-06-02

## 2022-06-02 VITALS
DIASTOLIC BLOOD PRESSURE: 70 MMHG | BODY MASS INDEX: 30.35 KG/M2 | HEIGHT: 70 IN | WEIGHT: 212 LBS | SYSTOLIC BLOOD PRESSURE: 142 MMHG | TEMPERATURE: 98.7 F | OXYGEN SATURATION: 96 % | HEART RATE: 62 BPM

## 2022-06-02 DIAGNOSIS — R56.9 SEIZURE: Chronic | ICD-10-CM

## 2022-06-02 DIAGNOSIS — I50.22 CHRONIC SYSTOLIC CONGESTIVE HEART FAILURE: ICD-10-CM

## 2022-06-02 DIAGNOSIS — M25.562 CHRONIC PAIN OF BOTH KNEES: ICD-10-CM

## 2022-06-02 DIAGNOSIS — I50.32 CHRONIC HEART FAILURE WITH PRESERVED EJECTION FRACTION: ICD-10-CM

## 2022-06-02 DIAGNOSIS — M25.561 CHRONIC PAIN OF BOTH KNEES: ICD-10-CM

## 2022-06-02 DIAGNOSIS — I38 VALVULAR HEART DISEASE: ICD-10-CM

## 2022-06-02 DIAGNOSIS — G89.29 CHRONIC PAIN OF BOTH KNEES: ICD-10-CM

## 2022-06-02 DIAGNOSIS — I10 ESSENTIAL HYPERTENSION: ICD-10-CM

## 2022-06-02 DIAGNOSIS — J44.9 CHRONIC OBSTRUCTIVE PULMONARY DISEASE, UNSPECIFIED COPD TYPE: ICD-10-CM

## 2022-06-02 DIAGNOSIS — N18.31 STAGE 3A CHRONIC KIDNEY DISEASE: ICD-10-CM

## 2022-06-02 DIAGNOSIS — E11.65 TYPE 2 DIABETES MELLITUS WITH HYPERGLYCEMIA, WITHOUT LONG-TERM CURRENT USE OF INSULIN: Primary | ICD-10-CM

## 2022-06-02 DIAGNOSIS — M15.9 PRIMARY OSTEOARTHRITIS INVOLVING MULTIPLE JOINTS: ICD-10-CM

## 2022-06-02 LAB
ANION GAP SERPL CALCULATED.3IONS-SCNC: 12 MMOL/L (ref 5–15)
BUN SERPL-MCNC: 15 MG/DL (ref 8–23)
BUN/CREAT SERPL: 15.5 (ref 7–25)
CALCIUM SPEC-SCNC: 9.1 MG/DL (ref 8.6–10.5)
CHLORIDE SERPL-SCNC: 101 MMOL/L (ref 98–107)
CO2 SERPL-SCNC: 27 MMOL/L (ref 22–29)
CREAT SERPL-MCNC: 0.97 MG/DL (ref 0.76–1.27)
EGFRCR SERPLBLD CKD-EPI 2021: 77.5 ML/MIN/1.73
EXPIRATION DATE: NORMAL
GLUCOSE SERPL-MCNC: 106 MG/DL (ref 65–99)
HBA1C MFR BLD: 6.4 %
Lab: NORMAL
POTASSIUM SERPL-SCNC: 2.9 MMOL/L (ref 3.5–5.2)
SODIUM SERPL-SCNC: 140 MMOL/L (ref 136–145)

## 2022-06-02 PROCEDURE — 80048 BASIC METABOLIC PNL TOTAL CA: CPT

## 2022-06-02 PROCEDURE — 99214 OFFICE O/P EST MOD 30 MIN: CPT | Performed by: PHYSICIAN ASSISTANT

## 2022-06-02 PROCEDURE — 83036 HEMOGLOBIN GLYCOSYLATED A1C: CPT | Performed by: PHYSICIAN ASSISTANT

## 2022-06-02 PROCEDURE — 3044F HG A1C LEVEL LT 7.0%: CPT | Performed by: PHYSICIAN ASSISTANT

## 2022-06-02 RX ORDER — KETOROLAC TROMETHAMINE 5 MG/ML
SOLUTION OPHTHALMIC
Status: ON HOLD | COMMUNITY
Start: 2022-05-11 | End: 2022-08-01

## 2022-06-02 NOTE — PROGRESS NOTES
Chief Complaint   Patient presents with   • Follow-up   • Diabetes     Type 2    • Hypertension   • Chronic Kidney Disease   • Ear Fullness       HPI     Narendra Cochran is a pleasant 81 y.o. male who is here for routine follow-up of type 2 diabetes, CKD stage III, COPD, hypertension, CHF, history of seizures, bilateral chronic knee pain.  Patient's wife is present today.  Patient is compliant on all medications.  He is followed closely by cardiology.  Blood pressure is borderline today.  He denies any worsening shortness of breath.  States he uses his inhaler as needed and this works well.  No seizures since starting Keppra.  His main concern today is his bilateral knee pain.  He has gait stability due to the pain and is using a cane.  He had injections in one of his knees a few years ago and received lots of benefit with this intervention.  He is not currently established with an orthopedic provider.    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   • Paget disease of bone        Past Surgical History:   Procedure Laterality Date   • APPENDECTOMY     • CARDIAC CATHETERIZATION N/A 3/18/2020    Procedure: Left Heart Cath;  Surgeon: Sonya Garibay MD;  Location:  [facility];  Service: Cardiology;  Laterality: N/A;  First availabel provider     • CARDIAC CATHETERIZATION N/A 3/15/2021    Procedure: LEFT HEART CATH;  Surgeon: Doc Sahni IV, MD;  Location:  [facility];  Service: Cardiovascular;  Laterality: N/A;   • CARDIAC CATHETERIZATION N/A 3/15/2021    Procedure: Coronary angiography;  Surgeon: Doc Sahni IV  "MD;  Location:  [facility];  Service: Cardiovascular;  Laterality: N/A;   • CARDIAC ELECTROPHYSIOLOGY PROCEDURE N/A 3/16/2021    Procedure: ICD DC new;  Surgeon: Som Boyd DO;  Location:  [facility];  Service: Cardiology;  Laterality: N/A;   • COLON RESECTION      second to diverticulitis    • FOOT SURGERY Left        Family History   Problem Relation Age of Onset   • No Known Problems Daughter    • No Known Problems Son    • No Known Problems Son    • No Known Problems Son    • Diabetes Sister    • Clotting disorder Sister    • Hyperlipidemia Mother    • No Known Problems Sister    • No Known Problems Brother    • Fainting Brother        Social History     Socioeconomic History   • Marital status:    Tobacco Use   • Smoking status: Former Smoker     Packs/day: 0.25     Years: 65.00     Pack years: 16.25     Types: Cigars, Cigarettes, Cigarettes, Cigars     Quit date: 2019     Years since quittin.7   • Smokeless tobacco: Never Used   Vaping Use   • Vaping Use: Never used   Substance and Sexual Activity   • Alcohol use: Yes     Comment: very rarely   • Drug use: Yes     Types: Marijuana     Comment: Pt states used weekly but now quitting    • Sexual activity: Defer       No Known Allergies    ROS  Review of Systems   Constitutional: Negative for chills and fever.   HENT: Positive for hearing loss.    Respiratory: Negative for cough, shortness of breath and wheezing.    Cardiovascular: Negative for chest pain.   Musculoskeletal: Positive for arthralgias and gait problem. Negative for joint swelling.   Neurological: Negative for dizziness and headache.       Vitals:    22 1058   BP: 142/70   BP Location: Right arm   Patient Position: Sitting   Cuff Size: Adult   Pulse: 62   Temp: 98.7 °F (37.1 °C)   SpO2: 96%   Weight: 96.2 kg (212 lb)   Height: 177.8 cm (70\")   PainSc: 0-No pain     Body mass index is 30.42 kg/m².    Current Outpatient Medications on File " Prior to Visit   Medication Sig Dispense Refill   • albuterol sulfate  (90 Base) MCG/ACT inhaler Inhale 2 puffs Every 4 (Four) Hours As Needed for Wheezing or Shortness of Air. 8 g 3   • aspirin 81 MG chewable tablet Chew 1 tablet Daily.     • Blood Pressure Monitoring (BLOOD PRESSURE MONITOR/L CUFF) misc 1 Units Daily. 1 each 0   • diclofenac (VOLTAREN) 1 % gel gel Apply 4 g topically to the appropriate area as directed 4 (Four) Times a Day As Needed (prn for pain). 60 tube 0   • furosemide (LASIX) 40 MG tablet 3 days a week (Mon, Wed, Fri) 30 tablet 6   • glucose blood (OneTouch Ultra) test strip USE AS INSTRUCTED PER  each 2   • glucose monitor monitoring kit 1 each Daily. DX: E11.65 1 each 0   • ketorolac (ACULAR) 0.5 % ophthalmic solution PLEASE SEE ATTACHED FOR DETAILED DIRECTIONS     • Lancets (OneTouch Delica Plus [id number]) misc USE TO CHECK BLOOD GLUCOSE ONCE DAILY 100 each 0   • levETIRAcetam (KEPPRA) 750 MG tablet Take 1 tablet by mouth 2 (Two) Times a Day. 180 tablet 1   • metFORMIN ER (GLUCOPHAGE-XR) 500 MG 24 hr tablet Take 1 tablet by mouth Daily With Breakfast. 90 tablet 1   • metoprolol succinate XL (TOPROL-XL) 50 MG 24 hr tablet Take 1 tablet by mouth Daily. 30 tablet 6   • pantoprazole (PROTONIX) 40 MG EC tablet TAKE 1 TABLET BY MOUTH TWICE A  tablet 0   • rosuvastatin (CRESTOR) 20 MG tablet Take 1 tablet by mouth Daily. 90 tablet 3   • sacubitril-valsartan (Entresto) 24-26 MG tablet Take 1 tablet by mouth 2 (Two) Times a Day. 60 tablet 6   • spironolactone (ALDACTONE) 25 MG tablet Take 1 tablet by mouth Daily. 30 tablet 6   • tiotropium bromide monohydrate (Spiriva Respimat) 2.5 MCG/ACT aerosol solution inhaler Inhale 2 puffs Daily. 4 g 5     No current facility-administered medications on file prior to visit.       Results for orders placed or performed in visit on 06/02/22   POC Glycosylated Hemoglobin (Hb A1C)    Specimen: Blood   Result Value Ref Range    Hemoglobin A1C  6.4 %    Lot Number [id number]     Expiration Date 02/04/24        PE    Physical Exam  Vitals reviewed.   Constitutional:       General: He is not in acute distress.     Appearance: Normal appearance. He is well-developed. He is obese. He is not ill-appearing or diaphoretic.   HENT:      Head: Normocephalic and atraumatic.   Eyes:      Extraocular Movements: Extraocular movements intact.      Conjunctiva/sclera: Conjunctivae normal.   Cardiovascular:      Rate and Rhythm: Normal rate and regular rhythm.   Pulmonary:      Effort: Pulmonary effort is normal.      Breath sounds: Normal breath sounds.   Musculoskeletal:      Cervical back: Normal range of motion.      Right knee: Decreased range of motion. Tenderness present.      Left knee: Decreased range of motion. Tenderness present.      Right lower leg: No edema.      Left lower leg: No edema.      Comments: Using cane to ambulate due to antalgic gait secondary to knee pain   Skin:     General: Skin is warm.      Findings: No erythema or rash.   Neurological:      General: No focal deficit present.      Mental Status: He is alert.   Psychiatric:         Attention and Perception: Attention and perception normal. He is attentive.         Mood and Affect: Mood and affect normal.         Speech: Speech normal.         Behavior: Behavior normal. Behavior is cooperative.         Thought Content: Thought content normal.         Cognition and Memory: Cognition and memory normal.         Judgment: Judgment normal.         A/P    Diagnoses and all orders for this visit:    1. Type 2 diabetes mellitus with hyperglycemia, without long-term current use of insulin (HCC) (Primary)  -     POC Glycosylated Hemoglobin (Hb A1C)  Previous hemoglobin A1c was 6.3%, hemoglobin A1c today 6.4%.  Patient is compliant on metformin.    He is on ARB and statin.  Recently seen by ophthalmology and had cataract surgery.    2. Stage 3a chronic kidney disease (HCC)  BMP ordered by cardiology,  patient will obtain lab work today.    3. Chronic obstructive pulmonary disease, unspecified COPD type (HCC)  Denies any worsening shortness of breath.  Uses inhaler as needed and this works well.    4. Essential hypertension  Borderline hypertensive today.  Followed by cardiology.  Compliant on all medications.    5. Chronic systolic congestive heart failure (HCC)  Followed by cardiology.  On beta-blocker, Entresto and Lasix and spironolactone.    6. H/O seizures on Keppra  No issues with seizures since starting Keppra.    7. Chronic pain of both knees  -     Ambulatory Referral to Orthopedic Surgery    8. Primary osteoarthritis involving multiple joints  -     Ambulatory Referral to Orthopedic Surgery  Main complaint today is bilateral knee pain that is limiting his ability to walk.  He previously had injections by orthopedic surgery and felt this was very beneficial.  He would like to have this done again if possible.  We will start referral to orthopedic surgery for further evaluation and recommendations.       Plan of care reviewed with patient at the conclusion of today's visit. Education was provided regarding diagnosis, management and any prescribed or recommended OTC medications.  Patient verbalizes understanding of and agreement with management plan.    Return in about 3 months (around 9/2/2022) for Medicare Wellness.     Marguerite Moore PA-C

## 2022-06-03 ENCOUNTER — TELEPHONE (OUTPATIENT)
Dept: CARDIOLOGY | Facility: HOSPITAL | Age: 82
End: 2022-06-03

## 2022-06-03 DIAGNOSIS — I50.22 CHRONIC SYSTOLIC CONGESTIVE HEART FAILURE: ICD-10-CM

## 2022-06-03 DIAGNOSIS — E87.6 HYPOKALEMIA: Primary | ICD-10-CM

## 2022-06-03 RX ORDER — POTASSIUM CHLORIDE 1500 MG/1
20 TABLET, FILM COATED, EXTENDED RELEASE ORAL DAILY
Qty: 90 TABLET | Refills: 3 | Status: SHIPPED | OUTPATIENT
Start: 2022-06-03 | End: 2022-06-24 | Stop reason: SDUPTHER

## 2022-06-03 NOTE — TELEPHONE ENCOUNTER
Please call patient.     His potassium level is low, but his kidney function is normal.    Continue that patient is taking spironolactone 25 mg 1 tablet daily    Have patient restart potassium chloride 20 mEq daily    Repeat labs in 1-2 weeks.

## 2022-06-22 ENCOUNTER — TELEPHONE (OUTPATIENT)
Dept: CARDIOLOGY | Facility: HOSPITAL | Age: 82
End: 2022-06-22

## 2022-06-22 ENCOUNTER — LAB (OUTPATIENT)
Dept: LAB | Facility: HOSPITAL | Age: 82
End: 2022-06-22

## 2022-06-22 ENCOUNTER — OFFICE VISIT (OUTPATIENT)
Dept: ORTHOPEDIC SURGERY | Facility: CLINIC | Age: 82
End: 2022-06-22

## 2022-06-22 VITALS
HEIGHT: 70 IN | BODY MASS INDEX: 30.35 KG/M2 | DIASTOLIC BLOOD PRESSURE: 70 MMHG | WEIGHT: 212 LBS | SYSTOLIC BLOOD PRESSURE: 130 MMHG

## 2022-06-22 DIAGNOSIS — M25.562 PAIN IN BOTH KNEES, UNSPECIFIED CHRONICITY: Primary | ICD-10-CM

## 2022-06-22 DIAGNOSIS — E87.6 HYPOKALEMIA: ICD-10-CM

## 2022-06-22 DIAGNOSIS — M88.9 PAGET DISEASE OF BONE: ICD-10-CM

## 2022-06-22 DIAGNOSIS — M25.561 PAIN IN BOTH KNEES, UNSPECIFIED CHRONICITY: Primary | ICD-10-CM

## 2022-06-22 DIAGNOSIS — M17.0 PRIMARY OSTEOARTHRITIS OF BOTH KNEES: ICD-10-CM

## 2022-06-22 DIAGNOSIS — I50.22 CHRONIC SYSTOLIC CONGESTIVE HEART FAILURE: ICD-10-CM

## 2022-06-22 LAB
ANION GAP SERPL CALCULATED.3IONS-SCNC: 10.8 MMOL/L (ref 5–15)
BUN SERPL-MCNC: 13 MG/DL (ref 8–23)
BUN/CREAT SERPL: 14.3 (ref 7–25)
CALCIUM SPEC-SCNC: 8.8 MG/DL (ref 8.6–10.5)
CHLORIDE SERPL-SCNC: 105 MMOL/L (ref 98–107)
CO2 SERPL-SCNC: 25.2 MMOL/L (ref 22–29)
CREAT SERPL-MCNC: 0.91 MG/DL (ref 0.76–1.27)
EGFRCR SERPLBLD CKD-EPI 2021: 83.6 ML/MIN/1.73
GLUCOSE SERPL-MCNC: 105 MG/DL (ref 65–99)
POTASSIUM SERPL-SCNC: 3.2 MMOL/L (ref 3.5–5.2)
SODIUM SERPL-SCNC: 141 MMOL/L (ref 136–145)

## 2022-06-22 PROCEDURE — 20610 DRAIN/INJ JOINT/BURSA W/O US: CPT | Performed by: ORTHOPAEDIC SURGERY

## 2022-06-22 PROCEDURE — 80048 BASIC METABOLIC PNL TOTAL CA: CPT

## 2022-06-22 PROCEDURE — 99203 OFFICE O/P NEW LOW 30 MIN: CPT | Performed by: ORTHOPAEDIC SURGERY

## 2022-06-22 PROCEDURE — 36415 COLL VENOUS BLD VENIPUNCTURE: CPT

## 2022-06-22 RX ORDER — TRIAMCINOLONE ACETONIDE 40 MG/ML
40 INJECTION, SUSPENSION INTRA-ARTICULAR; INTRAMUSCULAR
Status: COMPLETED | OUTPATIENT
Start: 2022-06-22 | End: 2022-06-22

## 2022-06-22 RX ORDER — LIDOCAINE HYDROCHLORIDE 10 MG/ML
4 INJECTION, SOLUTION EPIDURAL; INFILTRATION; INTRACAUDAL; PERINEURAL
Status: COMPLETED | OUTPATIENT
Start: 2022-06-22 | End: 2022-06-22

## 2022-06-22 RX ADMIN — TRIAMCINOLONE ACETONIDE 40 MG: 40 INJECTION, SUSPENSION INTRA-ARTICULAR; INTRAMUSCULAR at 14:40

## 2022-06-22 RX ADMIN — LIDOCAINE HYDROCHLORIDE 4 ML: 10 INJECTION, SOLUTION EPIDURAL; INFILTRATION; INTRACAUDAL; PERINEURAL at 14:40

## 2022-06-22 NOTE — PROGRESS NOTES
Procedure   - Large Joint Arthrocentesis: bilateral knee on 6/22/2022 2:40 PM  Indications: pain  Details: 22 G needle, anterolateral approach  Medications (Right): 4 mL lidocaine PF 1% 1 %; 40 mg triamcinolone acetonide 40 MG/ML (1cc .75% Marcaine NDC 3238-4443-84 LOT [id number] EXP 04.01.2023)  Medications (Left): 4 mL lidocaine PF 1% 1 %; 40 mg triamcinolone acetonide 40 MG/ML (1cc .75% Marcaine NDC 9117-0503-98 LOT [id number] EXP 04.01.2023)  Outcome: tolerated well, no immediate complications  Procedure, treatment alternatives, risks and benefits explained, specific risks discussed. Consent was given by the patient. Immediately prior to procedure a time out was called to verify the correct patient, procedure, equipment, support staff and site/side marked as required. Patient was prepped and draped in the usual sterile fashion.

## 2022-06-22 NOTE — TELEPHONE ENCOUNTER
Patient's wife states that she has not yet had patient do the labs but will had them done.     She reports that patient is experiencing pedal edema since this weekend but denies dyspnea. He is currently taking Lasix 40mg on Monday, Wednesday, and Friday. She would like to know if she should increase his dose.

## 2022-06-22 NOTE — TELEPHONE ENCOUNTER
Patient's wife notified. Patient will take extra dose of Lasix 40mg tomorrow then resume back to Monday, Wednesday, and Friday. Patient will call if he experiences any worsening dyspnea, edema, or weight gain.

## 2022-06-22 NOTE — PROGRESS NOTES
"      [facility] [place] [facility] Note    Subjective     CC: Pain of the Left Knee and Pain of the Right Knee      HPI    Narendra Cochran is a 81 y.o. male who presents with new problem of: bilateral knee pain.  Onset: atraumatic and gradual in nature. The issue has been ongoing for several year(s). Pain is a 6/10 on the pain scale. Pain is described as dull, aching, throbbing and shooting. Associated symptoms include pain. The pain is worse with walking and climbing stairs; Voltaren gel improve the pain. Previous treatments have included: cane/walker.    I have reviewed the following portions of the patient's history:History of Present Illness and review of systems.  I injected his right knee in 2019.  His left knee hurts worse today.  sReview of Systems   Musculoskeletal: Positive for arthralgias.   All other systems reviewed and are negative.      ROS:    Constiutional:Pt denies fever, chills, nausea, or vomiting.  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    Patient is well nourished and well developed.        Ortho Exam  Both knees have some medial joint line tenderness.  Stable ligaments.    Imaging/Labs/EMG Reviewed:  Imaging Results (Last 24 Hours)     Procedure Component Value Units Date/Time    XR Knee 4+ View Bilateral [[id number]] Resulted: 06/22/22 1433     Updated: 06/22/22 1436    Narrative:      Bilateral Knee X-Rays  Indication: " Pain    Upright AP, Lateral, skiers and Sunrise views of bilateral knees     Findings: Bone-on-bone medial compartment left knee which is worse than   the right knee.  Arthritic changes in both knees.  No fracture  No prior studies were available for comparison.          Assessment:  1. Pain in both knees, unspecified chronicity    2. Primary osteoarthritis of both knees    3. Paget disease of bone        Plan:  1. Recommend over the counter anti-inflammatories for pain and/or swelling  2. I injected both knees with cortisone.  He will follow-up with injection wears off.    Follow Up:   Return if symptoms worsen or fail to improve.      Medical Decision Making  Management Options : Low - OTC Drugs        Jason Treviño M.D., [facility]  Orthopedic Surgeon  Fellowship Trained Sports Medicine  [facility]  Orthopedics and Sports Medicine  [address], [address]  [place] [address]    EMR Dragon/Transcription disclaimer:  Much of this encounter note is an electronic transcription of spoken language to printed text. Electronic transcription of spoken language may permit erroneous, or at times, nonsensical words or phrases to be inadvertently transcribed. Although I have reviewed the note for such errors, some may still exist.

## 2022-06-22 NOTE — TELEPHONE ENCOUNTER
BMP ordered on 6/3/2022 to be completed in 1 to 2 weeks.  Have not received those results.  Please follow-up with patient.

## 2022-06-24 ENCOUNTER — TELEPHONE (OUTPATIENT)
Dept: CARDIOLOGY | Facility: HOSPITAL | Age: 82
End: 2022-06-24

## 2022-06-24 DIAGNOSIS — E87.6 HYPOKALEMIA: ICD-10-CM

## 2022-06-24 RX ORDER — POTASSIUM CHLORIDE 1500 MG/1
20 TABLET, FILM COATED, EXTENDED RELEASE ORAL 2 TIMES DAILY
Qty: 90 TABLET | Refills: 3
Start: 2022-06-24 | End: 2022-08-04 | Stop reason: HOSPADM

## 2022-06-24 NOTE — TELEPHONE ENCOUNTER
Attempted to call to review lab results with patient.  Unsuccessful.  Unable to leave message.    Please call patient.  Kidney function normal.  His potassium level is low entheses 3.2)    Please confirm that patient is taking spironolactone 25 mg daily, potassium chloride 20 mEq daily.    If patient is taking medicine as listed and have him increase his potassium chloride to 20 mEq twice a day.    Have repeat lab work in 2 weeks.

## 2022-06-25 RX ORDER — METOPROLOL SUCCINATE 50 MG/1
TABLET, EXTENDED RELEASE ORAL
Qty: 90 TABLET | Refills: 2 | Status: SHIPPED | OUTPATIENT
Start: 2022-06-25

## 2022-07-05 ENCOUNTER — TELEPHONE (OUTPATIENT)
Dept: CARDIOLOGY | Facility: CLINIC | Age: 82
End: 2022-07-05

## 2022-07-05 ENCOUNTER — LAB (OUTPATIENT)
Dept: LAB | Facility: HOSPITAL | Age: 82
End: 2022-07-05

## 2022-07-05 ENCOUNTER — OFFICE VISIT (OUTPATIENT)
Dept: CARDIOLOGY | Facility: HOSPITAL | Age: 82
End: 2022-07-05

## 2022-07-05 VITALS
SYSTOLIC BLOOD PRESSURE: 144 MMHG | WEIGHT: 212 LBS | HEIGHT: 70 IN | OXYGEN SATURATION: 99 % | HEART RATE: 59 BPM | BODY MASS INDEX: 30.35 KG/M2 | RESPIRATION RATE: 14 BRPM | DIASTOLIC BLOOD PRESSURE: 67 MMHG | TEMPERATURE: 97.2 F

## 2022-07-05 DIAGNOSIS — E87.6 HYPOKALEMIA: ICD-10-CM

## 2022-07-05 DIAGNOSIS — I50.22 CHRONIC SYSTOLIC CONGESTIVE HEART FAILURE: ICD-10-CM

## 2022-07-05 DIAGNOSIS — I38 VALVULAR HEART DISEASE: ICD-10-CM

## 2022-07-05 DIAGNOSIS — I25.10 CORONARY ARTERY DISEASE INVOLVING NATIVE CORONARY ARTERY OF NATIVE HEART WITHOUT ANGINA PECTORIS: ICD-10-CM

## 2022-07-05 LAB
ANION GAP SERPL CALCULATED.3IONS-SCNC: 9.9 MMOL/L (ref 5–15)
BUN SERPL-MCNC: 14 MG/DL (ref 8–23)
BUN/CREAT SERPL: 14 (ref 7–25)
CALCIUM SPEC-SCNC: 9 MG/DL (ref 8.6–10.5)
CHLORIDE SERPL-SCNC: 108 MMOL/L (ref 98–107)
CO2 SERPL-SCNC: 19.1 MMOL/L (ref 22–29)
CREAT SERPL-MCNC: 1 MG/DL (ref 0.76–1.27)
EGFRCR SERPLBLD CKD-EPI 2021: 74.7 ML/MIN/1.73
GLUCOSE SERPL-MCNC: 107 MG/DL (ref 65–99)
POTASSIUM SERPL-SCNC: 4 MMOL/L (ref 3.5–5.2)
SODIUM SERPL-SCNC: 137 MMOL/L (ref 136–145)

## 2022-07-05 PROCEDURE — 99214 OFFICE O/P EST MOD 30 MIN: CPT | Performed by: NURSE PRACTITIONER

## 2022-07-05 PROCEDURE — 36415 COLL VENOUS BLD VENIPUNCTURE: CPT

## 2022-07-05 PROCEDURE — 80048 BASIC METABOLIC PNL TOTAL CA: CPT

## 2022-07-05 RX ORDER — FUROSEMIDE 40 MG/1
40 TABLET ORAL DAILY
Qty: 30 TABLET | Refills: 6
Start: 2022-07-05 | End: 2022-08-04 | Stop reason: HOSPADM

## 2022-07-05 NOTE — PROGRESS NOTES
"[facility]    Chief Complaint  Follow-up and Congestive Heart Failure    Subjective    History of Present Illness {  Problem List  Visit  Diagnosis   Encounters  Notes  Medications  Labs  Result Review Imaging  Media :23}     Narendra Cochran presents to [facility] for   History of Present Illness     81-year-old male with history of heart failure with preserved EF, valvular heart disease.  History of EF being 36% in 2020.  Echocardiogram 3/13/2021 showed improved EF of 50%, moderate MR, moderate AI.  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    In March 2022 patient's creatinine was elevated.  Lasix, spironolactone, Entresto, potassium supplement were held.  Creatinine improved with restarting Entresto.  Patient's heart logic index has been elevated.  Lasix restarted as well as spironolactone for hypokalemia.  Patient still continued to have a low potassium on spironolactone.  Potassium chloride recently initiated.    Patient complains of dyspnea today.  States it is intermittent.  Some days are good some days are bad.  Noticed abdominal fullness today.  Patient is on his Entresto, spironolactone, potassium.  No edema.  States he is using his Hailer every 2 hours.  Currently not on Spiriva.  States that he is to expensive.  No history of COPD or asthma.      Objective     Vital Signs:   Vitals:    07/05/22 1412   BP: 144/67   BP Location: Left arm   Patient Position: Sitting   Cuff Size: Adult   Pulse: 59   Resp: 14   Temp: 97.2 °F (36.2 °C)   TempSrc: Temporal   SpO2: 99%   Weight: 96.2 kg (212 lb)   Height: 177.8 cm (70\")     Body mass index is 30.42 kg/m².  Physical Exam  Vitals reviewed.   Constitutional:       General: He is not in acute " distress.     Appearance: Normal appearance.   Cardiovascular:      Rate and Rhythm: Normal rate and regular rhythm.      Pulses:           Radial pulses are 2+ on the right side.        Dorsalis pedis pulses are 2+ on the right side.        Posterior tibial pulses are 2+ on the right side.      Heart sounds: Normal heart sounds.   Pulmonary:      Effort: Pulmonary effort is normal.      Breath sounds: Rales ( Basis) present.   Abdominal:      General: There is distension.   Musculoskeletal:      Right lower leg: No edema.      Left lower leg: No edema.   Skin:     General: Skin is warm and dry.   Neurological:      Mental Status: He is alert.   Psychiatric:         Mood and Affect: Mood normal.         Behavior: Behavior is cooperative.              Result Review  Data Reviewed:{ Labs  Result Review  Imaging  Med Tab  Media :23}   Echocardiogram 3/13/2021 showed improved EF of 50%, moderate MR, moderate AI.       Lab Results   Component Value Date    WBC 3.12 (L) 04/18/2022    HGB 12.8 (L) 04/18/2022    HCT 40.1 04/18/2022    MCV 83.4 04/18/2022    PLT 92 (L) 04/18/2022     Lab Results   Component Value Date    GLUCOSE 105 (H) 06/22/2022    CALCIUM 8.8 06/22/2022     06/22/2022    K 3.2 (L) 06/22/2022    CO2 25.2 06/22/2022     06/22/2022    BUN 13 06/22/2022    CREATININE 0.91 06/22/2022    EGFRIFAFRI 61 02/17/2022    EGFRIFNONA 64 10/21/2019    BCR 14.3 06/22/2022    ANIONGAP 10.8 06/22/2022     Per remote transmission his Heartlogic is elevated at 44, threshold is 6. His thoracic impedance is 37.6, S3 is 1.14, resp rate 19.8, night heart rate 63. Heartlogic has been elevated since 06/26/22              Assessment and Plan {CC Problem List  Visit Diagnosis  ROS  Review (Popup)  Health Maintenance  Quality  BestPractice  Medications  SmartSets  SnapShot Encounters  Media :23}   1. Chronic systolic congestive heart failure (HCC)  Continue entresto, aldactone, metoprolol  succinate    Increase lasix daily  - furosemide (LASIX) 40 MG tablet; Take 1 tablet by mouth Daily. 3 days a week (Mon, Wed, Fri)  Dispense: 30 tablet; Refill: 6    2. Valvular heart disease  dypsnea    3. Coronary artery disease involving native coronary artery of native heart without angina pectoris  No CP or pressure    4. Hypokalemia    - Basic Metabolic Panel; Future          Follow Up {Instructions Charge Capture  Follow-up Communications :23}   Return in about 4 weeks (around 8/2/2022) for Telephone visit, HF.    Patient was given instructions and counseling regarding his condition or for health maintenance advice. Please see specific information pulled into the AVS if appropriate.  Patient was instructed to call the [facility] with any questions, concerns, or worsening symptoms.

## 2022-07-06 ENCOUNTER — TELEPHONE (OUTPATIENT)
Dept: CARDIOLOGY | Facility: HOSPITAL | Age: 82
End: 2022-07-06

## 2022-07-06 NOTE — TELEPHONE ENCOUNTER
Call and review lab results with patient and wife.  Potassium and kidney function levels are normal.

## 2022-07-13 ENCOUNTER — APPOINTMENT (OUTPATIENT)
Dept: GENERAL RADIOLOGY | Facility: HOSPITAL | Age: 82
End: 2022-07-13

## 2022-07-13 ENCOUNTER — HOSPITAL ENCOUNTER (EMERGENCY)
Facility: HOSPITAL | Age: 82
Discharge: HOME | End: 2022-07-13
Attending: EMERGENCY MEDICINE | Admitting: EMERGENCY MEDICINE

## 2022-07-13 VITALS
HEIGHT: 70 IN | TEMPERATURE: 98 F | HEART RATE: 60 BPM | DIASTOLIC BLOOD PRESSURE: 62 MMHG | SYSTOLIC BLOOD PRESSURE: 123 MMHG | BODY MASS INDEX: 30.35 KG/M2 | OXYGEN SATURATION: 99 % | WEIGHT: 212 LBS | RESPIRATION RATE: 20 BRPM

## 2022-07-13 DIAGNOSIS — R06.02 SHORTNESS OF BREATH: ICD-10-CM

## 2022-07-13 DIAGNOSIS — N28.9 RENAL INSUFFICIENCY: ICD-10-CM

## 2022-07-13 DIAGNOSIS — I50.9 CHRONIC HEART FAILURE, UNSPECIFIED HEART FAILURE TYPE: Primary | ICD-10-CM

## 2022-07-13 LAB
ALBUMIN SERPL-MCNC: 4.1 G/DL (ref 3.5–5.2)
ALBUMIN/GLOB SERPL: 1.1 G/DL
ALP SERPL-CCNC: 68 U/L (ref 39–117)
ALT SERPL W P-5'-P-CCNC: 9 U/L (ref 1–41)
ANION GAP SERPL CALCULATED.3IONS-SCNC: 9 MMOL/L (ref 5–15)
AST SERPL-CCNC: 15 U/L (ref 1–40)
BASOPHILS # BLD AUTO: 0.01 10*3/MM3 (ref 0–0.2)
BASOPHILS NFR BLD AUTO: 0.3 % (ref 0–1.5)
BILIRUB SERPL-MCNC: 0.5 MG/DL (ref 0–1.2)
BUN SERPL-MCNC: 28 MG/DL (ref 8–23)
BUN/CREAT SERPL: 17.5 (ref 7–25)
CALCIUM SPEC-SCNC: 10.1 MG/DL (ref 8.6–10.5)
CHLORIDE SERPL-SCNC: 101 MMOL/L (ref 98–107)
CO2 SERPL-SCNC: 24 MMOL/L (ref 22–29)
CREAT SERPL-MCNC: 1.6 MG/DL (ref 0.76–1.27)
DEPRECATED RDW RBC AUTO: 47.9 FL (ref 37–54)
EGFRCR SERPLBLD CKD-EPI 2021: 42.5 ML/MIN/1.73
EOSINOPHIL # BLD AUTO: 0.03 10*3/MM3 (ref 0–0.4)
EOSINOPHIL NFR BLD AUTO: 1 % (ref 0.3–6.2)
ERYTHROCYTE [DISTWIDTH] IN BLOOD BY AUTOMATED COUNT: 16.8 % (ref 12.3–15.4)
FLUAV RNA RESP QL NAA+PROBE: NOT DETECTED
FLUBV RNA RESP QL NAA+PROBE: NOT DETECTED
GLOBULIN UR ELPH-MCNC: 3.6 GM/DL
GLUCOSE SERPL-MCNC: 120 MG/DL (ref 65–99)
HCT VFR BLD AUTO: 44.6 % (ref 37.5–51)
HGB BLD-MCNC: 14 G/DL (ref 13–17.7)
IMM GRANULOCYTES # BLD AUTO: 0.01 10*3/MM3 (ref 0–0.05)
IMM GRANULOCYTES NFR BLD AUTO: 0.3 % (ref 0–0.5)
LYMPHOCYTES # BLD AUTO: 0.91 10*3/MM3 (ref 0.7–3.1)
LYMPHOCYTES NFR BLD AUTO: 30.4 % (ref 19.6–45.3)
MCH RBC QN AUTO: 25.5 PG (ref 26.6–33)
MCHC RBC AUTO-ENTMCNC: 31.4 G/DL (ref 31.5–35.7)
MCV RBC AUTO: 81.4 FL (ref 79–97)
MONOCYTES # BLD AUTO: 0.24 10*3/MM3 (ref 0.1–0.9)
MONOCYTES NFR BLD AUTO: 8 % (ref 5–12)
NEUTROPHILS NFR BLD AUTO: 1.79 10*3/MM3 (ref 1.7–7)
NEUTROPHILS NFR BLD AUTO: 60 % (ref 42.7–76)
NRBC BLD AUTO-RTO: 0 /100 WBC (ref 0–0.2)
NT-PROBNP SERPL-MCNC: 1375 PG/ML (ref 0–1800)
PLAT MORPH BLD: NORMAL
PLATELET # BLD AUTO: 114 10*3/MM3 (ref 140–450)
POTASSIUM SERPL-SCNC: 4.5 MMOL/L (ref 3.5–5.2)
PROT SERPL-MCNC: 7.7 G/DL (ref 6–8.5)
QT INTERVAL: 502 MS
QTC INTERVAL: 502 MS
RBC # BLD AUTO: 5.48 10*6/MM3 (ref 4.14–5.8)
RBC MORPH BLD: NORMAL
SARS-COV-2 RNA RESP QL NAA+PROBE: NOT DETECTED
SODIUM SERPL-SCNC: 134 MMOL/L (ref 136–145)
TROPONIN T SERPL-MCNC: <0.01 NG/ML (ref 0–0.03)
WBC MORPH BLD: NORMAL
WBC NRBC COR # BLD: 2.99 10*3/MM3 (ref 3.4–10.8)

## 2022-07-13 PROCEDURE — 87636 SARSCOV2 & INF A&B AMP PRB: CPT | Performed by: EMERGENCY MEDICINE

## 2022-07-13 PROCEDURE — 99283 EMERGENCY DEPT VISIT LOW MDM: CPT

## 2022-07-13 PROCEDURE — 96374 THER/PROPH/DIAG INJ IV PUSH: CPT

## 2022-07-13 PROCEDURE — 85007 BL SMEAR W/DIFF WBC COUNT: CPT | Performed by: EMERGENCY MEDICINE

## 2022-07-13 PROCEDURE — 85025 COMPLETE CBC W/AUTO DIFF WBC: CPT | Performed by: EMERGENCY MEDICINE

## 2022-07-13 PROCEDURE — 36415 COLL VENOUS BLD VENIPUNCTURE: CPT

## 2022-07-13 PROCEDURE — 80053 COMPREHEN METABOLIC PANEL: CPT | Performed by: EMERGENCY MEDICINE

## 2022-07-13 PROCEDURE — 93005 ELECTROCARDIOGRAM TRACING: CPT | Performed by: EMERGENCY MEDICINE

## 2022-07-13 PROCEDURE — 84484 ASSAY OF TROPONIN QUANT: CPT | Performed by: EMERGENCY MEDICINE

## 2022-07-13 PROCEDURE — 25010000002 FUROSEMIDE PER 20 MG: Performed by: EMERGENCY MEDICINE

## 2022-07-13 PROCEDURE — 83880 ASSAY OF NATRIURETIC PEPTIDE: CPT | Performed by: EMERGENCY MEDICINE

## 2022-07-13 PROCEDURE — 71045 X-RAY EXAM CHEST 1 VIEW: CPT

## 2022-07-13 RX ORDER — FUROSEMIDE 10 MG/ML
40 INJECTION INTRAMUSCULAR; INTRAVENOUS ONCE
Status: COMPLETED | OUTPATIENT
Start: 2022-07-13 | End: 2022-07-13

## 2022-07-13 RX ORDER — SODIUM CHLORIDE 0.9 % (FLUSH) 0.9 %
10 SYRINGE (ML) INJECTION AS NEEDED
Status: DISCONTINUED | OUTPATIENT
Start: 2022-07-13 | End: 2022-07-13 | Stop reason: HOSPADM

## 2022-07-13 RX ADMIN — FUROSEMIDE 40 MG: 10 INJECTION, SOLUTION INTRAMUSCULAR; INTRAVENOUS at 17:50

## 2022-07-13 NOTE — CASE MANAGEMENT/SOCIAL WORK
Continued Stay Note   [facility]     Patient Name: Narendra Cochran  MRN: [id number]  Today's Date: 7/13/2022    Admit Date: 7/13/2022     Discharge Plan     Row Name 07/13/22 6600       Plan    Plan  follow up    Plan Comments ED Charge came to MSW about assistance with transportation for pt. upon his d/c. MSW arranged a [employer] for pt. MSW informed Tech of make and model of vehicle. MSW is available.    Final Discharge Disposition Code 01 - home or self-care               Discharge Codes    No documentation.                     RACHELLE Huizar

## 2022-07-13 NOTE — ED PROVIDER NOTES
[place]    EMERGENCY DEPARTMENT ENCOUNTER      Pt Name: Narendra Cochran  MRN: [id number]  YOB: 1940  Date of evaluation: 7/13/2022  Provider: Omero Beltran DO    CHIEF COMPLAINT       Chief Complaint   Patient presents with   • Shortness of Breath         HISTORY OF PRESENT ILLNESS  (Location/Symptom, Timing/Onset, Context/Setting, Quality, Duration, Modifying Factors, Severity.)   Narendra Cochran is a 81 y.o. male who presents to the emergency department via [facility] under the advice of his physician to be evaluated for shortness of breath.  He has a history of CHF, has significant other at home who was diagnosed with COVID last week.  The patient has a mild cough, no sputum production.  Denies any chest pain, weakness, does note his shortness of breath is slightly worse than his usual baseline.  Does not wear submental oxygen at home.  He has not had any fevers he is aware of, denies any increased swelling in his lower extremities.  He has had his COVID-vaccine in the past.  He denies any other acute systemic complaints this time.  No nausea vomiting or diarrhea.      Nursing notes were reviewed.    REVIEW OF SYSTEMS    (2-9 systems for level 4, 10 or more for level 5)   ROS:  General:  No fevers, no chills, no weakness  Cardiovascular:  No chest pain, no palpitations  Respiratory:  + Chronic shortness of breath, + cough, no wheezing  Gastrointestinal:  No pain, no nausea, no vomiting, no diarrhea  Musculoskeletal:  No muscle pain, no joint pain  Skin:  No rash  Neurologic:  No speech problems, no headache, no extremity numbness, no extremity tingling, no extremity weakness  Psychiatric:  No anxiety  Genitourinary:  No dysuria, no hematuria very hard of hearing,    Except as noted above the remainder of the review of systems was reviewed and negative.       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   • Paget disease of bone          SURGICAL HISTORY       Past Surgical History:   Procedure Laterality Date   • APPENDECTOMY     • CARDIAC CATHETERIZATION N/A 3/18/2020    Procedure: Left Heart Cath;  Surgeon: Sonya Garibay MD;  Location:  [facility];  Service: Cardiology;  Laterality: N/A;  First availabel provider     • CARDIAC CATHETERIZATION N/A 3/15/2021    Procedure: LEFT HEART CATH;  Surgeon: Doc Sahni IV, MD;  Location:  [facility];  Service: Cardiovascular;  Laterality: N/A;   • CARDIAC CATHETERIZATION N/A 3/15/2021    Procedure: Coronary angiography;  Surgeon: Doc Sahni IV, MD;  Location:  [facility];  Service: Cardiovascular;  Laterality: N/A;   • CARDIAC ELECTROPHYSIOLOGY PROCEDURE N/A 3/16/2021    Procedure: ICD DC new;  Surgeon: Som Boyd DO;  Location:  [facility];  Service: Cardiology;  Laterality: N/A;   • COLON RESECTION      second to diverticulitis    • FOOT SURGERY Left          CURRENT MEDICATIONS       Current Facility-Administered Medications:   •  furosemide (LASIX) injection 40 mg, 40 mg, Intravenous, Once, Omero Beltran DO  •  [COMPLETED] Insert peripheral IV, , , Once **AND** sodium chloride 0.9 % flush 10 mL, 10 mL, Intravenous, PRN, Omero Beltran DO    Current Outpatient Medications:   •  albuterol sulfate  (90 Base) MCG/ACT inhaler, Inhale 2 puffs Every 4 (Four) Hours As Needed for Wheezing or Shortness of Air., Disp: 8 g, Rfl: 3  •  aspirin 81 MG chewable tablet, Chew 1 tablet Daily., Disp: , Rfl:   •  Blood Pressure Monitoring (BLOOD PRESSURE MONITOR/L CUFF)  misc, 1 Units Daily., Disp: 1 each, Rfl: 0  •  diclofenac (VOLTAREN) 1 % gel gel, Apply 4 g topically to the appropriate area as directed 4 (Four) Times a Day As Needed (prn for pain)., Disp: 60 tube, Rfl: 0  •  furosemide (LASIX) 40 MG tablet, Take 1 tablet by mouth Daily. 3 days a week (Mon, Wed, Fri), Disp: 30 tablet, Rfl: 6  •  glucose blood (OneTouch Ultra) test strip, USE AS INSTRUCTED PER MD, Disp: 100 each, Rfl: 2  •  glucose monitor monitoring kit, 1 each Daily. DX: E11.65, Disp: 1 each, Rfl: 0  •  ketorolac (ACULAR) 0.5 % ophthalmic solution, PLEASE SEE ATTACHED FOR DETAILED DIRECTIONS, Disp: , Rfl:   •  Lancets (OneTouch Delica Plus [id number]) misc, USE TO CHECK BLOOD GLUCOSE ONCE DAILY, Disp: 100 each, Rfl: 0  •  levETIRAcetam (KEPPRA) 750 MG tablet, Take 1 tablet by mouth 2 (Two) Times a Day., Disp: 180 tablet, Rfl: 1  •  metFORMIN ER (GLUCOPHAGE-XR) 500 MG 24 hr tablet, Take 1 tablet by mouth Daily With Breakfast., Disp: 90 tablet, Rfl: 1  •  metoprolol succinate XL (TOPROL-XL) 50 MG 24 hr tablet, TAKE 1 TABLET BY MOUTH EVERY DAY, Disp: 90 tablet, Rfl: 2  •  pantoprazole (PROTONIX) 40 MG EC tablet, TAKE 1 TABLET BY MOUTH TWICE A DAY, Disp: 180 tablet, Rfl: 0  •  potassium chloride ER (K-TAB) 20 MEQ tablet controlled-release ER tablet, Take 1 tablet by mouth 2 (Two) Times a Day., Disp: 90 tablet, Rfl: 3  •  rosuvastatin (CRESTOR) 20 MG tablet, Take 1 tablet by mouth Daily., Disp: 90 tablet, Rfl: 3  •  sacubitril-valsartan (Entresto) 24-26 MG tablet, Take 1 tablet by mouth 2 (Two) Times a Day., Disp: 60 tablet, Rfl: 6  •  spironolactone (ALDACTONE) 25 MG tablet, Take 1 tablet by mouth Daily., Disp: 30 tablet, Rfl: 6  •  tiotropium bromide monohydrate (Spiriva Respimat) 2.5 MCG/ACT aerosol solution inhaler, Inhale 2 puffs Daily., Disp: 4 g, Rfl: 5    ALLERGIES     Patient has no known allergies.    FAMILY HISTORY       Family History   Problem Relation Age of Onset   • No Known Problems Daughter    • No  "Known Problems Son    • No Known Problems Son    • No Known Problems Son    • Diabetes Sister    • Clotting disorder Sister    • Hyperlipidemia Mother    • No Known Problems Sister    • No Known Problems Brother    • Fainting Brother           SOCIAL HISTORY       Social History     Socioeconomic History   • Marital status:    Tobacco Use   • Smoking status: Former Smoker     Packs/day: 0.25     Years: 65.00     Pack years: 16.25     Types: Cigars, Cigarettes, Cigarettes, Cigars     Quit date: 2019     Years since quittin.8   • Smokeless tobacco: Never Used   Vaping Use   • Vaping Use: Never used   Substance and Sexual Activity   • Alcohol use: Yes     Comment: very rarely   • Drug use: Yes     Types: Marijuana     Comment: Pt states used weekly but now quitting    • Sexual activity: Defer         PHYSICAL EXAM    (up to 7 for level 4, 8 or more for level 5)     Vitals:    22 1615 22 1617 22 1730   BP:  131/62 123/62   BP Location:  Right arm    Patient Position:  Sitting    Pulse: 57  60   Resp: 20  20   Temp: 98 °F (36.7 °C)     TempSrc: Oral     SpO2: 99%  99%   Weight: 96.2 kg (212 lb)     Height: 177.8 cm (70\")         Physical Exam  General : Patient is hard of hearing, awake, alert, oriented, in no acute distress, nontoxic appearing  HEENT: Pupils are equally round, EOMI, conjunctivae clear, there is no injection no icterus.  Oral mucosa is moist, uvula midline  Neck: Neck is supple, full range of motion, trachea midline  Cardiac: Heart regular rate, rhythm, no murmurs, rubs, or gallops  Lungs: Lungs with decreased breath sounds bilaterally, oxygen saturations 98% on room air  Chest wall: There is no tenderness to palpation over the chest wall or over ribs  Abdomen: Abdomen is soft, nontender, nondistended. There are no firm or pulsatile masses, no rebound rigidity or guarding  Musculoskeletal: No peripheral edema, 5 out of 5 strength in all 4 extremities.  No focal muscle " deficits are appreciated  Neuro: Motor intact, sensory intact, level of consciousness is normal  Dermatology: Skin is warm and dry  Psych: Mentation is grossly normal, cognition is grossly normal. Affect is appropriate      DIAGNOSTIC RESULTS     EKG: All EKGs are interpreted by the Emergency Department Physician who either signs or Co-signs this chart in the absence of a cardiologist.    ECG 12 Lead   Final Result   Test Reason : Short of breath   Blood Pressure :   */*   mmHG   Vent. Rate :  60 BPM     Atrial Rate :  60 BPM      P-R Int : 330 ms          QRS Dur : 166 ms       QT Int : 502 ms       P-R-T Axes :  -1 -58 161 degrees      QTc Int : 502 ms      Atrial-paced rhythm with prolonged AV conduction   Left axis deviation   Left bundle branch block   Abnormal ECG   When compared with ECG of 18-MAR-2021 10:19,   Electronic atrial pacemaker has replaced Electronic ventricular pacemaker   Confirmed by SABINO DE LA FUENTE MD ([id number]) on 7/13/2022 4:29:27 PM      Referred By: EDMD           Confirmed By: SABINO DE LA FUENTE MD          RADIOLOGY:   Non-plain film images such as CT, Ultrasound and MRI are read by the radiologist. Plain radiographic images are visualized and preliminarily interpreted by the emergency physician with the below findings:      [] Radiologist's Report Reviewed:  XR Chest 1 View   Final Result   Central interstitial opacities are present, favoring some vascular   engorgement, without definite focal consolidation.       This report was finalized on 7/13/2022 4:49 PM by Dev Ferguson.                ED BEDSIDE ULTRASOUND:   Performed by ED Physician - none    LABS:    I have reviewed and interpreted all of the currently available lab results from this visit (if applicable):  Results for orders placed or performed during the hospital encounter of 07/13/22   COVID-19 and FLU A/B PCR - Swab, Nasopharynx    Specimen: Nasopharynx; Swab   Result Value Ref Range    COVID19 Not Detected Not Detected - Ref.  Range    Influenza A PCR Not Detected Not Detected    Influenza B PCR Not Detected Not Detected   Comprehensive Metabolic Panel    Specimen: Blood   Result Value Ref Range    Glucose 120 (H) 65 - 99 mg/dL    BUN 28 (H) 8 - 23 mg/dL    Creatinine 1.60 (H) 0.76 - 1.27 mg/dL    Sodium 134 (L) 136 - 145 mmol/L    Potassium 4.5 3.5 - 5.2 mmol/L    Chloride 101 98 - 107 mmol/L    CO2 24.0 22.0 - 29.0 mmol/L    Calcium 10.1 8.6 - 10.5 mg/dL    Total Protein 7.7 6.0 - 8.5 g/dL    Albumin 4.10 3.50 - 5.20 g/dL    ALT (SGPT) 9 1 - 41 U/L    AST (SGOT) 15 1 - 40 U/L    Alkaline Phosphatase 68 39 - 117 U/L    Total Bilirubin 0.5 0.0 - 1.2 mg/dL    Globulin 3.6 gm/dL    A/G Ratio 1.1 g/dL    BUN/Creatinine Ratio 17.5 7.0 - 25.0    Anion Gap 9.0 5.0 - 15.0 mmol/L    eGFR 42.5 (L) >60.0 mL/min/1.73   BNP    Specimen: Blood   Result Value Ref Range    proBNP 1,375.0 0.0 - 1,800.0 pg/mL   Troponin    Specimen: Blood   Result Value Ref Range    Troponin T <0.010 0.000 - 0.030 ng/mL   CBC Auto Differential    Specimen: Blood   Result Value Ref Range    WBC 2.99 (L) 3.40 - 10.80 10*3/mm3    RBC 5.48 4.14 - 5.80 10*6/mm3    Hemoglobin 14.0 13.0 - 17.7 g/dL    Hematocrit 44.6 37.5 - 51.0 %    MCV 81.4 79.0 - 97.0 fL    MCH 25.5 (L) 26.6 - 33.0 pg    MCHC 31.4 (L) 31.5 - 35.7 g/dL    RDW 16.8 (H) 12.3 - 15.4 %    RDW-SD 47.9 37.0 - 54.0 fl    Platelets 114 (L) 140 - 450 10*3/mm3    Neutrophil % 60.0 42.7 - 76.0 %    Lymphocyte % 30.4 19.6 - 45.3 %    Monocyte % 8.0 5.0 - 12.0 %    Eosinophil % 1.0 0.3 - 6.2 %    Basophil % 0.3 0.0 - 1.5 %    Immature Grans % 0.3 0.0 - 0.5 %    Neutrophils, Absolute 1.79 1.70 - 7.00 10*3/mm3    Lymphocytes, Absolute 0.91 0.70 - 3.10 10*3/mm3    Monocytes, Absolute 0.24 0.10 - 0.90 10*3/mm3    Eosinophils, Absolute 0.03 0.00 - 0.40 10*3/mm3    Basophils, Absolute 0.01 0.00 - 0.20 10*3/mm3    Immature Grans, Absolute 0.01 0.00 - 0.05 10*3/mm3    nRBC 0.0 0.0 - 0.2 /100 WBC   Scan Slide    Specimen: Blood  "  Result Value Ref Range    RBC Morphology Normal Normal    WBC Morphology Normal Normal    Platelet Morphology Normal Normal   ECG 12 Lead   Result Value Ref Range    QT Interval 502 ms    QTC Interval 502 ms        All other labs were within normal range or not returned as of this dictation.      EMERGENCY DEPARTMENT COURSE and DIFFERENTIAL DIAGNOSIS/MDM:   Vitals:    Vitals:    07/13/22 1615 07/13/22 1617 07/13/22 1730   BP:  131/62 123/62   BP Location:  Right arm    Patient Position:  Sitting    Pulse: 57  60   Resp: 20  20   Temp: 98 °F (36.7 °C)     TempSrc: Oral     SpO2: 99%  99%   Weight: 96.2 kg (212 lb)     Height: 177.8 cm (70\")              Patient history of CHF, chronic shortness of breath presents for evaluation for his continued shortness of breath which he notes is.  Which is baseline, recent exposure with his wife with COVID-19.  On arrival the patient's awake alert, very hard of hearing, no acute respiratory distress, pulse ox 99% on room air.  We did obtain IV, labs, imaging for further evaluation, results as above.  Patient with a chronic, leukopenia, creatinine 1.6 with a history of intermittent renal insufficiency.  COVID-negative, chest x-ray stable, we will continue with his diuresis which she has at home, given a dose IV in the ED.  On reevaluation patient is resting comfortably, no acute respiratory distress, requesting discharge home which I feel is reasonable, close follow-up with his PCP for reevaluation of his symptoms, recheck of his kidney function.  Patient is comfortable with this plan of care.    I had a discussion with the patient/family regarding diagnosis, diagnostic results, treatment plan, and medications.  The patient/family indicated understanding of these instructions.  I spent adequate time at the bedside preceding discharge necessary to personally discuss the aftercare instructions, giving patient education, providing explanations of the results of our " evaluations/findings, and my decision making to assure that the patient/family understand the plan of care.  Time was allotted to answer questions at that time and throughout the ED course.  Emphasis was placed on timely follow-up after discharge.  I also discussed the potential for the development of an acute emergent condition requiring further evaluation, admission, or even surgical intervention. I discussed that we found nothing during the visit today indicating the need for further workup, admission, or the presence of an unstable medical condition.  I encouraged the patient to return to the emergency department immediately for ANY concerns, worsening, new complaints, or if symptoms persist and unable to seek follow-up in a timely fashion.  The patient/family expressed understanding and agreement with this plan.  The patient will follow-up with their PCP in 1-2 days for reevaluation.       MEDICATIONS ADMINISTERED IN ED:  Medications   sodium chloride 0.9 % flush 10 mL (has no administration in time range)   furosemide (LASIX) injection 40 mg (has no administration in time range)       PROCEDURES:  Procedures    CRITICAL CARE TIME    Total Critical Care time was 0 minutes, excluding separately reportable procedures.   There was a high probability of clinically significant/life threatening deterioration in the patient's condition which required my urgent intervention.      FINAL IMPRESSION      1. Chronic heart failure, unspecified heart failure type (HCC)    2. Renal insufficiency    3. Shortness of breath          DISPOSITION/PLAN     ED Disposition     ED Disposition   Discharge    Condition   Stable    Comment   --             PATIENT REFERRED TO:  Marguerite Moore PA-C  [address] [place], [place] [id number] [phone]    In 2 days  For reevaluation, recheck of your symptoms, kidney function.    [facility]  [address] [facility]  [address]  [phone]    If symptoms worsen      DISCHARGE MEDICATIONS:     Medication List      CONTINUE taking these medications    albuterol sulfate  (90 Base) MCG/ACT inhaler  Commonly known as: PROVENTIL HFA;VENTOLIN HFA;PROAIR HFA  Inhale 2 puffs Every 4 (Four) Hours As Needed for Wheezing or Shortness of Air.     aspirin 81 MG chewable tablet  Chew 1 tablet Daily.     Blood Pressure Monitor/L Cuff misc  1 Units Daily.     diclofenac 1 % gel gel  Commonly known as: VOLTAREN  Apply 4 g topically to the appropriate area as directed 4 (Four) Times a Day As Needed (prn for pain).     Entresto 24-26 MG tablet  Generic drug: sacubitril-valsartan  Take 1 tablet by mouth 2 (Two) Times a Day.     furosemide 40 MG tablet  Commonly known as: LASIX  Take 1 tablet by mouth Daily. 3 days a week (Mon, Wed, Fri)     glucose monitor monitoring kit  1 each Daily. DX: E11.65     ketorolac 0.5 % ophthalmic solution  Commonly known as: ACULAR     levETIRAcetam 750 MG tablet  Commonly known as: KEPPRA  Take 1 tablet by mouth 2 (Two) Times a Day.     metFORMIN  MG 24 hr tablet  Commonly known as: GLUCOPHAGE-XR  Take 1 tablet by mouth Daily With Breakfast.     metoprolol succinate XL 50 MG 24 hr tablet  Commonly known as: TOPROL-XL  TAKE 1 TABLET BY MOUTH EVERY DAY     OneTouch Delica Plus [id number] misc  USE TO CHECK BLOOD GLUCOSE ONCE DAILY     OneTouch Ultra test strip  Generic drug: glucose blood  USE AS INSTRUCTED PER MD     pantoprazole 40 MG EC tablet  Commonly known as: PROTONIX  TAKE 1 TABLET BY MOUTH TWICE A DAY     potassium chloride ER 20 MEQ tablet controlled-release ER tablet  Commonly known as: K-TAB  Take 1 tablet by mouth 2 (Two) Times a Day.     rosuvastatin 20 MG tablet  Commonly known as: CRESTOR  Take 1 tablet by mouth Daily.     Spiriva Respimat 2.5 MCG/ACT aerosol solution inhaler  Generic drug: tiotropium bromide monohydrate  Inhale 2 puffs Daily.     spironolactone 25 MG tablet  Commonly known  as: ALDACTONE  Take 1 tablet by mouth Daily.                Comment: Please note this report has been produced using speech recognition software.      Omero Beltran DO  Attending Emergency Physician               Omero Beltran,   07/13/22 [id number]

## 2022-07-13 NOTE — DISCHARGE INSTRUCTIONS
Please take your medication as previously prescribed including your diuretic medication.  Follow-up with your PCP in a couple days for reevaluation, return to the ED with any worsening symptoms or any further concerns.

## 2022-07-15 DIAGNOSIS — R56.9 SEIZURE: Chronic | ICD-10-CM

## 2022-07-15 RX ORDER — LEVETIRACETAM 750 MG/1
TABLET ORAL
Qty: 60 TABLET | Refills: 5 | Status: SHIPPED | OUTPATIENT
Start: 2022-07-15 | End: 2023-01-14

## 2022-07-15 NOTE — TELEPHONE ENCOUNTER
Rx Refill Note  Requested Prescriptions     Pending Prescriptions Disp Refills   • levETIRAcetam (KEPPRA) 750 MG tablet [Pharmacy Med Name: LEVETIRACETAM 750 MG TABLET] 60 tablet 5     Sig: TAKE 1 TABLET BY MOUTH TWICE A DAY      Last office visit with prescribing clinician: 6/2/2022      Next office visit with prescribing clinician: 9/6/2022            Destiny Orta MA  07/15/22, 14:35 EDT

## 2022-07-29 ENCOUNTER — HOSPITAL ENCOUNTER (INPATIENT)
Facility: HOSPITAL | Age: 82
LOS: 6 days | Discharge: HOME HEALTH | End: 2022-08-04
Attending: EMERGENCY MEDICINE | Admitting: HOSPITALIST

## 2022-07-29 ENCOUNTER — APPOINTMENT (OUTPATIENT)
Dept: CT IMAGING | Facility: HOSPITAL | Age: 82
End: 2022-07-29

## 2022-07-29 ENCOUNTER — TELEPHONE (OUTPATIENT)
Dept: CARDIOLOGY | Facility: CLINIC | Age: 82
End: 2022-07-29

## 2022-07-29 DIAGNOSIS — N17.9 ACUTE KIDNEY INJURY (NONTRAUMATIC): ICD-10-CM

## 2022-07-29 DIAGNOSIS — E44.0 MODERATE MALNUTRITION: ICD-10-CM

## 2022-07-29 DIAGNOSIS — R10.10 UPPER ABDOMINAL PAIN: ICD-10-CM

## 2022-07-29 DIAGNOSIS — E87.1 HYPONATREMIA: ICD-10-CM

## 2022-07-29 DIAGNOSIS — E87.5 HYPERKALEMIA: Primary | ICD-10-CM

## 2022-07-29 DIAGNOSIS — I50.22 CHRONIC SYSTOLIC CONGESTIVE HEART FAILURE: ICD-10-CM

## 2022-07-29 DIAGNOSIS — N18.31 STAGE 3A CHRONIC KIDNEY DISEASE: ICD-10-CM

## 2022-07-29 LAB
ALBUMIN SERPL-MCNC: 4 G/DL (ref 3.5–5.2)
ALBUMIN/GLOB SERPL: 1.1 G/DL
ALP SERPL-CCNC: 72 U/L (ref 39–117)
ALT SERPL W P-5'-P-CCNC: 7 U/L (ref 1–41)
ANION GAP SERPL CALCULATED.3IONS-SCNC: 12 MMOL/L (ref 5–15)
AST SERPL-CCNC: 16 U/L (ref 1–40)
ATMOSPHERIC PRESS: ABNORMAL MM[HG]
BACTERIA UR QL AUTO: NORMAL /HPF
BASE EXCESS BLDV CALC-SCNC: -10.2 MMOL/L (ref -2–2)
BASOPHILS # BLD AUTO: 0.01 10*3/MM3 (ref 0–0.2)
BASOPHILS NFR BLD AUTO: 0.1 % (ref 0–1.5)
BDY SITE: ABNORMAL
BILIRUB SERPL-MCNC: 0.6 MG/DL (ref 0–1.2)
BILIRUB UR QL STRIP: NEGATIVE
BODY TEMPERATURE: 37 C
BUN BLDA-MCNC: 63 MG/DL (ref 8–26)
BUN SERPL-MCNC: 73 MG/DL (ref 8–23)
BUN/CREAT SERPL: 23.2 (ref 7–25)
CA-I BLDA-SCNC: 1.45 MMOL/L (ref 1.2–1.32)
CALCIUM SPEC-SCNC: 10.1 MG/DL (ref 8.6–10.5)
CHLORIDE BLDA-SCNC: 113 MMOL/L (ref 98–109)
CHLORIDE SERPL-SCNC: 101 MMOL/L (ref 98–107)
CLARITY UR: CLEAR
CO2 BLDA-SCNC: 16 MMOL/L (ref 24–29)
CO2 BLDA-SCNC: 17.3 MMOL/L (ref 22–33)
CO2 SERPL-SCNC: 14 MMOL/L (ref 22–29)
COHGB MFR BLD: 1 %
COLOR UR: YELLOW
CREAT BLDA-MCNC: 3.2 MG/DL (ref 0.6–1.3)
CREAT SERPL-MCNC: 3.15 MG/DL (ref 0.76–1.27)
D-LACTATE SERPL-SCNC: 1.9 MMOL/L (ref 0.5–2)
D-LACTATE SERPL-SCNC: 2.9 MMOL/L (ref 0.5–2)
DEPRECATED RDW RBC AUTO: 48.3 FL (ref 37–54)
EGFRCR SERPLBLD CKD-EPI 2021: 18.5 ML/MIN/1.73
EGFRCR SERPLBLD CKD-EPI 2021: 18.8 ML/MIN/1.73
EOSINOPHIL # BLD AUTO: 0.01 10*3/MM3 (ref 0–0.4)
EOSINOPHIL NFR BLD AUTO: 0.1 % (ref 0.3–6.2)
EPAP: 0
ERYTHROCYTE [DISTWIDTH] IN BLOOD BY AUTOMATED COUNT: 18 % (ref 12.3–15.4)
GLOBULIN UR ELPH-MCNC: 3.7 GM/DL
GLUCOSE BLDC GLUCOMTR-MCNC: 113 MG/DL (ref 70–130)
GLUCOSE BLDC GLUCOMTR-MCNC: 154 MG/DL (ref 70–130)
GLUCOSE SERPL-MCNC: 116 MG/DL (ref 65–99)
GLUCOSE UR STRIP-MCNC: NEGATIVE MG/DL
GRAN CASTS URNS QL MICRO: NORMAL /LPF
HBA1C MFR BLD: 6.5 % (ref 4.8–5.6)
HCO3 BLDV-SCNC: 16.2 MMOL/L (ref 22–28)
HCT VFR BLD AUTO: 55.7 % (ref 37.5–51)
HCT VFR BLDA CALC: 52 % (ref 38–51)
HGB BLD-MCNC: 17.1 G/DL (ref 13–17.7)
HGB BLDA-MCNC: 13.9 G/DL (ref 13.5–17.5)
HGB BLDA-MCNC: 17.7 G/DL (ref 12–17)
HGB UR QL STRIP.AUTO: NEGATIVE
HOLD SPECIMEN: NORMAL
HYALINE CASTS UR QL AUTO: NORMAL /LPF
IMM GRANULOCYTES # BLD AUTO: 0.03 10*3/MM3 (ref 0–0.05)
IMM GRANULOCYTES NFR BLD AUTO: 0.4 % (ref 0–0.5)
INHALED O2 CONCENTRATION: 21 %
IPAP: 0
KETONES UR QL STRIP: NEGATIVE
LEUKOCYTE ESTERASE UR QL STRIP.AUTO: NEGATIVE
LIPASE SERPL-CCNC: 119 U/L (ref 13–60)
LYMPHOCYTES # BLD AUTO: 1.51 10*3/MM3 (ref 0.7–3.1)
LYMPHOCYTES NFR BLD AUTO: 20.4 % (ref 19.6–45.3)
MAGNESIUM SERPL-MCNC: 1.9 MG/DL (ref 1.6–2.4)
MCH RBC QN AUTO: 25.2 PG (ref 26.6–33)
MCHC RBC AUTO-ENTMCNC: 30.7 G/DL (ref 31.5–35.7)
MCV RBC AUTO: 82 FL (ref 79–97)
METHGB BLD QL: 0.1 %
MODALITY: ABNORMAL
MONOCYTES # BLD AUTO: 0.28 10*3/MM3 (ref 0.1–0.9)
MONOCYTES NFR BLD AUTO: 3.8 % (ref 5–12)
NEUTROPHILS NFR BLD AUTO: 5.55 10*3/MM3 (ref 1.7–7)
NEUTROPHILS NFR BLD AUTO: 75.2 % (ref 42.7–76)
NITRITE UR QL STRIP: NEGATIVE
NOTE: ABNORMAL
NRBC BLD AUTO-RTO: 0 /100 WBC (ref 0–0.2)
OXYHGB MFR BLDV: 83.5 %
PAW @ PEAK INSP FLOW SETTING VENT: 0 CMH2O
PCO2 BLDV: 36.6 MM HG (ref 41–51)
PH BLDV: 7.25 PH UNITS (ref 7.31–7.41)
PH UR STRIP.AUTO: <=5 [PH] (ref 5–8)
PLATELET # BLD AUTO: 103 10*3/MM3 (ref 140–450)
PMV BLD AUTO: 11.9 FL (ref 6–12)
PO2 BLDV: 58.7 MM HG (ref 27–53)
POTASSIUM BLDA-SCNC: 7.8 MMOL/L (ref 3.5–4.9)
POTASSIUM SERPL-SCNC: 6 MMOL/L (ref 3.5–5.2)
POTASSIUM SERPL-SCNC: 8 MMOL/L (ref 3.5–5.2)
PROT SERPL-MCNC: 7.7 G/DL (ref 6–8.5)
PROT UR QL STRIP: ABNORMAL
RBC # BLD AUTO: 6.79 10*6/MM3 (ref 4.14–5.8)
RBC # UR STRIP: NORMAL /HPF
REF LAB TEST METHOD: NORMAL
SODIUM BLD-SCNC: 131 MMOL/L (ref 138–146)
SODIUM SERPL-SCNC: 127 MMOL/L (ref 136–145)
SP GR UR STRIP: 1.02 (ref 1–1.03)
SQUAMOUS #/AREA URNS HPF: NORMAL /HPF
TOTAL RATE: 0 BREATHS/MINUTE
TROPONIN T SERPL-MCNC: 0.01 NG/ML (ref 0–0.03)
UROBILINOGEN UR QL STRIP: ABNORMAL
WBC # UR STRIP: NORMAL /HPF
WBC NRBC COR # BLD: 7.39 10*3/MM3 (ref 3.4–10.8)
WHOLE BLOOD HOLD COAG: NORMAL
WHOLE BLOOD HOLD SPECIMEN: NORMAL

## 2022-07-29 PROCEDURE — 83735 ASSAY OF MAGNESIUM: CPT | Performed by: NURSE PRACTITIONER

## 2022-07-29 PROCEDURE — 83690 ASSAY OF LIPASE: CPT | Performed by: EMERGENCY MEDICINE

## 2022-07-29 PROCEDURE — 81001 URINALYSIS AUTO W/SCOPE: CPT | Performed by: EMERGENCY MEDICINE

## 2022-07-29 PROCEDURE — 74176 CT ABD & PELVIS W/O CONTRAST: CPT

## 2022-07-29 PROCEDURE — 93010 ELECTROCARDIOGRAM REPORT: CPT | Performed by: INTERNAL MEDICINE

## 2022-07-29 PROCEDURE — 82805 BLOOD GASES W/O2 SATURATION: CPT

## 2022-07-29 PROCEDURE — 99223 1ST HOSP IP/OBS HIGH 75: CPT | Performed by: INTERNAL MEDICINE

## 2022-07-29 PROCEDURE — 94640 AIRWAY INHALATION TREATMENT: CPT

## 2022-07-29 PROCEDURE — 80047 BASIC METABLC PNL IONIZED CA: CPT

## 2022-07-29 PROCEDURE — 63710000001 INSULIN REGULAR HUMAN PER 5 UNITS: Performed by: EMERGENCY MEDICINE

## 2022-07-29 PROCEDURE — 83605 ASSAY OF LACTIC ACID: CPT | Performed by: EMERGENCY MEDICINE

## 2022-07-29 PROCEDURE — 80053 COMPREHEN METABOLIC PANEL: CPT | Performed by: EMERGENCY MEDICINE

## 2022-07-29 PROCEDURE — 25010000002 CALCIUM GLUCONATE-NACL 1-0.675 GM/50ML-% SOLUTION: Performed by: INTERNAL MEDICINE

## 2022-07-29 PROCEDURE — 93005 ELECTROCARDIOGRAM TRACING: CPT | Performed by: PHYSICIAN ASSISTANT

## 2022-07-29 PROCEDURE — 83036 HEMOGLOBIN GLYCOSYLATED A1C: CPT | Performed by: INTERNAL MEDICINE

## 2022-07-29 PROCEDURE — 63710000001 INSULIN REGULAR HUMAN PER 5 UNITS: Performed by: INTERNAL MEDICINE

## 2022-07-29 PROCEDURE — U0004 COV-19 TEST NON-CDC HGH THRU: HCPCS | Performed by: INTERNAL MEDICINE

## 2022-07-29 PROCEDURE — 84484 ASSAY OF TROPONIN QUANT: CPT | Performed by: NURSE PRACTITIONER

## 2022-07-29 PROCEDURE — 84132 ASSAY OF SERUM POTASSIUM: CPT | Performed by: NURSE PRACTITIONER

## 2022-07-29 PROCEDURE — 99284 EMERGENCY DEPT VISIT MOD MDM: CPT

## 2022-07-29 PROCEDURE — 93005 ELECTROCARDIOGRAM TRACING: CPT | Performed by: INTERNAL MEDICINE

## 2022-07-29 PROCEDURE — 85014 HEMATOCRIT: CPT

## 2022-07-29 PROCEDURE — 85025 COMPLETE CBC W/AUTO DIFF WBC: CPT | Performed by: EMERGENCY MEDICINE

## 2022-07-29 PROCEDURE — 82962 GLUCOSE BLOOD TEST: CPT

## 2022-07-29 PROCEDURE — P9612 CATHETERIZE FOR URINE SPEC: HCPCS

## 2022-07-29 RX ORDER — SODIUM CHLORIDE 0.9 % (FLUSH) 0.9 %
10 SYRINGE (ML) INJECTION EVERY 12 HOURS SCHEDULED
Status: DISCONTINUED | OUTPATIENT
Start: 2022-07-29 | End: 2022-08-04 | Stop reason: HOSPADM

## 2022-07-29 RX ORDER — DEXTROSE MONOHYDRATE 25 G/50ML
25 INJECTION, SOLUTION INTRAVENOUS
Status: DISCONTINUED | OUTPATIENT
Start: 2022-07-29 | End: 2022-08-04 | Stop reason: HOSPADM

## 2022-07-29 RX ORDER — ALBUTEROL SULFATE 2.5 MG/3ML
2.5 SOLUTION RESPIRATORY (INHALATION) EVERY 6 HOURS PRN
Refills: 3 | Status: DISCONTINUED | OUTPATIENT
Start: 2022-07-29 | End: 2022-08-04 | Stop reason: HOSPADM

## 2022-07-29 RX ORDER — SODIUM CHLORIDE 9 MG/ML
10 INJECTION INTRAVENOUS AS NEEDED
Status: DISCONTINUED | OUTPATIENT
Start: 2022-07-29 | End: 2022-08-04 | Stop reason: HOSPADM

## 2022-07-29 RX ORDER — ASPIRIN 81 MG/1
81 TABLET, CHEWABLE ORAL DAILY
Status: DISCONTINUED | OUTPATIENT
Start: 2022-07-30 | End: 2022-08-04 | Stop reason: HOSPADM

## 2022-07-29 RX ORDER — CALCIUM GLUCONATE 20 MG/ML
1 INJECTION, SOLUTION INTRAVENOUS ONCE
Status: COMPLETED | OUTPATIENT
Start: 2022-07-29 | End: 2022-07-29

## 2022-07-29 RX ORDER — HEPARIN SODIUM 5000 [USP'U]/ML
5000 INJECTION, SOLUTION INTRAVENOUS; SUBCUTANEOUS EVERY 12 HOURS SCHEDULED
Status: DISCONTINUED | OUTPATIENT
Start: 2022-07-30 | End: 2022-08-01

## 2022-07-29 RX ORDER — DEXTROSE MONOHYDRATE 25 G/50ML
50 INJECTION, SOLUTION INTRAVENOUS ONCE
Status: COMPLETED | OUTPATIENT
Start: 2022-07-29 | End: 2022-07-29

## 2022-07-29 RX ORDER — ROSUVASTATIN CALCIUM 10 MG/1
10 TABLET, COATED ORAL DAILY
Status: DISCONTINUED | OUTPATIENT
Start: 2022-07-30 | End: 2022-08-04 | Stop reason: HOSPADM

## 2022-07-29 RX ORDER — FUROSEMIDE 10 MG/ML
40 INJECTION INTRAMUSCULAR; INTRAVENOUS ONCE
Status: DISCONTINUED | OUTPATIENT
Start: 2022-07-29 | End: 2022-07-29

## 2022-07-29 RX ORDER — CALCIUM GLUCONATE 94 MG/ML
2 INJECTION, SOLUTION INTRAVENOUS ONCE
Status: DISCONTINUED | OUTPATIENT
Start: 2022-07-29 | End: 2022-08-01

## 2022-07-29 RX ORDER — LEVETIRACETAM 500 MG/1
500 TABLET ORAL 2 TIMES DAILY
Status: DISCONTINUED | OUTPATIENT
Start: 2022-07-29 | End: 2022-08-04 | Stop reason: HOSPADM

## 2022-07-29 RX ORDER — DEXTROSE MONOHYDRATE 25 G/50ML
25 INJECTION, SOLUTION INTRAVENOUS ONCE
Status: COMPLETED | OUTPATIENT
Start: 2022-07-29 | End: 2022-07-29

## 2022-07-29 RX ORDER — SODIUM CHLORIDE 9 MG/ML
50 INJECTION, SOLUTION INTRAVENOUS CONTINUOUS
Status: DISCONTINUED | OUTPATIENT
Start: 2022-07-29 | End: 2022-07-30

## 2022-07-29 RX ORDER — ALBUTEROL SULFATE 2.5 MG/3ML
10 SOLUTION RESPIRATORY (INHALATION) ONCE
Status: COMPLETED | OUTPATIENT
Start: 2022-07-29 | End: 2022-07-29

## 2022-07-29 RX ORDER — SODIUM CHLORIDE 0.9 % (FLUSH) 0.9 %
10 SYRINGE (ML) INJECTION AS NEEDED
Status: DISCONTINUED | OUTPATIENT
Start: 2022-07-29 | End: 2022-08-04 | Stop reason: HOSPADM

## 2022-07-29 RX ORDER — NICOTINE POLACRILEX 4 MG
15 LOZENGE BUCCAL
Status: DISCONTINUED | OUTPATIENT
Start: 2022-07-29 | End: 2022-08-04 | Stop reason: HOSPADM

## 2022-07-29 RX ORDER — INSULIN LISPRO 100 [IU]/ML
0-7 INJECTION, SOLUTION INTRAVENOUS; SUBCUTANEOUS
Status: DISCONTINUED | OUTPATIENT
Start: 2022-07-30 | End: 2022-07-30

## 2022-07-29 RX ORDER — KETOROLAC TROMETHAMINE 5 MG/ML
1 SOLUTION OPHTHALMIC 4 TIMES DAILY
Status: DISCONTINUED | OUTPATIENT
Start: 2022-07-29 | End: 2022-07-29

## 2022-07-29 RX ORDER — METOPROLOL SUCCINATE 50 MG/1
50 TABLET, EXTENDED RELEASE ORAL DAILY
Status: DISCONTINUED | OUTPATIENT
Start: 2022-07-30 | End: 2022-08-01

## 2022-07-29 RX ORDER — ALUMINA, MAGNESIA, AND SIMETHICONE 2400; 2400; 240 MG/30ML; MG/30ML; MG/30ML
15 SUSPENSION ORAL ONCE
Status: COMPLETED | OUTPATIENT
Start: 2022-07-29 | End: 2022-07-29

## 2022-07-29 RX ORDER — LIDOCAINE HYDROCHLORIDE 20 MG/ML
15 SOLUTION OROPHARYNGEAL ONCE
Status: COMPLETED | OUTPATIENT
Start: 2022-07-29 | End: 2022-07-29

## 2022-07-29 RX ORDER — PANTOPRAZOLE SODIUM 40 MG/1
40 TABLET, DELAYED RELEASE ORAL 2 TIMES DAILY
Status: DISCONTINUED | OUTPATIENT
Start: 2022-07-29 | End: 2022-08-04 | Stop reason: HOSPADM

## 2022-07-29 RX ADMIN — DEXTROSE MONOHYDRATE 25 G: 25 INJECTION, SOLUTION INTRAVENOUS at 18:00

## 2022-07-29 RX ADMIN — CALCIUM GLUCONATE 1 G: 20 INJECTION, SOLUTION INTRAVENOUS at 22:09

## 2022-07-29 RX ADMIN — PANTOPRAZOLE SODIUM 40 MG: 40 TABLET, DELAYED RELEASE ORAL at 21:07

## 2022-07-29 RX ADMIN — Medication 10 ML: at 20:48

## 2022-07-29 RX ADMIN — ALUMINUM HYDROXIDE, MAGNESIUM HYDROXIDE, AND DIMETHICONE 15 ML: 400; 400; 40 SUSPENSION ORAL at 16:16

## 2022-07-29 RX ADMIN — SODIUM CHLORIDE, POTASSIUM CHLORIDE, SODIUM LACTATE AND CALCIUM CHLORIDE 1000 ML: 600; 310; 30; 20 INJECTION, SOLUTION INTRAVENOUS at 18:11

## 2022-07-29 RX ADMIN — LIDOCAINE HYDROCHLORIDE 15 ML: 20 SOLUTION ORAL; TOPICAL at 16:17

## 2022-07-29 RX ADMIN — SODIUM ZIRCONIUM CYCLOSILICATE 10 G: 10 POWDER, FOR SUSPENSION ORAL at 21:08

## 2022-07-29 RX ADMIN — DEXTROSE MONOHYDRATE 50 ML: 25 INJECTION, SOLUTION INTRAVENOUS at 21:56

## 2022-07-29 RX ADMIN — LEVETIRACETAM 500 MG: 500 TABLET, FILM COATED ORAL at 21:07

## 2022-07-29 RX ADMIN — INSULIN HUMAN 10 UNITS: 100 INJECTION, SOLUTION PARENTERAL at 21:56

## 2022-07-29 RX ADMIN — SODIUM BICARBONATE 50 MEQ: 84 INJECTION INTRAVENOUS at 17:59

## 2022-07-29 RX ADMIN — INSULIN HUMAN 10 UNITS: 100 INJECTION, SOLUTION PARENTERAL at 17:59

## 2022-07-29 RX ADMIN — SODIUM CHLORIDE 1000 ML: 9 INJECTION, SOLUTION INTRAVENOUS at 15:37

## 2022-07-29 RX ADMIN — ALBUTEROL SULFATE 10 MG: 2.5 SOLUTION RESPIRATORY (INHALATION) at 21:43

## 2022-07-29 RX ADMIN — SODIUM CHLORIDE 50 ML/HR: 9 INJECTION, SOLUTION INTRAVENOUS at 22:09

## 2022-07-29 RX ADMIN — SODIUM BICARBONATE 50 MEQ: 84 INJECTION, SOLUTION INTRAVENOUS at 22:43

## 2022-07-29 RX ADMIN — SODIUM ZIRCONIUM CYCLOSILICATE 10 G: 10 POWDER, FOR SUSPENSION ORAL at 18:00

## 2022-07-29 RX ADMIN — SODIUM ZIRCONIUM CYCLOSILICATE 10 G: 10 POWDER, FOR SUSPENSION ORAL at 22:12

## 2022-07-29 NOTE — TELEPHONE ENCOUNTER
"Called to check on Mr Cochran after receiving a home transmission showing new onset of atrial fibrillation. Mrs Cochran answered the phone and stated her  was too sick to talk. She states since last Thursday he has been in bed and will not get up. Mr Cochran c/o about his stomach hurting and his wife reports he has been taking \"a lot\" of pepto bismol. Mrs Cochran states \"I think he has just give up\". He has no fever,cough,swelling in extremities and is not eating. Mrs Cochran reports he has been living on water and sprite since 7/21/2022. She also reports she was going to try to clean him up today and have EMS bring him to [facility] ER.They do not drive and have no assistance at home. I agreed with her and encourage her to bring him today. She voiced agreement and understanding.Report in murj for your review.  "

## 2022-07-29 NOTE — TELEPHONE ENCOUNTER
Thank you I will take a look at MURJ.  I agree ER evaluation would be best, he may have peptic ulcer disease or some other acute finding causing his abdominal pain and inability to eat

## 2022-07-30 ENCOUNTER — APPOINTMENT (OUTPATIENT)
Dept: ULTRASOUND IMAGING | Facility: HOSPITAL | Age: 82
End: 2022-07-30

## 2022-07-30 DIAGNOSIS — K21.9 GASTROESOPHAGEAL REFLUX DISEASE, UNSPECIFIED WHETHER ESOPHAGITIS PRESENT: ICD-10-CM

## 2022-07-30 LAB
ALBUMIN SERPL-MCNC: 3.4 G/DL (ref 3.5–5.2)
ALBUMIN/GLOB SERPL: 1.4 G/DL
ALP SERPL-CCNC: 54 U/L (ref 39–117)
ALT SERPL W P-5'-P-CCNC: 5 U/L (ref 1–41)
ANION GAP SERPL CALCULATED.3IONS-SCNC: 11 MMOL/L (ref 5–15)
ANION GAP SERPL CALCULATED.3IONS-SCNC: 13 MMOL/L (ref 5–15)
ANION GAP SERPL CALCULATED.3IONS-SCNC: 13 MMOL/L (ref 5–15)
ANION GAP SERPL CALCULATED.3IONS-SCNC: 8 MMOL/L (ref 5–15)
AST SERPL-CCNC: 13 U/L (ref 1–40)
BILIRUB SERPL-MCNC: 0.6 MG/DL (ref 0–1.2)
BUN SERPL-MCNC: 51 MG/DL (ref 8–23)
BUN SERPL-MCNC: 61 MG/DL (ref 8–23)
BUN SERPL-MCNC: 61 MG/DL (ref 8–23)
BUN SERPL-MCNC: 66 MG/DL (ref 8–23)
BUN/CREAT SERPL: 28.2 (ref 7–25)
BUN/CREAT SERPL: 28.6 (ref 7–25)
BUN/CREAT SERPL: 30.2 (ref 7–25)
BUN/CREAT SERPL: 30.8 (ref 7–25)
CALCIUM SPEC-SCNC: 8.1 MG/DL (ref 8.6–10.5)
CALCIUM SPEC-SCNC: 9.2 MG/DL (ref 8.6–10.5)
CALCIUM SPEC-SCNC: 9.3 MG/DL (ref 8.6–10.5)
CALCIUM SPEC-SCNC: 9.5 MG/DL (ref 8.6–10.5)
CHLORIDE SERPL-SCNC: 103 MMOL/L (ref 98–107)
CHLORIDE SERPL-SCNC: 104 MMOL/L (ref 98–107)
CHLORIDE SERPL-SCNC: 104 MMOL/L (ref 98–107)
CHLORIDE SERPL-SCNC: 106 MMOL/L (ref 98–107)
CK SERPL-CCNC: 86 U/L (ref 20–200)
CO2 SERPL-SCNC: 12 MMOL/L (ref 22–29)
CO2 SERPL-SCNC: 14 MMOL/L (ref 22–29)
CO2 SERPL-SCNC: 15 MMOL/L (ref 22–29)
CO2 SERPL-SCNC: 19 MMOL/L (ref 22–29)
CREAT SERPL-MCNC: 1.69 MG/DL (ref 0.76–1.27)
CREAT SERPL-MCNC: 1.98 MG/DL (ref 0.76–1.27)
CREAT SERPL-MCNC: 2.13 MG/DL (ref 0.76–1.27)
CREAT SERPL-MCNC: 2.34 MG/DL (ref 0.76–1.27)
CREAT UR-MCNC: 102.8 MG/DL
EGFRCR SERPLBLD CKD-EPI 2021: 26.9 ML/MIN/1.73
EGFRCR SERPLBLD CKD-EPI 2021: 30.1 ML/MIN/1.73
EGFRCR SERPLBLD CKD-EPI 2021: 32.9 ML/MIN/1.73
EGFRCR SERPLBLD CKD-EPI 2021: 39.8 ML/MIN/1.73
EOSINOPHIL SPEC QL MICRO: 0 % EOS/100 CELLS (ref 0–0)
GLOBULIN UR ELPH-MCNC: 2.5 GM/DL
GLUCOSE BLDC GLUCOMTR-MCNC: 109 MG/DL (ref 70–130)
GLUCOSE BLDC GLUCOMTR-MCNC: 109 MG/DL (ref 70–130)
GLUCOSE BLDC GLUCOMTR-MCNC: 144 MG/DL (ref 70–130)
GLUCOSE BLDC GLUCOMTR-MCNC: 160 MG/DL (ref 70–130)
GLUCOSE SERPL-MCNC: 100 MG/DL (ref 65–99)
GLUCOSE SERPL-MCNC: 126 MG/DL (ref 65–99)
GLUCOSE SERPL-MCNC: 87 MG/DL (ref 65–99)
GLUCOSE SERPL-MCNC: 89 MG/DL (ref 65–99)
LIPASE SERPL-CCNC: 62 U/L (ref 13–60)
MAGNESIUM SERPL-MCNC: 1.7 MG/DL (ref 1.6–2.4)
OSMOLALITY SERPL: 294 MOSM/KG (ref 275–295)
OSMOLALITY UR: 481 MOSM/KG (ref 300–1100)
PHOSPHATE SERPL-MCNC: 2.7 MG/DL (ref 2.5–4.5)
PHOSPHATE SERPL-MCNC: 3.7 MG/DL (ref 2.5–4.5)
POTASSIUM SERPL-SCNC: 4.5 MMOL/L (ref 3.5–5.2)
POTASSIUM SERPL-SCNC: 5.6 MMOL/L (ref 3.5–5.2)
POTASSIUM SERPL-SCNC: 5.6 MMOL/L (ref 3.5–5.2)
POTASSIUM SERPL-SCNC: 5.8 MMOL/L (ref 3.5–5.2)
PROT ?TM UR-MCNC: 18.5 MG/DL
PROT SERPL-MCNC: 5.9 G/DL (ref 6–8.5)
SARS-COV-2 RNA PNL SPEC NAA+PROBE: NOT DETECTED
SODIUM SERPL-SCNC: 129 MMOL/L (ref 136–145)
SODIUM SERPL-SCNC: 130 MMOL/L (ref 136–145)
SODIUM SERPL-SCNC: 130 MMOL/L (ref 136–145)
SODIUM SERPL-SCNC: 133 MMOL/L (ref 136–145)
SODIUM UR-SCNC: 25 MMOL/L
URATE SERPL-MCNC: 6.3 MG/DL (ref 3.4–7)

## 2022-07-30 PROCEDURE — 83690 ASSAY OF LIPASE: CPT | Performed by: INTERNAL MEDICINE

## 2022-07-30 PROCEDURE — 83930 ASSAY OF BLOOD OSMOLALITY: CPT | Performed by: INTERNAL MEDICINE

## 2022-07-30 PROCEDURE — 82550 ASSAY OF CK (CPK): CPT | Performed by: INTERNAL MEDICINE

## 2022-07-30 PROCEDURE — 84100 ASSAY OF PHOSPHORUS: CPT | Performed by: INTERNAL MEDICINE

## 2022-07-30 PROCEDURE — 63710000001 INSULIN LISPRO (HUMAN) PER 5 UNITS: Performed by: INTERNAL MEDICINE

## 2022-07-30 PROCEDURE — 87205 SMEAR GRAM STAIN: CPT | Performed by: INTERNAL MEDICINE

## 2022-07-30 PROCEDURE — 83735 ASSAY OF MAGNESIUM: CPT | Performed by: INTERNAL MEDICINE

## 2022-07-30 PROCEDURE — 84156 ASSAY OF PROTEIN URINE: CPT | Performed by: NURSE PRACTITIONER

## 2022-07-30 PROCEDURE — 99222 1ST HOSP IP/OBS MODERATE 55: CPT | Performed by: INTERNAL MEDICINE

## 2022-07-30 PROCEDURE — 97162 PT EVAL MOD COMPLEX 30 MIN: CPT | Performed by: PHYSICAL THERAPIST

## 2022-07-30 PROCEDURE — 84540 ASSAY OF URINE/UREA-N: CPT | Performed by: INTERNAL MEDICINE

## 2022-07-30 PROCEDURE — 25010000002 MAGNESIUM SULFATE IN D5W 1G/100ML (PREMIX) 1-5 GM/100ML-% SOLUTION: Performed by: INTERNAL MEDICINE

## 2022-07-30 PROCEDURE — 84165 PROTEIN E-PHORESIS SERUM: CPT | Performed by: NURSE PRACTITIONER

## 2022-07-30 PROCEDURE — 83935 ASSAY OF URINE OSMOLALITY: CPT | Performed by: INTERNAL MEDICINE

## 2022-07-30 PROCEDURE — 82962 GLUCOSE BLOOD TEST: CPT

## 2022-07-30 PROCEDURE — 25010000002 HEPARIN (PORCINE) PER 1000 UNITS: Performed by: NURSE PRACTITIONER

## 2022-07-30 PROCEDURE — 80053 COMPREHEN METABOLIC PANEL: CPT | Performed by: NURSE PRACTITIONER

## 2022-07-30 PROCEDURE — 84550 ASSAY OF BLOOD/URIC ACID: CPT | Performed by: INTERNAL MEDICINE

## 2022-07-30 PROCEDURE — 84300 ASSAY OF URINE SODIUM: CPT | Performed by: INTERNAL MEDICINE

## 2022-07-30 PROCEDURE — 76775 US EXAM ABDO BACK WALL LIM: CPT

## 2022-07-30 PROCEDURE — 82570 ASSAY OF URINE CREATININE: CPT | Performed by: NURSE PRACTITIONER

## 2022-07-30 PROCEDURE — 99233 SBSQ HOSP IP/OBS HIGH 50: CPT | Performed by: INTERNAL MEDICINE

## 2022-07-30 RX ORDER — INSULIN LISPRO 100 [IU]/ML
0-7 INJECTION, SOLUTION INTRAVENOUS; SUBCUTANEOUS
Status: DISCONTINUED | OUTPATIENT
Start: 2022-07-30 | End: 2022-08-04 | Stop reason: HOSPADM

## 2022-07-30 RX ORDER — MAGNESIUM SULFATE 1 G/100ML
1 INJECTION INTRAVENOUS AS NEEDED
Status: DISCONTINUED | OUTPATIENT
Start: 2022-07-30 | End: 2022-08-04 | Stop reason: HOSPADM

## 2022-07-30 RX ORDER — INSULIN LISPRO 100 [IU]/ML
0-7 INJECTION, SOLUTION INTRAVENOUS; SUBCUTANEOUS
Status: DISCONTINUED | OUTPATIENT
Start: 2022-07-30 | End: 2022-07-30

## 2022-07-30 RX ORDER — MAGNESIUM SULFATE HEPTAHYDRATE 40 MG/ML
2 INJECTION, SOLUTION INTRAVENOUS AS NEEDED
Status: DISCONTINUED | OUTPATIENT
Start: 2022-07-30 | End: 2022-08-04 | Stop reason: HOSPADM

## 2022-07-30 RX ORDER — DEXTROSE AND SODIUM CHLORIDE 5; .9 G/100ML; G/100ML
50 INJECTION, SOLUTION INTRAVENOUS CONTINUOUS
Status: DISCONTINUED | OUTPATIENT
Start: 2022-07-30 | End: 2022-07-30

## 2022-07-30 RX ADMIN — LEVETIRACETAM 500 MG: 500 TABLET, FILM COATED ORAL at 09:29

## 2022-07-30 RX ADMIN — DEXTROSE AND SODIUM CHLORIDE 50 ML/HR: 5; 900 INJECTION, SOLUTION INTRAVENOUS at 09:30

## 2022-07-30 RX ADMIN — PANTOPRAZOLE SODIUM 40 MG: 40 TABLET, DELAYED RELEASE ORAL at 09:29

## 2022-07-30 RX ADMIN — LEVETIRACETAM 500 MG: 500 TABLET, FILM COATED ORAL at 20:16

## 2022-07-30 RX ADMIN — ASPIRIN 81 MG CHEWABLE TABLET 81 MG: 81 TABLET CHEWABLE at 09:29

## 2022-07-30 RX ADMIN — SODIUM ZIRCONIUM CYCLOSILICATE 10 G: 10 POWDER, FOR SUSPENSION ORAL at 02:15

## 2022-07-30 RX ADMIN — INSULIN LISPRO 2 UNITS: 100 INJECTION, SOLUTION INTRAVENOUS; SUBCUTANEOUS at 09:36

## 2022-07-30 RX ADMIN — Medication 10 ML: at 09:29

## 2022-07-30 RX ADMIN — SODIUM BICARBONATE 150 MEQ: 84 INJECTION, SOLUTION INTRAVENOUS at 13:59

## 2022-07-30 RX ADMIN — ROSUVASTATIN CALCIUM 10 MG: 10 TABLET, FILM COATED ORAL at 09:29

## 2022-07-30 RX ADMIN — TIOTROPIUM BROMIDE INHALATION SPRAY 2 PUFF: 3.12 SPRAY, METERED RESPIRATORY (INHALATION) at 12:22

## 2022-07-30 RX ADMIN — HEPARIN SODIUM 5000 UNITS: 5000 INJECTION, SOLUTION INTRAVENOUS; SUBCUTANEOUS at 09:30

## 2022-07-30 RX ADMIN — PANTOPRAZOLE SODIUM 40 MG: 40 TABLET, DELAYED RELEASE ORAL at 20:16

## 2022-07-30 RX ADMIN — HEPARIN SODIUM 5000 UNITS: 5000 INJECTION, SOLUTION INTRAVENOUS; SUBCUTANEOUS at 20:16

## 2022-07-30 RX ADMIN — MAGNESIUM SULFATE HEPTAHYDRATE 1 G: 1 INJECTION, SOLUTION INTRAVENOUS at 17:53

## 2022-07-30 RX ADMIN — METOPROLOL SUCCINATE 50 MG: 50 TABLET, EXTENDED RELEASE ORAL at 09:29

## 2022-07-31 LAB
ALBUMIN SERPL-MCNC: 3.3 G/DL (ref 3.5–5.2)
ALP SERPL-CCNC: 53 U/L (ref 39–117)
ALT SERPL W P-5'-P-CCNC: 5 U/L (ref 1–41)
ANION GAP SERPL CALCULATED.3IONS-SCNC: 8 MMOL/L (ref 5–15)
AST SERPL-CCNC: 11 U/L (ref 1–40)
BILIRUB CONJ SERPL-MCNC: 0.2 MG/DL (ref 0–0.3)
BILIRUB INDIRECT SERPL-MCNC: 0.4 MG/DL
BILIRUB SERPL-MCNC: 0.6 MG/DL (ref 0–1.2)
BUN SERPL-MCNC: 43 MG/DL (ref 8–23)
BUN/CREAT SERPL: 27.2 (ref 7–25)
CALCIUM SPEC-SCNC: 8.8 MG/DL (ref 8.6–10.5)
CHLORIDE SERPL-SCNC: 102 MMOL/L (ref 98–107)
CO2 SERPL-SCNC: 24 MMOL/L (ref 22–29)
CREAT SERPL-MCNC: 1.58 MG/DL (ref 0.76–1.27)
CREAT UR-MCNC: 90.5 MG/DL
EGFRCR SERPLBLD CKD-EPI 2021: 43.1 ML/MIN/1.73
GLUCOSE BLDC GLUCOMTR-MCNC: 107 MG/DL (ref 70–130)
GLUCOSE BLDC GLUCOMTR-MCNC: 157 MG/DL (ref 70–130)
GLUCOSE BLDC GLUCOMTR-MCNC: 163 MG/DL (ref 70–130)
GLUCOSE BLDC GLUCOMTR-MCNC: 235 MG/DL (ref 70–130)
GLUCOSE SERPL-MCNC: 136 MG/DL (ref 65–99)
LIPASE SERPL-CCNC: 67 U/L (ref 13–60)
MAGNESIUM SERPL-MCNC: 2 MG/DL (ref 1.6–2.4)
POTASSIUM SERPL-SCNC: 4.4 MMOL/L (ref 3.5–5.2)
PROT SERPL-MCNC: 5.5 G/DL (ref 6–8.5)
QT INTERVAL: 392 MS
QT INTERVAL: 410 MS
QT INTERVAL: 432 MS
QTC INTERVAL: 445 MS
QTC INTERVAL: 455 MS
QTC INTERVAL: 470 MS
SODIUM SERPL-SCNC: 134 MMOL/L (ref 136–145)
UUN 24H UR-MCNC: 836 MG/DL

## 2022-07-31 PROCEDURE — 99232 SBSQ HOSP IP/OBS MODERATE 35: CPT | Performed by: INTERNAL MEDICINE

## 2022-07-31 PROCEDURE — 83690 ASSAY OF LIPASE: CPT | Performed by: INTERNAL MEDICINE

## 2022-07-31 PROCEDURE — 80048 BASIC METABOLIC PNL TOTAL CA: CPT | Performed by: NURSE PRACTITIONER

## 2022-07-31 PROCEDURE — 83735 ASSAY OF MAGNESIUM: CPT | Performed by: INTERNAL MEDICINE

## 2022-07-31 PROCEDURE — 82962 GLUCOSE BLOOD TEST: CPT

## 2022-07-31 PROCEDURE — 63710000001 INSULIN LISPRO (HUMAN) PER 5 UNITS: Performed by: INTERNAL MEDICINE

## 2022-07-31 PROCEDURE — 94799 UNLISTED PULMONARY SVC/PX: CPT

## 2022-07-31 PROCEDURE — 94664 DEMO&/EVAL PT USE INHALER: CPT

## 2022-07-31 PROCEDURE — 80076 HEPATIC FUNCTION PANEL: CPT | Performed by: INTERNAL MEDICINE

## 2022-07-31 PROCEDURE — 25010000002 HEPARIN (PORCINE) PER 1000 UNITS: Performed by: NURSE PRACTITIONER

## 2022-07-31 RX ORDER — ALBUMIN (HUMAN) 12.5 G/50ML
12.5 SOLUTION INTRAVENOUS 2 TIMES DAILY
Status: DISPENSED | OUTPATIENT
Start: 2022-07-31 | End: 2022-08-01

## 2022-07-31 RX ORDER — SODIUM CHLORIDE 9 MG/ML
75 INJECTION, SOLUTION INTRAVENOUS CONTINUOUS
Status: DISCONTINUED | OUTPATIENT
Start: 2022-07-31 | End: 2022-08-02

## 2022-07-31 RX ORDER — ONDANSETRON 2 MG/ML
4 INJECTION INTRAMUSCULAR; INTRAVENOUS EVERY 6 HOURS PRN
Status: DISCONTINUED | OUTPATIENT
Start: 2022-07-31 | End: 2022-08-04 | Stop reason: HOSPADM

## 2022-07-31 RX ADMIN — TIOTROPIUM BROMIDE INHALATION SPRAY 2 PUFF: 3.12 SPRAY, METERED RESPIRATORY (INHALATION) at 08:54

## 2022-07-31 RX ADMIN — PANTOPRAZOLE SODIUM 40 MG: 40 TABLET, DELAYED RELEASE ORAL at 09:08

## 2022-07-31 RX ADMIN — HEPARIN SODIUM 5000 UNITS: 5000 INJECTION, SOLUTION INTRAVENOUS; SUBCUTANEOUS at 09:08

## 2022-07-31 RX ADMIN — INSULIN LISPRO 2 UNITS: 100 INJECTION, SOLUTION INTRAVENOUS; SUBCUTANEOUS at 12:16

## 2022-07-31 RX ADMIN — ASPIRIN 81 MG CHEWABLE TABLET 81 MG: 81 TABLET CHEWABLE at 09:08

## 2022-07-31 RX ADMIN — LEVETIRACETAM 500 MG: 500 TABLET, FILM COATED ORAL at 09:08

## 2022-07-31 RX ADMIN — INSULIN LISPRO 2 UNITS: 100 INJECTION, SOLUTION INTRAVENOUS; SUBCUTANEOUS at 21:21

## 2022-07-31 RX ADMIN — PANTOPRAZOLE SODIUM 40 MG: 40 TABLET, DELAYED RELEASE ORAL at 21:20

## 2022-07-31 RX ADMIN — Medication 10 ML: at 09:08

## 2022-07-31 RX ADMIN — SODIUM BICARBONATE 150 MEQ: 84 INJECTION, SOLUTION INTRAVENOUS at 06:24

## 2022-07-31 RX ADMIN — INSULIN LISPRO 3 UNITS: 100 INJECTION, SOLUTION INTRAVENOUS; SUBCUTANEOUS at 17:02

## 2022-07-31 RX ADMIN — HEPARIN SODIUM 5000 UNITS: 5000 INJECTION, SOLUTION INTRAVENOUS; SUBCUTANEOUS at 21:20

## 2022-07-31 RX ADMIN — LEVETIRACETAM 500 MG: 500 TABLET, FILM COATED ORAL at 21:20

## 2022-07-31 RX ADMIN — METOPROLOL SUCCINATE 50 MG: 50 TABLET, EXTENDED RELEASE ORAL at 09:08

## 2022-07-31 RX ADMIN — ROSUVASTATIN CALCIUM 10 MG: 10 TABLET, FILM COATED ORAL at 09:08

## 2022-08-01 LAB
ALBUMIN SERPL ELPH-MCNC: 3.1 G/DL (ref 2.9–4.4)
ALBUMIN SERPL-MCNC: 3.2 G/DL (ref 3.5–5.2)
ALBUMIN/GLOB SERPL: 1 {RATIO} (ref 0.7–1.7)
ALBUMIN/GLOB SERPL: 1.3 G/DL
ALP SERPL-CCNC: 56 U/L (ref 39–117)
ALPHA1 GLOB SERPL ELPH-MCNC: 0.2 G/DL (ref 0–0.4)
ALPHA2 GLOB SERPL ELPH-MCNC: 0.5 G/DL (ref 0.4–1)
ALT SERPL W P-5'-P-CCNC: 10 U/L (ref 1–41)
ANION GAP SERPL CALCULATED.3IONS-SCNC: 11 MMOL/L (ref 5–15)
AST SERPL-CCNC: 17 U/L (ref 1–40)
B-GLOBULIN SERPL ELPH-MCNC: 0.9 G/DL (ref 0.7–1.3)
BASOPHILS # BLD AUTO: 0.01 10*3/MM3 (ref 0–0.2)
BASOPHILS NFR BLD AUTO: 0.3 % (ref 0–1.5)
BILIRUB SERPL-MCNC: 0.4 MG/DL (ref 0–1.2)
BUN SERPL-MCNC: 34 MG/DL (ref 8–23)
BUN/CREAT SERPL: 26.2 (ref 7–25)
CALCIUM SPEC-SCNC: 8.6 MG/DL (ref 8.6–10.5)
CHLORIDE SERPL-SCNC: 105 MMOL/L (ref 98–107)
CO2 SERPL-SCNC: 21 MMOL/L (ref 22–29)
CREAT SERPL-MCNC: 1.3 MG/DL (ref 0.76–1.27)
DEPRECATED RDW RBC AUTO: 44.6 FL (ref 37–54)
EGFRCR SERPLBLD CKD-EPI 2021: 54.5 ML/MIN/1.73
EOSINOPHIL # BLD AUTO: 0.03 10*3/MM3 (ref 0–0.4)
EOSINOPHIL NFR BLD AUTO: 1 % (ref 0.3–6.2)
ERYTHROCYTE [DISTWIDTH] IN BLOOD BY AUTOMATED COUNT: 15.9 % (ref 12.3–15.4)
GAMMA GLOB SERPL ELPH-MCNC: 1.4 G/DL (ref 0.4–1.8)
GLOBULIN SER CALC-MCNC: 3.1 G/DL (ref 2.2–3.9)
GLOBULIN UR ELPH-MCNC: 2.5 GM/DL
GLUCOSE BLDC GLUCOMTR-MCNC: 169 MG/DL (ref 70–130)
GLUCOSE BLDC GLUCOMTR-MCNC: 174 MG/DL (ref 70–130)
GLUCOSE BLDC GLUCOMTR-MCNC: 97 MG/DL (ref 70–130)
GLUCOSE BLDC GLUCOMTR-MCNC: 97 MG/DL (ref 70–130)
GLUCOSE SERPL-MCNC: 104 MG/DL (ref 65–99)
HCT VFR BLD AUTO: 33.6 % (ref 37.5–51)
HGB BLD-MCNC: 11 G/DL (ref 13–17.7)
IMM GRANULOCYTES # BLD AUTO: 0.02 10*3/MM3 (ref 0–0.05)
IMM GRANULOCYTES NFR BLD AUTO: 0.6 % (ref 0–0.5)
LABORATORY COMMENT REPORT: NORMAL
LIPASE SERPL-CCNC: 84 U/L (ref 13–60)
LYMPHOCYTES # BLD AUTO: 0.62 10*3/MM3 (ref 0.7–3.1)
LYMPHOCYTES NFR BLD AUTO: 19.9 % (ref 19.6–45.3)
M PROTEIN SERPL ELPH-MCNC: NORMAL G/DL
MAGNESIUM SERPL-MCNC: 2.1 MG/DL (ref 1.6–2.4)
MCH RBC QN AUTO: 25.3 PG (ref 26.6–33)
MCHC RBC AUTO-ENTMCNC: 32.7 G/DL (ref 31.5–35.7)
MCV RBC AUTO: 77.4 FL (ref 79–97)
MONOCYTES # BLD AUTO: 0.23 10*3/MM3 (ref 0.1–0.9)
MONOCYTES NFR BLD AUTO: 7.4 % (ref 5–12)
NEUTROPHILS NFR BLD AUTO: 2.2 10*3/MM3 (ref 1.7–7)
NEUTROPHILS NFR BLD AUTO: 70.8 % (ref 42.7–76)
NRBC BLD AUTO-RTO: 0 /100 WBC (ref 0–0.2)
PLATELET # BLD AUTO: 57 10*3/MM3 (ref 140–450)
POTASSIUM SERPL-SCNC: 4.3 MMOL/L (ref 3.5–5.2)
PROT PATTERN SERPL ELPH-IMP: NORMAL
PROT SERPL-MCNC: 5.7 G/DL (ref 6–8.5)
PROT SERPL-MCNC: 6.2 G/DL (ref 6–8.5)
RBC # BLD AUTO: 4.34 10*6/MM3 (ref 4.14–5.8)
SODIUM SERPL-SCNC: 137 MMOL/L (ref 136–145)
WBC NRBC COR # BLD: 3.11 10*3/MM3 (ref 3.4–10.8)

## 2022-08-01 PROCEDURE — 85025 COMPLETE CBC W/AUTO DIFF WBC: CPT | Performed by: INTERNAL MEDICINE

## 2022-08-01 PROCEDURE — 97165 OT EVAL LOW COMPLEX 30 MIN: CPT

## 2022-08-01 PROCEDURE — C1751 CATH, INF, PER/CENT/MIDLINE: HCPCS

## 2022-08-01 PROCEDURE — 99233 SBSQ HOSP IP/OBS HIGH 50: CPT | Performed by: INTERNAL MEDICINE

## 2022-08-01 PROCEDURE — 94799 UNLISTED PULMONARY SVC/PX: CPT

## 2022-08-01 PROCEDURE — 99232 SBSQ HOSP IP/OBS MODERATE 35: CPT | Performed by: INTERNAL MEDICINE

## 2022-08-01 PROCEDURE — 83690 ASSAY OF LIPASE: CPT | Performed by: INTERNAL MEDICINE

## 2022-08-01 PROCEDURE — 82962 GLUCOSE BLOOD TEST: CPT

## 2022-08-01 PROCEDURE — 63710000001 INSULIN LISPRO (HUMAN) PER 5 UNITS: Performed by: INTERNAL MEDICINE

## 2022-08-01 PROCEDURE — 94664 DEMO&/EVAL PT USE INHALER: CPT

## 2022-08-01 PROCEDURE — 83735 ASSAY OF MAGNESIUM: CPT | Performed by: INTERNAL MEDICINE

## 2022-08-01 PROCEDURE — 80053 COMPREHEN METABOLIC PANEL: CPT | Performed by: INTERNAL MEDICINE

## 2022-08-01 PROCEDURE — 02HV33Z INSERTION OF INFUSION DEVICE INTO SUPERIOR VENA CAVA, PERCUTANEOUS APPROACH: ICD-10-PCS | Performed by: HOSPITALIST

## 2022-08-01 PROCEDURE — C1894 INTRO/SHEATH, NON-LASER: HCPCS

## 2022-08-01 RX ORDER — SODIUM CHLORIDE 0.9 % (FLUSH) 0.9 %
10 SYRINGE (ML) INJECTION AS NEEDED
Status: DISCONTINUED | OUTPATIENT
Start: 2022-08-01 | End: 2022-08-04 | Stop reason: HOSPADM

## 2022-08-01 RX ORDER — SODIUM CHLORIDE 0.9 % (FLUSH) 0.9 %
10 SYRINGE (ML) INJECTION EVERY 12 HOURS SCHEDULED
Status: DISCONTINUED | OUTPATIENT
Start: 2022-08-01 | End: 2022-08-04 | Stop reason: HOSPADM

## 2022-08-01 RX ORDER — PANTOPRAZOLE SODIUM 40 MG/1
TABLET, DELAYED RELEASE ORAL
Qty: 180 TABLET | Refills: 0 | Status: SHIPPED | OUTPATIENT
Start: 2022-08-01 | End: 2022-09-06 | Stop reason: SDUPTHER

## 2022-08-01 RX ORDER — METOPROLOL SUCCINATE 50 MG/1
50 TABLET, EXTENDED RELEASE ORAL EVERY 12 HOURS SCHEDULED
Status: DISCONTINUED | OUTPATIENT
Start: 2022-08-01 | End: 2022-08-04 | Stop reason: HOSPADM

## 2022-08-01 RX ADMIN — LEVETIRACETAM 500 MG: 500 TABLET, FILM COATED ORAL at 20:17

## 2022-08-01 RX ADMIN — ASPIRIN 81 MG CHEWABLE TABLET 81 MG: 81 TABLET CHEWABLE at 09:04

## 2022-08-01 RX ADMIN — TIOTROPIUM BROMIDE INHALATION SPRAY 2 PUFF: 3.12 SPRAY, METERED RESPIRATORY (INHALATION) at 07:28

## 2022-08-01 RX ADMIN — SODIUM CHLORIDE 75 ML/HR: 9 INJECTION, SOLUTION INTRAVENOUS at 13:36

## 2022-08-01 RX ADMIN — METOPROLOL SUCCINATE 50 MG: 50 TABLET, EXTENDED RELEASE ORAL at 20:17

## 2022-08-01 RX ADMIN — ROSUVASTATIN CALCIUM 10 MG: 10 TABLET, FILM COATED ORAL at 09:04

## 2022-08-01 RX ADMIN — INSULIN LISPRO 2 UNITS: 100 INJECTION, SOLUTION INTRAVENOUS; SUBCUTANEOUS at 20:17

## 2022-08-01 RX ADMIN — Medication 10 ML: at 20:17

## 2022-08-01 RX ADMIN — PANTOPRAZOLE SODIUM 40 MG: 40 TABLET, DELAYED RELEASE ORAL at 09:04

## 2022-08-01 RX ADMIN — PANTOPRAZOLE SODIUM 40 MG: 40 TABLET, DELAYED RELEASE ORAL at 20:17

## 2022-08-01 RX ADMIN — INSULIN LISPRO 2 UNITS: 100 INJECTION, SOLUTION INTRAVENOUS; SUBCUTANEOUS at 11:55

## 2022-08-01 RX ADMIN — Medication 10 ML: at 14:20

## 2022-08-01 RX ADMIN — METOPROLOL SUCCINATE 50 MG: 50 TABLET, EXTENDED RELEASE ORAL at 09:04

## 2022-08-01 RX ADMIN — LEVETIRACETAM 500 MG: 500 TABLET, FILM COATED ORAL at 09:04

## 2022-08-01 NOTE — PLAN OF CARE
Goal Outcome Evaluation:  Plan of Care Reviewed With: patient, spouse           Outcome Evaluation: OT eval complete.  Pt is [place] and presents with with weakness, decreased balance and activity tolerance limiting independence with self care. Pt min A supine to sit, min A to don socks, min A to don hospital gown, CGA x 2 STS,  CGA x 2 to ambulate 100 ft with RW,  min A with slight LOB with stand to sit.  OT will follow to advance pt toward increased indpendence with self care.  Recommend IP rehab upon d/c.

## 2022-08-01 NOTE — THERAPY EVALUATION
Patient Name: Narendra Cochran  : 1940    MRN: [id number]                              Today's Date: 2022       Admit Date: 2022    Visit Dx:     ICD-10-CM ICD-9-CM   1. Hyperkalemia  E87.5 276.7   2. Acute kidney injury (nontraumatic) ([facility])  N17.9 584.9   3. Upper abdominal pain  R10.10 789.09     Patient Active Problem List   Diagnosis   • Essential hypertension   • Hyperlipidemia LDL goal <70   • T2DM   • Paget disease of bone   • Tobacco abuse   • H/O seizures on Keppra   • Gonalgia   • Osteitis deformans   • COPD (chronic obstructive pulmonary disease) ([facility])   • Syncope   • NSVT (nonsustained ventricular tachycardia) ([facility])   • Coronary artery disease involving native coronary artery of native heart with angina pectoris ([facility])   • NICM    • OHCA   • Hypokalemia   • Hypomagnesemia   • VHD; mild-mod AR, mild MR   • Chronic systolic congestive heart failure ([facility])   • Former tobacco user   • GERD   • Marijuana use   • Frequent PVCs   • Acute blood loss anemia   • Presence of biventricular implantable cardioverter-defibrillator (ICD)   • Stage 3a chronic kidney disease ([facility])   • Hyperkalemia   • Acute renal failure (ARF) ([facility])   • Hyponatremia     Past Medical History:   Diagnosis Date   • Arthritis    • CHF (congestive heart failure) ([facility])    • Diabetes mellitus ([facility])    • Diabetic foot ulcers ([facility])    • Diverticulitis    • Foot callus    • GERD (gastroesophageal reflux disease)    • Hammer toes, bilateral    • Hearing loss    • History of transfusion    • Hyperlipidemia    • Hypertension    • Hypertensive urgency, malignant 2017   • Paget disease of bone      Past Surgical History:   Procedure Laterality Date   • APPENDECTOMY     • CARDIAC CATHETERIZATION N/A 3/18/2020    Procedure: Left Heart Cath;  Surgeon: Sonya Garibay MD;  Location: [facility] CATH INVASIVE LOCATION;  Service: Cardiology;  Laterality: N/A;  First availabel provider     • CARDIAC CATHETERIZATION N/A 3/15/2021    Procedure: LEFT  HEART CATH;  Surgeon: Doc Sahni IV, MD;  Location:  [facility] CATH INVASIVE LOCATION;  Service: Cardiovascular;  Laterality: N/A;   • CARDIAC CATHETERIZATION N/A 3/15/2021    Procedure: Coronary angiography;  Surgeon: Doc Sahni IV, MD;  Location:  [facility] CATH INVASIVE LOCATION;  Service: Cardiovascular;  Laterality: N/A;   • CARDIAC ELECTROPHYSIOLOGY PROCEDURE N/A 3/16/2021    Procedure: ICD DC new;  Surgeon: Som Boyd DO;  Location:  [facility] EP INVASIVE LOCATION;  Service: Cardiology;  Laterality: N/A;   • COLON RESECTION      second to diverticulitis    • FOOT SURGERY Left       General Information     Row Name 08/01/22 0959          OT Time and Intention    Document Type evaluation  -AC     Mode of Treatment occupational therapy  -AC     Row Name 08/01/22 0959          General Information    Patient Profile Reviewed yes  -AC     Prior Level of Function independent:;all household mobility;ADL's;dependent:;driving  use a QC for community mobility  -AC     Existing Precautions/Restrictions fall  jerking movements with mobility  -AC     Barriers to Rehab hearing deficit  -AC     Row Name 08/01/22 0959          Occupational Profile    Environmental Supports and Barriers (Occupational Profile) walk in shower  -AC     Row Name 08/01/22 0959          Living Environment    People in Home spouse  -AC     Row Name 08/01/22 0959          Home Main Entrance    Number of Stairs, Main Entrance none  -AC     Stair Railings, Main Entrance none  -AC     Row Name 08/01/22 0959          Stairs Within Home, Primary    Number of Stairs, Within Home, Primary none  -AC     Row Name 08/01/22 0959          Cognition    Orientation Status (Cognition) oriented x 4  -AC     Row Name 08/01/22 0959          Safety Issues, Functional Mobility    Safety Issues Affecting Function (Mobility) insight into deficits/self-awareness;safety precaution awareness;safety precautions follow-through/compliance;sequencing  abilities  -     Impairments Affecting Function (Mobility) balance;endurance/activity tolerance;pain;postural/trunk control;strength  -           User Key  (r) = Recorded By, (t) = Taken By, (c) = Cosigned By    Initials Name Provider Type     Francia Yao, OT Occupational Therapist                 Mobility/ADL's     Row Name 08/01/22 1044          Bed Mobility    Bed Mobility supine-sit;sit-supine  -     Supine-Sit [place] (Bed Mobility) minimum assist (75% patient effort)  -     Sit-Supine [place] (Bed Mobility) supervision  -     Assistive Device (Bed Mobility) bed rails;head of bed elevated  -     Row Name 08/01/22 1044          Transfers    Transfers sit-stand transfer;stand-sit transfer  -     Sit-Stand [place] (Transfers) verbal cues;contact guard;2 person assist  -     Stand-Sit [place] (Transfers) minimum assist (75% patient effort);2 person assist  -[facility] Name 08/01/22 1044          Sit-Stand Transfer    Assistive Device (Sit-Stand Transfers) walker, front-wheeled  -[facility] Name 08/01/22 1044          Stand-Sit Transfer    Comment, (Stand-Sit Transfer) slight LOB when turning to sit in chair  -[facility] Name 08/01/22 1044          Functional Mobility    Functional Mobility- Ind. Level contact guard assist;2 person assist required  -     Functional Mobility- Device walker, front-wheeled  -     Functional Mobility-Distance (Feet) 100  -     Row Name 08/01/22 1044          Activities of Daily Living    BADL Assessment/Intervention lower body dressing;upper body dressing  -     Row Name 08/01/22 1044          Lower Body Dressing Assessment/Training    [place] Level (Lower Body Dressing) don;socks;verbal cues;minimum assist (75% patient effort)  -     Position (Lower Body Dressing) edge of bed sitting  -     Row Name 08/01/22 1044          Upper Body Dressing Assessment/Training    [place] Level (Upper Body Dressing) don;front opening  garment;minimum assist (75% patient effort)  -AC     Position (Upper Body Dressing) edge of bed sitting  -AC           User Key  (r) = Recorded By, (t) = Taken By, (c) = Cosigned By    Initials Name Provider Type    Francia Solares, HUMBERTO Occupational Therapist               Obj/Interventions     Row Name 08/01/22 1046          Sensory Assessment (Somatosensory)    Sensory Assessment (Somatosensory) UE sensation intact  -     Row Name 08/01/22 1046          Vision Assessment/Intervention    Visual Impairment/Limitations WFL  recent cataract surgery and does not need glasses all the time, but plans to get readers  -     Row Name 08/01/22 1046          Range of Motion Comprehensive    General Range of Motion bilateral upper extremity ROM WNL  -     Row Name 08/01/22 1046          Strength Comprehensive (MMT)    Comment, General Manual Muscle Testing (MMT) Assessment BUE grossly 4/5  -AC           User Key  (r) = Recorded By, (t) = Taken By, (c) = Cosigned By    Initials Name Provider Type    Francia Solares, OT Occupational Therapist               Goals/Plan     Row Name 08/01/22 1052          Bed Mobility Goal 1 (OT)    Activity/Assistive Device (Bed Mobility Goal 1, OT) supine to sit  -AC     [place] Level/Cues Needed (Bed Mobility Goal 1, OT) modified independence  -AC     Time Frame (Bed Mobility Goal 1, OT) by discharge  -AC     Progress/Outcomes (Bed Mobility Goal 1, OT) goal ongoing  -     Row Name 08/01/22 1052          Transfer Goal 1 (OT)    Activity/Assistive Device (Transfer Goal 1, OT) bed-to-chair/chair-to-bed;toilet;walker, rolling  -AC     [place] Level/Cues Needed (Transfer Goal 1, OT) supervision required  -AC     Time Frame (Transfer Goal 1, OT) by discharge  -AC     Progress/Outcome (Transfer Goal 1, OT) goal ongoing  -     Row Name 08/01/22 1052          Dressing Goal 1 (OT)    Activity/Device (Dressing Goal 1, OT) lower body dressing  -AC     [place]/Cues Needed  (Dressing Goal 1, OT) set-up required;supervision required  -AC     Time Frame (Dressing Goal 1, OT) by discharge  -AC     Progress/Outcome (Dressing Goal 1, OT) goal ongoing  -     Row Name 08/01/22 1052          Toileting Goal 1 (OT)    Activity/Device (Toileting Goal 1, OT) adjust/manage clothing;perform perineal hygiene  -     [place] Level/Cues Needed (Toileting Goal 1, OT) supervision required  -AC     Time Frame (Toileting Goal 1, OT) by discharge  -AC     Progress/Outcome (Toileting Goal 1, OT) goal ongoing  -     Row Name 08/01/22 1052          Therapy Assessment/Plan (OT)    Planned Therapy Interventions (OT) activity tolerance training;BADL retraining;functional balance retraining;patient/caregiver education/training;strengthening exercise;transfer/mobility retraining  -           User Key  (r) = Recorded By, (t) = Taken By, (c) = Cosigned By    Initials Name Provider Type     Francia Yao, OT Occupational Therapist               Clinical Impression     Row Name 08/01/22 1047          Pain Assessment    Pretreatment Pain Rating 6/10  -     Posttreatment Pain Rating 6/10  -     Pain Location - abdomen  -AC     Pain Intervention(s) Repositioned;Ambulation/increased activity  -     Row Name 08/01/22 1047          Plan of Care Review    Plan of Care Reviewed With patient;spouse  -     Outcome Evaluation OT eval complete.  Pt is [place] and presents with with weakness, decreased balance and activity tolerance limiting independence with self care. Pt min A supine to sit, min A to don socks, min A to don hospital gown, CGA x 2 STS,  CGA x 2 to ambulate 100 ft with RW,  min A with slight LOB with stand to sit.  OT will follow to advance pt toward increased indpendence with self care.  Recommend IP rehab upon d/c.  -     Row Name 08/01/22 8811          Therapy Assessment/Plan (OT)    Rehab Potential (OT) good, to achieve stated therapy goals  -     Criteria for Skilled Therapeutic  Interventions Met (OT) yes;skilled treatment is necessary  -     Therapy Frequency (OT) daily  -     Row Name 08/01/22 1047          Therapy Plan Review/Discharge Plan (OT)    Anticipated Discharge Disposition (OT) inpatient rehabilitation facility  -     Row Name 08/01/22 1047          Vital Signs    Pretreatment Heart Rate (beats/min) 73  -AC     Posttreatment Heart Rate (beats/min) 7  -AC     O2 Delivery Pre Treatment room air  -AC     Intra SpO2 (%) 91  -AC     O2 Delivery Intra Treatment room air  -AC     Post SpO2 (%) 100  -AC     O2 Delivery Post Treatment room air  -AC     Pre Patient Position Supine  -AC     Intra Patient Position Standing  -AC     Post Patient Position Supine  -AC     Row Name 08/01/22 1047          Positioning and Restraints    Pre-Treatment Position in bed  -AC     Post Treatment Position bed  -AC     In Bed notified nsg;fowlers;call light within reach;encouraged to call for assist;exit alarm on;with family/caregiver  -           User Key  (r) = Recorded By, (t) = Taken By, (c) = Cosigned By    Initials Name Provider Type    Francia Solares, OT Occupational Therapist               Outcome Measures     Row Name 08/01/22 1053          How much help from another is currently needed...    Putting on and taking off regular lower body clothing? 3  -AC     Bathing (including washing, rinsing, and drying) 2  -AC     Toileting (which includes using toilet bed pan or urinal) 3  -AC     Putting on and taking off regular upper body clothing 3  -AC     Taking care of personal grooming (such as brushing teeth) 3  -AC     Eating meals 4  -AC     AM-PAC 6 Clicks Score (OT) 18  -     Row Name 08/01/22 1053          Functional Assessment    Outcome Measure Options AM-PAC 6 Clicks Daily Activity (OT)  -           User Key  (r) = Recorded By, (t) = Taken By, (c) = Cosigned By    Initials Name Provider Type    Francia Solares, OT Occupational Therapist                Occupational Therapy  Education                 Title: PT OT SLP Therapies (In Progress)     Topic: Occupational Therapy (In Progress)     Point: ADL training (Done)     Description:   Instruct learner(s) on proper safety adaptation and remediation techniques during self care or transfers.   Instruct in proper use of assistive devices.              Learning Progress Summary           Patient Bert, SHIMON, VU by  at 8/1/2022 1054    Comment: Role of OT, benefits of activity                   Point: Home exercise program (Not Started)     Description:   Instruct learner(s) on appropriate technique for monitoring, assisting and/or progressing therapeutic exercises/activities.              Learner Progress:  Not documented in this visit.          Point: Precautions (Not Started)     Description:   Instruct learner(s) on prescribed precautions during self-care and functional transfers.              Learner Progress:  Not documented in this visit.          Point: Body mechanics (Not Started)     Description:   Instruct learner(s) on proper positioning and spine alignment during self-care, functional mobility activities and/or exercises.              Learner Progress:  Not documented in this visit.                      User Key     Initials Effective Dates Name Provider Type Discipline     06/16/21 -  Francia Yao, OT Occupational Therapist OT              OT Recommendation and Plan  Planned Therapy Interventions (OT): activity tolerance training, BADL retraining, functional balance retraining, patient/caregiver education/training, strengthening exercise, transfer/mobility retraining  Therapy Frequency (OT): daily  Plan of Care Review  Plan of Care Reviewed With: patient, spouse  Outcome Evaluation: OT eval complete.  Pt is [facility] and presents with with weakness, decreased balance and activity tolerance limiting independence with self care. Pt min A supine to sit, min A to don socks, min A to don hospital gown, CGA x 2 STS,  CGA x 2 to  ambulate 100 ft with RW,  min A with slight LOB with stand to sit.  OT will follow to advance pt toward increased indpendence with self care.  Recommend IP rehab upon d/c.     Time Calculation:    Time Calculation- OT     Row Name 08/01/22 0959             Time Calculation- OT    OT Start Time 0959  -AC      OT Received On 08/01/22  -AC      OT Goal Re-Cert Due Date 08/11/22  -AC              Untimed Charges    OT Eval/Re-eval Minutes 55  -AC              Total Minutes    Untimed Charges Total Minutes 55  -AC       Total Minutes 55  -AC            User Key  (r) = Recorded By, (t) = Taken By, (c) = Cosigned By    Initials Name Provider Type    AC Francia Yao, OT Occupational Therapist              Therapy Charges for Today     Code Description Service Date Service Provider Modifiers Qty    62680227598 HC OT EVAL LOW COMPLEXITY 4 8/1/2022 Francia Yao, OT GO 1    [id number]  OT THER SUPP EA 15 MIN 8/1/2022 Francia Yao OT GO 2               Francia Yao OT  8/1/2022

## 2022-08-01 NOTE — PLAN OF CARE
Goal Outcome Evaluation:   No complaints of pain this shift, VSS, IVF started via PICC placed today.   Problem: Adult Inpatient Plan of Care  Goal: Absence of Hospital-Acquired Illness or Injury  Intervention: Identify and Manage Fall Risk  Recent Flowsheet Documentation  Taken 8/1/2022 1400 by Renay Gan, RN  Safety Promotion/Fall Prevention:   activity supervised   toileting scheduled   safety round/check completed   room organization consistent   nonskid shoes/slippers when out of bed  Taken 8/1/2022 1200 by Renay Gan RN  Safety Promotion/Fall Prevention:   toileting scheduled   safety round/check completed   room organization consistent   patient off unit   nonskid shoes/slippers when out of bed   clutter free environment maintained  Taken 8/1/2022 1000 by Renay Gan RN  Safety Promotion/Fall Prevention:   activity supervised   clutter free environment maintained   assistive device/personal items within reach   muscle strengthening facilitated   lighting adjusted   nonskid shoes/slippers when out of bed   room organization consistent   safety round/check completed   toileting scheduled  Taken 8/1/2022 0849 by Renay Gan, RN  Safety Promotion/Fall Prevention:   toileting scheduled   safety round/check completed   room organization consistent   nonskid shoes/slippers when out of bed   activity supervised   clutter free environment maintained   fall prevention program maintained

## 2022-08-01 NOTE — PROGRESS NOTES
[facility]  PROGRESS NOTE    Patient Name: Narendra Cochran  : 1940  MRN: [id number]    Date of Admission: 2022  Primary Care Physician: Marguerite Moore PA-C    Subjective   Subjective     CC:  Acidosis, mello, hyperkalemia, n/v/d & abd pain    HPI:  Feeling better, no abd pain lying in bed; tolerated dinner last night and liquids. Still quite generally weak and unsteady. No overt chest pain, palpitations, or dyspnea at rest    ROS:  Gen- No fevers, chills  CV- No chest pain, palpitations  Resp- No cough, dyspnea  GI- above      Objective   Objective     Vital Signs:   Temp:  [97.6 °F (36.4 °C)-98.9 °F (37.2 °C)] 98 °F (36.7 °C)  Heart Rate:  [71-88] 71  Resp:  [16-18] 16  BP: ()/() 134/100     Physical Exam:  Constitutional:Alert, oriented x 3, nontoxic appearing, hard of hearing, chronically ill appearing but nontoxic  Psych:Normal/appropriate affect  HEENT:NCAT, oropharynx clear  Neck: neck supple, full range of motion  Neuro: Face symmetric, speech clear, equal , moves all extremities  Cardiac: RRR, murmur noted; No pretibial pitting edema  Resp: CTAB, normal effort  GI: abd soft, minimal epigstric tenderness (improved), no rebound, no guarding  Skin: No extremity rash  Musculoskeletal/extremities: no cyanosis of extremities; no significant ankle edema        Results Reviewed:  LAB RESULTS:      Lab 22  0727 22  1756 22  1600 22  1357   WBC 3.11*  --   --  7.39   HEMOGLOBIN 11.0*  --   --  17.1   HEMOGLOBIN, POC  --  17.7*  --   --    HEMATOCRIT 33.6*  --   --  55.7*   HEMATOCRIT POC  --  52*  --   --    PLATELETS 57*  --   --  103*   NEUTROS ABS 2.20  --   --  5.55   IMMATURE GRANS (ABS) 0.02  --   --  0.03   LYMPHS ABS 0.62*  --   --  1.51   MONOS ABS 0.23  --   --  0.28   EOS ABS 0.03  --   --  0.01   MCV 77.4*  --   --  82.0   LACTATE  --   --  1.9 2.9*         Lab 22  0727 22  0425  07/30/22 1954 07/30/22  0612 07/30/22  0107 07/29/22  1948 07/29/22  1600 07/29/22  1357   SODIUM 137 134* 133* 129*  130* 130*  --    < >  --    POTASSIUM 4.3 4.4 4.5 5.6*  5.6* 5.8* 6.0*   < >  --    CHLORIDE 105 102 106 104  103 104  --    < >  --    CO2 21.0* 24.0 19.0* 12.0*  14.0* 15.0*  --    < >  --    ANION GAP 11.0 8.0 8.0 13.0  13.0 11.0  --    < >  --    BUN 34* 43* 51* 61*  61* 66*  --    < >  --    CREATININE 1.30* 1.58* 1.69* 1.98*  2.13* 2.34*  --    < >  --    EGFR 54.5* 43.1* 39.8* 32.9*  30.1* 26.9*  --    < >  --    GLUCOSE 104* 136* 126* 89  87 100*  --    < >  --    CALCIUM 8.6 8.8 8.1* 9.3  9.2 9.5  --    < >  --    MAGNESIUM 2.1 2.0  --  1.7  --  1.9  --   --    PHOSPHORUS  --   --  2.7 3.7  --   --   --   --    HEMOGLOBIN A1C  --   --   --   --   --   --   --  6.50*    < > = values in this interval not displayed.         Lab 08/01/22 0727 07/31/22 0425 07/30/22  0612 07/29/22  1600 07/29/22  1357   TOTAL PROTEIN 5.7* 5.5* 5.9* 7.7  --    ALBUMIN 3.20* 3.30* 3.40* 4.00  --    GLOBULIN 2.5  --  2.5 3.7  --    ALT (SGPT) 10 5 5 7  --    AST (SGOT) 17 11 13 16  --    BILIRUBIN 0.4 0.6 0.6 0.6  --    INDIRECT BILIRUBIN  --  0.4  --   --   --    BILIRUBIN DIRECT  --  0.2  --   --   --    ALK PHOS 56 53 54 72  --    LIPASE 84* 67* 62*  --  119*         Lab 07/29/22 1948   TROPONIN T 0.011                 Lab 07/29/22  2002   FIO2 21   CARBOXYHEMOGLOBIN (VENOUS) 1.0     Brief Urine Lab Results  (Last result in the past 365 days)      Color   Clarity   Blood   Leuk Est   Nitrite   Protein   CREAT   Urine HCG        07/30/22 1956             90.5               Microbiology Results Abnormal     Procedure Component Value - Date/Time    Eosinophil Smear - Urine, Urine, Clean Catch [[id number]]  (Normal) Collected: 07/30/22 1958    Lab Status: Final result Specimen: Urine, Clean Catch Updated: 07/30/22 2145     Eosinophil Smear 0 % EOS/100 Cells     Narrative:      No eosinophil seen    COVID  PRE-OP / PRE-PROCEDURE SCREENING ORDER (NO ISOLATION) - Swab, Nasopharynx [[id number]]  (Normal) Collected: 07/29/22 2242    Lab Status: Final result Specimen: Swab from Nasopharynx Updated: 07/30/22 1726    Narrative:      The following orders were created for panel order COVID PRE-OP / PRE-PROCEDURE SCREENING ORDER (NO ISOLATION) - Swab, Nasopharynx.  Procedure                               Abnormality         Status                     ---------                               -----------         ------                     COVID-19, APTIMA PANTHER...[[id number]]  Normal              Final result                 Please view results for these tests on the individual orders.    COVID-19, APTIMA PANTHER [facility] IN-HOUSE NP/OP SWAB IN UTM/VTM/[facility] TRANSPORT MEDIA 24HR TAT - Swab, Nasopharynx [[id number]]  (Normal) Collected: 07/29/22 2242    Lab Status: Final result Specimen: Swab from Nasopharynx Updated: 07/30/22 1726     COVID19 Not Detected    Narrative:      Fact sheet for providers: https://www.fda.gov/media/910662/download     Fact sheet for patients: https://www.fda.gov/media/347187/download    Test performed by RT PCR.          US Renal Bilateral    Result Date: 7/31/2022  DATE OF EXAM: 7/30/2022 6:56 PM  PROCEDURE: US RENAL BILATERAL-  INDICATIONS: KENNEDY; E87.5-Hyperkalemia; N17.9-Acute kidney failure, unspecified; R10.10-Upper abdominal pain, unspecified  COMPARISON: CTA abdomen pelvis July 29, 2022  TECHNIQUE: Grayscale and color Doppler ultrasound evaluation of the kidneys and urinary bladder was performed.  FINDINGS: Right kidney: This kidney measures 13.2 cm in length. There are suggested renal cysts. The largest in the upper pole measures about 6.9 cm in greatest dimension. There is no hydronephrosis. Left kidney: This kidney measures 14.4 cm in length. There is a dominant cyst within the upper pole measuring 5.2 cm.  Bladder: The bladder does not appear unusual for the degree of distention.      Impression: 1.   Bilateral renal cysts.  This report was finalized on 7/31/2022 7:44 AM by Chicho Rose MD.        Results for orders placed during the hospital encounter of 03/12/21    Adult Transthoracic Echo Complete W/ Cont if Necessary Per Protocol    Interpretation Summary  · Left ventricular systolic function is normal. Estimated left ventricular EF = 50%  · Left ventricular wall thickness is consistent with mild concentric hypertrophy.  · Left atrial volume is mildly increased.  · Moderate aortic valve regurgitation is present.  · Moderate mitral valve regurgitation is present.      I have reviewed the medications:  Scheduled Meds:albumin human, 12.5 g, Intravenous, BID  aspirin, 81 mg, Oral, Daily  calcium gluconate, 2 g, Intravenous, Once  insulin lispro, 0-7 Units, Subcutaneous, 4x Daily With Meals & Nightly  levETIRAcetam, 500 mg, Oral, BID  metoprolol succinate XL, 50 mg, Oral, Q12H  pantoprazole, 40 mg, Oral, BID  rosuvastatin, 10 mg, Oral, Daily  sodium chloride, 10 mL, Intravenous, Q12H  tiotropium bromide monohydrate, 2 puff, Inhalation, Daily - RT      Continuous Infusions:sodium chloride, 75 mL/hr      PRN Meds:.albuterol  •  dextrose  •  dextrose  •  glucagon (human recombinant)  •  magnesium sulfate **OR** magnesium sulfate in D5W 1g/100mL (PREMIX)  •  ondansetron  •  Sodium Chloride (PF)  •  sodium chloride    Assessment & Plan   Assessment & Plan     Active Hospital Problems    Diagnosis  POA   • Hyperkalemia [E87.5]  Yes   • Acute renal failure (ARF) ([facility]) [N17.9]  Unknown   • Hyponatremia [E87.1]  Unknown   • Chronic systolic congestive heart failure (HCC) [I50.22]  Yes   • GERD [K21.9]  Yes   • Coronary artery disease involving native coronary artery of native heart with angina pectoris ([facility]) [I25.119]  Yes   • NICM  [I42.8]  Yes   • COPD (chronic obstructive pulmonary disease) ([facility]) [J44.9]  Yes   • H/O seizures on Keppra [R56.9]  Yes   • Essential hypertension [I10]  Yes   • Hyperlipidemia LDL goal <70  [E78.5]  Yes   • T2DM [E11.65]  Yes      Resolved Hospital Problems   No resolved problems to display.        Brief Hospital Course to date:  Narendra Cochran is a 81 y.o. male w/ hx NICM, previous cardiac arrest (s/p icd), valvular dz (moderate AI and MR 3/2021), htn, hl, dm2, previous seizure (on keppra), pagets dzwho developed n/v/d and epigastric abd pain over previous week, w/ decreased po intake only drinking liquids. Apparently was contacted by cardiologist office about having possible episodes of afib on his monitor so was contacted to come to ED fr evaluation. In ED creatinine 3.15, serum bicarb 14, potassium 8.0, fsbs 116, a1c 6.5, cbc unremarkable, u/a no sign infection. vbg showed ph 7.25. lipase 119; lft's normal. 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 Was given dextrose, iv insulin, kayexalate, bicarb and initiated on gentle hydration and admitted to hospitalist service.     *MELLO/metabolic acidosis (non-gap)/hyperkalemia/Hypontremia, improving  -improved after iv fluids, still serum bicarb low  -ct a/p : no obstructive uropathy  -renal u/s :no obstructive uropathy  -nephrology following; mello, acidosis, hyperkalemia improved. Off bicarb drip since 7/31/22 morning (lost iv access later same day); continue gentle fluids, holding entresto, aldactone, lasix, metformin    *Epigastric pain, improved  *N/V/D, resolved  *Elevated lipase, possible mild pancreatitis   -ct a/p negative acute process, gb and pancreas appeared normal  -initially had lipase 119... down to 80; currently epitastric pain nearly resolved, no nausea today, no diarrhea; tolerated a diet last night. Perhaps had very subtle pancreatitis which did not show on imaging but symptoms dramatically improved today compared to yesterday, w/ minimal epigastric tenderness w/ improved  lipase  -continue ppi, antiemetics prn    *Thrombocytopenia, acute on chronic  -previous labs 3 months ago plts ranged 91,000  -today platelets 57,000 (down from 103,000 on admission); I suspect worsened from baseline due to acute illness; will stop sq heparin; monitor plts    *lack of iv access  -7/31/22 late afternoon lost iv access, many providers attempted to gain access (including multiple attempts at u/s guided, also failed 2 attempts at EJ access)  -although renal fxn and metabolic acidosis are better today, and tolerating a diet last night, he is not ready for d/c home as still quite debilitated and weak, platelets dropped (as above) and still has some abdominal symptoms. Thus I feel risk-benefit favors placing picc line, I discussed w/ dr. Romero who cleared for picc access. Placing picc consult today      *Hx NICM (previous ICD due to prior cardiac arrest)  *Valvular heart dz (moderate AI & MR)  *Hx non-obstructive cad  *question of ? Recent afib (in h&p)  *HtN  *HL  -echo 3/'21: ef 50%, moderate AI & MR  -asa, toprol, statin  -Cards following: agrees w/ hydration currently, holding entresto, lasix and aldactone (mello and hyperkalemia), restart when appropriate. Currently in sinus, monitoring on tele    *dm2 (a1c 6.5)  -hold metformin  -ac/hs fsbs w/ sliding scale    *Hx seizures  -continue keppra    *gen weakness  -pt/ot following, per 7/30 note recommended inpatient rehab    Labs: cbc, cmp, mag, lipase    Expected Discharge Location and Transportation: probable inpatient rehab (if agreeable) via medical transport  Expected Discharge Date: 8/3/22    DVT prophylaxis:  Mechanical DVT prophylaxis orders are present.     AM-PAC 6 Clicks Score (PT): 13 (07/31/22 2045)    CODE STATUS:   Code Status and Medical Interventions:   Ordered at: 07/29/22 2011     Medical Intervention Limits:    NO intubation (DNI)     Level Of Support Discussed With:    Patient     Code Status (Patient has no pulse and is not breathing):     CPR (Attempt to Resuscitate)     Medical Interventions (Patient has pulse or is breathing):    Limited Support       Alejo Lugo MD  08/01/22

## 2022-08-01 NOTE — CONSULTS
CLAUDETTE HOOVER PICC placed by CORY Ponce, Rn [facility], verified via 3cg.  47cm total length, 0cm exposed

## 2022-08-01 NOTE — CASE MANAGEMENT/SOCIAL WORK
Discharge Planning Assessment  [facility]     Patient Name: Narendra Cochran  MRN: [id number]  Today's Date: 8/1/2022    Admit Date: 7/29/2022     Discharge Needs Assessment     Row Name 08/01/22 1546       Living Environment    People in Home spouse    Current Living Arrangements home    Primary Care Provided by self    Provides Primary Care For no one    Family Caregiver if Needed spouse    Quality of Family Relationships unable to assess    Able to Return to Prior Arrangements yes       Transition Planning    Patient/Family Anticipates Transition to home    Transportation Anticipated family or friend will provide       Discharge Needs Assessment    Equipment Currently Used at Home cane, quad;glucometer;shower chair;grab bar               Discharge Plan     Row Name 08/01/22 1547       Plan    Plan IDP    Patient/Family in Agreement with Plan yes    Plan Comments I spoke iwith Mr. Cochran at the bedside today. Very Hard of Hearing, he asked me to yell when speaking to him. Pt lives with wife in [place] Family transports him prn. Independent with ADL's. Independent with ambulation. No home O2/HH/OP PT. Has cane, shower ch, grab bars, glucometer. PCP-LEANDRO Huitron. Confirmed [employer] [place] Medicare, prescriptions filled in [place], he could not remember name of pharmacy. Plans to D/C home. Family to transport. No needs voiced or identified. CM will continue to follow.    Final Discharge Disposition Code 01 - home or self-care              Continued Care and Services - Admitted Since 7/29/2022    Coordination has not been started for this encounter.       Expected Discharge Date and Time     Expected Discharge Date Expected Discharge Time    Aug 3, 2022          Demographic Summary     Row Name 08/01/22 1546       General Information    Reason for Consult discharge planning       Contact Information    Permission Granted to Share Info With                Functional Status     Row Name  08/01/22 1546       Functional Status    Usual Activity Tolerance good    Current Activity Tolerance moderate       Functional Status, IADL    Medications independent    Meal Preparation independent    Housekeeping independent    Laundry independent    Shopping independent               Psychosocial    No documentation.                Abuse/Neglect    No documentation.                Legal    No documentation.                Substance Abuse    No documentation.                Patient Forms    No documentation.                   Sisi Mendez RN

## 2022-08-01 NOTE — PROGRESS NOTES
"   LOS: 3 days    Patient Care Team:  Marguerite Moore PA-C as PCP - General (Physician Assistant)  Som Boyd DO as Consulting Physician (Cardiology)  Javad Mercedes MD as Consulting Physician (Cardiology)  Thomas Lui MD as Consulting Physician (Hematology and Oncology)    Chief Complaint: Epigastric pain    Subjective     Interval History:   No acute events overnight. No new complaints       Review of Systems:   No CP or SOA , fever, chills, rigors, rash, N/V, Constipation.       Objective     Vital Sign Min/Max for last 24 hours  Temp  Min: 97.6 °F (36.4 °C)  Max: 98.9 °F (37.2 °C)   BP  Min: 92/50  Max: 136/68   Pulse  Min: 71  Max: 88   Resp  Min: 16  Max: 18   SpO2  Min: 94 %  Max: 98 %   No data recorded   Weight  Min: 88.5 kg (195 lb 1.6 oz)  Max: 88.5 kg (195 lb 1.6 oz)     Flowsheet Rows    Flowsheet Row First Filed Value   Admission Height 177.8 cm (70\") Documented at 07/29/2022 1303   Admission Weight 95.3 kg (210 lb) Documented at 07/29/2022 1303          I/O this shift:  In: 487 [P.O.:487]  Out: 100 [Urine:100]  I/O last 3 completed shifts:  In: 1717 [P.O.:1717]  Out: 2975 [Urine:2975]    Physical Exam:    Gen: Alert, NAD   HENT: NC, AT, EOMI   NECK: Supple, no JVD, Trachea midline   LUNGS: CTA bilaterally, non labored respirtation   CVS: S1/S2 audible, RRR, no murmur   Abd: Soft, NT, ND, BS+   Ext: No pedal edema, no cyanosis   CNS: Alert, No focal deficit noted grossly  Psy: Cooperative  Skin: Warm, dry and intact      WBC WBC   Date Value Ref Range Status   08/01/2022 3.11 (L) 3.40 - 10.80 10*3/mm3 Final   07/29/2022 7.39 3.40 - 10.80 10*3/mm3 Final      HGB Hemoglobin   Date Value Ref Range Status   08/01/2022 11.0 (L) 13.0 - 17.7 g/dL Final   07/29/2022 17.7 (H) 12.0 - 17.0 g/dL Final     Comment:     Serial Number: [id number]:  [id number]   07/29/2022 17.1 13.0 - 17.7 g/dL Final      HCT Hematocrit   Date Value Ref Range Status   08/01/2022 33.6 (L) 37.5 - 51.0 % Final "   07/29/2022 52 (H) 38 - 51 % Final   07/29/2022 55.7 (H) 37.5 - 51.0 % Final      Platlets No results found for: LABPLAT   MCV MCV   Date Value Ref Range Status   08/01/2022 77.4 (L) 79.0 - 97.0 fL Final   07/29/2022 82.0 79.0 - 97.0 fL Final          Sodium Sodium   Date Value Ref Range Status   08/01/2022 137 136 - 145 mmol/L Final   07/31/2022 134 (L) 136 - 145 mmol/L Final   07/30/2022 133 (L) 136 - 145 mmol/L Final   07/30/2022 130 (L) 136 - 145 mmol/L Final   07/30/2022 129 (L) 136 - 145 mmol/L Final   07/30/2022 130 (L) 136 - 145 mmol/L Final   07/29/2022 127 (L) 136 - 145 mmol/L Final      Potassium Potassium   Date Value Ref Range Status   08/01/2022 4.3 3.5 - 5.2 mmol/L Final     Comment:     Slight hemolysis detected by analyzer. Results may be affected.   07/31/2022 4.4 3.5 - 5.2 mmol/L Final     Comment:     Slight hemolysis detected by analyzer. Results may be affected.   07/30/2022 4.5 3.5 - 5.2 mmol/L Final     Comment:     Slight hemolysis detected by analyzer. Results may be affected.   07/30/2022 5.6 (H) 3.5 - 5.2 mmol/L Final     Comment:     Slight hemolysis detected by analyzer. Results may be affected.   07/30/2022 5.6 (H) 3.5 - 5.2 mmol/L Final     Comment:     Slight hemolysis detected by analyzer. Results may be affected.   07/30/2022 5.8 (H) 3.5 - 5.2 mmol/L Final     Comment:     Slight hemolysis detected by analyzer. Results may be affected.   07/29/2022 6.0 (H) 3.5 - 5.2 mmol/L Final     Comment:     Slight hemolysis detected by analyzer. Results may be affected.   07/29/2022 8.0 (C) 3.5 - 5.2 mmol/L Final      Chloride Chloride   Date Value Ref Range Status   08/01/2022 105 98 - 107 mmol/L Final   07/31/2022 102 98 - 107 mmol/L Final   07/30/2022 106 98 - 107 mmol/L Final   07/30/2022 103 98 - 107 mmol/L Final   07/30/2022 104 98 - 107 mmol/L Final   07/30/2022 104 98 - 107 mmol/L Final   07/29/2022 101 98 - 107 mmol/L Final      CO2 CO2   Date Value Ref Range Status   08/01/2022 21.0  (L) 22.0 - 29.0 mmol/L Final   07/31/2022 24.0 22.0 - 29.0 mmol/L Final   07/30/2022 19.0 (L) 22.0 - 29.0 mmol/L Final   07/30/2022 14.0 (L) 22.0 - 29.0 mmol/L Final   07/30/2022 12.0 (L) 22.0 - 29.0 mmol/L Final   07/30/2022 15.0 (L) 22.0 - 29.0 mmol/L Final   07/29/2022 14.0 (L) 22.0 - 29.0 mmol/L Final      BUN BUN   Date Value Ref Range Status   08/01/2022 34 (H) 8 - 23 mg/dL Final   07/31/2022 43 (H) 8 - 23 mg/dL Final   07/30/2022 51 (H) 8 - 23 mg/dL Final   07/30/2022 61 (H) 8 - 23 mg/dL Final   07/30/2022 61 (H) 8 - 23 mg/dL Final   07/30/2022 66 (H) 8 - 23 mg/dL Final   07/29/2022 73 (H) 8 - 23 mg/dL Final      Creatinine Creatinine   Date Value Ref Range Status   08/01/2022 1.30 (H) 0.76 - 1.27 mg/dL Final   07/31/2022 1.58 (H) 0.76 - 1.27 mg/dL Final   07/30/2022 1.69 (H) 0.76 - 1.27 mg/dL Final   07/30/2022 2.13 (H) 0.76 - 1.27 mg/dL Final   07/30/2022 1.98 (H) 0.76 - 1.27 mg/dL Final   07/30/2022 2.34 (H) 0.76 - 1.27 mg/dL Final   07/29/2022 3.20 (H) 0.60 - 1.30 mg/dL Final   07/29/2022 3.15 (H) 0.76 - 1.27 mg/dL Final      Calcium Calcium   Date Value Ref Range Status   08/01/2022 8.6 8.6 - 10.5 mg/dL Final   07/31/2022 8.8 8.6 - 10.5 mg/dL Final   07/30/2022 8.1 (L) 8.6 - 10.5 mg/dL Final   07/30/2022 9.2 8.6 - 10.5 mg/dL Final   07/30/2022 9.3 8.6 - 10.5 mg/dL Final   07/30/2022 9.5 8.6 - 10.5 mg/dL Final   07/29/2022 10.1 8.6 - 10.5 mg/dL Final      PO4 No results found for: CAPO4   Albumin Albumin   Date Value Ref Range Status   08/01/2022 3.20 (L) 3.50 - 5.20 g/dL Final   07/31/2022 3.30 (L) 3.50 - 5.20 g/dL Final   07/30/2022 3.40 (L) 3.50 - 5.20 g/dL Final   07/29/2022 4.00 3.50 - 5.20 g/dL Final      Magnesium Magnesium   Date Value Ref Range Status   08/01/2022 2.1 1.6 - 2.4 mg/dL Final   07/31/2022 2.0 1.6 - 2.4 mg/dL Final   07/30/2022 1.7 1.6 - 2.4 mg/dL Final   07/29/2022 1.9 1.6 - 2.4 mg/dL Final      Uric Acid Uric Acid   Date Value Ref Range Status   07/30/2022 6.3 3.4 - 7.0 mg/dL Final      Comment:     Falsely depressed results may occur on samples drawn from patients receiving N-Acetylcysteine (NAC) or Metamizole.           Results Review:     I reviewed the patient's new clinical results.    albumin human, 12.5 g, Intravenous, BID  aspirin, 81 mg, Oral, Daily  calcium gluconate, 2 g, Intravenous, Once  insulin lispro, 0-7 Units, Subcutaneous, 4x Daily With Meals & Nightly  levETIRAcetam, 500 mg, Oral, BID  metoprolol succinate XL, 50 mg, Oral, Q12H  pantoprazole, 40 mg, Oral, BID  rosuvastatin, 10 mg, Oral, Daily  sodium chloride, 10 mL, Intravenous, Q12H  tiotropium bromide monohydrate, 2 puff, Inhalation, Daily - RT      sodium chloride, 75 mL/hr        Medication Review:     FINDINGS:  Right kidney: This kidney measures 13.2 cm in length. There are  suggested renal cysts. The largest in the upper pole measures about 6.9  cm in greatest dimension. There is no hydronephrosis.  Left kidney: This kidney measures 14.4 cm in length. There is a dominant  cyst within the upper pole measuring 5.2 cm.     Bladder: The bladder does not appear unusual for the degree of  distention.      IMPRESSION:  1.  Bilateral renal cysts.    Assessment & Plan       Essential hypertension    Hyperlipidemia LDL goal <70    T2DM    H/O seizures on Keppra    COPD (chronic obstructive pulmonary disease) ([facility])    Coronary artery disease involving native coronary artery of native heart with angina pectoris (HCC)    NICM     Chronic systolic congestive heart failure (HCC)    GERD    Hyperkalemia    Acute renal failure (ARF) ([facility])    Hyponatremia       1- KENNEDY - non oliguric - likely pre-renal -volume depletion and hypotension secondary to poor oral intake while on Entresto, aldactone and lasix - improving - FeNa  0.39% suggestive of pre-renal azotemia. UPCR - 0.17  2- Hyperkalemia - resolved.   3- Metabolic acidosis - on metformin at home - resolved.   4- Hypotension borderline - improving  5- Hyponatremia - improving   6-  Abdominal pain.   7- DM   8- Bilateral renal cyst     Plan  - Encourage oral hydration.   - Monitor I/O   - Hold entresto, aldactone, lasix and metformin for now.   - Avoid nephrotoxic agents.   - Renal diet.   - Adjust meds per renal function   - no emergent need of RRT     Anant Romero MD  08/01/22  09:13 EDT

## 2022-08-01 NOTE — PLAN OF CARE
Goal Outcome Evaluation:  Plan of Care Reviewed With: patient      Several attempts at IV access by U/S unsuccessful yesterday, 2 attempts this shift by RN also unsuccessful. Pt with no complaints of pain or nausea this shift. VSS, CTM

## 2022-08-01 NOTE — TELEPHONE ENCOUNTER
Rx Refill Note  Requested Prescriptions     Pending Prescriptions Disp Refills   • pantoprazole (PROTONIX) 40 MG EC tablet [Pharmacy Med Name: PANTOPRAZOLE SOD DR 40 MG TAB] 180 tablet 0     Sig: TAKE 1 TABLET BY MOUTH TWICE A DAY      Last office visit with prescribing clinician: 6/2/2022      Next office visit with prescribing clinician: 9/6/2022            Kristi Mckeon MA  08/01/22, 09:16 EDT

## 2022-08-01 NOTE — PROGRESS NOTES
"[facility] Progress Note     LOS: 3 days   Patient Care Team:  Marguerite Moore PA-C as PCP - General (Physician Assistant)  Som Boyd DO as Consulting Physician (Cardiology)  Javad Mercedes MD as Consulting Physician (Cardiology)  Thomas Lui MD as Consulting Physician (Hematology and Oncology)  PCP:  Marguerite Moore PA-C    Chief Complaint: Follow-up nonischemic cardiomyopathy, paroxysmal A. fib    Subjective: Patient states his abdomen is feeling much better.  No complaints of chest pain or dyspnea.  No nausea or vomiting.      Review of Systems:   All systems have been reviewed and are negative with the exception of those mentioned above.      Objective:    Vital Sign Min/Max for last 24 hours  Temp  Min: 97.6 °F (36.4 °C)  Max: 98.9 °F (37.2 °C)   BP  Min: 112/60  Max: 145/74   Pulse  Min: 67  Max: 88   Resp  Min: 16  Max: 18   SpO2  Min: 93 %  Max: 98 %   No data recorded   Weight  Min: 88.5 kg (195 lb 1.6 oz)  Max: 88.5 kg (195 lb 1.6 oz)     Flowsheet Rows    Flowsheet Row First Filed Value   Admission Height 177.8 cm (70\") Documented at 07/29/2022 1303   Admission Weight 95.3 kg (210 lb) Documented at 07/29/2022 1303          Telemetry: Sinus with frequent PVCs, runs of nonsustained ventricular tachycardia      Intake/Output Summary (Last 24 hours) at 8/1/2022 1304  Last data filed at 8/1/2022 1100  Gross per 24 hour   Intake 1504 ml   Output 2325 ml   Net -821 ml     Intake & Output (last 3 days)       07/29 0701 07/30 0700 07/30 0701 07/31 0700 07/31 0701 08/01 0700 08/01 0701 08/02 0700    P.O.  724    IV Piggyback 50       Total Intake(mL/kg) 530 (6) 120 (1.4) 1597 (18) 724 (8.2)    Urine (mL/kg/hr) 550 925 (0.4) 2250 (1.1) 275 (0.5)    Total Output  275    Net -20 -805 -653 +449                   Physical Exam:  Constitutional:       Appearance: Healthy appearance. Not in distress.   Pulmonary: "      Effort: Pulmonary effort is normal.      Breath sounds: No rales.   Cardiovascular:      Frequent ectopic beats. Regular rhythm.      Murmurs: There is no murmur.   Edema:     Peripheral edema absent.   Skin:     General: Skin is warm and dry.          LABS/DIAGNOSTIC DATA:  Results from last 7 days   Lab Units 08/01/22  0727 07/29/22  1756 07/29/22  1357   WBC 10*3/mm3 3.11*  --  7.39   HEMOGLOBIN g/dL 11.0*  --  17.1   HEMOGLOBIN, POC g/dL  --  17.7*  --    HEMATOCRIT % 33.6*  --  55.7*   HEMATOCRIT POC %  --  52*  --    PLATELETS 10*3/mm3 57*  --  103*     Lab Results   Lab Value Date/Time    TROPONINT 0.011 07/29/2022 1948    TROPONINT <0.010 07/13/2022 1621    TROPONINT 0.017 03/15/2021 0524    TROPONINT 0.159 (C) 03/13/2021 0453    TROPONINT 0.026 03/12/2021 2215    TROPONINT <0.010 03/12/2021 1911    TROPONINT <0.010 11/13/2019 0233    TROPONINT <0.010 11/12/2019 2259         Results from last 7 days   Lab Units 08/01/22  0727 07/31/22  0425 07/30/22  1954 07/30/22  0612   SODIUM mmol/L 137 134* 133* 129*  130*   POTASSIUM mmol/L 4.3 4.4 4.5 5.6*  5.6*   CHLORIDE mmol/L 105 102 106 104  103   CO2 mmol/L 21.0* 24.0 19.0* 12.0*  14.0*   BUN mg/dL 34* 43* 51* 61*  61*   CREATININE mg/dL 1.30* 1.58* 1.69* 1.98*  2.13*   CALCIUM mg/dL 8.6 8.8 8.1* 9.3  9.2   BILIRUBIN mg/dL 0.4 0.6  --  0.6   ALK PHOS U/L 56 53  --  54   ALT (SGPT) U/L 10 5  --  5   AST (SGOT) U/L 17 11  --  13   GLUCOSE mg/dL 104* 136* 126* 89  87     Results from last 7 days   Lab Units 07/29/22  1357   HEMOGLOBIN A1C % 6.50*                   Medication Review:   albumin human, 12.5 g, Intravenous, BID  aspirin, 81 mg, Oral, Daily  insulin lispro, 0-7 Units, Subcutaneous, 4x Daily With Meals & Nightly  levETIRAcetam, 500 mg, Oral, BID  metoprolol succinate XL, 50 mg, Oral, Q12H  pantoprazole, 40 mg, Oral, BID  rosuvastatin, 10 mg, Oral, Daily  sodium chloride, 10 mL, Intravenous, Q12H  tiotropium bromide monohydrate, 2 puff,  Inhalation, Daily - RT       sodium chloride, 75 mL/hr           Essential hypertension    Hyperlipidemia LDL goal <70    T2DM    H/O seizures on Keppra    COPD (chronic obstructive pulmonary disease) (HCC)    Coronary artery disease involving native coronary artery of native heart with angina pectoris (HCC)    NICM     Chronic systolic congestive heart failure (HCC)    GERD    Hyperkalemia    Acute renal failure (ARF) ([facility])    Hyponatremia      Assessment/Plan:    1.  Paroxysmal A. Fib  -Detected on pacemaker check July 28 in setting of hyperkalemia  -No further episodes while admitted  -Would hold anticoagulation due to so far single documented episode in setting of significant electrolyte abnormalities, as well as development of thrombocytopenia platelets 50, hemoglobin 11  -Continue aspirin    2.  Nonischemic cardiomyopathy, frequent PVCs  -Increase frequency of Toprol-XL to 50 mg twice daily due to continued ventricular ectopy  -Entresto and spironolactone on hold due to recent KENNEDY hyperkalemia  -Does not appear volume overloaded    3.  [employer] dual-chamber ICD in place    4.  Hypertension  -Increase Toprol-XL to twice daily    5.  KENNEDY and hyperkalemia  -Nephrology following  -Renal function improving.  Holding nephrotoxins.      Javad Mecredes MD [facility]  08/01/22

## 2022-08-02 PROBLEM — E44.0 MODERATE MALNUTRITION (HCC): Status: ACTIVE | Noted: 2022-08-02

## 2022-08-02 LAB
ALBUMIN SERPL-MCNC: 2.9 G/DL (ref 3.5–5.2)
ALBUMIN/GLOB SERPL: 1 G/DL
ALP SERPL-CCNC: 56 U/L (ref 39–117)
ALT SERPL W P-5'-P-CCNC: 14 U/L (ref 1–41)
ANION GAP SERPL CALCULATED.3IONS-SCNC: 8 MMOL/L (ref 5–15)
AST SERPL-CCNC: 20 U/L (ref 1–40)
BILIRUB SERPL-MCNC: 0.4 MG/DL (ref 0–1.2)
BUN SERPL-MCNC: 19 MG/DL (ref 8–23)
BUN/CREAT SERPL: 18.6 (ref 7–25)
CALCIUM SPEC-SCNC: 8.7 MG/DL (ref 8.6–10.5)
CHLORIDE SERPL-SCNC: 106 MMOL/L (ref 98–107)
CO2 SERPL-SCNC: 23 MMOL/L (ref 22–29)
CREAT SERPL-MCNC: 1.02 MG/DL (ref 0.76–1.27)
DEPRECATED RDW RBC AUTO: 46.8 FL (ref 37–54)
EGFRCR SERPLBLD CKD-EPI 2021: 72.9 ML/MIN/1.73
ERYTHROCYTE [DISTWIDTH] IN BLOOD BY AUTOMATED COUNT: 16.3 % (ref 12.3–15.4)
GLOBULIN UR ELPH-MCNC: 3 GM/DL
GLUCOSE BLDC GLUCOMTR-MCNC: 133 MG/DL (ref 70–130)
GLUCOSE BLDC GLUCOMTR-MCNC: 137 MG/DL (ref 70–130)
GLUCOSE BLDC GLUCOMTR-MCNC: 314 MG/DL (ref 70–130)
GLUCOSE BLDC GLUCOMTR-MCNC: 95 MG/DL (ref 70–130)
GLUCOSE SERPL-MCNC: 93 MG/DL (ref 65–99)
HCT VFR BLD AUTO: 33.3 % (ref 37.5–51)
HGB BLD-MCNC: 10.8 G/DL (ref 13–17.7)
LIPASE SERPL-CCNC: 75 U/L (ref 13–60)
MAGNESIUM SERPL-MCNC: 1.7 MG/DL (ref 1.6–2.4)
MCH RBC QN AUTO: 25.7 PG (ref 26.6–33)
MCHC RBC AUTO-ENTMCNC: 32.4 G/DL (ref 31.5–35.7)
MCV RBC AUTO: 79.1 FL (ref 79–97)
PLATELET # BLD AUTO: 58 10*3/MM3 (ref 140–450)
PMV BLD AUTO: 11.7 FL (ref 6–12)
POTASSIUM SERPL-SCNC: 4 MMOL/L (ref 3.5–5.2)
PROT SERPL-MCNC: 5.9 G/DL (ref 6–8.5)
RBC # BLD AUTO: 4.21 10*6/MM3 (ref 4.14–5.8)
SODIUM SERPL-SCNC: 137 MMOL/L (ref 136–145)
WBC NRBC COR # BLD: 2.7 10*3/MM3 (ref 3.4–10.8)

## 2022-08-02 PROCEDURE — 63710000001 INSULIN LISPRO (HUMAN) PER 5 UNITS: Performed by: INTERNAL MEDICINE

## 2022-08-02 PROCEDURE — 94799 UNLISTED PULMONARY SVC/PX: CPT

## 2022-08-02 PROCEDURE — 25010000002 MAGNESIUM SULFATE IN D5W 1G/100ML (PREMIX) 1-5 GM/100ML-% SOLUTION: Performed by: INTERNAL MEDICINE

## 2022-08-02 PROCEDURE — 83735 ASSAY OF MAGNESIUM: CPT | Performed by: INTERNAL MEDICINE

## 2022-08-02 PROCEDURE — 85027 COMPLETE CBC AUTOMATED: CPT | Performed by: INTERNAL MEDICINE

## 2022-08-02 PROCEDURE — 99232 SBSQ HOSP IP/OBS MODERATE 35: CPT | Performed by: INTERNAL MEDICINE

## 2022-08-02 PROCEDURE — 82962 GLUCOSE BLOOD TEST: CPT

## 2022-08-02 PROCEDURE — 83690 ASSAY OF LIPASE: CPT | Performed by: INTERNAL MEDICINE

## 2022-08-02 PROCEDURE — 99232 SBSQ HOSP IP/OBS MODERATE 35: CPT | Performed by: HOSPITALIST

## 2022-08-02 PROCEDURE — 97110 THERAPEUTIC EXERCISES: CPT

## 2022-08-02 PROCEDURE — 97116 GAIT TRAINING THERAPY: CPT

## 2022-08-02 PROCEDURE — 80053 COMPREHEN METABOLIC PANEL: CPT | Performed by: INTERNAL MEDICINE

## 2022-08-02 RX ADMIN — PANTOPRAZOLE SODIUM 40 MG: 40 TABLET, DELAYED RELEASE ORAL at 08:00

## 2022-08-02 RX ADMIN — METOPROLOL SUCCINATE 50 MG: 50 TABLET, EXTENDED RELEASE ORAL at 21:25

## 2022-08-02 RX ADMIN — ROSUVASTATIN CALCIUM 10 MG: 10 TABLET, FILM COATED ORAL at 08:00

## 2022-08-02 RX ADMIN — SACUBITRIL AND VALSARTAN 1 TABLET: 24; 26 TABLET, FILM COATED ORAL at 21:25

## 2022-08-02 RX ADMIN — Medication 10 ML: at 08:01

## 2022-08-02 RX ADMIN — LEVETIRACETAM 500 MG: 500 TABLET, FILM COATED ORAL at 21:25

## 2022-08-02 RX ADMIN — SACUBITRIL AND VALSARTAN 1 TABLET: 24; 26 TABLET, FILM COATED ORAL at 10:31

## 2022-08-02 RX ADMIN — ASPIRIN 81 MG CHEWABLE TABLET 81 MG: 81 TABLET CHEWABLE at 08:00

## 2022-08-02 RX ADMIN — PANTOPRAZOLE SODIUM 40 MG: 40 TABLET, DELAYED RELEASE ORAL at 21:25

## 2022-08-02 RX ADMIN — METOPROLOL SUCCINATE 50 MG: 50 TABLET, EXTENDED RELEASE ORAL at 08:00

## 2022-08-02 RX ADMIN — INSULIN LISPRO 5 UNITS: 100 INJECTION, SOLUTION INTRAVENOUS; SUBCUTANEOUS at 21:26

## 2022-08-02 RX ADMIN — MAGNESIUM SULFATE HEPTAHYDRATE 1 G: 1 INJECTION, SOLUTION INTRAVENOUS at 07:54

## 2022-08-02 RX ADMIN — TIOTROPIUM BROMIDE INHALATION SPRAY 2 PUFF: 3.12 SPRAY, METERED RESPIRATORY (INHALATION) at 09:19

## 2022-08-02 RX ADMIN — LEVETIRACETAM 500 MG: 500 TABLET, FILM COATED ORAL at 08:00

## 2022-08-02 NOTE — PLAN OF CARE
Patient OOB several times today; up in chair and ambulated to bathroom. Later in shift pt had several bowel movements, that are now loose. Pt unsteady during ambulation. VSS, no complaints of pain.       Problem: Adult Inpatient Plan of Care  Goal: Absence of Hospital-Acquired Illness or Injury  Intervention: Identify and Manage Fall Risk  Recent Flowsheet Documentation  Taken 8/2/2022 1600 by Renay Gan RN  Safety Promotion/Fall Prevention:   activity supervised   toileting scheduled   safety round/check completed   nonskid shoes/slippers when out of bed   room organization consistent  Taken 8/2/2022 1544 by Renay Gan RN  Safety Promotion/Fall Prevention: assistive device/personal items within reach  Taken 8/2/2022 1400 by Renay Gan RN  Safety Promotion/Fall Prevention:   activity supervised   assistive device/personal items within reach   clutter free environment maintained   nonskid shoes/slippers when out of bed   room organization consistent   toileting scheduled   safety round/check completed  Taken 8/2/2022 1329 by Renay Gan RN  Safety Promotion/Fall Prevention:   activity supervised   clutter free environment maintained  Taken 8/2/2022 1202 by Renay Gan RN  Safety Promotion/Fall Prevention:   activity supervised   toileting scheduled   safety round/check completed   room organization consistent   nonskid shoes/slippers when out of bed  Taken 8/2/2022 1027 by Renay Gan RN  Safety Promotion/Fall Prevention:   activity supervised   toileting scheduled   safety round/check completed   room organization consistent   nonskid shoes/slippers when out of bed  Taken 8/2/2022 0800 by Renay Gan RN  Safety Promotion/Fall Prevention:   activity supervised   nonskid shoes/slippers when out of bed   toileting scheduled   clutter free environment maintained   fall prevention program maintained   assistive device/personal items within reach   Goal Outcome Evaluation:

## 2022-08-02 NOTE — PROGRESS NOTES
[facility]  PROGRESS NOTE    Patient Name: Narendra Cochran  : 1940  MRN: [id number]    Date of Admission: 2022  Primary Care Physician: Marguerite Moore, BHAVANI    Subjective   Subjective     CC:  F/U nausea, epigastric pain    HPI:  Patient seen this morning, feeling much better. No major complaints.    ROS:  Gen-no fevers, no chills  CV-no chest pain, no palpitations  Resp-no cough, no dyspnea  GI-no N/V/D, no abd pain    All other systems reviewed and negative except any additional pertinent positives and negatives as discussed in HPI.      Objective   Objective     Vital Signs:   Temp:  [98 °F (36.7 °C)-98.7 °F (37.1 °C)] 98.4 °F (36.9 °C)  Heart Rate:  [58-80] 58  Resp:  [14-16] 16  BP: (128-153)/(59-72) 136/65     Physical Exam:  Gen-no acute distress  HENT-NCAT, mucous membranes moist  CV-RRR, S1 S2 normal, no m/r/g  Resp-CTAB, no wheezes or rales  Abd-soft, NT, ND, +BS  Ext-no edema  Neuro-A&Ox3, no focal deficits, hard of hearing  Skin-no rashes  Psych-appropriate mood      Results Reviewed:  LAB RESULTS:      Lab 22  0622 22  0727 22  1756 22  1600 22  1357   WBC 2.70* 3.11*  --   --  7.39   HEMOGLOBIN 10.8* 11.0*  --   --  17.1   HEMOGLOBIN, POC  --   --  17.7*  --   --    HEMATOCRIT 33.3* 33.6*  --   --  55.7*   HEMATOCRIT POC  --   --  52*  --   --    PLATELETS 58* 57*  --   --  103*   NEUTROS ABS  --  2.20  --   --  5.55   IMMATURE GRANS (ABS)  --  0.02  --   --  0.03   LYMPHS ABS  --  0.62*  --   --  1.51   MONOS ABS  --  0.23  --   --  0.28   EOS ABS  --  0.03  --   --  0.01   MCV 79.1 77.4*  --   --  82.0   LACTATE  --   --   --  1.9 2.9*         Lab 22  0622 22  0727 22  0425 22  0622  0107 22  1948 22  1600 22  1357   SODIUM 137 137 134* 133* 129*  130*   < >  --    < >  --    POTASSIUM 4.0 4.3 4.4 4.5 5.6*  5.6*   < > 6.0*   < >  --     CHLORIDE 106 105 102 106 104  103   < >  --    < >  --    CO2 23.0 21.0* 24.0 19.0* 12.0*  14.0*   < >  --    < >  --    ANION GAP 8.0 11.0 8.0 8.0 13.0  13.0   < >  --    < >  --    BUN 19 34* 43* 51* 61*  61*   < >  --    < >  --    CREATININE 1.02 1.30* 1.58* 1.69* 1.98*  2.13*   < >  --    < >  --    EGFR 72.9 54.5* 43.1* 39.8* 32.9*  30.1*   < >  --    < >  --    GLUCOSE 93 104* 136* 126* 89  87   < >  --    < >  --    CALCIUM 8.7 8.6 8.8 8.1* 9.3  9.2   < >  --    < >  --    MAGNESIUM 1.7 2.1 2.0  --  1.7  --  1.9  --   --    PHOSPHORUS  --   --   --  2.7 3.7  --   --   --   --    HEMOGLOBIN A1C  --   --   --   --   --   --   --   --  6.50*    < > = values in this interval not displayed.         Lab 08/02/22  0622 08/01/22  0727 07/31/22  0425 07/30/22  0612 07/30/22  0107 07/29/22  1600 07/29/22  1357   TOTAL PROTEIN 5.9* 5.7* 5.5* 5.9*  --  7.7  --    ALBUMIN 2.90* 3.20* 3.30* 3.40* 3.1 4.00  --    GLOBULIN 3.0 2.5  --  2.5  --  3.7  --    ALT (SGPT) 14 10 5 5  --  7  --    AST (SGOT) 20 17 11 13  --  16  --    BILIRUBIN 0.4 0.4 0.6 0.6  --  0.6  --    INDIRECT BILIRUBIN  --   --  0.4  --   --   --   --    BILIRUBIN DIRECT  --   --  0.2  --   --   --   --    ALK PHOS 56 56 53 54  --  72  --    LIPASE 75* 84* 67* 62*  --   --  119*         Lab 07/29/22  1948   TROPONIN T 0.011                 Lab 07/29/22  2002   FIO2 21   CARBOXYHEMOGLOBIN (VENOUS) 1.0     Brief Urine Lab Results  (Last result in the past 365 days)      Color   Clarity   Blood   Leuk Est   Nitrite   Protein   CREAT   Urine HCG        07/30/22 1956             90.5               Microbiology Results Abnormal     Procedure Component Value - Date/Time    Eosinophil Smear - Urine, Urine, Clean Catch [[id number]]  (Normal) Collected: 07/30/22 1958    Lab Status: Final result Specimen: Urine, Clean Catch Updated: 07/30/22 2145     Eosinophil Smear 0 % EOS/100 Cells     Narrative:      No eosinophil seen    COVID PRE-OP / PRE-PROCEDURE  SCREENING ORDER (NO ISOLATION) - Swab, Nasopharynx [[id number]]  (Normal) Collected: 07/29/22 2242    Lab Status: Final result Specimen: Swab from Nasopharynx Updated: 07/30/22 1726    Narrative:      The following orders were created for panel order COVID PRE-OP / PRE-PROCEDURE SCREENING ORDER (NO ISOLATION) - Swab, Nasopharynx.  Procedure                               Abnormality         Status                     ---------                               -----------         ------                     COVID-19, APTIMA PANTHER...[[id number]]  Normal              Final result                 Please view results for these tests on the individual orders.    COVID-19, APTIMA PANTHER [facility] IN-HOUSE NP/OP SWAB IN UTM/VTM/SALINE TRANSPORT MEDIA 24HR TAT - Swab, Nasopharynx [[id number]]  (Normal) Collected: 07/29/22 2242    Lab Status: Final result Specimen: Swab from Nasopharynx Updated: 07/30/22 1726     COVID19 Not Detected    Narrative:      Fact sheet for providers: https://www.fda.gov/media/316254/download     Fact sheet for patients: https://www.fda.gov/media/069390/download    Test performed by RT PCR.          No radiology results from the last 24 hrs    Results for orders placed during the hospital encounter of 03/12/21    Adult Transthoracic Echo Complete W/ Cont if Necessary Per Protocol    Interpretation Summary  · Left ventricular systolic function is normal. Estimated left ventricular EF = 50%  · Left ventricular wall thickness is consistent with mild concentric hypertrophy.  · Left atrial volume is mildly increased.  · Moderate aortic valve regurgitation is present.  · Moderate mitral valve regurgitation is present.      I have reviewed the medications:  Scheduled Meds:aspirin, 81 mg, Oral, Daily  insulin lispro, 0-7 Units, Subcutaneous, 4x Daily With Meals & Nightly  levETIRAcetam, 500 mg, Oral, BID  metoprolol succinate XL, 50 mg, Oral, Q12H  pantoprazole, 40 mg, Oral, BID  rosuvastatin, 10 mg, Oral,  Daily  sacubitril-valsartan, 1 tablet, Oral, Q12H  sodium chloride, 10 mL, Intravenous, Q12H  sodium chloride, 10 mL, Intravenous, Q12H  tiotropium bromide monohydrate, 2 puff, Inhalation, Daily - RT      Continuous Infusions:   PRN Meds:.•  albuterol  •  dextrose  •  dextrose  •  glucagon (human recombinant)  •  magnesium sulfate **OR** magnesium sulfate in D5W 1g/100mL (PREMIX)  •  ondansetron  •  Sodium Chloride (PF)  •  sodium chloride  •  sodium chloride    Assessment & Plan   Assessment & Plan     Active Hospital Problems    Diagnosis  POA   • Hyperkalemia [E87.5]  Yes   • Acute renal failure (ARF) ([facility]) [N17.9]  Unknown   • Hyponatremia [E87.1]  Unknown   • Chronic systolic congestive heart failure (HCC) [I50.22]  Yes   • GERD [K21.9]  Yes   • Coronary artery disease involving native coronary artery of native heart with angina pectoris ([facility]) [I25.119]  Yes   • NICM  [I42.8]  Yes   • COPD (chronic obstructive pulmonary disease) ([facility]) [J44.9]  Yes   • H/O seizures on Keppra [R56.9]  Yes   • Essential hypertension [I10]  Yes   • Hyperlipidemia LDL goal <70 [E78.5]  Yes   • T2DM [E11.65]  Yes      Resolved Hospital Problems   No resolved problems to display.        Brief Hospital Course to date:  Narendra Cochran is a 81 y.o. male with hx of DM2, HTN, HLD, diverticulosis, chronic systolic CHF/NICM, COPD, CAD, Paget's disease, seizure disorder, and GERD who presents due to epigastric pain, nausea, and generalized weakness. Labs revealed K 8.0, Cr 3.2, Na 127. CT A/P negative for acute process. Admitted for further management.     This patient's problems and plans were partially entered by my partner and updated as appropriate by me 08/02/22.    KENNEDY  Metabolic acidosis (non-gap)  Hyperkalemia  Hypontremia  -CT A/P: no obstructive uropathy  -Renal US: no obstructive uropathy  -Nephrology following; Improved with fluids/bicarb drip. Off bicarb drip since 7/31/22 morning  -Have been holding Entresto, Aldactone, Lasix,  metformin since admission  -Cr trending down to 1.02 today  -K 4.0     Epigastric pain, improved  N/V/D, resolved  Elevated lipase, possible mild pancreatitis   -CT A/P negative acute process, GB and pancreas appeared normal  -Initially had lipase 119 then went down to 80; currently epigastric pain nearly resolved, tolerating diet better now. Perhaps had very subtle pancreatitis which did not show on imaging but symptoms dramatically improved now   -Continue PPI  -Continue antiemetics PRN     Thrombocytopenia, acute on chronic  -Previous labs 3 months ago platelets ranged ~91,000  -Trending down since admission from 103,000 to 57,000  -Stable at 58,000 today  -SQH discontinued  -Monitor closely     Hx NICM (previous ICD due to prior cardiac arrest)  Valvular heart disease (moderate AI & MR)  Hx non-obstructive CAD  Question of recent Afib  HTN  HLD  -Echo March 2021: EF 50%, moderate AI & MR  -Continue ASA, Metoprolol, statin  -Cardiology following: resume Entresto today (okay with Nephrology), continue to hold Lasix and Aldactone  -Currently in sinus, monitoring on telemetry.     DM2 (HbA1c 6.5%)  -Holding Metformin  -SSI     Seizure disorder  -Continue Keppra     Generalized weakness  -PT/OTfollowing, per 7/30/22 note recommended inpatient rehab    Expected Discharge Location and Transportation: rehab vs home  Expected Discharge Date: 8/3/22    DVT prophylaxis:  Mechanical DVT prophylaxis orders are present.     AM-PAC 6 Clicks Score (PT): 17 (08/02/22 1202)    CODE STATUS:   Code Status and Medical Interventions:   Ordered at: 07/29/22 2011     Medical Intervention Limits:    NO intubation (DNI)     Level Of Support Discussed With:    Patient     Code Status (Patient has no pulse and is not breathing):    CPR (Attempt to Resuscitate)     Medical Interventions (Patient has pulse or is breathing):    Limited Support       Siria Moreno MD  08/02/22

## 2022-08-02 NOTE — PLAN OF CARE
Goal Outcome Evaluation:  Plan of Care Reviewed With: patient        Progress: improving  Outcome Evaluation: Pt with good improvement.  Pt able to ambulate 110', CGA x2, FWW due to mild unsteadiness, cues for AD on turns, limited by fatigue, no jerking episodes present during session/ ambulation

## 2022-08-02 NOTE — PROGRESS NOTES
Malnutrition Severity Assessment    Patient Name:  Narendra Cochran  YOB: 1940  MRN: [id number]  Admit Date:  7/29/2022    Patient meets criteria for : Moderate (non-severe) Malnutrition (Pt meets criteria for non severe acute malnutrition based on intake PTA w some wasting.)    Comments:      Malnutrition Severity Assessment  Malnutrition Type: Acute Disease or Injury - Related Malnutrition  Malnutrition Type (last 8 hours)     Malnutrition Severity Assessment     Row Name 08/02/22 1309       Malnutrition Severity Assessment    Malnutrition Type Acute Disease or Injury - Related Malnutrition    Row Name 08/02/22 1309       Insufficient Energy Intake     Insufficient Energy Intake Findings Severe  PTA    Insufficient Energy Intake  < or equal to 50% of est. energy requirement for > or equal to 5d)    Row Name 08/02/22 1309       Unintentional Weight Loss     Unintentional Weight Loss Findings --  unable to confirm 2/2 fluid shifts    Row Name 08/02/22 1309       Muscle Loss    Loss of Muscle Mass Findings Mild    [place] Region --  unable to determine at this time    Clavicle Bone Region --  mild    Acromion Bone Region --  mild    Scapular Bone Region --  mild    Dorsal Hand Region None    Patellar Region --  mild    Anterior Thigh Region None    Posterior Calf Region Moderate - some roundness, slight firmness    Row Name 08/02/22 1309       Fat Loss    Subcutaneous Fat Loss Findings None    Orbital Region  --  mild    Upper Arm Region None    Thoracic & Lumbar Region None    Row Name 08/02/22 1309       Criteria Met (Must meet criteria for severity in at least 2 of these categories: M Wasting, Fat Loss, Fluid, Secondary Signs, Wt. Status, Intake)    Patient meets criteria for  Moderate (non-severe) Malnutrition  Pt meets criteria for non severe acute malnutrition based on intake PTA w some wasting.                Electronically signed by:  Erin Ag RD  08/02/22 13:24 EDT

## 2022-08-02 NOTE — PROGRESS NOTES
"[facility] Progress Note     LOS: 4 days   Patient Care Team:  Marguerite Moore PA-C as PCP - General (Physician Assistant)  Som Boyd DO as Consulting Physician (Cardiology)  Javad Mercedes MD as Consulting Physician (Cardiology)  Thomas Lui MD as Consulting Physician (Hematology and Oncology)  PCP:  Marguerite Moore PA-C    Chief Complaint:  Follow-up nonischemic cardiomyopathy, paroxysmal A. fib    Subjective: Patient states he feels well today.  He is back to eating regularly.  His creatinine has normalized.  Blood pressure mildly elevated.      Review of Systems:   All systems have been reviewed and are negative with the exception of those mentioned above.      Objective:    Vital Sign Min/Max for last 24 hours  Temp  Min: 98 °F (36.7 °C)  Max: 98.7 °F (37.1 °C)   BP  Min: 128/68  Max: 153/71   Pulse  Min: 61  Max: 80   Resp  Min: 14  Max: 16   SpO2  Min: 93 %  Max: 98 %   No data recorded   Weight  Min: 90.4 kg (199 lb 3.2 oz)  Max: 90.4 kg (199 lb 3.2 oz)     Flowsheet Rows    Flowsheet Row First Filed Value   Admission Height 177.8 cm (70\") Documented at 07/29/2022 1303   Admission Weight 95.3 kg (210 lb) Documented at 07/29/2022 1303          Telemetry: Occasional PVCs.  Sinus rhythm.      Intake/Output Summary (Last 24 hours) at 8/2/2022 1041  Last data filed at 8/2/2022 0900  Gross per 24 hour   Intake 2459 ml   Output 2725 ml   Net -266 ml     Intake & Output (last 3 days)       07/30 0701  07/31 0700 07/31 0701  08/01 0700 08/01 0701  08/02 0700 08/02 0701  08/03 0700    P.O. 120 1597 1553 120    I.V. (mL/kg)   1273 (14.1)     IV Piggyback        Total Intake(mL/kg) 120 (1.4) 1597 (18) 2826 (31.3) 120 (1.3)    Urine (mL/kg/hr) 925 (0.4) 2250 (1.1) 2625 (1.2) 200 (0.6)    Stool   0     Total Output 925 2250 2625 200    Net -805 -653 +201 -80            Stool Unmeasured Occurrence   1 x            Physical Exam: "   Constitutional:       Appearance: Healthy appearance. Not in distress.   Pulmonary:      Effort: Pulmonary effort is normal.      Breath sounds: No rales.   Cardiovascular:      Frequent ectopic beats. Regular rhythm.      Murmurs: There is no murmur.   Edema:     Peripheral edema absent.   Skin:     General: Skin is warm and dry.        LABS/DIAGNOSTIC DATA:  Results from last 7 days   Lab Units 08/02/22  0622 08/01/22  0727 07/29/22  1756 07/29/22  1357   WBC 10*3/mm3 2.70* 3.11*  --  7.39   HEMOGLOBIN g/dL 10.8* 11.0*  --  17.1   HEMOGLOBIN, POC g/dL  --   --  17.7*  --    HEMATOCRIT % 33.3* 33.6*  --  55.7*   HEMATOCRIT POC %  --   --  52*  --    PLATELETS 10*3/mm3 58* 57*  --  103*     Lab Results   Lab Value Date/Time    TROPONINT 0.011 07/29/2022 1948    TROPONINT <0.010 07/13/2022 1621    TROPONINT 0.017 03/15/2021 0524    TROPONINT 0.159 (C) 03/13/2021 0453    TROPONINT 0.026 03/12/2021 2215    TROPONINT <0.010 03/12/2021 1911    TROPONINT <0.010 11/13/2019 0233    TROPONINT <0.010 11/12/2019 2259         Results from last 7 days   Lab Units 08/02/22  0622 08/01/22  0727 07/31/22  0425   SODIUM mmol/L 137 137 134*   POTASSIUM mmol/L 4.0 4.3 4.4   CHLORIDE mmol/L 106 105 102   CO2 mmol/L 23.0 21.0* 24.0   BUN mg/dL 19 34* 43*   CREATININE mg/dL 1.02 1.30* 1.58*   CALCIUM mg/dL 8.7 8.6 8.8   BILIRUBIN mg/dL 0.4 0.4 0.6   ALK PHOS U/L 56 56 53   ALT (SGPT) U/L 14 10 5   AST (SGOT) U/L 20 17 11   GLUCOSE mg/dL 93 104* 136*     Results from last 7 days   Lab Units 07/29/22  1357   HEMOGLOBIN A1C % 6.50*                   Medication Review:   aspirin, 81 mg, Oral, Daily  insulin lispro, 0-7 Units, Subcutaneous, 4x Daily With Meals & Nightly  levETIRAcetam, 500 mg, Oral, BID  metoprolol succinate XL, 50 mg, Oral, Q12H  pantoprazole, 40 mg, Oral, BID  rosuvastatin, 10 mg, Oral, Daily  sacubitril-valsartan, 1 tablet, Oral, Q12H  sodium chloride, 10 mL, Intravenous, Q12H  sodium chloride, 10 mL, Intravenous,  Q12H  tiotropium bromide monohydrate, 2 puff, Inhalation, Daily - RT               Essential hypertension    Hyperlipidemia LDL goal <70    T2DM    H/O seizures on Keppra    COPD (chronic obstructive pulmonary disease) (HCC)    Coronary artery disease involving native coronary artery of native heart with angina pectoris (HCC)    NICM     Chronic systolic congestive heart failure (HCC)    GERD    Hyperkalemia    Acute renal failure (ARF) (HCC)    Hyponatremia      Assessment/Plan:    1.  Paroxysmal A. Fib  -Detected on pacemaker check July 28 in setting of hyperkalemia  -No further episodes while admitted  -Would hold anticoagulation due to so far single documented episode in setting of significant electrolyte abnormalities, as well as development of thrombocytopenia platelets 58, hemoglobin 10.8  -Continue aspirin     2.  Nonischemic cardiomyopathy, frequent PVCs  -Toprol-XL  50 mg twice daily due to continued ventricular ectopy  -Resume Entresto 24/26 mg twice daily today  -Remain off spironolactone due to significant hyperkalemia  -Does not appear volume overloaded, diuretics held     3.  [employer] dual-chamber ICD in place     4.  Hypertension  -Restart Entresto today      5.  KENNEDY and hyperkalemia  -Nephrology following  -Renal function has normalized.  We will restart Entresto.  Remain off of Aldactone.      Currently stable from a cardiac standpoint.  Resumed Entresto today.  Okay for discharge planning from cardiac standpoint.  We will be available for questions.  We will sign off.  Patient has an appointment in heart valve clinic on 8/10/2022.             Javad Mercedes MD [facility]  08/02/22

## 2022-08-02 NOTE — THERAPY TREATMENT NOTE
Patient Name: Narendra Cochran  : 1940    MRN: [id number]                              Today's Date: 2022       Admit Date: 2022    Visit Dx:     ICD-10-CM ICD-9-CM   1. Hyperkalemia  E87.5 276.7   2. Acute kidney injury (nontraumatic) ([facility])  N17.9 584.9   3. Upper abdominal pain  R10.10 789.09     Patient Active Problem List   Diagnosis   • Essential hypertension   • Hyperlipidemia LDL goal <70   • T2DM   • Paget disease of bone   • Tobacco abuse   • H/O seizures on Keppra   • Gonalgia   • Osteitis deformans   • COPD (chronic obstructive pulmonary disease) ([facility])   • Syncope   • NSVT (nonsustained ventricular tachycardia) ([facility])   • Coronary artery disease involving native coronary artery of native heart with angina pectoris ([facility])   • NICM    • OHCA   • Hypokalemia   • Hypomagnesemia   • VHD; mild-mod AR, mild MR   • Chronic systolic congestive heart failure ([facility])   • Former tobacco user   • GERD   • Marijuana use   • Frequent PVCs   • Acute blood loss anemia   • Presence of biventricular implantable cardioverter-defibrillator (ICD)   • Stage 3a chronic kidney disease ([facility])   • Hyperkalemia   • Acute renal failure (ARF) ([facility])   • Hyponatremia     Past Medical History:   Diagnosis Date   • Arthritis    • CHF (congestive heart failure) ([facility])    • Diabetes mellitus ([facility])    • Diabetic foot ulcers ([facility])    • Diverticulitis    • Foot callus    • GERD (gastroesophageal reflux disease)    • Hammer toes, bilateral    • Hearing loss    • History of transfusion    • Hyperlipidemia    • Hypertension    • Hypertensive urgency, malignant 2017   • Paget disease of bone      Past Surgical History:   Procedure Laterality Date   • APPENDECTOMY     • CARDIAC CATHETERIZATION N/A 3/18/2020    Procedure: Left Heart Cath;  Surgeon: Sonya Garibay MD;  Location: [facility] CATH INVASIVE LOCATION;  Service: Cardiology;  Laterality: N/A;  First availabel provider     • CARDIAC CATHETERIZATION N/A 3/15/2021    Procedure: LEFT  HEART CATH;  Surgeon: Doc Sahni IV, MD;  Location:  [facility] CATH INVASIVE LOCATION;  Service: Cardiovascular;  Laterality: N/A;   • CARDIAC CATHETERIZATION N/A 3/15/2021    Procedure: Coronary angiography;  Surgeon: Doc Sahni IV, MD;  Location:  [facility] CATH INVASIVE LOCATION;  Service: Cardiovascular;  Laterality: N/A;   • CARDIAC ELECTROPHYSIOLOGY PROCEDURE N/A 3/16/2021    Procedure: ICD DC new;  Surgeon: Som Boyd DO;  Location:  [facility] EP INVASIVE LOCATION;  Service: Cardiology;  Laterality: N/A;   • COLON RESECTION      second to diverticulitis    • FOOT SURGERY Left       General Information     Row Name 08/02/22 1156          Physical Therapy Time and Intention    Document Type therapy note (daily note)  -KG     Mode of Treatment physical therapy  -KG     Row Name 08/02/22 1156          General Information    Patient Profile Reviewed yes  -KG     Existing Precautions/Restrictions fall  jerking movements mobility  -KG     Row Name 08/02/22 1156          Cognition    Orientation Status (Cognition) oriented x 4  -KG     Row Name 08/02/22 1156          Safety Issues, Functional Mobility    Impairments Affecting Function (Mobility) balance;endurance/activity tolerance;pain;postural/trunk control;strength  -KG           User Key  (r) = Recorded By, (t) = Taken By, (c) = Cosigned By    Initials Name Provider Type    KG Ijeoma Weiner Physical Therapist               Mobility     Row Name 08/02/22 1156          Bed Mobility    Bed Mobility supine-sit  -KG     Supine-Sit Bland (Bed Mobility) minimum assist (75% patient effort)  -KG     Assistive Device (Bed Mobility) bed rails;head of bed elevated  -KG     Comment, (Bed Mobility) min-A for supine to sit, able to sit independently EOB but requires min-A for dynamic sitting EOB  -KG     Row Name 08/02/22 1156          Transfers    Comment, (Transfers) CGA, no jerking movements present today  -KG     Row Name 08/02/22 1156           Sit-Stand Transfer    Sit-Stand Arm[place] (Transfers) verbal cues;contact guard;2 person assist  -KG     Assistive Device (Sit-Stand Transfers) walker, front-wheeled  -KG     Row Name 08/02/22 1156          Gait/Stairs (Locomotion)    Arm[place]ust Level (Gait) contact guard;2 person assist  -KG     Assistive Device (Gait) walker, front-wheeled  -KG     Distance in Feet (Gait) 110  -KG     Deviations/Abnormal Patterns (Gait) gait speed decreased  -KG     Bilateral Gait Deviations forward flexed posture;heel strike decreased  -KG     Comment, (Gait/Stairs) Pt able to ambulate 110', CGA x2 due to mild unsteadiness, cues for AD on turns, limited by fatigue, no jerking episodes present during session/ ambulation  -KG           User Key  (r) = Recorded By, (t) = Taken By, (c) = Cosigned By    Initials Name Provider Type    Ijeoma Carvajal Physical Therapist               Obj/Interventions     Row Name 08/02/22 1159          Motor Skills    Therapeutic Exercise knee;ankle  -KG     Row Name 08/02/22 1159          Knee (Therapeutic Exercise)    Knee (Therapeutic Exercise) strengthening exercise  -KG     Knee Strengthening (Therapeutic Exercise) bilateral;SLR (straight leg raise);LAQ (long arc quad);15 repititions  -KG     Row Name 08/02/22 1159          Ankle (Therapeutic Exercise)    Ankle (Therapeutic Exercise) strengthening exercise  -KG     Ankle Strengthening (Therapeutic Exercise) bilateral;dorsiflexion;plantarflexion;15 repititions  -KG     Row Name 08/02/22 1159          Balance    Balance Assessment standing dynamic balance  -KG     Static Standing Balance contact guard  -KG     Dynamic Standing Balance contact guard  -KG     Position/Device Used, Standing Balance supported;walker, rolling  -KG     Balance Interventions standing;sit to stand;weight shifting activity;single limb stance  -KG           User Key  (r) = Recorded By, (t) = Taken By, (c) = Cosigned By    Initials Name Provider Type    KG  Ijeoma Weiner Physical Therapist               Goals/Plan    No documentation.                Clinical Impression     Row Name 08/02/22 1201          Pain    Pretreatment Pain Rating 0/10 - no pain  -KG     Posttreatment Pain Rating 0/10 - no pain  -KG     Row Name 08/02/22 1201          Plan of Care Review    Plan of Care Reviewed With patient  -KG     Progress improving  -KG     Outcome Evaluation Pt with good improvement.  Pt able to ambulate 110', CGA x2, FWW due to mild unsteadiness, cues for AD on turns, limited by fatigue, no jerking episodes present during session/ ambulation  -KG     Row Name 08/02/22 1201          Vital Signs    Pre Systolic BP Rehab 129  -KG     Pre Treatment Diastolic BP 72  -KG     Post Systolic BP Rehab 133  -KG     Post Treatment Diastolic BP 59  -KG     Row Name 08/02/22 1201          Positioning and Restraints    Pre-Treatment Position in bed  -KG     Post Treatment Position chair  -KG     In Chair notified nsg;call light within reach;encouraged to call for assist;exit alarm on;waffle cushion;legs elevated  -KG           User Key  (r) = Recorded By, (t) = Taken By, (c) = Cosigned By    Initials Name Provider Type    KG Ijeoma Weiner Physical Therapist               Outcome Measures     Row Name 08/02/22 1202          How much help from another person do you currently need...    Turning from your back to your side while in flat bed without using bedrails? 3  -KG     Moving from lying on back to sitting on the side of a flat bed without bedrails? 3  -KG     Moving to and from a bed to a chair (including a wheelchair)? 3  -KG     Standing up from a chair using your arms (e.g., wheelchair, bedside chair)? 3  -KG     Climbing 3-5 steps with a railing? 2  -KG     To walk in hospital room? 3  -KG     AM-PAC 6 Clicks Score (PT) 17  -KG     Highest level of mobility 5 --> Static standing  -KG     Row Name 08/02/22 1202          Functional Assessment    Outcome Measure Options AM-PAC  6 Clicks Basic Mobility (PT)  -KG           User Key  (r) = Recorded By, (t) = Taken By, (c) = Cosigned By    Initials Name Provider Type    KG Ijeoma Weiner Physical Therapist                             Physical Therapy Education                 Title: PT OT SLP Therapies (In Progress)     Topic: Physical Therapy (Done)     Point: Mobility training (Done)     Learning Progress Summary           Patient Acceptance, E,TB, VU,NR by KG at 8/2/2022 1203    Acceptance, E, VU,NR by LM at 7/30/2022 1408                   Point: Home exercise program (Done)     Learning Progress Summary           Patient Acceptance, E,TB, VU,NR by KG at 8/2/2022 1203                   Point: Body mechanics (Done)     Learning Progress Summary           Patient Acceptance, E,TB, VU,NR by KG at 8/2/2022 1203                   Point: Precautions (Done)     Learning Progress Summary           Patient Acceptance, E, VU,NR by LM at 7/30/2022 1408                               User Key     Initials Effective Dates Name Provider Type Discipline     06/16/21 -  Марина Watters, PT Physical Therapist PT     06/16/21 -  Ijeoma Weiner Physical Therapist PT              PT Recommendation and Plan     Plan of Care Reviewed With: patient  Progress: improving  Outcome Evaluation: Pt with good improvement.  Pt able to ambulate 110', CGA x2, FWW due to mild unsteadiness, cues for AD on turns, limited by fatigue, no jerking episodes present during session/ ambulation     Time Calculation:    PT Charges     Row Name 08/02/22 1203             Time Calculation    Start Time 1050  -KG      PT Received On 08/02/22  -KG      PT Goal Re-Cert Due Date 08/09/22  -KG              Time Calculation- PT    Total Timed Code Minutes- PT 30 minute(s)  -KG              Timed Charges    84436 - PT Therapeutic Exercise Minutes 10  -KG      48305 - Gait Training Minutes  20  -KG              Total Minutes    Timed Charges Total Minutes 30  -KG       Total Minutes 30   -KG            User Key  (r) = Recorded By, (t) = Taken By, (c) = Cosigned By    Initials Name Provider Type    Ijeoma Carvajal Physical Therapist              Therapy Charges for Today     Code Description Service Date Service Provider Modifiers Qty    81828025808 HC PT THER PROC EA 15 MIN 8/2/2022 Ijeoma Weiner GP 1    58086043652 HC GAIT TRAINING EA 15 MIN 8/2/2022 Ijeoma Weiner GP 1    37423249319 HC PT THER SUPP EA 15 MIN 8/2/2022 Ijeoma Weiner GP 2          PT G-Codes  Outcome Measure Options: AM-PAC 6 Clicks Basic Mobility (PT)  AM-PAC 6 Clicks Score (PT): 17  AM-PAC 6 Clicks Score (OT): 18    Ijeoma Weiner  8/2/2022

## 2022-08-02 NOTE — PROGRESS NOTES
"   LOS: 4 days    Patient Care Team:  Marguerite Moore PA-C as PCP - General (Physician Assistant)  Som Boyd DO as Consulting Physician (Cardiology)  Javad Mercedes MD as Consulting Physician (Cardiology)  Thomas Lui MD as Consulting Physician (Hematology and Oncology)    Chief Complaint: Epigastric pain    Subjective     Interval History:   No acute events overnight. No new complaints       Review of Systems:   No CP or SOA , fever, chills, rigors, rash, N/V, Constipation.       Objective     Vital Sign Min/Max for last 24 hours  Temp  Min: 98 °F (36.7 °C)  Max: 98.7 °F (37.1 °C)   BP  Min: 128/68  Max: 153/71   Pulse  Min: 61  Max: 80   Resp  Min: 14  Max: 16   SpO2  Min: 93 %  Max: 98 %   No data recorded   Weight  Min: 90.4 kg (199 lb 3.2 oz)  Max: 90.4 kg (199 lb 3.2 oz)     Flowsheet Rows    Flowsheet Row First Filed Value   Admission Height 177.8 cm (70\") Documented at 07/29/2022 1303   Admission Weight 95.3 kg (210 lb) Documented at 07/29/2022 1303          No intake/output data recorded.  I/O last 3 completed shifts:  In: 3426 [P.O.:2153; I.V.:1273]  Out: 4450 [Urine:4450]    Physical Exam:    Gen: Alert, NAD   HENT: NC, AT, EOMI   NECK: Supple, no JVD, Trachea midline   LUNGS: CTA bilaterally, non labored respirtation   CVS: S1/S2 audible, RRR, no murmur   Abd: Soft, NT, ND, BS+   Ext: No pedal edema, no cyanosis   CNS: Alert, No focal deficit noted grossly  Psy: Cooperative  Skin: Warm, dry and intact      WBC WBC   Date Value Ref Range Status   08/02/2022 2.70 (L) 3.40 - 10.80 10*3/mm3 Final   08/01/2022 3.11 (L) 3.40 - 10.80 10*3/mm3 Final      HGB Hemoglobin   Date Value Ref Range Status   08/02/2022 10.8 (L) 13.0 - 17.7 g/dL Final   08/01/2022 11.0 (L) 13.0 - 17.7 g/dL Final      HCT Hematocrit   Date Value Ref Range Status   08/02/2022 33.3 (L) 37.5 - 51.0 % Final   08/01/2022 33.6 (L) 37.5 - 51.0 % Final      Platlets No results found for: LABPLAT   MCV MCV   Date Value Ref " Range Status   08/02/2022 79.1 79.0 - 97.0 fL Final   08/01/2022 77.4 (L) 79.0 - 97.0 fL Final          Sodium Sodium   Date Value Ref Range Status   08/02/2022 137 136 - 145 mmol/L Final   08/01/2022 137 136 - 145 mmol/L Final   07/31/2022 134 (L) 136 - 145 mmol/L Final   07/30/2022 133 (L) 136 - 145 mmol/L Final      Potassium Potassium   Date Value Ref Range Status   08/02/2022 4.0 3.5 - 5.2 mmol/L Final     Comment:     Slight hemolysis detected by analyzer. Results may be affected.   08/01/2022 4.3 3.5 - 5.2 mmol/L Final     Comment:     Slight hemolysis detected by analyzer. Results may be affected.   07/31/2022 4.4 3.5 - 5.2 mmol/L Final     Comment:     Slight hemolysis detected by analyzer. Results may be affected.   07/30/2022 4.5 3.5 - 5.2 mmol/L Final     Comment:     Slight hemolysis detected by analyzer. Results may be affected.      Chloride Chloride   Date Value Ref Range Status   08/02/2022 106 98 - 107 mmol/L Final   08/01/2022 105 98 - 107 mmol/L Final   07/31/2022 102 98 - 107 mmol/L Final   07/30/2022 106 98 - 107 mmol/L Final      CO2 CO2   Date Value Ref Range Status   08/02/2022 23.0 22.0 - 29.0 mmol/L Final   08/01/2022 21.0 (L) 22.0 - 29.0 mmol/L Final   07/31/2022 24.0 22.0 - 29.0 mmol/L Final   07/30/2022 19.0 (L) 22.0 - 29.0 mmol/L Final      BUN BUN   Date Value Ref Range Status   08/02/2022 19 8 - 23 mg/dL Final   08/01/2022 34 (H) 8 - 23 mg/dL Final   07/31/2022 43 (H) 8 - 23 mg/dL Final   07/30/2022 51 (H) 8 - 23 mg/dL Final      Creatinine Creatinine   Date Value Ref Range Status   08/02/2022 1.02 0.76 - 1.27 mg/dL Final   08/01/2022 1.30 (H) 0.76 - 1.27 mg/dL Final   07/31/2022 1.58 (H) 0.76 - 1.27 mg/dL Final   07/30/2022 1.69 (H) 0.76 - 1.27 mg/dL Final      Calcium Calcium   Date Value Ref Range Status   08/02/2022 8.7 8.6 - 10.5 mg/dL Final   08/01/2022 8.6 8.6 - 10.5 mg/dL Final   07/31/2022 8.8 8.6 - 10.5 mg/dL Final   07/30/2022 8.1 (L) 8.6 - 10.5 mg/dL Final      PO4 No  results found for: CAPO4   Albumin Albumin   Date Value Ref Range Status   08/02/2022 2.90 (L) 3.50 - 5.20 g/dL Final   08/01/2022 3.20 (L) 3.50 - 5.20 g/dL Final   07/31/2022 3.30 (L) 3.50 - 5.20 g/dL Final      Magnesium Magnesium   Date Value Ref Range Status   08/02/2022 1.7 1.6 - 2.4 mg/dL Final   08/01/2022 2.1 1.6 - 2.4 mg/dL Final   07/31/2022 2.0 1.6 - 2.4 mg/dL Final      Uric Acid Uric Acid   Date Value Ref Range Status   07/30/2022 6.3 3.4 - 7.0 mg/dL Final     Comment:     Falsely depressed results may occur on samples drawn from patients receiving N-Acetylcysteine (NAC) or Metamizole.           Results Review:     I reviewed the patient's new clinical results.    aspirin, 81 mg, Oral, Daily  insulin lispro, 0-7 Units, Subcutaneous, 4x Daily With Meals & Nightly  levETIRAcetam, 500 mg, Oral, BID  metoprolol succinate XL, 50 mg, Oral, Q12H  pantoprazole, 40 mg, Oral, BID  rosuvastatin, 10 mg, Oral, Daily  sacubitril-valsartan, 1 tablet, Oral, Q12H  sodium chloride, 10 mL, Intravenous, Q12H  sodium chloride, 10 mL, Intravenous, Q12H  tiotropium bromide monohydrate, 2 puff, Inhalation, Daily - RT      sodium chloride, 75 mL/hr, Last Rate: 75 mL/hr (08/01/22 1336)        Medication Review:     FINDINGS:  Right kidney: This kidney measures 13.2 cm in length. There are  suggested renal cysts. The largest in the upper pole measures about 6.9  cm in greatest dimension. There is no hydronephrosis.  Left kidney: This kidney measures 14.4 cm in length. There is a dominant  cyst within the upper pole measuring 5.2 cm.     Bladder: The bladder does not appear unusual for the degree of  distention.      IMPRESSION:  1.  Bilateral renal cysts.    Assessment & Plan       Essential hypertension    Hyperlipidemia LDL goal <70    T2DM    H/O seizures on Keppra    COPD (chronic obstructive pulmonary disease) (HCC)    Coronary artery disease involving native coronary artery of native heart with angina pectoris (HCC)    NICM      Chronic systolic congestive heart failure (HCC)    GERD    Hyperkalemia    Acute renal failure (ARF) (HCC)    Hyponatremia       1- KENNEDY - non oliguric - likely pre-renal -volume depletion and hypotension secondary to poor oral intake while on Entresto, aldactone and lasix - improving - FeNa  0.39% suggestive of pre-renal azotemia. UPCR - 0.17 - resolved.   2- Hyperkalemia - resolved.   3- Metabolic acidosis - on metformin at home - resolved.   4- Hypotension borderline - improving  5- Hyponatremia - improving   6- Abdominal pain.   7- DM   8- Bilateral renal cyst     Plan  - Encourage oral hydration. Stop IV fluids.   - Monitor I/O   - Hold Aldactone and lasix for now. UOP is excellent.   - can restart metformin and entresto  - Avoid nephrotoxic agents.   - Renal diet.    - Adjust meds per renal function   - no emergent need of RRT      Anant Romero MD  08/02/22  09:18 EDT

## 2022-08-02 NOTE — PROGRESS NOTES
"                  Clinical Nutrition     Nutrition Assessment  Reason for Visit:   Identified at risk by screening criteria, Malnutrition Severity Assessment      Patient Name: Narendra Cochran  YOB: 1940  MRN: [id number]  Date of Encounter: 08/02/22 13:13 EDT  Admission date: 7/29/2022      Comments: Pt meets criteria for non severe acute malnutrition based on intake PTA w some wasting See flowsheet note.       Admission Diagnosis    Hyperkalemia [E87.5]  resolved  Epigastric pain    Hospital Problem List    Essential hypertension    Hyperlipidemia LDL goal <70    T2DM    H/O seizures on Keppra    COPD (chronic obstructive pulmonary disease) (HCC)    Coronary artery disease involving native coronary artery of native heart with angina pectoris (HCC)    NICM     Chronic systolic congestive heart failure (HCC)    GERD    Hyperkalemia    Acute renal failure (ARF) (HCC)    Hyponatremia    (hyponatremia resolved)  Bilat renal cyst  Chr Constipation                          Applicable Interval History:      Applicable PMH/PSxH:     PMH: He  has a past medical history of Arthritis, CHF (congestive heart failure) (HCC), Diabetes mellitus (HCC), Diabetic foot ulcers (HCC), Diverticulitis, Foot callus, GERD (gastroesophageal reflux disease), Hammer toes, bilateral, Hearing loss, History of transfusion, Hyperlipidemia, Hypertension, Hypertensive urgency, 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   PSxH: He  has a past surgical history that includes Appendectomy; Foot surgery (Left); Colectomy; Cardiac catheterization (N/A, 3/18/2020); Cardiac catheterization (N/A, 3/15/2021); Cardiac catheterization (N/A, 3/15/2021); and Cardiac electrophysiology procedure (N/A, 3/16/2021).         Diet/Nutrition Related History:     Rpt 5 da PTA < 50% intake. Rpt now doing well w food.       Anthropometrics     Admission Height 177.8 cm (70\") Documented at 07/29/2022 1303   Admission Weight 95.3 kg (210 lb) Documented at " "07/29/2022 1303        Height: 177.8 cm (70\")    Last filed wt: Weight: 90.4 kg (199 lb 3.2 oz) (08/02/22 0328)  Weight Method: Bed scale    BMI: BMI (Calculated): 28.6  Overweight: 25.0-29.9kg/m2     Ideal Body Weight (IBW) (kg): 76.48    Weight Change   UBW: Per  lbs on 7/5 however noted would incr diuretic at that time.  Weight change:   % wt change:   Time frame of weight loss:     Labs reviewed   Yes  Elevated K+ and Low serum Na resolved    Medications reviewed   Yes  Insulin, Protonix, Keppra      Intake/Ouptut 24 hrs reviewed   Yes  Stool 8/1    Nutrition Focused Physical Exam  Date:     Pt meets criteria for non severe acute malnutrition based on intake PTA w some wasting See flowsheet note.      Current Nutrition Prescription     PO: Diet Regular; Cardiac, Consistent Carbohydrate, Renal  Orders Placed This Encounter      Dietary Nutrition Supplements Novasource Renal  2x/da    Intake: 100% x last 7 meals recorded      Nutrition Diagnosis     8/2  Problem Malnutrition  non severe acute   Etiology Epigastric pain PTA   Signs/Symptoms Intake hx w wasting   Status:      Goal:   General: Nutrition to support treatment  PO: Maintain intake  Additional goals:      Nutrition Intervention     Follow treatment progress, Care plan reviewed, Advise alternate selection, Menu provided      Monitoring/Evaluation:   Per protocol, PO intake, Supplement intake, Pertinent labs, Weight, GI status, Symptoms        Erin Ag RD,   Time Spent: 30 min      "

## 2022-08-03 LAB
ANION GAP SERPL CALCULATED.3IONS-SCNC: 4 MMOL/L (ref 5–15)
BUN SERPL-MCNC: 17 MG/DL (ref 8–23)
BUN/CREAT SERPL: 17.7 (ref 7–25)
CALCIUM SPEC-SCNC: 8.9 MG/DL (ref 8.6–10.5)
CHLORIDE SERPL-SCNC: 106 MMOL/L (ref 98–107)
CO2 SERPL-SCNC: 25 MMOL/L (ref 22–29)
CREAT SERPL-MCNC: 0.96 MG/DL (ref 0.76–1.27)
EGFRCR SERPLBLD CKD-EPI 2021: 78.4 ML/MIN/1.73
GLUCOSE BLDC GLUCOMTR-MCNC: 104 MG/DL (ref 70–130)
GLUCOSE BLDC GLUCOMTR-MCNC: 135 MG/DL (ref 70–130)
GLUCOSE BLDC GLUCOMTR-MCNC: 163 MG/DL (ref 70–130)
GLUCOSE BLDC GLUCOMTR-MCNC: 171 MG/DL (ref 70–130)
GLUCOSE SERPL-MCNC: 126 MG/DL (ref 65–99)
MAGNESIUM SERPL-MCNC: 1.8 MG/DL (ref 1.6–2.4)
POTASSIUM SERPL-SCNC: 3.8 MMOL/L (ref 3.5–5.2)
SODIUM SERPL-SCNC: 135 MMOL/L (ref 136–145)

## 2022-08-03 PROCEDURE — 83735 ASSAY OF MAGNESIUM: CPT | Performed by: HOSPITALIST

## 2022-08-03 PROCEDURE — 94664 DEMO&/EVAL PT USE INHALER: CPT

## 2022-08-03 PROCEDURE — 82962 GLUCOSE BLOOD TEST: CPT

## 2022-08-03 PROCEDURE — 94799 UNLISTED PULMONARY SVC/PX: CPT

## 2022-08-03 PROCEDURE — 63710000001 INSULIN LISPRO (HUMAN) PER 5 UNITS: Performed by: INTERNAL MEDICINE

## 2022-08-03 PROCEDURE — 99232 SBSQ HOSP IP/OBS MODERATE 35: CPT | Performed by: HOSPITALIST

## 2022-08-03 PROCEDURE — 80048 BASIC METABOLIC PNL TOTAL CA: CPT | Performed by: HOSPITALIST

## 2022-08-03 RX ADMIN — METOPROLOL SUCCINATE 50 MG: 50 TABLET, EXTENDED RELEASE ORAL at 21:26

## 2022-08-03 RX ADMIN — Medication 10 ML: at 09:05

## 2022-08-03 RX ADMIN — SACUBITRIL AND VALSARTAN 1 TABLET: 24; 26 TABLET, FILM COATED ORAL at 21:26

## 2022-08-03 RX ADMIN — LEVETIRACETAM 500 MG: 500 TABLET, FILM COATED ORAL at 09:04

## 2022-08-03 RX ADMIN — ROSUVASTATIN CALCIUM 10 MG: 10 TABLET, FILM COATED ORAL at 09:04

## 2022-08-03 RX ADMIN — SACUBITRIL AND VALSARTAN 1 TABLET: 24; 26 TABLET, FILM COATED ORAL at 09:04

## 2022-08-03 RX ADMIN — PANTOPRAZOLE SODIUM 40 MG: 40 TABLET, DELAYED RELEASE ORAL at 09:04

## 2022-08-03 RX ADMIN — INSULIN LISPRO 2 UNITS: 100 INJECTION, SOLUTION INTRAVENOUS; SUBCUTANEOUS at 17:09

## 2022-08-03 RX ADMIN — TIOTROPIUM BROMIDE INHALATION SPRAY 2 PUFF: 3.12 SPRAY, METERED RESPIRATORY (INHALATION) at 08:23

## 2022-08-03 RX ADMIN — METOPROLOL SUCCINATE 50 MG: 50 TABLET, EXTENDED RELEASE ORAL at 09:04

## 2022-08-03 RX ADMIN — PANTOPRAZOLE SODIUM 40 MG: 40 TABLET, DELAYED RELEASE ORAL at 21:26

## 2022-08-03 RX ADMIN — INSULIN LISPRO 2 UNITS: 100 INJECTION, SOLUTION INTRAVENOUS; SUBCUTANEOUS at 21:26

## 2022-08-03 RX ADMIN — ASPIRIN 81 MG CHEWABLE TABLET 81 MG: 81 TABLET CHEWABLE at 09:04

## 2022-08-03 RX ADMIN — Medication 10 ML: at 09:04

## 2022-08-03 RX ADMIN — LEVETIRACETAM 500 MG: 500 TABLET, FILM COATED ORAL at 21:26

## 2022-08-03 NOTE — PROGRESS NOTES
"   LOS: 5 days    Patient Care Team:  Marguerite Moore PA-C as PCP - General (Physician Assistant)  Som Boyd DO as Consulting Physician (Cardiology)  Javad Mercedes MD as Consulting Physician (Cardiology)  Thomas Lui MD as Consulting Physician (Hematology and Oncology)    Chief Complaint: Epigastric pain    Subjective     Interval History:   No acute events overnight. No new complaints       Review of Systems:   No CP or SOA , fever, chills, rigors, rash, N/V, Constipation.       Objective     Vital Sign Min/Max for last 24 hours  Temp  Min: 98.1 °F (36.7 °C)  Max: 98.5 °F (36.9 °C)   BP  Min: 100/67  Max: 139/69   Pulse  Min: 58  Max: 81   Resp  Min: 16  Max: 20   SpO2  Min: 93 %  Max: 100 %   No data recorded   Weight  Min: 90.4 kg (199 lb 4.8 oz)  Max: 90.4 kg (199 lb 4.8 oz)     Flowsheet Rows    Flowsheet Row First Filed Value   Admission Height 177.8 cm (70\") Documented at 07/29/2022 1303   Admission Weight 95.3 kg (210 lb) Documented at 07/29/2022 1303          I/O this shift:  In: -   Out: 250 [Urine:250]  I/O last 3 completed shifts:  In: 2265 [P.O.:992; I.V.:1273]  Out: 3050 [Urine:3050]    Physical Exam:    Gen: Alert, NAD   HENT: NC, AT, EOMI   NECK: Supple, no JVD, Trachea midline   LUNGS: CTA bilaterally, non labored respirtation   CVS: S1/S2 audible, RRR, no murmur   Abd: Soft, NT, ND, BS+   Ext: No pedal edema, no cyanosis   CNS: Alert, No focal deficit noted grossly  Psy: Cooperative  Skin: Warm, dry and intact      WBC WBC   Date Value Ref Range Status   08/02/2022 2.70 (L) 3.40 - 10.80 10*3/mm3 Final   08/01/2022 3.11 (L) 3.40 - 10.80 10*3/mm3 Final      HGB Hemoglobin   Date Value Ref Range Status   08/02/2022 10.8 (L) 13.0 - 17.7 g/dL Final   08/01/2022 11.0 (L) 13.0 - 17.7 g/dL Final      HCT Hematocrit   Date Value Ref Range Status   08/02/2022 33.3 (L) 37.5 - 51.0 % Final   08/01/2022 33.6 (L) 37.5 - 51.0 % Final      Platlets No results found for: LABPLAT   MCV MCV "   Date Value Ref Range Status   08/02/2022 79.1 79.0 - 97.0 fL Final   08/01/2022 77.4 (L) 79.0 - 97.0 fL Final          Sodium Sodium   Date Value Ref Range Status   08/02/2022 137 136 - 145 mmol/L Final   08/01/2022 137 136 - 145 mmol/L Final      Potassium Potassium   Date Value Ref Range Status   08/02/2022 4.0 3.5 - 5.2 mmol/L Final     Comment:     Slight hemolysis detected by analyzer. Results may be affected.   08/01/2022 4.3 3.5 - 5.2 mmol/L Final     Comment:     Slight hemolysis detected by analyzer. Results may be affected.      Chloride Chloride   Date Value Ref Range Status   08/02/2022 106 98 - 107 mmol/L Final   08/01/2022 105 98 - 107 mmol/L Final      CO2 CO2   Date Value Ref Range Status   08/02/2022 23.0 22.0 - 29.0 mmol/L Final   08/01/2022 21.0 (L) 22.0 - 29.0 mmol/L Final      BUN BUN   Date Value Ref Range Status   08/02/2022 19 8 - 23 mg/dL Final   08/01/2022 34 (H) 8 - 23 mg/dL Final      Creatinine Creatinine   Date Value Ref Range Status   08/02/2022 1.02 0.76 - 1.27 mg/dL Final   08/01/2022 1.30 (H) 0.76 - 1.27 mg/dL Final      Calcium Calcium   Date Value Ref Range Status   08/02/2022 8.7 8.6 - 10.5 mg/dL Final   08/01/2022 8.6 8.6 - 10.5 mg/dL Final      PO4 No results found for: CAPO4   Albumin Albumin   Date Value Ref Range Status   08/02/2022 2.90 (L) 3.50 - 5.20 g/dL Final   08/01/2022 3.20 (L) 3.50 - 5.20 g/dL Final      Magnesium Magnesium   Date Value Ref Range Status   08/02/2022 1.7 1.6 - 2.4 mg/dL Final   08/01/2022 2.1 1.6 - 2.4 mg/dL Final      Uric Acid No results found for: URICACID        Results Review:     I reviewed the patient's new clinical results.    aspirin, 81 mg, Oral, Daily  insulin lispro, 0-7 Units, Subcutaneous, 4x Daily With Meals & Nightly  levETIRAcetam, 500 mg, Oral, BID  metoprolol succinate XL, 50 mg, Oral, Q12H  pantoprazole, 40 mg, Oral, BID  rosuvastatin, 10 mg, Oral, Daily  sacubitril-valsartan, 1 tablet, Oral, Q12H  sodium chloride, 10 mL,  Intravenous, Q12H  sodium chloride, 10 mL, Intravenous, Q12H  tiotropium bromide monohydrate, 2 puff, Inhalation, Daily - RT           Medication Review:     FINDINGS:  Right kidney: This kidney measures 13.2 cm in length. There are  suggested renal cysts. The largest in the upper pole measures about 6.9  cm in greatest dimension. There is no hydronephrosis.  Left kidney: This kidney measures 14.4 cm in length. There is a dominant  cyst within the upper pole measuring 5.2 cm.     Bladder: The bladder does not appear unusual for the degree of  distention.      IMPRESSION:  1.  Bilateral renal cysts.    Assessment & Plan       Essential hypertension    Hyperlipidemia LDL goal <70    T2DM    H/O seizures on Keppra    COPD (chronic obstructive pulmonary disease) ([facility])    Coronary artery disease involving native coronary artery of native heart with angina pectoris ([facility])    NICM     Chronic systolic congestive heart failure (HCC)    GERD    Hyperkalemia    Acute renal failure (ARF) ([facility])    Hyponatremia    Moderate malnutrition ([facility])       1- KENNEDY - non oliguric - likely pre-renal -volume depletion and hypotension secondary to poor oral intake while on Entresto, aldactone and lasix - improving - FeNa  0.39% suggestive of pre-renal azotemia. UPCR - 0.17 - resolved.   2- Hyperkalemia - resolved.   3- Metabolic acidosis - on metformin at home - resolved.   4- Hypotension borderline - resolved.   5- Hyponatremia - improving   6- Abdominal pain.   7- DM   8- Bilateral renal cyst     Plan  - Encourage oral hydration. Stop IV fluids.   - Monitor I/O   - Avoid nephrotoxic agents.   - Renal diet.    - Adjust meds per renal function   - no emergent need of RRT      Will sign off.     Anant Romero MD  08/03/22  09:03 EDT

## 2022-08-03 NOTE — PROGRESS NOTES
[facility] Medicine Services  PROGRESS NOTE    Patient Name: Narendra Cocharn  : 1940  MRN: [id number]    Date of Admission: 2022  Primary Care Physician: Marguerite Moore, BHAVANI    Subjective   Subjective     CC:  F/U nausea, epigastric pain    HPI:  Patient seen this morning, resting in bed. No complaints.     ROS:  Gen-no fevers, no chills  CV-no chest pain, no palpitations  Resp-no cough, no dyspnea  GI-no N/V/D, no abd pain    All other systems reviewed and negative except any additional pertinent positives and negatives as discussed in HPI.      Objective   Objective     Vital Signs:   Temp:  [98.1 °F (36.7 °C)-98.5 °F (36.9 °C)] 98.4 °F (36.9 °C)  Heart Rate:  [62-81] 62  Resp:  [17-20] 20  BP: (100-139)/(58-73) 128/58     Physical Exam:  Gen-no acute distress  HENT-NCAT, mucous membranes moist  CV-RRR, S1 S2 normal, no m/r/g  Resp-CTAB, no wheezes or rales  Abd-soft, NT, ND, +BS  Ext-no edema  Neuro-A&Ox3, no focal deficits, hard of hearing  Skin-no rashes  Psych-appropriate mood  No change from yesterday      Results Reviewed:  LAB RESULTS:      Lab 22  0622 22  0727 22  1756 22  1600 22  1357   WBC 2.70* 3.11*  --   --  7.39   HEMOGLOBIN 10.8* 11.0*  --   --  17.1   HEMOGLOBIN, POC  --   --  17.7*  --   --    HEMATOCRIT 33.3* 33.6*  --   --  55.7*   HEMATOCRIT POC  --   --  52*  --   --    PLATELETS 58* 57*  --   --  103*   NEUTROS ABS  --  2.20  --   --  5.55   IMMATURE GRANS (ABS)  --  0.02  --   --  0.03   LYMPHS ABS  --  0.62*  --   --  1.51   MONOS ABS  --  0.23  --   --  0.28   EOS ABS  --  0.03  --   --  0.01   MCV 79.1 77.4*  --   --  82.0   LACTATE  --   --   --  1.9 2.9*         Lab 22  1043 22  0622 22  0727 22  0425 22  1954 22  0612 22  1600 22  1357   SODIUM 135* 137 137 134* 133* 129*  130*   < >  --    POTASSIUM 3.8 4.0 4.3 4.4 4.5 5.6*  5.6*   < >  --    CHLORIDE 106  106 105 102 106 104  103   < >  --    CO2 25.0 23.0 21.0* 24.0 19.0* 12.0*  14.0*   < >  --    ANION GAP 4.0* 8.0 11.0 8.0 8.0 13.0  13.0   < >  --    BUN 17 19 34* 43* 51* 61*  61*   < >  --    CREATININE 0.96 1.02 1.30* 1.58* 1.69* 1.98*  2.13*   < >  --    EGFR 78.4 72.9 54.5* 43.1* 39.8* 32.9*  30.1*   < >  --    GLUCOSE 126* 93 104* 136* 126* 89  87   < >  --    CALCIUM 8.9 8.7 8.6 8.8 8.1* 9.3  9.2   < >  --    MAGNESIUM 1.8 1.7 2.1 2.0  --  1.7   < >  --    PHOSPHORUS  --   --   --   --  2.7 3.7  --   --    HEMOGLOBIN A1C  --   --   --   --   --   --   --  6.50*    < > = values in this interval not displayed.         Lab 08/02/22  0622 08/01/22  0727 07/31/22  0425 07/30/22  0612 07/30/22  0107 07/29/22  1600 07/29/22  1357   TOTAL PROTEIN 5.9* 5.7* 5.5* 5.9*  --  7.7  --    ALBUMIN 2.90* 3.20* 3.30* 3.40* 3.1 4.00  --    GLOBULIN 3.0 2.5  --  2.5  --  3.7  --    ALT (SGPT) 14 10 5 5  --  7  --    AST (SGOT) 20 17 11 13  --  16  --    BILIRUBIN 0.4 0.4 0.6 0.6  --  0.6  --    INDIRECT BILIRUBIN  --   --  0.4  --   --   --   --    BILIRUBIN DIRECT  --   --  0.2  --   --   --   --    ALK PHOS 56 56 53 54  --  72  --    LIPASE 75* 84* 67* 62*  --   --  119*         Lab 07/29/22  1948   TROPONIN T 0.011                 Lab 07/29/22  2002   FIO2 21   CARBOXYHEMOGLOBIN (VENOUS) 1.0     Brief Urine Lab Results  (Last result in the past 365 days)      Color   Clarity   Blood   Leuk Est   Nitrite   Protein   CREAT   Urine HCG        07/30/22 1956             90.5               Microbiology Results Abnormal     Procedure Component Value - Date/Time    Eosinophil Smear - Urine, Urine, Clean Catch [[id number]]  (Normal) Collected: 07/30/22 1958    Lab Status: Final result Specimen: Urine, Clean Catch Updated: 07/30/22 2145     Eosinophil Smear 0 % EOS/100 Cells     Narrative:      No eosinophil seen    COVID PRE-OP / PRE-PROCEDURE SCREENING ORDER (NO ISOLATION) - Swab, Nasopharynx [[id number]]  (Normal) Collected:  07/29/22 2242    Lab Status: Final result Specimen: Swab from Nasopharynx Updated: 07/30/22 1726    Narrative:      The following orders were created for panel order COVID PRE-OP / PRE-PROCEDURE SCREENING ORDER (NO ISOLATION) - Swab, Nasopharynx.  Procedure                               Abnormality         Status                     ---------                               -----------         ------                     COVID-19, APTIMA PANTHER...[[id number]]  Normal              Final result                 Please view results for these tests on the individual orders.    COVID-19, APTIMA PANTHER [facility]-HOUSE NP/OP SWAB IN UTM/VTM/[facility] TRANSPORT MEDIA 24HR TAT - Swab, Nasopharynx [[id number]]  (Normal) Collected: 07/29/22 2242    Lab Status: Final result Specimen: Swab from Nasopharynx Updated: 07/30/22 1726     COVID19 Not Detected    Narrative:      Fact sheet for providers: https://www.fda.gov/media/368162/download     Fact sheet for patients: https://www.fda.gov/media/562320/download    Test performed by RT PCR.          No radiology results from the last 24 hrs    Results for orders placed during the hospital encounter of 03/12/21    Adult Transthoracic Echo Complete W/ Cont if Necessary Per Protocol    Interpretation Summary  · Left ventricular systolic function is normal. Estimated left ventricular EF = 50%  · Left ventricular wall thickness is consistent with mild concentric hypertrophy.  · Left atrial volume is mildly increased.  · Moderate aortic valve regurgitation is present.  · Moderate mitral valve regurgitation is present.      I have reviewed the medications:  Scheduled Meds:aspirin, 81 mg, Oral, Daily  insulin lispro, 0-7 Units, Subcutaneous, 4x Daily With Meals & Nightly  levETIRAcetam, 500 mg, Oral, BID  metoprolol succinate XL, 50 mg, Oral, Q12H  pantoprazole, 40 mg, Oral, BID  rosuvastatin, 10 mg, Oral, Daily  sacubitril-valsartan, 1 tablet, Oral, Q12H  sodium chloride, 10 mL, Intravenous,  Q12H  sodium chloride, 10 mL, Intravenous, Q12H  tiotropium bromide monohydrate, 2 puff, Inhalation, Daily - RT      Continuous Infusions:   PRN Meds:.•  albuterol  •  dextrose  •  dextrose  •  glucagon (human recombinant)  •  magnesium sulfate **OR** magnesium sulfate in D5W 1g/100mL (PREMIX)  •  ondansetron  •  Sodium Chloride (PF)  •  sodium chloride  •  sodium chloride    Assessment & Plan   Assessment & Plan     Active Hospital Problems    Diagnosis  POA   • Moderate malnutrition (HCC) [E44.0]  Yes   • Hyperkalemia [E87.5]  Yes   • Acute renal failure (ARF) (HCC) [N17.9]  Unknown   • Hyponatremia [E87.1]  Unknown   • Chronic systolic congestive heart failure (HCC) [I50.22]  Yes   • GERD [K21.9]  Yes   • Coronary artery disease involving native coronary artery of native heart with angina pectoris (HCC) [I25.119]  Yes   • NICM  [I42.8]  Yes   • COPD (chronic obstructive pulmonary disease) ([facility]) [J44.9]  Yes   • H/O seizures on Keppra [R56.9]  Yes   • Essential hypertension [I10]  Yes   • Hyperlipidemia LDL goal <70 [E78.5]  Yes   • T2DM [E11.65]  Yes      Resolved Hospital Problems   No resolved problems to display.        Brief Hospital Course to date:  Narendra Cochran is a 81 y.o. male with hx of DM2, HTN, HLD, diverticulosis, chronic systolic CHF/NICM, COPD, CAD, Paget's disease, seizure disorder, and GERD who presents due to epigastric pain, nausea, and generalized weakness. Labs revealed K 8.0, Cr 3.2, Na 127. CT A/P negative for acute process. Admitted for further management.     This patient's problems and plans were partially entered by my partner and updated as appropriate by me 08/03/22.    KENNEDY, resolved  Metabolic acidosis (non-gap)  Hyperkalemia  Hypontremia  -CT A/P: no obstructive uropathy  -Renal US: no obstructive uropathy  -Improved with fluids/bicarb drip. Off bicarb drip since 7/31/22 morning.  -Have been holding Entresto, Aldactone, Lasix, metformin since admission  -Cr and K now  normalized  -Nephrology now signed off    Epigastric pain, improved  N/V/D, resolved  Elevated lipase, possible mild pancreatitis   -CT A/P negative acute process, GB and pancreas appeared normal  -Initially had lipase 119 then went down to 80; currently epigastric pain nearly resolved, tolerating diet better now. Perhaps had very subtle pancreatitis which did not show on imaging but symptoms dramatically improved now   -Continue PPI  -Continue antiemetics PRN     Thrombocytopenia, acute on chronic  -Previous labs 3 months ago platelets ranged ~91,000  -Trending down since admission from 103,000 to 57,000  -Stable at 58,000   -SQH discontinued  -Monitor closely, check CBC in AM     Hx NICM (previous ICD due to prior cardiac arrest)  Valvular heart disease (moderate AI & MR)  Hx non-obstructive CAD  Question of recent Afib  HTN  HLD  -Echo March 2021: EF 50%, moderate AI & MR  -Continue ASA, Metoprolol, statin  -Cardiology following: resumed Entresto 8/2/22 (okay'ed by Nephrology), continue to hold Lasix and Aldactone  -Currently in sinus, monitoring on telemetry.     DM2 (HbA1c 6.5%)  -Holding Metformin  -SSI     Seizure disorder  -Continue Keppra     Generalized weakness  -PT/OTfollowing, per 7/30/22 note recommended inpatient rehab    Expected Discharge Location and Transportation: rehab vs home  Expected Discharge Date: 08/04/22 as long as labs are stable and PT clears for home      DVT prophylaxis:  Mechanical DVT prophylaxis orders are present.     AM-PAC 6 Clicks Score (PT): 17 (08/02/22 1202)    CODE STATUS:   Code Status and Medical Interventions:   Ordered at: 07/29/22 2011     Medical Intervention Limits:    NO intubation (DNI)     Level Of Support Discussed With:    Patient     Code Status (Patient has no pulse and is not breathing):    CPR (Attempt to Resuscitate)     Medical Interventions (Patient has pulse or is breathing):    Limited Support       Siria Moreno MD  08/03/22

## 2022-08-03 NOTE — PAYOR COMM NOTE
"  Phyllis Valle RN, BSN, [facility]  Phone # [phone]  Fax # [phone]  Ref# [id number]  Narendra Reynolds (81 y.o. Male)             Date of Birth   1940    Social Security Number       Address   [address] [address]    Home Phone   [phone]    MRN   [id number]       Faith   Hoahaoism    Marital Status                               Admission Date   7/29/22    Admission Type   Emergency    Admitting Provider   Siria Moreno MD    Attending Provider   Siria Moreno MD    Department, Room/Bed   [facility] 5G, S564/1       Discharge Date       Discharge Disposition       Discharge Destination                               Attending Provider: Siria Moreno MD    Allergies: No Known Allergies    Isolation: None   Infection: None   Code Status: CPR   Advance Care Planning Activity    Ht: 177.8 cm (70\")   Wt: 90.4 kg (199 lb 4.8 oz)    Admission Cmt: None   Principal Problem: None                Active Insurance as of 7/29/2022     Primary Coverage     Payor Plan Insurance Group Employer/Plan Group    [facility] [place], [place]     Payor Plan Address Payor Plan Phone Number Payor Plan Fax Number Effective Dates    [address] [address] [phone]  1/6/2020 - None Entered    [facility] [address]       Subscriber Name Subscriber Birth Date Member ID       NARENDRA REYNOLDS 1940 [id number]                 Emergency Contacts      (Rel.) Home Phone Work Phone Mobile Phone    Jessica Reynolds (Spouse) -- -- [phone]    Narendra Reynolds (Son) -- -- [phone]              Current Facility-Administered Medications   Medication Dose Route Frequency Provider Last Rate Last Admin   • albuterol (PROVENTIL) nebulizer solution 0.083% 2.5 mg/3mL  2.5 mg Nebulization Q6H PRN Nancy, Sonya, APRN       • aspirin chewable tablet 81 mg  81 mg Oral Daily Nancy, Sonya, APRN   81 mg at 08/03/22 0904 "   • dextrose (D50W) (25 g/50 mL) IV injection 25 g  25 g Intravenous Q15 Min PRN Nancy, Sonya, APRN       • dextrose (GLUTOSE) oral gel 15 g  15 g Oral Q15 Min PRN Nancy, Sonya, APRN       • glucagon (human recombinant) (GLUCAGEN DIAGNOSTIC) injection 1 mg  1 mg Intramuscular Q15 Min PRN Nacny, Sonya, APRN       • Insulin Lispro (humaLOG) injection 0-7 Units  0-7 Units Subcutaneous 4x Daily With Meals & Nightly Alejo Lugo MD   5 Units at 08/02/22 2126   • levETIRAcetam (KEPPRA) tablet 500 mg  500 mg Oral BID Nancy, Sonya, APRN   500 mg at 08/03/22 0904   • Magnesium Sulfate 2 gram infusion - Mg less than or equal to 1.5 mg/dL  2 g Intravenous PRN Alejo Lugo MD        Or   • Magnesium Sulfate 1 gram infusion - Mg 1.6-1.9 mg/dL  1 g Intravenous PRN Alejo Lugo  mL/hr at 08/02/22 0754 1 g at 08/02/22 0754   • metoprolol succinate XL (TOPROL-XL) 24 hr tablet 50 mg  50 mg Oral Q12H Javad Mercedes MD   50 mg at 08/03/22 0904   • ondansetron (ZOFRAN) injection 4 mg  4 mg Intravenous Q6H PRN Alejo Lugo MD       • pantoprazole (PROTONIX) EC tablet 40 mg  40 mg Oral BID Nancy, Sonya, APRN   40 mg at 08/03/22 0904   • rosuvastatin (CRESTOR) tablet 10 mg  10 mg Oral Daily Nancy, Sonya, APRN   10 mg at 08/03/22 0904   • sacubitril-valsartan (ENTRESTO) 24-26 MG tablet 1 tablet  1 tablet Oral Q12H Javad Mercedes MD   1 tablet at 08/03/22 0904   • Sodium Chloride (PF) 0.9 % 10 mL  10 mL Intravenous PRN Geovani Ruffin MD       • sodium chloride 0.9 % flush 10 mL  10 mL Intravenous Q12H Nancy, Sonya, APRN   10 mL at 08/03/22 0905   • sodium chloride 0.9 % flush 10 mL  10 mL Intravenous PRN Sonya Cruz APRN       • sodium chloride 0.9 % flush 10 mL  10 mL Intravenous Q12H Alejo Lugo MD   10 mL at 08/03/22 0904   • sodium chloride 0.9 % flush 10 mL  10 mL Intravenous PRN Alejo Lugo MD       • tiotropium (SPIRIVA  RESPIMAT) 2.5 mcg/act aerosol solution inhaler  2 puff Inhalation Daily - RT Sonya Cruz APRN   2 puff at 22 0823     Lab Results (last 24 hours)     Procedure Component Value Units Date/Time    POC Glucose Once [[id number]]  (Normal) Collected: 22 0717    Specimen: Blood Updated: 22 0718     Glucose 104 mg/dL      Comment: Meter: [id number] : [id number] Alverto Nelsonyn       POC Glucose Once [[id number]]  (Abnormal) Collected: 22 204    Specimen: Blood Updated: 22 205     Glucose 314 mg/dL      Comment: Meter: [id number] : [id number] Amira Luceroell       POC Glucose Once [[id number]]  (Abnormal) Collected: 22 1609    Specimen: Blood Updated: 22 1612     Glucose 133 mg/dL      Comment: Meter: [id number] : [id number] Schickel Destini       POC Glucose Once [[id number]]  (Abnormal) Collected: 22 1121    Specimen: Blood Updated: 22 1123     Glucose 137 mg/dL      Comment: Meter: [id number] : [id number] Schickel Destini           Imaging Results (Last 24 Hours)     ** No results found for the last 24 hours. **        Operative/Procedure Notes (last 24 hours)  Notes from 22 1114 through 22 1114   No notes of this type exist for this encounter.            Physician Progress Notes (last 24 hours)      Siria Moreno MD at 22 1610              [facility] Medicine Services  PROGRESS NOTE    Patient Name: Narendra Cochran  : 1940  MRN: [id number]    Date of Admission: 2022  Primary Care Physician: Marguerite Moore, BHAVANI    Subjective   Subjective     CC:  F/U nausea, epigastric pain    HPI:  Patient seen this morning, feeling much better. No major complaints.    ROS:  Gen-no fevers, no chills  CV-no chest pain, no palpitations  Resp-no cough, no dyspnea  GI-no N/V/D, no abd pain    All other systems reviewed and negative except any additional pertinent positives and negatives as discussed in  HPI.      Objective   Objective     Vital Signs:   Temp:  [98 °F (36.7 °C)-98.7 °F (37.1 °C)] 98.4 °F (36.9 °C)  Heart Rate:  [58-80] 58  Resp:  [14-16] 16  BP: (128-153)/(59-72) 136/65     Physical Exam:  Gen-no acute distress  HENT-NCAT, mucous membranes moist  CV-RRR, S1 S2 normal, no m/r/g  Resp-CTAB, no wheezes or rales  Abd-soft, NT, ND, +BS  Ext-no edema  Neuro-A&Ox3, no focal deficits, hard of hearing  Skin-no rashes  Psych-appropriate mood      Results Reviewed:  LAB RESULTS:      Lab 08/02/22  0622 08/01/22 0727 07/29/22  1756 07/29/22  1600 07/29/22  1357   WBC 2.70* 3.11*  --   --  7.39   HEMOGLOBIN 10.8* 11.0*  --   --  17.1   HEMOGLOBIN, POC  --   --  17.7*  --   --    HEMATOCRIT 33.3* 33.6*  --   --  55.7*   HEMATOCRIT POC  --   --  52*  --   --    PLATELETS 58* 57*  --   --  103*   NEUTROS ABS  --  2.20  --   --  5.55   IMMATURE GRANS (ABS)  --  0.02  --   --  0.03   LYMPHS ABS  --  0.62*  --   --  1.51   MONOS ABS  --  0.23  --   --  0.28   EOS ABS  --  0.03  --   --  0.01   MCV 79.1 77.4*  --   --  82.0   LACTATE  --   --   --  1.9 2.9*         Lab 08/02/22  0622 08/01/22  0727 07/31/22  0425 07/30/22 1954 07/30/22  0612 07/30/22  0107 07/29/22  1948 07/29/22  1600 07/29/22  1357   SODIUM 137 137 134* 133* 129*  130*   < >  --    < >  --    POTASSIUM 4.0 4.3 4.4 4.5 5.6*  5.6*   < > 6.0*   < >  --    CHLORIDE 106 105 102 106 104  103   < >  --    < >  --    CO2 23.0 21.0* 24.0 19.0* 12.0*  14.0*   < >  --    < >  --    ANION GAP 8.0 11.0 8.0 8.0 13.0  13.0   < >  --    < >  --    BUN 19 34* 43* 51* 61*  61*   < >  --    < >  --    CREATININE 1.02 1.30* 1.58* 1.69* 1.98*  2.13*   < >  --    < >  --    EGFR 72.9 54.5* 43.1* 39.8* 32.9*  30.1*   < >  --    < >  --    GLUCOSE 93 104* 136* 126* 89  87   < >  --    < >  --    CALCIUM 8.7 8.6 8.8 8.1* 9.3  9.2   < >  --    < >  --    MAGNESIUM 1.7 2.1 2.0  --  1.7  --  1.9  --   --    PHOSPHORUS  --   --   --  2.7 3.7  --   --   --   --     HEMOGLOBIN A1C  --   --   --   --   --   --   --   --  6.50*    < > = values in this interval not displayed.         Lab 08/02/22  0622 08/01/22  0727 07/31/22  0425 07/30/22  0612 07/30/22  0107 07/29/22  1600 07/29/22  1357   TOTAL PROTEIN 5.9* 5.7* 5.5* 5.9*  --  7.7  --    ALBUMIN 2.90* 3.20* 3.30* 3.40* 3.1 4.00  --    GLOBULIN 3.0 2.5  --  2.5  --  3.7  --    ALT (SGPT) 14 10 5 5  --  7  --    AST (SGOT) 20 17 11 13  --  16  --    BILIRUBIN 0.4 0.4 0.6 0.6  --  0.6  --    INDIRECT BILIRUBIN  --   --  0.4  --   --   --   --    BILIRUBIN DIRECT  --   --  0.2  --   --   --   --    ALK PHOS 56 56 53 54  --  72  --    LIPASE 75* 84* 67* 62*  --   --  119*         Lab 07/29/22 1948   TROPONIN T 0.011                 Lab 07/29/22  2002   FIO2 21   CARBOXYHEMOGLOBIN (VENOUS) 1.0     Brief Urine Lab Results  (Last result in the past 365 days)      Color   Clarity   Blood   Leuk Est   Nitrite   Protein   CREAT   Urine HCG        07/30/22 1956             90.5               Microbiology Results Abnormal     Procedure Component Value - Date/Time    Eosinophil Smear - Urine, Urine, Clean Catch [[id number]]  (Normal) Collected: 07/30/22 1958    Lab Status: Final result Specimen: Urine, Clean Catch Updated: 07/30/22 2145     Eosinophil Smear 0 % EOS/100 Cells     Narrative:      No eosinophil seen    COVID PRE-OP / PRE-PROCEDURE SCREENING ORDER (NO ISOLATION) - Swab, Nasopharynx [[id number]]  (Normal) Collected: 07/29/22 2242    Lab Status: Final result Specimen: Swab from Nasopharynx Updated: 07/30/22 1726    Narrative:      The following orders were created for panel order COVID PRE-OP / PRE-PROCEDURE SCREENING ORDER (NO ISOLATION) - Swab, Nasopharynx.  Procedure                               Abnormality         Status                     ---------                               -----------         ------                     COVID-19, APTIMA PANTHER...[[id number]]  Normal              Final result                 Please  view results for these tests on the individual orders.    COVID-19, APTIMA PANTHER [facility] IN-HOUSE NP/OP SWAB IN UTM/VTM/SALINE TRANSPORT MEDIA 24HR TAT - Swab, Nasopharynx [[id number]]  (Normal) Collected: 07/29/22 2242    Lab Status: Final result Specimen: Swab from Nasopharynx Updated: 07/30/22 1726     COVID19 Not Detected    Narrative:      Fact sheet for providers: https://www.fda.gov/media/124905/download     Fact sheet for patients: https://www.fda.gov/media/145308/download    Test performed by RT PCR.          No radiology results from the last 24 hrs    Results for orders placed during the hospital encounter of 03/12/21    Adult Transthoracic Echo Complete W/ Cont if Necessary Per Protocol    Interpretation Summary  · Left ventricular systolic function is normal. Estimated left ventricular EF = 50%  · Left ventricular wall thickness is consistent with mild concentric hypertrophy.  · Left atrial volume is mildly increased.  · Moderate aortic valve regurgitation is present.  · Moderate mitral valve regurgitation is present.      I have reviewed the medications:  Scheduled Meds:aspirin, 81 mg, Oral, Daily  insulin lispro, 0-7 Units, Subcutaneous, 4x Daily With Meals & Nightly  levETIRAcetam, 500 mg, Oral, BID  metoprolol succinate XL, 50 mg, Oral, Q12H  pantoprazole, 40 mg, Oral, BID  rosuvastatin, 10 mg, Oral, Daily  sacubitril-valsartan, 1 tablet, Oral, Q12H  sodium chloride, 10 mL, Intravenous, Q12H  sodium chloride, 10 mL, Intravenous, Q12H  tiotropium bromide monohydrate, 2 puff, Inhalation, Daily - RT      Continuous Infusions:   PRN Meds:.•  albuterol  •  dextrose  •  dextrose  •  glucagon (human recombinant)  •  magnesium sulfate **OR** magnesium sulfate in D5W 1g/100mL (PREMIX)  •  ondansetron  •  Sodium Chloride (PF)  •  sodium chloride  •  sodium chloride    Assessment & Plan   Assessment & Plan     Active Hospital Problems    Diagnosis  POA   • Hyperkalemia [E87.5]  Yes   • Acute renal failure (ARF)  (HCC) [N17.9]  Unknown   • Hyponatremia [E87.1]  Unknown   • Chronic systolic congestive heart failure (HCC) [I50.22]  Yes   • GERD [K21.9]  Yes   • Coronary artery disease involving native coronary artery of native heart with angina pectoris (HCC) [I25.119]  Yes   • NICM  [I42.8]  Yes   • COPD (chronic obstructive pulmonary disease) (HCC) [J44.9]  Yes   • H/O seizures on Keppra [R56.9]  Yes   • Essential hypertension [I10]  Yes   • Hyperlipidemia LDL goal <70 [E78.5]  Yes   • T2DM [E11.65]  Yes      Resolved Hospital Problems   No resolved problems to display.        Brief Hospital Course to date:  Narendra Cochran is a 81 y.o. male with hx of DM2, HTN, HLD, diverticulosis, chronic systolic CHF/NICM, COPD, CAD, Paget's disease, seizure disorder, and GERD who presents due to epigastric pain, nausea, and generalized weakness. Labs revealed K 8.0, Cr 3.2, Na 127. CT A/P negative for acute process. Admitted for further management.     This patient's problems and plans were partially entered by my partner and updated as appropriate by me 08/02/22.    KENNEDY  Metabolic acidosis (non-gap)  Hyperkalemia  Hypontremia  -CT A/P: no obstructive uropathy  -Renal US: no obstructive uropathy  -Nephrology following; Improved with fluids/bicarb drip. Off bicarb drip since 7/31/22 morning  -Have been holding Entresto, Aldactone, Lasix, metformin since admission  -Cr trending down to 1.02 today  -K 4.0     Epigastric pain, improved  N/V/D, resolved  Elevated lipase, possible mild pancreatitis   -CT A/P negative acute process, GB and pancreas appeared normal  -Initially had lipase 119 then went down to 80; currently epigastric pain nearly resolved, tolerating diet better now. Perhaps had very subtle pancreatitis which did not show on imaging but symptoms dramatically improved now   -Continue PPI  -Continue antiemetics PRN     Thrombocytopenia, acute on chronic  -Previous labs 3 months ago platelets ranged ~91,000  -Trending down since  admission from 103,000 to 57,000  -Stable at 58,000 today  -SQH discontinued  -Monitor closely     Hx NICM (previous ICD due to prior cardiac arrest)  Valvular heart disease (moderate AI & MR)  Hx non-obstructive CAD  Question of recent Afib  HTN  HLD  -Echo March 2021: EF 50%, moderate AI & MR  -Continue ASA, Metoprolol, statin  -Cardiology following: resume Entresto today (okay with Nephrology), continue to hold Lasix and Aldactone  -Currently in sinus, monitoring on telemetry.     DM2 (HbA1c 6.5%)  -Holding Metformin  -SSI     Seizure disorder  -Continue Keppra     Generalized weakness  -PT/OTfollowing, per 7/30/22 note recommended inpatient rehab    Expected Discharge Location and Transportation: rehab vs home  Expected Discharge Date: 8/3/22    DVT prophylaxis:  Mechanical DVT prophylaxis orders are present.     AM-PAC 6 Clicks Score (PT): 17 (08/02/22 1202)    CODE STATUS:   Code Status and Medical Interventions:   Ordered at: 07/29/22 2011     Medical Intervention Limits:    NO intubation (DNI)     Level Of Support Discussed With:    Patient     Code Status (Patient has no pulse and is not breathing):    CPR (Attempt to Resuscitate)     Medical Interventions (Patient has pulse or is breathing):    Limited Support       Siria Moreno MD  08/02/22                Electronically signed by Siria Moreno MD at 08/02/22 1624       Consult Notes (last 24 hours)  Notes from 08/02/22 1115 through 08/03/22 1115   No notes of this type exist for this encounter.

## 2022-08-03 NOTE — DISCHARGE PLACEMENT REQUEST
"Case Management  [phone]    Narendra Reynolds (81 y.o. Male)             Date of Birth   1940    Social Security Number       Address   [address] [address]    Home Phone   [phone]    MRN   [id number]       Lutheran   Christianity    Marital Status                               Admission Date   22    Admission Type   Emergency    Admitting Provider   Siria Moreno MD    Attending Provider   Siria Moreno MD    Department, Room/Bed   [facility] 5G, S564/1       Discharge Date       Discharge Disposition       Discharge Destination                               Attending Provider: Siria Moreno MD    Allergies: No Known Allergies    Isolation: None   Infection: None   Code Status: CPR   Advance Care Planning Activity    Ht: 177.8 cm (70\")   Wt: 90.4 kg (199 lb 4.8 oz)    Admission Cmt: None   Principal Problem: None                Active Insurance as of 2022     Primary Coverage     Payor Plan Insurance Group Employer/Plan Group    [facility] MEDICARE REPLACEMENT [facility] [place]IC[place] [facility]     [place]or [place] Address Payor Plan Phone Number Payor Plan Fax Number Effective Dates    [address] [address] [phone]  2020 - None Entered    [facility] [id number]       Subscriber Name Subscriber Birth Date Member ID       NARENDRA REYNOLDS 1940 [id number]                 Emergency Contacts      (Rel.) Home Phone Work Phone Mobile Phone    Jessica Reynolds (Spouse) -- -- [phone]    Narendra Reynolds (Son) -- -- [phone]               History & Physical      Don Montero MD at 22 194              [facility] Medicine Services  HISTORY AND PHYSICAL    Patient Name: Narendra Reynolds  : 1940  MRN: [id number]  Primary Care Physician: Marguerite Moore PA-C  Date of admission: 2022    Subjective   Subjective     Chief Complaint:  Epigastric pain    HPI:  Narendra Reynolds is " a 81 y.o. male with a past medical history significant for diabetes mellitus type 2, diverticulosis, CHF, essential hypertension, hyperlipidemia and GERD presents to the ED accompanied by wife due to epigastric pain.  Patients wife at bedside provides most of the HPI.  She reports over the past week the patient has not been wanting to eat anything and will only drink liquids.  She states that she received a phone call from the cardiologist office check on the patient.  They had reviewed his monitor and he was having episodes of atrial fibrillation.  She explained that her  was too weak to come to the phone and has been in bed for the past week.  They advised to go to the ED for further evaluation.  Patient currently reports that he does not have much of an appetite.  He reports more epigastric pain but denies any nausea or vomiting.  He does have chronic constipation and his wife had been giving him stool softeners therefore he did have 1-2 days of diarrhea that has since resolved.  He denies any fever, chills, shortness of air, cough, melena, dysuria, hematuria, cough or chest pain.  He does however report his urine output has decreased.  Upon arrival to the ED lab work obtained revealed a potassium of 8.0.  Patients wife reports on July 5th he did have his potassium increased to 20 mg BID due to hypokalemia.  Patient will be admitted to [facility] under the care of the Hospitalist for further evaluation and treatment.         Review of Systems   Constitutional: Positive for activity change, appetite change and fatigue. Negative for chills, diaphoresis, fever and unexpected weight change.   HENT: Negative.    Eyes: Negative for photophobia and visual disturbance.   Respiratory: Negative for cough and shortness of breath.    Cardiovascular: Negative for chest pain, palpitations and leg swelling.   Gastrointestinal: Positive for abdominal pain and diarrhea. Negative for abdominal distention, blood in stool,  constipation, nausea and vomiting.   Genitourinary: Positive for decreased urine volume. Negative for difficulty urinating, dysuria, flank pain and frequency.   Musculoskeletal: Negative for neck pain and neck stiffness.   Skin: Negative.    Neurological: Positive for weakness. Negative for dizziness, light-headedness, numbness and headaches.   Psychiatric/Behavioral: Negative.         All other systems reviewed and are negative.     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   • Paget disease of bone              Past Surgical History:   Procedure Laterality Date   • APPENDECTOMY     • CARDIAC CATHETERIZATION N/A 3/18/2020    Procedure: Left Heart Cath;  Surgeon: Sonya Gariaby MD;  Location:  [facility]AS[facility];  Service: Cardiology;  Laterality: N/A;  First availabel provider     • CARDIAC CATHETERIZATION N/A 3/15/2021    Procedure: LEFT HEART CATH;  Surgeon: Doc Sahni IV, MD;  Location:  [facility]VAS[facility];  Service: Cardiovascular;  Laterality: N/A;   • CARDIAC CATHETERIZATION N/A 3/15/2021    Procedure: Coronary angiography;  Surgeon: Doc Sahni IV, MD;  Location:  [facility]ATH [facility]VASIVE LOC[facility];  Service: Cardiovascular;  Laterality: N/A;   • CARDIAC ELECTROPHYSIOLOGY PROCEDURE N/A 3/16/2021    Procedure: ICD DC new;  Surgeon: Som Boyd DO;  Location:  [facility] EP INVASIVE LOCATION;  Service: Cardiology;  Laterality: N/A;   • COLON RESECTION      second to diverticulitis    • FOOT SURGERY Left        Family History:  family history includes Clotting disorder in his sister; Diabetes in his  sister; Fainting in his brother; Hyperlipidemia in his mother; No Known Problems in his brother, daughter, sister, son, son, and son. Otherwise pertinent FHx was reviewed and unremarkable.     Social History:  reports that he quit smoking about 2 years ago. His smoking use included cigars, cigarettes, cigarettes, and cigars. He has a 16.25 pack-year smoking history. He has never used smokeless tobacco. He reports current alcohol use. He reports current drug use. Drug: Marijuana.  Social History     Social History Narrative    Caffeine coffee occassionaly 1 serving    Caffeine 0       Medications:  Blood Pressure Monitor/L Cuff, OneTouch Delica Plus [id number], albuterol sulfate HFA, aspirin, diclofenac, furosemide, glucose blood, glucose monitor, ketorolac, levETIRAcetam, metFORMIN ER, metoprolol succinate XL, pantoprazole, potassium chloride ER, rosuvastatin, sacubitril-valsartan, spironolactone, and tiotropium bromide monohydrate    No Known Allergies    Objective   Objective     Vital Signs:   Temp:  [97.9 °F (36.6 °C)-98.9 °F (37.2 °C)] 97.9 °F (36.6 °C)  Heart Rate:  [61-89] 87  Resp:  [19-20] 19  BP: ()/(40-70) 109/67    Physical Exam  Vitals and nursing note reviewed.   Constitutional:       General: He is not in acute distress.     Appearance: Normal appearance. He is normal weight. He is not ill-appearing, toxic-appearing or diaphoretic.   HENT:      Head: Normocephalic and atraumatic.      Nose: Nose normal.      Mouth/Throat:      Mouth: Mucous membranes are dry.      Pharynx: Oropharynx is clear.   Eyes:      Extraocular Movements: Extraocular movements intact.      Conjunctiva/sclera: Conjunctivae normal.      Pupils: Pupils are equal, round, and reactive to light.   Cardiovascular:      Rate and Rhythm: Normal rate and regular rhythm.      Pulses: Normal pulses.      Heart sounds: Normal heart sounds.   Pulmonary:      Effort: Pulmonary effort is normal.      Breath sounds: Normal breath sounds.    Abdominal:      General: Bowel sounds are normal. There is no distension.      Palpations: Abdomen is soft. There is no mass.      Tenderness: There is abdominal tenderness. There is no right CVA tenderness, left CVA tenderness, guarding or rebound.      Hernia: No hernia is present.      Comments: Epigastric tenderness    Musculoskeletal:         General: No swelling, tenderness, deformity or signs of injury. Normal range of motion.      Cervical back: Normal range of motion and neck supple.      Right lower leg: No edema.      Left lower leg: No edema.   Skin:     General: Skin is warm and dry.   Neurological:      General: No focal deficit present.      Mental Status: He is alert and oriented to person, place, and time. Mental status is at baseline.   Psychiatric:         Mood and Affect: Mood normal.         Behavior: Behavior normal.         Thought Content: Thought content normal.         Judgment: Judgment normal.          Results Reviewed:  I have personally reviewed most recent indicated data and agree with findings including:  [x]  Laboratory  [x]  Radiology  [x]  EKG/Telemetry  []  Pathology  []  Cardiac/Vascular Studies  []  Old records  []  Other:  Most pertinent findings include:      LAB RESULTS:      Lab 07/29/22 1756 07/29/22 1600 07/29/22  1357   WBC  --   --  7.39   HEMOGLOBIN  --   --  17.1   HEMOGLOBIN, POC 17.7*  --   --    HEMATOCRIT  --   --  55.7*   HEMATOCRIT POC 52*  --   --    PLATELETS  --   --  103*   NEUTROS ABS  --   --  5.55   IMMATURE GRANS (ABS)  --   --  0.03   LYMPHS ABS  --   --  1.51   MONOS ABS  --   --  0.28   EOS ABS  --   --  0.01   MCV  --   --  82.0   LACTATE  --  1.9 2.9*         Lab 07/29/22 1756 07/29/22  1600   SODIUM  --  127*   POTASSIUM  --  8.0*   CHLORIDE  --  101   CO2  --  14.0*   ANION GAP  --  12.0   BUN  --  73*   CREATININE 3.20* 3.15*   EGFR 18.5* 18.8*   GLUCOSE  --  116*   CALCIUM  --  10.1         Lab 07/29/22  1600 07/29/22  1357   TOTAL PROTEIN  7.7  --    ALBUMIN 4.00  --    GLOBULIN 3.7  --    ALT (SGPT) 7  --    AST (SGOT) 16  --    BILIRUBIN 0.6  --    ALK PHOS 72  --    LIPASE  --  119*                     Brief Urine Lab Results  (Last result in the past 365 days)      Color   Clarity   Blood   Leuk Est   Nitrite   Protein   CREAT   Urine HCG        07/29/22 1650 Yellow   Clear   Negative   Negative   Negative   100 mg/dL (2+)               Microbiology Results (last 10 days)     ** No results found for the last 240 hours. **          CT Abdomen Pelvis Without Contrast    Result Date: 7/29/2022  DATE OF EXAM: 7/29/2022 2:48 PM  PROCEDURE: CT ABDOMEN PELVIS WO CONTRAST-  INDICATIONS: abd pain  COMPARISON: 1/27/2021  TECHNIQUE: Routine transaxial slices were obtained through the abdomen and pelvis without the administration of intravenous contrast. Reconstructed coronal and sagittal images were also obtained. Automated exposure control and iterative construction methods were used.  The radiation dose reduction device was turned on for each scan per the ALARA (As Low as Reasonably Achievable) protocol.  FINDINGS: There are emphysematous changes in the lung bases. There is a transvenous pacemaker in place. The liver, gallbladder, spleen, adrenal glands and pancreas are normal. There are multiple bilateral cystic lesions in the kidneys. No definite solid renal masses are seen. There is no evidence of hydronephrosis or obstruction. There is a periumbilical hernia containing fat appears unchanged from the previous CT. There is aneurysmal dilatation of the right common iliac artery measuring up to 2.8 cm. This is not changed from the last study. There is also dilatation of the left iliac artery measuring 1.7 cm which has not changed. There are no dilated or thickened loops of small or large bowel identified. The bladder is unremarkable. The prostate is enlarged. Scans to the osseous elements show extensive cortical bone week thickening particularly in the  left ilium clearly involving the left hip and femoral neck and the right and left pubic rami consistent with Paget's disease. In the lumbar region there is some mild degenerative disc disease. There also appears to be pagetoid involvement of the L5 level      Impression:  1. No acute findings in the abdomen or pelvis. 2. Multiple bilateral renal cysts. 3. Bilateral iliac artery aneurysms. 4. Extensive bone abnormalities most likely reflecting Paget's disease 5. Stable periumbilical hernia 6. Enlarged prostate  This report was finalized on 7/29/2022 3:20 PM by Brent Cason MD.        Results for orders placed during the hospital encounter of 03/12/21    Adult Transthoracic Echo Complete W/ Cont if Necessary Per Protocol    Interpretation Summary  · Left ventricular systolic function is normal. Estimated left ventricular EF = 50%  · Left ventricular wall thickness is consistent with mild concentric hypertrophy.  · Left atrial volume is mildly increased.  · Moderate aortic valve regurgitation is present.  · Moderate mitral valve regurgitation is present.      Assessment & Plan   Assessment & Plan       Essential hypertension    Hyperlipidemia LDL goal <70    T2DM    H/O seizures on Keppra    COPD (chronic obstructive pulmonary disease) (HCC)    Coronary artery disease involving native coronary artery of native heart with angina pectoris (HCC)    NICM     Chronic systolic congestive heart failure (HCC)    GERD    Hyperkalemia    Acute renal failure (ARF) (HCC)    Hyponatremia    81 year old male presents to the ED with abdominal pain and generalized weakness that has progressively worsened over the past week.     1) Hyperkalemia      ARF      Hyponatremia  -potassium 8.0 on arrival, Creatinine 3.15  -s/p bicarb, lokelma, calcium gluconate, D50 and insulin in the ED  -repeat potassium 6.0.  Will repeat above meds.  Hold on nephrology consult until AM since K+ down to 6.0.  IVF low rate with h/o CHF.  Check renal  u/s  -check magnesium now along with VBG  -EKG with noted changes in lateral leads  -consult cardiology in am  -consult nephrology in AM   -hold nephrotoxic agents  -s/p 1L normal saline in the ED  -trend EKG   -trend sodium     2) Epigastric pain   -CT of abdomen and pelvis noted above  -will start PPI   -pain control   -prn anti-emetics     3) Non-ischemic cardiomyopathy       Systolic congestive heart failure  -last echo 11/13/19 EF 60% with mild LVH, mild MR, mild to mod AR  -s/p ICD implant 3/16/21  -[facility] march 2020 with mild nonobstructive CAD  -continue entresto, toprol  -on spironolactone and lasix: hold for now  -cardiology to see in am     4) Diabetes mellitus type 2  -check hgb A1c   -start ldssi   -fingersticks achs     5) Essential hypertension   -stable   -continue home  Lopressor    6) Hyperlipidemia  -continue statin     7) Seizure disorder   -continue keppra        DVT prophylaxis: scds    CODE STATUS:  CPR only no intubation       This note has been completed as part of a split-shared workflow.     Signature: Electronically signed by LEANDRO Sorenson, 07/29/22, 8:04 PM EDT.      Attending   Admission Attestation       I have seen and examined the patient, performing an independent face-to-face diagnostic evaluation with plan of care reviewed and developed with the advanced practice clinician (APC).      Brief Summary Statement:   Narendra Cochran is a 81 y.o. male with h/o CHF, T2DM, HTN, HL, GERD, and Paget's disease, and CKD with epigastric pain and poor PO x about 1 week.  Per wife initially was constipated so she gave him laxatives which then caused diarrhea for a couple of times a day x a couple of days but now resolved.  Per wife the cardiologist office called d/t concern of episodes of afib on his monitor and was instructed to go to the ER.  In the ER he was found to have KENNEDY with hyperkalemia with K+ of 8.0 and a metabolic acidosis.  Denies any CP or SOA.  Note that patient is on  spironolactone and lasix for his CHF and PCP increased his KCl to 20mg BID on .  Remainder of detailed HPI is as noted by APC and has been reviewed and/or edited by me for completeness.    Attending Physical Exam:  Constitutional: Awake, alert, wife at bedside  Eyes: PERRLA, sclerae anicteric, no conjunctival injection  HENT: NCAT, mucous membranes dry, very hard of hearing  Neck: Supple, no thyromegaly, no lymphadenopathy, trachea midline  Respiratory: Clear to auscultation bilaterally, nonlabored respirations   Cardiovascular: RRR, no murmurs, rubs, or gallops, palpable pedal pulses bilaterally  Gastrointestinal: Positive bowel sounds, soft, nontender, nondistended  Musculoskeletal: No bilateral ankle edema, no clubbing or cyanosis to extremities  Psychiatric: Appropriate affect, cooperative  Neurologic: Oriented x 3, strength symmetric in all extremities, Cranial Nerves grossly intact to confrontation, speech clear  Skin: No rashes      Brief Assessment/Plan :  See detailed assessment and plan developed with APC which I have reviewed and/or edited for completeness.        Admission Status: I believe that this patient meets INPATIENT status due to hyperkalemia and KENNEDY.  I feel patient’s risk for adverse outcomes and need for care warrant INPATIENT evaluation and I predict the patient’s care encounter to likely last beyond 2 midnights.        Don Montero MD  22                              Electronically signed by Don Montero MD at 22 [phone]          Physical Therapy Notes (most recent note)      Ijeoma Weiner at 22 1050  Version 1 of 1         Patient Name: Narendra Cochran  : 1940    MRN: [id number]                              Today's Date: 2022       Admit Date: 2022    Visit Dx:     ICD-10-CM ICD-9-CM   1. Hyperkalemia  E87.5 276.7   2. Acute kidney injury (nontraumatic) (HCC)  N17.9 584.9   3. Upper abdominal pain  R10.10 789.09     Patient Active Problem List    Diagnosis   • Essential hypertension   • Hyperlipidemia LDL goal <70   • T2DM   • Paget disease of bone   • Tobacco abuse   • H/O seizures on Keppra   • Gonalgia   • Osteitis deformans   • COPD (chronic obstructive pulmonary disease) ([facility])   • Syncope   • NSVT (nonsustained ventricular tachycardia) ([facility])   • Coronary artery disease involving native coronary artery of native heart with angina pectoris ([facility])   • NICM    • OHCA   • Hypokalemia   • Hypomagnesemia   • VHD; mild-mod AR, mild MR   • Chronic systolic congestive heart failure ([facility])   • Former tobacco user   • GERD   • Marijuana use   • Frequent PVCs   • Acute blood loss anemia   • Presence of biventricular implantable cardioverter-defibrillator (ICD)   • Stage 3a chronic kidney disease ([facility])   • Hyperkalemia   • Acute renal failure (ARF) ([facility])   • Hyponatremia     Past Medical History:   Diagnosis Date   • Arthritis    • CHF (congestive heart failure) ([facility])    • Diabetes mellitus ([facility])    • Diabetic foot ulcers ([facility])    • Diverticulitis    • Foot callus    • GERD (gastroesophageal reflux disease)    • Hammer toes, bilateral    • Hearing loss    • History of transfusion    • Hyperlipidemia    • Hypertension    • Hypertensive urgency, 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   • Paget disease of bone      Past Surgical History:   Procedure Laterality Date   • APPENDECTOMY     • CARDIAC CATHETERIZATION N/A 3/18/2020    Procedure: Left Heart Cath;  Surgeon: Sonya Garibay MD;  Location:  [facility];  Service: Cardiology;  Laterality: N/A;  First availabel provider     • CARDIAC CATHETERIZATION N/A 3/15/2021    Procedure: LEFT HEART CATH;  Surgeon: Doc Sahni IV, MD;  Location:  [facility] CATH INVASIVE LOCATION;  Service: Cardiovascular;  Laterality: N/A;   • CARDIAC CATHETERIZATION N/A 3/15/2021    Procedure: Coronary angiography;  Surgeon: Doc Sahni IV, MD;  Location:  [facility] CATH INVASIVE LOCATION;  Service: Cardiovascular;   Laterality: N/A;   • CARDIAC ELECTROPHYSIOLOGY PROCEDURE N/A 3/16/2021    Procedure: ICD DC new;  Surgeon: Som Boyd DO;  Location: [facility] INVASIVE LOCATION;  Service: Cardiology;  Laterality: N/A;   • COLON RESECTION      second to diverticulitis    • FOOT SURGERY Left       General Information     Row Name 08/02/22 1156          Physical Therapy Time and Intention    Document Type therapy note (daily note)  -KG     Mode of Treatment physical therapy  -KG     Row Name 08/02/22 1156          General Information    Patient Profile Reviewed yes  -KG     Existing Precautions/Restrictions fall  jerking movements mobility  -KG     Row Name 08/02/22 1156          Cognition    Orientation Status (Cognition) oriented x 4  -KG     Row Name 08/02/22 1156          Safety Issues, Functional Mobility    Impairments Affecting Function (Mobility) balance;endurance/activity tolerance;pain;postural/trunk control;strength  -KG           User Key  (r) = Recorded By, (t) = Taken By, (c) = Cosigned By    Initials Name Provider Type    KG Ijeoma Weiner Physical Therapist               Mobility     Row Name 08/02/22 1156          Bed Mobility    Bed Mobility supine-sit  -KG     Supine-Sit [place] (Bed Mobility) minimum assist (75% patient effort)  -KG     Assistive Device (Bed Mobility) bed rails;head of bed elevated  -KG     Comment, (Bed Mobility) min-A for supine to sit, able to sit independently EOB but requires min-A for dynamic sitting EOB  -KG     Row Name 08/02/22 1156          Transfers    Comment, (Transfers) CGA, no jerking movements present today  -KG     Row Name 08/02/22 1156          Sit-Stand Transfer    Sit-Stand [place] (Transfers) verbal cues;contact guard;2 person assist  -KG     Assistive Device (Sit-Stand Transfers) walker, front-wheeled  -KG     Row Name 08/02/22 1156          Gait/Stairs (Locomotion)    [place] Level (Gait) contact guard;2 person assist  -KG     Assistive Device (Gait)  walker, front-wheeled  -KG     Distance in Feet (Gait) 110  -KG     Deviations/Abnormal Patterns (Gait) gait speed decreased  -KG     Bilateral Gait Deviations forward flexed posture;heel strike decreased  -KG     Comment, (Gait/Stairs) Pt able to ambulate 110', CGA x2 due to mild unsteadiness, cues for AD on turns, limited by fatigue, no jerking episodes present during session/ ambulation  -KG           User Key  (r) = Recorded By, (t) = Taken By, (c) = Cosigned By    Initials Name Provider Type    Ijeoma Carvajal Physical Therapist               Obj/Interventions     Row Name 08/02/22 1159          Motor Skills    Therapeutic Exercise knee;ankle  -KG     Row Name 08/02/22 1159          Knee (Therapeutic Exercise)    Knee (Therapeutic Exercise) strengthening exercise  -KG     Knee Strengthening (Therapeutic Exercise) bilateral;SLR (straight leg raise);LAQ (long arc quad);15 repititions  -KG     Row Name 08/02/22 1159          Ankle (Therapeutic Exercise)    Ankle (Therapeutic Exercise) strengthening exercise  -KG     Ankle Strengthening (Therapeutic Exercise) bilateral;dorsiflexion;plantarflexion;15 repititions  -KG     Row Name 08/02/22 1159          Balance    Balance Assessment standing dynamic balance  -KG     Static Standing Balance contact guard  -KG     Dynamic Standing Balance contact guard  -KG     Position/Device Used, Standing Balance supported;walker, rolling  -KG     Balance Interventions standing;sit to stand;weight shifting activity;single limb stance  -KG           User Key  (r) = Recorded By, (t) = Taken By, (c) = Cosigned By    Initials Name Provider Type    Ijeoma Carvajal Physical Therapist               Goals/Plan    No documentation.                Clinical Impression     Row Name 08/02/22 1201          Pain    Pretreatment Pain Rating 0/10 - no pain  -KG     Posttreatment Pain Rating 0/10 - no pain  -KG     [facility] Name 08/02/22 1201          Plan of Care Review    Plan of Care Reviewed  With patient  -KG     Progress improving  -KG     Outcome Evaluation Pt with good improvement.  Pt able to ambulate 110', CGA x2, FWW due to mild unsteadiness, cues for AD on turns, limited by fatigue, no jerking episodes present during session/ ambulation  -KG     Row Name 08/02/22 1201          Vital Signs    Pre Systolic BP Rehab 129  -KG     Pre Treatment Diastolic BP 72  -KG     Post Systolic BP Rehab 133  -KG     Post Treatment Diastolic BP 59  -KG     Row Name 08/02/22 1201          Positioning and Restraints    Pre-Treatment Position in bed  -KG     Post Treatment Position chair  -KG     In Chair notified nsg;call light within reach;encouraged to call for assist;exit alarm on;waffle cushion;legs elevated  -KG           User Key  (r) = Recorded By, (t) = Taken By, (c) = Cosigned By    Initials Name Provider Type    Ijeoma Carvajal Physical Therapist               Outcome Measures     Row Name 08/02/22 1202          How much help from another person do you currently need...    Turning from your back to your side while in flat bed without using bedrails? 3  -KG     Moving from lying on back to sitting on the side of a flat bed without bedrails? 3  -KG     Moving to and from a bed to a chair (including a wheelchair)? 3  -KG     Standing up from a chair using your arms (e.g., wheelchair, bedside chair)? 3  -KG     Climbing 3-5 steps with a railing? 2  -KG     To walk in hospital room? 3  -KG     AM-PAC 6 Clicks Score (PT) 17  -KG     Highest level of mobility 5 --> Static standing  -KG     Row Name 08/02/22 1202          Functional Assessment    Outcome Measure Options AM-PAC 6 Clicks Basic Mobility (PT)  -KG           User Key  (r) = Recorded By, (t) = Taken By, (c) = Cosigned By    Initials Name Provider Type    Ijeoma Carvajal Physical Therapist                             Physical Therapy Education                 Title: PT OT SLP Therapies (In Progress)     Topic: Physical Therapy (Done)     Point:  Mobility training (Done)     Learning Progress Summary           Patient Acceptance, E,TB, VU,NR by KG at 8/2/2022 1203    Acceptance, E, VU,NR by LM at 7/30/2022 1408                   Point: Home exercise program (Done)     Learning Progress Summary           Patient Acceptance, E,TB, VU,NR by KG at 8/2/2022 1203                   Point: Body mechanics (Done)     Learning Progress Summary           Patient Acceptance, E,TB, VU,NR by KG at 8/2/2022 1203                   Point: Precautions (Done)     Learning Progress Summary           Patient Acceptance, E, VU,NR by LM at 7/30/2022 1408                               User Key     Initials Effective Dates Name Provider Type Discipline     06/16/21 -  Марина Watters, PT Physical Therapist PT     06/16/21 -  Ijeoma Weiner Physical Therapist PT              PT Recommendation and Plan     Plan of Care Reviewed With: patient  Progress: improving  Outcome Evaluation: Pt with good improvement.  Pt able to ambulate 110', CGA x2, FWW due to mild unsteadiness, cues for AD on turns, limited by fatigue, no jerking episodes present during session/ ambulation     Time Calculation:    PT Charges     Row Name 08/02/22 1203             Time Calculation    Start Time 1050  -KG      PT Received On 08/02/22  -KG      PT Goal Re-Cert Due Date 08/09/22  -KG              Time Calculation- PT    Total Timed Code Minutes- PT 30 minute(s)  -KG              Timed Charges    08021 - PT Therapeutic Exercise Minutes 10  -KG      51709 - Gait Training Minutes  20  -KG              Total Minutes    Timed Charges Total Minutes 30  -KG       Total Minutes 30  -KG            User Key  (r) = Recorded By, (t) = Taken By, (c) = Cosigned By    Initials Name Provider Type    KG Ijeoma Weiner Physical Therapist              Therapy Charges for Today     Code Description Service Date Service Provider Modifiers Qty    03185831331 HC PT THER PROC EA 15 MIN 8/2/2022 Ijeoma Weiner GP 1     38315220691 HC GAIT TRAINING EA 15 MIN 2022 Ijeoma Weiner GP 1    21476442052 HC PT THER SUPP EA 15 MIN 2022 Ijeoma Weiner GP 2          PT G-Codes  Outcome Measure Options: AM-PAC 6 Clicks Basic Mobility (PT)  AM-PAC 6 Clicks Score (PT): 17  AM-PAC 6 Clicks Score (OT): 18    Ijeoma Weiner  2022      Electronically signed by Ijeoma Weiner at 22 1204          Occupational Therapy Notes (most recent note)      Francia Yao, OT at 22 0959          Patient Name: Narendra Cochran  : 1940    MRN: [id number]                              Today's Date: 2022       Admit Date: 2022    Visit Dx:     ICD-10-CM ICD-9-CM   1. Hyperkalemia  E87.5 276.7   2. Acute kidney injury (nontraumatic) ([facility])  N17.9 584.9   3. Upper abdominal pain  R10.10 789.09     Patient Active Problem List   Diagnosis   • Essential hypertension   • Hyperlipidemia LDL goal <70   • T2DM   • Paget disease of bone   • Tobacco abuse   • H/O seizures on Keppra   • Gonalgia   • Osteitis deformans   • COPD (chronic obstructive pulmonary disease) ([facility])   • Syncope   • NSVT (nonsustained ventricular tachycardia) ([facility])   • Coronary artery disease involving native coronary artery of native heart with angina pectoris ([facility])   • NICM    • OHCA   • Hypokalemia   • Hypomagnesemia   • VHD; mild-mod AR, mild MR   • Chronic systolic congestive heart failure ([facility])   • Former tobacco user   • GERD   • Marijuana use   • Frequent PVCs   • Acute blood loss anemia   • Presence of biventricular implantable cardioverter-defibrillator (ICD)   • Stage 3a chronic kidney disease ([facility])   • Hyperkalemia   • Acute renal failure (ARF) ([facility])   • Hyponatremia     Past Medical History:   Diagnosis Date   • Arthritis    • CHF (congestive heart failure) ([facility])    • Diabetes mellitus ([facility])    • Diabetic foot ulcers ([facility])    • Diverticulitis    • Foot callus    • GERD (gastroesophageal reflux disease)    • Hammer toes, bilateral    • Hearing  loss    • History of transfusion    • Hyperlipidemia    • Hypertension    • Hypertensive urgency, 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   • Paget disease of bone      Past Surgical History:   Procedure Laterality Date   • APPENDECTOMY     • CARDIAC CATHETERIZATION N/A 3/18/2020    Procedure: Left Heart Cath;  Surgeon: Sonya Garibay MD;  Location:  [facility];  Service: Cardiology;  Laterality: N/A;  First availabel provider     • CARDIAC CATHETERIZATION N/A 3/15/2021    Procedure: LEFT HEART CATH;  Surgeon: Doc Sahni IV, MD;  Location:  [facility];  Service: Cardiovascular;  Laterality: N/A;   • CARDIAC CATHETERIZATION N/A 3/15/2021    Procedure: Coronary angiography;  Surgeon: Doc Sahni IV, MD;  Location:  [facility];  Service: Cardiovascular;  Laterality: N/A;   • CARDIAC ELECTROPHYSIOLOGY PROCEDURE N/A 3/16/2021    Procedure: ICD DC new;  Surgeon: Som Boyd DO;  Location:  [facility];  Service: Cardiology;  Laterality: N/A;   • COLON RESECTION      second to diverticulitis    • FOOT SURGERY Left       General Information     Row Name 08/01/22 0959          OT Time and Intention    Document Type evaluation  -AC     Mode of Treatment occupational therapy  -     Row Name 08/01/22 0959          General Information    Patient Profile Reviewed yes  -AC     Prior Level of Function independent:;all household mobility;ADL's;dependent:;driving  use a QC for community mobility  -     Existing Precautions/Restrictions fall  jerking movements with mobility  -     Barriers to Rehab hearing deficit  -     Row Name 08/01/22 0959          Occupational Profile    Environmental Supports and Barriers (Occupational Profile) walk in shower  -     Row Name 08/01/22 0959          Living Environment    People in Home spouse  -     Row Name 08/01/22 0959          Home Main Entrance    Number of Stairs, Main Entrance none  -AC     Stair  Railings, Main Entrance none  -     Row Name 08/01/22 0959          Stairs Within Home, Primary    Number of Stairs, Within Home, Primary none  -     Row Name 08/01/22 0959          Cognition    Orientation Status (Cognition) oriented x 4  -     Row Name 08/01/22 0959          Safety Issues, Functional Mobility    Safety Issues Affecting Function (Mobility) insight into deficits/self-awareness;safety precaution awareness;safety precautions follow-through/compliance;sequencing abilities  -     Impairments Affecting Function (Mobility) balance;endurance/activity tolerance;pain;postural/trunk control;strength  -           User Key  (r) = Recorded By, (t) = Taken By, (c) = Cosigned By    Initials Name Provider Type     Francia Yao OT Occupational Therapist                 Mobility/ADL's     Row Name 08/01/22 1044          Bed Mobility    Bed Mobility supine-sit;sit-supine  -     Supine-Sit [place] (Bed Mobility) minimum assist (75% patient effort)  [facility]     Sit-Supine [place] (Bed Mobility) supervision  -     Assistive Device (Bed Mobility) bed rails;head of bed elevated  -     Row Name 08/01/22 1044          Transfers    Transfers sit-stand transfer;stand-sit transfer  -     Sit-Stand [place] (Transfers) verbal cues;contact guard;2 person assist  -     Stand-Sit [place] (Transfers) minimum assist (75% patient effort);2 person assist  -[facility] Name 08/01/22 1044          Sit-Stand Transfer    Assistive Device (Sit-Stand Transfers) walker, front-wheeled  -     Row Name 08/01/22 1044          Stand-Sit Transfer    Comment, (Stand-Sit Transfer) slight LOB when turning to sit in chair  -     Row Name 08/01/22 1044          Functional Mobility    Functional Mobility- Ind. Level contact guard assist;2 person assist required  -     Functional Mobility- Device walker, front-wheeled  -     Functional Mobility-Distance (Feet) 100  -     Row Name 08/01/22 1044           Activities of Daily Living    BADL Assessment/Intervention lower body dressing;upper body dressing  -     Row Name 08/01/22 1044          Lower Body Dressing Assessment/Training    [place] Level (Lower Body Dressing) don;socks;verbal cues;minimum assist (75% patient effort)  -     Position (Lower Body Dressing) edge of bed sitting  -     Row Name 08/01/22 1044          Upper Body Dressing Assessment/Training    [place] Level (Upper Body Dressing) don;front opening garment;minimum assist (75% patient effort)  -     Position (Upper Body Dressing) edge of bed sitting  -           User Key  (r) = Recorded By, (t) = Taken By, (c) = Cosigned By    Initials Name Provider Type    Francia Solares, OT Occupational Therapist               Obj/Interventions     Row Name 08/01/22 1046          Sensory Assessment (Somatosensory)    Sensory Assessment (Somatosensory) UE sensation intact  -     Row Name 08/01/22 1046          Vision Assessment/Intervention    Visual Impairment/Limitations WFL  recent cataract surgery and does not need glasses all the time, but plans to get readers  -     Row Name 08/01/22 1046          Range of Motion Comprehensive    General Range of Motion bilateral upper extremity ROM WNL  -     Row Name 08/01/22 1046          Strength Comprehensive (MMT)    Comment, General Manual Muscle Testing (MMT) Assessment BUE grossly 4/5  -           User Key  (r) = Recorded By, (t) = Taken By, (c) = Cosigned By    Initials Name Provider Type    Francia Solares, OT Occupational Therapist               Goals/Plan     Row Name 08/01/22 1052          Bed Mobility Goal 1 (OT)    Activity/Assistive Device (Bed Mobility Goal 1, OT) supine to sit  -     [place] Level/Cues Needed (Bed Mobility Goal 1, OT) modified independence  -AC     Time Frame (Bed Mobility Goal 1, OT) by discharge  -     Progress/Outcomes (Bed Mobility Goal 1, OT) goal ongoing  -     Row Name 08/01/22 1052           Transfer Goal 1 (OT)    Activity/Assistive Device (Transfer Goal 1, OT) bed-to-chair/chair-to-bed;toilet;walker, rolling  -AC     [place] Level/Cues Needed (Transfer Goal 1, OT) supervision required  -AC     Time Frame (Transfer Goal 1, OT) by discharge  -AC     Progress/Outcome (Transfer Goal 1, OT) goal ongoing  -     Row Name 08/01/22 1052          Dressing Goal 1 (OT)    Activity/Device (Dressing Goal 1, OT) lower body dressing  -AC     [place]/Cues Needed (Dressing Goal 1, OT) set-up required;supervision required  -AC     Time Frame (Dressing Goal 1, OT) by discharge  -AC     Progress/Outcome (Dressing Goal 1, OT) goal ongoing  -     Row Name 08/01/22 1052          Toileting Goal 1 (OT)    Activity/Device (Toileting Goal 1, OT) adjust/manage clothing;perform perineal hygiene  -AC     [place] Level/Cues Needed (Toileting Goal 1, OT) supervision required  -AC     Time Frame (Toileting Goal 1, OT) by discharge  -AC     Progress/Outcome (Toileting Goal 1, OT) goal ongoing  -     Row Name 08/01/22 1052          Therapy Assessment/Plan (OT)    Planned Therapy Interventions (OT) activity tolerance training;BADL retraining;functional balance retraining;patient/caregiver education/training;strengthening exercise;transfer/mobility retraining  -AC           User Key  (r) = Recorded By, (t) = Taken By, (c) = Cosigned By    Initials Name Provider Type    AC Francia Yao OT Occupational Therapist               Clinical Impression     Row Name 08/01/22 1047          Pain Assessment    Pretreatment Pain Rating 6/10  -AC     Posttreatment Pain Rating 6/10  -AC     Pain Location - abdomen  -AC     Pain Intervention(s) Repositioned;Ambulation/increased activity  -     Row Name 08/01/22 1047          Plan of Care Review    Plan of Care Reviewed With patient;spouse  -AC     Outcome Evaluation OT eval complete.  Pt is [place] and presents with with weakness, decreased balance and activity tolerance limiting  independence with self care. Pt min A supine to sit, min A to don socks, min A to don hospital gown, CGA x 2 STS,  CGA x 2 to ambulate 100 ft with RW,  min A with slight LOB with stand to sit.  OT will follow to advance pt toward increased indpendence with self care.  Recommend IP rehab upon d/c.  -     Row Name 08/01/22 1047          Therapy Assessment/Plan (OT)    Rehab Potential (OT) good, to achieve stated therapy goals  -     Criteria for Skilled Therapeutic Interventions Met (OT) yes;skilled treatment is necessary  -     Therapy Frequency (OT) daily  -     Row Name 08/01/22 1047          Therapy Plan Review/Discharge Plan (OT)    Anticipated Discharge Disposition (OT) inpatient rehabilitation facility  -     Row Name 08/01/22 1047          Vital Signs    Pretreatment Heart Rate (beats/min) 73  -AC     Posttreatment Heart Rate (beats/min) 7  -AC     O2 Delivery Pre Treatment room air  -AC     Intra SpO2 (%) 91  -AC     O2 Delivery Intra Treatment room air  -AC     Post SpO2 (%) 100  -AC     O2 Delivery Post Treatment room air  -AC     Pre Patient Position Supine  -AC     Intra Patient Position Standing  -AC     Post Patient Position Supine  -AC     Row Name 08/01/22 1047          Positioning and Restraints    Pre-Treatment Position in bed  -AC     Post Treatment Position bed  -AC     In Bed notified nsg;fowlers;call light within reach;encouraged to call for assist;exit alarm on;with family/caregiver  -           User Key  (r) = Recorded By, (t) = Taken By, (c) = Cosigned By    Initials Name Provider Type    AC Francia Yao, OT Occupational Therapist               Outcome Measures     Row Name 08/01/22 1053          How much help from another is currently needed...    Putting on and taking off regular lower body clothing? 3  -AC     Bathing (including washing, rinsing, and drying) 2  -AC     Toileting (which includes using toilet bed pan or urinal) 3  -AC     Putting on and taking off regular upper  body clothing 3  -AC     Taking care of personal grooming (such as brushing teeth) 3  -AC     Eating meals 4  -AC     AM-PAC 6 Clicks Score (OT) 18  -AC     Row Name 08/01/22 1053          Functional Assessment    Outcome Measure Options AM-PAC 6 Clicks Daily Activity (OT)  -           User Key  (r) = Recorded By, (t) = Taken By, (c) = Cosigned By    Initials Name Provider Type     Francia Yao, OT Occupational Therapist                Occupational Therapy Education                 Title: PT OT SLP Therapies (In Progress)     Topic: Occupational Therapy (In Progress)     Point: ADL training (Done)     Description:   Instruct learner(s) on proper safety adaptation and remediation techniques during self care or transfers.   Instruct in proper use of assistive devices.              Learning Progress Summary           Patient Acceptance, E, VU by  at 8/1/2022 1054    Comment: Role of OT, benefits of activity                   Point: Home exercise program (Not Started)     Description:   Instruct learner(s) on appropriate technique for monitoring, assisting and/or progressing therapeutic exercises/activities.              Learner Progress:  Not documented in this visit.          Point: Precautions (Not Started)     Description:   Instruct learner(s) on prescribed precautions during self-care and functional transfers.              Learner Progress:  Not documented in this visit.          Point: Body mechanics (Not Started)     Description:   Instruct learner(s) on proper positioning and spine alignment during self-care, functional mobility activities and/or exercises.              Learner Progress:  Not documented in this visit.                      User Key     Initials Effective Dates Name Provider Type Discipline     06/16/21 -  Francia Yao OT Occupational Therapist OT              OT Recommendation and Plan  Planned Therapy Interventions (OT): activity tolerance training, BADL retraining, functional  balance retraining, patient/caregiver education/training, strengthening exercise, transfer/mobility retraining  Therapy Frequency (OT): daily  Plan of Care Review  Plan of Care Reviewed With: patient, spouse  Outcome Evaluation: OT eval complete.  Pt is [place] and presents with with weakness, decreased balance and activity tolerance limiting independence with self care. Pt min A supine to sit, min A to don socks, min A to don hospital gown, CGA x 2 STS,  CGA x 2 to ambulate 100 ft with RW,  min A with slight LOB with stand to sit.  OT will follow to advance pt toward increased indpendence with self care.  Recommend IP rehab upon d/c.     Time Calculation:    Time Calculation- OT     Row Name 08/01/22 0959             Time Calculation- OT    OT Start Time 0959  -AC      OT Received On 08/01/22  -      OT Goal Re-Cert Due Date 08/11/22  -AC              Untimed Charges    OT Eval/Re-eval Minutes 55  -AC              Total Minutes    Untimed Charges Total Minutes 55  -AC       Total Minutes 55  -AC            User Key  (r) = Recorded By, (t) = Taken By, (c) = Cosigned By    Initials Name Provider Type    AC Francia Yao, OT Occupational Therapist              Therapy Charges for Today     Code Description Service Date Service Provider Modifiers Qty    15306562149  OT EVAL LOW COMPLEXITY 4 8/1/2022 Francia Yao, OT GO 1    [id number]  OT THER SUPP EA 15 MIN 8/1/2022 Francia Yao OT GO 2               Francia Yao OT  8/1/2022    Electronically signed by Francia Yao OT at 08/01/22 4383

## 2022-08-03 NOTE — CASE MANAGEMENT/SOCIAL WORK
Continued Stay Note  [facility]     Patient Name: Narendra Cochran  MRN: [id number]  Today's Date: 8/3/2022    Admit Date: 7/29/2022     Discharge Plan     Row Name 08/03/22 1713       Plan    Plan IPR vs Home with Home Health    Patient/Family in Agreement with Plan yes    Plan Comments I have met with Mr. Cochran at his bedside today to discuss the discharge plan which includes OT recommendation for IPR.  He is agreeable to submission of referrals to [facility].  I have faxed these referrals.  CM will cont to follow the plan of care, f/u with these facilities regarding bed availability and assist with discharge needs as recommendations become available.    Final Discharge Disposition Code 03 - skilled nursing facility (SNF)               Discharge Codes    No documentation.               Expected Discharge Date and Time     Expected Discharge Date Expected Discharge Time    Aug 5, 2022             Kristina Rocha RN

## 2022-08-04 ENCOUNTER — READMISSION MANAGEMENT (OUTPATIENT)
Dept: CALL CENTER | Facility: HOSPITAL | Age: 82
End: 2022-08-04

## 2022-08-04 VITALS
TEMPERATURE: 98.2 F | BODY MASS INDEX: 28.53 KG/M2 | SYSTOLIC BLOOD PRESSURE: 124 MMHG | HEART RATE: 67 BPM | WEIGHT: 199.3 LBS | DIASTOLIC BLOOD PRESSURE: 62 MMHG | HEIGHT: 70 IN | RESPIRATION RATE: 18 BRPM | OXYGEN SATURATION: 96 %

## 2022-08-04 PROBLEM — E87.1 HYPONATREMIA: Status: RESOLVED | Noted: 2022-07-29 | Resolved: 2022-08-04

## 2022-08-04 PROBLEM — E87.5 HYPERKALEMIA: Status: RESOLVED | Noted: 2022-07-29 | Resolved: 2022-08-04

## 2022-08-04 PROBLEM — N17.9 ACUTE RENAL FAILURE (ARF): Status: RESOLVED | Noted: 2022-07-29 | Resolved: 2022-08-04

## 2022-08-04 LAB
ANION GAP SERPL CALCULATED.3IONS-SCNC: 7 MMOL/L (ref 5–15)
BUN SERPL-MCNC: 20 MG/DL (ref 8–23)
BUN/CREAT SERPL: 23 (ref 7–25)
CALCIUM SPEC-SCNC: 9.1 MG/DL (ref 8.6–10.5)
CHLORIDE SERPL-SCNC: 108 MMOL/L (ref 98–107)
CO2 SERPL-SCNC: 22 MMOL/L (ref 22–29)
CREAT SERPL-MCNC: 0.87 MG/DL (ref 0.76–1.27)
DEPRECATED RDW RBC AUTO: 49.2 FL (ref 37–54)
EGFRCR SERPLBLD CKD-EPI 2021: 85.6 ML/MIN/1.73
ERYTHROCYTE [DISTWIDTH] IN BLOOD BY AUTOMATED COUNT: 17.1 % (ref 12.3–15.4)
GLUCOSE BLDC GLUCOMTR-MCNC: 116 MG/DL (ref 70–130)
GLUCOSE BLDC GLUCOMTR-MCNC: 97 MG/DL (ref 70–130)
GLUCOSE SERPL-MCNC: 126 MG/DL (ref 65–99)
HCT VFR BLD AUTO: 33.5 % (ref 37.5–51)
HGB BLD-MCNC: 10.8 G/DL (ref 13–17.7)
MCH RBC QN AUTO: 25.7 PG (ref 26.6–33)
MCHC RBC AUTO-ENTMCNC: 32.2 G/DL (ref 31.5–35.7)
MCV RBC AUTO: 79.8 FL (ref 79–97)
PLATELET # BLD AUTO: 79 10*3/MM3 (ref 140–450)
POTASSIUM SERPL-SCNC: 3.8 MMOL/L (ref 3.5–5.2)
RBC # BLD AUTO: 4.2 10*6/MM3 (ref 4.14–5.8)
SODIUM SERPL-SCNC: 137 MMOL/L (ref 136–145)
WBC NRBC COR # BLD: 3.31 10*3/MM3 (ref 3.4–10.8)

## 2022-08-04 PROCEDURE — 82962 GLUCOSE BLOOD TEST: CPT

## 2022-08-04 PROCEDURE — 85027 COMPLETE CBC AUTOMATED: CPT | Performed by: HOSPITALIST

## 2022-08-04 PROCEDURE — 99239 HOSP IP/OBS DSCHRG MGMT >30: CPT | Performed by: HOSPITALIST

## 2022-08-04 PROCEDURE — 80048 BASIC METABOLIC PNL TOTAL CA: CPT | Performed by: HOSPITALIST

## 2022-08-04 PROCEDURE — 94664 DEMO&/EVAL PT USE INHALER: CPT

## 2022-08-04 PROCEDURE — 94799 UNLISTED PULMONARY SVC/PX: CPT

## 2022-08-04 RX ADMIN — ROSUVASTATIN CALCIUM 10 MG: 10 TABLET, FILM COATED ORAL at 08:01

## 2022-08-04 RX ADMIN — TIOTROPIUM BROMIDE INHALATION SPRAY 2 PUFF: 3.12 SPRAY, METERED RESPIRATORY (INHALATION) at 08:13

## 2022-08-04 RX ADMIN — Medication 10 ML: at 08:02

## 2022-08-04 RX ADMIN — ASPIRIN 81 MG CHEWABLE TABLET 81 MG: 81 TABLET CHEWABLE at 08:01

## 2022-08-04 RX ADMIN — LEVETIRACETAM 500 MG: 500 TABLET, FILM COATED ORAL at 08:01

## 2022-08-04 RX ADMIN — SACUBITRIL AND VALSARTAN 1 TABLET: 24; 26 TABLET, FILM COATED ORAL at 08:01

## 2022-08-04 RX ADMIN — METOPROLOL SUCCINATE 50 MG: 50 TABLET, EXTENDED RELEASE ORAL at 08:01

## 2022-08-04 RX ADMIN — PANTOPRAZOLE SODIUM 40 MG: 40 TABLET, DELAYED RELEASE ORAL at 08:01

## 2022-08-04 NOTE — OUTREACH NOTE
Prep Survey    Flowsheet Row Responses   [facility] facility patient discharged from? [place]   Is LACE score < 7 ? No   Emergency Room discharge w/ pulse ox? No   Eligibility [facility]   Date of Admission 07/29/22   Date of Discharge 08/04/22   Discharge Disposition Home-Health Care S[facility]   Discharge diagnosis KENNEDY, thrombocytopenia   Does the patient have one of the following disease processes/diagnoses(primary or secondary)? Other   Does the patient have Home health ordered? Yes   What is the Home health agency?  [facility]   Is there a DME ordered? No   Prep survey completed? Yes          CARLOS YUAN - Registered Nurse

## 2022-08-04 NOTE — PLAN OF CARE
Problem: Adult Inpatient Plan of Care  Goal: Absence of Hospital-Acquired Illness or Injury  Intervention: Identify and Manage Fall Risk  Recent Flowsheet Documentation  Taken 8/4/2022 1200 by Anayeli Winchester RN  Safety Promotion/Fall Prevention:   activity supervised   toileting scheduled   safety round/check completed   room organization consistent   nonskid shoes/slippers when out of bed  Taken 8/4/2022 1000 by Anayeli Winchester RN  Safety Promotion/Fall Prevention:   activity supervised   toileting scheduled   safety round/check completed   room organization consistent   nonskid shoes/slippers when out of bed  Taken 8/4/2022 0800 by Anayeli Winchester RN  Safety Promotion/Fall Prevention:   activity supervised   toileting scheduled   safety round/check completed   room organization consistent   nonskid shoes/slippers when out of bed  Intervention: Prevent Skin Injury  Recent Flowsheet Documentation  Taken 8/4/2022 1200 by Anayeli Winchester RN  Body Position: sitting up in bed  Skin Protection:   adhesive use limited   tubing/devices free from skin contact   transparent dressing maintained   skin-to-skin areas padded  Taken 8/4/2022 1000 by Anayeli Winchester RN  Body Position: position changed independently  Skin Protection:   adhesive use limited   tubing/devices free from skin contact   transparent dressing maintained   skin-to-skin areas padded  Taken 8/4/2022 0800 by Anayeli Winchester RN  Body Position: sitting up in bed  Skin Protection:   adhesive use limited   tubing/devices free from skin contact   transparent dressing maintained   skin-to-skin areas padded  Intervention: Prevent and Manage VTE (Venous Thromboembolism) Risk  Recent Flowsheet Documentation  Taken 8/4/2022 1200 by Anayeli Winchester RN  Activity Management: activity adjusted per tolerance  Taken 8/4/2022 1000 by Anayeli Winchester RN  Activity Management: activity adjusted per tolerance  Taken 8/4/2022 0800 by Anayeli Winchester  Vitals:    04/03/19 2107 04/04/19 0411 04/04/19 0430 04/04/19 0817   BP: 107/67 101/61  (!) 114/51   Pulse: 94 127 110 104   Resp: 15 16  20   Temp: 97 °F (36.1 °C) 99.1 °F (37.3 °C)  98.8 °F (37.1 °C)   TempSrc: Oral Oral  Axillary   SpO2: 91%   94%   Weight:       Height:           BMP  Recent Labs     04/01/19  1505 04/02/19  0520    139   K 4.6 4.5   CO2 34* 33*   BUN 9 8   CREATININE <0.5* <0.5*   CALCIUM 9.2 9.0   GLUCOSE 91 95        CBC  Recent Labs     04/01/19  1505 04/02/19  0520 04/03/19  0520   WBC 7.3 6.0  --    HGB 10.6* 9.7* 8.0*   HCT 33.2* 30.5* 25.5*    182  --           Intake/Output Summary (Last 24 hours) at 4/4/2019 1018  Last data filed at 4/4/2019 0817  Gross per 24 hour   Intake 940 ml   Output 150 ml   Net 790 ml           Pt is seen lying in bed. They are alert and oriented times 1. Speech is clear at times - mumbles at other. The pt is currently on 2 L O2. IV access is located in the pt's PICC RUE. See EMAR for current infusions and rates. The pt is incontinent  to void. See flowsheets for assessment. Pt drowsy this morning VSS when checked bedside. Pt dose wake up when spoken too, but goes back to sleep shortly after. No s/s of distress. Bed in low locked position with bed alarm set, call light in reach, door open.  Will continue to monitor Sebastian Soares RN RN  Activity Management: activity adjusted per tolerance  VTE Prevention/Management: dorsiflexion/plantar flexion performed  Range of Motion: active ROM (range of motion) encouraged  Intervention: Prevent Infection  Recent Flowsheet Documentation  Taken 8/4/2022 1200 by Anayeli Winchester RN  Infection Prevention:   environmental surveillance performed   equipment surfaces disinfected   hand hygiene promoted  Taken 8/4/2022 1000 by Anayeli Winchester RN  Infection Prevention:   environmental surveillance performed   equipment surfaces disinfected   hand hygiene promoted  Taken 8/4/2022 0800 by Anayeli Winchester RN  Infection Prevention:   environmental surveillance performed   equipment surfaces disinfected   hand hygiene promoted     Problem: Skin Injury Risk Increased  Goal: Skin Health and Integrity  Intervention: Optimize Skin Protection  Recent Flowsheet Documentation  Taken 8/4/2022 1200 by Anayeli Winchester RN  Pressure Reduction Techniques: frequent weight shift encouraged  Head of Bed (HOB) Positioning: [facility] elevated  Pressure Reduction Devices:   pressure-redistributing mattress utilized   positioning supports utilized  Skin Protection:   adhesive use limited   tubing/devices free from skin contact   transparent dressing maintained   skin-to-skin areas padded  Taken 8/4/2022 1000 by Anayeli Winchester RN  Pressure Reduction Techniques:   frequent weight shift encouraged   weight shift assistance provided  Head of Bed (HOB) Positioning: [facility] elevated  Pressure Reduction Devices:   pressure-redistributing mattress utilized   positioning supports utilized  Skin Protection:   adhesive use limited   tubing/devices free from skin contact   transparent dressing maintained   skin-to-skin areas padded  Taken 8/4/2022 0800 by Anayeli Winchester RN  Pressure Reduction Techniques:   frequent weight shift encouraged   pressure points protected  Head of Bed (HOB) Positioning: HOB at 15 degrees  Pressure Reduction  Devices:   pressure-redistributing mattress utilized   positioning supports utilized  Skin Protection:   adhesive use limited   tubing/devices free from skin contact   transparent dressing maintained   skin-to-skin areas padded   Goal Outcome Evaluation:

## 2022-08-04 NOTE — DISCHARGE SUMMARY
[facility] Medicine Services  DISCHARGE SUMMARY    Patient Name: Narendra Cochran  : 1940  MRN: [id number]    Date of Admission: 2022  1:33 PM  Date of Discharge:  22  Primary Care Physician: Marguerite Moore PA-C    Consults     Date and Time Order Name Status Description    2022 12:34 AM Inpatient Nephrology Consult Completed     2022 12:34 AM Inpatient Cardiology Consult Completed           Hospital Course     Presenting Problem:   Hyperkalemia [E87.5]    Active Hospital Problems    Diagnosis  POA   • Moderate malnutrition (HCC) [E44.0]  Yes   • Chronic systolic congestive heart failure (HCC) [I50.22]  Yes   • GERD [K21.9]  Yes   • Coronary artery disease involving native coronary artery of native heart with angina pectoris (HCC) [I25.119]  Yes   • NICM  [I42.8]  Yes   • COPD (chronic obstructive pulmonary disease) (HCC) [J44.9]  Yes   • H/O seizures on Keppra [R56.9]  Yes   • Essential hypertension [I10]  Yes   • Hyperlipidemia LDL goal <70 [E78.5]  Yes   • T2DM [E11.65]  Yes      Resolved Hospital Problems    Diagnosis Date Resolved POA   • Hyperkalemia [E87.5] 2022 Yes   • Acute renal failure (ARF) (HCC) [N17.9] 2022 Unknown   • Hyponatremia [E87.1] 2022 Unknown          Hospital Course:  Narendra Cochran is a 81 y.o. male with hx of DM2, HTN, HLD, diverticulosis, chronic systolic CHF/NICM, COPD, CAD, Paget's disease, seizure disorder, and GERD who presents due to epigastric pain, nausea, and generalized weakness. Labs revealed K 8.0, Cr 3.2, Na 127. CT A/P negative for acute process. Admitted for further management.      KENNEDY, resolved  Metabolic acidosis (non-gap)  Hyperkalemia  Hypontremia  -CT A/P: no obstructive uropathy  -Renal US: no obstructive uropathy  -Improved with fluids/bicarb drip. Off bicarb drip since 22 morning.  -Have been holding Entresto, Aldactone, Lasix, metformin since admission  -Cr and K now normalized,  resuming Entresto and metformin at discharge, will continue to hold Aldactone and Lasix  -Nephrology now signed off     Epigastric pain, improved  N/V/D, resolved  Elevated lipase, possible mild pancreatitis   -CT A/P negative acute process, GB and pancreas appeared normal  -Initially had lipase 119 then went down to 80; currently epigastric pain nearly resolved, tolerating diet better now. Perhaps had very subtle pancreatitis which did not show on imaging but symptoms dramatically improved now   -Continue PPI  -Continue antiemetics PRN     Thrombocytopenia, acute on chronic  -Previous labs 3 months ago platelets ranged ~91,000  -Trending down since admission from 103,000 to 57,000  -Stable at 58,000   -SQH discontinued  -Improved today to 79,000. Monitor as outpatient.     Hx NICM (previous ICD due to prior cardiac arrest)  Valvular heart disease (moderate AI & MR)  Hx non-obstructive CAD  PAF  HTN  HLD  -Echo March 2021: EF 50%, moderate AI & MR  -Continue ASA, Metoprolol, statin  -Cardiology following: resumed Entresto 8/2/22 (okay'ed by Nephrology), continue to hold Lasix and Aldactone at discharge  -Afib was detected on pacemaker check 7/28/22 in setting of hyperkalemia, has been in sinus rhythm rest of admission; Cardiology recommends holding anticoagulation as no further episodes documented and patient also with chronic anemia/thrombocytopenia  -F/U in Heart and Valve clinic on 8/10/22 as scheduled     DM2 (HbA1c 6.5%)  -Resume Metformin at discharge     Seizure disorder  -Continue Keppra     Generalized weakness  -PT/OT following, per 7/30/22 note recommended inpatient rehab however patient declines and wants to go home with HHPT; last PT note 8/3/22 he walked 110 feet       Discharge Follow Up Recommendations for outpatient labs/diagnostics:  F/U with PCP in 1 week  F/U in Heart and Valve clinic 8/10/22    Day of Discharge     HPI:   Patient seen this morning, wife at bedside. He has no complaints, eager to  go home. Does not want rehab.    Review of Systems  Gen-no fevers, no chills  CV-no chest pain, no palpitations  Resp-no cough, no dyspnea  GI-no N/V/D, no abd pain    All other systems reviewed and negative except any additional pertinent positives and negatives as discussed in HPI.      Vital Signs:   Temp:  [98.2 °F (36.8 °C)-98.7 °F (37.1 °C)] 98.2 °F (36.8 °C)  Heart Rate:  [63-76] 65  Resp:  [16-20] 16  BP: ()/(43-71) 143/63      Physical Exam:  Gen-no acute distress  HENT-NCAT, mucous membranes moist  CV-RRR, S1 S2 normal, no m/r/g  Resp-CTAB, no wheezes or rales  Abd-soft, NT, ND, +BS  Ext-no edema  Neuro-A&Ox3, no focal deficits, hard of hearing  Skin-no rashes  Psych-appropriate mood      Pertinent  and/or Most Recent Results     LAB RESULTS:      Lab 08/04/22  0926 08/02/22  0622 08/01/22  0727 07/29/22  1756 07/29/22  1600 07/29/22  1357   WBC 3.31* 2.70* 3.11*  --   --  7.39   HEMOGLOBIN 10.8* 10.8* 11.0*  --   --  17.1   HEMOGLOBIN, POC  --   --   --  17.7*  --   --    HEMATOCRIT 33.5* 33.3* 33.6*  --   --  55.7*   HEMATOCRIT POC  --   --   --  52*  --   --    PLATELETS 79* 58* 57*  --   --  103*   NEUTROS ABS  --   --  2.20  --   --  5.55   IMMATURE GRANS (ABS)  --   --  0.02  --   --  0.03   LYMPHS ABS  --   --  0.62*  --   --  1.51   MONOS ABS  --   --  0.23  --   --  0.28   EOS ABS  --   --  0.03  --   --  0.01   MCV 79.8 79.1 77.4*  --   --  82.0   LACTATE  --   --   --   --  1.9 2.9*         Lab 08/04/22  0926 08/03/22  1043 08/02/22  0622 08/01/22  0727 07/31/22  0425 07/30/22  1954 07/30/22  0612 07/29/22  1600 07/29/22  1357   SODIUM 137 135* 137 137 134* 133* 129*  130*   < >  --    POTASSIUM 3.8 3.8 4.0 4.3 4.4 4.5 5.6*  5.6*   < >  --    CHLORIDE 108* 106 106 105 102 106 104  103   < >  --    CO2 22.0 25.0 23.0 21.0* 24.0 19.0* 12.0*  14.0*   < >  --    ANION GAP 7.0 4.0* 8.0 11.0 8.0 8.0 13.0  13.0   < >  --    BUN 20 17 19 34* 43* 51* 61*  61*   < >  --    CREATININE 0.87 0.96  1.02 1.30* 1.58* 1.69* 1.98*  2.13*   < >  --    EGFR 85.6 78.4 72.9 54.5* 43.1* 39.8* 32.9*  30.1*   < >  --    GLUCOSE 126* 126* 93 104* 136* 126* 89  87   < >  --    CALCIUM 9.1 8.9 8.7 8.6 8.8 8.1* 9.3  9.2   < >  --    MAGNESIUM  --  1.8 1.7 2.1 2.0  --  1.7   < >  --    PHOSPHORUS  --   --   --   --   --  2.7 3.7  --   --    HEMOGLOBIN A1C  --   --   --   --   --   --   --   --  6.50*    < > = values in this interval not displayed.         Lab 08/02/22  0622 08/01/22  0727 07/31/22  0425 07/30/22  0612 07/30/22  0107 07/29/22  1600 07/29/22  1357   TOTAL PROTEIN 5.9* 5.7* 5.5* 5.9*  --  7.7  --    ALBUMIN 2.90* 3.20* 3.30* 3.40* 3.1 4.00  --    GLOBULIN 3.0 2.5  --  2.5  --  3.7  --    ALT (SGPT) 14 10 5 5  --  7  --    AST (SGOT) 20 17 11 13  --  16  --    BILIRUBIN 0.4 0.4 0.6 0.6  --  0.6  --    INDIRECT BILIRUBIN  --   --  0.4  --   --   --   --    BILIRUBIN DIRECT  --   --  0.2  --   --   --   --    ALK PHOS 56 56 53 54  --  72  --    LIPASE 75* 84* 67* 62*  --   --  119*         Lab 07/29/22  1948   TROPONIN T 0.011                 Lab 07/29/22  2002   FIO2 21   CARBOXYHEMOGLOBIN (VENOUS) 1.0     Brief Urine Lab Results  (Last result in the past 365 days)      Color   Clarity   Blood   Leuk Est   Nitrite   Protein   CREAT   Urine HCG        07/30/22 1956             90.5             Microbiology Results (last 10 days)     Procedure Component Value - Date/Time    Eosinophil Smear - Urine, Urine, Clean Catch [[id number]]  (Normal) Collected: 07/30/22 1958    Lab Status: Final result Specimen: Urine, Clean Catch Updated: 07/30/22 2145     Eosinophil Smear 0 % EOS/100 Cells     Narrative:      No eosinophil seen    COVID PRE-OP / PRE-PROCEDURE SCREENING ORDER (NO ISOLATION) - Swab, Nasopharynx [[id number]]  (Normal) Collected: 07/29/22 2242    Lab Status: Final result Specimen: Swab from Nasopharynx Updated: 07/30/22 1726    Narrative:      The following orders were created for panel order COVID PRE-OP /  PRE-PROCEDURE SCREENING ORDER (NO ISOLATION) - Swab, Nasopharynx.  Procedure                               Abnormality         Status                     ---------                               -----------         ------                     COVID-19, APTIMA PANTHER...[[id number]]  Normal              Final result                 Please view results for these tests on the individual orders.    COVID-19, APTIMA PANTHER [facility] IN-HOUSE NP/OP SWAB IN UTM/VTM/SALINE TRANSPORT MEDIA 24HR TAT - Swab, Nasopharynx [[id number]]  (Normal) Collected: 07/29/22 2242    Lab Status: Final result Specimen: Swab from Nasopharynx Updated: 07/30/22 1726     COVID19 Not Detected    Narrative:      Fact sheet for providers: https://www.fda.gov/media/516195/download     Fact sheet for patients: https://www.fda.gov/media/186117/download    Test performed by RT PCR.          CT Abdomen Pelvis Without Contrast    Result Date: 7/29/2022  DATE OF EXAM: 7/29/2022 2:48 PM  PROCEDURE: CT ABDOMEN PELVIS WO CONTRAST-  INDICATIONS: abd pain  COMPARISON: 1/27/2021  TECHNIQUE: Routine transaxial slices were obtained through the abdomen and pelvis without the administration of intravenous contrast. Reconstructed coronal and sagittal images were also obtained. Automated exposure control and iterative construction methods were used.  The radiation dose reduction device was turned on for each scan per the ALARA (As Low as Reasonably Achievable) protocol.  FINDINGS: There are emphysematous changes in the lung bases. There is a transvenous pacemaker in place. The liver, gallbladder, spleen, adrenal glands and pancreas are normal. There are multiple bilateral cystic lesions in the kidneys. No definite solid renal masses are seen. There is no evidence of hydronephrosis or obstruction. There is a periumbilical hernia containing fat appears unchanged from the previous CT. There is aneurysmal dilatation of the right common iliac artery measuring up to 2.8 cm. This is  not changed from the last study. There is also dilatation of the left iliac artery measuring 1.7 cm which has not changed. There are no dilated or thickened loops of small or large bowel identified. The bladder is unremarkable. The prostate is enlarged. Scans to the osseous elements show extensive cortical bone week thickening particularly in the left ilium clearly involving the left hip and femoral neck and the right and left pubic rami consistent with Paget's disease. In the lumbar region there is some mild degenerative disc disease. There also appears to be pagetoid involvement of the L5 level       1. No acute findings in the abdomen or pelvis. 2. Multiple bilateral renal cysts. 3. Bilateral iliac artery aneurysms. 4. Extensive bone abnormalities most likely reflecting Paget's disease 5. Stable periumbilical hernia 6. Enlarged prostate  This report was finalized on 7/29/2022 3:20 PM by Brent Cason MD.      US Renal Bilateral    Result Date: 7/31/2022  DATE OF EXAM: 7/30/2022 6:56 PM  PROCEDURE: US RENAL BILATERAL-  INDICATIONS: KENNEDY; E87.5-Hyperkalemia; N17.9-Acute kidney failure, unspecified; R10.10-Upper abdominal pain, unspecified  COMPARISON: CTA abdomen pelvis July 29, 2022  TECHNIQUE: Grayscale and color Doppler ultrasound evaluation of the kidneys and urinary bladder was performed.  FINDINGS: Right kidney: This kidney measures 13.2 cm in length. There are suggested renal cysts. The largest in the upper pole measures about 6.9 cm in greatest dimension. There is no hydronephrosis. Left kidney: This kidney measures 14.4 cm in length. There is a dominant cyst within the upper pole measuring 5.2 cm.  Bladder: The bladder does not appear unusual for the degree of distention.      1.  Bilateral renal cysts.  This report was finalized on 7/31/2022 7:44 AM by Chicho Rose MD.        Results for orders placed in visit on 12/08/17    SCANNED VASCULAR STUDIES      Results for orders placed in visit on  12/08/17    SCANNED VASCULAR STUDIES      Results for orders placed during the hospital encounter of 03/12/21    Adult Transthoracic Echo Complete W/ Cont if Necessary Per Protocol    Interpretation Summary  · Left ventricular systolic function is normal. Estimated left ventricular EF = 50%  · Left ventricular wall thickness is consistent with mild concentric hypertrophy.  · Left atrial volume is mildly increased.  · Moderate aortic valve regurgitation is present.  · Moderate mitral valve regurgitation is present.        Discharge Details        Discharge Medications      Continue These Medications      Instructions Start Date   albuterol sulfate  (90 Base) MCG/ACT inhaler  Commonly known as: PROVENTIL HFA;VENTOLIN HFA;PROAIR HFA   2 puffs, Inhalation, Every 4 Hours PRN      aspirin 81 MG chewable tablet   81 mg, Oral, Daily      Blood Pressure Monitor/L Cuff misc   1 Units, Does not apply, Daily      diclofenac 1 % gel gel  Commonly known as: VOLTAREN   4 g, Topical, 4 Times Daily PRN      Entresto 24-26 MG tablet  Generic drug: sacubitril-valsartan   1 tablet, Oral, 2 Times Daily      glucose monitor monitoring kit   1 each, Does not apply, Daily, DX: E11.65      levETIRAcetam 750 MG tablet  Commonly known as: KEPPRA   TAKE 1 TABLET BY MOUTH TWICE A DAY      metFORMIN  MG 24 hr tablet  Commonly known as: GLUCOPHAGE-XR   500 mg, Oral, Daily With Breakfast      metoprolol succinate XL 50 MG 24 hr tablet  Commonly known as: TOPROL-XL   TAKE 1 TABLET BY MOUTH EVERY DAY      OneTouch Delica Plus [id number] misc   USE TO CHECK BLOOD GLUCOSE ONCE DAILY      OneTouch Ultra test strip  Generic drug: glucose blood   USE AS INSTRUCTED PER MD      pantoprazole 40 MG EC tablet  Commonly known as: PROTONIX   TAKE 1 TABLET BY MOUTH TWICE A DAY      rosuvastatin 20 MG tablet  Commonly known as: CRESTOR   20 mg, Oral, Daily      Spiriva Respimat 2.5 MCG/ACT aerosol solution inhaler  Generic drug: tiotropium bromide  monohydrate   2 puffs, Inhalation, Daily - RT         Stop These Medications    furosemide 40 MG tablet  Commonly known as: LASIX     potassium chloride ER 20 MEQ tablet controlled-release ER tablet  Commonly known as: K-TAB     spironolactone 25 MG tablet  Commonly known as: ALDACTONE            No Known Allergies      Discharge Disposition:  Home or Self Care    Diet:  Hospital:  Diet Order   Procedures   • Diet Regular; Cardiac, Consistent Carbohydrate, Renal       Activity:  Activity Instructions     Activity as Tolerated               CODE STATUS:    Code Status and Medical Interventions:   Ordered at: 07/29/22 2011     Medical Intervention Limits:    NO intubation (DNI)     Level Of Support Discussed With:    Patient     Code Status (Patient has no pulse and is not breathing):    CPR (Attempt to Resuscitate)     Medical Interventions (Patient has pulse or is breathing):    Limited Support       Future Appointments   Date Time Provider Department Center   8/10/2022 10:15 AM Pari García APRN [facility]   9/6/2022 10:00 AM Marguerite Moore PA-C [facility]   11/22/2022 11:30 AM Javad Mercedes MD [facility]       Additional Instructions for the Follow-ups that You Need to Schedule     Discharge Follow-up with PCP   As directed       Currently Documented PCP:    Marguerite Moore PA-C    PCP Phone Number:    [phone]     Follow Up Details: 1 week                     Siria Moreno MD  08/04/22      Time Spent on Discharge:  I spent  35  minutes on this discharge activity which included: face-to-face encounter with the patient, reviewing the data in the system, coordination of the care with the nursing staff as well as consultants, documentation, and entering orders.

## 2022-08-04 NOTE — CASE MANAGEMENT/SOCIAL WORK
Case Management Discharge Note      Final Note: I have met with Mr. Cochran at his bedside today to discuss the discharge plan.  He states that he wants to return home and is now not interested in inpatient rehab.  He requests PT with Home health services.  I have submitted a referral to [facility].  I have confirmed with Adrienne at [facility] that she will accept the referral.  Jessica, Mr. Cochran's wife states that she will provide transportation home today.  She and Mr. Cochran deny having any other discharge needs at this time.         Selected Continued Care - Admitted Since 7/29/2022     Destination    No services have been selected for the patient.              Durable Medical Equipment    No services have been selected for the patient.              Dialysis/Infusion    No services have been selected for the patient.              Home Medical Care Coordination complete.    Service Provider Selected Services Address Phone Fax Patient Preferred    [facility]  [facility] Services ,  Home Nursing ,  [facility] [address] [facility] [id number] [phone] --          Therapy    No services have been selected for the patient.              Community Resources    No services have been selected for the patient.              Community & DME    No services have been selected for the patient.                  Transportation Services  Private: Car    Final Discharge Disposition Code: 06 - home with home health care

## 2022-08-05 ENCOUNTER — TELEPHONE (OUTPATIENT)
Dept: CARDIOLOGY | Facility: HOSPITAL | Age: 82
End: 2022-08-05

## 2022-08-05 ENCOUNTER — TELEPHONE (OUTPATIENT)
Dept: FAMILY MEDICINE CLINIC | Facility: CLINIC | Age: 82
End: 2022-08-05

## 2022-08-05 ENCOUNTER — TRANSITIONAL CARE MANAGEMENT TELEPHONE ENCOUNTER (OUTPATIENT)
Dept: CALL CENTER | Facility: HOSPITAL | Age: 82
End: 2022-08-05

## 2022-08-05 NOTE — TELEPHONE ENCOUNTER
I called to check on Mr Cocharn due to his c/o abdominal pain on 7/29/2022.  I spoke with his wife and she stated that Mr Cochran had been in the hospital for the last week for a high potassium level and came home last night.   She states he is doing much better. I encouraged her to keep us informed of his health status and she voiced understanding and agreement.

## 2022-08-05 NOTE — OUTREACH NOTE
Call Center TCM Note    Flowsheet Row Responses   [facility] patient discharged from? [place]   Does the patient have one of the following disease processes/diagnoses(primary or secondary)? Other   TCM attempt successful? Yes   Call start time 1045   Call end time 1053   Is patient permission given to speak with other caregiver? Yes   List who call center can speak with Jessica Cochran Spouse    Person spoke with today (if not patient) and relationship Sirisha Cochranllvadim Adamson Spouse    Medication alerts for this patient lasix, potassium and spironolactone dc'd.    Meds reviewed with patient/caregiver? Yes   Does the patient have all medications ordered at discharge? N/A  [No new meds ordered at discharge. ]   Is the patient taking all medications as directed (includes completed medication regime)? Yes   Comments regarding appointments MyChart Video Visit with Pari García, APRN Wednesday Aug 10, 2022 10:15 AM   Does the patient have a primary care provider?  Yes   Comments regarding PCP Marguerite Moore PA-C PCP. Hospital follow up scheduled for Tues 8/9  2pm   Has the patient kept scheduled appointments due by today? N/A   [facility]   Has home health visited the patient within 72 hours of discharge? Call prior to 72 hours  [Wife reports HH coming today. ]   DME comments Wife reports patient needs a walker. Advised to have PT that comes today to recommend walker type that needs to be ordered for patient and contact office.    Psychosocial issues? No   Did the patient receive a copy of their discharge instructions? Yes   Nursing interventions Reviewed instructions with patient  [wife]   What is the patient's perception of their health status since discharge? Same   Is the patient/caregiver able to teach back signs and symptoms related to disease process for when to call PCP? Yes   Is the patient/caregiver able to teach back signs and symptoms  related to disease process for when to call 911? Yes   Is the patient/caregiver able to teach back the hierarchy of who to call/visit for symptoms/problems? PCP, Specialist, Home health nurse, Urgent Care, ED, 911 Yes   If the patient is a current smoker, are they able to teach back resources for cessation? Not a smoker   TCM call completed? Yes   Wrap up additional comments Denies further questions today.           Debby Shine RN    8/5/2022, 10:54 EDT

## 2022-08-10 ENCOUNTER — TELEMEDICINE (OUTPATIENT)
Dept: CARDIOLOGY | Facility: HOSPITAL | Age: 82
End: 2022-08-10

## 2022-08-10 VITALS — HEART RATE: 62 BPM | SYSTOLIC BLOOD PRESSURE: 120 MMHG | DIASTOLIC BLOOD PRESSURE: 60 MMHG

## 2022-08-10 DIAGNOSIS — E87.5 HYPERKALEMIA: ICD-10-CM

## 2022-08-10 DIAGNOSIS — I10 ESSENTIAL HYPERTENSION: ICD-10-CM

## 2022-08-10 DIAGNOSIS — I50.22 CHRONIC SYSTOLIC CONGESTIVE HEART FAILURE: Primary | ICD-10-CM

## 2022-08-10 DIAGNOSIS — I25.10 CORONARY ARTERY DISEASE INVOLVING NATIVE CORONARY ARTERY OF NATIVE HEART WITHOUT ANGINA PECTORIS: ICD-10-CM

## 2022-08-10 DIAGNOSIS — I38 VALVULAR HEART DISEASE: ICD-10-CM

## 2022-08-10 PROCEDURE — 99213 OFFICE O/P EST LOW 20 MIN: CPT | Performed by: NURSE PRACTITIONER

## 2022-08-10 NOTE — PROGRESS NOTES
[facility], [facility] and Vascular    This was an audio and video enabled telemedicine encounter.    You have chosen to receive care through the use of telemedicine. Telemedicine enables health care providers at different locations to provide safe, effective, and convenient care through the use of technology. As with any health care service, there are risks associated with the use of telemedicine, including equipment failure, poor connections, and  issues.  Do you understand the risks and benefits of telemedicine as I have explained them to you? Yes  • Have your questions regarding telemedicine been answered? Yes  • Do you consent to the use of telemedicine in your medical care today? Yes    Chief Complaint  Hospital Follow Up Visit (HF, hyperkalemia)    Subjective    History of Present Illness {CC  Problem List  Visit  Diagnosis   Encounters  Notes  Medications  Labs  Result Review Imaging  Media :23}     Narendra Cochran presents to [facility] for   History of Present Illness     81-year-old male with history of heart failure with  reduced EF, valvular heart disease.  In 2020 patient's EF 36%.  Echocardiogram 3/13/2021 showed an improved EF of 50%, moderate MR, moderate AI.  History of CAD, PVCs, pacemaker/ICD ([employer] dual-chamber ICD) placed in March 2021 after cardiac arrest, sinus node dysfunction, first-degree AV block, hypertension, dyslipidemia, diabetes, COPD, diverticulosis, Paget's disease, seizure disorder, GERD.    March of this year patient struggled with elevated creatinine on Lasix, spironolactone, Entresto, potassium.  At that time medications were held.  Creatinine was improved with restarting Entresto.  Patient's heart logic index elevated.  Patient was then restarted on Lasix, and spironolactone.  Patient's potassium level continue to be low and started on potassium supplement    Patient then  presented to [facility] July 13, 2022 with dyspnea.  On 7/29/2022 he developed new onset atrial fibrillation and presented to [facility] ED.  He was found to have hyperkalemia and acute kidney disease.    Again Entresto, Aldactone, Lasix, metformin held.  Creatinine and potassium normalized.  He resumed Entresto and metformin at discharge.  Lasix and Aldactone held.  Patient with chronic anemia and thrombocytopenia.  No further atrial fibrillation was detected.  Anticoagulation was held.      Patient denies chest pain, pressure, worsening dyspnea.  States that he is breathing better since prior to his hospitalization.  He denies edema, PND, orthopnea.  Chief complaint is increased weakness and poor appetite.  Patient being followed by home health and physical therapy.  Objective     Vital Signs:   Vitals:    08/10/22 1009   BP: 120/60   Pulse: 62     There is no height or weight on file to calculate BMI.  Physical Exam  Vitals reviewed.   Constitutional:       General: He is not in acute distress.  Pulmonary:      Effort: Pulmonary effort is normal.   Skin:     Coloration: Skin is not pale.   Neurological:      Mental Status: He is alert.   Psychiatric:         Mood and Affect: Mood normal.         Behavior: Behavior normal. Behavior is cooperative.              Result Review  Data Reviewed:{ Labs  Result Review  Imaging  Med Tab  Media :23}   Echocardiogram 3/13/2021: EF 50%, moderate AR, moderate MR    Lab Results   Component Value Date    GLUCOSE 126 (H) 08/04/2022    BUN 20 08/04/2022    CREATININE 0.87 08/04/2022    EGFRIFNONA 64 10/21/2019    EGFRIFAFRI 61 02/17/2022    BCR 23.0 08/04/2022    K 3.8 08/04/2022    CO2 22.0 08/04/2022    CALCIUM 9.1 08/04/2022    PROTENTOTREF 6.2 07/30/2022    ALBUMIN 2.90 (L) 08/02/2022    LABIL2 1.0 07/30/2022    AST 20 08/02/2022    ALT 14 08/02/2022     Lab Results   Component Value Date    WBC 3.31 (L) 08/04/2022    HGB 10.8 (L) 08/04/2022    HCT 33.5 (L)  08/04/2022    MCV 79.8 08/04/2022    PLT 79 (L) 08/04/2022                   Assessment and Plan {CC Problem List  Visit Diagnosis  ROS  Review (Popup)  Health Maintenance  Quality  BestPractice  Medications  SmartSets  SnapShot Encounters  Media :23}   1. Chronic systolic congestive heart failure (HCC)  Continue beta-blocker, Entresto.    Currently not on Lasix.  Denies worsening heart failure symptoms.  Patient has not tolerated Aldactone in the past, causing hyperkalemia.  Increasing diuretics have caused hypokalemia with a combination of spironolactone and potassium supplement he had acute kidney injury and hyperkalemia.  Monitor closely.  We will try to avoid spironolactone and try only using potassium or Lasix supplement as needed    - Basic Metabolic Panel; labs to be drawn this week secondary care home health     2. Valvular heart disease  Continue to monitor.  Patient has had difficulty with fluid status, kidney failure and hyperkalemia.  May be on max tolerated medications.  We will need to have goals of care discussion at follow-up visit.    3. Coronary artery disease involving native coronary artery of native heart without angina pectoris  No CP or pressure    4. Essential hypertension  stable    5. Hyperkalemia    - Basic Metabolic Panel; Future      I spent 20 minutes caring for Narendra on this date of service. This time includes time spent by me in the following activities:preparing for the visit, reviewing tests, performing a medically appropriate examination and/or evaluation , counseling and educating the patient/family/caregiver, documenting information in the medical record and care coordination    Follow Up {Instructions Charge Capture  Follow-up Communications :23}   Return in about 2 weeks (around 8/24/2022) for HF, Office visit.    Patient was given instructions and counseling regarding his condition or for health maintenance advice. Please see specific information pulled into  the AVS if appropriate.  Patient was instructed to call the [facility] with any questions, concerns, or worsening symptoms.

## 2022-08-11 ENCOUNTER — TELEPHONE (OUTPATIENT)
Dept: CARDIOLOGY | Facility: HOSPITAL | Age: 82
End: 2022-08-11

## 2022-08-11 DIAGNOSIS — R30.0 DYSURIA: Primary | ICD-10-CM

## 2022-08-19 ENCOUNTER — TELEPHONE (OUTPATIENT)
Dept: FAMILY MEDICINE CLINIC | Facility: CLINIC | Age: 82
End: 2022-08-19

## 2022-08-19 RX ORDER — SACUBITRIL AND VALSARTAN 24; 26 MG/1; MG/1
1 TABLET, FILM COATED ORAL 2 TIMES DAILY
Qty: 60 TABLET | Refills: 6 | Status: CANCELLED | OUTPATIENT
Start: 2022-08-19

## 2022-08-22 ENCOUNTER — READMISSION MANAGEMENT (OUTPATIENT)
Dept: CALL CENTER | Facility: HOSPITAL | Age: 82
End: 2022-08-22

## 2022-08-22 NOTE — OUTREACH NOTE
"Medical Week 3 Survey    Flowsheet Row Responses   [facility] patient discharged from? [place]   Does the patient have one of the following disease processes/diagnoses(primary or secondary)? Other   Week 3 attempt successful? Yes   Call start time 1925   Call end time 1926   Person spoke with today (if not patient) and relationship Jessica Adamson Spouse    Is the patient taking all medications as directed (includes completed medication regime)? Yes   Does the patient have a primary care provider?  Yes   Has the patient kept scheduled appointments due by today? Yes   Comments Had a video visit with LEANDRO Brown but will have an inperson visit on Thurs.   [facility]   Has home health visited the patient within 72 hours of discharge? Yes   Psychosocial issues? No   What is the patient's perception of their health status since discharge? Improving   Is the patient/caregiver able to teach back signs and symptoms related to disease process for when to call PCP? Yes   Is the patient/caregiver able to teach back signs and symptoms related to disease process for when to call 911? Yes   Is the patient/caregiver able to teach back the hierarchy of who to call/visit for symptoms/problems? PCP, Specialist, Home health nurse, Urgent Care, ED, 911 Yes   If the patient is a current smoker, are they able to teach back resources for cessation? Not a smoker   Additional teach back comments States he is \"much better\".   Week 3 Call Completed? Yes   Graduated Yes   Graduated/Revoked comments Denies questions or needs at this time.          SUNSHINE MCMAHAN - Licensed Nurse  "

## 2022-08-29 ENCOUNTER — TELEPHONE (OUTPATIENT)
Dept: FAMILY MEDICINE CLINIC | Facility: CLINIC | Age: 82
End: 2022-08-29

## 2022-08-29 DIAGNOSIS — I10 ESSENTIAL HYPERTENSION: Chronic | ICD-10-CM

## 2022-08-29 DIAGNOSIS — E11.65 TYPE 2 DIABETES MELLITUS WITH HYPERGLYCEMIA, WITHOUT LONG-TERM CURRENT USE OF INSULIN: Primary | ICD-10-CM

## 2022-08-29 RX ORDER — BLOOD-GLUCOSE METER
1 KIT MISCELLANEOUS DAILY
Qty: 1 EACH | Refills: 0 | Status: SHIPPED | OUTPATIENT
Start: 2022-08-29 | End: 2022-09-06 | Stop reason: SDUPTHER

## 2022-08-29 RX ORDER — LANCETS
1 EACH MISCELLANEOUS DAILY
Qty: 100 EACH | Refills: 12 | Status: SHIPPED | OUTPATIENT
Start: 2022-08-29 | End: 2022-09-06

## 2022-08-29 NOTE — TELEPHONE ENCOUNTER
Caller: RAUL WITH [facility]     Relationship: Home Health    Best call back number: [phone]  What medication are you requesting: GLUCOMITOR     What are your current symptoms: DIABETIC         If a prescription is needed, what is your preferred pharmacy and phone number: [id number] - [place], [place] - [address] - [phone]  - [phone]      Additional notes: PLEASE CALL IN A GLUCOSE MONITOR WITH SUPPLIES ANY BRAND

## 2022-08-29 NOTE — TELEPHONE ENCOUNTER
Rx Refill Note  Requested Prescriptions     Signed Prescriptions Disp Refills   • glucose monitor monitoring kit 1 each 0     Si each Daily. Use daily as directed.     Authorizing Provider: ADEOLA BARRERA     Ordering User: MARIA E TOLLIVER   • glucose blood test strip 100 each 12     Si each by Other route Daily. Use daily as directed     Authorizing Provider: ADEOLA BARRERA     Ordering User: MARIA E TOLLIVER   • Lancets Thin misc 100 each 12     Si each Daily. Use daily as directed     Authorizing Provider: ADEOLA BARRERA     Ordering User: MARIA E TOLLIVER      Last office visit with prescribing clinician: 2022      Next office visit with prescribing clinician: 2022            Maria E Tolliver MA  22, 16:58 EDT

## 2022-08-30 ENCOUNTER — TELEPHONE (OUTPATIENT)
Dept: CARDIOLOGY | Facility: HOSPITAL | Age: 82
End: 2022-08-30

## 2022-08-30 ENCOUNTER — DOCUMENTATION (OUTPATIENT)
Dept: CARDIOLOGY | Facility: HOSPITAL | Age: 82
End: 2022-08-30

## 2022-08-30 NOTE — PROGRESS NOTES
Received labs completed on 8/11/2022 for review    Sodium 135, potassium 4.2, chloride 103, carbon oxide 21, BUN 24, creatinine 1.4, estimated GFR 56    Urinalysis 8/11/2022: Nitrite negative urine glucose negative, bacteria 1+    Urine culture 10,000-100,000 CFU per milliliter.  Plan to recollect specimen due to 3 or more organisms suggesting contamination.    Home health to recollect.

## 2022-08-30 NOTE — TELEPHONE ENCOUNTER
----- Message from Phyllis Richardson CMA sent at 8/24/2022  9:55 AM EDT -----  Petra with [employer] called back and states that they will be able to fax over the BMP but the UA was contaminated and that it will have to be recollected. She is going back out tomorrow to recollect.    ----- Message -----  From: Pari García APRN  Sent: 8/24/2022   8:06 AM EDT  To: Phyllis Richardson CMA    BMP was ordered on 8/10/2022 to be completed that week with home health as well as a urinalysis.  I did not receive those results.  Please follow-up.

## 2022-09-01 ENCOUNTER — OFFICE VISIT (OUTPATIENT)
Dept: CARDIOLOGY | Facility: HOSPITAL | Age: 82
End: 2022-09-01

## 2022-09-01 ENCOUNTER — LAB (OUTPATIENT)
Dept: LAB | Facility: HOSPITAL | Age: 82
End: 2022-09-01

## 2022-09-01 VITALS
OXYGEN SATURATION: 99 % | RESPIRATION RATE: 23 BRPM | TEMPERATURE: 96.3 F | DIASTOLIC BLOOD PRESSURE: 62 MMHG | SYSTOLIC BLOOD PRESSURE: 138 MMHG | HEIGHT: 70 IN | WEIGHT: 207 LBS | BODY MASS INDEX: 29.63 KG/M2 | HEART RATE: 55 BPM

## 2022-09-01 DIAGNOSIS — I38 VALVULAR HEART DISEASE: ICD-10-CM

## 2022-09-01 DIAGNOSIS — I10 ESSENTIAL HYPERTENSION: ICD-10-CM

## 2022-09-01 DIAGNOSIS — I25.10 CORONARY ARTERY DISEASE INVOLVING NATIVE CORONARY ARTERY OF NATIVE HEART WITHOUT ANGINA PECTORIS: ICD-10-CM

## 2022-09-01 DIAGNOSIS — I50.32 CHRONIC HEART FAILURE WITH PRESERVED EJECTION FRACTION: ICD-10-CM

## 2022-09-01 PROCEDURE — 80048 BASIC METABOLIC PNL TOTAL CA: CPT

## 2022-09-01 PROCEDURE — 99214 OFFICE O/P EST MOD 30 MIN: CPT | Performed by: NURSE PRACTITIONER

## 2022-09-01 PROCEDURE — 36415 COLL VENOUS BLD VENIPUNCTURE: CPT

## 2022-09-01 RX ORDER — POTASSIUM CHLORIDE 1500 MG/1
20 TABLET, FILM COATED, EXTENDED RELEASE ORAL AS NEEDED
COMMUNITY
End: 2022-09-02 | Stop reason: SDUPTHER

## 2022-09-01 RX ORDER — FUROSEMIDE 40 MG/1
40 TABLET ORAL DAILY
COMMUNITY
End: 2022-09-23 | Stop reason: SDUPTHER

## 2022-09-01 NOTE — PROGRESS NOTES
"[facility], [facility] Heart and Vascular    Chief Complaint  Follow-up (HF and edema)    Subjective    History of Present Illness {  Problem List  Visit  Diagnosis   Encounters  Notes  Medications  Labs  Result Review Imaging  Media :23}     Narendra Cochran presents to [facility] CARDIOLOG[facility] for   History of Present Illness       81-year-old male with heart failure with reduced EF, valvular heart disease.  EF in 2020 was 36%.  Echocardiogram 3/13/2021 showed an improved EF of 50%, moderate MR, moderate AI.  History of CAD, PVCs, pacemaker/ICD ([employer] dual-chamber ICD) placed after cardiac arrest, sinus node dysfunction, first-degree AV block, hypertension, dyslipidemia, diabetes, COPD, diverticulosis, Paget's disease, seizure disorder, GERD.    Patient is struggled with elevated serum creatinine on Lasix, spironolactone, Entresto, potassium.  Medications were held in March of this year with improving creatinine.  Entresto was restarted.  Patient's cardiac heart logic continue to be elevated.  Lasix was restarted as well as spironolactone.  Patient then developed acute kidney injury with elevated creatinine and elevated potassium.     Patient currently on Entresto.  Lasix and potassium have been held.    Pt reports improved dyspnea, fatigue, appetite.  Overall feeling better.  He has noted mild lower extremity edema.  Sleeping okay.  No chest pain or pressure or palpitations.      Objective     Vital Signs:   Vitals:    09/01/22 1340   BP: 138/62   BP Location: Left arm   Patient Position: Sitting   Cuff Size: Adult   Pulse: 55   Resp: 23   Temp: 96.3 °F (35.7 °C)   TempSrc: Temporal   SpO2: 99%   Weight: 93.9 kg (207 lb)   Height: 177.8 cm (70\")     Body mass index is 29.7 kg/m².  Physical Exam  Vitals reviewed.   Constitutional:       General: He is not in acute distress.     Appearance: Normal appearance.   Cardiovascular:      Rate and Rhythm: " Normal rate and regular rhythm.      Pulses:           Radial pulses are 2+ on the right side.        Dorsalis pedis pulses are 2+ on the right side.        Posterior tibial pulses are 2+ on the right side.      Heart sounds: Normal heart sounds.   Pulmonary:      Effort: Pulmonary effort is normal.      Breath sounds: Rales (bases) present.   Musculoskeletal:      Right lower leg: No edema.      Left lower leg: No edema.   Skin:     General: Skin is warm and dry.   Neurological:      Mental Status: He is alert.   Psychiatric:         Mood and Affect: Mood normal.         Behavior: Behavior is cooperative.              Result Review  Data Reviewed:{ Labs  Result Review  Imaging  Med Tab  Media :23}   Received labs completed on 8/11/2022 for review     Sodium 135, potassium 4.2, chloride 103, carbon oxide 21, BUN 24, creatinine 1.4, estimated GFR 56              Assessment and Plan {CC Problem List  Visit Diagnosis  ROS  Review (Popup)  Health Maintenance  Quality  BestPractice  Medications  SmartSets  SnapShot Encounters  Media :23}   1. Chronic heart failure with preserved ejection fraction (HCC)  History of depressed EF.  Last echocardiogram improved    Continue beta-blocker, Entresto    Patient with history of hyperkalemia and acute kidney injury with spironolactone.  Has been tried on 2 occasions.    Patient has not had Lasix    Improved dyspnea.  Has noted mild lower extremity edema.  Weight gain noted.      [employer] heart logic report transmission 9/1/2022 with heart logic index 31.  Recent increase in the last month.    Patient will take Lasix 40 mg with potassium supplement x2 days.    Will use Lasix on an as-needed basis    - Basic Metabolic Panel; Future    Entresto samples given:  Lot:  [id number], Exp 10/2024 (X3). Lot:  [id number], Exp 12/2024 (X1 bottle)    Given patient [employer] assistance forms to complete      2. Valvular heart disease  Continue to monitor    3. Coronary artery  disease involving native coronary artery of native heart without angina pectoris  No chest pain or pressure    4. Essential hypertension  Stable          Follow Up {Instructions Charge Capture  Follow-up Communications :23}   Return in about 6 months (around 3/1/2023) for HF, Office visit.    Patient was given instructions and counseling regarding his condition or for health maintenance advice. Please see specific information pulled into the AVS if appropriate.  Patient was instructed to call the [facility] Center with any questions, concerns, or worsening symptoms.

## 2022-09-02 ENCOUNTER — TELEPHONE (OUTPATIENT)
Dept: CARDIOLOGY | Facility: HOSPITAL | Age: 82
End: 2022-09-02

## 2022-09-02 ENCOUNTER — DOCUMENTATION (OUTPATIENT)
Dept: CARDIOLOGY | Facility: HOSPITAL | Age: 82
End: 2022-09-02

## 2022-09-02 DIAGNOSIS — E87.6 HYPOKALEMIA: Primary | ICD-10-CM

## 2022-09-02 LAB
ANION GAP SERPL CALCULATED.3IONS-SCNC: 10.5 MMOL/L (ref 5–15)
BUN SERPL-MCNC: 9 MG/DL (ref 8–23)
BUN/CREAT SERPL: 9.9 (ref 7–25)
CALCIUM SPEC-SCNC: 9.2 MG/DL (ref 8.6–10.5)
CHLORIDE SERPL-SCNC: 110 MMOL/L (ref 98–107)
CO2 SERPL-SCNC: 24.5 MMOL/L (ref 22–29)
CREAT SERPL-MCNC: 0.91 MG/DL (ref 0.76–1.27)
EGFRCR SERPLBLD CKD-EPI 2021: 83.6 ML/MIN/1.73
GLUCOSE SERPL-MCNC: 93 MG/DL (ref 65–99)
POTASSIUM SERPL-SCNC: 3.1 MMOL/L (ref 3.5–5.2)
SODIUM SERPL-SCNC: 145 MMOL/L (ref 136–145)

## 2022-09-02 RX ORDER — POTASSIUM CHLORIDE 1500 MG/1
20 TABLET, FILM COATED, EXTENDED RELEASE ORAL EVERY OTHER DAY
Qty: 60 TABLET
Start: 2022-09-02 | End: 2022-09-23 | Stop reason: SDUPTHER

## 2022-09-02 NOTE — TELEPHONE ENCOUNTER
Called to review labs with patient and wife.  Patient's kidney function normal.  Hypokalemic with potassium of 3.1.  Patient has had a history of hyperkalemia on spironolactone.    Patient will continue potassium chloride 20 mEq every other day.  Repeat kidney function in 1 week with home health.  Wife is able to teach back instructions.

## 2022-09-02 NOTE — PROGRESS NOTES
[employer] Prescriber application form completed for assistance of Enstresto 24-26 mg, 1 tab BID for patient.

## 2022-09-06 ENCOUNTER — OFFICE VISIT (OUTPATIENT)
Dept: FAMILY MEDICINE CLINIC | Facility: CLINIC | Age: 82
End: 2022-09-06

## 2022-09-06 ENCOUNTER — LAB (OUTPATIENT)
Dept: LAB | Facility: HOSPITAL | Age: 82
End: 2022-09-06

## 2022-09-06 VITALS
WEIGHT: 205.4 LBS | DIASTOLIC BLOOD PRESSURE: 76 MMHG | HEIGHT: 70 IN | HEART RATE: 62 BPM | OXYGEN SATURATION: 96 % | TEMPERATURE: 98.5 F | SYSTOLIC BLOOD PRESSURE: 124 MMHG | BODY MASS INDEX: 29.41 KG/M2

## 2022-09-06 DIAGNOSIS — N18.31 ANEMIA DUE TO STAGE 3A CHRONIC KIDNEY DISEASE: ICD-10-CM

## 2022-09-06 DIAGNOSIS — E11.65 TYPE 2 DIABETES MELLITUS WITH HYPERGLYCEMIA, WITHOUT LONG-TERM CURRENT USE OF INSULIN: Chronic | ICD-10-CM

## 2022-09-06 DIAGNOSIS — K21.9 GASTROESOPHAGEAL REFLUX DISEASE, UNSPECIFIED WHETHER ESOPHAGITIS PRESENT: ICD-10-CM

## 2022-09-06 DIAGNOSIS — Z91.81 AT HIGH RISK FOR FALLS: ICD-10-CM

## 2022-09-06 DIAGNOSIS — R56.9 SEIZURE: Chronic | ICD-10-CM

## 2022-09-06 DIAGNOSIS — D63.1 ANEMIA DUE TO STAGE 3A CHRONIC KIDNEY DISEASE: ICD-10-CM

## 2022-09-06 DIAGNOSIS — Z00.00 MEDICARE ANNUAL WELLNESS VISIT, SUBSEQUENT: Primary | ICD-10-CM

## 2022-09-06 DIAGNOSIS — Z87.891 FORMER SMOKER: Chronic | ICD-10-CM

## 2022-09-06 DIAGNOSIS — E78.5 HYPERLIPIDEMIA LDL GOAL <70: ICD-10-CM

## 2022-09-06 DIAGNOSIS — J44.9 CHRONIC OBSTRUCTIVE PULMONARY DISEASE, UNSPECIFIED COPD TYPE: ICD-10-CM

## 2022-09-06 DIAGNOSIS — I50.22 CHRONIC SYSTOLIC CONGESTIVE HEART FAILURE: ICD-10-CM

## 2022-09-06 DIAGNOSIS — I10 ESSENTIAL HYPERTENSION: ICD-10-CM

## 2022-09-06 DIAGNOSIS — Z23 NEED FOR VACCINATION: ICD-10-CM

## 2022-09-06 LAB
BASOPHILS # BLD AUTO: 0.02 10*3/MM3 (ref 0–0.2)
BASOPHILS NFR BLD AUTO: 0.6 % (ref 0–1.5)
CHOLEST SERPL-MCNC: 120 MG/DL (ref 0–200)
DEPRECATED RDW RBC AUTO: 50 FL (ref 37–54)
EOSINOPHIL # BLD AUTO: 0.06 10*3/MM3 (ref 0–0.4)
EOSINOPHIL NFR BLD AUTO: 1.9 % (ref 0.3–6.2)
ERYTHROCYTE [DISTWIDTH] IN BLOOD BY AUTOMATED COUNT: 17.8 % (ref 12.3–15.4)
HCT VFR BLD AUTO: 33.3 % (ref 37.5–51)
HDLC SERPL-MCNC: 57 MG/DL (ref 40–60)
HGB BLD-MCNC: 10.5 G/DL (ref 13–17.7)
LDLC SERPL CALC-MCNC: 49 MG/DL (ref 0–100)
LDLC/HDLC SERPL: 0.86 {RATIO}
LYMPHOCYTES # BLD AUTO: 0.67 10*3/MM3 (ref 0.7–3.1)
LYMPHOCYTES NFR BLD AUTO: 21.1 % (ref 19.6–45.3)
MCH RBC QN AUTO: 24.9 PG (ref 26.6–33)
MCHC RBC AUTO-ENTMCNC: 31.5 G/DL (ref 31.5–35.7)
MCV RBC AUTO: 78.9 FL (ref 79–97)
MONOCYTES # BLD AUTO: 0.23 10*3/MM3 (ref 0.1–0.9)
MONOCYTES NFR BLD AUTO: 7.3 % (ref 5–12)
NEUTROPHILS NFR BLD AUTO: 2.18 10*3/MM3 (ref 1.7–7)
NEUTROPHILS NFR BLD AUTO: 68.8 % (ref 42.7–76)
PLATELET # BLD AUTO: 80 10*3/MM3 (ref 140–450)
RBC # BLD AUTO: 4.22 10*6/MM3 (ref 4.14–5.8)
TRIGL SERPL-MCNC: 69 MG/DL (ref 0–150)
VLDLC SERPL-MCNC: 14 MG/DL (ref 5–40)
WBC NRBC COR # BLD: 3.17 10*3/MM3 (ref 3.4–10.8)

## 2022-09-06 PROCEDURE — 1170F FXNL STATUS ASSESSED: CPT | Performed by: PHYSICIAN ASSISTANT

## 2022-09-06 PROCEDURE — 90677 PCV20 VACCINE IM: CPT | Performed by: PHYSICIAN ASSISTANT

## 2022-09-06 PROCEDURE — 85025 COMPLETE CBC W/AUTO DIFF WBC: CPT

## 2022-09-06 PROCEDURE — G0009 ADMIN PNEUMOCOCCAL VACCINE: HCPCS | Performed by: PHYSICIAN ASSISTANT

## 2022-09-06 PROCEDURE — 1126F AMNT PAIN NOTED NONE PRSNT: CPT | Performed by: PHYSICIAN ASSISTANT

## 2022-09-06 PROCEDURE — G0439 PPPS, SUBSEQ VISIT: HCPCS | Performed by: PHYSICIAN ASSISTANT

## 2022-09-06 PROCEDURE — 80061 LIPID PANEL: CPT

## 2022-09-06 PROCEDURE — 1159F MED LIST DOCD IN RCRD: CPT | Performed by: PHYSICIAN ASSISTANT

## 2022-09-06 RX ORDER — METFORMIN HYDROCHLORIDE 500 MG/1
500 TABLET, EXTENDED RELEASE ORAL
Qty: 90 TABLET | Refills: 3 | Status: SHIPPED | OUTPATIENT
Start: 2022-09-06

## 2022-09-06 RX ORDER — BLOOD-GLUCOSE METER
1 KIT MISCELLANEOUS DAILY
Qty: 1 EACH | Refills: 0 | Status: SHIPPED | OUTPATIENT
Start: 2022-09-06

## 2022-09-06 RX ORDER — PANTOPRAZOLE SODIUM 40 MG/1
40 TABLET, DELAYED RELEASE ORAL 2 TIMES DAILY
Qty: 180 TABLET | Refills: 1 | Status: SHIPPED | OUTPATIENT
Start: 2022-09-06 | End: 2023-02-22

## 2022-09-06 NOTE — PATIENT INSTRUCTIONS

## 2022-09-06 NOTE — PROGRESS NOTES
The ABCs of the Annual Wellness Visit  Subsequent Medicare Wellness Visit    Chief Complaint   Patient presents with   • Medicare Wellness-subsequent   • Hyperkalemia     ED visit  7/29/22   • Chronic Renal Failure     ED Visit 729/22   • Diabetes     Type 2  Last A1C  7/29/22   6.50   • Med Refill      Subjective    History of Present Illness:  Narendra Cochran is a 81 y.o. male who presents for a Subsequent Medicare Wellness Visit.    The following portions of the patient's history were reviewed and   updated as appropriate: allergies, current medications, past family history, past medical history, past social history, past surgical history and problem list.    Compared to one year ago, the patient feels his physical   health is the same.    Compared to one year ago, the patient feels his mental   health is the same.    Recent Hospitalizations:  This patient has had a [facility] admission record on file within the last 365 days.    Current Medical Providers:  Patient Care Team:  Marguerite Moore PA-C as PCP - General (Physician Assistant)  Som Boyd DO as Consulting Physician (Cardiology)  Javad Mercedes MD as Consulting Physician (Cardiology)  Thomas Lui MD as Consulting Physician (Hematology and Oncology)    Outpatient Medications Prior to Visit   Medication Sig Dispense Refill   • albuterol sulfate  (90 Base) MCG/ACT inhaler Inhale 2 puffs Every 4 (Four) Hours As Needed for Wheezing or Shortness of Air. 8 g 3   • aspirin 81 MG chewable tablet Chew 1 tablet Daily.     • diclofenac (VOLTAREN) 1 % gel gel Apply 4 g topically to the appropriate area as directed 4 (Four) Times a Day As Needed (prn for pain). 60 tube 0   • furosemide (LASIX) 40 MG tablet Take 40 mg by mouth As Needed.     • levETIRAcetam (KEPPRA) 750 MG tablet TAKE 1 TABLET BY MOUTH TWICE A DAY 60 tablet 5   • metoprolol succinate XL (TOPROL-XL) 50 MG 24 hr tablet TAKE 1 TABLET BY MOUTH EVERY DAY 90 tablet 2   •  potassium chloride ER (K-TAB) 20 MEQ tablet controlled-release ER tablet Take 1 tablet by mouth Every Other Day. 60 tablet    • rosuvastatin (CRESTOR) 20 MG tablet Take 1 tablet by mouth Daily. 90 tablet 3   • sacubitril-valsartan (Entresto) 24-26 MG tablet Take 1 tablet by mouth 2 (Two) Times a Day. 60 tablet 6   • tiotropium bromide monohydrate (Spiriva Respimat) 2.5 MCG/ACT aerosol solution inhaler Inhale 2 puffs Daily. 4 g 5   • Blood Pressure Monitoring (BLOOD PRESSURE MONITOR/L CUFF) misc 1 Units Daily. 1 each 0   • glucose blood (OneTouch Ultra) test strip USE AS INSTRUCTED PER  each 2   • glucose blood test strip 1 each by Other route Daily. Use daily as directed 100 each 12   • glucose monitor monitoring kit 1 each Daily. DX: E11.65 1 each 0   • glucose monitor monitoring kit 1 each Daily. Use daily as directed. 1 each 0   • Lancets (OneTouch Delica Plus [id number]) misc USE TO CHECK BLOOD GLUCOSE ONCE DAILY 100 each 0   • Lancets Thin misc 1 each Daily. Use daily as directed 100 each 12   • metFORMIN ER (GLUCOPHAGE-XR) 500 MG 24 hr tablet Take 1 tablet by mouth Daily With Breakfast. 90 tablet 1   • pantoprazole (PROTONIX) 40 MG EC tablet TAKE 1 TABLET BY MOUTH TWICE A  tablet 0     No facility-administered medications prior to visit.       No opioid medication identified on active medication list. I have reviewed chart for other potential  high risk medication/s and harmful drug interactions in the elderly.          Aspirin is on active medication list. Aspirin use is not indicated based on review of current medical condition/s. Risk of harm outweighs potential benefits. Patient instructed to discontinue this medication.  .      Patient Active Problem List   Diagnosis   • Essential hypertension   • Hyperlipidemia LDL goal <70   • Paget disease of bone   • H/O seizures on Keppra   • Gonalgia   • Osteitis deformans   • COPD (chronic obstructive pulmonary disease) (HCC)   • Syncope   • NSVT  "(nonsustained ventricular tachycardia) ([facility])   • Coronary artery disease involving native coronary artery of native heart with angina pectoris (HCC)   • NICM    • OHCA   • Hypokalemia   • Hypomagnesemia   • VHD; mild-mod AR, mild MR   • Chronic systolic congestive heart failure (HCC)   • Former tobacco user   • GERD   • Marijuana use   • Frequent PVCs   • Presence of biventricular implantable cardioverter-defibrillator (ICD)   • Stage 3a chronic kidney disease (HCC)   • Moderate malnutrition (HCC)     Advance Care Planning  Advance Directive is not on file.  ACP discussion was declined by the patient. Patient does not have an advance directive, declines further assistance.    Review of Systems   Constitutional: Positive for fatigue. Negative for chills and fever.   HENT: Positive for dental problem and hearing loss. Negative for congestion, ear pain, nosebleeds, rhinorrhea, sinus pressure, sore throat and trouble swallowing.    Eyes: Positive for visual disturbance.   Respiratory: Positive for cough. Negative for shortness of breath and wheezing.    Gastrointestinal: Negative for abdominal pain, blood in stool, constipation, diarrhea, nausea and vomiting.   Endocrine: Negative for polyuria.   Genitourinary: Negative for flank pain, frequency and hematuria.   Musculoskeletal: Positive for arthralgias, gait problem and myalgias. Negative for neck pain.   Skin: Negative for rash.   Neurological: Positive for weakness. Negative for dizziness, seizures, numbness and confusion.   Psychiatric/Behavioral: Negative for agitation, dysphoric mood, self-injury, sleep disturbance and suicidal ideas. The patient is not nervous/anxious.         Objective    Vitals:    09/06/22 1000   BP: 124/76   BP Location: Left arm   Patient Position: Sitting   Cuff Size: Adult   Pulse: 62   Temp: 98.5 °F (36.9 °C)   SpO2: 96%   Weight: 93.2 kg (205 lb 6.4 oz)   Height: 177.8 cm (70\")   PainSc: 0-No pain     Estimated body mass index is 29.47 " "kg/m² as calculated from the following:    Height as of this encounter: 177.8 cm (70\").    Weight as of this encounter: 93.2 kg (205 lb 6.4 oz).    BMI is >= 25 and <30. (Overweight) The following options were offered after discussion;: exercise counseling/recommendations and nutrition counseling/recommendations      Does the patient have evidence of cognitive impairment? No    Physical Exam  Vitals reviewed.   Constitutional:       General: He is not in acute distress.     Appearance: Normal appearance. He is well-developed. He is not ill-appearing or diaphoretic.   HENT:      Head: Normocephalic and atraumatic.      Right Ear: Tympanic membrane, ear canal and external ear normal. Decreased hearing noted.      Left Ear: Tympanic membrane, ear canal and external ear normal. Decreased hearing noted.      Nose: Nose normal.      Right Sinus: No maxillary sinus tenderness or frontal sinus tenderness.      Left Sinus: No maxillary sinus tenderness or frontal sinus tenderness.      Mouth/Throat:      Pharynx: Uvula midline.   Eyes:      General: Lids are normal.      Extraocular Movements: Extraocular movements intact.      Conjunctiva/sclera: Conjunctivae normal.   Neck:      Thyroid: No thyroid mass or thyromegaly.      Trachea: Trachea and phonation normal.   Cardiovascular:      Rate and Rhythm: Normal rate and regular rhythm.      Heart sounds: Normal heart sounds.   Pulmonary:      Effort: Pulmonary effort is normal.      Breath sounds: Normal breath sounds.   Abdominal:      Palpations: Abdomen is not rigid.   Musculoskeletal:         General: Normal range of motion.      Cervical back: Normal range of motion.      Right lower leg: No edema.      Left lower leg: No edema.   Lymphadenopathy:      Cervical: No cervical adenopathy.      Right cervical: No superficial cervical adenopathy.     Left cervical: No superficial cervical adenopathy.   Skin:     General: Skin is warm.      Findings: No erythema or rash.      " Nails: There is no clubbing.   Neurological:      Mental Status: He is alert and oriented to person, place, and time.      Coordination: Coordination normal.      Gait: Gait abnormal.      Deep Tendon Reflexes: Reflexes are normal and symmetric.      Comments: CN grossly intact.  Unstable while walking, using walker in office today.   Psychiatric:         Attention and Perception: He is attentive.         Mood and Affect: Mood normal.         Speech: Speech normal.         Behavior: Behavior normal. Behavior is cooperative.         Thought Content: Thought content normal.         Judgment: Judgment normal.       Lab Results   Component Value Date    HGBA1C 6.50 (H) 07/29/2022            HEALTH RISK ASSESSMENT    Smoking Status:  Social History     Tobacco Use   Smoking Status Former Smoker   • Packs/day: 0.50   • Years: 65.00   • Pack years: 32.50   • Types: Cigars, Cigarettes   • Quit date: 9/1/2019   • Years since quitting: 3.0   Smokeless Tobacco Never Used     Alcohol Consumption:  Social History     Substance and Sexual Activity   Alcohol Use Yes    Comment: very rarely     Fall Risk Screen:    RUSTADI Fall Risk Assessment was completed, and patient is at MODERATE risk for falls. Assessment completed on:9/6/2022    Depression Screening:  PHQ-2/PHQ-9 Depression Screening 9/6/2022   Retired PHQ-9 Total Score -   Retired Total Score -   Little Interest or Pleasure in Doing Things 0-->not at all   Feeling Down, Depressed or Hopeless 0-->not at all   PHQ-9: Brief Depression Severity Measure Score 0       Health Habits and Functional and Cognitive Screening:  Functional & Cognitive Status 9/6/2022   Do you have difficulty preparing food and eating? Yes   Do you have difficulty bathing yourself, getting dressed or grooming yourself? No   Do you have difficulty using the toilet? No   Do you have difficulty moving around from place to place? No   Do you have trouble with steps or getting out of a bed or a chair? No    Current Diet Well Balanced Diet   Dental Exam Up to date   Eye Exam Up to date   Exercise (times per week) 4 times per week   Current Exercises Include Walking   Current Exercise Activities Include -   Do you need help using the phone?  No   Are you deaf or do you have serious difficulty hearing?  Yes   Do you need help with transportation? No   Do you need help shopping? No   Do you need help preparing meals?  No   Do you need help with housework?  No   Do you need help with laundry? No   Do you need help taking your medications? No   Do you need help managing money? No   Do you ever drive or ride in a car without wearing a seat belt? No   Have you felt unusual stress, anger or loneliness in the last month? -   Who do you live with? -   If you need help, do you have trouble finding someone available to you? -   Have you been bothered in the last four weeks by sexual problems? -   Do you have difficulty concentrating, remembering or making decisions? -       Age-appropriate Screening Schedule:  Refer to the list below for future screening recommendations based on patient's age, sex and/or medical conditions. Orders for these recommended tests are listed in the plan section. The patient has been provided with a written plan.    Health Maintenance   Topic Date Due   • TDAP/TD VACCINES (1 - Tdap) Never done   • ZOSTER VACCINE (1 of 2) Never done   • URINE MICROALBUMIN  05/18/2022   • LIPID PANEL  08/12/2022   • INFLUENZA VACCINE  10/01/2022   • HEMOGLOBIN A1C  01/29/2023   • DIABETIC EYE EXAM  08/02/2023              Assessment & Plan   CMS Preventative Services Quick Reference  Risk Factors Identified During Encounter  Cardiovascular Disease  Fall Risk-High or Moderate  Hearing Problem  Immunizations Discussed/Encouraged (specific Immunizations; Td, Influenza, Prevnar 20 (Pneumococcal 20-valent conjugate) and COVID19  Inactivity/Sedentary  Obesity/Overweight   Polypharmacy  The above risks/problems have been  discussed with the patient.  Follow up actions/plans if indicated are seen below in the Assessment/Plan Section.  Pertinent information has been shared with the patient in the After Visit Summary.    Diagnoses and all orders for this visit:    1. Medicare annual wellness visit, subsequent (Primary)    2. Type 2 diabetes mellitus with hyperglycemia, without long-term current use of insulin (HCC)  -     metFORMIN ER (GLUCOPHAGE-XR) 500 MG 24 hr tablet; Take 1 tablet by mouth Daily With Breakfast.  Dispense: 90 tablet; Refill: 3  -     glucose monitor monitoring kit; 1 each Daily. Use daily as directed.  Dispense: 1 each; Refill: 0  -     glucose blood test strip; 1 each by Other route Daily. Use daily as directed  Dispense: 100 each; Refill: 12  -     Ambulatory Referral to Podiatry  Recent hemoglobin AIC was 6.5%.  Previously on Jardiance, was unable to afford this.  On metformin currently.  Return in 3 months.    3. Chronic systolic congestive heart failure (HCC)  On Entresto, samples given today.  Followed by cardiology.    4. Essential hypertension  Stable, well-controlled.  Compliant on medication.    5. Hyperlipidemia LDL goal <70  -     Lipid Panel; Future  On rosuvastatin 20 mg nightly.    6. Anemia due to stage 3a chronic kidney disease ([facility])  -     CBC Auto Differential; Future  Repeat CBC given recent anemia.    7. Gastroesophageal reflux disease, unspecified whether esophagitis present  -     pantoprazole (PROTONIX) 40 MG EC tablet; Take 1 tablet by mouth 2 (Two) Times a Day.  Dispense: 180 tablet; Refill: 1  Stable with medication.    8. H/O seizures on Keppra  No seizures since starting medication.    9. Chronic obstructive pulmonary disease, unspecified COPD type ([facility])  Using inhaler regularly.    10. Former tobacco user  Quit a few years ago.    11. At high risk for falls  Using walker.  Has wheelchair.  Currently has home health PT/OT.    12. Need for vaccination  -     Pneumococcal Conjugate Vaccine  20-Valent (PCV20)        Follow Up:   Return in about 3 months (around 12/6/2022) for Recheck, DM.     An After Visit Summary and PPPS were made available to the patient.            I spent 40 minutes caring for Narendra on this date of service. This time includes time spent by me in the following activities:preparing for the visit, reviewing tests, obtaining and/or reviewing a separately obtained history, performing a medically appropriate examination and/or evaluation , counseling and educating the patient/family/caregiver, ordering medications, tests, or procedures, referring and communicating with other health care professionals , documenting information in the medical record, independently interpreting results and communicating that information with the patient/family/caregiver and care coordination

## 2022-09-08 ENCOUNTER — TELEPHONE (OUTPATIENT)
Dept: CARDIOLOGY | Facility: CLINIC | Age: 82
End: 2022-09-08

## 2022-09-08 NOTE — TELEPHONE ENCOUNTER
"Elevated Heart Logic: Heartlogic Index 49, S3 1.22, s1 1.96, Thoracic Impedance 33.3  RR 23.0, Night HR 66. Called and spoke with wife Jessica. She reports that patient has been feeling well. He has not complained of nor does he have any noticeable SOB. He does not have edema, and denies cp, palpitations or dizziness. She did report that he was started on a Potassium supplement last week that he takes every other day. She also reported that he is currently in the \"donut hole\" and has been requiring samples of Entresto. She assures me that he has not missed a dose of his Entresto.   "

## 2022-09-09 ENCOUNTER — TELEPHONE (OUTPATIENT)
Dept: CARDIOLOGY | Facility: HOSPITAL | Age: 82
End: 2022-09-09

## 2022-09-09 NOTE — TELEPHONE ENCOUNTER
Received urinalysis ordered on 8/11/2020    Results show positive Enterococcus faecalis    Called to follow-up with patient.  He denies continued dysuria, hematuria, frequency, nausea, vomiting, abdominal pain, fevers, chills.  Overall he is not having any concerns.  Wife tells me today that he is not confused or had any change in level of consciousness.  He is at his baseline state of health.    At this time we will treat as asymptomatic bacteriuria and avoid antibiotic treatment at this time.    If patient symptoms change or worsen call for evaluation.

## 2022-09-13 ENCOUNTER — TELEPHONE (OUTPATIENT)
Dept: CARDIOLOGY | Facility: HOSPITAL | Age: 82
End: 2022-09-13

## 2022-09-13 NOTE — TELEPHONE ENCOUNTER
----- Message from LEANDRO Brown sent at 9/13/2022  1:00 PM EDT -----  Regarding: RE: SOB and edema  Have him take lasix 40 mg today and tomorrow with potassium supplement.   ----- Message -----  From: Phyllis Richardson CMA  Sent: 9/13/2022  12:54 PM EDT  To: LEANDRO Brown  Subject: RE: SOB and edema                                Spoke to patient's wife and she states that he is feeling a little better today. Patient is still experiencing SOB this morning and has had edema in his feet for 2 days. He denies chest pain or pressure but has had some abdominal pain as of last night. No weight gain that she has noticed but wife states that he is still in bed so she has not weighed him today.    ----- Message -----  From: Pari García APRN  Sent: 9/13/2022  10:31 AM EDT  To: Phyllis Richardson CMA  Subject: RE: SOB and edema                                The order was placed on 09/02/22 for BMP    Call patient and wife today and see who is he doing.  cP, pressure, dyspnea, edema, weight gain?  ----- Message -----  From: Phyllis Richardson CMA  Sent: 9/12/2022   3:33 PM EDT  To: LEANDRO Brown, LEANDRO Dudley  Subject: SOB and edema                                    Petra, nurse with [facility], called regarding patient's c/o increased SOB and edema off and on. Lung sounds slightly diminished but vitals stable. Patient had also mentioned that Pari wanted them to have labs drawn but I see no recent orders.    Phone: [phone]

## 2022-09-14 ENCOUNTER — APPOINTMENT (OUTPATIENT)
Dept: GENERAL RADIOLOGY | Facility: HOSPITAL | Age: 82
End: 2022-09-14

## 2022-09-14 ENCOUNTER — HOSPITAL ENCOUNTER (OUTPATIENT)
Facility: HOSPITAL | Age: 82
Setting detail: OBSERVATION
Discharge: NURSING, REHAB OR LONG-TERM CARE | End: 2022-09-20
Attending: EMERGENCY MEDICINE | Admitting: INTERNAL MEDICINE

## 2022-09-14 ENCOUNTER — TELEPHONE (OUTPATIENT)
Dept: CARDIOLOGY | Facility: HOSPITAL | Age: 82
End: 2022-09-14

## 2022-09-14 DIAGNOSIS — R79.89 ELEVATED SERUM CREATININE: ICD-10-CM

## 2022-09-14 DIAGNOSIS — J44.9 CHRONIC OBSTRUCTIVE PULMONARY DISEASE, UNSPECIFIED COPD TYPE: ICD-10-CM

## 2022-09-14 DIAGNOSIS — R06.82 TACHYPNEA: ICD-10-CM

## 2022-09-14 DIAGNOSIS — R06.02 SHORTNESS OF BREATH: ICD-10-CM

## 2022-09-14 DIAGNOSIS — I50.9 ACUTE ON CHRONIC CONGESTIVE HEART FAILURE, UNSPECIFIED HEART FAILURE TYPE: Primary | ICD-10-CM

## 2022-09-14 PROBLEM — J81.0 ACUTE PULMONARY EDEMA (HCC): Status: ACTIVE | Noted: 2022-09-14

## 2022-09-14 PROBLEM — E11.9 TYPE 2 DIABETES MELLITUS (HCC): Status: ACTIVE | Noted: 2022-09-14

## 2022-09-14 PROBLEM — D69.6 THROMBOCYTOPENIA: Status: ACTIVE | Noted: 2022-09-14

## 2022-09-14 PROBLEM — I48.0 PAROXYSMAL A-FIB: Status: ACTIVE | Noted: 2022-09-14

## 2022-09-14 LAB
ALBUMIN SERPL-MCNC: 4.2 G/DL (ref 3.5–5.2)
ALBUMIN/GLOB SERPL: 1.2 G/DL
ALP SERPL-CCNC: 76 U/L (ref 39–117)
ALT SERPL W P-5'-P-CCNC: 30 U/L (ref 1–41)
ANION GAP SERPL CALCULATED.3IONS-SCNC: 17 MMOL/L (ref 5–15)
AST SERPL-CCNC: 47 U/L (ref 1–40)
BASOPHILS # BLD AUTO: 0.01 10*3/MM3 (ref 0–0.2)
BASOPHILS NFR BLD AUTO: 0.3 % (ref 0–1.5)
BILIRUB SERPL-MCNC: 1.1 MG/DL (ref 0–1.2)
BUN SERPL-MCNC: 18 MG/DL (ref 8–23)
BUN/CREAT SERPL: 11.1 (ref 7–25)
CALCIUM SPEC-SCNC: 9.5 MG/DL (ref 8.6–10.5)
CHLORIDE SERPL-SCNC: 106 MMOL/L (ref 98–107)
CO2 SERPL-SCNC: 20 MMOL/L (ref 22–29)
CREAT SERPL-MCNC: 1.62 MG/DL (ref 0.76–1.27)
DEPRECATED RDW RBC AUTO: 60.6 FL (ref 37–54)
EGFRCR SERPLBLD CKD-EPI 2021: 41.9 ML/MIN/1.73
EOSINOPHIL # BLD AUTO: 0.04 10*3/MM3 (ref 0–0.4)
EOSINOPHIL NFR BLD AUTO: 1.1 % (ref 0.3–6.2)
ERYTHROCYTE [DISTWIDTH] IN BLOOD BY AUTOMATED COUNT: 20.7 % (ref 12.3–15.4)
FLUAV SUBTYP SPEC NAA+PROBE: NOT DETECTED
FLUBV RNA ISLT QL NAA+PROBE: NOT DETECTED
GLOBULIN UR ELPH-MCNC: 3.4 GM/DL
GLUCOSE SERPL-MCNC: 99 MG/DL (ref 65–99)
HCT VFR BLD AUTO: 35.2 % (ref 37.5–51)
HGB BLD-MCNC: 11 G/DL (ref 13–17.7)
HOLD SPECIMEN: NORMAL
IMM GRANULOCYTES # BLD AUTO: 0.01 10*3/MM3 (ref 0–0.05)
IMM GRANULOCYTES NFR BLD AUTO: 0.3 % (ref 0–0.5)
LYMPHOCYTES # BLD AUTO: 0.62 10*3/MM3 (ref 0.7–3.1)
LYMPHOCYTES NFR BLD AUTO: 17.4 % (ref 19.6–45.3)
MCH RBC QN AUTO: 25.7 PG (ref 26.6–33)
MCHC RBC AUTO-ENTMCNC: 31.3 G/DL (ref 31.5–35.7)
MCV RBC AUTO: 82.2 FL (ref 79–97)
MONOCYTES # BLD AUTO: 0.19 10*3/MM3 (ref 0.1–0.9)
MONOCYTES NFR BLD AUTO: 5.3 % (ref 5–12)
NEUTROPHILS NFR BLD AUTO: 2.7 10*3/MM3 (ref 1.7–7)
NEUTROPHILS NFR BLD AUTO: 75.6 % (ref 42.7–76)
NRBC BLD AUTO-RTO: 0 /100 WBC (ref 0–0.2)
NT-PROBNP SERPL-MCNC: ABNORMAL PG/ML (ref 0–1800)
PLAT MORPH BLD: NORMAL
PLATELET # BLD AUTO: 129 10*3/MM3 (ref 140–450)
POTASSIUM SERPL-SCNC: 4.6 MMOL/L (ref 3.5–5.2)
PROT SERPL-MCNC: 7.6 G/DL (ref 6–8.5)
RBC # BLD AUTO: 4.28 10*6/MM3 (ref 4.14–5.8)
RBC MORPH BLD: NORMAL
SARS-COV-2 RNA PNL SPEC NAA+PROBE: NOT DETECTED
SODIUM SERPL-SCNC: 143 MMOL/L (ref 136–145)
TROPONIN T SERPL-MCNC: 0.02 NG/ML (ref 0–0.03)
WBC MORPH BLD: NORMAL
WBC NRBC COR # BLD: 3.57 10*3/MM3 (ref 3.4–10.8)
WHOLE BLOOD HOLD COAG: NORMAL
WHOLE BLOOD HOLD SPECIMEN: NORMAL

## 2022-09-14 PROCEDURE — G0378 HOSPITAL OBSERVATION PER HR: HCPCS

## 2022-09-14 PROCEDURE — 83880 ASSAY OF NATRIURETIC PEPTIDE: CPT | Performed by: EMERGENCY MEDICINE

## 2022-09-14 PROCEDURE — C9803 HOPD COVID-19 SPEC COLLECT: HCPCS

## 2022-09-14 PROCEDURE — 96375 TX/PRO/DX INJ NEW DRUG ADDON: CPT

## 2022-09-14 PROCEDURE — 85025 COMPLETE CBC W/AUTO DIFF WBC: CPT | Performed by: EMERGENCY MEDICINE

## 2022-09-14 PROCEDURE — 93005 ELECTROCARDIOGRAM TRACING: CPT | Performed by: EMERGENCY MEDICINE

## 2022-09-14 PROCEDURE — 74018 RADEX ABDOMEN 1 VIEW: CPT

## 2022-09-14 PROCEDURE — 99220 PR INITIAL OBSERVATION CARE/DAY 70 MINUTES: CPT | Performed by: INTERNAL MEDICINE

## 2022-09-14 PROCEDURE — 25010000002 FUROSEMIDE PER 20 MG: Performed by: PHYSICIAN ASSISTANT

## 2022-09-14 PROCEDURE — 71045 X-RAY EXAM CHEST 1 VIEW: CPT

## 2022-09-14 PROCEDURE — 80053 COMPREHEN METABOLIC PANEL: CPT | Performed by: EMERGENCY MEDICINE

## 2022-09-14 PROCEDURE — 99285 EMERGENCY DEPT VISIT HI MDM: CPT

## 2022-09-14 PROCEDURE — 85007 BL SMEAR W/DIFF WBC COUNT: CPT | Performed by: EMERGENCY MEDICINE

## 2022-09-14 PROCEDURE — 96372 THER/PROPH/DIAG INJ SC/IM: CPT

## 2022-09-14 PROCEDURE — 87636 SARSCOV2 & INF A&B AMP PRB: CPT | Performed by: PHYSICIAN ASSISTANT

## 2022-09-14 PROCEDURE — 84484 ASSAY OF TROPONIN QUANT: CPT | Performed by: EMERGENCY MEDICINE

## 2022-09-14 PROCEDURE — 36415 COLL VENOUS BLD VENIPUNCTURE: CPT

## 2022-09-14 PROCEDURE — 25010000002 HEPARIN (PORCINE) PER 1000 UNITS: Performed by: INTERNAL MEDICINE

## 2022-09-14 RX ORDER — ACETAMINOPHEN 160 MG/5ML
650 SOLUTION ORAL EVERY 4 HOURS PRN
Status: DISCONTINUED | OUTPATIENT
Start: 2022-09-14 | End: 2022-09-20 | Stop reason: HOSPADM

## 2022-09-14 RX ORDER — METOPROLOL SUCCINATE 50 MG/1
50 TABLET, EXTENDED RELEASE ORAL DAILY
Status: DISCONTINUED | OUTPATIENT
Start: 2022-09-15 | End: 2022-09-20 | Stop reason: HOSPADM

## 2022-09-14 RX ORDER — SODIUM CHLORIDE 0.9 % (FLUSH) 0.9 %
10 SYRINGE (ML) INJECTION AS NEEDED
Status: DISCONTINUED | OUTPATIENT
Start: 2022-09-14 | End: 2022-09-20 | Stop reason: HOSPADM

## 2022-09-14 RX ORDER — FUROSEMIDE 10 MG/ML
60 INJECTION INTRAMUSCULAR; INTRAVENOUS ONCE
Status: COMPLETED | OUTPATIENT
Start: 2022-09-14 | End: 2022-09-14

## 2022-09-14 RX ORDER — PANTOPRAZOLE SODIUM 40 MG/1
40 TABLET, DELAYED RELEASE ORAL 2 TIMES DAILY
Status: DISCONTINUED | OUTPATIENT
Start: 2022-09-14 | End: 2022-09-20 | Stop reason: HOSPADM

## 2022-09-14 RX ORDER — INSULIN LISPRO 100 [IU]/ML
0-7 INJECTION, SOLUTION INTRAVENOUS; SUBCUTANEOUS
Status: DISCONTINUED | OUTPATIENT
Start: 2022-09-15 | End: 2022-09-20 | Stop reason: HOSPADM

## 2022-09-14 RX ORDER — ROSUVASTATIN CALCIUM 20 MG/1
20 TABLET, COATED ORAL DAILY
Status: DISCONTINUED | OUTPATIENT
Start: 2022-09-15 | End: 2022-09-20 | Stop reason: HOSPADM

## 2022-09-14 RX ORDER — CALCIUM CARBONATE 200(500)MG
2 TABLET,CHEWABLE ORAL 2 TIMES DAILY PRN
Status: DISCONTINUED | OUTPATIENT
Start: 2022-09-14 | End: 2022-09-20 | Stop reason: HOSPADM

## 2022-09-14 RX ORDER — LEVETIRACETAM 750 MG/1
750 TABLET ORAL 2 TIMES DAILY
Status: DISCONTINUED | OUTPATIENT
Start: 2022-09-14 | End: 2022-09-20 | Stop reason: HOSPADM

## 2022-09-14 RX ORDER — BISACODYL 5 MG/1
5 TABLET, DELAYED RELEASE ORAL DAILY PRN
Status: DISCONTINUED | OUTPATIENT
Start: 2022-09-14 | End: 2022-09-20 | Stop reason: HOSPADM

## 2022-09-14 RX ORDER — NICOTINE POLACRILEX 4 MG
15 LOZENGE BUCCAL
Status: DISCONTINUED | OUTPATIENT
Start: 2022-09-14 | End: 2022-09-20 | Stop reason: HOSPADM

## 2022-09-14 RX ORDER — DEXTROSE MONOHYDRATE 25 G/50ML
25 INJECTION, SOLUTION INTRAVENOUS
Status: DISCONTINUED | OUTPATIENT
Start: 2022-09-14 | End: 2022-09-20 | Stop reason: HOSPADM

## 2022-09-14 RX ORDER — ACETAMINOPHEN 650 MG/1
650 SUPPOSITORY RECTAL EVERY 4 HOURS PRN
Status: DISCONTINUED | OUTPATIENT
Start: 2022-09-14 | End: 2022-09-20 | Stop reason: HOSPADM

## 2022-09-14 RX ORDER — BISACODYL 10 MG
10 SUPPOSITORY, RECTAL RECTAL DAILY PRN
Status: DISCONTINUED | OUTPATIENT
Start: 2022-09-14 | End: 2022-09-20 | Stop reason: HOSPADM

## 2022-09-14 RX ORDER — ACETAMINOPHEN 325 MG/1
650 TABLET ORAL EVERY 4 HOURS PRN
Status: DISCONTINUED | OUTPATIENT
Start: 2022-09-14 | End: 2022-09-20 | Stop reason: HOSPADM

## 2022-09-14 RX ORDER — HEPARIN SODIUM 5000 [USP'U]/ML
5000 INJECTION, SOLUTION INTRAVENOUS; SUBCUTANEOUS EVERY 12 HOURS SCHEDULED
Status: DISCONTINUED | OUTPATIENT
Start: 2022-09-14 | End: 2022-09-20 | Stop reason: HOSPADM

## 2022-09-14 RX ORDER — ASPIRIN 81 MG/1
81 TABLET, CHEWABLE ORAL DAILY
Status: DISCONTINUED | OUTPATIENT
Start: 2022-09-15 | End: 2022-09-20 | Stop reason: HOSPADM

## 2022-09-14 RX ORDER — SODIUM CHLORIDE 0.9 % (FLUSH) 0.9 %
10 SYRINGE (ML) INJECTION EVERY 12 HOURS SCHEDULED
Status: DISCONTINUED | OUTPATIENT
Start: 2022-09-14 | End: 2022-09-20 | Stop reason: HOSPADM

## 2022-09-14 RX ORDER — POLYETHYLENE GLYCOL 3350 17 G/17G
17 POWDER, FOR SOLUTION ORAL DAILY PRN
Status: DISCONTINUED | OUTPATIENT
Start: 2022-09-14 | End: 2022-09-20 | Stop reason: HOSPADM

## 2022-09-14 RX ORDER — ALBUTEROL SULFATE 2.5 MG/3ML
2.5 SOLUTION RESPIRATORY (INHALATION) EVERY 6 HOURS PRN
Refills: 3 | Status: DISCONTINUED | OUTPATIENT
Start: 2022-09-14 | End: 2022-09-20 | Stop reason: HOSPADM

## 2022-09-14 RX ORDER — AMOXICILLIN 250 MG
2 CAPSULE ORAL 2 TIMES DAILY
Status: DISCONTINUED | OUTPATIENT
Start: 2022-09-14 | End: 2022-09-20 | Stop reason: HOSPADM

## 2022-09-14 RX ADMIN — LEVETIRACETAM 750 MG: 750 TABLET, FILM COATED ORAL at 23:11

## 2022-09-14 RX ADMIN — HEPARIN SODIUM 5000 UNITS: 5000 INJECTION INTRAVENOUS; SUBCUTANEOUS at 23:12

## 2022-09-14 RX ADMIN — SENNOSIDES AND DOCUSATE SODIUM 2 TABLET: 50; 8.6 TABLET ORAL at 23:12

## 2022-09-14 RX ADMIN — PANTOPRAZOLE SODIUM 40 MG: 40 TABLET, DELAYED RELEASE ORAL at 23:12

## 2022-09-14 RX ADMIN — FUROSEMIDE 60 MG: 10 INJECTION INTRAMUSCULAR; INTRAVENOUS at 20:20

## 2022-09-14 NOTE — TELEPHONE ENCOUNTER
----- Message from Sherie Chambers MA sent at 9/14/2022  3:31 PM EDT -----  I called to let Petra know the information and she called back stating they he is on his way to the ED.  ----- Message -----  From: Pari García APRN  Sent: 9/14/2022   2:32 PM EDT  To: Sherie Chambers MA    Yes to get labs tomorrow.  Have patient hold Lasix and potassium.  HOld tonights dose of entresto and restart tomorrow.  Let patient is s/s get work to go to ED.  Schedule patient a f/u next week.    ----- Message -----  From: Sherie Chambers MA  Sent: 9/14/2022   2:29 PM EDT  To: LEANDRO Brown    Petra Russo from [facility] called and stated that she went to do labs on pt and was unable to obtain them due to pt being dehydrated. She wants to know if it is ok to go back tomorrow to retry to obtain the BMP.    She also stated that pt was having shortness of air and she mentioned for him to go to the ED and pt refused. She stated vitals today are 134/72/64, RR 26, O2 94%. Stated pt has not eaten in two days has diarrhea and hiccups. Pt weight has been steady at 192 lbs but two days ago it went to 203 lbs 9 oz. She stated no visible edema.

## 2022-09-14 NOTE — ED PROVIDER NOTES
Subjective   History of Present Illness  Mr. Cochran is an 81 year old male who arrives via EMS with complaints of increasing shortness of breath and some generalized abdominal pain over the past 2 days.  The pt has had a productive cough.  No chest pain.  No fever.  No nausea or vomiting.  He had some mild diarrhea earlier today.  No urinary symptoms.  No known exposure to COVID.  No lower extremity edema.    Past medical history includes CAD, CHF, DM, HTN, HLD, GERD, diverticlitis, Paget disease.  He is a former smoker but none in 20 years.          Review of Systems   Constitutional: Positive for fatigue. Negative for chills, diaphoresis and fever.   HENT: Negative for sore throat.    Respiratory: Positive for cough and shortness of breath.    Cardiovascular: Negative for chest pain, palpitations and leg swelling.   Gastrointestinal: Positive for abdominal pain and diarrhea. Negative for blood in stool, nausea and vomiting.   Genitourinary: Negative for dysuria.   Musculoskeletal: Negative for back pain.   Skin: Negative for rash.   Allergic/Immunologic: Negative for immunocompromised state.   Neurological: Positive for weakness (generalized). Negative for light-headedness, numbness and headaches.   Hematological: Negative.    Psychiatric/Behavioral: Negative.        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   • Paget disease of bone        No Known Allergies    Past Surgical History:   Procedure Laterality Date   • APPENDECTOMY     • CARDIAC CATHETERIZATION N/A 3/18/2020    Procedure: Left Heart Cath;  Surgeon: Sonya Garibay MD;  Location: [facility] INVASIVE  LOCATION;  Service: Cardiology;  Laterality: N/A;  First availabel provider     • CARDIAC CATHETERIZATION N/A 3/15/2021    Procedure: LEFT HEART CATH;  Surgeon: Doc Sahni IV, MD;  Location:  [facility] INVASIVE LOCATION;  Service: Cardiovascular;  Laterality: N/A;   • CARDIAC CATHETERIZATION N/A 3/15/2021    Procedure: Coronary angiography;  Surgeon: Doc Sahni IV, MD;  Location:  [facility] CATH INVASIVE LOCATION;  Service: Cardiovascular;  Laterality: N/A;   • CARDIAC ELECTROPHYSIOLOGY PROCEDURE N/A 3/16/2021    Procedure: ICD DC new;  Surgeon: Som Boyd DO;  Location:  [facility] INVASIVE LOCATION;  Service: Cardiology;  Laterality: N/A;   • COLON RESECTION      second to diverticulitis    • FOOT SURGERY Left        Family History   Problem Relation Age of Onset   • No Known Problems Daughter    • No Known Problems Son    • No Known Problems Son    • No Known Problems Son    • Diabetes Sister    • Clotting disorder Sister    • Hyperlipidemia Mother    • No Known Problems Sister    • No Known Problems Brother    • Fainting Brother        Social History     Socioeconomic History   • Marital status:    Tobacco Use   • Smoking status: Former Smoker     Packs/day: 0.50     Years: 65.00     Pack years: 32.50     Types: Cigars, Cigarettes     Quit date: 9/1/2019     Years since quitting: 3.0   • Smokeless tobacco: Never Used   Vaping Use   • Vaping Use: Never used   Substance and Sexual Activity   • Alcohol use: Yes     Comment: very rarely   • Drug use: Yes     Types: Marijuana     Comment: Pt states used weekly but now quitting    • Sexual activity: Defer           Objective   Physical Exam  Constitutional:       General: He is not in acute distress.     Appearance: He is well-developed. He is not diaphoretic.   HENT:      Head: Normocephalic.      Nose: Nose normal.      Mouth/Throat:      Mouth: Mucous membranes are moist.   Eyes:      Pupils: Pupils are equal, round, and  reactive to light.   Cardiovascular:      Rate and Rhythm: Normal rate and regular rhythm.      Pulses: Normal pulses.   Pulmonary:      Effort: Tachypnea present.      Comments: Mild bibasilar rales    Abdominal:      General: Bowel sounds are normal.      Tenderness: There is no abdominal tenderness.   Musculoskeletal:         General: No tenderness. Normal range of motion.      Cervical back: Normal range of motion and neck supple.      Right lower leg: No edema.      Left lower leg: No edema.   Skin:     General: Skin is warm and dry.      Findings: No rash.   Neurological:      General: No focal deficit present.      Mental Status: He is alert and oriented to person, place, and time.   Psychiatric:         Mood and Affect: Mood normal.         Procedures           ED Course      Pt is tachypneic with respiratory rate around 20 BPM.  O2 sats in low to mid 90s on RA.   Labs, COVID swab, EKG, and CXR obtained.    White count is 3.57.  Mild anemia at 11/35.2.  Creatinine is up acutely at 1.62.  No concerning chemistries.  ProBNP is 37,603.  Troponin is 0.019.    CXR:  Mild pulmonary edema.      EKG read independently by me and shows LBBB with rate of 71.      Pt given Lasix 60mg IV.  Will require admission for cautious diuresis in the setting of elevated creatinine.                                           MDM    Final diagnoses:   Acute on chronic congestive heart failure, unspecified heart failure type (HCC)   Shortness of breath   Tachypnea   Elevated serum creatinine       ED Disposition  ED Disposition     ED Disposition   Decision to Admit    Condition   --    Comment   --             No follow-up provider specified.       Medication List      No changes were made to your prescriptions during this visit.          Mahad Miles PA  09/14/22 0748

## 2022-09-15 PROBLEM — N18.2 CKD (CHRONIC KIDNEY DISEASE) STAGE 2, GFR 60-89 ML/MIN: Chronic | Status: ACTIVE | Noted: 2022-03-01

## 2022-09-15 LAB
ANION GAP SERPL CALCULATED.3IONS-SCNC: 13 MMOL/L (ref 5–15)
BACTERIA UR QL AUTO: ABNORMAL /HPF
BILIRUB UR QL STRIP: NEGATIVE
BUN SERPL-MCNC: 24 MG/DL (ref 8–23)
BUN/CREAT SERPL: 16.7 (ref 7–25)
CALCIUM SPEC-SCNC: 9.6 MG/DL (ref 8.6–10.5)
CHLORIDE SERPL-SCNC: 107 MMOL/L (ref 98–107)
CLARITY UR: CLEAR
CO2 SERPL-SCNC: 22 MMOL/L (ref 22–29)
COARSE GRAN CASTS URNS QL MICRO: ABNORMAL /LPF
COLOR UR: YELLOW
CREAT SERPL-MCNC: 1.44 MG/DL (ref 0.76–1.27)
D-LACTATE SERPL-SCNC: 1.5 MMOL/L (ref 0.5–2)
DEPRECATED RDW RBC AUTO: 60.5 FL (ref 37–54)
EGFRCR SERPLBLD CKD-EPI 2021: 48.2 ML/MIN/1.73
ERYTHROCYTE [DISTWIDTH] IN BLOOD BY AUTOMATED COUNT: 20.4 % (ref 12.3–15.4)
FINE GRAN CASTS URNS QL MICRO: ABNORMAL /LPF
GLUCOSE BLDC GLUCOMTR-MCNC: 103 MG/DL (ref 70–130)
GLUCOSE BLDC GLUCOMTR-MCNC: 127 MG/DL (ref 70–130)
GLUCOSE BLDC GLUCOMTR-MCNC: 152 MG/DL (ref 70–130)
GLUCOSE SERPL-MCNC: 132 MG/DL (ref 65–99)
GLUCOSE UR STRIP-MCNC: NEGATIVE MG/DL
HCT VFR BLD AUTO: 35.3 % (ref 37.5–51)
HGB BLD-MCNC: 10.8 G/DL (ref 13–17.7)
HGB UR QL STRIP.AUTO: NEGATIVE
HYALINE CASTS UR QL AUTO: ABNORMAL /LPF
KETONES UR QL STRIP: ABNORMAL
LEUKOCYTE ESTERASE UR QL STRIP.AUTO: ABNORMAL
MAGNESIUM SERPL-MCNC: 1.8 MG/DL (ref 1.6–2.4)
MCH RBC QN AUTO: 25.2 PG (ref 26.6–33)
MCHC RBC AUTO-ENTMCNC: 30.6 G/DL (ref 31.5–35.7)
MCV RBC AUTO: 82.5 FL (ref 79–97)
NITRITE UR QL STRIP: NEGATIVE
PH UR STRIP.AUTO: <=5 [PH] (ref 5–8)
PLATELET # BLD AUTO: 112 10*3/MM3 (ref 140–450)
POTASSIUM SERPL-SCNC: 3.9 MMOL/L (ref 3.5–5.2)
PROT UR QL STRIP: ABNORMAL
RBC # BLD AUTO: 4.28 10*6/MM3 (ref 4.14–5.8)
RBC # UR STRIP: ABNORMAL /HPF
REF LAB TEST METHOD: ABNORMAL
SODIUM SERPL-SCNC: 142 MMOL/L (ref 136–145)
SP GR UR STRIP: 1.02 (ref 1–1.03)
SQUAMOUS #/AREA URNS HPF: ABNORMAL /HPF
TRANS CELLS #/AREA URNS HPF: ABNORMAL /HPF
UROBILINOGEN UR QL STRIP: ABNORMAL
WBC # UR STRIP: ABNORMAL /HPF
WBC NRBC COR # BLD: 3.29 10*3/MM3 (ref 3.4–10.8)

## 2022-09-15 PROCEDURE — 83605 ASSAY OF LACTIC ACID: CPT | Performed by: INTERNAL MEDICINE

## 2022-09-15 PROCEDURE — 25010000002 HEPARIN (PORCINE) PER 1000 UNITS: Performed by: INTERNAL MEDICINE

## 2022-09-15 PROCEDURE — 82962 GLUCOSE BLOOD TEST: CPT

## 2022-09-15 PROCEDURE — 96376 TX/PRO/DX INJ SAME DRUG ADON: CPT

## 2022-09-15 PROCEDURE — 99226 PR SBSQ OBSERVATION CARE/DAY 35 MINUTES: CPT | Performed by: INTERNAL MEDICINE

## 2022-09-15 PROCEDURE — 99213 OFFICE O/P EST LOW 20 MIN: CPT | Performed by: INTERNAL MEDICINE

## 2022-09-15 PROCEDURE — 96372 THER/PROPH/DIAG INJ SC/IM: CPT

## 2022-09-15 PROCEDURE — 63710000001 INSULIN LISPRO (HUMAN) PER 5 UNITS: Performed by: INTERNAL MEDICINE

## 2022-09-15 PROCEDURE — 83735 ASSAY OF MAGNESIUM: CPT | Performed by: INTERNAL MEDICINE

## 2022-09-15 PROCEDURE — 80048 BASIC METABOLIC PNL TOTAL CA: CPT | Performed by: INTERNAL MEDICINE

## 2022-09-15 PROCEDURE — G0378 HOSPITAL OBSERVATION PER HR: HCPCS

## 2022-09-15 PROCEDURE — 97166 OT EVAL MOD COMPLEX 45 MIN: CPT

## 2022-09-15 PROCEDURE — 87086 URINE CULTURE/COLONY COUNT: CPT | Performed by: INTERNAL MEDICINE

## 2022-09-15 PROCEDURE — 25010000002 FUROSEMIDE PER 20 MG

## 2022-09-15 PROCEDURE — 25010000002 ONDANSETRON PER 1 MG: Performed by: INTERNAL MEDICINE

## 2022-09-15 PROCEDURE — 96365 THER/PROPH/DIAG IV INF INIT: CPT

## 2022-09-15 PROCEDURE — 96366 THER/PROPH/DIAG IV INF ADDON: CPT

## 2022-09-15 PROCEDURE — 96375 TX/PRO/DX INJ NEW DRUG ADDON: CPT

## 2022-09-15 PROCEDURE — 81001 URINALYSIS AUTO W/SCOPE: CPT | Performed by: INTERNAL MEDICINE

## 2022-09-15 PROCEDURE — 85027 COMPLETE CBC AUTOMATED: CPT | Performed by: INTERNAL MEDICINE

## 2022-09-15 PROCEDURE — 0 MAGNESIUM SULFATE 4 GM/100ML SOLUTION: Performed by: INTERNAL MEDICINE

## 2022-09-15 RX ORDER — SIMETHICONE 80 MG
40 TABLET,CHEWABLE ORAL 4 TIMES DAILY PRN
Status: DISCONTINUED | OUTPATIENT
Start: 2022-09-15 | End: 2022-09-20 | Stop reason: HOSPADM

## 2022-09-15 RX ORDER — MAGNESIUM SULFATE HEPTAHYDRATE 40 MG/ML
2 INJECTION, SOLUTION INTRAVENOUS AS NEEDED
Status: DISCONTINUED | OUTPATIENT
Start: 2022-09-15 | End: 2022-09-20 | Stop reason: HOSPADM

## 2022-09-15 RX ORDER — FUROSEMIDE 10 MG/ML
40 INJECTION INTRAMUSCULAR; INTRAVENOUS ONCE
Status: COMPLETED | OUTPATIENT
Start: 2022-09-15 | End: 2022-09-15

## 2022-09-15 RX ORDER — POTASSIUM CHLORIDE 1.5 G/1.77G
40 POWDER, FOR SOLUTION ORAL AS NEEDED
Status: DISCONTINUED | OUTPATIENT
Start: 2022-09-15 | End: 2022-09-20 | Stop reason: HOSPADM

## 2022-09-15 RX ORDER — ONDANSETRON 2 MG/ML
4 INJECTION INTRAMUSCULAR; INTRAVENOUS EVERY 6 HOURS PRN
Status: DISCONTINUED | OUTPATIENT
Start: 2022-09-15 | End: 2022-09-20 | Stop reason: HOSPADM

## 2022-09-15 RX ORDER — POTASSIUM CHLORIDE 750 MG/1
40 CAPSULE, EXTENDED RELEASE ORAL AS NEEDED
Status: DISCONTINUED | OUTPATIENT
Start: 2022-09-15 | End: 2022-09-20 | Stop reason: HOSPADM

## 2022-09-15 RX ORDER — POTASSIUM CHLORIDE 7.45 MG/ML
10 INJECTION INTRAVENOUS
Status: DISCONTINUED | OUTPATIENT
Start: 2022-09-15 | End: 2022-09-20 | Stop reason: HOSPADM

## 2022-09-15 RX ORDER — MAGNESIUM SULFATE HEPTAHYDRATE 40 MG/ML
4 INJECTION, SOLUTION INTRAVENOUS AS NEEDED
Status: DISCONTINUED | OUTPATIENT
Start: 2022-09-15 | End: 2022-09-20 | Stop reason: HOSPADM

## 2022-09-15 RX ADMIN — ROSUVASTATIN CALCIUM 20 MG: 20 TABLET, FILM COATED ORAL at 08:29

## 2022-09-15 RX ADMIN — LEVETIRACETAM 750 MG: 750 TABLET, FILM COATED ORAL at 08:29

## 2022-09-15 RX ADMIN — TIOTROPIUM BROMIDE INHALATION SPRAY 2 PUFF: 3.12 SPRAY, METERED RESPIRATORY (INHALATION) at 08:09

## 2022-09-15 RX ADMIN — PANTOPRAZOLE SODIUM 40 MG: 40 TABLET, DELAYED RELEASE ORAL at 08:30

## 2022-09-15 RX ADMIN — ASPIRIN 81 MG CHEWABLE TABLET 81 MG: 81 TABLET CHEWABLE at 08:29

## 2022-09-15 RX ADMIN — ACETAMINOPHEN 650 MG: 325 TABLET, FILM COATED ORAL at 08:30

## 2022-09-15 RX ADMIN — PANTOPRAZOLE SODIUM 40 MG: 40 TABLET, DELAYED RELEASE ORAL at 20:59

## 2022-09-15 RX ADMIN — Medication 10 ML: at 21:00

## 2022-09-15 RX ADMIN — METOPROLOL SUCCINATE 50 MG: 50 TABLET, FILM COATED, EXTENDED RELEASE ORAL at 08:30

## 2022-09-15 RX ADMIN — SENNOSIDES AND DOCUSATE SODIUM 2 TABLET: 50; 8.6 TABLET ORAL at 08:29

## 2022-09-15 RX ADMIN — INSULIN LISPRO 2 UNITS: 100 INJECTION, SOLUTION INTRAVENOUS; SUBCUTANEOUS at 17:18

## 2022-09-15 RX ADMIN — MAGNESIUM SULFATE HEPTAHYDRATE 4 G: 40 INJECTION, SOLUTION INTRAVENOUS at 15:49

## 2022-09-15 RX ADMIN — Medication 10 ML: at 00:04

## 2022-09-15 RX ADMIN — LEVETIRACETAM 750 MG: 750 TABLET, FILM COATED ORAL at 20:59

## 2022-09-15 RX ADMIN — HEPARIN SODIUM 5000 UNITS: 5000 INJECTION INTRAVENOUS; SUBCUTANEOUS at 08:30

## 2022-09-15 RX ADMIN — SENNOSIDES AND DOCUSATE SODIUM 2 TABLET: 50; 8.6 TABLET ORAL at 20:59

## 2022-09-15 RX ADMIN — Medication 10 ML: at 08:31

## 2022-09-15 RX ADMIN — FUROSEMIDE 40 MG: 10 INJECTION, SOLUTION INTRAMUSCULAR; INTRAVENOUS at 13:11

## 2022-09-15 RX ADMIN — ONDANSETRON 4 MG: 2 INJECTION INTRAMUSCULAR; INTRAVENOUS at 08:37

## 2022-09-15 RX ADMIN — HEPARIN SODIUM 5000 UNITS: 5000 INJECTION INTRAVENOUS; SUBCUTANEOUS at 20:59

## 2022-09-15 RX ADMIN — ANTACID TABLETS 2 TABLET: 500 TABLET, CHEWABLE ORAL at 06:24

## 2022-09-15 NOTE — THERAPY EVALUATION
Patient Name: Narendra Cochran  : 1940    MRN: [id number]                              Today's Date: 9/15/2022       Admit Date: 2022    Visit Dx:     ICD-10-CM ICD-9-CM   1. Acute on chronic congestive heart failure, unspecified heart failure type ([facility])  I50.9 428.0   2. Shortness of breath  R06.02 786.05   3. Tachypnea  R06.82 786.06   4. Elevated serum creatinine  R79.89 790.99     Patient Active Problem List   Diagnosis   • Essential hypertension   • Hyperlipidemia LDL goal <70   • Paget disease of bone   • H/O seizures on Keppra   • Gonalgia   • Osteitis deformans   • COPD (chronic obstructive pulmonary disease) ([facility])   • Syncope   • NSVT (nonsustained ventricular tachycardia) ([facility])   • Coronary artery disease involving native coronary artery of native heart with angina pectoris ([facility])   • NICM    • OHCA   • Hypokalemia   • Hypomagnesemia   • VHD; mild-mod AR, mild MR   • Chronic systolic congestive heart failure ([facility])   • Former tobacco user   • GERD   • Marijuana use   • Frequent PVCs   • Presence of biventricular implantable cardioverter-defibrillator (ICD)   • Stage 3a chronic kidney disease ([facility])   • Acute renal failure (ARF) ([facility])   • Moderate malnutrition ([facility])   • Acute on chronic congestive heart failure, unspecified heart failure type ([facility])   • Paroxysmal A-fib ([facility])   • Type 2 diabetes mellitus ([facility])   • Thrombocytopenia ([facility])   • Acute pulmonary edema ([facility])   • Elevated brain natriuretic peptide (BNP) level     Past Medical History:   Diagnosis Date   • Arthritis    • CHF (congestive heart failure) ([facility])    • Diabetes mellitus ([facility])    • Diabetic foot ulcers ([facility])    • Diverticulitis    • Foot callus    • GERD (gastroesophageal reflux disease)    • Hammer toes, bilateral    • Hearing loss    • History of transfusion    • Hyperlipidemia    • Hypertension    • Hypertensive urgency, malignant 2017   • Paget disease of bone      Past Surgical History:   Procedure Laterality Date   •  APPENDECTOMY     • CARDIAC CATHETERIZATION N/A 3/18/2020    Procedure: Left Heart Cath;  Surgeon: Sonya Garibay MD;  Location:  [facility] INVAS[facility] LOCATION;  Service: Cardiology;  Laterality: N/A;  First availabel provider     • CARDIAC CATHETERIZATION N/A 3/15/2021    Procedure: LEFT HEART CATH;  Surgeon: Doc Sahni IV, MD;  Location:  [facility] INVAS[facility] LOCATION;  Service: Cardiovascular;  Laterality: N/A;   • CARDIAC CATHETERIZATION N/A 3/15/2021    Procedure: Coronary angiography;  Surgeon: Doc Sahni IV, MD;  Location:  [facility] INVASIVE LOCATION;  Service: Cardiovascular;  Laterality: N/A;   • CARDIAC ELECTROPHYSIOLOGY PROCEDURE N/A 3/16/2021    Procedure: ICD DC new;  Surgeon: Som Boyd DO;  Location:  [facility] INVASIVE LOCATION;  Service: Cardiology;  Laterality: N/A;   • COLON RESECTION      second to diverticulitis    • FOOT SURGERY Left       General Information     Row Name 09/15/22 1117          OT Time and Intention    Document Type evaluation  -SRAVANTHI     Mode of Treatment occupational therapy  -SRAVANTHI     Row Name 09/15/22 1117          General Information    Patient Profile Reviewed yes  -SRAVANTHI     Prior Level of Function independent:;ADL's;bed mobility;transfer;all household mobility  Pt reports using FWW for ambulation at baseline, currently receiving  PT  -SRAVANTHI     Existing Precautions/Restrictions fall;seizures  -SRAVANTHI     Barriers to Rehab medically complex;previous functional deficit;hearing deficit  -SRAVANTHI     Row Name 09/15/22 1117          Occupational Profile    Environmental Supports and Barriers (Occupational Profile) Lives with spouse at [facility] in [place], has handicapped accessible shower; has 3-point cane and w/c.  -SRAVANTHI     Row Name 09/15/22 1117          Living Environment    People in Home spouse  -SRAVANTHI     Row Name 09/15/22 1117          Home Main Entrance    Number of Stairs, Main Entrance none  -SRAVANTHI     Row Name 09/15/22 1117           Stairs Within Home, Primary    Number of Stairs, Within Home, Primary none  -     Row Name 09/15/22 1117          Cognition    Orientation Status (Cognition) oriented x 3  -SRAVANTHI     Row Name 09/15/22 1117          Safety Issues, Functional Mobility    Safety Issues Affecting Function (Mobility) awareness of need for assistance;insight into deficits/self-awareness;judgment;positioning of assistive device;problem-solving;safety precaution awareness;safety precautions follow-through/compliance;sequencing abilities  -SRAVANTHI     Impairments Affecting Function (Mobility) balance;coordination;endurance/activity tolerance;motor planning;postural/trunk control;strength  -SRAVANTHI     Comment, Safety Issues/Impairments (Mobility) manual assist to manage RW in tight spaces  -           User Key  (r) = Recorded By, (t) = Taken By, (c) = Cosigned By    Initials Name Provider Type    Chantel Pena OT Occupational Therapist                 Mobility/ADL's     Row Name 09/15/22 1121          Bed Mobility    Bed Mobility supine-sit  -SRAVANTHI     Supine-Sit Green (Bed Mobility) supervision  -     Assistive Device (Bed Mobility) bed rails;head of bed elevated  -SRAVANTHI     Comment, (Bed Mobility) Increased time and effort needed; pt reported dizziness upon position change, clearing in about a minute  -     Row Name 09/15/22 1121          Transfers    Transfers sit-stand transfer;stand-sit transfer  -SRAVANTHI     Sit-Stand Green (Transfers) minimum assist (75% patient effort);verbal cues  -     Stand-Sit Green (Transfers) minimum assist (75% patient effort);verbal cues  -     Row Name 09/15/22 1121          Sit-Stand Transfer    Assistive Device (Sit-Stand Transfers) walker, front-wheeled  -SRAVANTHI     Comment, (Sit-Stand Transfer) cues for HP  -     Row Name 09/15/22 1121          Stand-Sit Transfer    Assistive Device (Stand-Sit Transfers) walker, front-wheeled  -SRAVANTHI     Row Name 09/15/22 1121          Functional Mobility     Functional Mobility- Ind. Level minimum assist (75% patient effort);verbal cues required  -SRAVANTHI     Functional Mobility- Device walker, front-wheeled  -SRAVANTHI     Functional Mobility-Distance (Feet) 30  -SRAVANTHI     Functional Mobility- Safety Issues balance decreased during turns;sequencing ability decreased  -SRAVANTHI     Functional Mobility- Comment Pt ambulated to bathroom, then to recliner chair across room with slight LOB while turning to exit bathroom, required manual assist to manage RW  -SRAVANTHI     Row Name 09/15/22 1121          Activities of Daily Living    BADL Assessment/Intervention lower body dressing;grooming;toileting  -     Row Name 09/15/22 1121          Lower Body Dressing Assessment/Training    [place] Level (Lower Body Dressing) don;socks;set up  -SRAVANTHI     Position (Lower Body Dressing) sitting up in bed  -SRAVANTHI     Row Name 09/15/22 1121          Grooming Assessment/Training    [place] Level (Grooming) oral care regimen;wash face, hands;contact guard assist  -SRAVANTHI     Position (Grooming) sitting up in bed;sink side;supported standing  -SRAVANTHI     Comment, (Grooming) CGA for standing balance at sink during oral care  -[facility] Name 09/15/22 1121          Toileting Assessment/Training    [place] Level (Toileting) adjust/manage clothing;set up  -SRAVANTHI     Assistive Devices (Toileting) urinal  -SRAVANTHI     Position (Toileting) sitting up in bed  -SRAVANTHI           User Key  (r) = Recorded By, (t) = Taken By, (c) = Cosigned By    Initials Name Provider Type    Chantel Pena OT Occupational Therapist               Obj/Interventions     Row Name 09/15/22 1126          Sensory Assessment (Somatosensory)    Sensory Assessment (Somatosensory) UE sensation intact  -[facility] Name 09/15/22 1126          Vision Assessment/Intervention    Visual Impairment/Limitations visual/perceptual impairments present;other (see comments)  Pt reports his eyeglass prescription is out of date, does not have glasses with him  -     Row Name  09/15/22 1126          Range of Motion Comprehensive    General Range of Motion bilateral upper extremity ROM WFL  -SRAVANTHI     Row Name 09/15/22 1126          Strength Comprehensive (MMT)    Comment, General Manual Muscle Testing (MMT) Assessment BUE's grossly 4/5  -SRAVANTHI     Row Name 09/15/22 1126          Balance    Balance Assessment sitting static balance;sitting dynamic balance;standing static balance;standing dynamic balance  -SRAVANTHI     Static Sitting Balance supervision  -SRAVANTHI     Dynamic Sitting Balance supervision  -SRAVANTHI     Position, Sitting Balance unsupported;sitting edge of bed  -SRAVANTHI     Static Standing Balance minimal assist  -SRAVANTHI     Dynamic Standing Balance minimal assist  -SRAVANTHI     Position/Device Used, Standing Balance supported;walker, front-wheeled  -SRAVANTHI     Balance Interventions standing;dynamic;weight shifting activity  -SRAVANTHI     Comment, Balance Slight LOB while turning with RW to exit bathroom.  -SRAVANTHI           User Key  (r) = Recorded By, (t) = Taken By, (c) = Cosigned By    Initials Name Provider Type    Chantel Pena OT Occupational Therapist               Goals/Plan     Row Name 09/15/22 1137          Transfer Goal 1 (OT)    Activity/Assistive Device (Transfer Goal 1, OT) sit-to-stand/stand-to-sit;toilet;walker, rolling  -SRAVANTHI     [place] Level/Cues Needed (Transfer Goal 1, OT) contact guard required  -SRAVANTHI     Time Frame (Transfer Goal 1, OT) long term goal (LTG);by discharge  -SRAVANTHI     Progress/Outcome (Transfer Goal 1, OT) new goal  -     Row Name 09/15/22 1137          Toileting Goal 1 (OT)    Activity/Device (Toileting Goal 1, OT) adjust/manage clothing;perform perineal hygiene;commode;grab bar/safety frame  -SRAVANTHI     [place] Level/Cues Needed (Toileting Goal 1, OT) minimum assist (75% or more patient effort)  -SRAVANTHI     Time Frame (Toileting Goal 1, OT) long term goal (LTG);by discharge  -SRAVANTHI     Progress/Outcome (Toileting Goal 1, OT) new goal  -     Row Name 09/15/22 1137          Grooming Goal 1  (OT)    Activity/Device (Grooming Goal 1, OT) oral care;wash face, hands  -SRAVANTHI     [place] (Grooming Goal 1, OT) contact guard required  -SRAVANTHI     Time Frame (Grooming Goal 1, OT) long term goal (LTG);by discharge  -SRAVANTHI     Strategies/Barriers (Grooming Goal 1, OT) standing sink side  -SRAVANTHI     Progress/Outcome (Grooming Goal 1, OT) new goal  -SRAVANTHI     Row Name 09/15/22 1130          Therapy Assessment/Plan (OT)    Planned Therapy Interventions (OT) activity tolerance training;BADL retraining;functional balance retraining;occupation/activity based interventions;patient/caregiver education/training;ROM/therapeutic exercise;strengthening exercise;transfer/mobility retraining  -SRAVANTHI           User Key  (r) = Recorded By, (t) = Taken By, (c) = Cosigned By    Initials Name Provider Type    Chantel Pena, OT Occupational Therapist               Clinical Impression     Row Name 09/15/22 1129          Pain Assessment    Additional Documentation Pain Scale: FACES Pre/Post-Treatment (Group)  -SRAVANTHI     Row Name 09/15/22 1129          Pain Scale: FACES Pre/Post-Treatment    Pain: FACES Scale, Pretreatment 0-->no hurt  -SRAVANTHI     Posttreatment Pain Rating 0-->no hurt  -SRAVANTHI     Row Name 09/15/22 1121          Plan of Care Review    Plan of Care Reviewed With patient  -SRAVANTHI     Outcome Evaluation OT eval completed. Pt presents with generalized weakness and balance deficits impacting ADL's. Pt completed bed mobility with SUP, reporting dizziness with position change. Pt ambulated to bathroom with Jose Carlos/RW, performed oral care standing sink side with CGA, slight LOB while turning with RW in tight space. Manual assist required to reposition RW to safely exit bathroom. IP OT services warranted. Recommend IRF for optimal outcomes.  -SRAVANTHI     Row Name 09/15/22 1122          Therapy Assessment/Plan (OT)    Rehab Potential (OT) good, to achieve stated therapy goals  -SRAVANTHI     Criteria for Skilled Therapeutic Interventions Met (OT) yes;meets  criteria;skilled treatment is necessary  -SRAVANTHI     Therapy Frequency (OT) daily  -SRAVANTHI     Row Name 09/15/22 1129          Therapy Plan Review/Discharge Plan (OT)    Anticipated Discharge Disposition (OT) inpatient rehabilitation facility  -SRAVANTHI     Row Name 09/15/22 1129          Vital Signs    Pre Systolic BP Rehab 159  -SRAVANTHI     Pre Treatment Diastolic BP 83  -SRAVANTHI     Post Systolic BP Rehab 125  -SRAVANTHI     Post Treatment Diastolic BP 78  -SRAVANTHI     Pretreatment Heart Rate (beats/min) 74  -SRAVANTHI     Posttreatment Heart Rate (beats/min) 76  -SRAVANTHI     Pre SpO2 (%) 96  -SRAVANTHI     O2 Delivery Pre Treatment nasal cannula  -SRAVANTHI     O2 Delivery Intra Treatment room air  -SRAVANTHI     Post SpO2 (%) 95  -SRAVANTHI     O2 Delivery Post Treatment room air  -SRAVANTHI     Pre Patient Position Supine  -SRAVANTHI     Intra Patient Position Standing  -SRAVANTHI     Post Patient Position Sitting  -SRAVANTHI     Row Name 09/15/22 1129          Positioning and Restraints    Pre-Treatment Position in bed  -SRAVANTHI     Post Treatment Position chair  -SRAVANTHI     In Chair notified nsg;reclined;call light within reach;encouraged to call for assist;exit alarm on;waffle cushion;legs elevated  -SRAVANTHI           User Key  (r) = Recorded By, (t) = Taken By, (c) = Cosigned By    Initials Name Provider Type    Chantel Pena, OT Occupational Therapist               Outcome Measures     Row Name 09/15/22 1138          How much help from another is currently needed...    Putting on and taking off regular lower body clothing? 3  -SRAVANTHI     Bathing (including washing, rinsing, and drying) 3  -SRAVANTHI     Toileting (which includes using toilet bed pan or urinal) 2  -SRAVANTHI     Putting on and taking off regular upper body clothing 3  -SRAVANTHI     Taking care of personal grooming (such as brushing teeth) 3  -SRAVANTHI     Eating meals 3  -SRAVANTHI     AM-PAC 6 Clicks Score (OT) 17  -SRAVANTHI     Row Name 09/15/22 1138          Functional Assessment    Outcome Measure Options AM-PAC 6 Clicks Daily Activity (OT)  -SRAVANTHI           User Key  (r) = Recorded By, (t) =  Taken By, (c) = Cosigned By    Initials Name Provider Type    Chantel Pena OT Occupational Therapist                Occupational Therapy Education                 Title: PT OT SLP Therapies (In Progress)     Topic: Occupational Therapy (In Progress)     Point: ADL training (Done)     Description:   Instruct learner(s) on proper safety adaptation and remediation techniques during self care or transfers.   Instruct in proper use of assistive devices.              Learning Progress Summary           Patient Acceptance, E, VU by SRAVANTHI at 9/15/2022 1139    Comment: OT POC; discharge planning                   Point: Home exercise program (Not Started)     Description:   Instruct learner(s) on appropriate technique for monitoring, assisting and/or progressing therapeutic exercises/activities.              Learner Progress:  Not documented in this visit.          Point: Precautions (Done)     Description:   Instruct learner(s) on prescribed precautions during self-care and functional transfers.              Learning Progress Summary           Patient Acceptance, E, VU by SRAVANTHI at 9/15/2022 1139    Comment: OT POC; discharge planning                   Point: Body mechanics (Done)     Description:   Instruct learner(s) on proper positioning and spine alignment during self-care, functional mobility activities and/or exercises.              Learning Progress Summary           Patient Acceptance, E, VU by SRAVANTHI at 9/15/2022 1139    Comment: OT POC; discharge planning                               User Key     Initials Effective Dates Name Provider Type Discipline    SRAVANTHI 06/16/21 -  Chantel Harris OT Occupational Therapist OT              OT Recommendation and Plan  Planned Therapy Interventions (OT): activity tolerance training, BADL retraining, functional balance retraining, occupation/activity based interventions, patient/caregiver education/training, ROM/therapeutic exercise, strengthening exercise, transfer/mobility  retraining  Therapy Frequency (OT): daily  Plan of Care Review  Plan of Care Reviewed With: patient  Outcome Evaluation: OT eval completed. Pt presents with generalized weakness and balance deficits impacting ADL's. Pt completed bed mobility with SUP, reporting dizziness with position change. Pt ambulated to bathroom with Jose Carlos/RW, performed oral care standing sink side with CGA, slight LOB while turning with RW in tight space. Manual assist required to reposition RW to safely exit bathroom. IP OT services warranted. Recommend IRF for optimal outcomes.     Time Calculation:    Time Calculation- OT     Row Name 09/15/22 1040             Time Calculation- OT    OT Start Time 1040  -SRAVANTHI      OT Received On 09/15/22  -SRAVANTHI      OT Goal Re-Cert Due Date 09/25/22  -SRAVANTHI              Untimed Charges    OT Eval/Re-eval Minutes 52  -SRAVANTHI              Total Minutes    Untimed Charges Total Minutes 52  -SRAVANTHI       Total Minutes 52  -SRAVANTHI            User Key  (r) = Recorded By, (t) = Taken By, (c) = Cosigned By    Initials Name Provider Type    Chantel Pena OT Occupational Therapist              Therapy Charges for Today     Code Description Service Date Service Provider Modifiers Qty    [id number]  OT EVAL MOD COMPLEXITY 4 9/15/2022 Chantel Harris OT GO 1               Chantel Harris OT  9/15/2022

## 2022-09-15 NOTE — PLAN OF CARE
Goal Outcome Evaluation:  Plan of Care Reviewed With: patient           Outcome Evaluation: No events through the night. O2 at 1L/NC placed for patient comfort. Complained of generalized abdominal pain this morning treated with PRN Tums. VSS, Remains in NSR/Paced at times. Family at bedside. Will continue with plan of care.

## 2022-09-15 NOTE — CASE MANAGEMENT/SOCIAL WORK
Discharge Planning Assessment  [facility]     Patient Name: Narendra Cochran  MRN: [id number]  Today's Date: 9/15/2022    Admit Date: 9/14/2022     Discharge Needs Assessment     Row Name 09/15/22 1011       Living Environment    People in Home spouse    Current Living Arrangements home    Primary Care Provided by self       Discharge Needs Assessment    Equipment Currently Used at Home walker, rolling;wheelchair;cane, straight    Equipment Needed After Discharge none               Discharge Plan     Row Name 09/15/22 1015       Plan    Plan Home with HH    Patient/Family in Agreement with Plan yes    Plan Comments Spoke with patient and wife on the phone. Patient lives with wife at [facility] (assisted living facility) in [facility]. Patient uses a walker at home. Has a wheelchair and cane. Is current with [facility]  with PT and Nursing.  PCP is ADEOLA BARRERA May need transportation at discharge. Wife states that sometimes she can find family members to transport. Awaiting OT and PT recommendation. Wife states that the last time he was here they recommended SNF but decline to go. Plan is Home with HH vs SNF. CM will follow.    Final Discharge Disposition Code 06 - home with home health care              Continued Care and Services - Admitted Since 9/14/2022    Coordination has not been started for this encounter.     Selected Continued Care - Prior Encounters Includes selections from prior encounters from 6/16/2022 to 9/15/2022    Discharged on 8/4/2022 Admission date: 7/29/2022 - Discharge disposition: Home-Health Care [facility]    Home Medical Care     Service Provider Selected Services Address Phone Fax Patient Preferred    [facility] ,  Home Nursing ,  [facility] [address] [facility] [facility] [address] [phone] --                    Expected Discharge Date and Time     Expected Discharge Date Expected Discharge Time     Sep 16, 2022          Demographic Summary     Row Name 09/15/22 1010       General Information    Preferred Language English               Functional Status     Row Name 09/15/22 1010       Functional Status    Usual Activity Tolerance fair    Current Activity Tolerance fair       Functional Status, IADL    Medications independent    Meal Preparation assistive equipment    Housekeeping assistive equipment    Laundry assistive equipment    Shopping assistive equipment               Psychosocial    No documentation.                Abuse/Neglect    No documentation.                Legal    No documentation.                Substance Abuse    No documentation.                Patient Forms    No documentation.                   Zeinab Dasilva RN

## 2022-09-15 NOTE — CONSULTS
[facility]  CARDIOLOGY CONSULTATION NOTE    Narendra Cochran  : 1940  MRN:[id number]  Home Phone:[phone]    Date of Admission:2022  Date of Consultation: 09/15/22   Primary Provider:  Marguerite Moore PA-C    Referring Provider: No ref. provider found  Reason for Consultation: heart failure, KENNEDY    IDENTIFICATION: A 81 y.o. male who lives with spouse at [facility] in [facility]    CC:   Chief Complaint   Patient presents with   • Shortness of Breath       PROBLEM LIST:   1. Nonischemic cardiomyopathy / systolic congestive heart failure / Cardiac Arrest   1. [facility] 2020 with mild nonobstructive CAD, EF 36-40%  2. Witnessed out of hospital cardiac arrest 3/12/2021 with documented bystander CPR with a single shock from AED with transfer to [facility] per EMS.  3. Echo 3/13/2021 EF 50%, no significant VHD  4. LHC 3/14/2021 per Dr. Sahni with documented severe 1-vessel CAD involving a small posterior lateral branch of the of the RCA with no interval change to prior angiography  5. [employer] [facility] [employer] DDD ICD implant 3/16/2021 for secondary prevention of SCD     2. Premature ventricular contractions  1. Holter monitor 2020:  PVCs burden 12.3%   2. Amiodarone therapy initiated at 200 mg daily for PVCs 2020  3. Amiodarone discontinued 2020  4. [employer] dual-chamber pacemaker/ICD implanted 2021 after cardiac arrest     3. Sinus node dysfunction  4. HTN  5. Essential hypertension  6. Dyslipidemia  7. Diabetes mellitus  8. COPD  9. Seizures  10. Paget's disease  11. Hearing loss      ALLERGIES: No Known Allergies    HPI: Mr. Cochran is a 81-year-old male with the above cardiac history who we are seeing in consultation today for heart failure in the setting of KENNEDY on CKD.  He presented to the ED yesterday with abdominal pain and shortness of breath x2 days.  His abdominal pain investigation has thus far been benign with  KUB demonstrating increased gas and large and small bowel but otherwise no acute process.  Patient with O2 sats greater than 95% sitting up in armchair at time of interview.  He reports he still has some mild abdominal pain but overall feels much better and reports his dyspnea has resolved.  He denies chest pain, palpitations, or syncope.    Troponin x1 0.019, proBNP 37,000, Cr 1.44, EKG with no acute changes, and CXR with mild pulmonary edema.      ROS: All systems have been reviewed and are negative with the exception of those mentioned in the HPI and problem list above.    HOME MEDICINES:   Current Outpatient Medications   Medication Instructions   • albuterol sulfate  (90 Base) MCG/ACT inhaler 2 puffs, Inhalation, Every 4 Hours PRN   • aspirin 81 mg, Oral, Daily   • diclofenac (VOLTAREN) 4 g, Topical, 4 Times Daily PRN   • furosemide (LASIX) 40 mg, Oral, Daily, X2 days per pcp started yesterday   • glucose blood test strip 1 each, Other, Daily, Use daily as directed   • glucose monitor monitoring kit 1 each, Does not apply, Daily, Use daily as directed.   • levETIRAcetam (KEPPRA) 750 MG tablet TAKE 1 TABLET BY MOUTH TWICE A DAY   • metFORMIN ER (GLUCOPHAGE-XR) 500 mg, Oral, Daily With Breakfast   • metoprolol succinate XL (TOPROL-XL) 50 MG 24 hr tablet TAKE 1 TABLET BY MOUTH EVERY DAY   • pantoprazole (PROTONIX) 40 mg, Oral, 2 Times Daily   • potassium chloride ER (K-TAB) 20 MEQ tablet controlled-release ER tablet 20 mEq, Oral, Every Other Day   • rosuvastatin (CRESTOR) 20 mg, Oral, Daily   • sacubitril-valsartan (Entresto) 24-26 MG tablet 1 tablet, Oral, 2 Times Daily   • tiotropium bromide monohydrate (Spiriva Respimat) 2.5 MCG/ACT aerosol solution inhaler 2 puffs, Inhalation, Daily - RT       Surgical History:   Past Surgical History:   Procedure Laterality Date   • APPENDECTOMY     • CARDIAC CATHETERIZATION N/A 3/18/2020    Procedure: Left Heart Cath;  Surgeon: Sonya Garibay MD;  Location: [facility]  "INVASIVE LOCATION;  Service: Cardiology;  Laterality: N/A;  First availabel provider     • CARDIAC CATHETERIZATION N/A 3/15/2021    Procedure: LEFT HEART CATH;  Surgeon: Doc Sahni IV, MD;  Location:  [facility] INVASIVE LOCATION;  Service: Cardiovascular;  Laterality: N/A;   • CARDIAC CATHETERIZATION N/A 3/15/2021    Procedure: Coronary angiography;  Surgeon: Doc Sahni IV, MD;  Location:  [facility] INVASIVE LOCATION;  Service: Cardiovascular;  Laterality: N/A;   • CARDIAC ELECTROPHYSIOLOGY PROCEDURE N/A 3/16/2021    Procedure: ICD DC new;  Surgeon: Som Boyd DO;  Location:  [facility] EP INVASIVE LOCATION;  Service: Cardiology;  Laterality: N/A;   • COLON RESECTION      second to diverticulitis    • FOOT SURGERY Left        Social History:   Social History     Socioeconomic History   • Marital status:    Tobacco Use   • Smoking status: Former Smoker     Packs/day: 0.50     Years: 65.00     Pack years: 32.50     Types: Cigars, Cigarettes     Quit date: 9/1/2019     Years since quitting: 3.0   • Smokeless tobacco: Never Used   Vaping Use   • Vaping Use: Never used   Substance and Sexual Activity   • Alcohol use: Yes     Comment: very rarely   • Drug use: Yes     Types: Marijuana     Comment: Pt states used weekly but now quitting    • Sexual activity: Defer       Family History:   Family History   Problem Relation Age of Onset   • No Known Problems Daughter    • No Known Problems Son    • No Known Problems Son    • No Known Problems Son    • Diabetes Sister    • Clotting disorder Sister    • Hyperlipidemia Mother    • No Known Problems Sister    • No Known Problems Brother    • Fainting Brother        Objective     /78 (BP Location: Left arm, Patient Position: Sitting)   Pulse 75   Temp 97.9 °F (36.6 °C) (Oral)   Resp 22   Ht 177.8 cm (70\")   Wt 86.3 kg (190 lb 3.2 oz)   SpO2 96%   BMI 27.29 kg/m²     Intake/Output Summary (Last 24 hours) at 9/15/2022 1144  Last " data filed at 9/15/2022 0833  Gross per 24 hour   Intake --   Output 1150 ml   Net -1150 ml       PHYSICAL EXAM:  CONSTITUTIONAL: Well nourished, cooperative, in no acute distress; without his hearing aids  CARDIOVASCULAR:  Regular rhythm and normal rate, no murmur, gallop, rub.   RESPIRATORY: normal respiratory effort, bibasilar wheezing  GI: Soft, tender, not distended  EXTREMITIES: No gross deformities, 2+ pretibial edema.Radialpulses are present and equal bilaterally  NEUROLOGICAL: No gross focal deficits  PSYCHIATRIC: Normal mood and affect. Behavior is normal     Labs/Diagnostic Data  Results from last 7 days   Lab Units 09/15/22  1020 09/14/22  1732   SODIUM mmol/L 142 143   POTASSIUM mmol/L 3.9 4.6   CHLORIDE mmol/L 107 106   CO2 mmol/L 22.0 20.0*   BUN mg/dL 24* 18   CREATININE mg/dL 1.44* 1.62*   GLUCOSE mg/dL 132* 99   CALCIUM mg/dL 9.6 9.5     Results from last 7 days   Lab Units 09/14/22  1732   TROPONIN T ng/mL 0.019     Results from last 7 days   Lab Units 09/15/22  1020 09/14/22  1732   WBC 10*3/mm3 3.29* 3.57   HEMOGLOBIN g/dL 10.8* 11.0*   HEMATOCRIT % 35.3* 35.2*   PLATELETS 10*3/mm3 112* 129*     Results from last 7 days   Lab Units 09/15/22  1020   MAGNESIUM mg/dL 1.8                 Results from last 7 days   Lab Units 09/14/22  1732   PROBNP pg/mL 37,603.0*           I personally reviewed the patient's EKG/Telemetry data    EKG with sinus rhythm, stable LBBB    Telemetry shows SR, some atrial pacing, PVCs    Radiology Data:  9/14/22 CXR:  FINDINGS:  Unchanged appearance of dual-lead AICD with left chest pulse generator.  There is stable cardiomegaly. There is mild interstitial pulmonary  edema. No significant pleural effusion. No pneumothorax. No acute  osseous findings.     IMPRESSION:  Mild interstitial pulmonary edema.    Echo pending today  Echo 3/13/21:  Interpretation:  · Left ventricular systolic function is normal. Estimated left ventricular EF = 50%  · Left ventricular wall thickness  is consistent with mild concentric hypertrophy.  · Left atrial volume is mildly increased.  · Moderate aortic valve regurgitation is present.  · Moderate mitral valve regurgitation is present.         Current Medications:    aspirin, 81 mg, Oral, Daily  heparin (porcine), 5,000 Units, Subcutaneous, Q12H  insulin lispro, 0-7 Units, Subcutaneous, TID With Meals  levETIRAcetam, 750 mg, Oral, BID  metoprolol succinate XL, 50 mg, Oral, Daily  pantoprazole, 40 mg, Oral, BID  pharmacy consult - MTM, , Does not apply, Daily  rosuvastatin, 20 mg, Oral, Daily  senna-docusate sodium, 2 tablet, Oral, BID  sodium chloride, 10 mL, Intravenous, Q12H  tiotropium bromide monohydrate, 2 puff, Inhalation, Daily - RT           Assessment    1. Nonischemic cardiomyopathy/acute on chronic systolic heart failure  a. Cardiac arrest in 2021  b. proBNP 37k  c. Most recent echo 3/13/21: EF 50%, LV wall thickness mild concentric hypertrophy, LA volume mildly increased, moderate AR and MR  d. Will repeat TTE today  e. CXR 9/14/22 mild interstitial pulmonary edema  2. Presence of PPM/ICD  a. [employer] dual-chamber pacemaker/ICD implanted March 2021 after cardiac arrest  3. KENNEDY on CKD  4. Hypertension  5. Hyperlipidemia  6. Type II diabetes  7. History of seizures  a. On Keppra    Plan  · Patient sitting in recliner in no respiratory distress on room air with mild interstitial pulmonary edema on CXR.  Still some signs of mild volume overload on exam.  Creatinine 1.4.  We will give 1 dose of Lasix IV 40 mg and continue to monitor electrolytes and renal function.  · Agree with holding Entresto in setting of elevated creatinine.  Can reassess reinitiation of Entresto closer to discharge.  · Continue aspirin, statin, and metoprolol.  · Echo pending.  Most recent accessible echo in 3/21 around time of his prior cardiac arrest with EF 50%.  · Patient with no chest pain since presenting to the hospital, negative initial troponin x1, and no acute  signs of ischemia on EKG. we will defer ischemic eval as long as there are no signs of significant left ventricular function deterioration on echo.    Scribed by Carlos Gaspar PA-C for Dr. Brent Mccracken MD on 09/15/22     I personally performed the services described in this documentation as scribed by the above named individual in my presence, and it is both accurate and complete.    BARRIE Mccracken MD, MS  09/15/22  13:43 EDT

## 2022-09-15 NOTE — PROGRESS NOTES
"    [facility] Medicine Services  PROGRESS NOTE    Patient Name: Narendra Cochran  : 1940  MRN: [id number]    Date of Admission: 2022  Primary Care Physician: Marguerite Moore PA-C    Subjective   Subjective     CC:  Follow-up heart failure    HPI:  Admitted late last night.  Per nursing he was complaining of abdominal pain earlier today, by the time my evaluation he was denying abdominal pain.  Then started wincing in pain, when directly questioned he says he has intermittent abdominal pain.  Unable to further classify it.  He is stating that his predominant concern when he came in was concerned over \"having fluid around my lungs.\"  He has been seen by cardiology and received IV diuresis, states this is already making him feel better    ROS:  Gen- No fevers, chills  CV- No chest pain, palpitations  Resp- No cough, dyspnea  GI- No N/V/D, yes, intermittent abd pain    Objective   Objective     Vital Signs:   Temp:  [96.5 °F (35.8 °C)-97.7 °F (36.5 °C)] 97.1 °F (36.2 °C)  Heart Rate:  [60-85] 77  Resp:  [16-28] 16  BP: (138-167)/(70-92) 138/80  Flow (L/min):  [1-2] 1     Physical Exam:  Constitutional: Awake, alert, chronically ill-appearing male laying in bed in no acute distress  HENT: NCAT, mucous membranes moist  Respiratory: Clear to auscultation bilaterally, respiratory effort normal   Cardiovascular: RRR, no murmurs, rubs, or gallops, palpable radial pulse  Gastrointestinal: Frequent bowel sounds, soft, nontender, nondistended  Musculoskeletal: No bilateral ankle edema  Psychiatric: Appropriate affect, cooperative  Neurologic: Very hard of hearing, moving all extremities spontaneously    Results Reviewed:  LAB RESULTS:      Lab 22  1732   WBC 3.57   HEMOGLOBIN 11.0*   HEMATOCRIT 35.2*   PLATELETS 129*   NEUTROS ABS 2.70   IMMATURE GRANS (ABS) 0.01   LYMPHS ABS 0.62*   MONOS ABS 0.19   EOS ABS 0.04   MCV 82.2         Lab 22  1732   SODIUM 143   POTASSIUM 4.6 "   CHLORIDE 106   CO2 20.0*   ANION GAP 17.0*   BUN 18   CREATININE 1.62*   EGFR 41.9*   GLUCOSE 99   CALCIUM 9.5         Lab 09/14/22  1732   TOTAL PROTEIN 7.6   ALBUMIN 4.20   GLOBULIN 3.4   ALT (SGPT) 30   AST (SGOT) 47*   BILIRUBIN 1.1   ALK PHOS 76         Lab 09/14/22  1732   PROBNP 37,603.0*   TROPONIN T 0.019                 Brief Urine Lab Results  (Last result in the past 365 days)      Color   Clarity   Blood   Leuk Est   Nitrite   Protein   CREAT   Urine HCG        07/30/22 1956             90.5               Microbiology Results Abnormal     Procedure Component Value - Date/Time    COVID PRE-OP / PRE-PROCEDURE SCREENING ORDER (NO ISOLATION) - Swab, Nasopharynx [[id number]]  (Normal) Collected: 09/14/22 1702    Lab Status: Final result Specimen: Swab from Nasopharynx Updated: 09/14/22 1815    Narrative:      The following orders were created for panel order COVID PRE-OP / PRE-PROCEDURE SCREENING ORDER (NO ISOLATION) - Swab, Nasopharynx.  Procedure                               Abnormality         Status                     ---------                               -----------         ------                     COVID-19 and FLU A/B PCR...[[id number]]  Normal              Final result                 Please view results for these tests on the individual orders.    COVID-19 and FLU A/B PCR - Swab, Nasopharynx [[id number]]  (Normal) Collected: 09/14/22 1702    Lab Status: Final result Specimen: Swab from Nasopharynx Updated: 09/14/22 1815     COVID19 Not Detected     Influenza A PCR Not Detected     Influenza B PCR Not Detected    Narrative:      Fact sheet for providers: https://www.fda.gov/media/382169/download    Fact sheet for patients: https://www.fda.gov/media/409776/download    Test performed by PCR.          XR Chest 1 View    Result Date: 9/14/2022  DATE OF EXAM: 9/14/2022 5:01 PM  PROCEDURE: XR CHEST 1 VW-  INDICATIONS: SOA triage protocol.  COMPARISON: Chest x-ray 7/13/2022  TECHNIQUE: Single frontal  view of the chest.  FINDINGS: Unchanged appearance of dual-lead AICD with left chest pulse generator. There is stable cardiomegaly. There is mild interstitial pulmonary edema. No significant pleural effusion. No pneumothorax. No acute osseous findings.      Impression: Mild interstitial pulmonary edema.  This report was finalized on 9/14/2022 5:30 PM by Martin Cabral MD.        Results for orders placed during the hospital encounter of 03/12/21    Adult Transthoracic Echo Complete W/ Cont if Necessary Per Protocol    Interpretation Summary  · Left ventricular systolic function is normal. Estimated left ventricular EF = 50%  · Left ventricular wall thickness is consistent with mild concentric hypertrophy.  · Left atrial volume is mildly increased.  · Moderate aortic valve regurgitation is present.  · Moderate mitral valve regurgitation is present.      I have reviewed the medications:  Scheduled Meds:aspirin, 81 mg, Oral, Daily  heparin (porcine), 5,000 Units, Subcutaneous, Q12H  insulin lispro, 0-7 Units, Subcutaneous, TID With Meals  levETIRAcetam, 750 mg, Oral, BID  metoprolol succinate XL, 50 mg, Oral, Daily  pantoprazole, 40 mg, Oral, BID  rosuvastatin, 20 mg, Oral, Daily  senna-docusate sodium, 2 tablet, Oral, BID  sodium chloride, 10 mL, Intravenous, Q12H  tiotropium bromide monohydrate, 2 puff, Inhalation, Daily - RT      Continuous Infusions:   PRN Meds:.•  acetaminophen **OR** acetaminophen **OR** acetaminophen  •  albuterol  •  senna-docusate sodium **AND** polyethylene glycol **AND** bisacodyl **AND** bisacodyl  •  calcium carbonate  •  dextrose  •  dextrose  •  glucagon (human recombinant)  •  sodium chloride  •  sodium chloride    Assessment & Plan   Assessment & Plan     Active Hospital Problems    Diagnosis  POA   • **Acute on chronic congestive heart failure, unspecified heart failure type (HCC) [I50.9]  Yes   • Type 2 diabetes mellitus (HCC) [E11.9]  Unknown   • Thrombocytopenia (HCC) [D69.6]   Unknown   • Elevated brain natriuretic peptide (BNP) level [R79.89]  Unknown   • Acute kidney injury (HCC) [N17.9]  Yes   • CKD (chronic kidney disease) stage 2, GFR 60-89 ml/min [N18.2]  Yes   • Coronary artery disease involving native coronary artery of native heart with angina pectoris (HCC) [I25.119]  Yes   • H/O seizures on Keppra [R56.9]  Yes   • Essential hypertension [I10]  Yes      Resolved Hospital Problems   No resolved problems to display.        Brief Hospital Course to date:  Narendra Cochran is a 81 y.o. male w/ HFrEF and recovered EF (50%), seizure disorder, HTN, DM2 who was recently admitted 7/29-8/4 with KENNEDY, hyperkalemia; ultimately his Aldactone was discontinued and Lasix was changed to PRN dosing only.  Increased HeartLogic Index 9/8, developed worsening SOB/edema 9/13-9/14, presented to the ED with shortness of and noted to have increased creatinine    The following problems new to me today    Assessment/plan    Chronic HFrEF, recovered EF, decompensated  S/p cardiac arrest 3/2021  S/p [employer] dual-chamber pacemaker/ICD  - Most recent EF 50%, cardiology repeating echo  -Aldactone dc'ed earlier this year 2/2 hyperkalemia  -Entresto on hold for KENNEDY  - Seen by cardiology, continue aspirin, Toprol-XL, rosuvastatin  -Received IV diuresis per cardiology    Acute kidney injury on CKD 2  -BLine Cr ~0.9-1.1; eGFR 70-80's  -Slightly improved today, repeat labs in the a.m.    Abdominal pain  -Similar complaints during recent admission  -Continue PPI  - KUB reassuring, only notable for slightly increased gas pattern  -Empiric trial of simethicone    DM type 2, A1c 6.5%, without long-term use of insulin  - Accu-Cheks with SSI    Hx paroxysmal atrial fibrillation  Hypertension  - Toprol-XL, aspirin  -Per last DC note PAF was in the setting of electrolyte derangements, no recurrence, rec against chronic AC    Chronic pancytopenia  -Stable, follow-up outpatient    Seizure disorder  -Keppra    Expected  Discharge Location and Transportation: Home with family  Expected Discharge Date: 9/17    DVT prophylaxis:  Medical DVT prophylaxis orders are present.          CODE STATUS:   Code Status and Medical Interventions:   Ordered at: 09/14/22 1886     Level Of Support Discussed With:    Patient     Code Status (Patient has no pulse and is not breathing):    CPR (Attempt to Resuscitate)     Medical Interventions (Patient has pulse or is breathing):    Full Support       Hira Thao, DO  09/15/22

## 2022-09-15 NOTE — H&P
[facility]  HISTORY AND PHYSICAL    Patient Name: Narendra Cochran  : 1940  MRN: [id number]  Primary Care Physician: Marguerite Moore PA-C  Date of admission: 2022      Subjective   Subjective     Chief Complaint:  Abd pain     HPI:  Narendra Cochran is a 81 y.o. male with history of DM, HTN, chronic systolic heart failure, nonischemic cardiomyopathy, valvular heart disease, seizure disorder who presents with abd pain. States he has had abd pain that comes and goes for the last 3 days. Tried pepto-bismal but this caused him to have diarrhea so discontinued. Unable to tell me what makes it worse. States he hasn't had anything to eat in 2-3 days but feels like he can eat now. Having a BM did not seem to improve symptoms. Patient states he hasn't been more SOA, however, on chart review patient's wife called the heart failure clinic on  and  for patient experiencing SOA. He was told to take lasix  and .       Review of Systems   Gen- No fevers, chills  CV- No chest pain, palpitations  Resp- No cough, dyspnea  GI- No N/V +D, + abd pain      All other systems reviewed and are negative.     Personal History     Past Medical History:   Diagnosis Date   • Arthritis    • CHF (congestive heart failure) (HCC)    • Diabetes mellitus (HCC)    • Diabetic foot ulcers (HCC)    • Diverticulitis    • Foot callus    • GERD (gastroesophageal reflux disease)    • Hammer toes, bilateral    • Hearing loss    • History of transfusion    • Hyperlipidemia    • Hypertension    • Hypertensive urgency, malignant 2017   • Paget disease of bone              Past Surgical History:   Procedure Laterality Date   • APPENDECTOMY     • CARDIAC CATHETERIZATION N/A 3/18/2020    Procedure: Left Heart Cath;  Surgeon: Sonya Garibay MD;  Location: [facility] CATH INVASIVE LOCATION;  Service: Cardiology;  Laterality: N/A;  First availabel provider     • CARDIAC CATHETERIZATION N/A  3/15/2021    Procedure: LEFT HEART CATH;  Surgeon: Doc Sahni IV, MD;  Location:  [facility] CATH INVASIVE LOCATION;  Service: Cardiovascular;  Laterality: N/A;   • CARDIAC CATHETERIZATION N/A 3/15/2021    Procedure: Coronary angiography;  Surgeon: Doc Sahni IV, MD;  Location:  [facility] CATH INVASIVE LOCATION;  Service: Cardiovascular;  Laterality: N/A;   • CARDIAC ELECTROPHYSIOLOGY PROCEDURE N/A 3/16/2021    Procedure: ICD DC new;  Surgeon: Som Boyd DO;  Location:  [facility] EP INVASIVE LOCATION;  Service: Cardiology;  Laterality: N/A;   • COLON RESECTION      second to diverticulitis    • FOOT SURGERY Left        Family History:  family history includes Clotting disorder in his sister; Diabetes in his sister; Fainting in his brother; Hyperlipidemia in his mother; No Known Problems in his brother, daughter, sister, son, son, and son. Otherwise pertinent FHx was reviewed and unremarkable.     Social History:  reports that he quit smoking about 3 years ago. His smoking use included cigars and cigarettes. He has a 32.50 pack-year smoking history. He has never used smokeless tobacco. He reports current alcohol use. He reports current drug use. Drug: Marijuana.  Social History     Social History Narrative    Caffeine coffee occassionaly 1 serving    Caffeine 0       Medications:  Available home medication information reviewed.  Medications Prior to Admission   Medication Sig Dispense Refill Last Dose   • albuterol sulfate  (90 Base) MCG/ACT inhaler Inhale 2 puffs Every 4 (Four) Hours As Needed for Wheezing or Shortness of Air. 8 g 3 9/14/2022 at Unknown time   • aspirin 81 MG chewable tablet Chew 1 tablet Daily.   9/14/2022 at Unknown time   • diclofenac (VOLTAREN) 1 % gel gel Apply 4 g topically to the appropriate area as directed 4 (Four) Times a Day As Needed (prn for pain). 60 tube 0 9/13/2022 at Unknown time   • furosemide (LASIX) 40 MG tablet Take 40 mg by mouth Daily. X2 days per  pcp started yesterday   9/14/2022 at Unknown time   • glucose blood test strip 1 each by Other route Daily. Use daily as directed 100 each 12 9/14/2022 at Unknown time   • glucose monitor monitoring kit 1 each Daily. Use daily as directed. 1 each 0 9/14/2022 at Unknown time   • levETIRAcetam (KEPPRA) 750 MG tablet TAKE 1 TABLET BY MOUTH TWICE A DAY 60 tablet 5 9/14/2022 at Unknown time   • metFORMIN ER (GLUCOPHAGE-XR) 500 MG 24 hr tablet Take 1 tablet by mouth Daily With Breakfast. 90 tablet 3 9/14/2022 at Unknown time   • metoprolol succinate XL (TOPROL-XL) 50 MG 24 hr tablet TAKE 1 TABLET BY MOUTH EVERY DAY 90 tablet 2 9/14/2022 at Unknown time   • pantoprazole (PROTONIX) 40 MG EC tablet Take 1 tablet by mouth 2 (Two) Times a Day. 180 tablet 1 9/14/2022 at Unknown time   • potassium chloride ER (K-TAB) 20 MEQ tablet controlled-release ER tablet Take 1 tablet by mouth Every Other Day. (Patient taking differently: Take 20 mEq by mouth Every Other Day. Changed by pcp to take daily x 2 days) 60 tablet  9/14/2022 at Unknown time   • rosuvastatin (CRESTOR) 20 MG tablet Take 1 tablet by mouth Daily. 90 tablet 3 9/14/2022 at Unknown time   • sacubitril-valsartan (Entresto) 24-26 MG tablet Take 1 tablet by mouth 2 (Two) Times a Day. 60 tablet 6 9/14/2022 at Unknown time   • tiotropium bromide monohydrate (Spiriva Respimat) 2.5 MCG/ACT aerosol solution inhaler Inhale 2 puffs Daily. (Patient not taking: Reported on 9/14/2022) 4 g 5 Not Taking at Unknown time       No Known Allergies    Objective   Objective     Vital Signs:   Temp:  [96.5 °F (35.8 °C)-97.7 °F (36.5 °C)] 96.5 °F (35.8 °C)  Heart Rate:  [60-73] 73  Resp:  [20-28] 28  BP: (145-167)/(70-92) 145/70  Flow (L/min):  [1-2] 1       Physical Exam   Constitutional: Awake, alert  Eyes: PERRLA, sclerae anicteric, no conjunctival injection  HENT: NCAT, mucous membranes moist; hard of hearing   Neck: Supple, no thyromegaly, no lymphadenopathy, trachea midline  Respiratory:  Diminished in the bases; mild increase in work with speaking   Cardiovascular: RRR, II/VI murmurs, rubs, or gallops, palpable pedal pulses bilaterally  Gastrointestinal: Positive bowel sounds, soft, nontender, nondistended  Musculoskeletal: No bilateral ankle edema, no clubbing or cyanosis to extremities  Psychiatric: Appropriate affect, cooperative  Neurologic: Oriented x 3, strength symmetric in all extremities, Cranial Nerves grossly intact to confrontation, speech clear  Skin: No rashes      Result Review:  I have personally reviewed the results from the time of this admission to 9/14/2022 22:59 EDT and agree with these findings:  []  Laboratory list / accordion  []  Microbiology  []  Radiology  []  EKG/Telemetry   []  Cardiology/Vascular   []  Pathology  []  Old records  []  Other:  Most notable findings include:         LAB RESULTS:      Lab 09/14/22  1732   WBC 3.57   HEMOGLOBIN 11.0*   HEMATOCRIT 35.2*   PLATELETS 129*   NEUTROS ABS 2.70   IMMATURE GRANS (ABS) 0.01   LYMPHS ABS 0.62*   MONOS ABS 0.19   EOS ABS 0.04   MCV 82.2         Lab 09/14/22  1732   SODIUM 143   POTASSIUM 4.6   CHLORIDE 106   CO2 20.0*   ANION GAP 17.0*   BUN 18   CREATININE 1.62*   EGFR 41.9*   GLUCOSE 99   CALCIUM 9.5         Lab 09/14/22  1732   TOTAL PROTEIN 7.6   ALBUMIN 4.20   GLOBULIN 3.4   ALT (SGPT) 30   AST (SGOT) 47*   BILIRUBIN 1.1   ALK PHOS 76         Lab 09/14/22  1732   PROBNP 37,603.0*   TROPONIN T 0.019                 UA    Urinalysis 7/29/22 7/29/22    1650 1650   Squamous Epithelial Cells, UA  0-2   Specific Gravity, UA 1.019    Ketones, UA Negative    Blood, UA Negative    Leukocytes, UA Negative    Nitrite, UA Negative    RBC, UA  0-2   WBC, UA  0-2   Bacteria, UA  None Seen             Microbiology Results (last 10 days)     Procedure Component Value - Date/Time    COVID PRE-OP / PRE-PROCEDURE SCREENING ORDER (NO ISOLATION) - Swab, Nasopharynx [[id number]]  (Normal) Collected: 09/14/22 1702    Lab Status: Final  result Specimen: Swab from Nasopharynx Updated: 09/14/22 1815    Narrative:      The following orders were created for panel order COVID PRE-OP / PRE-PROCEDURE SCREENING ORDER (NO ISOLATION) - Swab, Nasopharynx.  Procedure                               Abnormality         Status                     ---------                               -----------         ------                     COVID-19 and FLU A/B PCR...[[id number]]  Normal              Final result                 Please view results for these tests on the individual orders.    COVID-19 and FLU A/B PCR - Swab, Nasopharynx [[id number]]  (Normal) Collected: 09/14/22 1702    Lab Status: Final result Specimen: Swab from Nasopharynx Updated: 09/14/22 1815     COVID19 Not Detected     Influenza A PCR Not Detected     Influenza B PCR Not Detected    Narrative:      Fact sheet for providers: https://www.fda.gov/media/080635/download    Fact sheet for patients: https://www.fda.gov/media/830492/download    Test performed by PCR.          XR Chest 1 View    Result Date: 9/14/2022  DATE OF EXAM: 9/14/2022 5:01 PM  PROCEDURE: XR CHEST 1 VW-  INDICATIONS: SOA triage protocol.  COMPARISON: Chest x-ray 7/13/2022  TECHNIQUE: Single frontal view of the chest.  FINDINGS: Unchanged appearance of dual-lead AICD with left chest pulse generator. There is stable cardiomegaly. There is mild interstitial pulmonary edema. No significant pleural effusion. No pneumothorax. No acute osseous findings.      Impression: Mild interstitial pulmonary edema.  This report was finalized on 9/14/2022 5:30 PM by Martin Cabral MD.        Results for orders placed during the hospital encounter of 03/12/21    Adult Transthoracic Echo Complete W/ Cont if Necessary Per Protocol    Interpretation Summary  · Left ventricular systolic function is normal. Estimated left ventricular EF = 50%  · Left ventricular wall thickness is consistent with mild concentric hypertrophy.  · Left atrial volume is mildly  increased.  · Moderate aortic valve regurgitation is present.  · Moderate mitral valve regurgitation is present.      Assessment & Plan   Assessment & Plan     Active Hospital Problems    Diagnosis  POA   • Acute on chronic congestive heart failure, unspecified heart failure type ([facility]) [I50.9]  Yes   • Paroxysmal A-fib ([facility]) [I48.0]  Unknown   • Type 2 diabetes mellitus ([facility]) [E11.9]  Unknown   • Acute renal failure (ARF) ([facility]) [N17.9]  Yes   • Stage 3a chronic kidney disease ([facility]) [N18.31]  Yes   • Presence of biventricular implantable cardioverter-defibrillator (ICD) [Z95.810]  Yes   • VHD; mild-mod AR, mild MR [I38]  Yes     · Echo (3/13/2021): LVEF 50%.  Moderate MR.  Moderate aortic insufficiency.     • Coronary artery disease involving native coronary artery of native heart with angina pectoris ([facility]) [I25.119]  Yes     · Cardiac catheterization (3/18/2020): 1 vessel CAD involving small posterior lateral branch of the RCA.  · Cardiac catheterization for OHCA (3/15/2021): Severe 1-vessel CAD involving small posterior lateral branch of the RCA.     • COPD (chronic obstructive pulmonary disease) ([facility]) [J44.9]  Yes   • H/O seizures on Keppra [R56.9]  Yes   • Essential hypertension [I10]  Yes   • Hyperlipidemia LDL goal <70 [E78.5]  Yes       Narendra Cochran is a 81 y.o. male with history of DM, HTN, chronic systolic heart failure, nonischemic cardiomyopathy, valvular heart disease, seizure disorder, Paget's disease who presents with abd pain. Workup in the ER with elevated BNP, elevated Cr and imaging with mild interstitial pulm edema.     Abd pain  Diarrhea   - Appears improved. Patient with no current complaints of abd pain, exam benign and asking to eat/tolearting diet   - Obtain KUB   - Obtain UA   - Additional workup pending symptoms. He did have similar symptoms per notes from 7/29-8/4 admission     KENNEDY   - Cr appears to be normal at baseline. KENNEDY during last admission  - He has had issues tolerating entresto  and diuretics in the past  - Received lasix in the ER and has been on lasix for the last 2 days. Will hold on additional and re-assess in the AM   - Hold entresto until re-evaluated in the AM   - Repeat labs in the AM     NICM  Pulm edema  Elevated BNP   Valvular heart disease   - Last ECHO 3/21 with EF 50%, moderate aortic regurg and moderate mitral regurg   - s/p lasix 60 mg IV in the ER  - Holding entresto per above; continue metoprolol   - Strict I/Os, daily weights   - Cards consult     HTN   - Continue metoprolol. Holding entresto per above     DM   - Holding home metformin  - SSI     History of PAF   - Noted on pacemaker 7/22 in setting of electrolyte abnormalities. Per last DC note, cards recommended holding anticoagulation as no further episodes and with chronic anemia/thrombocytopenia     Anemia  Thrombocytopenia   - Chronic. Improved from last check     Seizures   - keppra       DVT prophylaxis:  Heparin       CODE STATUS:  Reviewed with patient - full code   Code Status and Medical Interventions:   Ordered at: 09/14/22 5352     Level Of Support Discussed With:    Patient     Code Status (Patient has no pulse and is not breathing):    CPR (Attempt to Resuscitate)     Medical Interventions (Patient has pulse or is breathing):    Full Support         Bia Caputo,   09/14/22

## 2022-09-15 NOTE — PLAN OF CARE
Goal Outcome Evaluation:  Plan of Care Reviewed With: patient           Outcome Evaluation: OT eval completed. Pt presents with generalized weakness and balance deficits impacting ADL's. Pt completed bed mobility with SUP, reporting dizziness with position change. Pt ambulated to bathroom with Jose Carlos/RW, performed oral care standing sink side with CGA, slight LOB while turning with RW in tight space. Manual assist required to reposition RW to safely exit bathroom. IP OT services warranted. Recommend IRF for optimal outcomes.

## 2022-09-16 ENCOUNTER — APPOINTMENT (OUTPATIENT)
Dept: CARDIOLOGY | Facility: HOSPITAL | Age: 82
End: 2022-09-16

## 2022-09-16 LAB
ANION GAP SERPL CALCULATED.3IONS-SCNC: 11 MMOL/L (ref 5–15)
BACTERIA SPEC AEROBE CULT: NO GROWTH
BH CV ECHO MEAS - AI P1/2T: 344.1 MSEC
BH CV ECHO MEAS - AO MAX PG: 7.1 MMHG
BH CV ECHO MEAS - AO MEAN PG: 4 MMHG
BH CV ECHO MEAS - AO ROOT DIAM: 3.4 CM
BH CV ECHO MEAS - AO V2 MAX: 133.3 CM/SEC
BH CV ECHO MEAS - AO V2 VTI: 20.9 CM
BH CV ECHO MEAS - AVA(I,D): 1.74 CM2
BH CV ECHO MEAS - EDV(CUBED): 267.2 ML
BH CV ECHO MEAS - EDV(MOD-SP2): 285 ML
BH CV ECHO MEAS - EDV(MOD-SP4): 250 ML
BH CV ECHO MEAS - EF(MOD-BP): 11.2 %
BH CV ECHO MEAS - EF(MOD-SP2): 11.9 %
BH CV ECHO MEAS - EF(MOD-SP4): 12.8 %
BH CV ECHO MEAS - ESV(CUBED): 175.3 ML
BH CV ECHO MEAS - ESV(MOD-SP2): 251 ML
BH CV ECHO MEAS - ESV(MOD-SP4): 218 ML
BH CV ECHO MEAS - FS: 13.1 %
BH CV ECHO MEAS - IVS/LVPW: 0.94 CM
BH CV ECHO MEAS - IVSD: 0.96 CM
BH CV ECHO MEAS - LA DIMENSION: 4.6 CM
BH CV ECHO MEAS - LV MASS(C)D: 273.6 GRAMS
BH CV ECHO MEAS - LV MAX PG: 2.12 MMHG
BH CV ECHO MEAS - LV MEAN PG: 0.91 MMHG
BH CV ECHO MEAS - LV V1 MAX: 72.8 CM/SEC
BH CV ECHO MEAS - LV V1 VTI: 11.9 CM
BH CV ECHO MEAS - LVIDD: 6.4 CM
BH CV ECHO MEAS - LVIDS: 5.6 CM
BH CV ECHO MEAS - LVOT AREA: 3.1 CM2
BH CV ECHO MEAS - LVOT DIAM: 1.97 CM
BH CV ECHO MEAS - LVPWD: 1.01 CM
BH CV ECHO MEAS - MR MAX PG: 91.7 MMHG
BH CV ECHO MEAS - MR MAX VEL: 478.8 CM/SEC
BH CV ECHO MEAS - MR MEAN PG: 57.1 MMHG
BH CV ECHO MEAS - MR MEAN VEL: 354.5 CM/SEC
BH CV ECHO MEAS - MR VTI: 157.3 CM
BH CV ECHO MEAS - MV A MAX VEL: 87.8 CM/SEC
BH CV ECHO MEAS - MV DEC SLOPE: 364.4 CM/SEC2
BH CV ECHO MEAS - MV DEC TIME: 0.15 MSEC
BH CV ECHO MEAS - MV E MAX VEL: 106 CM/SEC
BH CV ECHO MEAS - MV E/A: 1.21
BH CV ECHO MEAS - MV MAX PG: 3.1 MMHG
BH CV ECHO MEAS - MV MEAN PG: 1.9 MMHG
BH CV ECHO MEAS - MV P1/2T: 64.4 MSEC
BH CV ECHO MEAS - MV V2 VTI: 18.1 CM
BH CV ECHO MEAS - MVA(P1/2T): 3.4 CM2
BH CV ECHO MEAS - MVA(VTI): 2.01 CM2
BH CV ECHO MEAS - PA ACC TIME: 0.12 SEC
BH CV ECHO MEAS - PA PR(ACCEL): 26.7 MMHG
BH CV ECHO MEAS - RAP SYSTOLE: 15 MMHG
BH CV ECHO MEAS - RF(MV,LVOT)(1DIAM): 0.81 CM
BH CV ECHO MEAS - RVSP: 71 MMHG
BH CV ECHO MEAS - SV(LVOT): 36.4 ML
BH CV ECHO MEAS - SV(MOD-SP2): 34 ML
BH CV ECHO MEAS - SV(MOD-SP4): 32 ML
BH CV ECHO MEAS - TAPSE (>1.6): 1.91 CM
BH CV ECHO MEAS - TR MAX PG: 56 MMHG
BH CV ECHO MEAS - TR MAX VEL: 374 CM/SEC
BH CV VAS BP RIGHT ARM: NORMAL MMHG
BH CV XLRA - RV BASE: 5.8 CM
BH CV XLRA - RV LENGTH: 9.8 CM
BH CV XLRA - RV MID: 3.8 CM
BUN SERPL-MCNC: 20 MG/DL (ref 8–23)
BUN/CREAT SERPL: 18.2 (ref 7–25)
CALCIUM SPEC-SCNC: 9.3 MG/DL (ref 8.6–10.5)
CHLORIDE SERPL-SCNC: 108 MMOL/L (ref 98–107)
CO2 SERPL-SCNC: 25 MMOL/L (ref 22–29)
CREAT SERPL-MCNC: 1.1 MG/DL (ref 0.76–1.27)
EGFRCR SERPLBLD CKD-EPI 2021: 66.6 ML/MIN/1.73
GLUCOSE BLDC GLUCOMTR-MCNC: 129 MG/DL (ref 70–130)
GLUCOSE BLDC GLUCOMTR-MCNC: 145 MG/DL (ref 70–130)
GLUCOSE BLDC GLUCOMTR-MCNC: 96 MG/DL (ref 70–130)
GLUCOSE SERPL-MCNC: 122 MG/DL (ref 65–99)
LEFT ATRIUM VOLUME INDEX: 57.1 ML/M2
LV EF 2D ECHO EST: 15 %
MAGNESIUM SERPL-MCNC: 1.9 MG/DL (ref 1.6–2.4)
MAXIMAL PREDICTED HEART RATE: 137 BPM
POTASSIUM SERPL-SCNC: 3.4 MMOL/L (ref 3.5–5.2)
POTASSIUM SERPL-SCNC: 4.2 MMOL/L (ref 3.5–5.2)
QT INTERVAL: 518 MS
QTC INTERVAL: 562 MS
SODIUM SERPL-SCNC: 144 MMOL/L (ref 136–145)
STRESS TARGET HR: 116 BPM

## 2022-09-16 PROCEDURE — 93306 TTE W/DOPPLER COMPLETE: CPT

## 2022-09-16 PROCEDURE — 84132 ASSAY OF SERUM POTASSIUM: CPT | Performed by: INTERNAL MEDICINE

## 2022-09-16 PROCEDURE — 80048 BASIC METABOLIC PNL TOTAL CA: CPT | Performed by: INTERNAL MEDICINE

## 2022-09-16 PROCEDURE — 25010000002 HEPARIN (PORCINE) PER 1000 UNITS: Performed by: INTERNAL MEDICINE

## 2022-09-16 PROCEDURE — G0378 HOSPITAL OBSERVATION PER HR: HCPCS

## 2022-09-16 PROCEDURE — 25010000002 SULFUR HEXAFLUORIDE MICROSPH 60.7-25 MG RECONSTITUTED SUSPENSION

## 2022-09-16 PROCEDURE — 0 MAGNESIUM SULFATE 4 GM/100ML SOLUTION: Performed by: INTERNAL MEDICINE

## 2022-09-16 PROCEDURE — 97161 PT EVAL LOW COMPLEX 20 MIN: CPT

## 2022-09-16 PROCEDURE — 99214 OFFICE O/P EST MOD 30 MIN: CPT | Performed by: INTERNAL MEDICINE

## 2022-09-16 PROCEDURE — 94761 N-INVAS EAR/PLS OXIMETRY MLT: CPT

## 2022-09-16 PROCEDURE — 83735 ASSAY OF MAGNESIUM: CPT | Performed by: INTERNAL MEDICINE

## 2022-09-16 PROCEDURE — 82962 GLUCOSE BLOOD TEST: CPT

## 2022-09-16 PROCEDURE — 99225 PR SBSQ OBSERVATION CARE/DAY 25 MINUTES: CPT | Performed by: INTERNAL MEDICINE

## 2022-09-16 PROCEDURE — 96372 THER/PROPH/DIAG INJ SC/IM: CPT

## 2022-09-16 PROCEDURE — 94799 UNLISTED PULMONARY SVC/PX: CPT

## 2022-09-16 PROCEDURE — 93306 TTE W/DOPPLER COMPLETE: CPT | Performed by: INTERNAL MEDICINE

## 2022-09-16 PROCEDURE — 97530 THERAPEUTIC ACTIVITIES: CPT

## 2022-09-16 RX ORDER — FUROSEMIDE 40 MG/1
40 TABLET ORAL DAILY
Status: DISCONTINUED | OUTPATIENT
Start: 2022-09-17 | End: 2022-09-20 | Stop reason: HOSPADM

## 2022-09-16 RX ADMIN — POTASSIUM CHLORIDE 40 MEQ: 750 CAPSULE, EXTENDED RELEASE ORAL at 15:43

## 2022-09-16 RX ADMIN — EMPAGLIFLOZIN 10 MG: 10 TABLET, FILM COATED ORAL at 17:41

## 2022-09-16 RX ADMIN — POTASSIUM CHLORIDE 40 MEQ: 750 CAPSULE, EXTENDED RELEASE ORAL at 11:19

## 2022-09-16 RX ADMIN — MAGNESIUM SULFATE HEPTAHYDRATE 4 G: 40 INJECTION, SOLUTION INTRAVENOUS at 12:23

## 2022-09-16 RX ADMIN — PANTOPRAZOLE SODIUM 40 MG: 40 TABLET, DELAYED RELEASE ORAL at 22:51

## 2022-09-16 RX ADMIN — ASPIRIN 81 MG CHEWABLE TABLET 81 MG: 81 TABLET CHEWABLE at 08:26

## 2022-09-16 RX ADMIN — HEPARIN SODIUM 5000 UNITS: 5000 INJECTION INTRAVENOUS; SUBCUTANEOUS at 08:27

## 2022-09-16 RX ADMIN — LEVETIRACETAM 750 MG: 750 TABLET, FILM COATED ORAL at 22:51

## 2022-09-16 RX ADMIN — Medication 10 ML: at 08:27

## 2022-09-16 RX ADMIN — SENNOSIDES AND DOCUSATE SODIUM 2 TABLET: 50; 8.6 TABLET ORAL at 08:26

## 2022-09-16 RX ADMIN — SULFUR HEXAFLUORIDE 5 ML: KIT at 16:35

## 2022-09-16 RX ADMIN — HEPARIN SODIUM 5000 UNITS: 5000 INJECTION INTRAVENOUS; SUBCUTANEOUS at 22:52

## 2022-09-16 RX ADMIN — METOPROLOL SUCCINATE 50 MG: 50 TABLET, FILM COATED, EXTENDED RELEASE ORAL at 08:26

## 2022-09-16 RX ADMIN — TIOTROPIUM BROMIDE INHALATION SPRAY 2 PUFF: 3.12 SPRAY, METERED RESPIRATORY (INHALATION) at 08:19

## 2022-09-16 RX ADMIN — SACUBITRIL AND VALSARTAN 1 TABLET: 24; 26 TABLET, FILM COATED ORAL at 22:52

## 2022-09-16 RX ADMIN — LEVETIRACETAM 750 MG: 750 TABLET, FILM COATED ORAL at 08:26

## 2022-09-16 RX ADMIN — PANTOPRAZOLE SODIUM 40 MG: 40 TABLET, DELAYED RELEASE ORAL at 08:26

## 2022-09-16 RX ADMIN — ROSUVASTATIN CALCIUM 20 MG: 20 TABLET, FILM COATED ORAL at 08:26

## 2022-09-16 NOTE — PLAN OF CARE
Goal Outcome Evaluation:  Plan of Care Reviewed With: patient        Progress: no change  Outcome Evaluation: Patient rested this shift. Remains on 1L NC with oxygen saturation greater then 95%. Patient denies soa/pain. Vitals stable.

## 2022-09-16 NOTE — PROGRESS NOTES
[facility]  PROGRESS NOTE    Patient Name: Narendra Cochran  : 1940  MRN: [id number]    Date of Admission: 2022  Primary Care Physician: Marguerite Moore PA-C    Subjective   Subjective     CC:  Follow-up heart failure    HPI:  Shortness of breath improved today     ROS:  Gen- No fevers, chills  CV- No chest pain, palpitations  Resp- No cough  GI- No N/V/D, abd pain        Objective   Objective     Vital Signs:   Temp:  [96.6 °F (35.9 °C)-97.9 °F (36.6 °C)] 96.6 °F (35.9 °C)  Heart Rate:  [] 82  Resp:  [16-18] 18  BP: (121-148)/(61-81) 121/63  Flow (L/min):  [1] 1     Physical Exam:  Constitutional - no acute distress, nontoxic, in bed  HEENT-NCAT  CV-RRR  Resp-grossly clear bilaterally  Abd-soft, nontender, nondistended, normoactive bowel sounds  Ext-No lower extremity cyanosis, clubbing or edema bilaterally  Neuro-alert, speech clear, moves all extremities   Psych-normal affect   Skin- No rash on exposed UE or LE bilaterally      Results Reviewed:  LAB RESULTS:      Lab 09/15/22  1020 22  1732   WBC 3.29* 3.57   HEMOGLOBIN 10.8* 11.0*   HEMATOCRIT 35.3* 35.2*   PLATELETS 112* 129*   NEUTROS ABS  --  2.70   IMMATURE GRANS (ABS)  --  0.01   LYMPHS ABS  --  0.62*   MONOS ABS  --  0.19   EOS ABS  --  0.04   MCV 82.5 82.2   LACTATE 1.5  --          Lab 22  0957 09/15/22  1020 22  1732   SODIUM 144 142 143   POTASSIUM 3.4* 3.9 4.6   CHLORIDE 108* 107 106   CO2 25.0 22.0 20.0*   ANION GAP 11.0 13.0 17.0*   BUN 20 24* 18   CREATININE 1.10 1.44* 1.62*   EGFR 66.6 48.2* 41.9*   GLUCOSE 122* 132* 99   CALCIUM 9.3 9.6 9.5   MAGNESIUM 1.9 1.8  --          Lab 22  1732   TOTAL PROTEIN 7.6   ALBUMIN 4.20   GLOBULIN 3.4   ALT (SGPT) 30   AST (SGOT) 47*   BILIRUBIN 1.1   ALK PHOS 76         Lab 22  1732   PROBNP 37,603.0*   TROPONIN T 0.019                 Brief Urine Lab Results  (Last result in the past 365 days)      Color    Clarity   Blood   Leuk Est   Nitrite   Protein   CREAT   Urine HCG        09/15/22 1146 Yellow   Clear   Negative   Trace   Negative   30 mg/dL (1+)                 Microbiology Results Abnormal     Procedure Component Value - Date/Time    Urine Culture - Urine, Urine, Clean Catch [[id number]]  (Normal) Collected: 09/15/22 1146    Lab Status: Final result Specimen: Urine, Clean Catch Updated: 09/16/22 1425     Urine Culture No growth    COVID PRE-OP / PRE-PROCEDURE SCREENING ORDER (NO ISOLATION) - Swab, Nasopharynx [[id number]]  (Normal) Collected: 09/14/22 1702    Lab Status: Final result Specimen: Swab from Nasopharynx Updated: 09/14/22 1815    Narrative:      The following orders were created for panel order COVID PRE-OP / PRE-PROCEDURE SCREENING ORDER (NO ISOLATION) - Swab, Nasopharynx.  Procedure                               Abnormality         Status                     ---------                               -----------         ------                     COVID-19 and FLU A/B PCR...[[id number]]  Normal              Final result                 Please view results for these tests on the individual orders.    COVID-19 and FLU A/B PCR - Swab, Nasopharynx [[id number]]  (Normal) Collected: 09/14/22 1702    Lab Status: Final result Specimen: Swab from Nasopharynx Updated: 09/14/22 1815     COVID19 Not Detected     Influenza A PCR Not Detected     Influenza B PCR Not Detected    Narrative:      Fact sheet for providers: https://www.fda.gov/media/354215/download    Fact sheet for patients: https://www.fda.gov/media/468574/download    Test performed by PCR.          XR Chest 1 View    Result Date: 9/14/2022  DATE OF EXAM: 9/14/2022 5:01 PM  PROCEDURE: XR CHEST 1 VW-  INDICATIONS: SOA triage protocol.  COMPARISON: Chest x-ray 7/13/2022  TECHNIQUE: Single frontal view of the chest.  FINDINGS: Unchanged appearance of dual-lead AICD with left chest pulse generator. There is stable cardiomegaly. There is mild interstitial  pulmonary edema. No significant pleural effusion. No pneumothorax. No acute osseous findings.      Impression: Mild interstitial pulmonary edema.  This report was finalized on 9/14/2022 5:30 PM by Martin Cabral MD.      XR Abdomen KUB    Result Date: 9/15/2022  DATE OF EXAM: 9/14/2022 11:16 PM  PROCEDURE: XR ABDOMEN KUB-  INDICATIONS: abd pain; I50.9-Heart failure, unspecified; R06.02-Shortness of breath; R06.82-Tachypnea, not elsewhere classified; R79.89-Other specified abnormal findings of blood chemistry  COMPARISON: No comparisons available.  TECHNIQUE: Single radiographic view of the abdomen was obtained.  FINDINGS: There is mildly and diffusely increased large and small bowel gas in a nonspecific pattern. Considerations would include mild ileus, gastroenteritis, but the pattern does not particularly suggest bowel obstruction. No bowel wall edema or pneumatosis is seen. There is a coarsened trabeculation pattern of the lower pelvic bones of left proximal femur as noted on 7/19/2022 abdomen pelvis CT scan, consistent with history of of Paget's disease.      Impression:  1. Nonspecific bowel gas pattern with mildly increased large and small bowel gas as discussed above. 2. Incidentally noted Paget's disease of the pelvis and left proximal femur.  This report was finalized on 9/15/2022 8:33 AM by Dr. Edin Clark MD.        Results for orders placed during the hospital encounter of 03/12/21    Adult Transthoracic Echo Complete W/ Cont if Necessary Per Protocol    Interpretation Summary  · Left ventricular systolic function is normal. Estimated left ventricular EF = 50%  · Left ventricular wall thickness is consistent with mild concentric hypertrophy.  · Left atrial volume is mildly increased.  · Moderate aortic valve regurgitation is present.  · Moderate mitral valve regurgitation is present.      I have reviewed the medications:  Scheduled Meds:aspirin, 81 mg, Oral, Daily  heparin (porcine), 5,000 Units,  Subcutaneous, Q12H  insulin lispro, 0-7 Units, Subcutaneous, TID With Meals  levETIRAcetam, 750 mg, Oral, BID  metoprolol succinate XL, 50 mg, Oral, Daily  pantoprazole, 40 mg, Oral, BID  pharmacy consult - MTM, , Does not apply, Daily  rosuvastatin, 20 mg, Oral, Daily  senna-docusate sodium, 2 tablet, Oral, BID  sodium chloride, 10 mL, Intravenous, Q12H  tiotropium bromide monohydrate, 2 puff, Inhalation, Daily - RT      Continuous Infusions:   PRN Meds:.•  acetaminophen **OR** acetaminophen **OR** acetaminophen  •  albuterol  •  senna-docusate sodium **AND** polyethylene glycol **AND** bisacodyl **AND** bisacodyl  •  calcium carbonate  •  dextrose  •  dextrose  •  glucagon (human recombinant)  •  magnesium sulfate **OR** magnesium sulfate **OR** magnesium sulfate  •  ondansetron  •  potassium chloride **OR** potassium chloride **OR** potassium chloride  •  simethicone  •  sodium chloride  •  sodium chloride    Assessment & Plan   Assessment & Plan     Active Hospital Problems    Diagnosis  POA   • **Acute on chronic congestive heart failure, unspecified heart failure type (HCC) [I50.9]  Yes   • Type 2 diabetes mellitus (HCC) [E11.9]  Unknown   • Thrombocytopenia (HCC) [D69.6]  Unknown   • Elevated brain natriuretic peptide (BNP) level [R79.89]  Unknown   • Acute kidney injury (HCC) [N17.9]  Yes   • CKD (chronic kidney disease) stage 2, GFR 60-89 ml/min [N18.2]  Yes   • Coronary artery disease involving native coronary artery of native heart with angina pectoris (HCC) [I25.119]  Yes   • H/O seizures on Keppra [R56.9]  Yes   • Essential hypertension [I10]  Yes      Resolved Hospital Problems   No resolved problems to display.        Brief Hospital Course to date:  Narendra Cochran is a 81 y.o. male w/ HFrEF and recovered EF (50%), seizure disorder, HTN, DM2 who was recently admitted 7/29-8/4 with KENNEDY, hyperkalemia; ultimately his Aldactone was discontinued and Lasix was changed to PRN dosing only.  Increased [employer]  Index 9/8, developed worsening SOB/edema 9/13-9/14, presented to the ED with shortness of and noted to have increased creatinine    The following problems new to me today    Assessment/plan    Chronic HFrEF, recovered EF, decompensated  S/p cardiac arrest 3/2021  S/p [employer] dual-chamber pacemaker/ICD  - Most recent EF 50%, cardiology repeating echo  -Aldactone dc'ed earlier this year 2/2 hyperkalemia  -Entresto on hold for KENNEDY  -continue aspirin, Toprol-XL, rosuvastatin  -Received IV diuresis per cardiology    Acute kidney injury on CKD 2  -BLine Cr ~0.9-1.1; eGFR 70-80's  -creatinine improved from 1.6 at admission to 1.1 today    Abdominal pain  -Similar complaints during recent admission  -Continue PPI  - KUB reassuring, only notable for slightly increased gas pattern  -Empiric trial of simethicone    DM type 2, A1c 6.5%, without long-term use of insulin  - Accu-Cheks with SSI    Hx paroxysmal atrial fibrillation  Hypertension  - Toprol-XL, aspirin  -Per last DC note PAF was in the setting of electrolyte derangements, no recurrence    Chronic pancytopenia  -Stable, follow-up outpatient    Seizure disorder  -Keppra    Expected Discharge Location and Transportation: Home with family  Expected Discharge Date: 9/17    DVT prophylaxis:  Medical DVT prophylaxis orders are present.     AM-PAC 6 Clicks Score (PT): 18 (09/16/22 4511)    CODE STATUS:   Code Status and Medical Interventions:   Ordered at: 09/14/22 6902     Level Of Support Discussed With:    Patient     Code Status (Patient has no pulse and is not breathing):    CPR (Attempt to Resuscitate)     Medical Interventions (Patient has pulse or is breathing):    Full Support       Isrrael Black MD  09/16/22

## 2022-09-16 NOTE — PLAN OF CARE
Problem: Adult Inpatient Plan of Care  Goal: Plan of Care Review  Outcome: Ongoing, Progressing  Flowsheets (Taken 9/16/2022 1813)  Progress: no change  Plan of Care Reviewed With: patient  Outcome Evaluation: VSS. On room air. Echo done. Worked with therapy and sat in chair. No complaints.   Goal Outcome Evaluation:  Plan of Care Reviewed With: patient        Progress: no change  Outcome Evaluation: VSS. On room air. Echo done. Worked with therapy and sat in chair. No complaints.

## 2022-09-16 NOTE — PROGRESS NOTES
"                    Clinical Nutrition       Patient Name: Narendra Cochran  YOB: 1940  MRN: [id number]  Date of Encounter: 22 17:55 EDT  Admission date: 2022      Reason for Visit   Identified at risk by screening criteria, \"Unsure\" unintentional weight loss    Comment: current weight loss does not meet criterion for malnutrition  EMR  Reviewed   Yes    Height: Height: 177.8 cm (70\")  Weight: Weight: 86.6 kg (191 lb) (22 1645)  BMI: BMI (Calculated): 27.4    Problem:    Acute on chronic congestive heart failure, unspecified heart failure type (HCC)    Essential hypertension    H/O seizures on Keppra    Coronary artery disease involving native coronary artery of native heart with angina pectoris (HCC)    CKD (chronic kidney disease) stage 2, GFR 60-89 ml/min    Acute kidney injury (HCC)    Type 2 diabetes mellitus (HCC)    Thrombocytopenia (HCC)    Elevated brain natriuretic peptide (BNP) level    Wt Hx:  Weight Weight (kg) Weight (lbs) Weight Method VISIT REPORT   2022 86.637 kg 191 lb - -   2022 86.637 kg 191 lb - -   2022 86.864 kg 191 lb 8 oz Bed scale -   9/15/2022 86.274 kg 190 lb 3.2 oz Bed scale -   2022 88.54 kg 195 lb 3.1 oz Bed scale -   2022 93.169 kg 205 lb 6.4 oz Stated -   2022 93.169 kg 205 lb 6.4 oz - Report   7% weight loss over 1 year, wt fluctuations may also be volume r/t CHF     Reported/Observed/Food/Nutrition Related - Comments   Pt reports his appetite was not too good prior to adm. He reports his appetite has returned and he is now eating well.   He is uncertain oif he has lost weight. He is agreeable to Boost ONS.      Current Nutrition Prescription     Diet Regular; Cardiac, Consistent Carbohydrate  Orders Placed This Encounter      Dietary Nutrition Supplements Boost Plus; vanilla      Average Intake from [employer]% of 1 meal since adm.    Nutrition Diagnosis       Problem Unintended weight loss   Etiology Uncertain - CHF "   Signs/Symptoms 14 lbs over 1 year, 7% possible fuid loss w/ CHF; daily furosemide   Status: ongoing    Actions     Follow treatment progress, Care plan reviewed, Encourage intake, Supplement provided    Monitor Per Protocol      Manisha oLu MS,RD,LD,   Time Spent: 20 mins

## 2022-09-16 NOTE — THERAPY EVALUATION
Patient Name: Narendra Cochran  : 1940    MRN: [id number]                              Today's Date: 2022       Admit Date: 2022    Visit Dx:     ICD-10-CM ICD-9-CM   1. Acute on chronic congestive heart failure, unspecified heart failure type ([facility])  I50.9 428.0   2. Shortness of breath  R06.02 786.05   3. Tachypnea  R06.82 786.06   4. Elevated serum creatinine  R79.89 790.99     Patient Active Problem List   Diagnosis   • Essential hypertension   • Hyperlipidemia LDL goal <70   • Paget disease of bone   • H/O seizures on Keppra   • Gonalgia   • Osteitis deformans   • COPD (chronic obstructive pulmonary disease) ([facility])   • Syncope   • NSVT (nonsustained ventricular tachycardia) ([facility])   • Coronary artery disease involving native coronary artery of native heart with angina pectoris ([facility])   • NICM    • OHCA   • Hypokalemia   • Hypomagnesemia   • VHD; mild-mod AR, mild MR   • Chronic systolic congestive heart failure ([facility])   • Former tobacco user   • GERD   • Marijuana use   • Frequent PVCs   • Presence of biventricular implantable cardioverter-defibrillator (ICD)   • CKD (chronic kidney disease) stage 2, GFR 60-89 ml/min   • Acute kidney injury ([facility])   • Moderate malnutrition ([facility])   • Acute on chronic congestive heart failure, unspecified heart failure type ([facility])   • Paroxysmal A-fib ([facility])   • Type 2 diabetes mellitus ([facility])   • Thrombocytopenia ([facility])   • Acute pulmonary edema ([facility])   • Elevated brain natriuretic peptide (BNP) level     Past Medical History:   Diagnosis Date   • Arthritis    • CHF (congestive heart failure) ([facility])    • Diabetes mellitus ([facility])    • Diabetic foot ulcers ([facility])    • Diverticulitis    • Foot callus    • GERD (gastroesophageal reflux disease)    • Hammer toes, bilateral    • Hearing loss    • History of transfusion    • Hyperlipidemia    • Hypertension    • Hypertensive urgency, malignant 2017   • Paget disease of bone      Past Surgical History:   Procedure Laterality  Date   • APPENDECTOMY     • CARDIAC CATHETERIZATION N/A 3/18/2020    Procedure: Left Heart Cath;  Surgeon: Sonya Garibay MD;  Location:  [facility];  Service: Cardiology;  Laterality: N/A;  First availabel provider     • CARDIAC CATHETERIZATION N/A 3/15/2021    Procedure: LEFT HEART CATH;  Surgeon: Doc Sahni IV, MD;  Location:  [facility];  Service: Cardiovascular;  Laterality: N/A;   • CARDIAC CATHETERIZATION N/A 3/15/2021    Procedure: Coronary angiography;  Surgeon: oDc Sahni IV, MD;  Location:  [facility];  Service: Cardiovascular;  Laterality: N/A;   • CARDIAC ELECTROPHYSIOLOGY PROCEDURE N/A 3/16/2021    Procedure: ICD DC new;  Surgeon: Som Boyd DO;  Location:  [facility];  Service: Cardiology;  Laterality: N/A;   • COLON RESECTION      second to diverticulitis    • FOOT SURGERY Left       General Information     Row Name 09/16/22 1443          Physical Therapy Time and Intention    Document Type evaluation  -AY     Mode of Treatment physical therapy  -AY     Row Name 09/16/22 1443          General Information    Patient Profile Reviewed yes  -AY     Prior Level of Function independent:;all household mobility;community mobility;gait;transfer;bed mobility;using stairs;ADL's  using RWx for mobility  -AY     Existing Precautions/Restrictions fall;seizures  -AY     Barriers to Rehab medically complex;previous functional deficit;hearing deficit  -AY     Row Name 09/16/22 1443          Living Environment    People in Home spouse  -AY     Row Name 09/16/22 1443          Home Main Entrance    Number of Stairs, Main Entrance none  -AY     Row Name 09/16/22 1443          Stairs Within Home, Primary    Number of Stairs, Within Home, Primary none  -AY     Row Name 09/16/22 1443          Cognition    Orientation Status (Cognition) oriented x 3  -AY     Row Name 09/16/22 1443          Safety Issues, Functional  Mobility    Safety Issues Affecting Function (Mobility) insight into deficits/self-awareness;safety precautions follow-through/compliance;sequencing abilities;awareness of need for assistance;safety precaution awareness  -AY     Impairments Affecting Function (Mobility) balance;coordination;endurance/activity tolerance;motor planning;postural/trunk control;strength  -AY           User Key  (r) = Recorded By, (t) = Taken By, (c) = Cosigned By    Initials Name Provider Type    AY Phyllis Bowser PT Physical Therapist               Mobility     Row Name 09/16/22 1444          Sit-Stand Transfer    Sit-Stand Ary (Transfers) minimum assist (75% patient effort);verbal cues;1 person assist  -AY     Assistive Device (Sit-Stand Transfers) walker, front-wheeled  -AY     Row Name 09/16/22 1444          Gait/Stairs (Locomotion)    Ary Level (Gait) minimum assist (75% patient effort);1 person assist  -AY     Assistive Device (Gait) walker, front-wheeled  -AY     Distance in Feet (Gait) 80  -AY     Deviations/Abnormal Patterns (Gait) kristy decreased;festinating/shuffling;gait speed decreased;bilateral deviations;stride length decreased;weight shifting decreased;base of support, narrow  -AY     Bilateral Gait Deviations heel strike decreased;forward flexed posture  -AY     Comment, (Gait/Stairs) pt demonstrated step through gait pattern with decreased kristy and flexed posture. Cueing for posture, increased stride length, heel strike, and walker postioning. 2 LOB requiring mod A to correct. Gait distance limited by c/o SOA with O2 remaining >95%on RA.  -AY           User Key  (r) = Recorded By, (t) = Taken By, (c) = Cosigned By    Initials Name Provider Type    Phyllis Caputo PT Physical Therapist               Obj/Interventions     Row Name 09/16/22 1440          Range of Motion Comprehensive    General Range of Motion bilateral lower extremity ROM WFL  -AY     Row Name 09/16/22 1441          Strength  Comprehensive (MMT)    General Manual Muscle Testing (MMT) Assessment lower extremity strength deficits identified  -AY     Comment, General Manual Muscle Testing (MMT) Assessment BLE grossly 4-/5  -AY     Row Name 09/16/22 1445          Balance    Balance Assessment sitting static balance;sitting dynamic balance;sit to stand dynamic balance;standing static balance;standing dynamic balance  -AY     Static Sitting Balance supervision  -AY     Dynamic Sitting Balance contact guard  -AY     Position, Sitting Balance supported;sitting in chair  -AY     Sit to Stand Dynamic Balance minimal assist  -AY     Static Standing Balance minimal assist  -AY     Dynamic Standing Balance minimal assist  -AY     Position/Device Used, Standing Balance supported;walker, rolling  -AY     Row Name 09/16/22 1445          Sensory Assessment (Somatosensory)    Sensory Assessment (Somatosensory) LE sensation intact  -AY           User Key  (r) = Recorded By, (t) = Taken By, (c) = Cosigned By    Initials Name Provider Type    Phyllis Caputo, PT Physical Therapist               Goals/Plan     Row Name 09/16/22 1446          Bed Mobility Goal 1 (PT)    Activity/Assistive Device (Bed Mobility Goal 1, PT) sit to supine/supine to sit  -AY     [place] Level/Cues Needed (Bed Mobility Goal 1, PT) independent  -AY     Time Frame (Bed Mobility Goal 1, PT) long term goal (LTG);2 weeks  -AY     Row Name 09/16/22 1449          Transfer Goal 1 (PT)    Activity/Assistive Device (Transfer Goal 1, PT) sit-to-stand/stand-to-sit;walker, rolling  -AY     [place] Level/Cues Needed (Transfer Goal 1, PT) modified independence  -AY     Time Frame (Transfer Goal 1, PT) long term goal (LTG);2 weeks  -AY     Row Name 09/16/22 1445          Gait Training Goal 1 (PT)    Activity/Assistive Device (Gait Training Goal 1, PT) gait (walking locomotion);assistive device use;walker, rolling  -AY     [place] Level (Gait Training Goal 1, PT) modified  independence  -AY     Distance (Gait Training Goal 1, PT) 500  -AY     Time Frame (Gait Training Goal 1, PT) long term goal (LTG);2 weeks  -AY     Row Name 09/16/22 1448          Therapy Assessment/Plan (PT)    Planned Therapy Interventions (PT) balance training;bed mobility training;gait training;home exercise program;patient/family education;strengthening;stair training;postural re-education;transfer training  -AY           User Key  (r) = Recorded By, (t) = Taken By, (c) = Cosigned By    Initials Name Provider Type    AY Phyllis Bowser, PT Physical Therapist               Clinical Impression     Row Name 09/16/22 1446          Pain    Pretreatment Pain Rating 0/10 - no pain  -AY     Posttreatment Pain Rating 0/10 - no pain  -AY     Pain Intervention(s) Ambulation/increased activity;Repositioned  -AY     Additional Documentation Pain Scale: Numbers Pre/Post-Treatment (Group)  -AY     Row Name 09/16/22 1446          Plan of Care Review    Plan of Care Reviewed With patient  -AY     Progress no change  -AY     Outcome Evaluation PT initial eval completed. Pt limited by decreased functional endurance, generalized weakness, balance deficit, and SOA. Pt amb 80ft with RWx and min A. Two LOB requiring mod A to correct. Limited by SOA with O2 remaining >95% on RA. Rec continued skilled PT. d/c rec for IRF, but will monitor progress closely.  -AY     Row Name 09/16/22 1444          Therapy Assessment/Plan (PT)    Rehab Potential (PT) good, to achieve stated therapy goals  -AY     Criteria for Skilled Interventions Met (PT) yes;meets criteria;skilled treatment is necessary  -AY     Therapy Frequency (PT) daily  -AY     Row Name 09/16/22 1446          Vital Signs    Pre Systolic BP Rehab --  VSS  -AY     Pretreatment Heart Rate (beats/min) 78  -AY     Posttreatment Heart Rate (beats/min) 94  -AY     Pre SpO2 (%) 96  -AY     O2 Delivery Pre Treatment room air  -AY     O2 Delivery Intra Treatment room air  -AY     Post SpO2 (%)  95  -AY     O2 Delivery Post Treatment room air  -AY     Pre Patient Position Sitting  -AY     Intra Patient Position Standing  -AY     Post Patient Position Sitting  -AY     Row Name 09/16/22 1446          Positioning and Restraints    Pre-Treatment Position sitting in chair/recliner  -AY     Post Treatment Position chair  -AY     In Chair notified nsg;reclined;sitting;call light within reach;encouraged to call for assist;exit alarm on;waffle cushion;legs elevated  -AY           User Key  (r) = Recorded By, (t) = Taken By, (c) = Cosigned By    Initials Name Provider Type    Phyllis Caputo PT Physical Therapist               Outcome Measures     Row Name 09/16/22 1448          How much help from another person do you currently need...    Turning from your back to your side while in flat bed without using bedrails? 3  -AY     Moving from lying on back to sitting on the side of a flat bed without bedrails? 3  -AY     Moving to and from a bed to a chair (including a wheelchair)? 3  -AY     Standing up from a chair using your arms (e.g., wheelchair, bedside chair)? 3  -AY     Climbing 3-5 steps with a railing? 3  -AY     To walk in hospital room? 3  -AY     AM-PAC 6 Clicks Score (PT) 18  -AY     Highest level of mobility 6 --> Walked 10 steps or more  -AY     Row Name 09/16/22 1448          Functional Assessment    Outcome Measure Options AM-PAC 6 Clicks Basic Mobility (PT)  -AY           User Key  (r) = Recorded By, (t) = Taken By, (c) = Cosigned By    Initials Name Provider Type    Phyllis Caputo PT Physical Therapist                             Physical Therapy Education                 Title: PT OT SLP Therapies (In Progress)     Topic: Physical Therapy (In Progress)     Point: Mobility training (Done)     Learning Progress Summary           Patient Acceptance, E,TB, VU,NR by AY at 9/16/2022 1449                   Point: Home exercise program (Not Started)     Learner Progress:  Not documented in this visit.           Point: Body mechanics (Done)     Learning Progress Summary           Patient Acceptance, E,TB, VU,NR by AY at 9/16/2022 1449                   Point: Precautions (Done)     Learning Progress Summary           Patient Acceptance, E,TB, VU,NR by AY at 9/16/2022 1449                               User Key     Initials Effective Dates Name Provider Type Discipline    AY 11/10/20 -  Phyllis Bowser PT Physical Therapist PT              PT Recommendation and Plan  Planned Therapy Interventions (PT): balance training, bed mobility training, gait training, home exercise program, patient/family education, strengthening, stair training, postural re-education, transfer training  Plan of Care Reviewed With: patient  Progress: no change  Outcome Evaluation: PT initial eval completed. Pt limited by decreased functional endurance, generalized weakness, balance deficit, and SOA. Pt amb 80ft with RWx and min A. Two LOB requiring mod A to correct. Limited by SOA with O2 remaining >95% on RA. Rec continued skilled PT. d/c rec for IRF, but will monitor progress closely.     Time Calculation:    PT Charges     Row Name 09/16/22 1449             Time Calculation    Start Time 1130  -AY      PT Received On 09/16/22  -AY      PT Goal Re-Cert Due Date 09/26/22  -AY              Timed Charges    63077 - PT Therapeutic Activity Minutes 10  -AY              Untimed Charges    PT Eval/Re-eval Minutes 46  -AY              Total Minutes    Timed Charges Total Minutes 10  -AY      Untimed Charges Total Minutes 46  -AY       Total Minutes 56  -AY            User Key  (r) = Recorded By, (t) = Taken By, (c) = Cosigned By    Initials Name Provider Type    AY Phyllis Bowser PT Physical Therapist              Therapy Charges for Today     Code Description Service Date Service Provider Modifiers Qty    41836719201 HC PT THERAPEUTIC ACT EA 15 MIN 9/16/2022 Phyllis Bowser, PT GP 1    85393469331 HC PT EVAL LOW COMPLEXITY 4 9/16/2022 Phyllis Bowser  PT GP 1          PT G-Codes  Outcome Measure Options: AM-PAC 6 Clicks Basic Mobility (PT)  AM-PAC 6 Clicks Score (PT): 18  AM-PAC 6 Clicks Score (OT): 17    Phyllis Bowser, ESTELA  9/16/2022

## 2022-09-16 NOTE — PLAN OF CARE
Goal Outcome Evaluation:  Plan of Care Reviewed With: patient        Progress: no change  Outcome Evaluation: PT initial eval completed. Pt limited by decreased functional endurance, generalized weakness, balance deficit, and SOA. Pt amb 80ft with RWx and min A. Two LOB requiring mod A to correct. Limited by SOA with O2 remaining >95% on RA. Rec continued skilled PT. d/c rec for IRF, but will monitor progress closely.

## 2022-09-16 NOTE — CASE MANAGEMENT/SOCIAL WORK
Continued Stay Note  [facility]     Patient Name: Narendra Cochran  MRN: [id number]  Today's Date: 9/16/2022    Admit Date: 9/14/2022     Discharge Plan     Row Name 09/16/22 1559       Plan    Plan SNF VS Home with HH.    Plan Comments Spoke with patient and family at bedside. Discussed SNF and they were okay with referrals to [facility] and [facility]. CM will follow.    Final Discharge Disposition Code 06 - home with home health care               Discharge Codes    No documentation.               Expected Discharge Date and Time     Expected Discharge Date Expected Discharge Time    Sep 19, 2022             Zeinab Dasilva RN

## 2022-09-16 NOTE — PROGRESS NOTES
"[facility] Progress Note     LOS: 0 days   Patient Care Team:  Marguerite Moore PA-C as PCP - General (Physician Assistant)  Som Boyd DO as Consulting Physician (Cardiology)  Javad Mercedes MD as Consulting Physician (Cardiology)  Thomas Lui MD as Consulting Physician (Hematology and Oncology)  PCP:  Marguerite Moore PA-C    Chief Complaint: Congestive heart failure    SUBJECTIVE: Resting comfortably in bed.  No complaints.  Family at bedside.      Review of Systems:   All systems have been reviewed and are negative with the exception of those mentioned above.      OBJECTIVE:    Vital Sign Min/Max for last 24 hours  Temp  Min: 96.6 °F (35.9 °C)  Max: 97.9 °F (36.6 °C)   BP  Min: 121/63  Max: 148/81   Pulse  Min: 60  Max: 102   Resp  Min: 16  Max: 18   SpO2  Min: 92 %  Max: 99 %   No data recorded   Weight  Min: 86.6 kg (191 lb)  Max: 86.9 kg (191 lb 8 oz)     Flowsheet Rows    Flowsheet Row First Filed Value   Admission Height 177.8 cm (70\") Documented at 09/14/2022 1854   Admission Weight 93.2 kg (205 lb 6.4 oz) Documented at 09/14/2022 1854          Telemetry: Sinus rhythm with PVCs      Intake/Output Summary (Last 24 hours) at 9/16/2022 1640  Last data filed at 9/16/2022 1456  Gross per 24 hour   Intake 525 ml   Output 1900 ml   Net -1375 ml     Intake & Output (last 3 days)       09/13 0701  09/14 0700 09/14 0701  09/15 0700 09/15 0701  09/16 0700 09/16 0701  09/17 0700    P.O.    525    Total Intake(mL/kg)    525 (6.1)    Urine (mL/kg/hr)  1000 2050 (1) 500 (0.6)    Stool    0    Total Output  1000 2050 500    Net  -1000 -2050 +25            Stool Unmeasured Occurrence    1 x           Physical Exam:    General Appearance:    Alert, cooperative, no distress, appears stated age   Neck:   Supple, symmetrical, trachea midline.   Lungs:    Bilateral inspiratory rales over the lower 60%, respirations unlabored   Chest Wall:    No " tenderness or deformity    Heart:    Regular rate and rhythm, S1 and S2 normal, no murmur, rub   or gallop, normal carotid impulse bilaterally without bruit.   Extremities:   Extremities normal, atraumatic, no cyanosis or edema   Pulses:   2+ and symmetric all extremities   Skin:   Skin color, texture, turgor normal, no rashes or lesions      LABS/DIAGNOSTIC DATA:  Results from last 7 days   Lab Units 09/15/22  1020 09/14/22  1732   WBC 10*3/mm3 3.29* 3.57   HEMOGLOBIN g/dL 10.8* 11.0*   HEMATOCRIT % 35.3* 35.2*   PLATELETS 10*3/mm3 112* 129*     Lab Results   Lab Value Date/Time    TROPONINT 0.019 09/14/2022 1732    TROPONINT 0.011 07/29/2022 1948    TROPONINT <0.010 07/13/2022 1621    TROPONINT 0.017 03/15/2021 0524    TROPONINT 0.159 (C) 03/13/2021 0453    TROPONINT 0.026 03/12/2021 2215    TROPONINT <0.010 03/12/2021 1911    TROPONINT <0.010 11/13/2019 0233    TROPONINT <0.010 11/12/2019 2259         Results from last 7 days   Lab Units 09/16/22  0957 09/15/22  1020 09/14/22  1732   SODIUM mmol/L 144 142 143   POTASSIUM mmol/L 3.4* 3.9 4.6   CHLORIDE mmol/L 108* 107 106   CO2 mmol/L 25.0 22.0 20.0*   BUN mg/dL 20 24* 18   CREATININE mg/dL 1.10 1.44* 1.62*   CALCIUM mg/dL 9.3 9.6 9.5   BILIRUBIN mg/dL  --   --  1.1   ALK PHOS U/L  --   --  76   ALT (SGPT) U/L  --   --  30   AST (SGOT) U/L  --   --  47*   GLUCOSE mg/dL 122* 132* 99                       Medication Review:   aspirin, 81 mg, Oral, Daily  heparin (porcine), 5,000 Units, Subcutaneous, Q12H  insulin lispro, 0-7 Units, Subcutaneous, TID With Meals  levETIRAcetam, 750 mg, Oral, BID  metoprolol succinate XL, 50 mg, Oral, Daily  pantoprazole, 40 mg, Oral, BID  pharmacy consult - MTM, , Does not apply, Daily  rosuvastatin, 20 mg, Oral, Daily  senna-docusate sodium, 2 tablet, Oral, BID  sodium chloride, 10 mL, Intravenous, Q12H  tiotropium bromide monohydrate, 2 puff, Inhalation, Daily - RT             ASSESSMENT/PLAN:    Acute on chronic congestive heart  failure, unspecified heart failure type (HCC)    Essential hypertension    H/O seizures on Keppra    Coronary artery disease involving native coronary artery of native heart with angina pectoris (HCC)    CKD (chronic kidney disease) stage 2, GFR 60-89 ml/min    Acute kidney injury (HCC)    Type 2 diabetes mellitus (HCC)    Thrombocytopenia (HCC)    Elevated brain natriuretic peptide (BNP) level      Heart failure with reduced ejection fraction, acute on chronic: Newly depressed EF, 15 to 20% with regional wall motion abnormalities most prominent in the LAD distribution.  Associated moderate mitral vegetation, moderate aortic insufficiency and mild to moderate tricuspid regurgitation.  Suspicious for underlying progression of ischemic heart disease.  -Continue titration of guideline directed medical therapy as blood pressure and renal function allow  -Consider repeat ischemia evaluation with cardiac catheterization.  As there is no clinical evidence for ACS and he appears to be responding to diuresis and titration of medical therapy, this can likely be achieved as an outpatient.          Brent Fritz III, MD   09/16/22  16:40 EDT

## 2022-09-17 LAB
ANION GAP SERPL CALCULATED.3IONS-SCNC: 10 MMOL/L (ref 5–15)
BUN SERPL-MCNC: 16 MG/DL (ref 8–23)
BUN/CREAT SERPL: 15.7 (ref 7–25)
CALCIUM SPEC-SCNC: 9.4 MG/DL (ref 8.6–10.5)
CHLORIDE SERPL-SCNC: 110 MMOL/L (ref 98–107)
CO2 SERPL-SCNC: 24 MMOL/L (ref 22–29)
CREAT SERPL-MCNC: 1.02 MG/DL (ref 0.76–1.27)
EGFRCR SERPLBLD CKD-EPI 2021: 72.9 ML/MIN/1.73
GLUCOSE BLDC GLUCOMTR-MCNC: 114 MG/DL (ref 70–130)
GLUCOSE BLDC GLUCOMTR-MCNC: 133 MG/DL (ref 70–130)
GLUCOSE BLDC GLUCOMTR-MCNC: 154 MG/DL (ref 70–130)
GLUCOSE SERPL-MCNC: 123 MG/DL (ref 65–99)
MAGNESIUM SERPL-MCNC: 2.1 MG/DL (ref 1.6–2.4)
POTASSIUM SERPL-SCNC: 4.2 MMOL/L (ref 3.5–5.2)
SODIUM SERPL-SCNC: 144 MMOL/L (ref 136–145)

## 2022-09-17 PROCEDURE — 83735 ASSAY OF MAGNESIUM: CPT | Performed by: INTERNAL MEDICINE

## 2022-09-17 PROCEDURE — 99226 PR SBSQ OBSERVATION CARE/DAY 35 MINUTES: CPT | Performed by: PHYSICIAN ASSISTANT

## 2022-09-17 PROCEDURE — 82962 GLUCOSE BLOOD TEST: CPT

## 2022-09-17 PROCEDURE — 80048 BASIC METABOLIC PNL TOTAL CA: CPT | Performed by: PHYSICIAN ASSISTANT

## 2022-09-17 PROCEDURE — 96372 THER/PROPH/DIAG INJ SC/IM: CPT

## 2022-09-17 PROCEDURE — 25010000002 HEPARIN (PORCINE) PER 1000 UNITS: Performed by: INTERNAL MEDICINE

## 2022-09-17 PROCEDURE — 94761 N-INVAS EAR/PLS OXIMETRY MLT: CPT

## 2022-09-17 PROCEDURE — 63710000001 INSULIN LISPRO (HUMAN) PER 5 UNITS: Performed by: INTERNAL MEDICINE

## 2022-09-17 PROCEDURE — G0378 HOSPITAL OBSERVATION PER HR: HCPCS

## 2022-09-17 PROCEDURE — 94799 UNLISTED PULMONARY SVC/PX: CPT

## 2022-09-17 PROCEDURE — 99214 OFFICE O/P EST MOD 30 MIN: CPT | Performed by: STUDENT IN AN ORGANIZED HEALTH CARE EDUCATION/TRAINING PROGRAM

## 2022-09-17 RX ORDER — FUROSEMIDE 40 MG/1
40 TABLET ORAL ONCE
Status: COMPLETED | OUTPATIENT
Start: 2022-09-17 | End: 2022-09-17

## 2022-09-17 RX ADMIN — SENNOSIDES AND DOCUSATE SODIUM 2 TABLET: 50; 8.6 TABLET ORAL at 21:23

## 2022-09-17 RX ADMIN — FUROSEMIDE 40 MG: 40 TABLET ORAL at 13:28

## 2022-09-17 RX ADMIN — ROSUVASTATIN CALCIUM 20 MG: 20 TABLET, FILM COATED ORAL at 10:25

## 2022-09-17 RX ADMIN — SENNOSIDES AND DOCUSATE SODIUM 2 TABLET: 50; 8.6 TABLET ORAL at 10:25

## 2022-09-17 RX ADMIN — SIMETHICONE 40 MG: 80 TABLET, CHEWABLE ORAL at 13:28

## 2022-09-17 RX ADMIN — HEPARIN SODIUM 5000 UNITS: 5000 INJECTION INTRAVENOUS; SUBCUTANEOUS at 09:04

## 2022-09-17 RX ADMIN — LEVETIRACETAM 750 MG: 750 TABLET, FILM COATED ORAL at 09:04

## 2022-09-17 RX ADMIN — TIOTROPIUM BROMIDE INHALATION SPRAY 2 PUFF: 3.12 SPRAY, METERED RESPIRATORY (INHALATION) at 08:02

## 2022-09-17 RX ADMIN — Medication 10 ML: at 09:05

## 2022-09-17 RX ADMIN — LEVETIRACETAM 750 MG: 750 TABLET, FILM COATED ORAL at 21:24

## 2022-09-17 RX ADMIN — FUROSEMIDE 40 MG: 40 TABLET ORAL at 09:04

## 2022-09-17 RX ADMIN — SACUBITRIL AND VALSARTAN 1 TABLET: 24; 26 TABLET, FILM COATED ORAL at 10:25

## 2022-09-17 RX ADMIN — METOPROLOL SUCCINATE 50 MG: 50 TABLET, FILM COATED, EXTENDED RELEASE ORAL at 09:04

## 2022-09-17 RX ADMIN — PANTOPRAZOLE SODIUM 40 MG: 40 TABLET, DELAYED RELEASE ORAL at 09:04

## 2022-09-17 RX ADMIN — Medication 10 ML: at 21:24

## 2022-09-17 RX ADMIN — PANTOPRAZOLE SODIUM 40 MG: 40 TABLET, DELAYED RELEASE ORAL at 21:24

## 2022-09-17 RX ADMIN — ANTACID TABLETS 2 TABLET: 500 TABLET, CHEWABLE ORAL at 12:29

## 2022-09-17 RX ADMIN — ASPIRIN 81 MG CHEWABLE TABLET 81 MG: 81 TABLET CHEWABLE at 09:04

## 2022-09-17 RX ADMIN — HEPARIN SODIUM 5000 UNITS: 5000 INJECTION INTRAVENOUS; SUBCUTANEOUS at 21:23

## 2022-09-17 RX ADMIN — EMPAGLIFLOZIN 10 MG: 10 TABLET, FILM COATED ORAL at 09:04

## 2022-09-17 RX ADMIN — INSULIN LISPRO 2 UNITS: 100 INJECTION, SOLUTION INTRAVENOUS; SUBCUTANEOUS at 12:29

## 2022-09-17 RX ADMIN — SACUBITRIL AND VALSARTAN 1 TABLET: 24; 26 TABLET, FILM COATED ORAL at 21:23

## 2022-09-17 NOTE — PLAN OF CARE
Goal Outcome Evaluation:  Plan of Care Reviewed With: patient        Progress: no change  Outcome Evaluation: Patient rested this shift. No complaints verbalized. Patient normal sinus to A. Paced on the monitor. Patient noted to have 9 Beat run of vtach this morning. Provider notifed new order for Morning BMP

## 2022-09-17 NOTE — PROGRESS NOTES
[facility] Medicine Services  PROGRESS NOTE    Patient Name: Narendra Cochran  : 1940  MRN: [id number]    Date of Admission: 2022  Primary Care Physician: Margueirte Moore PA-C    Subjective   Subjective     CC:  Follow-up heart failure    HPI:  Reports abd pain and SOA much improved, no current CP. Denies N/V, constipation, difficulty urinating.     ROS:  Gen- No fevers, chills  CV- No chest pain, palpitations  Resp-SOA improving,  No cough  GI- occasional abd pain. No N/V/D        Objective   Objective     Vital Signs:   Temp:  [96.6 °F (35.9 °C)-98.3 °F (36.8 °C)] 97.7 °F (36.5 °C)  Heart Rate:  [] 70  Resp:  [16-18] 16  BP: (113-138)/(59-78) 133/78     Physical Exam:  Constitutional - no acute distress, nontoxic, in bed  HEENT-NCAT  CV-intermittently paced rhythma  Resp-rales in bases Left >Right, mildly dyspneic with speech, satting in mid 90s awake, 91% when asleep just now  Abd-soft,obese, nondistended, normoactive bowel sounds, mild TTP left mid abd no rebound/guarding  Ext-No lower extremity cyanosis, clubbing or edema bilaterally  Neuro-alert, speech clear, moves all extremities   Psych-normal affect   Skin- No rash on exposed UE or LE bilaterally      Results Reviewed:  LAB RESULTS:      Lab 09/15/22  1020 22  1732   WBC 3.29* 3.57   HEMOGLOBIN 10.8* 11.0*   HEMATOCRIT 35.3* 35.2*   PLATELETS 112* 129*   NEUTROS ABS  --  2.70   IMMATURE GRANS (ABS)  --  0.01   LYMPHS ABS  --  0.62*   MONOS ABS  --  0.19   EOS ABS  --  0.04   MCV 82.5 82.2   LACTATE 1.5  --          Lab 22  0452 22  2104 22  0957 09/15/22  1020 22  1732   SODIUM 144  --  144 142 143   POTASSIUM 4.2 4.2 3.4* 3.9 4.6   CHLORIDE 110*  --  108* 107 106   CO2 24.0  --  25.0 22.0 20.0*   ANION GAP 10.0  --  11.0 13.0 17.0*   BUN 16  --  20 24* 18   CREATININE 1.02  --  1.10 1.44* 1.62*   EGFR 72.9  --  66.6 48.2* 41.9*   GLUCOSE 123*  --  122* 132* 99   CALCIUM 9.4   --  9.3 9.6 9.5   MAGNESIUM 2.1  --  1.9 1.8  --          Lab 09/14/22 1732   TOTAL PROTEIN 7.6   ALBUMIN 4.20   GLOBULIN 3.4   ALT (SGPT) 30   AST (SGOT) 47*   BILIRUBIN 1.1   ALK PHOS 76         Lab 09/14/22 1732   PROBNP 37,603.0*   TROPONIN T 0.019                 Brief Urine Lab Results  (Last result in the past 365 days)      Color   Clarity   Blood   Leuk Est   Nitrite   Protein   CREAT   Urine HCG        09/15/22 1146 Yellow   Clear   Negative   Trace   Negative   30 mg/dL (1+)                 Microbiology Results Abnormal     Procedure Component Value - Date/Time    Urine Culture - Urine, Urine, Clean Catch [[id number]]  (Normal) Collected: 09/15/22 1146    Lab Status: Final result Specimen: Urine, Clean Catch Updated: 09/16/22 1425     Urine Culture No growth    COVID PRE-OP / PRE-PROCEDURE SCREENING ORDER (NO ISOLATION) - Swab, Nasopharynx [[id number]]  (Normal) Collected: 09/14/22 1702    Lab Status: Final result Specimen: Swab from Nasopharynx Updated: 09/14/22 1815    Narrative:      The following orders were created for panel order COVID PRE-OP / PRE-PROCEDURE SCREENING ORDER (NO ISOLATION) - Swab, Nasopharynx.  Procedure                               Abnormality         Status                     ---------                               -----------         ------                     COVID-19 and FLU A/B PCR...[[id number]]  Normal              Final result                 Please view results for these tests on the individual orders.    COVID-19 and FLU A/B PCR - Swab, Nasopharynx [[id number]]  (Normal) Collected: 09/14/22 1702    Lab Status: Final result Specimen: Swab from Nasopharynx Updated: 09/14/22 1815     COVID19 Not Detected     Influenza A PCR Not Detected     Influenza B PCR Not Detected    Narrative:      Fact sheet for providers: https://www.fda.gov/media/719212/download    Fact sheet for patients: https://www.fda.gov/media/948590/download    Test performed by PCR.          Adult  Transthoracic Echo Complete W/ Cont if Necessary Per Protocol    Result Date: 9/16/2022  · The left ventricular cavity is moderately dilated with normal wall thickness · Estimated left ventricular EF = 15% severe global hypokinesis, apical motion consistent with pacemaker activation. · Left ventricular diastolic function is consistent with grade 2 diastolic dysfunction · The following left ventricular wall segments are hypokinetic: mid anterior, apical anterior, basal anterolateral, mid anterolateral, apical lateral, apical septal, apex hypokinetic, mid anteroseptal and basal anterior. · Moderate aortic valve regurgitation is present. · Moderate to severe mitral valve regurgitation is present. · Estimated right ventricular systolic pressure from tricuspid regurgitation is markedly elevated (>55 mmHg). · Left atrial volume is severely increased.        Results for orders placed during the hospital encounter of 09/14/22    Adult Transthoracic Echo Complete W/ Cont if Necessary Per Protocol    Interpretation Summary  · The left ventricular cavity is moderately dilated with normal wall thickness  · Estimated left ventricular EF = 15% severe global hypokinesis, apical motion consistent with pacemaker activation.  · Left ventricular diastolic function is consistent with grade 2 diastolic dysfunction  · The following left ventricular wall segments are hypokinetic: mid anterior, apical anterior, basal anterolateral, mid anterolateral, apical lateral, apical septal, apex hypokinetic, mid anteroseptal and basal anterior.  · Moderate aortic valve regurgitation is present.  · Moderate to severe mitral valve regurgitation is present.  · Estimated right ventricular systolic pressure from tricuspid regurgitation is markedly elevated (>55 mmHg).  · Left atrial volume is severely increased.      I have reviewed the medications:  Scheduled Meds:aspirin, 81 mg, Oral, Daily  empagliflozin, 10 mg, Oral, Daily  furosemide, 40 mg, Oral,  Daily  furosemide, 40 mg, Oral, Once  heparin (porcine), 5,000 Units, Subcutaneous, Q12H  insulin lispro, 0-7 Units, Subcutaneous, TID With Meals  levETIRAcetam, 750 mg, Oral, BID  metoprolol succinate XL, 50 mg, Oral, Daily  pantoprazole, 40 mg, Oral, BID  pharmacy consult - MTM, , Does not apply, Daily  rosuvastatin, 20 mg, Oral, Daily  sacubitril-valsartan, 1 tablet, Oral, Q12H  senna-docusate sodium, 2 tablet, Oral, BID  sodium chloride, 10 mL, Intravenous, Q12H  tiotropium bromide monohydrate, 2 puff, Inhalation, Daily - RT      Continuous Infusions:   PRN Meds:.•  acetaminophen **OR** acetaminophen **OR** acetaminophen  •  albuterol  •  senna-docusate sodium **AND** polyethylene glycol **AND** bisacodyl **AND** bisacodyl  •  calcium carbonate  •  dextrose  •  dextrose  •  glucagon (human recombinant)  •  magnesium sulfate **OR** magnesium sulfate **OR** magnesium sulfate  •  ondansetron  •  potassium chloride **OR** potassium chloride **OR** potassium chloride  •  simethicone  •  sodium chloride  •  sodium chloride    Assessment & Plan   Assessment & Plan     Active Hospital Problems    Diagnosis  POA   • **Acute on chronic congestive heart failure, unspecified heart failure type (HCC) [I50.9]  Yes   • Type 2 diabetes mellitus (HCC) [E11.9]  Unknown   • Thrombocytopenia (HCC) [D69.6]  Unknown   • Elevated brain natriuretic peptide (BNP) level [R79.89]  Unknown   • Acute kidney injury (HCC) [N17.9]  Yes   • CKD (chronic kidney disease) stage 2, GFR 60-89 ml/min [N18.2]  Yes   • Coronary artery disease involving native coronary artery of native heart with angina pectoris (HCC) [I25.119]  Yes   • H/O seizures on Keppra [R56.9]  Yes   • Essential hypertension [I10]  Yes      Resolved Hospital Problems   No resolved problems to display.        Brief Hospital Course to date:  Narendra Cochran is a 81 y.o. male w/ HFrEF and recovered EF (50%), seizure disorder, HTN, DM2 who was recently admitted 7/29-8/4 with KENNEDY,  hyperkalemia; ultimately his Aldactone was discontinued and Lasix was changed to PRN dosing only.  Increased HeartLogic Index 9/8, developed worsening SOB/edema 9/13-9/14, presented to the ED with shortness of and noted to have increased creatinine    This patient's problems and plans were partially entered by my partner and updated as appropriate by me 09/17/22.      Assessment/plan    Chronic HFrEF, Newly depressed EF, 15 to 20% with regional wall motion abnormalities most prominent in the LAD distribution.    S/p cardiac arrest 3/2021  S/p [employer] dual-chamber pacemaker/ICD  - Most recent EF 50%, cardiology repeating echo  -Aldactone dc'ed earlier this year 2/2 hyperkalemia  -Entresto on hold for KENNEDY  -continue aspirin, Toprol-XL, rosuvastatin  -Received IV diuresis per cardiology who added 1 dose po lasix d/t decreased UOP, for mild crackles in bases today, Left >right    -CXR in a.m.    Acute kidney injury on CKD 2  -BLine Cr ~0.9-1.1; eGFR 70-80's  -creatinine improved from 1.6 at admission to 1.02today, BMP in a.m with diuresis monitor     Abdominal pain  -Similar complaints during recent admission  -Continue PPI  - KUB reassuring, only notable for slightly increased gas pattern  -Empiric trial of simethicone  -BM yesterday  DM type 2, A1c 6.5%, without long-term use of insulin  - Accu-Cheks with SSI, BG well controlled    Hx paroxysmal atrial fibrillation  Hypertension  - Toprol-XL, aspirin  -Per last DC note PAF was in the setting of electrolyte derangements, no recurrence    Chronic pancytopenia  -Stable, follow-up outpatient- cbc in a.m.    Seizure disorder  -Keppra    Weakness  PT rec inpatient rehab at dc  Expected Discharge Location and Transportation: has been referred to [facility]  Expected Discharge Date: 9/19 if medically appropriate from cardiac standpoint    DVT prophylaxis:  Medical DVT prophylaxis orders are present.     AM-PAC 6 Clicks Score (PT): 18 (09/16/22 3610)    CODE STATUS:   Code  Status and Medical Interventions:   Ordered at: 09/14/22 0510     Level Of Support Discussed With:    Patient     Code Status (Patient has no pulse and is not breathing):    CPR (Attempt to Resuscitate)     Medical Interventions (Patient has pulse or is breathing):    Full Support       Casie M Mayne, PA-C  09/17/22

## 2022-09-17 NOTE — PLAN OF CARE
Goal Outcome Evaluation:           Progress: no change   VSS with patient on RA and NSR with PVCs. No acute events this shift. Will cont to monitor.

## 2022-09-17 NOTE — PROGRESS NOTES
"[facility] Progress Note     LOS: 0 days   Patient Care Team:  Marguerite Moore PA-C as PCP - General (Physician Assistant)  Som Boyd DO as Consulting Physician (Cardiology)  Javad Mercedes MD as Consulting Physician (Cardiology)  Thomas Lui MD as Consulting Physician (Hematology and Oncology)  PCP:  Marguerite Moore PA-C    Chief Complaint: Congestive heart failure    SUBJECTIVE: Resting comfortably in bed.  No complaints this morning.  Says he did not have great diuresis yesterday.  Not been up out of bed yet this morning.  He denies chest pain, shortness of breath, dyspnea on exertion, palpitations, orthopnea, PND, or lower extremity swelling.        Review of Systems:   All systems have been reviewed and are negative with the exception of those mentioned above.      OBJECTIVE:    Vital Sign Min/Max for last 24 hours  Temp  Min: 96.6 °F (35.9 °C)  Max: 98.3 °F (36.8 °C)   BP  Min: 113/59  Max: 138/71   Pulse  Min: 60  Max: 102   Resp  Min: 16  Max: 18   SpO2  Min: 92 %  Max: 98 %   No data recorded   Weight  Min: 86.6 kg (191 lb)  Max: 88 kg (193 lb 14.4 oz)     Flowsheet Rows    Flowsheet Row First Filed Value   Admission Height 177.8 cm (70\") Documented at 09/14/2022 1854   Admission Weight 93.2 kg (205 lb 6.4 oz) Documented at 09/14/2022 1854          Telemetry: Sinus rhythm with PVCs      Intake/Output Summary (Last 24 hours) at 9/17/2022 0913  Last data filed at 9/17/2022 0900  Gross per 24 hour   Intake 525 ml   Output 700 ml   Net -175 ml     Intake & Output (last 3 days)       09/14 0701  09/15 0700 09/15 0701 09/16 0700 09/16 0701 09/17 0700 09/17 0701 09/18 0700    P.O.   525 525    Total Intake(mL/kg)   525 (6) 525 (6)    Urine (mL/kg/hr) 1000 2050 (1) 500 (0.2) 400 (2.1)    Stool   0     Total Output 1000 2050 500 400    Net -1000 -2050 +25 +125            Stool Unmeasured Occurrence   2 x            Physical " Exam:    General Appearance:    Alert, cooperative, no distress, appears stated age   Neck:   Supple, symmetrical, trachea midline.   Lungs:    Bilateral inspiratory rales over the lower 60%, respirations unlabored   Chest Wall:    No tenderness or deformity    Heart:    Regular rate and rhythm, S1 and S2 normal, no murmur, rub   or gallop, normal carotid impulse bilaterally without bruit.   Extremities:   Extremities normal, atraumatic, no cyanosis or edema   Pulses:   2+ and symmetric all extremities   Skin:   Skin color, texture, turgor normal, no rashes or lesions      LABS/DIAGNOSTIC DATA:  Results from last 7 days   Lab Units 09/15/22  1020 09/14/22  1732   WBC 10*3/mm3 3.29* 3.57   HEMOGLOBIN g/dL 10.8* 11.0*   HEMATOCRIT % 35.3* 35.2*   PLATELETS 10*3/mm3 112* 129*     Lab Results   Lab Value Date/Time    TROPONINT 0.019 09/14/2022 1732    TROPONINT 0.011 07/29/2022 1948    TROPONINT <0.010 07/13/2022 1621    TROPONINT 0.017 03/15/2021 0524    TROPONINT 0.159 (C) 03/13/2021 0453    TROPONINT 0.026 03/12/2021 2215    TROPONINT <0.010 03/12/2021 1911    TROPONINT <0.010 11/13/2019 0233    TROPONINT <0.010 11/12/2019 2259         Results from last 7 days   Lab Units 09/17/22  0452 09/16/22  2104 09/16/22  0957 09/15/22  1020 09/14/22  1732   SODIUM mmol/L 144  --  144 142 143   POTASSIUM mmol/L 4.2 4.2 3.4* 3.9 4.6   CHLORIDE mmol/L 110*  --  108* 107 106   CO2 mmol/L 24.0  --  25.0 22.0 20.0*   BUN mg/dL 16  --  20 24* 18   CREATININE mg/dL 1.02  --  1.10 1.44* 1.62*   CALCIUM mg/dL 9.4  --  9.3 9.6 9.5   BILIRUBIN mg/dL  --   --   --   --  1.1   ALK PHOS U/L  --   --   --   --  76   ALT (SGPT) U/L  --   --   --   --  30   AST (SGOT) U/L  --   --   --   --  47*   GLUCOSE mg/dL 123*  --  122* 132* 99                       Medication Review:   aspirin, 81 mg, Oral, Daily  empagliflozin, 10 mg, Oral, Daily  furosemide, 40 mg, Oral, Daily  heparin (porcine), 5,000 Units, Subcutaneous, Q12H  insulin lispro, 0-7  Units, Subcutaneous, TID With Meals  levETIRAcetam, 750 mg, Oral, BID  metoprolol succinate XL, 50 mg, Oral, Daily  pantoprazole, 40 mg, Oral, BID  pharmacy consult - MTM, , Does not apply, Daily  rosuvastatin, 20 mg, Oral, Daily  sacubitril-valsartan, 1 tablet, Oral, Q12H  senna-docusate sodium, 2 tablet, Oral, BID  sodium chloride, 10 mL, Intravenous, Q12H  tiotropium bromide monohydrate, 2 puff, Inhalation, Daily - RT             ASSESSMENT/PLAN:    Acute on chronic congestive heart failure, unspecified heart failure type (HCC)    Essential hypertension    H/O seizures on Keppra    Coronary artery disease involving native coronary artery of native heart with angina pectoris (HCC)    CKD (chronic kidney disease) stage 2, GFR 60-89 ml/min    Acute kidney injury (HCC)    Type 2 diabetes mellitus (HCC)    Thrombocytopenia (HCC)    Elevated brain natriuretic peptide (BNP) level      Heart failure with reduced ejection fraction, acute on chronic: Newly depressed EF, 15 to 20% with regional wall motion abnormalities most prominent in the LAD distribution.  Associated moderate mitral regurgitation, moderate aortic insufficiency and mild to moderate tricuspid regurgitation.  Suspicious for underlying progression of ischemic heart disease.  -Continue titration of guideline directed medical therapy as blood pressure and renal function allow  -Consider repeat ischemia evaluation with cardiac catheterization.  As there is no clinical evidence for ACS and he appears to be responding to diuresis and titration of medical therapy, this can likely be achieved as an outpatient.  -Did not have much urine output yesterday and weight actually up a little bit today.  Does not have much peripheral edema but does have continued mild crackles at the bases.  We will give 1 additional dose of p.o. Lasix this afternoon.  -Consideration for discharge to SNF at baseline.  Will await recommendations on that.  We will optimize his fluid volume  until then.        Erick Walker MD   09/17/22  09:13 EDT

## 2022-09-18 ENCOUNTER — APPOINTMENT (OUTPATIENT)
Dept: GENERAL RADIOLOGY | Facility: HOSPITAL | Age: 82
End: 2022-09-18

## 2022-09-18 LAB
ANION GAP SERPL CALCULATED.3IONS-SCNC: 8 MMOL/L (ref 5–15)
BUN SERPL-MCNC: 19 MG/DL (ref 8–23)
BUN/CREAT SERPL: 17.9 (ref 7–25)
CALCIUM SPEC-SCNC: 9.5 MG/DL (ref 8.6–10.5)
CHLORIDE SERPL-SCNC: 106 MMOL/L (ref 98–107)
CO2 SERPL-SCNC: 28 MMOL/L (ref 22–29)
CREAT SERPL-MCNC: 1.06 MG/DL (ref 0.76–1.27)
DEPRECATED RDW RBC AUTO: 59.3 FL (ref 37–54)
EGFRCR SERPLBLD CKD-EPI 2021: 69.6 ML/MIN/1.73
ERYTHROCYTE [DISTWIDTH] IN BLOOD BY AUTOMATED COUNT: 20.3 % (ref 12.3–15.4)
GLUCOSE BLDC GLUCOMTR-MCNC: 132 MG/DL (ref 70–130)
GLUCOSE BLDC GLUCOMTR-MCNC: 142 MG/DL (ref 70–130)
GLUCOSE BLDC GLUCOMTR-MCNC: 156 MG/DL (ref 70–130)
GLUCOSE SERPL-MCNC: 116 MG/DL (ref 65–99)
HCT VFR BLD AUTO: 32.6 % (ref 37.5–51)
HGB BLD-MCNC: 10.2 G/DL (ref 13–17.7)
MCH RBC QN AUTO: 25.6 PG (ref 26.6–33)
MCHC RBC AUTO-ENTMCNC: 31.3 G/DL (ref 31.5–35.7)
MCV RBC AUTO: 81.7 FL (ref 79–97)
PLATELET # BLD AUTO: 133 10*3/MM3 (ref 140–450)
POTASSIUM SERPL-SCNC: 3.8 MMOL/L (ref 3.5–5.2)
RBC # BLD AUTO: 3.99 10*6/MM3 (ref 4.14–5.8)
SODIUM SERPL-SCNC: 142 MMOL/L (ref 136–145)
WBC NRBC COR # BLD: 3.98 10*3/MM3 (ref 3.4–10.8)

## 2022-09-18 PROCEDURE — 85027 COMPLETE CBC AUTOMATED: CPT | Performed by: PHYSICIAN ASSISTANT

## 2022-09-18 PROCEDURE — 25010000002 HEPARIN (PORCINE) PER 1000 UNITS: Performed by: INTERNAL MEDICINE

## 2022-09-18 PROCEDURE — 99225 PR SBSQ OBSERVATION CARE/DAY 25 MINUTES: CPT | Performed by: INTERNAL MEDICINE

## 2022-09-18 PROCEDURE — 94799 UNLISTED PULMONARY SVC/PX: CPT

## 2022-09-18 PROCEDURE — 71045 X-RAY EXAM CHEST 1 VIEW: CPT

## 2022-09-18 PROCEDURE — 99214 OFFICE O/P EST MOD 30 MIN: CPT | Performed by: STUDENT IN AN ORGANIZED HEALTH CARE EDUCATION/TRAINING PROGRAM

## 2022-09-18 PROCEDURE — 80048 BASIC METABOLIC PNL TOTAL CA: CPT | Performed by: PHYSICIAN ASSISTANT

## 2022-09-18 PROCEDURE — 82962 GLUCOSE BLOOD TEST: CPT

## 2022-09-18 PROCEDURE — 94664 DEMO&/EVAL PT USE INHALER: CPT

## 2022-09-18 PROCEDURE — G0378 HOSPITAL OBSERVATION PER HR: HCPCS

## 2022-09-18 PROCEDURE — 63710000001 INSULIN LISPRO (HUMAN) PER 5 UNITS: Performed by: INTERNAL MEDICINE

## 2022-09-18 PROCEDURE — 96372 THER/PROPH/DIAG INJ SC/IM: CPT

## 2022-09-18 RX ADMIN — LEVETIRACETAM 750 MG: 750 TABLET, FILM COATED ORAL at 08:05

## 2022-09-18 RX ADMIN — SACUBITRIL AND VALSARTAN 1 TABLET: 24; 26 TABLET, FILM COATED ORAL at 08:06

## 2022-09-18 RX ADMIN — PANTOPRAZOLE SODIUM 40 MG: 40 TABLET, DELAYED RELEASE ORAL at 21:50

## 2022-09-18 RX ADMIN — FUROSEMIDE 40 MG: 40 TABLET ORAL at 08:06

## 2022-09-18 RX ADMIN — TIOTROPIUM BROMIDE INHALATION SPRAY 2 PUFF: 3.12 SPRAY, METERED RESPIRATORY (INHALATION) at 09:06

## 2022-09-18 RX ADMIN — ASPIRIN 81 MG CHEWABLE TABLET 81 MG: 81 TABLET CHEWABLE at 08:06

## 2022-09-18 RX ADMIN — Medication 10 ML: at 21:51

## 2022-09-18 RX ADMIN — HEPARIN SODIUM 5000 UNITS: 5000 INJECTION INTRAVENOUS; SUBCUTANEOUS at 08:05

## 2022-09-18 RX ADMIN — Medication 10 ML: at 08:06

## 2022-09-18 RX ADMIN — SACUBITRIL AND VALSARTAN 1 TABLET: 24; 26 TABLET, FILM COATED ORAL at 21:50

## 2022-09-18 RX ADMIN — METOPROLOL SUCCINATE 50 MG: 50 TABLET, FILM COATED, EXTENDED RELEASE ORAL at 08:06

## 2022-09-18 RX ADMIN — PANTOPRAZOLE SODIUM 40 MG: 40 TABLET, DELAYED RELEASE ORAL at 08:06

## 2022-09-18 RX ADMIN — INSULIN LISPRO 2 UNITS: 100 INJECTION, SOLUTION INTRAVENOUS; SUBCUTANEOUS at 11:41

## 2022-09-18 RX ADMIN — ROSUVASTATIN CALCIUM 20 MG: 20 TABLET, FILM COATED ORAL at 08:06

## 2022-09-18 RX ADMIN — HEPARIN SODIUM 5000 UNITS: 5000 INJECTION INTRAVENOUS; SUBCUTANEOUS at 21:51

## 2022-09-18 RX ADMIN — EMPAGLIFLOZIN 10 MG: 10 TABLET, FILM COATED ORAL at 08:05

## 2022-09-18 RX ADMIN — LEVETIRACETAM 750 MG: 750 TABLET, FILM COATED ORAL at 21:51

## 2022-09-18 NOTE — PLAN OF CARE
Goal Outcome Evaluation:  Plan of Care Reviewed With: patient        Progress: no change  Outcome Evaluation: Patient rested this shift. Patient without complaints this shift. Vitals stable.

## 2022-09-18 NOTE — PROGRESS NOTES
[facility] Medicine Services  PROGRESS NOTE    Patient Name: Narendra Cochran  : 1940  MRN: [id number]    Date of Admission: 2022  Primary Care Physician: Marguerite Moore PA-C    Subjective   Subjective     CC:  Follow-up heart failure    HPI:  Feels pretty good today.  No shortness of breath.    ROS:  Gen- No fevers, chills  CV- No chest pain, palpitations  Resp- No cough, dyspnea  GI- No N/V/D, abd pain        Objective   Objective     Vital Signs:   Temp:  [97.1 °F (36.2 °C)-97.9 °F (36.6 °C)] 97.5 °F (36.4 °C)  Heart Rate:  [66-79] 72  Resp:  [16-18] 17  BP: (126-144)/(68-88) 144/73     Physical Exam:  Constitutional - no acute distress, nontoxic, in bed  HEENT-NCAT, mucous membranes moist  CV-RRR  Resp-faint basilar rales  Abd-soft, nontender, nondistended, normoactive bowel sounds  Ext-No lower extremity cyanosis, clubbing or edema bilaterally  Neuro-asleep but awakens easily to voice, speech clear, moves all extremities   Psych-normal affect   Skin- No rash on exposed UE or LE bilaterally        Results Reviewed:  LAB RESULTS:      Lab 22  0455 09/15/22  1020 22  1732   WBC 3.98 3.29* 3.57   HEMOGLOBIN 10.2* 10.8* 11.0*   HEMATOCRIT 32.6* 35.3* 35.2*   PLATELETS 133* 112* 129*   NEUTROS ABS  --   --  2.70   IMMATURE GRANS (ABS)  --   --  0.01   LYMPHS ABS  --   --  0.62*   MONOS ABS  --   --  0.19   EOS ABS  --   --  0.04   MCV 81.7 82.5 82.2   LACTATE  --  1.5  --          Lab 22  0455 22  0452 22  2104 22  0957 09/15/22  1020 22  1732   SODIUM 142 144  --  144 142 143   POTASSIUM 3.8 4.2 4.2 3.4* 3.9 4.6   CHLORIDE 106 110*  --  108* 107 106   CO2 28.0 24.0  --  25.0 22.0 20.0*   ANION GAP 8.0 10.0  --  11.0 13.0 17.0*   BUN 19 16  --  20 24* 18   CREATININE 1.06 1.02  --  1.10 1.44* 1.62*   EGFR 69.6 72.9  --  66.6 48.2* 41.9*   GLUCOSE 116* 123*  --  122* 132* 99   CALCIUM 9.5 9.4  --  9.3 9.6 9.5   MAGNESIUM  --  2.1   --  1.9 1.8  --          Lab 09/14/22  1732   TOTAL PROTEIN 7.6   ALBUMIN 4.20   GLOBULIN 3.4   ALT (SGPT) 30   AST (SGOT) 47*   BILIRUBIN 1.1   ALK PHOS 76         Lab 09/14/22  1732   PROBNP 37,603.0*   TROPONIN T 0.019                 Brief Urine Lab Results  (Last result in the past 365 days)      Color   Clarity   Blood   Leuk Est   Nitrite   Protein   CREAT   Urine HCG        09/15/22 1146 Yellow   Clear   Negative   Trace   Negative   30 mg/dL (1+)                 Microbiology Results Abnormal     Procedure Component Value - Date/Time    Urine Culture - Urine, Urine, Clean Catch [[id number]]  (Normal) Collected: 09/15/22 1146    Lab Status: Final result Specimen: Urine, Clean Catch Updated: 09/16/22 1425     Urine Culture No growth    COVID PRE-OP / PRE-PROCEDURE SCREENING ORDER (NO ISOLATION) - Swab, Nasopharynx [[id number]]  (Normal) Collected: 09/14/22 1702    Lab Status: Final result Specimen: Swab from Nasopharynx Updated: 09/14/22 1815    Narrative:      The following orders were created for panel order COVID PRE-OP / PRE-PROCEDURE SCREENING ORDER (NO ISOLATION) - Swab, Nasopharynx.  Procedure                               Abnormality         Status                     ---------                               -----------         ------                     COVID-19 and FLU A/B PCR...[[id number]]  Normal              Final result                 Please view results for these tests on the individual orders.    COVID-19 and FLU A/B PCR - Swab, Nasopharynx [[id number]]  (Normal) Collected: 09/14/22 1702    Lab Status: Final result Specimen: Swab from Nasopharynx Updated: 09/14/22 1815     COVID19 Not Detected     Influenza A PCR Not Detected     Influenza B PCR Not Detected    Narrative:      Fact sheet for providers: https://www.fda.gov/media/199945/download    Fact sheet for patients: https://www.fda.gov/media/691641/download    Test performed by PCR.          Adult Transthoracic Echo Complete W/ Cont if  Necessary Per Protocol    Result Date: 9/16/2022  · The left ventricular cavity is moderately dilated with normal wall thickness · Estimated left ventricular EF = 15% severe global hypokinesis, apical motion consistent with pacemaker activation. · Left ventricular diastolic function is consistent with grade 2 diastolic dysfunction · The following left ventricular wall segments are hypokinetic: mid anterior, apical anterior, basal anterolateral, mid anterolateral, apical lateral, apical septal, apex hypokinetic, mid anteroseptal and basal anterior. · Moderate aortic valve regurgitation is present. · Moderate to severe mitral valve regurgitation is present. · Estimated right ventricular systolic pressure from tricuspid regurgitation is markedly elevated (>55 mmHg). · Left atrial volume is severely increased.      XR Chest 1 View    Result Date: 9/18/2022  DATE OF EXAM: 9/18/2022 2:20 AM  PROCEDURE: XR CHEST 1 VW-  INDICATIONS: CHF, dyspnea; I50.9-Heart failure, unspecified; R06.02-Shortness of breath; R06.82-Tachypnea, not elsewhere classified; R79.89-Other specified abnormal findings of blood chemistry.  COMPARISON: Chest x-ray 9/14/2022  TECHNIQUE: Single frontal view of the chest.  FINDINGS: Unchanged AICD. Stable cardiomegaly. Redemonstration of diffuse interstitial prominence. Streaky right lung base opacities are favored to reflect atelectasis. No pleural effusion or pneumothorax.      Impression: Stable diffuse interstitial prominence favored to reflect interstitial edema. Streaky right base opacities likely reflect atelectasis.  This report was finalized on 9/18/2022 7:23 AM by Martin Cabral MD.        Results for orders placed during the hospital encounter of 09/14/22    Adult Transthoracic Echo Complete W/ Cont if Necessary Per Protocol    Interpretation Summary  · The left ventricular cavity is moderately dilated with normal wall thickness  · Estimated left ventricular EF = 15% severe global hypokinesis,  apical motion consistent with pacemaker activation.  · Left ventricular diastolic function is consistent with grade 2 diastolic dysfunction  · The following left ventricular wall segments are hypokinetic: mid anterior, apical anterior, basal anterolateral, mid anterolateral, apical lateral, apical septal, apex hypokinetic, mid anteroseptal and basal anterior.  · Moderate aortic valve regurgitation is present.  · Moderate to severe mitral valve regurgitation is present.  · Estimated right ventricular systolic pressure from tricuspid regurgitation is markedly elevated (>55 mmHg).  · Left atrial volume is severely increased.      I have reviewed the medications:  Scheduled Meds:aspirin, 81 mg, Oral, Daily  empagliflozin, 10 mg, Oral, Daily  furosemide, 40 mg, Oral, Daily  heparin (porcine), 5,000 Units, Subcutaneous, Q12H  insulin lispro, 0-7 Units, Subcutaneous, TID With Meals  levETIRAcetam, 750 mg, Oral, BID  metoprolol succinate XL, 50 mg, Oral, Daily  pantoprazole, 40 mg, Oral, BID  pharmacy consult - MTM, , Does not apply, Daily  rosuvastatin, 20 mg, Oral, Daily  sacubitril-valsartan, 1 tablet, Oral, Q12H  senna-docusate sodium, 2 tablet, Oral, BID  sodium chloride, 10 mL, Intravenous, Q12H  tiotropium bromide monohydrate, 2 puff, Inhalation, Daily - RT      Continuous Infusions:   PRN Meds:.•  acetaminophen **OR** acetaminophen **OR** acetaminophen  •  albuterol  •  senna-docusate sodium **AND** polyethylene glycol **AND** bisacodyl **AND** bisacodyl  •  calcium carbonate  •  dextrose  •  dextrose  •  glucagon (human recombinant)  •  magnesium sulfate **OR** magnesium sulfate **OR** magnesium sulfate  •  ondansetron  •  potassium chloride **OR** potassium chloride **OR** potassium chloride  •  simethicone  •  sodium chloride  •  sodium chloride    Assessment & Plan   Assessment & Plan     Active Hospital Problems    Diagnosis  POA   • **Acute on chronic congestive heart failure, unspecified heart failure type  (HCC) [I50.9]  Yes   • Type 2 diabetes mellitus (HCC) [E11.9]  Unknown   • Thrombocytopenia (HCC) [D69.6]  Unknown   • Elevated brain natriuretic peptide (BNP) level [R79.89]  Unknown   • Acute kidney injury (HCC) [N17.9]  Yes   • CKD (chronic kidney disease) stage 2, GFR 60-89 ml/min [N18.2]  Yes   • Coronary artery disease involving native coronary artery of native heart with angina pectoris (HCC) [I25.119]  Yes   • H/O seizures on Keppra [R56.9]  Yes   • Essential hypertension [I10]  Yes      Resolved Hospital Problems   No resolved problems to display.        Brief Hospital Course to date:  Narendra Cochran is a 81 y.o. male w/ HFrEF and recovered EF (50%), seizure disorder, HTN, DM2 who was recently admitted 7/29-8/4 with KENNEDY, hyperkalemia; ultimately his Aldactone was discontinued and Lasix was changed to PRN dosing only.  Increased HeartLogic Index 9/8, developed worsening SOB/edema 9/13-9/14, presented to the ED with shortness of and noted to have increased creatinine    This patient's problems and plans were partially entered by my partner and updated as appropriate by me 09/18/22.      Assessment/plan    Chronic HFrEF   - Newly depressed EF, 15 to 20% with regional wall motion abnormalities most prominent in the LAD distribution.    -Aldactone dc'ed earlier this year 2/2 hyperkalemia  -continue aspirin, Toprol-XL, rosuvastatin, entresto, jardiance, lasix 40 mg PO daily    Acute kidney injury on CKD 2  -BLine Cr ~0.9-1.1; eGFR 70-80's  -creatinine stable  - BMP am and in one week when at rehab    Abdominal pain  -Similar complaints during recent admission  -Continue PPI  - KUB reassuring, only notable for slightly increased gas pattern  -Empiric trial of simethicone    DM type 2, A1c 6.5%, without long-term use of insulin  - Accu-Cheks with SSI, BG well controlled    Hx paroxysmal atrial fibrillation  Hypertension  - Toprol-XL, aspirin    Chronic pancytopenia  -Stable, follow-up outpatient    Seizure  disorder  -Keppra    Weakness  PT rec inpatient rehab at [facility]  Expected Discharge Location and Transportation: has been referred to SNF  Expected Discharge Date: 9/19 if medically appropriate from cardiac standpoint    DVT prophylaxis:  Medical DVT prophylaxis orders are present.     AM-PAC 6 Clicks Score (PT): 18 (09/18/22 0802)    CODE STATUS:   Code Status and Medical Interventions:   Ordered at: 09/14/22 8622     Level Of Support Discussed With:    Patient     Code Status (Patient has no pulse and is not breathing):    CPR (Attempt to Resuscitate)     Medical Interventions (Patient has pulse or is breathing):    Full Support       Isrrael Black MD  09/18/22

## 2022-09-18 NOTE — PLAN OF CARE
Goal Outcome Evaluation:           Progress: improving   VSS with patient on RA and NSR on tele. Patient has been up in chair most of the shift. No acute events this shift. Will cont to monitor.

## 2022-09-19 LAB
ANION GAP SERPL CALCULATED.3IONS-SCNC: 14 MMOL/L (ref 5–15)
BUN SERPL-MCNC: 15 MG/DL (ref 8–23)
BUN/CREAT SERPL: 17.6 (ref 7–25)
CALCIUM SPEC-SCNC: 9.3 MG/DL (ref 8.6–10.5)
CHLORIDE SERPL-SCNC: 106 MMOL/L (ref 98–107)
CO2 SERPL-SCNC: 23 MMOL/L (ref 22–29)
CREAT SERPL-MCNC: 0.85 MG/DL (ref 0.76–1.27)
EGFRCR SERPLBLD CKD-EPI 2021: 86.2 ML/MIN/1.73
GLUCOSE BLDC GLUCOMTR-MCNC: 116 MG/DL (ref 70–130)
GLUCOSE BLDC GLUCOMTR-MCNC: 119 MG/DL (ref 70–130)
GLUCOSE BLDC GLUCOMTR-MCNC: 132 MG/DL (ref 70–130)
GLUCOSE SERPL-MCNC: 103 MG/DL (ref 65–99)
POTASSIUM SERPL-SCNC: 3.5 MMOL/L (ref 3.5–5.2)
SODIUM SERPL-SCNC: 143 MMOL/L (ref 136–145)

## 2022-09-19 PROCEDURE — 94799 UNLISTED PULMONARY SVC/PX: CPT

## 2022-09-19 PROCEDURE — 25010000002 FUROSEMIDE PER 20 MG: Performed by: INTERNAL MEDICINE

## 2022-09-19 PROCEDURE — G0378 HOSPITAL OBSERVATION PER HR: HCPCS

## 2022-09-19 PROCEDURE — 99225 PR SBSQ OBSERVATION CARE/DAY 25 MINUTES: CPT | Performed by: INTERNAL MEDICINE

## 2022-09-19 PROCEDURE — 96376 TX/PRO/DX INJ SAME DRUG ADON: CPT

## 2022-09-19 PROCEDURE — 97116 GAIT TRAINING THERAPY: CPT

## 2022-09-19 PROCEDURE — 97110 THERAPEUTIC EXERCISES: CPT

## 2022-09-19 PROCEDURE — 96372 THER/PROPH/DIAG INJ SC/IM: CPT

## 2022-09-19 PROCEDURE — 80048 BASIC METABOLIC PNL TOTAL CA: CPT | Performed by: INTERNAL MEDICINE

## 2022-09-19 PROCEDURE — 25010000002 HEPARIN (PORCINE) PER 1000 UNITS: Performed by: INTERNAL MEDICINE

## 2022-09-19 PROCEDURE — 82962 GLUCOSE BLOOD TEST: CPT

## 2022-09-19 PROCEDURE — 94664 DEMO&/EVAL PT USE INHALER: CPT

## 2022-09-19 PROCEDURE — 99213 OFFICE O/P EST LOW 20 MIN: CPT | Performed by: INTERNAL MEDICINE

## 2022-09-19 RX ORDER — FUROSEMIDE 10 MG/ML
40 INJECTION INTRAMUSCULAR; INTRAVENOUS ONCE
Status: COMPLETED | OUTPATIENT
Start: 2022-09-19 | End: 2022-09-19

## 2022-09-19 RX ADMIN — ANTACID TABLETS 2 TABLET: 500 TABLET, CHEWABLE ORAL at 10:24

## 2022-09-19 RX ADMIN — Medication 10 ML: at 10:12

## 2022-09-19 RX ADMIN — PANTOPRAZOLE SODIUM 40 MG: 40 TABLET, DELAYED RELEASE ORAL at 20:12

## 2022-09-19 RX ADMIN — ASPIRIN 81 MG CHEWABLE TABLET 81 MG: 81 TABLET CHEWABLE at 10:26

## 2022-09-19 RX ADMIN — EMPAGLIFLOZIN 10 MG: 10 TABLET, FILM COATED ORAL at 10:11

## 2022-09-19 RX ADMIN — FUROSEMIDE 40 MG: 40 TABLET ORAL at 10:11

## 2022-09-19 RX ADMIN — LEVETIRACETAM 750 MG: 750 TABLET, FILM COATED ORAL at 20:12

## 2022-09-19 RX ADMIN — Medication 10 ML: at 20:12

## 2022-09-19 RX ADMIN — TIOTROPIUM BROMIDE INHALATION SPRAY 2 PUFF: 3.12 SPRAY, METERED RESPIRATORY (INHALATION) at 08:20

## 2022-09-19 RX ADMIN — DICLOFENAC 2 G: 10 GEL TOPICAL at 10:11

## 2022-09-19 RX ADMIN — FUROSEMIDE 40 MG: 10 INJECTION, SOLUTION INTRAMUSCULAR; INTRAVENOUS at 18:17

## 2022-09-19 RX ADMIN — SACUBITRIL AND VALSARTAN 1 TABLET: 24; 26 TABLET, FILM COATED ORAL at 10:11

## 2022-09-19 RX ADMIN — ROSUVASTATIN CALCIUM 20 MG: 20 TABLET, FILM COATED ORAL at 10:11

## 2022-09-19 RX ADMIN — SENNOSIDES AND DOCUSATE SODIUM 2 TABLET: 50; 8.6 TABLET ORAL at 10:11

## 2022-09-19 RX ADMIN — HEPARIN SODIUM 5000 UNITS: 5000 INJECTION INTRAVENOUS; SUBCUTANEOUS at 20:12

## 2022-09-19 RX ADMIN — LEVETIRACETAM 750 MG: 750 TABLET, FILM COATED ORAL at 10:11

## 2022-09-19 RX ADMIN — METOPROLOL SUCCINATE 50 MG: 50 TABLET, FILM COATED, EXTENDED RELEASE ORAL at 10:11

## 2022-09-19 RX ADMIN — DICLOFENAC 2 G: 10 GEL TOPICAL at 18:16

## 2022-09-19 RX ADMIN — PANTOPRAZOLE SODIUM 40 MG: 40 TABLET, DELAYED RELEASE ORAL at 10:11

## 2022-09-19 RX ADMIN — HEPARIN SODIUM 5000 UNITS: 5000 INJECTION INTRAVENOUS; SUBCUTANEOUS at 10:10

## 2022-09-19 RX ADMIN — SACUBITRIL AND VALSARTAN 1 TABLET: 24; 26 TABLET, FILM COATED ORAL at 20:12

## 2022-09-19 NOTE — PLAN OF CARE
Goal Outcome Evaluation:  Plan of Care Reviewed With: patient        Progress: improving  Outcome Evaluation: Patient is progressing with PT, continues to be limited by weakness, decreased balance, and pain. He required Min A to ambulate 82ft with RW, requiring cues for safety with turns but with no overt LOB. He tolerated a few reps of LE exercises but was limited by B knee pain. Will continue to progress as tolerated.

## 2022-09-19 NOTE — CASE MANAGEMENT/SOCIAL WORK
Continued Stay Note  [facility]     Patient Name: Narendra Cochran  MRN: [id number]  Today's Date: 9/19/2022    Admit Date: 9/14/2022     Discharge Plan     Row Name 09/19/22 1516       Plan    Plan SNF    Plan Comments I spoke with patient and wife today at bedside. Discussed discharge plan. Plan is for Patient to go to [facility] on Wednesday, 9/21. Patient and wife are both in agreement with plan. Schedule transport with [facility] transport tomorrow and will need a COVID test. CM will follow.    Final Discharge Disposition Code 03 - skilled nursing facility (SNF)               Discharge Codes    No documentation.               Expected Discharge Date and Time     Expected Discharge Date Expected Discharge Time    Sep 20, 2022             Zeinab Dasilva RN

## 2022-09-19 NOTE — PLAN OF CARE
Goal Outcome Evaluation:  Plan of Care Reviewed With: patient        Progress: no change  Outcome Evaluation: VSS. Pt remains on RA with no signs of SOA. Pt remains NSR w/ PVCs on the monitor. Pt complained of pain once and stated it was artritis in his knee. Voltarn gel ordered. No other complaints noted.

## 2022-09-19 NOTE — PLAN OF CARE
Goal Outcome Evaluation:   Pt up to chair for most of shift, worked with P.T. and was able to ambulate in room with walker, steady gait. SOA on exertion noted, extra Lasix ordered. Pt voiding per urinal, adequate UOP. Plan for rehab, waiting for placement/insurance.

## 2022-09-19 NOTE — THERAPY TREATMENT NOTE
Patient Name: Narendra Cochran  : 1940    MRN: [id number]                              Today's Date: 2022       Admit Date: 2022    Visit Dx:     ICD-10-CM ICD-9-CM   1. Acute on chronic congestive heart failure, unspecified heart failure type ([facility])  I50.9 428.0   2. Shortness of breath  R06.02 786.05   3. Tachypnea  R06.82 786.06   4. Elevated serum creatinine  R79.89 790.99     Patient Active Problem List   Diagnosis   • Essential hypertension   • Hyperlipidemia LDL goal <70   • Paget disease of bone   • H/O seizures on Keppra   • Gonalgia   • Osteitis deformans   • COPD (chronic obstructive pulmonary disease) ([facility])   • Syncope   • NSVT (nonsustained ventricular tachycardia) ([facility])   • Coronary artery disease involving native coronary artery of native heart with angina pectoris ([facility])   • NICM    • OHCA   • Hypokalemia   • Hypomagnesemia   • VHD; mild-mod AR, mild MR   • Chronic systolic congestive heart failure ([facility])   • Former tobacco user   • GERD   • Marijuana use   • Frequent PVCs   • Presence of biventricular implantable cardioverter-defibrillator (ICD)   • CKD (chronic kidney disease) stage 2, GFR 60-89 ml/min   • Acute kidney injury ([facility])   • Moderate malnutrition ([facility])   • Acute on chronic congestive heart failure, unspecified heart failure type ([facility])   • Paroxysmal A-fib ([facility])   • Type 2 diabetes mellitus ([facility])   • Thrombocytopenia ([facility])   • Acute pulmonary edema ([facility])   • Elevated brain natriuretic peptide (BNP) level     Past Medical History:   Diagnosis Date   • Arthritis    • CHF (congestive heart failure) ([facility])    • Diabetes mellitus ([facility])    • Diabetic foot ulcers ([facility])    • Diverticulitis    • Foot callus    • GERD (gastroesophageal reflux disease)    • Hammer toes, bilateral    • Hearing loss    • History of transfusion    • Hyperlipidemia    • Hypertension    • Hypertensive urgency, malignant 2017   • Paget disease of bone      Past Surgical History:   Procedure Laterality  Date   • APPENDECTOMY     • CARDIAC CATHETERIZATION N/A 3/18/2020    Procedure: Left Heart Cath;  Surgeon: Sonya Garibay MD;  Location:  [facility];  Service: Cardiology;  Laterality: N/A;  First availabel provider     • CARDIAC CATHETERIZATION N/A 3/15/2021    Procedure: LEFT HEART CATH;  Surgeon: Doc Sahni IV, MD;  Location:  [facility];  Service: Cardiovascular;  Laterality: N/A;   • CARDIAC CATHETERIZATION N/A 3/15/2021    Procedure: Coronary angiography;  Surgeon: Doc Sahni IV, MD;  Location:  [facility];  Service: Cardiovascular;  Laterality: N/A;   • CARDIAC ELECTROPHYSIOLOGY PROCEDURE N/A 3/16/2021    Procedure: ICD DC new;  Surgeon: Som Boyd DO;  Location:  [facility];  Service: Cardiology;  Laterality: N/A;   • COLON RESECTION      second to diverticulitis    • FOOT SURGERY Left       General Information     Row Name 09/19/22 1520          Physical Therapy Time and Intention    Document Type therapy note (daily note)  -NS     Mode of Treatment individual therapy;physical therapy  -NS     Row Name 09/19/22 1520          General Information    Patient Profile Reviewed yes  -NS     Existing Precautions/Restrictions fall;seizures  [place]; chronic B knee pain  -NS     Row Name 09/19/22 1520          Cognition    Orientation Status (Cognition) oriented x 3  -NS     Row Name 09/19/22 1520          Safety Issues, Functional Mobility    Safety Issues Affecting Function (Mobility) safety precaution awareness;safety precautions follow-through/compliance;sequencing abilities  -NS     Impairments Affecting Function (Mobility) balance;coordination;endurance/activity tolerance;strength;postural/trunk control;pain  -NS           User Key  (r) = Recorded By, (t) = Taken By, (c) = Cosigned By    Initials Name Provider Type    NS Shama Murry PT Physical Therapist               Mobility     Row Name 09/19/22 1528           Bed Mobility    Comment, (Bed Mobility) Pt UIC  -NS     Row Name 09/19/22 1520          Transfers    Comment, (Transfers) VCs for hand placement  -NS     Row Name 09/19/22 1520          Sit-Stand Transfer    Sit-Stand [place] (Transfers) contact guard;verbal cues  -NS     Assistive Device (Sit-Stand Transfers) walker, front-wheeled  -NS     Row Name 09/19/22 1520          Gait/Stairs (Locomotion)    [place] Level (Gait) minimum assist (75% patient effort)  -NS     Assistive Device (Gait) walker, front-wheeled  -NS     Distance in Feet (Gait) 82  -NS     Deviations/Abnormal Patterns (Gait) kristy decreased;gait speed decreased;stride length decreased;base of support, narrow  -NS     Bilateral Gait Deviations heel strike decreased;forward flexed posture  -NS     Comment, (Gait/Stairs) Patient ambulated with step- through pattern around room, requiring cues for safety with turns and for safety awareness with ambulation. No overt LOB today.  -NS           User Key  (r) = Recorded By, (t) = Taken By, (c) = Cosigned By    Initials Name Provider Type    NS Shama Murry, PT Physical Therapist               Obj/Interventions     [facility] Name 09/19/22 1520          Motor Skills    Therapeutic Exercise hip;knee;ankle  -[facility] Name 09/19/22 1520          Hip (Therapeutic Exercise)    Hip (Therapeutic Exercise) strengthening exercise  -NS     Hip Strengthening (Therapeutic Exercise) bilateral;marching while seated;marching while standing;aBduction;aDduction;5 repetitions;external rotation;internal rotation;10 repetitions  -[facility] Name 09/19/22 1520          Knee (Therapeutic Exercise)    Knee (Therapeutic Exercise) strengthening exercise  -NS     Knee Strengthening (Therapeutic Exercise) bilateral;SLR (straight leg raise);LAQ (long arc quad);5 repetitions  -[facility] Name 09/19/22 1520          Ankle (Therapeutic Exercise)    Ankle (Therapeutic Exercise) AROM (active range of motion);strengthening exercise   -NS     Ankle AROM (Therapeutic Exercise) bilateral;dorsiflexion;plantarflexion;10 repetitions  -NS     Ankle Strengthening (Therapeutic Exercise) bilateral;dorsiflexion;plantarflexion;3 repetitions;standing  -NS     Row Name 09/19/22 1520          Balance    Balance Assessment sitting static balance;sitting dynamic balance;standing static balance;standing dynamic balance  -NS     Static Sitting Balance supervision  -NS     Dynamic Sitting Balance contact guard  -NS     Position, Sitting Balance unsupported;sitting in chair  -NS     Static Standing Balance contact guard  -NS     Dynamic Standing Balance minimal assist  -NS     Position/Device Used, Standing Balance supported;walker, front-wheeled  -NS           User Key  (r) = Recorded By, (t) = Taken By, (c) = Cosigned By    Initials Name Provider Type    Shama Kuo PT Physical Therapist               Goals/Plan    No documentation.                Clinical Impression     Row Name 09/19/22 1520          Pain    Pretreatment Pain Rating 0/10 - no pain  -NS     Posttreatment Pain Rating 0/10 - no pain  -NS     Pre/Posttreatment Pain Comment Patient reported mild B knee pain with ambulation that worsened with exercises involving flex/ext. Pt had medicated cream to assist with pain. RN notified of pt's pain.  -NS     Pain Intervention(s) Repositioned;Ambulation/increased activity;Nursing Notified  -NS     Row Name 09/19/22 1520          Plan of Care Review    Plan of Care Reviewed With patient  -NS     Progress improving  -NS     Outcome Evaluation Patient is progressing with PT, continues to be limited by weakness, decreased balance, and pain. He required Min A to ambulate 82ft with RW, requiring cues for safety with turns but with no overt LOB. He tolerated a few reps of LE exercises but was limited by B knee pain. Will continue to progress as tolerated.  -NS     Row Name 09/19/22 1520          Vital Signs    Pre Systolic BP Rehab --  VSS- RN cleared for PT   -NS     Pre Patient Position Sitting  -NS     Intra Patient Position Standing  -NS     Post Patient Position Sitting  -NS     Row Name 09/19/22 1520          Positioning and Restraints    Pre-Treatment Position sitting in chair/recliner  -NS     Post Treatment Position chair  -NS     In Chair notified nsg;reclined;call light within reach;encouraged to call for assist;exit alarm on;legs elevated;waffle cushion;with family/caregiver;heels elevated  -NS           User Key  (r) = Recorded By, (t) = Taken By, (c) = Cosigned By    Initials Name Provider Type    Shama Kuo, PT Physical Therapist               Outcome Measures     Row Name 09/19/22 1520 09/19/22 0837       How much help from another person do you currently need...    Turning from your back to your side while in flat bed without using bedrails? 3  -NS 3  -HC    Moving from lying on back to sitting on the side of a flat bed without bedrails? 3  -NS 3  -HC    Moving to and from a bed to a chair (including a wheelchair)? 3  -NS 3  -HC    Standing up from a chair using your arms (e.g., wheelchair, bedside chair)? 3  -NS 3  -HC    Climbing 3-5 steps with a railing? 2  -NS 3  -HC    To walk in hospital room? 3  -NS 3  -HC    AM-PAC 6 Clicks Score (PT) 17  -NS 18  -HC    Highest level of mobility 5 --> Static standing  -NS 6 --> Walked 10 steps or more  -HC    Row Name 09/19/22 1520          Functional Assessment    Outcome Measure Options AM-PAC 6 Clicks Basic Mobility (PT)  -NS           User Key  (r) = Recorded By, (t) = Taken By, (c) = Cosigned By    Initials Name Provider Type    Shama Kuo, PT Physical Therapist    HC Petra Riggins, RN Registered Nurse                             Physical Therapy Education                 Title: PT OT SLP Therapies (In Progress)     Topic: Physical Therapy (Done)     Point: Mobility training (Done)     Learning Progress Summary           Patient Acceptance, E, VU,NR by NS at 9/19/2022 3249    Acceptance, E,TB,  VU,NR by AY at 9/16/2022 1449   Significant Other Acceptance, E, VU,NR by NS at 9/19/2022 1553                   Point: Home exercise program (Done)     Learning Progress Summary           Patient Acceptance, E, VU,NR by NS at 9/19/2022 1553   Significant Other Acceptance, E, VU,NR by NS at 9/19/2022 1553                   Point: Body mechanics (Done)     Learning Progress Summary           Patient Acceptance, E, VU,NR by NS at 9/19/2022 1553    Acceptance, E,TB, VU,NR by AY at 9/16/2022 1449   Significant Other Acceptance, E, VU,NR by NS at 9/19/2022 1553                   Point: Precautions (Done)     Learning Progress Summary           Patient Acceptance, E, VU,NR by NS at 9/19/2022 1553    Acceptance, E,TB, VU,NR by AY at 9/16/2022 1449   Significant Other Acceptance, E, VU,NR by NS at 9/19/2022 1553                               User Key     Initials Effective Dates Name Provider Type Discipline    NS 06/16/21 -  Shama Murry, PT Physical Therapist PT     11/10/20 -  Phyllis Bowser PT Physical Therapist PT              PT Recommendation and Plan     Plan of Care Reviewed With: patient  Progress: improving  Outcome Evaluation: Patient is progressing with PT, continues to be limited by weakness, decreased balance, and pain. He required Min A to ambulate 82ft with RW, requiring cues for safety with turns but with no overt LOB. He tolerated a few reps of LE exercises but was limited by B knee pain. Will continue to progress as tolerated.     Time Calculation:    PT Charges     Row Name 09/19/22 1520             Time Calculation    Start Time 1520  -NS      PT Received On 09/19/22  -NS      PT Goal Re-Cert Due Date 09/26/22  -NS              Timed Charges    97799 - PT Therapeutic Exercise Minutes 10  -NS      79533 - Gait Training Minutes  13  -NS              Total Minutes    Timed Charges Total Minutes 23  -NS       Total Minutes 23  -NS            User Key  (r) = Recorded By, (t) = Taken By, (c) = Cosigned By     Initials Name Provider Type    NS Shama Murry, PT Physical Therapist              Therapy Charges for Today     Code Description Service Date Service Provider Modifiers Qty    13277257649 HC GAIT TRAINING EA 15 MIN 9/19/2022 Shama Murry, PT GP 1    35259179833 HC PT THER PROC EA 15 MIN 9/19/2022 Shama Murry, PT GP 1          PT G-Codes  Outcome Measure Options: AM-PAC 6 Clicks Basic Mobility (PT)  AM-PAC 6 Clicks Score (PT): 17  AM-PAC 6 Clicks Score (OT): 17    Shama Murry PT  9/19/2022

## 2022-09-19 NOTE — PROGRESS NOTES
[facility]  PROGRESS NOTE    Patient Name: Narendra Cochran  : 1940  MRN: [id number]    Date of Admission: 2022  Primary Care Physician: Marguerite Moore PA-C    Subjective   Subjective     CC:  Follow-up heart failure    HPI:  Having some increased shortness of breath today. No cough    ROS:  Gen- No fevers, chills  CV- No chest pain, palpitations  Resp- No cough, + dyspnea  GI- No N/V/D, abd pain            Objective   Objective     Vital Signs:   Temp:  [96.1 °F (35.6 °C)-97.1 °F (36.2 °C)] 96.1 °F (35.6 °C)  Heart Rate:  [67-83] 67  Resp:  [18] 18  BP: (137-159)/(64-96) 150/64     Physical Exam:  Constitutional - no acute distress, nontoxic, in bed  HEENT-NCAT, mucous membranes moist  CV-RRR  Resp-mild tachypnea, basilar rales  Abd-soft, nontender, nondistended, normoactive bowel sounds  Ext-1+ LE edema bilaterally  Neuro-alert, speech clear, moves all extremities   Psych-normal affect   Skin- No rash on exposed UE or LE bilaterally          Results Reviewed:  LAB RESULTS:      Lab 22  0455 09/15/22  1020 22  1732   WBC 3.98 3.29* 3.57   HEMOGLOBIN 10.2* 10.8* 11.0*   HEMATOCRIT 32.6* 35.3* 35.2*   PLATELETS 133* 112* 129*   NEUTROS ABS  --   --  2.70   IMMATURE GRANS (ABS)  --   --  0.01   LYMPHS ABS  --   --  0.62*   MONOS ABS  --   --  0.19   EOS ABS  --   --  0.04   MCV 81.7 82.5 82.2   LACTATE  --  1.5  --          Lab 22  0554 22  0455 22  0452 22  2104 22  0957 09/15/22  1020   SODIUM 143 142 144  --  144 142   POTASSIUM 3.5 3.8 4.2 4.2 3.4* 3.9   CHLORIDE 106 106 110*  --  108* 107   CO2 23.0 28.0 24.0  --  25.0 22.0   ANION GAP 14.0 8.0 10.0  --  11.0 13.0   BUN 15 19 16  --  20 24*   CREATININE 0.85 1.06 1.02  --  1.10 1.44*   EGFR 86.2 69.6 72.9  --  66.6 48.2*   GLUCOSE 103* 116* 123*  --  122* 132*   CALCIUM 9.3 9.5 9.4  --  9.3 9.6   MAGNESIUM  --   --  2.1  --  1.9 1.8         Lab 22  0173    TOTAL PROTEIN 7.6   ALBUMIN 4.20   GLOBULIN 3.4   ALT (SGPT) 30   AST (SGOT) 47*   BILIRUBIN 1.1   ALK PHOS 76         Lab 09/14/22  1732   PROBNP 37,603.0*   TROPONIN T 0.019                 Brief Urine Lab Results  (Last result in the past 365 days)      Color   Clarity   Blood   Leuk Est   Nitrite   Protein   CREAT   Urine HCG        09/15/22 1146 Yellow   Clear   Negative   Trace   Negative   30 mg/dL (1+)                 Microbiology Results Abnormal     Procedure Component Value - Date/Time    Urine Culture - Urine, Urine, Clean Catch [[id number]]  (Normal) Collected: 09/15/22 1146    Lab Status: Final result Specimen: Urine, Clean Catch Updated: 09/16/22 1425     Urine Culture No growth    COVID PRE-OP / PRE-PROCEDURE SCREENING ORDER (NO ISOLATION) - Swab, Nasopharynx [[id number]]  (Normal) Collected: 09/14/22 1702    Lab Status: Final result Specimen: Swab from Nasopharynx Updated: 09/14/22 1815    Narrative:      The following orders were created for panel order COVID PRE-OP / PRE-PROCEDURE SCREENING ORDER (NO ISOLATION) - Swab, Nasopharynx.  Procedure                               Abnormality         Status                     ---------                               -----------         ------                     COVID-19 and FLU A/B PCR...[[id number]]  Normal              Final result                 Please view results for these tests on the individual orders.    COVID-19 and FLU A/B PCR - Swab, Nasopharynx [[id number]]  (Normal) Collected: 09/14/22 1702    Lab Status: Final result Specimen: Swab from Nasopharynx Updated: 09/14/22 1815     COVID19 Not Detected     Influenza A PCR Not Detected     Influenza B PCR Not Detected    Narrative:      Fact sheet for providers: https://www.fda.gov/media/693404/download    Fact sheet for patients: https://www.fda.gov/media/649140/download    Test performed by PCR.          XR Chest 1 View    Result Date: 9/18/2022  DATE OF EXAM: 9/18/2022 2:20 AM  PROCEDURE: XR CHEST  1 VW-  INDICATIONS: CHF, dyspnea; I50.9-Heart failure, unspecified; R06.02-Shortness of breath; R06.82-Tachypnea, not elsewhere classified; R79.89-Other specified abnormal findings of blood chemistry.  COMPARISON: Chest x-ray 9/14/2022  TECHNIQUE: Single frontal view of the chest.  FINDINGS: Unchanged AICD. Stable cardiomegaly. Redemonstration of diffuse interstitial prominence. Streaky right lung base opacities are favored to reflect atelectasis. No pleural effusion or pneumothorax.      Impression: Stable diffuse interstitial prominence favored to reflect interstitial edema. Streaky right base opacities likely reflect atelectasis.  This report was finalized on 9/18/2022 7:23 AM by Martin Cabral MD.        Results for orders placed during the hospital encounter of 09/14/22    Adult Transthoracic Echo Complete W/ Cont if Necessary Per Protocol    Interpretation Summary  · The left ventricular cavity is moderately dilated with normal wall thickness  · Estimated left ventricular EF = 15% severe global hypokinesis, apical motion consistent with pacemaker activation.  · Left ventricular diastolic function is consistent with grade 2 diastolic dysfunction  · The following left ventricular wall segments are hypokinetic: mid anterior, apical anterior, basal anterolateral, mid anterolateral, apical lateral, apical septal, apex hypokinetic, mid anteroseptal and basal anterior.  · Moderate aortic valve regurgitation is present.  · Moderate to severe mitral valve regurgitation is present.  · Estimated right ventricular systolic pressure from tricuspid regurgitation is markedly elevated (>55 mmHg).  · Left atrial volume is severely increased.      I have reviewed the medications:  Scheduled Meds:aspirin, 81 mg, Oral, Daily  Diclofenac Sodium, 2 g, Topical, 4x Daily  empagliflozin, 10 mg, Oral, Daily  furosemide, 40 mg, Oral, Daily  heparin (porcine), 5,000 Units, Subcutaneous, Q12H  insulin lispro, 0-7 Units, Subcutaneous, TID  With Meals  levETIRAcetam, 750 mg, Oral, BID  metoprolol succinate XL, 50 mg, Oral, Daily  pantoprazole, 40 mg, Oral, BID  pharmacy consult - MTM, , Does not apply, Daily  rosuvastatin, 20 mg, Oral, Daily  sacubitril-valsartan, 1 tablet, Oral, Q12H  senna-docusate sodium, 2 tablet, Oral, BID  sodium chloride, 10 mL, Intravenous, Q12H  tiotropium bromide monohydrate, 2 puff, Inhalation, Daily - RT      Continuous Infusions:   PRN Meds:.•  acetaminophen **OR** acetaminophen **OR** acetaminophen  •  albuterol  •  senna-docusate sodium **AND** polyethylene glycol **AND** bisacodyl **AND** bisacodyl  •  calcium carbonate  •  dextrose  •  dextrose  •  glucagon (human recombinant)  •  magnesium sulfate **OR** magnesium sulfate **OR** magnesium sulfate  •  ondansetron  •  potassium chloride **OR** potassium chloride **OR** potassium chloride  •  simethicone  •  sodium chloride  •  sodium chloride    Assessment & Plan   Assessment & Plan     Active Hospital Problems    Diagnosis  POA   • **Acute on chronic congestive heart failure, unspecified heart failure type (HCC) [I50.9]  Yes   • Type 2 diabetes mellitus (HCC) [E11.9]  Unknown   • Thrombocytopenia (HCC) [D69.6]  Unknown   • Elevated brain natriuretic peptide (BNP) level [R79.89]  Unknown   • Acute kidney injury (HCC) [N17.9]  Yes   • CKD (chronic kidney disease) stage 2, GFR 60-89 ml/min [N18.2]  Yes   • Coronary artery disease involving native coronary artery of native heart with angina pectoris (HCC) [I25.119]  Yes   • H/O seizures on Keppra [R56.9]  Yes   • Essential hypertension [I10]  Yes      Resolved Hospital Problems   No resolved problems to display.      I/O last 3 completed shifts:  In: -   Out: 2320 [Urine:2320]  I/O this shift:  In: 720 [P.O.:720]  Out: 750 [Urine:750]      Brief Hospital Course to date:  Narendra Cochran is a 81 y.o. male w/ HFrEF and recovered EF (50%), seizure disorder, HTN, DM2 who was recently admitted 7/29-8/4 with KENNEDY, hyperkalemia;  ultimately his Aldactone was discontinued and Lasix was changed to PRN dosing only.  Increased HeartLogic Index 9/8, developed worsening SOB/edema 9/13-9/14, presented to the ED with shortness of and noted to have increased creatinine    This patient's problems and plans were partially entered by my partner and updated as appropriate by me 09/19/22.      Assessment/plan    Chronic HFrEF   - Newly depressed EF, 15 to 20% with regional wall motion abnormalities most prominent in the LAD distribution.    -Aldactone ID'ed earlier this year 2/2 hyperkalemia  -continue aspirin, Toprol-XL, rosuvastatin, entresto, jardiance, lasix 40 mg PO daily  - will give extra dose of IV lasix this afternoon for increased dyspnea.    Acute kidney injury on CKD 2  -BLine Cr ~0.9-1.1; eGFR 70-80's  -creatinine stable  - BMP am     Abdominal pain  -Similar complaints during recent admission  -Continue PPI  -KUB reassuring, only notable for slightly increased gas pattern  -Empiric trial of simethicone    DM type 2, A1c 6.5%, without long-term use of insulin  - Accu-Cheks with SSI, BG well controlled    Hx paroxysmal atrial fibrillation  Hypertension  - Toprol-XL, aspirin    Chronic pancytopenia  -Stable, follow-up outpatient    Seizure disorder  -Keppra    Weakness  PT rec inpatient rehab at [facility]  Expected Discharge Location and Transportation: has been referred to [facility]  Expected Discharge Date: 9/20 if medically appropriate from cardiac standpoint    DVT prophylaxis:  Medical DVT prophylaxis orders are present.     AM-PAC 6 Clicks Score (PT): 17 (09/19/22 1520)    CODE STATUS:   Code Status and Medical Interventions:   Ordered at: 09/14/22 2349     Level Of Support Discussed With:    Patient     Code Status (Patient has no pulse and is not breathing):    CPR (Attempt to Resuscitate)     Medical Interventions (Patient has pulse or is breathing):    Full Support       Isrrael Black MD  09/19/22

## 2022-09-20 VITALS
OXYGEN SATURATION: 95 % | TEMPERATURE: 96.6 F | RESPIRATION RATE: 18 BRPM | WEIGHT: 187.6 LBS | BODY MASS INDEX: 26.86 KG/M2 | HEART RATE: 82 BPM | SYSTOLIC BLOOD PRESSURE: 119 MMHG | HEIGHT: 70 IN | DIASTOLIC BLOOD PRESSURE: 57 MMHG

## 2022-09-20 PROBLEM — I50.23 ACUTE ON CHRONIC SYSTOLIC HEART FAILURE: Status: ACTIVE | Noted: 2022-09-20

## 2022-09-20 LAB
ANION GAP SERPL CALCULATED.3IONS-SCNC: 12 MMOL/L (ref 5–15)
BUN SERPL-MCNC: 17 MG/DL (ref 8–23)
BUN/CREAT SERPL: 19.8 (ref 7–25)
CALCIUM SPEC-SCNC: 9.8 MG/DL (ref 8.6–10.5)
CHLORIDE SERPL-SCNC: 103 MMOL/L (ref 98–107)
CO2 SERPL-SCNC: 27 MMOL/L (ref 22–29)
CREAT SERPL-MCNC: 0.86 MG/DL (ref 0.76–1.27)
DEPRECATED RDW RBC AUTO: 60.1 FL (ref 37–54)
EGFRCR SERPLBLD CKD-EPI 2021: 85.9 ML/MIN/1.73
ERYTHROCYTE [DISTWIDTH] IN BLOOD BY AUTOMATED COUNT: 20.7 % (ref 12.3–15.4)
GLUCOSE BLDC GLUCOMTR-MCNC: 116 MG/DL (ref 70–130)
GLUCOSE BLDC GLUCOMTR-MCNC: 165 MG/DL (ref 70–130)
GLUCOSE SERPL-MCNC: 157 MG/DL (ref 65–99)
HCT VFR BLD AUTO: 38.3 % (ref 37.5–51)
HGB BLD-MCNC: 11.9 G/DL (ref 13–17.7)
MCH RBC QN AUTO: 25.3 PG (ref 26.6–33)
MCHC RBC AUTO-ENTMCNC: 31.1 G/DL (ref 31.5–35.7)
MCV RBC AUTO: 81.3 FL (ref 79–97)
PLATELET # BLD AUTO: 155 10*3/MM3 (ref 140–450)
POTASSIUM SERPL-SCNC: 3.4 MMOL/L (ref 3.5–5.2)
RBC # BLD AUTO: 4.71 10*6/MM3 (ref 4.14–5.8)
SARS-COV-2 RDRP RESP QL NAA+PROBE: NORMAL
SODIUM SERPL-SCNC: 142 MMOL/L (ref 136–145)
WBC NRBC COR # BLD: 3.59 10*3/MM3 (ref 3.4–10.8)

## 2022-09-20 PROCEDURE — G0378 HOSPITAL OBSERVATION PER HR: HCPCS

## 2022-09-20 PROCEDURE — 87635 SARS-COV-2 COVID-19 AMP PRB: CPT | Performed by: INTERNAL MEDICINE

## 2022-09-20 PROCEDURE — 82962 GLUCOSE BLOOD TEST: CPT

## 2022-09-20 PROCEDURE — 97530 THERAPEUTIC ACTIVITIES: CPT

## 2022-09-20 PROCEDURE — 85027 COMPLETE CBC AUTOMATED: CPT | Performed by: INTERNAL MEDICINE

## 2022-09-20 PROCEDURE — 99217 PR OBSERVATION CARE DISCHARGE MANAGEMENT: CPT | Performed by: INTERNAL MEDICINE

## 2022-09-20 PROCEDURE — 96372 THER/PROPH/DIAG INJ SC/IM: CPT

## 2022-09-20 PROCEDURE — 80048 BASIC METABOLIC PNL TOTAL CA: CPT | Performed by: INTERNAL MEDICINE

## 2022-09-20 PROCEDURE — 63710000001 INSULIN LISPRO (HUMAN) PER 5 UNITS: Performed by: INTERNAL MEDICINE

## 2022-09-20 PROCEDURE — 25010000002 HEPARIN (PORCINE) PER 1000 UNITS: Performed by: INTERNAL MEDICINE

## 2022-09-20 RX ORDER — TIOTROPIUM BROMIDE INHALATION SPRAY 3.12 UG/1
2 SPRAY, METERED RESPIRATORY (INHALATION)
Qty: 4 G | Refills: 5 | Status: SHIPPED | OUTPATIENT
Start: 2022-09-20

## 2022-09-20 RX ADMIN — ASPIRIN 81 MG CHEWABLE TABLET 81 MG: 81 TABLET CHEWABLE at 08:54

## 2022-09-20 RX ADMIN — METOPROLOL SUCCINATE 50 MG: 50 TABLET, FILM COATED, EXTENDED RELEASE ORAL at 08:54

## 2022-09-20 RX ADMIN — EMPAGLIFLOZIN 10 MG: 10 TABLET, FILM COATED ORAL at 08:55

## 2022-09-20 RX ADMIN — SACUBITRIL AND VALSARTAN 1 TABLET: 24; 26 TABLET, FILM COATED ORAL at 08:54

## 2022-09-20 RX ADMIN — PANTOPRAZOLE SODIUM 40 MG: 40 TABLET, DELAYED RELEASE ORAL at 08:54

## 2022-09-20 RX ADMIN — ROSUVASTATIN CALCIUM 20 MG: 20 TABLET, FILM COATED ORAL at 08:54

## 2022-09-20 RX ADMIN — DICLOFENAC 2 G: 10 GEL TOPICAL at 12:00

## 2022-09-20 RX ADMIN — DICLOFENAC 2 G: 10 GEL TOPICAL at 08:54

## 2022-09-20 RX ADMIN — LEVETIRACETAM 750 MG: 750 TABLET, FILM COATED ORAL at 08:54

## 2022-09-20 RX ADMIN — HEPARIN SODIUM 5000 UNITS: 5000 INJECTION INTRAVENOUS; SUBCUTANEOUS at 08:54

## 2022-09-20 RX ADMIN — FUROSEMIDE 40 MG: 40 TABLET ORAL at 08:54

## 2022-09-20 RX ADMIN — Medication 10 ML: at 08:54

## 2022-09-20 RX ADMIN — INSULIN LISPRO 2 UNITS: 100 INJECTION, SOLUTION INTRAVENOUS; SUBCUTANEOUS at 12:00

## 2022-09-20 NOTE — PLAN OF CARE
Goal Outcome Evaluation:  Plan of Care Reviewed With: patient        Progress: no change  Outcome Evaluation: VSS. Pt remains on RA with no signs of SOA. Pt remains NSR on the monitor. No pain or complaints noted.

## 2022-09-20 NOTE — DISCHARGE PLACEMENT REQUEST
"Narendra Reynolds (81 y.o. Male)     Zeinab BOLAND RN, [phone]            Date of Birth   1940    Social Security Number       Address   [address] [address]    Home Phone   [phone]    MRN   [id number]       Oriental orthodox   Nondenominational    Marital Status                               Admission Date   22    Admission Type   Emergency    Admitting Provider   Lashawn Tobin MD    Attending Provider   Lashawn Tobin MD    Department, Room/Bed   [facility] 6B, N638/1       Discharge Date       Discharge Disposition   Rehab Facility or Unit (DC - External)    Discharge Destination                               Attending Provider: Lashawn Tobin MD    Allergies: No Known Allergies    Isolation: None   Infection: None   Code Status: CPR   Advance Care Planning Activity    Ht: 177.8 cm (70\")   Wt: 85.1 kg (187 lb 9.6 oz)    Admission Cmt: None   Principal Problem: Acute on chronic congestive heart failure, unspecified heart failure type (HCC) [I50.9]                 Active Insurance as of 2022     Primary Coverage     Payor Plan Insurance Group Employer/Plan Group    [facility] MEDICARE REPLACEMENT [employer] MEDICARE REPLACEMENT [facility]     Payor Plan Address Payor Plan Phone Number Payor Plan Fax Number Effective Dates    [address] [address] [phone]  2020 - None Entered    [facility] [id number]       Subscriber Name Subscriber Birth Date Member ID       NARENDRA REYNOLDS 1940 [id number]                 Emergency Contacts      (Rel.) Home Phone Work Phone Mobile Phone    DimasJessica Snow (Spouse) -- -- [phone]    Narendra Reynolds (Son) -- -- [phone]                 Discharge Summary      Lashawn Tobin MD at 22 1210              [facility] Medicine Services  DISCHARGE SUMMARY    Patient Name: Narendra Reynolds  : 1940  MRN: [id number]    Date of Admission: " 9/14/2022  3:58 PM  Date of Discharge:  9/20/2022  Primary Care Physician: Marguerite Moore PA-C    Consults     Date and Time Order Name Status Description    9/14/2022 10:25 PM Inpatient Cardiology Consult Completed           Hospital Course     Presenting Problem:   Acute on chronic congestive heart failure, unspecified heart failure type (HCC) [I50.9]    Active Hospital Problems    Diagnosis  POA   • **Acute on chronic congestive heart failure, unspecified heart failure type (HCC) [I50.9]  Yes   • Acute on chronic systolic heart failure (CMS/HCC) [I50.23]  Unknown   • Type 2 diabetes mellitus (HCC) [E11.9]  Unknown   • Thrombocytopenia (HCC) [D69.6]  Unknown   • Elevated brain natriuretic peptide (BNP) level [R79.89]  Unknown   • Acute kidney injury (HCC) [N17.9]  Yes   • CKD (chronic kidney disease) stage 2, GFR 60-89 ml/min [N18.2]  Yes   • Coronary artery disease involving native coronary artery of native heart with angina pectoris (HCC) [I25.119]  Yes   • H/O seizures on Keppra [R56.9]  Yes   • Essential hypertension [I10]  Yes      Resolved Hospital Problems   No resolved problems to display.          Hospital Course:  Narendra Cochran is a 81 y.o. male w/ HFrEF and recovered EF (50%), seizure disorder, HTN, DM2 who was recently admitted 7/29-8/4 with KENNEDY, hyperkalemia; ultimately his Aldactone was discontinued and Lasix was changed to PRN dosing only.  Increased HeartLogic Index 9/8, developed worsening SOB/edema 9/13-9/14, presented to the ED with shortness of breath and noted to have increased creatinine     This patient's problems and plans were partially entered by my partner and updated as appropriate by me 09/20/22.        Chronic HFrEF   - Newly depressed EF, 15 to 20% with regional wall motion abnormalities most prominent in the LAD distribution.    -Aldactone dc'ed earlier this year due to hyperkalemia  -continue aspirin, Toprol-XL, rosuvastatin, entresto, jardiance, lasix 40 mg PO daily  -  given extra dose of IV lasix yesterday for increased dyspnea.  -- net negative 1605 for 24 hours     Acute kidney injury on CKD 2  -BLine Cr ~0.9-1.1; eGFR 70-80s  -creatinine stable      Abdominal pain  -Similar complaints during recent admission  -Continue PPI  -KUB reassuring, only notable for slightly increased gas pattern  -Empiric trial of simethicone     DM type 2, A1c 6.5%, without long-term use of insulin  - Accu-Cheks with SSI, BG well controlled     Hx paroxysmal atrial fibrillation  Hypertension  - Toprol-XL, aspirin     Chronic pancytopenia  -Stable, follow-up outpatient     Seizure disorder  -Keppra     Weakness  PT rec inpatient rehab at [place], going to [facility] today      Discharge Follow Up Recommendations for outpatient labs/diagnostics:   Follow up with PCP within one week of d/c from rehab facility    Day of Discharge     HPI:   Doing well this am, denies any issues overnight. Breathing much better today    Review of Systems  Gen- No fevers, chills  CV- No chest pain, palpitations  Resp- No cough, dyspnea  GI- No N/V/D, abd pain        Vital Signs:   Temp:  [96.1 °F (35.6 °C)-98.5 °F (36.9 °C)] 96.6 °F (35.9 °C)  Heart Rate:  [65-78] 65  Resp:  [10-18] 18  BP: (119-145)/(57-74) 119/57      Physical Exam:  Constitutional: No acute distress, awake, alert, sitting up in chair at bedside, [place]  HENT: NCAT, mucous membranes moist  Respiratory: Clear to auscultation bilaterally, respiratory effort normal   Cardiovascular: RRR, no murmurs, rubs, or gallops  Gastrointestinal: Positive bowel sounds, soft, nontender, nondistended  Musculoskeletal: 1+ pitting edema BLE   Psychiatric: Appropriate affect, cooperative  Neurologic: Oriented x 3, strength symmetric in all extremities, Cranial Nerves grossly intact to confrontation, speech clear  Skin: No rashes    Pertinent  and/or Most Recent Results     LAB RESULTS:      Lab 09/20/22  1000 09/18/22  0455 09/15/22  1020 09/14/22  1732   WBC 3.59 3.98 3.29* 3.57    HEMOGLOBIN 11.9* 10.2* 10.8* 11.0*   HEMATOCRIT 38.3 32.6* 35.3* 35.2*   PLATELETS 155 133* 112* 129*   NEUTROS ABS  --   --   --  2.70   IMMATURE GRANS (ABS)  --   --   --  0.01   LYMPHS ABS  --   --   --  0.62*   MONOS ABS  --   --   --  0.19   EOS ABS  --   --   --  0.04   MCV 81.3 81.7 82.5 82.2   LACTATE  --   --  1.5  --          Lab 09/20/22  1000 09/19/22  0554 09/18/22  0455 09/17/22  0452 09/16/22  2104 09/16/22  0957 09/15/22  1020   SODIUM 142 143 142 144  --  144 142   POTASSIUM 3.4* 3.5 3.8 4.2 4.2 3.4* 3.9   CHLORIDE 103 106 106 110*  --  108* 107   CO2 27.0 23.0 28.0 24.0  --  25.0 22.0   ANION GAP 12.0 14.0 8.0 10.0  --  11.0 13.0   BUN 17 15 19 16  --  20 24*   CREATININE 0.86 0.85 1.06 1.02  --  1.10 1.44*   EGFR 85.9 86.2 69.6 72.9  --  66.6 48.2*   GLUCOSE 157* 103* 116* 123*  --  122* 132*   CALCIUM 9.8 9.3 9.5 9.4  --  9.3 9.6   MAGNESIUM  --   --   --  2.1  --  1.9 1.8         Lab 09/14/22  1732   TOTAL PROTEIN 7.6   ALBUMIN 4.20   GLOBULIN 3.4   ALT (SGPT) 30   AST (SGOT) 47*   BILIRUBIN 1.1   ALK PHOS 76         Lab 09/14/22  1732   PROBNP 37,603.0*   TROPONIN T 0.019                 Brief Urine Lab Results  (Last result in the past 365 days)      Color   Clarity   Blood   Leuk Est   Nitrite   Protein   CREAT   Urine HCG        09/15/22 1146 Yellow   Clear   Negative   Trace   Negative   30 mg/dL (1+)               Microbiology Results (last 10 days)     Procedure Component Value - Date/Time    Urine Culture - Urine, Urine, Clean Catch [[id number]]  (Normal) Collected: 09/15/22 1146    Lab Status: Final result Specimen: Urine, Clean Catch Updated: 09/16/22 1425     Urine Culture No growth    COVID PRE-OP / PRE-PROCEDURE SCREENING ORDER (NO ISOLATION) - Swab, Nasopharynx [[id number]]  (Normal) Collected: 09/14/22 1702    Lab Status: Final result Specimen: Swab from Nasopharynx Updated: 09/14/22 1815    Narrative:      The following orders were created for panel order COVID PRE-OP /  PRE-PROCEDURE SCREENING ORDER (NO ISOLATION) - Swab, Nasopharynx.  Procedure                               Abnormality         Status                     ---------                               -----------         ------                     COVID-19 and FLU A/B PCR...[[id number]]  Normal              Final result                 Please view results for these tests on the individual orders.    COVID-19 and FLU A/B PCR - Swab, Nasopharynx [[id number]]  (Normal) Collected: 09/14/22 1702    Lab Status: Final result Specimen: Swab from Nasopharynx Updated: 09/14/22 1815     COVID19 Not Detected     Influenza A PCR Not Detected     Influenza B PCR Not Detected    Narrative:      Fact sheet for providers: https://www.fda.gov/media/907090/download    Fact sheet for patients: https://www.fda.gov/media/170261/download    Test performed by PCR.          Adult Transthoracic Echo Complete W/ Cont if Necessary Per Protocol    Result Date: 9/16/2022  · The left ventricular cavity is moderately dilated with normal wall thickness · Estimated left ventricular EF = 15% severe global hypokinesis, apical motion consistent with pacemaker activation. · Left ventricular diastolic function is consistent with grade 2 diastolic dysfunction · The following left ventricular wall segments are hypokinetic: mid anterior, apical anterior, basal anterolateral, mid anterolateral, apical lateral, apical septal, apex hypokinetic, mid anteroseptal and basal anterior. · Moderate aortic valve regurgitation is present. · Moderate to severe mitral valve regurgitation is present. · Estimated right ventricular systolic pressure from tricuspid regurgitation is markedly elevated (>55 mmHg). · Left atrial volume is severely increased.      XR Chest 1 View    Result Date: 9/18/2022  DATE OF EXAM: 9/18/2022 2:20 AM  PROCEDURE: XR CHEST 1 VW-  INDICATIONS: CHF, dyspnea; I50.9-Heart failure, unspecified; R06.02-Shortness of breath; R06.82-Tachypnea, not elsewhere  classified; R79.89-Other specified abnormal findings of blood chemistry.  COMPARISON: Chest x-ray 9/14/2022  TECHNIQUE: Single frontal view of the chest.  FINDINGS: Unchanged AICD. Stable cardiomegaly. Redemonstration of diffuse interstitial prominence. Streaky right lung base opacities are favored to reflect atelectasis. No pleural effusion or pneumothorax.      Stable diffuse interstitial prominence favored to reflect interstitial edema. Streaky right base opacities likely reflect atelectasis.  This report was finalized on 9/18/2022 7:23 AM by Martin Cabral MD.      XR Chest 1 View    Result Date: 9/14/2022  DATE OF EXAM: 9/14/2022 5:01 PM  PROCEDURE: XR CHEST 1 VW-  INDICATIONS: SOA triage protocol.  COMPARISON: Chest x-ray 7/13/2022  TECHNIQUE: Single frontal view of the chest.  FINDINGS: Unchanged appearance of dual-lead AICD with left chest pulse generator. There is stable cardiomegaly. There is mild interstitial pulmonary edema. No significant pleural effusion. No pneumothorax. No acute osseous findings.      Mild interstitial pulmonary edema.  This report was finalized on 9/14/2022 5:30 PM by Martin Cabral MD.      XR Abdomen KUB    Result Date: 9/15/2022  DATE OF EXAM: 9/14/2022 11:16 PM  PROCEDURE: XR ABDOMEN KUB-  INDICATIONS: abd pain; I50.9-Heart failure, unspecified; R06.02-Shortness of breath; R06.82-Tachypnea, not elsewhere classified; R79.89-Other specified abnormal findings of blood chemistry  COMPARISON: No comparisons available.  TECHNIQUE: Single radiographic view of the abdomen was obtained.  FINDINGS: There is mildly and diffusely increased large and small bowel gas in a nonspecific pattern. Considerations would include mild ileus, gastroenteritis, but the pattern does not particularly suggest bowel obstruction. No bowel wall edema or pneumatosis is seen. There is a coarsened trabeculation pattern of the lower pelvic bones of left proximal femur as noted on 7/19/2022 abdomen pelvis CT  scan, consistent with history of of Paget's disease.       1. Nonspecific bowel gas pattern with mildly increased large and small bowel gas as discussed above. 2. Incidentally noted Paget's disease of the pelvis and left proximal femur.  This report was finalized on 9/15/2022 8:33 AM by Dr. Edin Clark MD.        Results for orders placed in visit on 12/08/17    SCANNED VASCULAR STUDIES      Results for orders placed in visit on 12/08/17    SCANNED VASCULAR STUDIES      Results for orders placed during the hospital encounter of 09/14/22    Adult Transthoracic Echo Complete W/ Cont if Necessary Per Protocol    Interpretation Summary  · The left ventricular cavity is moderately dilated with normal wall thickness  · Estimated left ventricular EF = 15% severe global hypokinesis, apical motion consistent with pacemaker activation.  · Left ventricular diastolic function is consistent with grade 2 diastolic dysfunction  · The following left ventricular wall segments are hypokinetic: mid anterior, apical anterior, basal anterolateral, mid anterolateral, apical lateral, apical septal, apex hypokinetic, mid anteroseptal and basal anterior.  · Moderate aortic valve regurgitation is present.  · Moderate to severe mitral valve regurgitation is present.  · Estimated right ventricular systolic pressure from tricuspid regurgitation is markedly elevated (>55 mmHg).  · Left atrial volume is severely increased.      Plan for Follow-up of Pending Labs/Results:   Pending Labs     Order Current Status    COVID PRE-OP / PRE-PROCEDURE SCREENING ORDER (NO ISOLATION) - Swab, Nasopharynx Collected (09/20/22 1209)    COVID-19, ABBOTT IN-HOUSE,NASAL Swab (NO TRANSPORT MEDIA) 2 HR TAT - Swab, Nasopharynx Collected (09/20/22 1209)        Discharge Details        Discharge Medications      New Medications      Instructions Start Date   empagliflozin 10 MG tablet tablet  Commonly known as: JARDIANCE   10 mg, Oral, Daily   Start Date: September 21,  2022        Changes to Medications      Instructions Start Date   potassium chloride ER 20 MEQ tablet controlled-release ER tablet  Commonly known as: K-TAB  What changed: additional instructions   20 mEq, Oral, Every Other Day         Continue These Medications      Instructions Start Date   albuterol sulfate  (90 Base) MCG/ACT inhaler  Commonly known as: PROVENTIL HFA;VENTOLIN HFA;PROAIR HFA   2 puffs, Inhalation, Every 4 Hours PRN      aspirin 81 MG chewable tablet   81 mg, Oral, Daily      diclofenac 1 % gel gel  Commonly known as: VOLTAREN   4 g, Topical, 4 Times Daily PRN      Entresto 24-26 MG tablet  Generic drug: sacubitril-valsartan   1 tablet, Oral, 2 Times Daily      furosemide 40 MG tablet  Commonly known as: LASIX   40 mg, Oral, Daily, X2 days per pcp started yesterday      glucose blood test strip   1 each, Other, Daily, Use daily as directed      glucose monitor monitoring kit   1 each, Does not apply, Daily, Use daily as directed.      levETIRAcetam 750 MG tablet  Commonly known as: KEPPRA   TAKE 1 TABLET BY MOUTH TWICE A DAY      metFORMIN  MG 24 hr tablet  Commonly known as: GLUCOPHAGE-XR   500 mg, Oral, Daily With Breakfast      metoprolol succinate XL 50 MG 24 hr tablet  Commonly known as: TOPROL-XL   TAKE 1 TABLET BY MOUTH EVERY DAY      pantoprazole 40 MG EC tablet  Commonly known as: PROTONIX   40 mg, Oral, 2 Times Daily      rosuvastatin 20 MG tablet  Commonly known as: CRESTOR   20 mg, Oral, Daily      Spiriva Respimat 2.5 MCG/ACT aerosol solution inhaler  Generic drug: tiotropium bromide monohydrate   2 puffs, Inhalation, Daily - RT             No Known Allergies      Discharge Disposition:  Rehab Facility or Unit (DC - External)    Diet:  Hospital:  Diet Order   Procedures   • Diet Regular; Cardiac, Consistent Carbohydrate       Activity:      Restrictions or Other Recommendations:         CODE STATUS:    Code Status and Medical Interventions:   Ordered at: 09/14/22 5504      Level Of Support Discussed With:    Patient     Code Status (Patient has no pulse and is not breathing):    CPR (Attempt to Resuscitate)     Medical Interventions (Patient has pulse or is breathing):    Full Support       Future Appointments   Date Time Provider Department Center   11/22/2022 11:30 AM Javad Mercedes MD [facility]   12/6/2022  1:00 PM Marguerite Moore PA-C [facility]   3/2/2023  2:15 PM Pari García APRN [facility]       Additional Instructions for the Follow-ups that You Need to Schedule     Discharge Follow-up with PCP   As directed       Currently Documented PCP:    Marguerite Moore PA-C    PCP Phone Number:    [phone]     Follow Up Details: in one week with PCP                     Lashawn Tobin MD  09/20/22      Time Spent on Discharge:  I spent  35 minutes on this discharge activity which included: face-to-face encounter with the patient, reviewing the data in the system, coordination of the care with the nursing staff as well as consultants, documentation, and entering orders.          Electronically signed by Lashawn Tobin MD at 09/20/22 5501

## 2022-09-20 NOTE — CASE MANAGEMENT/SOCIAL WORK
Case Management Discharge Note      Final Note: Plan to go to [facility] today with [facility] van at 2:30pm. He needs to be down at the [address] building, maternity entrance at 2:25pm. Patient will be going to SRU unit. Nurse to call report [phone]. CM will fax d/c summary to [phone]. CM has update the patient and wife.         Selected Continued Care - Admitted Since 9/14/2022     Destination Coordination complete.    Service Provider Selected Services Address Phone Fax Patient Preferred    [facility]  In[facility] [address] [place] [address] [phone] --          Durable Medical Equipment    No services have been selected for the patient.              Dialysis/Infusion    No services have been selected for the patient.              Home Medical Care    No services have been selected for the patient.              Therapy    No services have been selected for the patient.              Community Resources    No services have been selected for the patient.              Community & DME    No services have been selected for the patient.                Selected Continued Care - Prior Encounters Includes selections from prior encounters from 6/16/2022 to 9/20/2022    Discharged on 8/4/2022 Admission date: 7/29/2022 - Discharge disposition: Home-Health Care [facility]    Home Medical Care     Service Provider Selected Services Address Phone Fax Patient Preferred    [facility]  [facility] ,  Home Nursing ,  Home Rehabilitation [address] [place] [address] [phone] --                         Final Discharge Disposition Code: 03 - skilled nursing facility (SNF)

## 2022-09-20 NOTE — PLAN OF CARE
Goal Outcome Evaluation:  Plan of Care Reviewed With: patient        Progress: no change  Outcome Evaluation: Pt tolerated household distances of functional mobility w/ CGA, occasional Jose Carlos for manipulating RW around obstacles. CGA for sit to stand transfers, Jose Carlos w/ stand to sit d/t pt failing to reach back and demo eccentric control. Good effort noted w/ BUE and BLE ther ex. Continue per current POC as able.

## 2022-09-20 NOTE — DISCHARGE SUMMARY
[facility] Medicine Services  DISCHARGE SUMMARY    Patient Name: Narendra Cochran  : 1940  MRN: [id number]    Date of Admission: 2022  3:58 PM  Date of Discharge:  2022  Primary Care Physician: Marguerite Moore PA-C    Consults     Date and Time Order Name Status Description    2022 10:25 PM Inpatient Cardiology Consult Completed           Hospital Course     Presenting Problem:   Acute on chronic congestive heart failure, unspecified heart failure type (HCC) [I50.9]    Active Hospital Problems    Diagnosis  POA   • **Acute on chronic congestive heart failure, unspecified heart failure type (HCC) [I50.9]  Yes   • Acute on chronic systolic heart failure (CMS/HCC) [I50.23]  Unknown   • Type 2 diabetes mellitus (HCC) [E11.9]  Unknown   • Thrombocytopenia (HCC) [D69.6]  Unknown   • Elevated brain natriuretic peptide (BNP) level [R79.89]  Unknown   • Acute kidney injury (HCC) [N17.9]  Yes   • CKD (chronic kidney disease) stage 2, GFR 60-89 ml/min [N18.2]  Yes   • Coronary artery disease involving native coronary artery of native heart with angina pectoris (HCC) [I25.119]  Yes   • H/O seizures on Keppra [R56.9]  Yes   • Essential hypertension [I10]  Yes      Resolved Hospital Problems   No resolved problems to display.          Hospital Course:  Narendra Cochran is a 81 y.o. male w/ HFrEF and recovered EF (50%), seizure disorder, HTN, DM2 who was recently admitted - with KENNEDY, hyperkalemia; ultimately his Aldactone was discontinued and Lasix was changed to PRN dosing only.  Increased HeartLogic Index , developed worsening SOB/edema -, presented to the ED with shortness of breath and noted to have increased creatinine     This patient's problems and plans were partially entered by my partner and updated as appropriate by me 22.        Chronic HFrEF   - Newly depressed EF, 15 to 20% with regional wall motion abnormalities most prominent in the LAD  distribution.    -Aldactone VT'ed earlier this year due to hyperkalemia  -continue aspirin, Toprol-XL, rosuvastatin, entresto, jardiance, lasix 40 mg PO daily  - given extra dose of IV lasix yesterday for increased dyspnea.  -- net negative 1605 for 24 hours     Acute kidney injury on CKD 2  -BLine Cr ~0.9-1.1; eGFR 70-80s  -creatinine stable      Abdominal pain  -Similar complaints during recent admission  -Continue PPI  -KUB reassuring, only notable for slightly increased gas pattern  -Empiric trial of simethicone     DM type 2, A1c 6.5%, without long-term use of insulin  - Accu-Cheks with SSI, BG well controlled     Hx paroxysmal atrial fibrillation  Hypertension  - Toprol-XL, aspirin     Chronic pancytopenia  -Stable, follow-up outpatient     Seizure disorder  -Keppra     Weakness  PT rec inpatient rehab at [place], going to [facility] today      Discharge Follow Up Recommendations for outpatient labs/diagnostics:   Follow up with PCP within one week of d/c from rehab facility    Day of Discharge     HPI:   Doing well this am, denies any issues overnight. Breathing much better today    Review of Systems  Gen- No fevers, chills  CV- No chest pain, palpitations  Resp- No cough, dyspnea  GI- No N/V/D, abd pain        Vital Signs:   Temp:  [96.1 °F (35.6 °C)-98.5 °F (36.9 °C)] 96.6 °F (35.9 °C)  Heart Rate:  [65-78] 65  Resp:  [10-18] 18  BP: (119-145)/(57-74) 119/57      Physical Exam:  Constitutional: No acute distress, awake, alert, sitting up in chair at bedside, [place]  HENT: NCAT, mucous membranes moist  Respiratory: Clear to auscultation bilaterally, respiratory effort normal   Cardiovascular: RRR, no murmurs, rubs, or gallops  Gastrointestinal: Positive bowel sounds, soft, nontender, nondistended  Musculoskeletal: 1+ pitting edema BLE   Psychiatric: Appropriate affect, cooperative  Neurologic: Oriented x 3, strength symmetric in all extremities, Cranial Nerves grossly intact to confrontation, speech clear  Skin: No  gregg    Pertinent  and/or Most Recent Results     LAB RESULTS:      Lab 09/20/22  1000 09/18/22  0455 09/15/22  1020 09/14/22  1732   WBC 3.59 3.98 3.29* 3.57   HEMOGLOBIN 11.9* 10.2* 10.8* 11.0*   HEMATOCRIT 38.3 32.6* 35.3* 35.2*   PLATELETS 155 133* 112* 129*   NEUTROS ABS  --   --   --  2.70   IMMATURE GRANS (ABS)  --   --   --  0.01   LYMPHS ABS  --   --   --  0.62*   MONOS ABS  --   --   --  0.19   EOS ABS  --   --   --  0.04   MCV 81.3 81.7 82.5 82.2   LACTATE  --   --  1.5  --          Lab 09/20/22  1000 09/19/22  0554 09/18/22  0455 09/17/22  0452 09/16/22  2104 09/16/22  0957 09/15/22  1020   SODIUM 142 143 142 144  --  144 142   POTASSIUM 3.4* 3.5 3.8 4.2 4.2 3.4* 3.9   CHLORIDE 103 106 106 110*  --  108* 107   CO2 27.0 23.0 28.0 24.0  --  25.0 22.0   ANION GAP 12.0 14.0 8.0 10.0  --  11.0 13.0   BUN 17 15 19 16  --  20 24*   CREATININE 0.86 0.85 1.06 1.02  --  1.10 1.44*   EGFR 85.9 86.2 69.6 72.9  --  66.6 48.2*   GLUCOSE 157* 103* 116* 123*  --  122* 132*   CALCIUM 9.8 9.3 9.5 9.4  --  9.3 9.6   MAGNESIUM  --   --   --  2.1  --  1.9 1.8         Lab 09/14/22  1732   TOTAL PROTEIN 7.6   ALBUMIN 4.20   GLOBULIN 3.4   ALT (SGPT) 30   AST (SGOT) 47*   BILIRUBIN 1.1   ALK PHOS 76         Lab 09/14/22  1732   PROBNP 37,603.0*   TROPONIN T 0.019                 Brief Urine Lab Results  (Last result in the past 365 days)      Color   Clarity   Blood   Leuk Est   Nitrite   Protein   CREAT   Urine HCG        09/15/22 1146 Yellow   Clear   Negative   Trace   Negative   30 mg/dL (1+)               Microbiology Results (last 10 days)     Procedure Component Value - Date/Time    Urine Culture - Urine, Urine, Clean Catch [[id number]]  (Normal) Collected: 09/15/22 1146    Lab Status: Final result Specimen: Urine, Clean Catch Updated: 09/16/22 1425     Urine Culture No growth    COVID PRE-OP / PRE-PROCEDURE SCREENING ORDER (NO ISOLATION) - Swab, Nasopharynx [[id number]]  (Normal) Collected: 09/14/22 1702    Lab Status:  Final result Specimen: Swab from Nasopharynx Updated: 09/14/22 1815    Narrative:      The following orders were created for panel order COVID PRE-OP / PRE-PROCEDURE SCREENING ORDER (NO ISOLATION) - Swab, Nasopharynx.  Procedure                               Abnormality         Status                     ---------                               -----------         ------                     COVID-19 and FLU A/B PCR...[[id number]]  Normal              Final result                 Please view results for these tests on the individual orders.    COVID-19 and FLU A/B PCR - Swab, Nasopharynx [[id number]]  (Normal) Collected: 09/14/22 1702    Lab Status: Final result Specimen: Swab from Nasopharynx Updated: 09/14/22 1815     COVID19 Not Detected     Influenza A PCR Not Detected     Influenza B PCR Not Detected    Narrative:      Fact sheet for providers: https://www.fda.gov/media/645586/download    Fact sheet for patients: https://www.fda.gov/media/063727/download    Test performed by PCR.          Adult Transthoracic Echo Complete W/ Cont if Necessary Per Protocol    Result Date: 9/16/2022  · The left ventricular cavity is moderately dilated with normal wall thickness · Estimated left ventricular EF = 15% severe global hypokinesis, apical motion consistent with pacemaker activation. · Left ventricular diastolic function is consistent with grade 2 diastolic dysfunction · The following left ventricular wall segments are hypokinetic: mid anterior, apical anterior, basal anterolateral, mid anterolateral, apical lateral, apical septal, apex hypokinetic, mid anteroseptal and basal anterior. · Moderate aortic valve regurgitation is present. · Moderate to severe mitral valve regurgitation is present. · Estimated right ventricular systolic pressure from tricuspid regurgitation is markedly elevated (>55 mmHg). · Left atrial volume is severely increased.      XR Chest 1 View    Result Date: 9/18/2022  DATE OF EXAM: 9/18/2022 2:20 AM   PROCEDURE: XR CHEST 1 VW-  INDICATIONS: CHF, dyspnea; I50.9-Heart failure, unspecified; R06.02-Shortness of breath; R06.82-Tachypnea, not elsewhere classified; R79.89-Other specified abnormal findings of blood chemistry.  COMPARISON: Chest x-ray 9/14/2022  TECHNIQUE: Single frontal view of the chest.  FINDINGS: Unchanged AICD. Stable cardiomegaly. Redemonstration of diffuse interstitial prominence. Streaky right lung base opacities are favored to reflect atelectasis. No pleural effusion or pneumothorax.      Stable diffuse interstitial prominence favored to reflect interstitial edema. Streaky right base opacities likely reflect atelectasis.  This report was finalized on 9/18/2022 7:23 AM by Martin Cabral MD.      XR Chest 1 View    Result Date: 9/14/2022  DATE OF EXAM: 9/14/2022 5:01 PM  PROCEDURE: XR CHEST 1 VW-  INDICATIONS: SOA triage protocol.  COMPARISON: Chest x-ray 7/13/2022  TECHNIQUE: Single frontal view of the chest.  FINDINGS: Unchanged appearance of dual-lead AICD with left chest pulse generator. There is stable cardiomegaly. There is mild interstitial pulmonary edema. No significant pleural effusion. No pneumothorax. No acute osseous findings.      Mild interstitial pulmonary edema.  This report was finalized on 9/14/2022 5:30 PM by Martin Cabral MD.      XR Abdomen KUB    Result Date: 9/15/2022  DATE OF EXAM: 9/14/2022 11:16 PM  PROCEDURE: XR ABDOMEN KUB-  INDICATIONS: abd pain; I50.9-Heart failure, unspecified; R06.02-Shortness of breath; R06.82-Tachypnea, not elsewhere classified; R79.89-Other specified abnormal findings of blood chemistry  COMPARISON: No comparisons available.  TECHNIQUE: Single radiographic view of the abdomen was obtained.  FINDINGS: There is mildly and diffusely increased large and small bowel gas in a nonspecific pattern. Considerations would include mild ileus, gastroenteritis, but the pattern does not particularly suggest bowel obstruction. No bowel wall edema or  pneumatosis is seen. There is a coarsened trabeculation pattern of the lower pelvic bones of left proximal femur as noted on 7/19/2022 abdomen pelvis CT scan, consistent with history of of Paget's disease.       1. Nonspecific bowel gas pattern with mildly increased large and small bowel gas as discussed above. 2. Incidentally noted Paget's disease of the pelvis and left proximal femur.  This report was finalized on 9/15/2022 8:33 AM by Dr. Edin Clark MD.        Results for orders placed in visit on 12/08/17    SCANNED VASCULAR STUDIES      Results for orders placed in visit on 12/08/17    SCANNED VASCULAR STUDIES      Results for orders placed during the hospital encounter of 09/14/22    Adult Transthoracic Echo Complete W/ Cont if Necessary Per Protocol    Interpretation Summary  · The left ventricular cavity is moderately dilated with normal wall thickness  · Estimated left ventricular EF = 15% severe global hypokinesis, apical motion consistent with pacemaker activation.  · Left ventricular diastolic function is consistent with grade 2 diastolic dysfunction  · The following left ventricular wall segments are hypokinetic: mid anterior, apical anterior, basal anterolateral, mid anterolateral, apical lateral, apical septal, apex hypokinetic, mid anteroseptal and basal anterior.  · Moderate aortic valve regurgitation is present.  · Moderate to severe mitral valve regurgitation is present.  · Estimated right ventricular systolic pressure from tricuspid regurgitation is markedly elevated (>55 mmHg).  · Left atrial volume is severely increased.      Plan for Follow-up of Pending Labs/Results:   Pending Labs     Order Current Status    COVID PRE-OP / PRE-PROCEDURE SCREENING ORDER (NO ISOLATION) - Swab, Nasopharynx Collected (09/20/22 1209)    COVID-[facility] IN-HOUSE,NASAL Swab (NO TRANSPORT MEDIA) 2 HR TAT - Swab, Nasopharynx Collected (09/20/22 1209)        Discharge Details        Discharge Medications      New  Medications      Instructions Start Date   empagliflozin 10 MG tablet tablet  Commonly known as: JARDIANCE   10 mg, Oral, Daily   Start Date: September 21, 2022        Changes to Medications      Instructions Start Date   potassium chloride ER 20 MEQ tablet controlled-release ER tablet  Commonly known as: K-TAB  What changed: additional instructions   20 mEq, Oral, Every Other Day         Continue These Medications      Instructions Start Date   albuterol sulfate  (90 Base) MCG/ACT inhaler  Commonly known as: PROVENTIL HFA;VENTOLIN HFA;PROAIR HFA   2 puffs, Inhalation, Every 4 Hours PRN      aspirin 81 MG chewable tablet   81 mg, Oral, Daily      diclofenac 1 % gel gel  Commonly known as: VOLTAREN   4 g, Topical, 4 Times Daily PRN      Entresto 24-26 MG tablet  Generic drug: sacubitril-valsartan   1 tablet, Oral, 2 Times Daily      furosemide 40 MG tablet  Commonly known as: LASIX   40 mg, Oral, Daily, X2 days per pcp started yesterday      glucose blood test strip   1 each, Other, Daily, Use daily as directed      glucose monitor monitoring kit   1 each, Does not apply, Daily, Use daily as directed.      levETIRAcetam 750 MG tablet  Commonly known as: KEPPRA   TAKE 1 TABLET BY MOUTH TWICE A DAY      metFORMIN  MG 24 hr tablet  Commonly known as: GLUCOPHAGE-XR   500 mg, Oral, Daily With Breakfast      metoprolol succinate XL 50 MG 24 hr tablet  Commonly known as: TOPROL-XL   TAKE 1 TABLET BY MOUTH EVERY DAY      pantoprazole 40 MG EC tablet  Commonly known as: PROTONIX   40 mg, Oral, 2 Times Daily      rosuvastatin 20 MG tablet  Commonly known as: CRESTOR   20 mg, Oral, Daily      Spiriva Respimat 2.5 MCG/ACT aerosol solution inhaler  Generic drug: tiotropium bromide monohydrate   2 puffs, Inhalation, Daily - RT             No Known Allergies      Discharge Disposition:  Rehab Facility or Unit (DC - External)    Diet:  Hospital:  Diet Order   Procedures   • Diet Regular; Cardiac, Consistent Carbohydrate        Activity:      Restrictions or Other Recommendations:         CODE STATUS:    Code Status and Medical Interventions:   Ordered at: 09/14/22 2225     Level Of Support Discussed With:    Patient     Code Status (Patient has no pulse and is not breathing):    CPR (Attempt to Resuscitate)     Medical Interventions (Patient has pulse or is breathing):    Full Support       Future Appointments   Date Time Provider Department Center   11/22/2022 11:30 AM Javad Mercedes MD [facility]   12/6/2022  1:00 PM Marguerite Moore PA-C [facility]   3/2/2023  2:15 PM Pari García APRN [facility]       Additional Instructions for the Follow-ups that You Need to Schedule     Discharge Follow-up with PCP   As directed       Currently Documented PCP:    Marguerite Moore PA-C    PCP Phone Number:    [phone]     Follow Up Details: in one week with PCP                     Lashawn Tobin MD  09/20/22      Time Spent on Discharge:  I spent  35 minutes on this discharge activity which included: face-to-face encounter with the patient, reviewing the data in the system, coordination of the care with the nursing staff as well as consultants, documentation, and entering orders.

## 2022-09-20 NOTE — THERAPY TREATMENT NOTE
Patient Name: Narendra Cochran  : 1940    MRN: [id number]                              Today's Date: 2022       Admit Date: 2022    Visit Dx:     ICD-10-CM ICD-9-CM   1. Acute on chronic congestive heart failure, unspecified heart failure type ([facility])  I50.9 428.0   2. Shortness of breath  R06.02 786.05   3. Tachypnea  R06.82 786.06   4. Elevated serum creatinine  R79.89 790.99     Patient Active Problem List   Diagnosis   • Essential hypertension   • Hyperlipidemia LDL goal <70   • Paget disease of bone   • H/O seizures on Keppra   • Gonalgia   • Osteitis deformans   • COPD (chronic obstructive pulmonary disease) ([facility])   • Syncope   • NSVT (nonsustained ventricular tachycardia) ([facility])   • Coronary artery disease involving native coronary artery of native heart with angina pectoris ([facility])   • NICM    • OHCA   • Hypokalemia   • Hypomagnesemia   • VHD; mild-mod AR, mild MR   • Chronic systolic congestive heart failure ([facility])   • Former tobacco user   • GERD   • Marijuana use   • Frequent PVCs   • Presence of biventricular implantable cardioverter-defibrillator (ICD)   • CKD (chronic kidney disease) stage 2, GFR 60-89 ml/min   • Acute kidney injury ([facility])   • Moderate malnutrition ([facility])   • Acute on chronic congestive heart failure, unspecified heart failure type ([facility])   • Paroxysmal A-fib ([facility])   • Type 2 diabetes mellitus ([facility])   • Thrombocytopenia ([facility])   • Acute pulmonary edema ([facility])   • Elevated brain natriuretic peptide (BNP) level     Past Medical History:   Diagnosis Date   • Arthritis    • CHF (congestive heart failure) ([facility])    • Diabetes mellitus ([facility])    • Diabetic foot ulcers ([facility])    • Diverticulitis    • Foot callus    • GERD (gastroesophageal reflux disease)    • Hammer toes, bilateral    • Hearing loss    • History of transfusion    • Hyperlipidemia    • Hypertension    • Hypertensive urgency, malignant 2017   • Paget disease of bone      Past Surgical History:   Procedure Laterality  Date   • APPENDECTOMY     • CARDIAC CATHETERIZATION N/A 3/18/2020    Procedure: Left Heart Cath;  Surgeon: Sonya Garibay MD;  Location:  [facility];  Service: Cardiology;  Laterality: N/A;  First availabel provider     • CARDIAC CATHETERIZATION N/A 3/15/2021    Procedure: LEFT HEART CATH;  Surgeon: Doc Sahni IV, MD;  Location:  [facility];  Service: Cardiovascular;  Laterality: N/A;   • CARDIAC CATHETERIZATION N/A 3/15/2021    Procedure: Coronary angiography;  Surgeon: Doc Sahni IV, MD;  Location:  [facility];  Service: Cardiovascular;  Laterality: N/A;   • CARDIAC ELECTROPHYSIOLOGY PROCEDURE N/A 3/16/2021    Procedure: ICD DC new;  Surgeon: Som Boyd DO;  Location:  [facility];  Service: Cardiology;  Laterality: N/A;   • COLON RESECTION      second to diverticulitis    • FOOT SURGERY Left       General Information     Row Name 09/20/22 1022          OT Time and Intention    Document Type therapy note (daily note)  -MR     Mode of Treatment occupational therapy  -MR     Row Name 09/20/22 1022          General Information    Patient Profile Reviewed yes  -MR     Existing Precautions/Restrictions fall;seizures  [place]; chronic B knee pain  -MR     Barriers to Rehab medically complex;previous functional deficit;hearing deficit  -MR     Row Name 09/20/22 1022          Cognition    Orientation Status (Cognition) oriented x 3  -MR     Row Name 09/20/22 1022          Safety Issues, Functional Mobility    Safety Issues Affecting Function (Mobility) safety precaution awareness;safety precautions follow-through/compliance;sequencing abilities  -MR     Impairments Affecting Function (Mobility) balance;coordination;endurance/activity tolerance;strength;postural/trunk control;pain  -MR           User Key  (r) = Recorded By, (t) = Taken By, (c) = Cosigned By    Initials Name Provider Type    Salma Lux OT Occupational  Therapist                 Mobility/ADL's     Row Name 09/20/22 1022          Bed Mobility    Comment, (Bed Mobility) Pt received UIC and left UIC.  -MR     Row Name 09/20/22 1022          Transfers    Transfers sit-stand transfer  -MR     Sit-Stand [place] (Transfers) contact guard;verbal cues  -MR     Stand-Sit [place] (Transfers) minimum assist (75% patient effort);verbal cues  -MR     Row Name 09/20/22 1022          Sit-Stand Transfer    Assistive Device (Sit-Stand Transfers) walker, front-wheeled  -MR     Row Name 09/20/22 1022          Stand-Sit Transfer    Assistive Device (Stand-Sit Transfers) walker, front-wheeled  -MR     Row Name 09/20/22 1022          Functional Mobility    Functional Mobility- Ind. Level contact guard assist;verbal cues required;nonverbal cues required (demo/gesture)  -MR     Functional Mobility- Device walker, front-wheeled  -MR     Functional Mobility-Distance (Feet) --  household distances w/in pt's room.  -MR     Row Name 09/20/22 1022          Activities of Daily Living    BADL Assessment/Intervention lower body dressing  -MR     Row Name 09/20/22 1022          Lower Body Dressing Assessment/Training    [place] Level (Lower Body Dressing) other (see comments);socks;maximum assist (25% patient effort)  -MR     Position (Lower Body Dressing) supported sitting  -MR           User Key  (r) = Recorded By, (t) = Taken By, (c) = Cosigned By    Initials Name Provider Type    Salma Lux OT Occupational Therapist               Obj/Interventions     Row Name 09/20/22 1024          Shoulder (Therapeutic Exercise)    Shoulder (Therapeutic Exercise) AROM (active range of motion)  -MR     Shoulder AROM (Therapeutic Exercise) bilateral;flexion;extension;10 repetitions;sitting  -MR     Row Name 09/20/22 1024          Elbow/Forearm (Therapeutic Exercise)    Elbow/Forearm (Therapeutic Exercise) AROM (active range of motion)  -MR     Elbow/Forearm AROM (Therapeutic Exercise)  bilateral;flexion;extension;10 repetitions;sitting  -MR     Row Name 09/20/22 1024          Motor Skills    Therapeutic Exercise other (see comments);shoulder;elbow/forearm  Pt tolerated BUE and BLE ther ex this date.BLE - LAQ, hip flexion  -MR     Row Name 09/20/22 1024          Balance    Balance Assessment sitting static balance;sitting dynamic balance;standing static balance;standing dynamic balance  -MR     Static Sitting Balance supervision  -MR     Dynamic Sitting Balance contact guard  -MR     Position, Sitting Balance unsupported;sitting in chair  -MR     Static Standing Balance contact guard  -MR     Dynamic Standing Balance minimal assist  -MR     Position/Device Used, Standing Balance supported;walker, rolling  -MR     Balance Interventions sitting;standing;sit to stand;supported;static;dynamic;occupation based/functional task  -MR           User Key  (r) = Recorded By, (t) = Taken By, (c) = Cosigned By    Initials Name Provider Type     Salma Ty, OT Occupational Therapist               Goals/Plan    No documentation.                Clinical Impression     Row Name 09/20/22 1026          Pain Assessment    Pretreatment Pain Rating 0/10 - no pain  -MR     Posttreatment Pain Rating 0/10 - no pain  -MR     Pre/Posttreatment Pain Comment Pt initially denied pain, reporting chronic baseline pain in B knees.  -MR     Pain Intervention(s) Ambulation/increased activity;Repositioned  -MR     Row Name 09/20/22 1026          Plan of Care Review    Plan of Care Reviewed With patient  -MR     Progress no change  -MR     Outcome Evaluation Pt tolerated household distances of functional mobility w/ CGA, occasional Jose Carlos for manipulating RW around obstacles. CGA for sit to stand transfers, Jose Carlos w/ stand to sit d/t pt failing to reach back and demo eccentric control. Good effort noted w/ BUE and BLE ther ex. Continue per current POC as able.  -MR     Row Name 09/20/22 1026          Vital Signs    Pre Systolic BP  Rehab --  VSS  -MR     O2 Delivery Pre Treatment room air  -MR     O2 Delivery Intra Treatment room air  -MR     O2 Delivery Post Treatment room air  -MR     Pre Patient Position Sitting  -MR     Intra Patient Position Standing  -MR     Post Patient Position Sitting  -MR     Row Name 09/20/22 1026          Positioning and Restraints    Pre-Treatment Position sitting in chair/recliner  -MR     Post Treatment Position chair  -MR     In Chair notified nsg;reclined;sitting;call light within reach;encouraged to call for assist;exit alarm on;legs elevated;heels elevated;waffle cushion  -MR           User Key  (r) = Recorded By, (t) = Taken By, (c) = Cosigned By    Initials Name Provider Type     Salma Ty OT Occupational Therapist               Outcome Measures     Row Name 09/20/22 1053          How much help from another is currently needed...    Putting on and taking off regular lower body clothing? 3  -MR     Bathing (including washing, rinsing, and drying) 3  -MR     Toileting (which includes using toilet bed pan or urinal) 2  -MR     Putting on and taking off regular upper body clothing 3  -MR     Taking care of personal grooming (such as brushing teeth) 3  -MR     Eating meals 3  -MR     AM-PAC 6 Clicks Score (OT) 17  -MR     Row Name 09/20/22 1053          Functional Assessment    Outcome Measure Options AM-PAC 6 Clicks Daily Activity (OT)  -MR           User Key  (r) = Recorded By, (t) = Taken By, (c) = Cosigned By    Initials Name Provider Type     Salma Ty OT Occupational Therapist                Occupational Therapy Education                 Title: PT OT SLP Therapies (In Progress)     Topic: Occupational Therapy (In Progress)     Point: ADL training (Done)     Description:   Instruct learner(s) on proper safety adaptation and remediation techniques during self care or transfers.   Instruct in proper use of assistive devices.              Learning Progress Summary           Patient Acceptance,  E, VU by  at 9/20/2022 1053    Acceptance, E, VU by  at 9/16/2022 0500    Acceptance, E, VU by  at 9/15/2022 1139    Comment: OT POC; discharge planning                   Point: Home exercise program (Not Started)     Description:   Instruct learner(s) on appropriate technique for monitoring, assisting and/or progressing therapeutic exercises/activities.              Learner Progress:  Not documented in this visit.          Point: Precautions (Done)     Description:   Instruct learner(s) on prescribed precautions during self-care and functional transfers.              Learning Progress Summary           Patient Acceptance, E, VU by MR at 9/20/2022 1053    Acceptance, E, VU by SRAVANTHI at 9/15/2022 1139    Comment: OT POC; discharge planning                   Point: Body mechanics (Done)     Description:   Instruct learner(s) on proper positioning and spine alignment during self-care, functional mobility activities and/or exercises.              Learning Progress Summary           Patient Acceptance, E, VU by  at 9/20/2022 1053    Acceptance, E, VU by  at 9/15/2022 1139    Comment: OT POC; discharge planning                               User Key     Initials Effective Dates Name Provider Type Discipline     06/16/21 -  Ashley Mcgarry RN Registered Nurse Nurse     06/16/21 -  Chantel Harris, OT Occupational Therapist OT     10/06/21 -  Salma Ty, OT Occupational Therapist OT              OT Recommendation and Plan     Plan of Care Review  Plan of Care Reviewed With: patient  Progress: no change  Outcome Evaluation: Pt tolerated household distances of functional mobility w/ CGA, occasional Jose Carlos for manipulating RW around obstacles. CGA for sit to stand transfers, Jose Carlos w/ stand to sit d/t pt failing to reach back and demo eccentric control. Good effort noted w/ BUE and BLE ther ex. Continue per current POC as able.     Time Calculation:    Time Calculation- OT     Row Name 09/20/22 1054              Time Calculation- OT    OT Start Time 0939  -MR      OT Received On 09/20/22  -MR      OT Goal Re-Cert Due Date 09/25/22  -MR              Timed Charges    80668 - OT Therapeutic Exercise Minutes 5  -MR      16631 - OT Therapeutic Activity Minutes 8  -MR      74951 - OT Self Care/Mgmt Minutes 3  -MR              Total Minutes    Timed Charges Total Minutes 16  -MR       Total Minutes 16  -MR            User Key  (r) = Recorded By, (t) = Taken By, (c) = Cosigned By    Initials Name Provider Type    Alejandro Lux OT Occupational Therapist              Therapy Charges for Today     Code Description Service Date Service Provider Modifiers Qty    [id number]  OT THERAPEUTIC ACT EA 15 MIN 9/20/2022 Alejandro Ty OT GO 1               ALEJANDRO TY OT  9/20/2022

## 2022-09-22 ENCOUNTER — APPOINTMENT (OUTPATIENT)
Dept: CT IMAGING | Facility: HOSPITAL | Age: 82
End: 2022-09-22

## 2022-09-22 ENCOUNTER — APPOINTMENT (OUTPATIENT)
Dept: GENERAL RADIOLOGY | Facility: HOSPITAL | Age: 82
End: 2022-09-22

## 2022-09-22 ENCOUNTER — HOSPITAL ENCOUNTER (EMERGENCY)
Facility: HOSPITAL | Age: 82
Discharge: HOME | End: 2022-09-22
Attending: EMERGENCY MEDICINE | Admitting: EMERGENCY MEDICINE

## 2022-09-22 VITALS
WEIGHT: 208 LBS | RESPIRATION RATE: 18 BRPM | HEART RATE: 60 BPM | SYSTOLIC BLOOD PRESSURE: 141 MMHG | TEMPERATURE: 98.2 F | OXYGEN SATURATION: 98 % | DIASTOLIC BLOOD PRESSURE: 89 MMHG | HEIGHT: 70 IN | BODY MASS INDEX: 29.78 KG/M2

## 2022-09-22 DIAGNOSIS — Z86.79 HISTORY OF CHF (CONGESTIVE HEART FAILURE): ICD-10-CM

## 2022-09-22 DIAGNOSIS — Z87.898 HISTORY OF SEIZURE: ICD-10-CM

## 2022-09-22 DIAGNOSIS — R56.9 SEIZURE-LIKE ACTIVITY: Primary | ICD-10-CM

## 2022-09-22 LAB
ALBUMIN SERPL-MCNC: 4 G/DL (ref 3.5–5.2)
ALBUMIN/GLOB SERPL: 1.2 G/DL
ALP SERPL-CCNC: 72 U/L (ref 39–117)
ALT SERPL W P-5'-P-CCNC: 15 U/L (ref 1–41)
ANION GAP SERPL CALCULATED.3IONS-SCNC: 10 MMOL/L (ref 5–15)
ANISOCYTOSIS BLD QL: NORMAL
AST SERPL-CCNC: 25 U/L (ref 1–40)
BASOPHILS # BLD AUTO: 0.02 10*3/MM3 (ref 0–0.2)
BASOPHILS NFR BLD AUTO: 0.7 % (ref 0–1.5)
BILIRUB SERPL-MCNC: 0.7 MG/DL (ref 0–1.2)
BILIRUB UR QL STRIP: NEGATIVE
BUN SERPL-MCNC: 15 MG/DL (ref 8–23)
BUN/CREAT SERPL: 14.2 (ref 7–25)
BURR CELLS BLD QL SMEAR: NORMAL
CALCIUM SPEC-SCNC: 9.7 MG/DL (ref 8.6–10.5)
CHLORIDE SERPL-SCNC: 103 MMOL/L (ref 98–107)
CLARITY UR: CLEAR
CO2 SERPL-SCNC: 29 MMOL/L (ref 22–29)
COLOR UR: YELLOW
CREAT SERPL-MCNC: 1.06 MG/DL (ref 0.76–1.27)
DEPRECATED RDW RBC AUTO: 60.6 FL (ref 37–54)
EGFRCR SERPLBLD CKD-EPI 2021: 69.6 ML/MIN/1.73
EOSINOPHIL # BLD AUTO: 0.08 10*3/MM3 (ref 0–0.4)
EOSINOPHIL NFR BLD AUTO: 2.9 % (ref 0.3–6.2)
ERYTHROCYTE [DISTWIDTH] IN BLOOD BY AUTOMATED COUNT: 20.4 % (ref 12.3–15.4)
GLOBULIN UR ELPH-MCNC: 3.4 GM/DL
GLUCOSE SERPL-MCNC: 105 MG/DL (ref 65–99)
GLUCOSE UR STRIP-MCNC: ABNORMAL MG/DL
HCT VFR BLD AUTO: 37.2 % (ref 37.5–51)
HGB BLD-MCNC: 11.2 G/DL (ref 13–17.7)
HGB UR QL STRIP.AUTO: NEGATIVE
IMM GRANULOCYTES # BLD AUTO: 0.01 10*3/MM3 (ref 0–0.05)
IMM GRANULOCYTES NFR BLD AUTO: 0.4 % (ref 0–0.5)
INR PPP: 1.03 (ref 0.84–1.13)
KETONES UR QL STRIP: NEGATIVE
LEUKOCYTE ESTERASE UR QL STRIP.AUTO: NEGATIVE
LYMPHOCYTES # BLD AUTO: 0.66 10*3/MM3 (ref 0.7–3.1)
LYMPHOCYTES NFR BLD AUTO: 24.1 % (ref 19.6–45.3)
MAGNESIUM SERPL-MCNC: 1.8 MG/DL (ref 1.6–2.4)
MCH RBC QN AUTO: 24.8 PG (ref 26.6–33)
MCHC RBC AUTO-ENTMCNC: 30.1 G/DL (ref 31.5–35.7)
MCV RBC AUTO: 82.5 FL (ref 79–97)
MONOCYTES # BLD AUTO: 0.25 10*3/MM3 (ref 0.1–0.9)
MONOCYTES NFR BLD AUTO: 9.1 % (ref 5–12)
NEUTROPHILS NFR BLD AUTO: 1.72 10*3/MM3 (ref 1.7–7)
NEUTROPHILS NFR BLD AUTO: 62.8 % (ref 42.7–76)
NITRITE UR QL STRIP: NEGATIVE
NRBC BLD AUTO-RTO: 0 /100 WBC (ref 0–0.2)
OVALOCYTES BLD QL SMEAR: NORMAL
PH UR STRIP.AUTO: 7 [PH] (ref 5–8)
PLAT MORPH BLD: NORMAL
PLATELET # BLD AUTO: 138 10*3/MM3 (ref 140–450)
POTASSIUM SERPL-SCNC: 3.5 MMOL/L (ref 3.5–5.2)
PROT SERPL-MCNC: 7.4 G/DL (ref 6–8.5)
PROT UR QL STRIP: NEGATIVE
PROTHROMBIN TIME: 13.4 SECONDS (ref 11.4–14.4)
RBC # BLD AUTO: 4.51 10*6/MM3 (ref 4.14–5.8)
SODIUM SERPL-SCNC: 142 MMOL/L (ref 136–145)
SP GR UR STRIP: 1.01 (ref 1–1.03)
UROBILINOGEN UR QL STRIP: ABNORMAL
WBC MORPH BLD: NORMAL
WBC NRBC COR # BLD: 2.74 10*3/MM3 (ref 3.4–10.8)

## 2022-09-22 PROCEDURE — 36415 COLL VENOUS BLD VENIPUNCTURE: CPT

## 2022-09-22 PROCEDURE — 81003 URINALYSIS AUTO W/O SCOPE: CPT | Performed by: EMERGENCY MEDICINE

## 2022-09-22 PROCEDURE — 71045 X-RAY EXAM CHEST 1 VIEW: CPT

## 2022-09-22 PROCEDURE — 85007 BL SMEAR W/DIFF WBC COUNT: CPT | Performed by: EMERGENCY MEDICINE

## 2022-09-22 PROCEDURE — 83735 ASSAY OF MAGNESIUM: CPT | Performed by: EMERGENCY MEDICINE

## 2022-09-22 PROCEDURE — 80053 COMPREHEN METABOLIC PANEL: CPT | Performed by: EMERGENCY MEDICINE

## 2022-09-22 PROCEDURE — 85610 PROTHROMBIN TIME: CPT | Performed by: EMERGENCY MEDICINE

## 2022-09-22 PROCEDURE — 70450 CT HEAD/BRAIN W/O DYE: CPT

## 2022-09-22 PROCEDURE — 99284 EMERGENCY DEPT VISIT MOD MDM: CPT

## 2022-09-22 PROCEDURE — 85025 COMPLETE CBC W/AUTO DIFF WBC: CPT | Performed by: EMERGENCY MEDICINE

## 2022-09-22 NOTE — ED PROVIDER NOTES
"Subjective   History of Present Illness  81-year-old male sent from [facility] to be evaluated for \"possible seizure.\"  Of note, the patient was recently admitted to our facility from September 14 through September 20 for CHF.  The patient has a known history of seizures.  He endorses compliance with his medications.  The patient reportedly had a mechanical trip and fall earlier today and may or may not have hit his head.  Questionable LOC.  He had 2 brief episodes of confusion this morning that staff at [facility] were concerned could potentially be related to a seizure.  As a result, EMS was called and the patient was brought to our facility to be evaluated.  Glucose normal.  The patient had no postictal state following either episode.  The patient currently feels well and has no complaints.  He denies any headache.  No neck pain.  No paresthesias.        Review of Systems   Neurological:        \"Possible seizure\"   All other systems reviewed and are negative.      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   • Paget disease of bone        No Known Allergies    Past Surgical History:   Procedure Laterality Date   • APPENDECTOMY     • CARDIAC CATHETERIZATION N/A 3/18/2020    Procedure: Left Heart Cath;  Surgeon: Sonya Garibay MD;  Location:  [facility];  Service: Cardiology;  Laterality: N/A;  First availabel provider     • CARDIAC CATHETERIZATION N/A 3/15/2021    Procedure: LEFT HEART CATH;  Surgeon: Doc Sahni IV, MD;  Location:  [facility];  Service: Cardiovascular;  Laterality: N/A;   • CARDIAC " CATHETERIZATION N/A 3/15/2021    Procedure: Coronary angiography;  Surgeon: Doc Sahni IV, MD;  Location:  [facility];  Service: Cardiovascular;  Laterality: N/A;   • CARDIAC ELECTROPHYSIOLOGY PROCEDURE N/A 3/16/2021    Procedure: ICD DC new;  Surgeon: Som Boyd DO;  Location:  [facility];  Service: Cardiology;  Laterality: N/A;   • COLON RESECTION      second to diverticulitis    • FOOT SURGERY Left        Family History   Problem Relation Age of Onset   • No Known Problems Daughter    • No Known Problems Son    • No Known Problems Son    • No Known Problems Son    • Diabetes Sister    • Clotting disorder Sister    • Hyperlipidemia Mother    • No Known Problems Sister    • No Known Problems Brother    • Fainting Brother        Social History     Socioeconomic History   • Marital status:    Tobacco Use   • Smoking status: Former Smoker     Packs/day: 0.50     Years: 65.00     Pack years: 32.50     Types: Cigars, Cigarettes     Quit date: 9/1/2019     Years since quitting: 3.0   • Smokeless tobacco: Never Used   Vaping Use   • Vaping Use: Never used   Substance and Sexual Activity   • Alcohol use: Yes     Comment: very rarely   • Drug use: Yes     Types: Marijuana     Comment: Pt states used weekly but now quitting    • Sexual activity: Defer           Objective   Physical Exam  Vitals and nursing note reviewed.   Constitutional:       General: He is not in acute distress.     Appearance: He is well-developed. He is not diaphoretic.      Comments: Nontoxic-appearing male   HENT:      Head: Normocephalic and atraumatic.      Comments: No tongue laceration noted  Eyes:      Pupils: Pupils are equal, round, and reactive to light.   Neck:      Vascular: No JVD.      Comments: No meningeal signs or nuchal rigidity  Cardiovascular:      Rate and Rhythm: Normal rate and regular rhythm.      Heart sounds: Normal heart sounds. No murmur heard.    No friction rub.  "No gallop.   Pulmonary:      Effort: Pulmonary effort is normal. No respiratory distress.      Breath sounds: Normal breath sounds. No wheezing or rales.   Abdominal:      General: Bowel sounds are normal. There is no distension.      Palpations: Abdomen is soft. There is no mass.      Tenderness: There is no abdominal tenderness. There is no guarding.   Musculoskeletal:         General: Normal range of motion.   Skin:     General: Skin is warm and dry.      Coloration: Skin is not pale.      Findings: No erythema or rash.   Neurological:      Mental Status: He is alert and oriented to person, place, and time.   Psychiatric:         Thought Content: Thought content normal.         Judgment: Judgment normal.         Procedures           ED Course  ED Course as of 09/22/22 1934   Thu Sep 22, 2022   1051 81-year-old male with multiple comorbidities presents for evaluation via EMS from [facility] to be evaluated for \"possible seizure\" today.  Of note, the patient had no postictal state after either episode.  On arrival to the ED, the patient is very well-appearing with unremarkable exam.  He has no complaints at this time.  He did not bite his tongue.  No focal neurological deficits noted.  No urinary incontinence.  Glucose normal per EMS.  We will obtain labs, EKG, and imaging, and we will reassess following initial interventions.  Of note, the patient does have a history of seizures and endorses compliance with his Keppra. [DD]   1141 I personally viewed the patient's x-ray images myself, and I am in agreement with the radiologist's reading for final interpretation.     [DD]   1248 Labs are bland.  Imaging is unrevealing.  The patient was observed in the emergency department for more than 2 hours with no seizure-like activity.  I feel that he is stable for discharge at this point.  He will follow-up with his primary care physician within the next week.  Agreeable with plan and given appropriate strict return " precautions. [DD]   1326 No events noted on device interrogation. [DD]      ED Course User Index  [DD] Hernan Chavez MD                                       Recent Results (from the past 24 hour(s))   Comprehensive Metabolic Panel    Collection Time: 09/22/22 11:58 AM    Specimen: Blood   Result Value Ref Range    Glucose 105 (H) 65 - 99 mg/dL    BUN 15 8 - 23 mg/dL    Creatinine 1.06 0.76 - 1.27 mg/dL    Sodium 142 136 - 145 mmol/L    Potassium 3.5 3.5 - 5.2 mmol/L    Chloride 103 98 - 107 mmol/L    CO2 29.0 22.0 - 29.0 mmol/L    Calcium 9.7 8.6 - 10.5 mg/dL    Total Protein 7.4 6.0 - 8.5 g/dL    Albumin 4.00 3.50 - 5.20 g/dL    ALT (SGPT) 15 1 - 41 U/L    AST (SGOT) 25 1 - 40 U/L    Alkaline Phosphatase 72 39 - 117 U/L    Total Bilirubin 0.7 0.0 - 1.2 mg/dL    Globulin 3.4 gm/dL    A/G Ratio 1.2 g/dL    BUN/Creatinine Ratio 14.2 7.0 - 25.0    Anion Gap 10.0 5.0 - 15.0 mmol/L    eGFR 69.6 >60.0 mL/min/1.73   Protime-INR    Collection Time: 09/22/22 11:58 AM    Specimen: Blood   Result Value Ref Range    Protime 13.4 11.4 - 14.4 Seconds    INR 1.03 0.84 - 1.13   Urinalysis With Culture If Indicated - Urine, Clean Catch    Collection Time: 09/22/22 11:58 AM    Specimen: Urine, Clean Catch   Result Value Ref Range    Color, UA Yellow Yellow, Straw    Appearance, UA Clear Clear    pH, UA 7.0 5.0 - 8.0    Specific Gravity, UA 1.011 1.001 - 1.030    Glucose, UA >=1000 mg/dL (3+) (A) Negative    Ketones, UA Negative Negative    Bilirubin, UA Negative Negative    Blood, UA Negative Negative    Protein, UA Negative Negative    Leuk Esterase, UA Negative Negative    Nitrite, UA Negative Negative    Urobilinogen, UA 1.0 E.U./dL 0.2 - 1.0 E.U./dL   CBC Auto Differential    Collection Time: 09/22/22 11:58 AM    Specimen: Blood   Result Value Ref Range    WBC 2.74 (L) 3.40 - 10.80 10*3/mm3    RBC 4.51 4.14 - 5.80 10*6/mm3    Hemoglobin 11.2 (L) 13.0 - 17.7 g/dL    Hematocrit 37.2 (L) 37.5 - 51.0 %    MCV 82.5 79.0 - 97.0  fL    MCH 24.8 (L) 26.6 - 33.0 pg    MCHC 30.1 (L) 31.5 - 35.7 g/dL    RDW 20.4 (H) 12.3 - 15.4 %    RDW-SD 60.6 (H) 37.0 - 54.0 fl    Platelets 138 (L) 140 - 450 10*3/mm3    Neutrophil % 62.8 42.7 - 76.0 %    Lymphocyte % 24.1 19.6 - 45.3 %    Monocyte % 9.1 5.0 - 12.0 %    Eosinophil % 2.9 0.3 - 6.2 %    Basophil % 0.7 0.0 - 1.5 %    Immature Grans % 0.4 0.0 - 0.5 %    Neutrophils, Absolute 1.72 1.70 - 7.00 10*3/mm3    Lymphocytes, Absolute 0.66 (L) 0.70 - 3.10 10*3/mm3    Monocytes, Absolute 0.25 0.10 - 0.90 10*3/mm3    Eosinophils, Absolute 0.08 0.00 - 0.40 10*3/mm3    Basophils, Absolute 0.02 0.00 - 0.20 10*3/mm3    Immature Grans, Absolute 0.01 0.00 - 0.05 10*3/mm3    nRBC 0.0 0.0 - 0.2 /100 WBC   Magnesium    Collection Time: 09/22/22 11:58 AM    Specimen: Blood   Result Value Ref Range    Magnesium 1.8 1.6 - 2.4 mg/dL   Scan Slide    Collection Time: 09/22/22 11:58 AM    Specimen: Blood   Result Value Ref Range    Anisocytosis Mod/2+ None Seen    Crenated RBC's Large/3+ None Seen    Ovalocytes Mod/2+ None Seen    WBC Morphology Normal Normal    Platelet Morphology Normal Normal     Note: In addition to lab results from this visit, the labs listed above may include labs taken at another facility or during a different encounter within the last 24 hours. Please correlate lab times with ED admission and discharge times for further clarification of the services performed during this visit.    CT Head Without Contrast   Final Result   No acute intracranial traumatic injury.       This report was finalized on 9/22/2022 12:41 PM by Johnathan Arreola.          XR Chest 1 View   Final Result       1. Cardiomegaly and mild pulmonary venous hypertension.   2. Patchy bibasilar interstitial changes favored to be chronic. Minimal   interstitial edema superimposed on COPD might appear similar.       This report was finalized on 9/22/2022 11:35 AM by Dr. Edin Clark MD.            Vitals:    09/22/22 1048 09/22/22 1330  "09/22/22 1415   BP: 148/98 141/89    Pulse: 92 63 60   Resp: 18 18    Temp: 98.2 °F (36.8 °C)     TempSrc: Oral     SpO2: 98% 100% 98%   Weight: 94.3 kg (208 lb)     Height: 177.8 cm (70\")       Medications - No data to display  ECG/EMG Results (last 24 hours)     ** No results found for the last 24 hours. **        No orders to display              MDM    Final diagnoses:   Seizure-like activity (HCC)   History of seizure   History of CHF (congestive heart failure)       ED Disposition  ED Disposition     ED Disposition   Discharge    Condition   Stable    Comment   --             Marguerite Moore PAWarrenC  [address] [address]  [phone]    In 1 week      [facility]  [address] [address]  [phone]             Medication List      Changed    potassium chloride ER 20 MEQ tablet controlled-release ER tablet  Commonly known as: K-TAB  Take 1 tablet by mouth Every Other Day.  What changed: additional instructions             Hernan Chavez MD  09/22/22 1936    "

## 2022-09-22 NOTE — CASE MANAGEMENT/SOCIAL WORK
Continued Stay Note  [facility]     Patient Name: Narendra Cochran  MRN: [id number]  Today's Date: 9/22/2022    Admit Date: 9/22/2022    Plan: Transport   Discharge Plan     Row Name 09/22/22 1358       Plan    Plan Transport    Plan Comments RN requested transportation assistance for Pt to be transported back to [facility]. MSW contact  EMS.  EMS had opening for 2:15PM. MSW completed PCS form and notified RN of arrival time.    Final Discharge Disposition Code 03-Skilled Nursing Facility (SNF)               Discharge Codes    No documentation.                     RACHELLE Quintero

## 2022-09-23 ENCOUNTER — TELEPHONE (OUTPATIENT)
Dept: CARDIOLOGY | Facility: CLINIC | Age: 82
End: 2022-09-23

## 2022-09-23 DIAGNOSIS — I50.22 CHRONIC SYSTOLIC CONGESTIVE HEART FAILURE: Primary | ICD-10-CM

## 2022-09-23 RX ORDER — FUROSEMIDE 40 MG/1
TABLET ORAL
Start: 2022-09-23 | End: 2022-09-28 | Stop reason: SDUPTHER

## 2022-09-23 RX ORDER — POTASSIUM CHLORIDE 1500 MG/1
20 TABLET, FILM COATED, EXTENDED RELEASE ORAL DAILY
Qty: 60 TABLET
Start: 2022-09-23

## 2022-09-23 NOTE — TELEPHONE ENCOUNTER
Per [facility] cardiac device transmission received today, 9/23/2022:     HeartLogic Index elevated @ 56 with a corresponding decrease in thoracic impedance   -see below.     Pt recently admitted to [facility] 9/14-9/20/2022 & discharged to [facility] rehab. Pt seen in [facility] ER last evening-see EMR.     I reviewed HeartLogic data w/LEANDRO Brown. Ms. García will call [facility] to further assess patient

## 2022-09-23 NOTE — TELEPHONE ENCOUNTER
Called [facility] rehab facility and spoke to nurse Neri who is taking care of Mr Dimas.      He reports that patient has been stable without shortness of breath or worsening edema.    Reviewed heart logic heart failure index report and recommended that Lasix be adjusted.    Currently he is taking Lasix 40 mg daily.  I recommend that he continue Lasix 40 mg daily, alternating with 40 mg twice daily every other day.  Continue potassium chloride 20 mEq daily.    Repeat BMP in 1 week.    Neri stated he could not take a verbal order from me, but he would review plan of care with [facility] Red nurse practitioner on-call.  Encouraged Ms. Red to call with any questions or concerns.    I have shared with Neri that we will review patient's heart logic index next week, and call to follow-up with patient.

## 2022-09-27 NOTE — TELEPHONE ENCOUNTER
I spoke w/pt's spouse & asked her to send in a manual transmission from pt's [facility] remote monitor when she visits him today to check his fluid status. I instructed Mrs. Cochran to call device clinic for assistance when she is with the patient; Mrs. Cochran verbalized understanding & compliance.

## 2022-09-28 ENCOUNTER — TELEPHONE (OUTPATIENT)
Dept: CARDIOLOGY | Facility: HOSPITAL | Age: 82
End: 2022-09-28

## 2022-09-28 DIAGNOSIS — I50.22 CHRONIC SYSTOLIC CONGESTIVE HEART FAILURE: Primary | ICD-10-CM

## 2022-09-28 RX ORDER — FUROSEMIDE 40 MG/1
60 TABLET ORAL DAILY
Start: 2022-09-28 | End: 2022-10-25 | Stop reason: SDUPTHER

## 2022-09-28 NOTE — TELEPHONE ENCOUNTER
----- Message from LEANDRO Brown sent at 9/28/2022 10:48 AM EDT -----  Can you call [facility] rehab and f/u with on patient.      Last week his HF index on his PPM was elevated.  I requested they increase his Lasix 40 mg daily alternating 40 mg BID every other day.  Can you see if he is doing ok on that dose and if that med change was made.  Any worsening edema, dyspnea, weight gain, ect.    Check the dosing of KCL    I requested a repeat BMP this week.  See if completed and get a copy.    Also f/u with Erin Hightower in [facility] Device clinic to see if she has a repeat HF index this week for us to review.      Does the  patient have a plan to d/c?  ----- Message -----  From: Pari García APRN  Sent: 9/28/2022  12:00 AM EDT  To: LEANDRO Brown    F/u with heart failure index and call [facility] to see if he is doing better.  A BMP was order to be repeated.

## 2022-09-28 NOTE — TELEPHONE ENCOUNTER
BMP drawn 9/26/2022 received today. Creatinine Level 1.00, Sodium .0, Potassium HSL 3.7, Chloride .0, Carbon Dioxide HSL 26.0, Anion Gap HSL 10, Glucose 95.0, BUN HSL 16, Creatinine HSL 1.0, eGGFR-AA HSL 63  Calcium Total HSL 16. Results forwarded to Pari for review.

## 2022-09-28 NOTE — TELEPHONE ENCOUNTER
Spoke to the [facility] rehab nurse who reports that currently patient is on Lasix 40mg daily. Patient has been doing well and has not experienced any edema, dyspnea, or weight gain. He is currently taking 20 meQ daily of Potassium. Labs were drawn on 9/26 and copy requested to be faxed.     Spoke with Ragini Hightower in [facility] and she has not been able to repeat a HF index as of yet. She states that she spoke to the wife and told her that once she went to visit her  to call so that they could do a reading. She states that she has not seen anything recent come through but has addenum the note on the 23rd.     Message was left for the  at [facility] for information regarding d/c.

## 2022-09-28 NOTE — TELEPHONE ENCOUNTER
I spoke with Mrs. Cochran who reports that yesterday she informed the nursing staff at [facility] to call the device clinic to perform a manual transmission. Mrs. Cochran states she will not be at [facility] today.     I called [facility] & spoke with pt's nurse Melina Adams-assisted her with [employer] remote manual transmission.     Melina Adams reports that patient is receiving Lasix 40 mg daily, NO dosage adjustment from the 9/23/2022 note was implemented.  Ms. Adams states she will be with patient until 7PM this evening and the APRN is on site now.     HeartLogic Index is trending down, thoracic impedance is trending up but both values remain abnormal-see below:

## 2022-09-28 NOTE — TELEPHONE ENCOUNTER
Spoke to the nurse at [facility] and suggested new dose of Lasix per Pari's recommendation. Patient will make follow-up in 2 weeks.

## 2022-09-28 NOTE — TELEPHONE ENCOUNTER
His HF index is slowly improving, but still elevarted.     I would like to increase Lasix 60 mg daily, Continue KCL 20 neq daily    BMP next week.    Schedule [facility]u [facility] weeks.

## 2022-10-04 ENCOUNTER — APPOINTMENT (OUTPATIENT)
Dept: CT IMAGING | Facility: HOSPITAL | Age: 82
End: 2022-10-04

## 2022-10-04 ENCOUNTER — HOSPITAL ENCOUNTER (EMERGENCY)
Facility: HOSPITAL | Age: 82
Discharge: HOME | End: 2022-10-04
Attending: EMERGENCY MEDICINE | Admitting: EMERGENCY MEDICINE

## 2022-10-04 VITALS
SYSTOLIC BLOOD PRESSURE: 114 MMHG | HEART RATE: 59 BPM | WEIGHT: 208 LBS | OXYGEN SATURATION: 100 % | HEIGHT: 70 IN | RESPIRATION RATE: 16 BRPM | DIASTOLIC BLOOD PRESSURE: 64 MMHG | TEMPERATURE: 98.5 F | BODY MASS INDEX: 29.78 KG/M2

## 2022-10-04 DIAGNOSIS — R53.1 GENERALIZED WEAKNESS: ICD-10-CM

## 2022-10-04 DIAGNOSIS — G40.919 BREAKTHROUGH SEIZURE: Primary | ICD-10-CM

## 2022-10-04 LAB
ALBUMIN SERPL-MCNC: 4.5 G/DL (ref 3.5–5.2)
ALBUMIN/GLOB SERPL: 1.3 G/DL
ALP SERPL-CCNC: 70 U/L (ref 39–117)
ALT SERPL W P-5'-P-CCNC: 10 U/L (ref 1–41)
ANION GAP SERPL CALCULATED.3IONS-SCNC: 11 MMOL/L (ref 5–15)
AST SERPL-CCNC: 22 U/L (ref 1–40)
BASOPHILS # BLD AUTO: 0.01 10*3/MM3 (ref 0–0.2)
BASOPHILS NFR BLD AUTO: 0.4 % (ref 0–1.5)
BILIRUB SERPL-MCNC: 0.5 MG/DL (ref 0–1.2)
BILIRUB UR QL STRIP: NEGATIVE
BUN SERPL-MCNC: 32 MG/DL (ref 8–23)
BUN/CREAT SERPL: 25.6 (ref 7–25)
CALCIUM SPEC-SCNC: 10.1 MG/DL (ref 8.6–10.5)
CHLORIDE SERPL-SCNC: 104 MMOL/L (ref 98–107)
CLARITY UR: CLEAR
CO2 SERPL-SCNC: 27 MMOL/L (ref 22–29)
COLOR UR: YELLOW
CREAT SERPL-MCNC: 1.25 MG/DL (ref 0.76–1.27)
DEPRECATED RDW RBC AUTO: 59.8 FL (ref 37–54)
EGFRCR SERPLBLD CKD-EPI 2021: 57.1 ML/MIN/1.73
EOSINOPHIL # BLD AUTO: 0.05 10*3/MM3 (ref 0–0.4)
EOSINOPHIL NFR BLD AUTO: 1.9 % (ref 0.3–6.2)
ERYTHROCYTE [DISTWIDTH] IN BLOOD BY AUTOMATED COUNT: 19.6 % (ref 12.3–15.4)
GLOBULIN UR ELPH-MCNC: 3.6 GM/DL
GLUCOSE SERPL-MCNC: 125 MG/DL (ref 65–99)
GLUCOSE UR STRIP-MCNC: ABNORMAL MG/DL
HCT VFR BLD AUTO: 39.6 % (ref 37.5–51)
HGB BLD-MCNC: 12.2 G/DL (ref 13–17.7)
HGB UR QL STRIP.AUTO: NEGATIVE
HOLD SPECIMEN: NORMAL
IMM GRANULOCYTES # BLD AUTO: 0 10*3/MM3 (ref 0–0.05)
IMM GRANULOCYTES NFR BLD AUTO: 0 % (ref 0–0.5)
KETONES UR QL STRIP: NEGATIVE
LEUKOCYTE ESTERASE UR QL STRIP.AUTO: NEGATIVE
LYMPHOCYTES # BLD AUTO: 0.75 10*3/MM3 (ref 0.7–3.1)
LYMPHOCYTES NFR BLD AUTO: 29.1 % (ref 19.6–45.3)
MCH RBC QN AUTO: 25.8 PG (ref 26.6–33)
MCHC RBC AUTO-ENTMCNC: 30.8 G/DL (ref 31.5–35.7)
MCV RBC AUTO: 83.7 FL (ref 79–97)
MONOCYTES # BLD AUTO: 0.28 10*3/MM3 (ref 0.1–0.9)
MONOCYTES NFR BLD AUTO: 10.9 % (ref 5–12)
NEUTROPHILS NFR BLD AUTO: 1.49 10*3/MM3 (ref 1.7–7)
NEUTROPHILS NFR BLD AUTO: 57.7 % (ref 42.7–76)
NITRITE UR QL STRIP: NEGATIVE
NRBC BLD AUTO-RTO: 0 /100 WBC (ref 0–0.2)
PH UR STRIP.AUTO: 7.5 [PH] (ref 5–8)
PLATELET # BLD AUTO: 124 10*3/MM3 (ref 140–450)
PMV BLD AUTO: ABNORMAL FL
POTASSIUM SERPL-SCNC: 4.4 MMOL/L (ref 3.5–5.2)
PROT SERPL-MCNC: 8.1 G/DL (ref 6–8.5)
PROT UR QL STRIP: NEGATIVE
RBC # BLD AUTO: 4.73 10*6/MM3 (ref 4.14–5.8)
SODIUM SERPL-SCNC: 142 MMOL/L (ref 136–145)
SP GR UR STRIP: 1.01 (ref 1–1.03)
UROBILINOGEN UR QL STRIP: ABNORMAL
WBC NRBC COR # BLD: 2.58 10*3/MM3 (ref 3.4–10.8)
WHOLE BLOOD HOLD COAG: NORMAL
WHOLE BLOOD HOLD SPECIMEN: NORMAL

## 2022-10-04 PROCEDURE — 85025 COMPLETE CBC W/AUTO DIFF WBC: CPT | Performed by: EMERGENCY MEDICINE

## 2022-10-04 PROCEDURE — 93005 ELECTROCARDIOGRAM TRACING: CPT | Performed by: EMERGENCY MEDICINE

## 2022-10-04 PROCEDURE — 70450 CT HEAD/BRAIN W/O DYE: CPT

## 2022-10-04 PROCEDURE — 99284 EMERGENCY DEPT VISIT MOD MDM: CPT

## 2022-10-04 PROCEDURE — 81003 URINALYSIS AUTO W/O SCOPE: CPT | Performed by: EMERGENCY MEDICINE

## 2022-10-04 PROCEDURE — 80177 DRUG SCRN QUAN LEVETIRACETAM: CPT | Performed by: EMERGENCY MEDICINE

## 2022-10-04 PROCEDURE — 93005 ELECTROCARDIOGRAM TRACING: CPT

## 2022-10-04 PROCEDURE — 80053 COMPREHEN METABOLIC PANEL: CPT | Performed by: EMERGENCY MEDICINE

## 2022-10-04 PROCEDURE — 36415 COLL VENOUS BLD VENIPUNCTURE: CPT

## 2022-10-04 RX ORDER — SODIUM CHLORIDE 0.9 % (FLUSH) 0.9 %
10 SYRINGE (ML) INJECTION AS NEEDED
Status: DISCONTINUED | OUTPATIENT
Start: 2022-10-04 | End: 2022-10-04 | Stop reason: HOSPADM

## 2022-10-04 RX ORDER — LEVETIRACETAM 500 MG/1
1000 TABLET ORAL ONCE
Status: COMPLETED | OUTPATIENT
Start: 2022-10-04 | End: 2022-10-04

## 2022-10-04 RX ORDER — LEVETIRACETAM 10 MG/ML
1000 INJECTION INTRAVASCULAR ONCE
Status: DISCONTINUED | OUTPATIENT
Start: 2022-10-04 | End: 2022-10-04

## 2022-10-04 RX ADMIN — LEVETIRACETAM 1000 MG: 500 TABLET, FILM COATED ORAL at 18:17

## 2022-10-04 NOTE — ED PROVIDER NOTES
[place]    EMERGENCY DEPARTMENT ENCOUNTER      Pt Name: Narendra Cochran  MRN: [id number]  YOB: 1940  Date of evaluation: 10/4/2022  Provider: Omero Beltran DO    CHIEF COMPLAINT       Chief Complaint   Patient presents with   • Seizures         HISTORY OF PRESENT ILLNESS  (Location/Symptom, Timing/Onset, Context/Setting, Quality, Duration, Modifying Factors, Severity.)   Narendra Cochran is a 81 y.o. male [facility] to a concern for seizure-like activity.  The patient was going through his therapies when he had an episode described as tonic-clonic seizure activity while he was being wheeled back to his room after his therapy.  The patient has a history of seizure activity, states he been taking his Keppra he believes on a daily basis.  The patient states he had a very mild aura, states he came back to his normal baseline pretty quickly.  He denies any history of recurrent uncontrolled seizure activity.  He does follow with a neurology specialist.  He denies any headache, no vision changes, denies any unilateral weakness, numbness or tingling.  He does endorse generalized weakness and overall just is at the rehab facility secondary to chronic deconditioning.  The patient denies any fever or chills, no chest pain or palpitations, denies any other acute systemic complaints at this time.  No nausea, vomiting, diarrhea.  He states now he feels normal, returned back to his normal baseline.      Nursing notes were reviewed.    REVIEW OF SYSTEMS    (2-9 systems for level 4, 10 or more for level 5)   ROS:  General:  No fevers, no chills, no weakness  Cardiovascular:  No chest pain, no palpitations  Respiratory:  No shortness of breath, no cough, no wheezing  Gastrointestinal:  No pain, no nausea, no vomiting, no diarrhea  Musculoskeletal:  No muscle pain, no joint pain  Skin:  No rash  Neurologic:  No speech problems, no headache, no extremity  numbness, no extremity tingling, no extremity weakness  Psychiatric:  No anxiety  Genitourinary:  No dysuria, no hematuria    Except as noted above the remainder of the review of systems was reviewed and negative.       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   • Paget disease of bone          SURGICAL HISTORY       Past Surgical History:   Procedure Laterality Date   • APPENDECTOMY     • CARDIAC CATHETERIZATION N/A 3/18/2020    Procedure: Left Heart Cath;  Surgeon: Sonya Garibay MD;  Location:  [facility];  Service: Cardiology;  Laterality: N/A;  First availabel provider     • CARDIAC CATHETERIZATION N/A 3/15/2021    Procedure: LEFT HEART CATH;  Surgeon: Doc Sahni IV, MD;  Location:  [facility];  Service: Cardiovascular;  Laterality: N/A;   • CARDIAC CATHETERIZATION N/A 3/15/2021    Procedure: Coronary angiography;  Surgeon: Doc Sahni IV, MD;  Location:  [facility];  Service: Cardiovascular;  Laterality: N/A;   • CARDIAC ELECTROPHYSIOLOGY PROCEDURE N/A 3/16/2021    Procedure: ICD DC new;  Surgeon: Som Boyd DO;  Location:  [facility];  Service: Cardiology;  Laterality: N/A;   • COLON RESECTION      second to diverticulitis    • FOOT SURGERY Left          CURRENT MEDICATIONS       Current Facility-Administered Medications:   •  levETIRAcetam (KEPPRA) tablet 1,000 mg, 1,000 mg, Oral, Once, Omero Beltran DO  •  sodium chloride 0.9 % flush 10 mL, 10 mL, Intravenous, PRN, Omero Beltran DO  •  [COMPLETED] Insert peripheral  IV, , , Once **AND** sodium chloride 0.9 % flush 10 mL, 10 mL, Intravenous, PRN, Omero Beltran, DO    Current Outpatient Medications:   •  albuterol sulfate  (90 Base) MCG/ACT inhaler, Inhale 2 puffs Every 4 (Four) Hours As Needed for Wheezing or Shortness of Air., Disp: 8 g, Rfl: 3  •  aspirin 81 MG chewable tablet, Chew 1 tablet Daily., Disp: , Rfl:   •  diclofenac (VOLTAREN) 1 % gel gel, Apply 4 g topically to the appropriate area as directed 4 (Four) Times a Day As Needed (prn for pain)., Disp: 60 tube, Rfl: 0  •  empagliflozin (JARDIANCE) 10 MG tablet tablet, Take 1 tablet by mouth Daily., Disp: 30 tablet, Rfl:   •  furosemide (LASIX) 40 MG tablet, Take 1.5 tablets by mouth Daily., Disp: , Rfl:   •  glucose blood test strip, 1 each by Other route Daily. Use daily as directed, Disp: 100 each, Rfl: 12  •  glucose monitor monitoring kit, 1 each Daily. Use daily as directed., Disp: 1 each, Rfl: 0  •  levETIRAcetam (KEPPRA) 750 MG tablet, TAKE 1 TABLET BY MOUTH TWICE A DAY, Disp: 60 tablet, Rfl: 5  •  metFORMIN ER (GLUCOPHAGE-XR) 500 MG 24 hr tablet, Take 1 tablet by mouth Daily With Breakfast., Disp: 90 tablet, Rfl: 3  •  metoprolol succinate XL (TOPROL-XL) 50 MG 24 hr tablet, TAKE 1 TABLET BY MOUTH EVERY DAY, Disp: 90 tablet, Rfl: 2  •  pantoprazole (PROTONIX) 40 MG EC tablet, Take 1 tablet by mouth 2 (Two) Times a Day., Disp: 180 tablet, Rfl: 1  •  potassium chloride ER (K-TAB) 20 MEQ tablet controlled-release ER tablet, Take 1 tablet by mouth Daily., Disp: 60 tablet, Rfl:   •  rosuvastatin (CRESTOR) 20 MG tablet, Take 1 tablet by mouth Daily., Disp: 90 tablet, Rfl: 3  •  sacubitril-valsartan (Entresto) 24-26 MG tablet, Take 1 tablet by mouth 2 (Two) Times a Day., Disp: 60 tablet, Rfl: 6  •  tiotropium bromide monohydrate (Spiriva Respimat) 2.5 MCG/ACT aerosol solution inhaler, Inhale 2 puffs Daily., Disp: 4 g, Rfl: 5    ALLERGIES     Patient has no known allergies.    FAMILY HISTORY       Family  "History   Problem Relation Age of Onset   • No Known Problems Daughter    • No Known Problems Son    • No Known Problems Son    • No Known Problems Son    • Diabetes Sister    • Clotting disorder Sister    • Hyperlipidemia Mother    • No Known Problems Sister    • No Known Problems Brother    • Fainting Brother           SOCIAL HISTORY       Social History     Socioeconomic History   • Marital status:    Tobacco Use   • Smoking status: Former Smoker     Packs/day: 0.50     Years: 65.00     Pack years: 32.50     Types: Cigars, Cigarettes     Quit date: 9/1/2019     Years since quitting: 3.0   • Smokeless tobacco: Never Used   Vaping Use   • Vaping Use: Never used   Substance and Sexual Activity   • Alcohol use: Yes     Comment: very rarely   • Drug use: Yes     Types: Marijuana     Comment: Pt states used weekly but now quitting    • Sexual activity: Defer         PHYSICAL EXAM    (up to 7 for level 4, 8 or more for level 5)     Vitals:    10/04/22 1545 10/04/22 1730   BP: 116/58 99/58   BP Location: Right arm    Patient Position: Lying    Pulse: 61 60   Resp: 16    Temp: 98.5 °F (36.9 °C)    TempSrc: Oral    SpO2: 100% 100%   Weight: 94.3 kg (208 lb)    Height: 177.8 cm (70\")        Physical Exam  General : Patient is awake, alert, oriented, in no acute distress, nontoxic appearing, answers questions properly, GCS 15  HEENT: Pupils are equally round, EOMI, conjunctivae clear, there is no injection no icterus.  Oral mucosa is moist, uvula midline  Neck: Neck is supple, full range of motion, trachea midline  Cardiac: Heart regular rate, rhythm, no murmurs, rubs, or gallops  Lungs: Lungs are clear to auscultation, there is no wheezing, rhonchi, or rales. There is no use of accessory muscles  Chest wall: There is no tenderness to palpation over the chest wall or over ribs  Abdomen: Abdomen is soft, nontender, nondistended. There are no firm or pulsatile masses, no rebound rigidity or guarding  Musculoskeletal: 5 " out of 5 strength in all 4 extremities.  No focal muscle deficits are appreciated  Neuro: No acute neurological deficits appreciated, 5 out of 5 strength bilateral upper extremities, 4-5 strength bilateral lower extremities, no unilateral weakness, normal finger-to-nose, no pronator drift.  No dysdiadochokinesia.  Motor intact, sensory intact, level of consciousness is normal, cerebellar function is normal  Dermatology: Skin is warm and dry  Psych: Mentation is grossly normal, cognition is grossly normal. Affect is appropriate      DIAGNOSTIC RESULTS     EKG: All EKGs are interpreted by the Emergency Department Physician who either signs or Co-signs this chart in the absence of a cardiologist.    ECG 12 Lead   Preliminary Result   Test Reason : ABNORMAL RHYTHM   Blood Pressure :   */*   mmHG   Vent. Rate :  61 BPM     Atrial Rate :  61 BPM      P-R Int : 252 ms          QRS Dur : 174 ms       QT Int : 504 ms       P-R-T Axes :  39 -30 177 degrees      QTc Int : 507 ms      Sinus rhythm with 1st degree AV block   Left axis deviation   Left bundle branch block   Abnormal ECG   When compared with ECG of 14-SEP-2022 17:21,   premature ventricular complexes are no longer present   MI interval has increased   Inverted T waves have replaced nonspecific T wave abnormality in Inferior    leads      Referred By: EDMD           Confirmed By:           RADIOLOGY:   Non-plain film images such as CT, Ultrasound and MRI are read by the radiologist. Plain radiographic images are visualized and preliminarily interpreted by the emergency physician with the below findings:      [] Radiologist's Report Reviewed:  CT Head Without Contrast   Final Result       1. No acute findings.   2. Volume loss secondary to age-appropriate atrophy.   3. Chronic ischemic changes.       This report was finalized on 10/4/2022 5:31 PM by Rafiq Linares MD.                ED BEDSIDE ULTRASOUND:   Performed by ED Physician - none    LABS:    I have reviewed  and interpreted all of the currently available lab results from this visit (if applicable):  Results for orders placed or performed during the hospital encounter of 10/04/22   Comprehensive Metabolic Panel    Specimen: Blood   Result Value Ref Range    Glucose 125 (H) 65 - 99 mg/dL    BUN 32 (H) 8 - 23 mg/dL    Creatinine 1.25 0.76 - 1.27 mg/dL    Sodium 142 136 - 145 mmol/L    Potassium 4.4 3.5 - 5.2 mmol/L    Chloride 104 98 - 107 mmol/L    CO2 27.0 22.0 - 29.0 mmol/L    Calcium 10.1 8.6 - 10.5 mg/dL    Total Protein 8.1 6.0 - 8.5 g/dL    Albumin 4.50 3.50 - 5.20 g/dL    ALT (SGPT) 10 1 - 41 U/L    AST (SGOT) 22 1 - 40 U/L    Alkaline Phosphatase 70 39 - 117 U/L    Total Bilirubin 0.5 0.0 - 1.2 mg/dL    Globulin 3.6 gm/dL    A/G Ratio 1.3 g/dL    BUN/Creatinine Ratio 25.6 (H) 7.0 - 25.0    Anion Gap 11.0 5.0 - 15.0 mmol/L    eGFR 57.1 (L) >60.0 mL/min/1.73   CBC Auto Differential    Specimen: Blood   Result Value Ref Range    WBC 2.58 (L) 3.40 - 10.80 10*3/mm3    RBC 4.73 4.14 - 5.80 10*6/mm3    Hemoglobin 12.2 (L) 13.0 - 17.7 g/dL    Hematocrit 39.6 37.5 - 51.0 %    MCV 83.7 79.0 - 97.0 fL    MCH 25.8 (L) 26.6 - 33.0 pg    MCHC 30.8 (L) 31.5 - 35.7 g/dL    RDW 19.6 (H) 12.3 - 15.4 %    RDW-SD 59.8 (H) 37.0 - 54.0 fl    MPV      Platelets 124 (L) 140 - 450 10*3/mm3    Neutrophil % 57.7 42.7 - 76.0 %    Lymphocyte % 29.1 19.6 - 45.3 %    Monocyte % 10.9 5.0 - 12.0 %    Eosinophil % 1.9 0.3 - 6.2 %    Basophil % 0.4 0.0 - 1.5 %    Immature Grans % 0.0 0.0 - 0.5 %    Neutrophils, Absolute 1.49 (L) 1.70 - 7.00 10*3/mm3    Lymphocytes, Absolute 0.75 0.70 - 3.10 10*3/mm3    Monocytes, Absolute 0.28 0.10 - 0.90 10*3/mm3    Eosinophils, Absolute 0.05 0.00 - 0.40 10*3/mm3    Basophils, Absolute 0.01 0.00 - 0.20 10*3/mm3    Immature Grans, Absolute 0.00 0.00 - 0.05 10*3/mm3    nRBC 0.0 0.0 - 0.2 /100 WBC   Urinalysis With Microscopic If Indicated (No Culture) - Urine, Clean Catch    Specimen: Urine, Clean Catch   Result  "Value Ref Range    Color, UA Yellow Yellow, Straw    Appearance, UA Clear Clear    pH, UA 7.5 5.0 - 8.0    Specific Gravity, UA 1.013 1.001 - 1.030    Glucose,  mg/dL (2+) (A) Negative    Ketones, UA Negative Negative    Bilirubin, UA Negative Negative    Blood, UA Negative Negative    Protein, UA Negative Negative    Leuk Esterase, UA Negative Negative    Nitrite, UA Negative Negative    Urobilinogen, UA 0.2 E.U./dL 0.2 - 1.0 E.U./dL   ECG 12 Lead   Result Value Ref Range    QT Interval 504 ms    QTC Interval 507 ms   Green Top (Gel)   Result Value Ref Range    Extra Tube Hold for add-ons.    Lavender Top   Result Value Ref Range    Extra Tube hold for add-on    Gold Top - SST   Result Value Ref Range    Extra Tube Hold for add-ons.    Light Blue Top   Result Value Ref Range    Extra Tube Hold for add-ons.         All other labs were within normal range or not returned as of this dictation.      EMERGENCY DEPARTMENT COURSE and DIFFERENTIAL DIAGNOSIS/MDM:   Vitals:    Vitals:    10/04/22 1545 10/04/22 1730   BP: 116/58 99/58   BP Location: Right arm    Patient Position: Lying    Pulse: 61 60   Resp: 16    Temp: 98.5 °F (36.9 °C)    TempSrc: Oral    SpO2: 100% 100%   Weight: 94.3 kg (208 lb)    Height: 177.8 cm (70\")             Patient with a history of seizure activity presents after a possible seizure episode at his nursing rehab facility.  The patient relates fully awake and alert, nontoxic-appearing, no meningeal signs examination, his vital signs are stable, patient is afebrile, nontoxic-appearing.  He notes he does not have any acute complaints, states he is returned back to his normal baseline.  We did obtain IV, labs, urinalysis, we will load the patient with Keppra 1 g IV, Keppra levels obtained.  Will take few days to get Keppra levels back.  He is electrolytes labs, kidney function liver function is stable.  Patient with chronic lymphopenia, white count 2.58, stable.  Hemoglobin 12.2.  No infectious " etiology on urinalysis.  CT head is unremarkable.  He has some difficulty with maintaining patent IV, patient would prefer just to do oral Keppra at this time which I feel is reasonable.  He has not had any seizure activity, is remained in a normal mental status since arrival to the emergency department.  We will plan on continuing with his medications, antiepileptics as prescribed, follow-up with his neurologist for further evaluation, return to the ED with any worsening symptoms or further concerns.      I had a discussion with the patient/family regarding diagnosis, diagnostic results, treatment plan, and medications.  The patient/family indicated understanding of these instructions.  I spent adequate time at the bedside preceding discharge necessary to personally discuss the aftercare instructions, giving patient education, providing explanations of the results of our evaluations/findings, and my decision making to assure that the patient/family understand the plan of care.  Time was allotted to answer questions at that time and throughout the ED course.  Emphasis was placed on timely follow-up after discharge.  I also discussed the potential for the development of an acute emergent condition requiring further evaluation, admission, or even surgical intervention. I discussed that we found nothing during the visit today indicating the need for further workup, admission, or the presence of an unstable medical condition.  I encouraged the patient to return to the emergency department immediately for ANY concerns, worsening, new complaints, or if symptoms persist and unable to seek follow-up in a timely fashion.  The patient/family expressed understanding and agreement with this plan.  The patient will follow-up with their PCP in 1-2 days for reevaluation.       MEDICATIONS ADMINISTERED IN ED:  Medications   sodium chloride 0.9 % flush 10 mL (has no administration in time range)   sodium chloride 0.9 % flush 10 mL  (has no administration in time range)   levETIRAcetam (KEPPRA) tablet 1,000 mg (has no administration in time range)       PROCEDURES:  Procedures    CRITICAL CARE TIME    Total Critical Care time was 0 minutes, excluding separately reportable procedures.   There was a high probability of clinically significant/life threatening deterioration in the patient's condition which required my urgent intervention.      FINAL IMPRESSION      1. Breakthrough seizure (HCC)    2. Generalized weakness          DISPOSITION/PLAN     ED Disposition     ED Disposition   Discharge    Condition   Stable    Comment   --             PATIENT REFERRED TO:  Marguerite Moore PA-C  [address] [address]  [phone]    In 2 days      [facility] Emergency Department  [address] [facility] [address] [phone]    If symptoms worsen      DISCHARGE MEDICATIONS:     Medication List      CONTINUE taking these medications    albuterol sulfate  (90 Base) MCG/ACT inhaler  Commonly known as: PROVENTIL HFA;VENTOLIN HFA;PROAIR HFA  Inhale 2 puffs Every 4 (Four) Hours As Needed for Wheezing or Shortness of Air.     aspirin 81 MG chewable tablet  Chew 1 tablet Daily.     diclofenac 1 % gel gel  Commonly known as: VOLTAREN  Apply 4 g topically to the appropriate area as directed 4 (Four) Times a Day As Needed (prn for pain).     empagliflozin 10 MG tablet tablet  Commonly known as: JARDIANCE  Take 1 tablet by mouth Daily.     Entresto 24-26 MG tablet  Generic drug: sacubitril-valsartan  Take 1 tablet by mouth 2 (Two) Times a Day.     furosemide 40 MG tablet  Commonly known as: LASIX  Take 1.5 tablets by mouth Daily.     glucose blood test strip  1 each by Other route Daily. Use daily as directed     glucose monitor monitoring kit  1 each Daily. Use daily as directed.     levETIRAcetam 750 MG tablet  Commonly known as: KEPPRA  TAKE 1 TABLET BY MOUTH TWICE A DAY     metFORMIN   MG 24 hr tablet  Commonly known as: GLUCOPHAGE-XR  Take 1 tablet by mouth Daily With Breakfast.     metoprolol succinate XL 50 MG 24 hr tablet  Commonly known as: TOPROL-XL  TAKE 1 TABLET BY MOUTH EVERY DAY     pantoprazole 40 MG EC tablet  Commonly known as: PROTONIX  Take 1 tablet by mouth 2 (Two) Times a Day.     potassium chloride ER 20 MEQ tablet controlled-release ER tablet  Commonly known as: K-TAB  Take 1 tablet by mouth Daily.     rosuvastatin 20 MG tablet  Commonly known as: CRESTOR  Take 1 tablet by mouth Daily.     Spiriva Respimat 2.5 MCG/ACT aerosol solution inhaler  Generic drug: tiotropium bromide monohydrate  Inhale 2 puffs Daily.                Comment: Please note this report has been produced using speech recognition software.      Omero Beltran DO  Attending Emergency Physician               Omero Beltran DO  10/04/22 1813

## 2022-10-07 LAB
LEVETIRACETAM SERPL-MCNC: 35 UG/ML (ref 10–40)
QT INTERVAL: 504 MS
QTC INTERVAL: 507 MS

## 2022-10-13 NOTE — TELEPHONE ENCOUNTER
spironolactone (ALDACTONE) 25 MG tablet     The original prescription was discontinued on 8/4/2022 by Siria Moreno MD for the following reason: Stop Taking at Discharge. Renewing this prescription may not be appropriate.     Rx Refill Note  Requested Prescriptions     Pending Prescriptions Disp Refills   • spironolactone (ALDACTONE) 25 MG tablet [Pharmacy Med Name: SPIRONOLACTONE 25 MG TABLET] 90 tablet 2     Sig: TAKE 1 TABLET BY MOUTH EVERY DAY      Last office visit with prescribing clinician: 9/6/2022      Next office visit with prescribing clinician: 10/20/2022            Kristi Mckeon MA  10/13/22, 13:27 EDT

## 2022-10-14 RX ORDER — SPIRONOLACTONE 25 MG/1
TABLET ORAL
Qty: 90 TABLET | Refills: 2 | OUTPATIENT
Start: 2022-10-14

## 2022-10-18 ENCOUNTER — TELEPHONE (OUTPATIENT)
Dept: FAMILY MEDICINE CLINIC | Facility: CLINIC | Age: 82
End: 2022-10-18

## 2022-10-18 NOTE — TELEPHONE ENCOUNTER
Caller: [employer]    Relationship: HOME HEALTH     Best call back number: [phone]    What is the best time to reach you: ANYTIME     Who are you requesting to speak with (clinical staff, provider,  specific staff member): CLINICAL STAFF    What was the call regarding:  SANDRA, A  WITH [facility], IS CALLING TO ASK FOR A VERBAL ORDER FOR SOCIAL WORK EVALUATION. SHE SAYS THAT THIS WAS NOT ABLE TO BE DONE LAST WEEK.     Do you require a callback: YES

## 2022-10-20 ENCOUNTER — OFFICE VISIT (OUTPATIENT)
Dept: FAMILY MEDICINE CLINIC | Facility: CLINIC | Age: 82
End: 2022-10-20

## 2022-10-20 VITALS
HEART RATE: 78 BPM | OXYGEN SATURATION: 96 % | SYSTOLIC BLOOD PRESSURE: 108 MMHG | WEIGHT: 187.2 LBS | DIASTOLIC BLOOD PRESSURE: 80 MMHG | HEIGHT: 70 IN | BODY MASS INDEX: 26.8 KG/M2 | TEMPERATURE: 98.4 F

## 2022-10-20 DIAGNOSIS — I50.22 CHRONIC SYSTOLIC CONGESTIVE HEART FAILURE: ICD-10-CM

## 2022-10-20 DIAGNOSIS — I10 ESSENTIAL HYPERTENSION: ICD-10-CM

## 2022-10-20 DIAGNOSIS — K59.09 OTHER CONSTIPATION: ICD-10-CM

## 2022-10-20 DIAGNOSIS — Z09 HOSPITAL DISCHARGE FOLLOW-UP: Primary | ICD-10-CM

## 2022-10-20 DIAGNOSIS — E11.65 TYPE 2 DIABETES MELLITUS WITH HYPERGLYCEMIA, WITHOUT LONG-TERM CURRENT USE OF INSULIN: ICD-10-CM

## 2022-10-20 DIAGNOSIS — Z23 FLU VACCINE NEED: ICD-10-CM

## 2022-10-20 DIAGNOSIS — R56.9 SEIZURES: ICD-10-CM

## 2022-10-20 PROCEDURE — G0008 ADMIN INFLUENZA VIRUS VAC: HCPCS | Performed by: PHYSICIAN ASSISTANT

## 2022-10-20 PROCEDURE — 99214 OFFICE O/P EST MOD 30 MIN: CPT | Performed by: PHYSICIAN ASSISTANT

## 2022-10-20 PROCEDURE — 90662 IIV NO PRSV INCREASED AG IM: CPT | Performed by: PHYSICIAN ASSISTANT

## 2022-10-20 PROCEDURE — 1111F DSCHRG MED/CURRENT MED MERGE: CPT | Performed by: PHYSICIAN ASSISTANT

## 2022-10-20 RX ORDER — POLYETHYLENE GLYCOL 3350 17 G/17G
17 POWDER, FOR SOLUTION ORAL NIGHTLY
Qty: 578 G | Refills: 1 | Status: SHIPPED | OUTPATIENT
Start: 2022-10-20

## 2022-10-20 RX ORDER — BLOOD-GLUCOSE METER
EACH MISCELLANEOUS
COMMUNITY
Start: 2022-09-06

## 2022-10-20 RX ORDER — LANCETS 33 GAUGE
EACH MISCELLANEOUS SEE ADMIN INSTRUCTIONS
COMMUNITY
Start: 2022-09-13

## 2022-10-20 NOTE — PROGRESS NOTES
Chief Complaint   Patient presents with   • Hospital Follow Up Visit     ED Visit 10/4/22 & 9/22/22 Due to Seizure Activity    • Seizures   • Labs Only     Pt. Would like Potassium check today. Pt. Wife States when he was having issues like this before his Potassium was too high.    • Medication Problem     Pt. Would like to discuss Meds       HPI     Narendra Cochran is a pleasant 81 y.o. 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   • Paget disease of bone        Past Surgical History:   Procedure Laterality Date   • APPENDECTOMY     • CARDIAC CATHETERIZATION N/A 3/18/2020    Procedure: Left Heart Cath;  Surgeon: Sonya Garibay MD;  Location:  [facility];  Service: Cardiology;  Laterality: N/A;  First availabel provider     • CARDIAC CATHETERIZATION N/A 3/15/2021    Procedure: LEFT HEART CATH;  Surgeon: Doc Sahni IV, MD;  Location:  [facility];  Service: Cardiovascular;  Laterality: N/A;   • CARDIAC CATHETERIZATION N/A 3/15/2021    Procedure: Coronary angiography;  Surgeon: Doc Sahni IV, MD;  Location:  [facility];  Service: Cardiovascular;  Laterality: N/A;   • CARDIAC ELECTROPHYSIOLOGY PROCEDURE N/A 3/16/2021    Procedure: ICD DC new;  Surgeon: Som Boyd DO;  Location:  [facility];  Service: Cardiology;  Laterality: N/A;   • COLON RESECTION      second to diverticulitis    • FOOT SURGERY Left        Family History   Problem Relation Age of Onset   • No Known Problems Daughter    • No Known  "Problems Son    • No Known Problems Son    • No Known Problems Son    • Diabetes Sister    • Clotting disorder Sister    • Hyperlipidemia Mother    • No Known Problems Sister    • No Known Problems Brother    • Fainting Brother        Social History     Socioeconomic History   • Marital status:    Tobacco Use   • Smoking status: Former     Packs/day: 0.50     Years: 65.00     Pack years: 32.50     Types: Cigars, Cigarettes     Quit date: 9/1/2019     Years since quitting: 3.1   • Smokeless tobacco: Never   Vaping Use   • Vaping Use: Never used   Substance and Sexual Activity   • Alcohol use: Yes     Comment: very rarely   • Drug use: Yes     Types: Marijuana     Comment: Pt states used weekly but now quitting    • Sexual activity: Defer       No Known Allergies    ROS  Review of Systems    Vitals:    10/20/22 1537   BP: 108/80   BP Location: Left arm   Patient Position: Sitting   Cuff Size: Adult   Pulse: 78   Temp: 98.4 °F (36.9 °C)   TempSrc: Oral   SpO2: 96%   Weight: 84.9 kg (187 lb 3.2 oz)   Height: 177.8 cm (70\")   PainSc: 0-No pain     Body mass index is 26.86 kg/m².    Current Outpatient Medications on File Prior to Visit   Medication Sig Dispense Refill   • albuterol sulfate  (90 Base) MCG/ACT inhaler Inhale 2 puffs Every 4 (Four) Hours As Needed for Wheezing or Shortness of Air. 8 g 3   • aspirin 81 MG chewable tablet Chew 1 tablet Daily.     • Blood Glucose Monitoring Suppl (ONE TOUCH ULTRA 2) w/Device kit TEST DAILY AS DIRECTED     • diclofenac (VOLTAREN) 1 % gel gel Apply 4 g topically to the appropriate area as directed 4 (Four) Times a Day As Needed (prn for pain). 60 tube 0   • Diclofenac Sodium (VOLTAREN) 1 % gel gel APPLY 2 GRAMS TO FEET TWICE DAILY FOR PAIN AND STIFFNESS     • empagliflozin (JARDIANCE) 10 MG tablet tablet Take 1 tablet by mouth Daily. 30 tablet    • furosemide (LASIX) 40 MG tablet Take 1.5 tablets by mouth Daily.     • glucose blood test strip 1 each by Other route " Daily. Use daily as directed 100 each 12   • glucose monitor monitoring kit 1 each Daily. Use daily as directed. 1 each 0   • Lancets (OneTouch Delica Plus [id number]) misc See Admin Instructions.     • levETIRAcetam (KEPPRA) 750 MG tablet TAKE 1 TABLET BY MOUTH TWICE A DAY 60 tablet 5   • metFORMIN ER (GLUCOPHAGE-XR) 500 MG 24 hr tablet Take 1 tablet by mouth Daily With Breakfast. 90 tablet 3   • metoprolol succinate XL (TOPROL-XL) 50 MG 24 hr tablet TAKE 1 TABLET BY MOUTH EVERY DAY 90 tablet 2   • pantoprazole (PROTONIX) 40 MG EC tablet Take 1 tablet by mouth 2 (Two) Times a Day. 180 tablet 1   • potassium chloride ER (K-TAB) 20 MEQ tablet controlled-release ER tablet Take 1 tablet by mouth Daily. 60 tablet    • rosuvastatin (CRESTOR) 20 MG tablet Take 1 tablet by mouth Daily. 90 tablet 3   • sacubitril-valsartan (Entresto) 24-26 MG tablet Take 1 tablet by mouth 2 (Two) Times a Day. 60 tablet 6   • tiotropium bromide monohydrate (Spiriva Respimat) 2.5 MCG/ACT aerosol solution inhaler Inhale 2 puffs Daily. 4 g 5     No current facility-administered medications on file prior to visit.       Results for orders placed or performed during the hospital encounter of 10/04/22   Levetiracetam Level (Keppra)    Specimen: Blood   Result Value Ref Range    Levetiracetam 35.0 10.0 - 40.0 ug/mL   Comprehensive Metabolic Panel    Specimen: Blood   Result Value Ref Range    Glucose 125 (H) 65 - 99 mg/dL    BUN 32 (H) 8 - 23 mg/dL    Creatinine 1.25 0.76 - 1.27 mg/dL    Sodium 142 136 - 145 mmol/L    Potassium 4.4 3.5 - 5.2 mmol/L    Chloride 104 98 - 107 mmol/L    CO2 27.0 22.0 - 29.0 mmol/L    Calcium 10.1 8.6 - 10.5 mg/dL    Total Protein 8.1 6.0 - 8.5 g/dL    Albumin 4.50 3.50 - 5.20 g/dL    ALT (SGPT) 10 1 - 41 U/L    AST (SGOT) 22 1 - 40 U/L    Alkaline Phosphatase 70 39 - 117 U/L    Total Bilirubin 0.5 0.0 - 1.2 mg/dL    Globulin 3.6 gm/dL    A/G Ratio 1.3 g/dL    BUN/Creatinine Ratio 25.6 (H) 7.0 - 25.0    Anion Gap 11.0  5.0 - 15.0 mmol/L    eGFR 57.1 (L) >60.0 mL/min/1.73   CBC Auto Differential    Specimen: Blood   Result Value Ref Range    WBC 2.58 (L) 3.40 - 10.80 10*3/mm3    RBC 4.73 4.14 - 5.80 10*6/mm3    Hemoglobin 12.2 (L) 13.0 - 17.7 g/dL    Hematocrit 39.6 37.5 - 51.0 %    MCV 83.7 79.0 - 97.0 fL    MCH 25.8 (L) 26.6 - 33.0 pg    MCHC 30.8 (L) 31.5 - 35.7 g/dL    RDW 19.6 (H) 12.3 - 15.4 %    RDW-SD 59.8 (H) 37.0 - 54.0 fl    MPV      Platelets 124 (L) 140 - 450 10*3/mm3    Neutrophil % 57.7 42.7 - 76.0 %    Lymphocyte % 29.1 19.6 - 45.3 %    Monocyte % 10.9 5.0 - 12.0 %    Eosinophil % 1.9 0.3 - 6.2 %    Basophil % 0.4 0.0 - 1.5 %    Immature Grans % 0.0 0.0 - 0.5 %    Neutrophils, Absolute 1.49 (L) 1.70 - 7.00 10*3/mm3    Lymphocytes, Absolute 0.75 0.70 - 3.10 10*3/mm3    Monocytes, Absolute 0.28 0.10 - 0.90 10*3/mm3    Eosinophils, Absolute 0.05 0.00 - 0.40 10*3/mm3    Basophils, Absolute 0.01 0.00 - 0.20 10*3/mm3    Immature Grans, Absolute 0.00 0.00 - 0.05 10*3/mm3    nRBC 0.0 0.0 - 0.2 /100 WBC   Urinalysis With Microscopic If Indicated (No Culture) - Urine, Clean Catch    Specimen: Urine, Clean Catch   Result Value Ref Range    Color, UA Yellow Yellow, Straw    Appearance, UA Clear Clear    pH, UA 7.5 5.0 - 8.0    Specific Gravity, UA 1.013 1.001 - 1.030    Glucose,  mg/dL (2+) (A) Negative    Ketones, UA Negative Negative    Bilirubin, UA Negative Negative    Blood, UA Negative Negative    Protein, UA Negative Negative    Leuk Esterase, UA Negative Negative    Nitrite, UA Negative Negative    Urobilinogen, UA 0.2 E.U./dL 0.2 - 1.0 E.U./dL   ECG 12 Lead   Result Value Ref Range    QT Interval 504 ms    QTC Interval 507 ms   Green Top (Gel)   Result Value Ref Range    Extra Tube Hold for add-ons.    Lavender Top   Result Value Ref Range    Extra Tube hold for add-on    Gold Top - SST   Result Value Ref Range    Extra Tube Hold for add-ons.    Gray Top   Result Value Ref Range    Extra Tube Hold for add-ons.     Light Blue Top   Result Value Ref Range    Extra Tube Hold for add-ons.        PE    Physical Exam    A/P    Diagnoses and all orders for this visit:    1. Essential hypertension (Primary)  -     Basic Metabolic Panel; Future         Plan of care reviewed with patient at the conclusion of today's visit. Education was provided regarding diagnosis, management and any prescribed or recommended OTC medications.  Patient verbalizes understanding of and agreement with management plan.    No follow-ups on file.     Marguerite Moore PA-C

## 2022-10-20 NOTE — PROGRESS NOTES
Transitional Care Follow Up Visit  Subjective     Narendra Cochran is a 81 y.o. male who presents for a transitional care management visit.    Within 48 business hours after discharge our office contacted him via telephone to coordinate his care and needs.      I reviewed and discussed the details of that call along with the discharge summary, hospital problems, inpatient lab results, inpatient diagnostic studies, and consultation reports with Narendra.     Current outpatient and discharge medications have been reconciled for the patient.    Date of TCM Phone Call 11/15/2019 3/20/2021 8/4/2022   [facility]   Date of Admission 11/12/2019 3/12/2021 7/29/2022   Date of Discharge 11/14/2019 3/20/2021 8/4/2022   Discharge Disposition Home or Self Care Home-Health [facility]     Risk for Readmission (LACE) No data recorded    History of Present Illness  Narendra Cochran is a pleasant 81 y.o. male who is here for routine follow-up of recent ED visit for seizures on 10/4 and 9/22.  Patient is currently compliant on Keppra.  He denies any additional seizure activity since leaving [facility].  He was discharged from [facility] on October 6.  He has not established with a neurologist and needs a referral.    Patient was admitted to [facility] on 9/14-9/20 for CHF.  Patient is followed by cardiology.  He was recently started on Lasix 60 mg daily and potassium 20 mEq daily.  His last CMP was completed on 10/4 and showed slight decline in GFR, 57, with normal creatinine.  His labs also indicated that he was dehydrated at that time.  His potassium level was normal at 4.4.  His wife is concerned that his potassium is abnormal today.      He was started on Jardiance 10 mg daily but is unable to afford this.  He is currently in the donut hole.    His blood pressure is stable and well-controlled.  His last hemoglobin A1c was completed on 7/29 and was 6.5%.  He is  compliant on his metformin.    Patient's main complaint today is constipation.  He states that he has not gone to the bathroom since he left [facility].  He is not currently taking any medication to help with his constipation.    Patient's wife is present at appointment.     Duration of Hospital Stay:  09/14/2022 to 09/20/2022, Discharged from [facility] on 10/06/2022    Current outpatient and discharge medications have been reconciled for the patient.  Reviewed by: Marguerite [facility] PA-C       The following portions of the patient's history were reviewed and updated as appropriate: allergies, current medications, past family history, past medical history, past social history, past surgical history and problem list.    Review of Systems   Constitutional: Positive for fatigue. Negative for chills and fever.   HENT: Positive for hearing loss.    Respiratory: Negative for cough, shortness of breath and wheezing.    Cardiovascular: Negative for chest pain, palpitations and leg swelling.   Gastrointestinal: Positive for constipation.   Musculoskeletal: Positive for arthralgias.   Neurological: Negative for dizziness and headache.       Current Outpatient Medications on File Prior to Visit   Medication Sig Dispense Refill   • albuterol sulfate  (90 Base) MCG/ACT inhaler Inhale 2 puffs Every 4 (Four) Hours As Needed for Wheezing or Shortness of Air. 8 g 3   • aspirin 81 MG chewable tablet Chew 1 tablet Daily.     • Blood Glucose Monitoring Suppl (ONE TOUCH ULTRA 2) w/Device kit TEST DAILY AS DIRECTED     • diclofenac (VOLTAREN) 1 % gel gel Apply 4 g topically to the appropriate area as directed 4 (Four) Times a Day As Needed (prn for pain). 60 tube 0   • Diclofenac Sodium (VOLTAREN) 1 % gel gel APPLY 2 GRAMS TO FEET TWICE DAILY FOR PAIN AND STIFFNESS     • empagliflozin (JARDIANCE) 10 MG tablet tablet Take 1 tablet by mouth Daily. 30 tablet    • furosemide (LASIX) 40 MG tablet Take 1.5 tablets by mouth  Daily.     • glucose blood test strip 1 each by Other route Daily. Use daily as directed 100 each 12   • glucose monitor monitoring kit 1 each Daily. Use daily as directed. 1 each 0   • Lancets (OneTouch Delica Plus [id number]) misc See Admin Instructions.     • levETIRAcetam (KEPPRA) 750 MG tablet TAKE 1 TABLET BY MOUTH TWICE A DAY 60 tablet 5   • metFORMIN ER (GLUCOPHAGE-XR) 500 MG 24 hr tablet Take 1 tablet by mouth Daily With Breakfast. 90 tablet 3   • metoprolol succinate XL (TOPROL-XL) 50 MG 24 hr tablet TAKE 1 TABLET BY MOUTH EVERY DAY 90 tablet 2   • pantoprazole (PROTONIX) 40 MG EC tablet Take 1 tablet by mouth 2 (Two) Times a Day. 180 tablet 1   • potassium chloride ER (K-TAB) 20 MEQ tablet controlled-release ER tablet Take 1 tablet by mouth Daily. 60 tablet    • rosuvastatin (CRESTOR) 20 MG tablet Take 1 tablet by mouth Daily. 90 tablet 3   • sacubitril-valsartan (Entresto) 24-26 MG tablet Take 1 tablet by mouth 2 (Two) Times a Day. 60 tablet 6   • tiotropium bromide monohydrate (Spiriva Respimat) 2.5 MCG/ACT aerosol solution inhaler Inhale 2 puffs Daily. 4 g 5     No current facility-administered medications on file prior to visit.       Results for orders placed or performed during the hospital encounter of 10/04/22   Levetiracetam Level (Keppra)    Specimen: Blood   Result Value Ref Range    Levetiracetam 35.0 10.0 - 40.0 ug/mL   Comprehensive Metabolic Panel    Specimen: Blood   Result Value Ref Range    Glucose 125 (H) 65 - 99 mg/dL    BUN 32 (H) 8 - 23 mg/dL    Creatinine 1.25 0.76 - 1.27 mg/dL    Sodium 142 136 - 145 mmol/L    Potassium 4.4 3.5 - 5.2 mmol/L    Chloride 104 98 - 107 mmol/L    CO2 27.0 22.0 - 29.0 mmol/L    Calcium 10.1 8.6 - 10.5 mg/dL    Total Protein 8.1 6.0 - 8.5 g/dL    Albumin 4.50 3.50 - 5.20 g/dL    ALT (SGPT) 10 1 - 41 U/L    AST (SGOT) 22 1 - 40 U/L    Alkaline Phosphatase 70 39 - 117 U/L    Total Bilirubin 0.5 0.0 - 1.2 mg/dL    Globulin 3.6 gm/dL    A/G Ratio 1.3 g/dL     BUN/Creatinine Ratio 25.6 (H) 7.0 - 25.0    Anion Gap 11.0 5.0 - 15.0 mmol/L    eGFR 57.1 (L) >60.0 mL/min/1.73   CBC Auto Differential    Specimen: Blood   Result Value Ref Range    WBC 2.58 (L) 3.40 - 10.80 10*3/mm3    RBC 4.73 4.14 - 5.80 10*6/mm3    Hemoglobin 12.2 (L) 13.0 - 17.7 g/dL    Hematocrit 39.6 37.5 - 51.0 %    MCV 83.7 79.0 - 97.0 fL    MCH 25.8 (L) 26.6 - 33.0 pg    MCHC 30.8 (L) 31.5 - 35.7 g/dL    RDW 19.6 (H) 12.3 - 15.4 %    RDW-SD 59.8 (H) 37.0 - 54.0 fl    MPV      Platelets 124 (L) 140 - 450 10*3/mm3    Neutrophil % 57.7 42.7 - 76.0 %    Lymphocyte % 29.1 19.6 - 45.3 %    Monocyte % 10.9 5.0 - 12.0 %    Eosinophil % 1.9 0.3 - 6.2 %    Basophil % 0.4 0.0 - 1.5 %    Immature Grans % 0.0 0.0 - 0.5 %    Neutrophils, Absolute 1.49 (L) 1.70 - 7.00 10*3/mm3    Lymphocytes, Absolute 0.75 0.70 - 3.10 10*3/mm3    Monocytes, Absolute 0.28 0.10 - 0.90 10*3/mm3    Eosinophils, Absolute 0.05 0.00 - 0.40 10*3/mm3    Basophils, Absolute 0.01 0.00 - 0.20 10*3/mm3    Immature Grans, Absolute 0.00 0.00 - 0.05 10*3/mm3    nRBC 0.0 0.0 - 0.2 /100 WBC   Urinalysis With Microscopic If Indicated (No Culture) - Urine, Clean Catch    Specimen: Urine, Clean Catch   Result Value Ref Range    Color, UA Yellow Yellow, Straw    Appearance, UA Clear Clear    pH, UA 7.5 5.0 - 8.0    Specific Gravity, UA 1.013 1.001 - 1.030    Glucose,  mg/dL (2+) (A) Negative    Ketones, UA Negative Negative    Bilirubin, UA Negative Negative    Blood, UA Negative Negative    Protein, UA Negative Negative    Leuk Esterase, UA Negative Negative    Nitrite, UA Negative Negative    Urobilinogen, UA 0.2 E.U./dL 0.2 - 1.0 E.U./dL   ECG 12 Lead   Result Value Ref Range    QT Interval 504 ms    QTC Interval 507 ms   Green Top (Gel)   Result Value Ref Range    Extra Tube Hold for add-ons.    Lavender Top   Result Value Ref Range    Extra Tube hold for add-on    Gold Top - SST   Result Value Ref Range    Extra Tube Hold for add-ons.    Gray Top  "  Result Value Ref Range    Extra Tube Hold for add-ons.    Light Blue Top   Result Value Ref Range    Extra Tube Hold for add-ons.        Visit Vitals  /80 (BP Location: Left arm, Patient Position: Sitting, Cuff Size: Adult)   Pulse 78   Temp 98.4 °F (36.9 °C) (Oral)   Ht 177.8 cm (70\")   Wt 84.9 kg (187 lb 3.2 oz)   SpO2 96%   BMI 26.86 kg/m²     Body mass index is 26.86 kg/m².    Objective   Physical Exam  Vitals reviewed.   Constitutional:       General: He is not in acute distress.     Appearance: Normal appearance. He is well-developed and normal weight. He is not ill-appearing or diaphoretic.   HENT:      Head: Normocephalic and atraumatic.      Right Ear: Decreased hearing noted.      Left Ear: Decreased hearing noted.   Eyes:      Extraocular Movements: Extraocular movements intact.      Conjunctiva/sclera: Conjunctivae normal.   Cardiovascular:      Rate and Rhythm: Regular rhythm.      Heart sounds: Heart sounds are distant.   Pulmonary:      Effort: No respiratory distress.   Musculoskeletal:         General: Normal range of motion.      Cervical back: Normal range of motion.      Right lower leg: No edema.      Left lower leg: No edema.   Skin:     General: Skin is warm.      Findings: No erythema or rash.   Neurological:      General: No focal deficit present.      Mental Status: He is alert.   Psychiatric:         Attention and Perception: He is attentive.         Mood and Affect: Mood normal.         Speech: Speech normal.         Behavior: Behavior normal. Behavior is cooperative.         Thought Content: Thought content normal.         Judgment: Judgment normal.         Assessment & Plan   Diagnoses and all orders for this visit:    1. Hospital discharge follow-up (Primary)  Hospitalized 9/14-9/20 for CHF.  Patient was transferred to [facility] after hospitalization and was recently discharged on 10/6.    2. Seizures (HCC)  -     Ambulatory Referral to Neurology  She was seen in ED on 9/22 " and 10/4 for seizure activity.  Compliant on Keppra.  Denies any seizures since being seen in the ED on 10/4.    3. Essential hypertension  -     Basic Metabolic Panel; Future  Stable, well-controlled.  Compliant on medication.    4. Other constipation  -     polyethylene glycol (MIRALAX) 17 GM/SCOOP powder; Take 17 g by mouth Every Night.  Dispense: 578 g; Refill: 1  Main complaint today is constipation.  It is unclear when his last bowel movement was.  Recommend starting MiraLAX daily and continuing this indefinitely.  Advised to drink milk of magnesium if the MiraLAX is not helping.  Call office if unable to have a bowel movement with the above regiment.    5. Type 2 diabetes mellitus with hyperglycemia, without long-term current use of insulin ([facility])  -     Hemoglobin A1c; Future  Previous hemoglobin A1c was 6.5%.  Will repeat hemoglobin A1c.  Patient will return on Monday for blood work.  Patient was started on Jardiance but is unable to afford this.  I gave him samples for 1 month.  He will call if he needs additional samples and hopefully we will be able to provide these.  [facility] and [facility] should improve in January.  He is established with endocrinology.  Continue on metformin.    6.  Chronic systolic congestive heart failure  Followed by cardiology.  Recently placed on Lasix 60 mg and potassium 20 mEq daily.  Will repeat BMP.  Patient will return on Monday for blood work.    7. Flu vaccine need  -     Fluzone High-Dose 65+yrs ([id number])

## 2022-10-24 ENCOUNTER — OFFICE VISIT (OUTPATIENT)
Dept: ORTHOPEDIC SURGERY | Facility: CLINIC | Age: 82
End: 2022-10-24

## 2022-10-24 ENCOUNTER — LAB (OUTPATIENT)
Dept: LAB | Facility: HOSPITAL | Age: 82
End: 2022-10-24

## 2022-10-24 VITALS
WEIGHT: 187 LBS | HEIGHT: 70 IN | BODY MASS INDEX: 26.77 KG/M2 | SYSTOLIC BLOOD PRESSURE: 148 MMHG | DIASTOLIC BLOOD PRESSURE: 72 MMHG

## 2022-10-24 DIAGNOSIS — M17.0 PRIMARY OSTEOARTHRITIS OF BOTH KNEES: Primary | ICD-10-CM

## 2022-10-24 DIAGNOSIS — I10 ESSENTIAL HYPERTENSION: ICD-10-CM

## 2022-10-24 DIAGNOSIS — E11.65 TYPE 2 DIABETES MELLITUS WITH HYPERGLYCEMIA, WITHOUT LONG-TERM CURRENT USE OF INSULIN: ICD-10-CM

## 2022-10-24 DIAGNOSIS — E11.65 TYPE 2 DIABETES MELLITUS WITH HYPERGLYCEMIA, WITHOUT LONG-TERM CURRENT USE OF INSULIN: Chronic | ICD-10-CM

## 2022-10-24 LAB
ANION GAP SERPL CALCULATED.3IONS-SCNC: 13.8 MMOL/L (ref 5–15)
BUN SERPL-MCNC: 41 MG/DL (ref 8–23)
BUN/CREAT SERPL: 23.8 (ref 7–25)
CALCIUM SPEC-SCNC: 10.2 MG/DL (ref 8.6–10.5)
CHLORIDE SERPL-SCNC: 103 MMOL/L (ref 98–107)
CO2 SERPL-SCNC: 20.2 MMOL/L (ref 22–29)
CREAT SERPL-MCNC: 1.72 MG/DL (ref 0.76–1.27)
EGFRCR SERPLBLD CKD-EPI 2021: 39 ML/MIN/1.73
GLUCOSE SERPL-MCNC: 102 MG/DL (ref 65–99)
HBA1C MFR BLD: 6.4 % (ref 4.8–5.6)
POTASSIUM SERPL-SCNC: 5.2 MMOL/L (ref 3.5–5.2)
SODIUM SERPL-SCNC: 137 MMOL/L (ref 136–145)

## 2022-10-24 PROCEDURE — 83036 HEMOGLOBIN GLYCOSYLATED A1C: CPT

## 2022-10-24 PROCEDURE — 20610 DRAIN/INJ JOINT/BURSA W/O US: CPT | Performed by: ORTHOPAEDIC SURGERY

## 2022-10-24 PROCEDURE — 80048 BASIC METABOLIC PNL TOTAL CA: CPT

## 2022-10-24 RX ORDER — ROPIVACAINE HYDROCHLORIDE 5 MG/ML
4 INJECTION, SOLUTION EPIDURAL; INFILTRATION; PERINEURAL
Status: COMPLETED | OUTPATIENT
Start: 2022-10-24 | End: 2022-10-24

## 2022-10-24 RX ORDER — LIDOCAINE HYDROCHLORIDE 10 MG/ML
4 INJECTION, SOLUTION EPIDURAL; INFILTRATION; INTRACAUDAL; PERINEURAL
Status: COMPLETED | OUTPATIENT
Start: 2022-10-24 | End: 2022-10-24

## 2022-10-24 RX ORDER — TRIAMCINOLONE ACETONIDE 40 MG/ML
40 INJECTION, SUSPENSION INTRA-ARTICULAR; INTRAMUSCULAR
Status: COMPLETED | OUTPATIENT
Start: 2022-10-24 | End: 2022-10-24

## 2022-10-24 RX ADMIN — LIDOCAINE HYDROCHLORIDE 4 ML: 10 INJECTION, SOLUTION EPIDURAL; INFILTRATION; INTRACAUDAL; PERINEURAL at 09:24

## 2022-10-24 RX ADMIN — ROPIVACAINE HYDROCHLORIDE 4 ML: 5 INJECTION, SOLUTION EPIDURAL; INFILTRATION; PERINEURAL at 09:24

## 2022-10-24 RX ADMIN — TRIAMCINOLONE ACETONIDE 40 MG: 40 INJECTION, SUSPENSION INTRA-ARTICULAR; INTRAMUSCULAR at 09:24

## 2022-10-24 NOTE — PROGRESS NOTES
Procedure   - Large Joint Arthrocentesis: bilateral knee on 10/24/2022 9:24 AM  Details: 22 G needle, anterolateral approach  Medications (Right): 4 mL ropivacaine 0.5 %; 4 mL lidocaine PF 1% 1 %; 40 mg triamcinolone acetonide 40 MG/ML  Medications (Left): 4 mL ropivacaine 0.5 %; 4 mL lidocaine PF 1% 1 %; 40 mg triamcinolone acetonide 40 MG/ML  Outcome: tolerated well, no immediate complications  Procedure, treatment alternatives, risks and benefits explained, specific risks discussed. Consent was given by the patient. Immediately prior to procedure a time out was called to verify the correct patient, procedure, equipment, support staff and site/side marked as required. Patient was prepped and draped in the usual sterile fashion.          I injected both knees with cortisone.  He tolerated injections well.

## 2022-10-24 NOTE — PROGRESS NOTES
[facility] Note    Subjective     Chief Complaint   Patient presents with   • Follow-up     4 month follow up, Primary osteoarthritis of both knees, bilateral knee cortisone injection 6/22/22              HPI  Narendra Cochran is a 81 y.o. male.  He has worsening knee pain.  Cortisone injections were about 4 months ago.    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   • Paget disease of bone       Past Surgical History:   Procedure Laterality Date   • APPENDECTOMY     • CARDIAC CATHETERIZATION N/A 3/18/2020    Procedure: Left Heart Cath;  Surgeon: Sonya Garibay MD;  Location:  [facility];  Service: Cardiology;  Laterality: N/A;  First availabel provider     • CARDIAC CATHETERIZATION N/A 3/15/2021    Procedure: LEFT HEART CATH;  Surgeon: Doc Sahni IV, MD;  Location:  [facility];  Service: Cardiovascular;  Laterality: N/A;   • CARDIAC CATHETERIZATION N/A 3/15/2021    Procedure: Coronary angiography;  Surgeon: Doc Sahni IV, MD;  Location:  [facility];  Service: Cardiovascular;  Laterality: N/A;   • CARDIAC ELECTROPHYSIOLOGY PROCEDURE N/A 3/16/2021    Procedure: ICD DC new;  Surgeon: Som Boyd DO;  Location:  [facility];  Service: Cardiology;  Laterality: N/A;   • COLON RESECTION      second to diverticulitis    • FOOT SURGERY Left       Family History   Problem Relation Age of Onset   • No Known Problems Daughter    • No Known Problems Son    • No Known Problems Son    • No Known Problems Son    • Diabetes Sister    • Clotting disorder Sister     • Hyperlipidemia Mother    • No Known Problems Sister    • No Known Problems Brother    • Fainting Brother      Social History     Socioeconomic History   • Marital status:    Tobacco Use   • Smoking status: Former     Packs/day: 0.50     Years: 65.00     Pack years: 32.50     Types: Cigars, Cigarettes     Quit date: 9/1/2019     Years since quitting: 3.1   • Smokeless tobacco: Never   Vaping Use   • Vaping Use: Never used   Substance and Sexual Activity   • Alcohol use: Yes     Comment: very rarely   • Drug use: Yes     Types: Marijuana     Comment: Pt states used weekly but now quitting    • Sexual activity: Defer      Current Outpatient Medications on File Prior to Visit   Medication Sig Dispense Refill   • albuterol sulfate  (90 Base) MCG/ACT inhaler Inhale 2 puffs Every 4 (Four) Hours As Needed for Wheezing or Shortness of Air. 8 g 3   • aspirin 81 MG chewable tablet Chew 1 tablet Daily.     • Blood Glucose Monitoring Suppl (ONE TOUCH ULTRA 2) w/Device kit TEST DAILY AS DIRECTED     • diclofenac (VOLTAREN) 1 % gel gel Apply 4 g topically to the appropriate area as directed 4 (Four) Times a Day As Needed (prn for pain). 60 tube 0   • Diclofenac Sodium (VOLTAREN) 1 % gel gel APPLY 2 GRAMS TO FEET TWICE DAILY FOR PAIN AND STIFFNESS     • empagliflozin (JARDIANCE) 10 MG tablet tablet Take 1 tablet by mouth Daily. 30 tablet    • furosemide (LASIX) 40 MG tablet Take 1.5 tablets by mouth Daily.     • glucose blood test strip 1 each by Other route Daily. Use daily as directed 100 each 12   • glucose monitor monitoring kit 1 each Daily. Use daily as directed. 1 each 0   • Lancets (OneTouch Delica Plus [id number]) misc See Admin Instructions.     • levETIRAcetam (KEPPRA) 750 MG tablet TAKE 1 TABLET BY MOUTH TWICE A DAY 60 tablet 5   • metFORMIN ER (GLUCOPHAGE-XR) 500 MG 24 hr tablet Take 1 tablet by mouth Daily With Breakfast. 90 tablet 3   • metoprolol succinate XL (TOPROL-XL) 50 MG 24 hr tablet TAKE 1  "TABLET BY MOUTH EVERY DAY 90 tablet 2   • pantoprazole (PROTONIX) 40 MG EC tablet Take 1 tablet by mouth 2 (Two) Times a Day. 180 tablet 1   • polyethylene glycol (MIRALAX) 17 GM/SCOOP powder Take 17 g by mouth Every Night. 578 g 1   • potassium chloride ER (K-TAB) 20 MEQ tablet controlled-release ER tablet Take 1 tablet by mouth Daily. 60 tablet    • rosuvastatin (CRESTOR) 20 MG tablet Take 1 tablet by mouth Daily. 90 tablet 3   • sacubitril-valsartan (Entresto) 24-26 MG tablet Take 1 tablet by mouth 2 (Two) Times a Day. 60 tablet 6   • tiotropium bromide monohydrate (Spiriva Respimat) 2.5 MCG/ACT aerosol solution inhaler Inhale 2 puffs Daily. 4 g 5     No current facility-administered medications on file prior to visit.      No Known Allergies     The following portions of the patient's history were reviewed and updated as appropriate: allergies, current medications, past family history, past medical history, past social history, past surgical history and problem list.    Review of Systems   Constitutional: Positive for chills.   HENT: Positive for hearing loss.    Eyes: Negative.    Respiratory: Negative.    Cardiovascular: Negative.    Gastrointestinal: Negative.    Endocrine: Negative.    Genitourinary: Negative.    Musculoskeletal: Positive for arthralgias.   Skin: Negative.    Allergic/Immunologic: Negative.    Neurological: Negative.    Hematological: Negative.    Psychiatric/Behavioral: Negative.         Objective      Physical Exam  /72   Ht 177.8 cm (70\")   Wt 84.8 kg (187 lb)   BMI 26.83 kg/m²     Body mass index is 26.83 kg/m².    No change in bilateral knee exam    Imaging/Studies  Imaging Results (Last 7 Days)     ** No results found for the last 168 hours. **          Assessment/Plan        ICD-10-CM ICD-9-CM   1. Primary osteoarthritis of both knees  M17.0 715.16       Orders Placed This Encounter   Procedures   • - Large Joint Arthrocentesis: bilateral knee        I injected both knees " with cortisone.  He will follow-up as needed    Jason Treviño MD  10/24/22  09:52 EDT        Jason Treviño M.D., FAAOS  Orthopedic Surgeon  Fellowship Trained Sports Medicine  [facility]  Orthopedics and Sports Medicine  [address] [place] [address], [address]  [place] [address]         EMR Dragon/Transcription disclaimer:  Much of this encounter note is an electronic transcription of spoken language to printed text. Electronic transcription of spoken language may permit erroneous, or at times, nonsensical words or phrases to be inadvertently transcribed. Although I have reviewed the note for such errors, some may still exist.

## 2022-10-25 ENCOUNTER — TELEPHONE (OUTPATIENT)
Dept: CARDIOLOGY | Facility: HOSPITAL | Age: 82
End: 2022-10-25

## 2022-10-25 DIAGNOSIS — I50.22 CHRONIC SYSTOLIC CONGESTIVE HEART FAILURE: ICD-10-CM

## 2022-10-25 RX ORDER — FUROSEMIDE 40 MG/1
40 TABLET ORAL DAILY
Start: 2022-10-25 | End: 2022-11-03

## 2022-10-25 RX ORDER — BLOOD-GLUCOSE METER
EACH MISCELLANEOUS
Qty: 1 EACH | OUTPATIENT
Start: 2022-10-25

## 2022-10-25 NOTE — TELEPHONE ENCOUNTER
Rx Refill Note  Requested Prescriptions     Pending Prescriptions Disp Refills   • Blood Glucose Monitoring Suppl (ONE TOUCH ULTRA 2) w/Device kit [Pharmacy Med Name: ONE TOUCH ULTRA2 GLUCOSE SYST] 1 each      Sig: TEST DAILY AS DIRECTED      Last office visit with prescribing clinician: 10/20/2022      Next office visit with prescribing clinician: 12/6/2022            Kristi Mckeon MA  10/25/22, 15:40 EDT

## 2022-10-25 NOTE — TELEPHONE ENCOUNTER
----- Message from Phyllis Richardson CMA sent at 10/25/2022  2:12 PM EDT -----  Spoke to patient's wife and advised her to have patient hold Lasix tomorrow and decrease to 40mg daily. Patient's wife verbalized understanding. Patient was scheduled for 11/3 at 10:45.     [facility]ed [facility] and spoke to nurse and there was a heart logic 5 days ago. She is going to send this over for review.  ----- Message -----  From: Pari García APRN  Sent: 10/25/2022   1:37 PM EDT  To: Phyllis Richardson CMA    Pt with worsening renal function.    Hold Lasix tomorrow then decrease 40 mg daily    [facility] this week or next.     See if [facility] can get a recent Heart logic report for review.   ----- Message -----  From: Phyllis Richardson CMA  Sent: 10/25/2022   1:06 PM EDT  To: LEANDRO Brown with [employer] called to report abnormal blood pressure readings of 92/52 today. She states that she has 2-3 days worth of readings that were low. She call be reached at [phone].

## 2022-10-26 ENCOUNTER — TELEPHONE (OUTPATIENT)
Dept: FAMILY MEDICINE CLINIC | Facility: CLINIC | Age: 82
End: 2022-10-26

## 2022-10-26 NOTE — TELEPHONE ENCOUNTER
----- Message from Marguerite Moore PA-C sent at 10/24/2022  3:38 PM EDT -----  Regarding: labs

## 2022-10-26 NOTE — TELEPHONE ENCOUNTER
The patient brought in paperwork from [employer] to be completed and faxed back. I gave them to the [facility] Beth.

## 2022-10-28 ENCOUNTER — TELEPHONE (OUTPATIENT)
Dept: FAMILY MEDICINE CLINIC | Facility: CLINIC | Age: 82
End: 2022-10-28

## 2022-10-30 PROCEDURE — 93296 REM INTERROG EVL PM/IDS: CPT | Performed by: INTERNAL MEDICINE

## 2022-10-30 PROCEDURE — 93295 DEV INTERROG REMOTE 1/2/MLT: CPT | Performed by: INTERNAL MEDICINE

## 2022-10-31 ENCOUNTER — TELEPHONE (OUTPATIENT)
Dept: CARDIOLOGY | Facility: HOSPITAL | Age: 82
End: 2022-10-31

## 2022-10-31 NOTE — TELEPHONE ENCOUNTER
Petra with [employer] called to report that there was a 6lb weight gain by patient's numbers (3 lbs by the measurements of [employer] since the 25th). Wife reports that patient has not had any symptoms.

## 2022-11-03 ENCOUNTER — LAB (OUTPATIENT)
Dept: LAB | Facility: HOSPITAL | Age: 82
End: 2022-11-03

## 2022-11-03 ENCOUNTER — OFFICE VISIT (OUTPATIENT)
Dept: CARDIOLOGY | Facility: HOSPITAL | Age: 82
End: 2022-11-03

## 2022-11-03 VITALS
HEIGHT: 70 IN | BODY MASS INDEX: 27.66 KG/M2 | SYSTOLIC BLOOD PRESSURE: 134 MMHG | WEIGHT: 193.19 LBS | OXYGEN SATURATION: 98 % | DIASTOLIC BLOOD PRESSURE: 63 MMHG | HEART RATE: 60 BPM | TEMPERATURE: 96.9 F | RESPIRATION RATE: 16 BRPM

## 2022-11-03 DIAGNOSIS — I38 VALVULAR HEART DISEASE: ICD-10-CM

## 2022-11-03 DIAGNOSIS — Z71.89 COUNSELING REGARDING ADVANCED CARE PLANNING AND GOALS OF CARE: ICD-10-CM

## 2022-11-03 DIAGNOSIS — I50.32 CHRONIC HEART FAILURE WITH PRESERVED EJECTION FRACTION: ICD-10-CM

## 2022-11-03 DIAGNOSIS — N17.9 AKI (ACUTE KIDNEY INJURY): ICD-10-CM

## 2022-11-03 DIAGNOSIS — I50.32 CHRONIC HEART FAILURE WITH PRESERVED EJECTION FRACTION: Primary | ICD-10-CM

## 2022-11-03 DIAGNOSIS — I10 ESSENTIAL HYPERTENSION: ICD-10-CM

## 2022-11-03 DIAGNOSIS — I25.10 CORONARY ARTERY DISEASE INVOLVING NATIVE CORONARY ARTERY OF NATIVE HEART WITHOUT ANGINA PECTORIS: ICD-10-CM

## 2022-11-03 LAB
ANION GAP SERPL CALCULATED.3IONS-SCNC: 9.1 MMOL/L (ref 5–15)
BUN SERPL-MCNC: 51 MG/DL (ref 8–23)
BUN/CREAT SERPL: 31.7 (ref 7–25)
CALCIUM SPEC-SCNC: 9.4 MG/DL (ref 8.6–10.5)
CHLORIDE SERPL-SCNC: 108 MMOL/L (ref 98–107)
CO2 SERPL-SCNC: 19.9 MMOL/L (ref 22–29)
CREAT SERPL-MCNC: 1.61 MG/DL (ref 0.76–1.27)
EGFRCR SERPLBLD CKD-EPI 2021: 41.9 ML/MIN/1.73
GLUCOSE SERPL-MCNC: 126 MG/DL (ref 65–99)
NT-PROBNP SERPL-MCNC: 1107 PG/ML (ref 0–1800)
POTASSIUM SERPL-SCNC: 4.7 MMOL/L (ref 3.5–5.2)
SODIUM SERPL-SCNC: 137 MMOL/L (ref 136–145)

## 2022-11-03 PROCEDURE — 99214 OFFICE O/P EST MOD 30 MIN: CPT | Performed by: NURSE PRACTITIONER

## 2022-11-03 PROCEDURE — 80048 BASIC METABOLIC PNL TOTAL CA: CPT

## 2022-11-03 PROCEDURE — 83880 ASSAY OF NATRIURETIC PEPTIDE: CPT

## 2022-11-03 PROCEDURE — 36415 COLL VENOUS BLD VENIPUNCTURE: CPT

## 2022-11-03 RX ORDER — BUMETANIDE 1 MG/1
1 TABLET ORAL DAILY
Qty: 30 TABLET | Refills: 3 | Status: SHIPPED | OUTPATIENT
Start: 2022-11-03 | End: 2023-01-16

## 2022-11-03 NOTE — PROGRESS NOTES
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  He was discharged to rehab and had 2 ED visits for seizure-like activity on 9/22/2022 and 10/4/2022.    Patient is on Jardiance, Entresto, Lasix, metoprolol.  Patient has failed spironolactone in the past with hyperkalemia.  Currently on a potassium supplement.    Patient had elevated heart logic reports over the last month.  His Lasix was increased to 60 mg daily.  With improved heart logic numbers.  Patient then had worsening kidney function.  Lasix was then decreased back to 40 mg a day.  Home health called this week reporting 6 pound weight gain.  He had taken an extra dose of Lasix.    Patient denies worsening chest pain or pressure.  Reports intermittent dyspnea but not significantly worsened.  Denies abdominal fullness.  Appetite is at baseline.  Fatigue moderate with minimal exertion.  Dyspnea mild to moderate with minimal exertion.  Primarily sedentary.  He did walk into clinic today slowly with a cane.  No edema, dizziness, near syncope, syncope.  Objective     Vital Signs:   Vitals:    11/03/22 1041   BP: 134/63   BP Location: Left arm   Patient Position: Sitting   Cuff Size: Adult   Pulse: 60   Resp: 16   Temp: 96.9 °F (36.1 °C)   TempSrc: Temporal   SpO2: 98%   Weight: 87.6  "kg (193 lb 3 oz)   Height: 177.8 cm (70\")     Body mass index is 27.72 kg/m².  Physical Exam  Vitals reviewed.   Constitutional:       General: He is not in acute distress.     Appearance: Normal appearance.   Cardiovascular:      Rate and Rhythm: Normal rate and regular rhythm.      Pulses:           Radial pulses are 2+ on the right side.        Dorsalis pedis pulses are 2+ on the right side.        Posterior tibial pulses are 2+ on the right side.      Heart sounds: Murmur heard.   Pulmonary:      Effort: Pulmonary effort is normal.      Breath sounds: Normal breath sounds.   Musculoskeletal:      Right lower leg: No edema.      Left lower leg: No edema.   Skin:     General: Skin is warm and dry.   Neurological:      Mental Status: He is alert.   Psychiatric:         Mood and Affect: Mood normal.         Behavior: Behavior is cooperative.              Result Review  Data Reviewed:{ Labs  Result Review  Imaging  Med Tab  Media :23}   Echocardiogram 9/16/2022: EF 15%, grade 2 diastolic dysfunction, moderate AR, moderate to severe MR    Cardiac catheterization 3/15/2021: Severe one-vessel CAD involving small posterior lateral branch of the RCA.    Lab Results   Component Value Date    HGBA1C 6.40 (H) 10/24/2022     Lab Results   Component Value Date    GLUCOSE 102 (H) 10/24/2022    CALCIUM 10.2 10/24/2022     10/24/2022    K 5.2 10/24/2022    CO2 20.2 (L) 10/24/2022     10/24/2022    BUN 41 (H) 10/24/2022    CREATININE 1.72 (H) 10/24/2022    EGFRIFAFRI 61 02/17/2022    EGFRIFNONA 64 10/21/2019    BCR 23.8 10/24/2022    ANIONGAP 13.8 10/24/2022     Lab Results   Component Value Date    WBC 2.58 (L) 10/04/2022    HGB 12.2 (L) 10/04/2022    HCT 39.6 10/04/2022    MCV 83.7 10/04/2022     (L) 10/04/2022                   Assessment and Plan {CC Problem List  Visit Diagnosis  ROS  Review (Popup)  Health Maintenance  Quality  BestPractice  Medications  SmartSets  SnapShot Encounters  " Media :23}   1. Chronic heart failure with preserved ejection fraction (HCC)  NYHA III-IV  StageC  EF 15%, ICD  Continue entresto, BB, jardiance  D/c lasix and transition to bumex  - bumetanide (BUMEX) 1 MG tablet; Take 1 tablet by mouth Daily.  Dispense: 30 tablet; Refill: 3      - Basic Metabolic Panel; Future  - proBNP; Future      - Ambulatory Referral to Home Hospice    2. Valvular heart disease  Moderate to severe MR    - Basic Metabolic Panel; Future  - proBNP; Future  - Ambulatory Referral to Home Hospice    3. Coronary artery disease involving native coronary artery of native heart without angina pectoris  Statin, asa    4. Essential hypertension  Controlled to day    5. KENNEDY (acute kidney injury) ([facility])  labs    6. Counseling regarding advanced care planning and goals of care    - Ambulatory Referral to Home Hospice  Discussed goals of care, current condition and expectations.  Reviewed palliative and hospice services.  Patient is interested in evaluation by hospice to learn more about their services and to see if a candidate.  Patient has expressed desire to have treatment in the home setting.    F/u in 4 weeks or sooner pending hospice evaluation and POC      I spent 25 minutes (face to face)caring for Narendra on this date of service. This time includes time spent by me in the following activities:preparing for the visit, reviewing tests, performing a medically appropriate examination and/or evaluation , counseling and educating the patient/family/caregiver, ordering medications, tests, or procedures, documenting information in the medical record and care coordination    Follow Up {Instructions Charge Capture  Follow-up Communications :23}   Return in about 4 weeks (around 12/1/2022) for Office visit, HF.    Patient was given instructions and counseling regarding his condition or for health maintenance advice. Please see specific information pulled into the AVS if appropriate.  Patient was instructed to  call the [facility] with any questions, concerns, or worsening symptoms.

## 2022-12-13 ENCOUNTER — TELEPHONE (OUTPATIENT)
Dept: CARDIOLOGY | Facility: HOSPITAL | Age: 82
End: 2022-12-13

## 2022-12-13 DIAGNOSIS — I50.32 CHRONIC HEART FAILURE WITH PRESERVED EJECTION FRACTION: Primary | ICD-10-CM

## 2022-12-13 DIAGNOSIS — I38 VALVULAR HEART DISEASE: ICD-10-CM

## 2022-12-13 DIAGNOSIS — I50.22 CHRONIC SYSTOLIC CONGESTIVE HEART FAILURE: ICD-10-CM

## 2022-12-13 DIAGNOSIS — I10 ESSENTIAL HYPERTENSION: ICD-10-CM

## 2022-12-13 RX ORDER — SACUBITRIL AND VALSARTAN 24; 26 MG/1; MG/1
1 TABLET, FILM COATED ORAL 2 TIMES DAILY
Qty: 180 TABLET | Refills: 2 | Status: SHIPPED | OUTPATIENT
Start: 2022-12-13

## 2023-01-14 DIAGNOSIS — R56.9 SEIZURE: Chronic | ICD-10-CM

## 2023-01-14 DIAGNOSIS — I50.32 CHRONIC HEART FAILURE WITH PRESERVED EJECTION FRACTION: ICD-10-CM

## 2023-01-14 RX ORDER — LEVETIRACETAM 750 MG/1
TABLET ORAL
Qty: 180 TABLET | Refills: 1 | Status: SHIPPED | OUTPATIENT
Start: 2023-01-14

## 2023-01-14 NOTE — TELEPHONE ENCOUNTER
Rx Refill Note  Requested Prescriptions     Pending Prescriptions Disp Refills   • levETIRAcetam (KEPPRA) 750 MG tablet [Pharmacy Med Name: LEVETIRACETAM 750 MG TABLET] 180 tablet 1     Sig: TAKE 1 TABLET BY MOUTH TWICE A DAY      Last office visit with prescribing clinician: 10/20/2022   Last telemedicine visit with prescribing clinician: 1/20/2023   Next office visit with prescribing clinician: 1/20/2023                         Would you like a call back once the refill request has been completed: [] Yes [] No    If the office needs to give you a call back, can they leave a voicemail: [] Yes [] No    Elvira Treviño MA  01/14/23, 11:18 EST

## 2023-01-16 RX ORDER — BUMETANIDE 1 MG/1
TABLET ORAL
Qty: 90 TABLET | Refills: 1 | Status: SHIPPED | OUTPATIENT
Start: 2023-01-16

## 2023-01-16 NOTE — TELEPHONE ENCOUNTER
Last visit on 11/3/22 and will follow up on 3/2/23. Sent refills for bumetanide 1mg daily to [facility].     Phyllis Stevens, ErynD

## 2023-01-23 ENCOUNTER — TELEPHONE (OUTPATIENT)
Dept: CARDIOLOGY | Facility: CLINIC | Age: 83
End: 2023-01-23
Payer: MEDICARE

## 2023-01-23 NOTE — TELEPHONE ENCOUNTER
----- Message from Javad Mercedes MD sent at 1/23/2023  8:41 AM EST -----  Nehemiah Raphael,    Would you mind following up with this patient?:  I received an alert via his ICD that he may have more congestive heart failure developing.  His last visit in the heart valve clinic [facility] mentioned he was pursuing home hospice.  I just wanted to see where he was at in terms of still wanting to come to the office or if he is trying to minimize further hospital visits.

## 2023-01-23 NOTE — TELEPHONE ENCOUNTER
Ok.  Thanks for following up with him and letting me know.  Hospice will have the ability to adjust his meds also, if needed.

## 2023-01-23 NOTE — TELEPHONE ENCOUNTER
Called pt and spoke to pt's wife. She states that patient is refusing to go to any office appts and will not go back into the hospital.     Pt's wife states that patient is not having any symptoms of swelling or shortness of breath at this time and hospice has been coming out to see patient.

## 2023-01-29 PROCEDURE — 93296 REM INTERROG EVL PM/IDS: CPT | Performed by: INTERNAL MEDICINE

## 2023-01-29 PROCEDURE — 93295 DEV INTERROG REMOTE 1/2/MLT: CPT | Performed by: INTERNAL MEDICINE

## 2023-02-20 DIAGNOSIS — K21.9 GASTROESOPHAGEAL REFLUX DISEASE, UNSPECIFIED WHETHER ESOPHAGITIS PRESENT: ICD-10-CM

## 2023-02-21 NOTE — TELEPHONE ENCOUNTER
Rx Refill Note  Requested Prescriptions     Pending Prescriptions Disp Refills   • pantoprazole (PROTONIX) 40 MG EC tablet [Pharmacy Med Name: PANTOPRAZOLE SOD DR 40 MG TAB] 180 tablet 1     Sig: TAKE 1 TABLET BY MOUTH TWICE A DAY      Last office visit with prescribing clinician: 10/20/2022   Last telemedicine visit with prescribing clinician: Visit date not found   Next office visit with prescribing clinician: Visit date not found                         Would you like a call back once the refill request has been completed: [] Yes [] No    If the office needs to give you a call back, can they leave a voicemail: [] Yes [] No    Petra Bill MA  02/21/23, 13:54 EST

## 2023-02-22 RX ORDER — PANTOPRAZOLE SODIUM 40 MG/1
TABLET, DELAYED RELEASE ORAL
Qty: 180 TABLET | Refills: 1 | Status: SHIPPED | OUTPATIENT
Start: 2023-02-22

## 2023-03-11 DIAGNOSIS — E78.2 MIXED HYPERLIPIDEMIA: ICD-10-CM

## 2023-03-13 RX ORDER — ROSUVASTATIN CALCIUM 20 MG/1
TABLET, COATED ORAL
Qty: 90 TABLET | Refills: 0 | Status: SHIPPED | OUTPATIENT
Start: 2023-03-13

## 2023-03-13 NOTE — TELEPHONE ENCOUNTER
Spoke with patient's wife and she said he is not wanting to come to the doctor's office anymore due to having so much going on         Rx Refill Note  Requested Prescriptions     Pending Prescriptions Disp Refills   • rosuvastatin (CRESTOR) 20 MG tablet [Pharmacy Med Name: ROSUVASTATIN CALCIUM 20 MG TAB] 90 tablet 3     Sig: TAKE 1 TABLET BY MOUTH EVERY DAY      Last office visit with prescribing clinician: 10/20/2022   Last telemedicine visit with prescribing clinician: Visit date not found   Next office visit with prescribing clinician: Visit date not found                         Would you like a call back once the refill request has been completed: [] Yes [] No    If the office needs to give you a call back, can they leave a voicemail: [] Yes [] No    Kristi Mckeon MA  03/13/23, 09:32 EDT     Return in about 3 months (around 12/6/2022) for Recheck, DM.

## 2023-04-30 PROCEDURE — 93296 REM INTERROG EVL PM/IDS: CPT | Performed by: INTERNAL MEDICINE

## 2023-04-30 PROCEDURE — 93295 DEV INTERROG REMOTE 1/2/MLT: CPT | Performed by: INTERNAL MEDICINE

## 2023-06-08 NOTE — TELEPHONE ENCOUNTER
CHIEF COMPLAINT(S):   Chief Complaint   Patient presents with   • Right Shoulder - Workers Compensation Follow Up Visit   • Office Visit     HISTORY OF PRESENT ILLNESS:  Azul Veras is a 27 year old right-handed female who is here for a follow up visit for her work related right shoulder pain due to rotator cuff syndrome. Patient was previously seen in our office on 5/17/2023 where she was instructed to continue physical therapy, work restrictions of no lifting, pushing, pulling up to 30 pounds, start home exercise program and follow up after MRI arthrogram is completed. Patient completed 10 sessions of physical therapy and continues her home exercise program. Patient completed MRI arthrogram on 6/8/2023 at [facility]. Patient reports compliance with all instructions above, however, denies any improvements since last office visit. Patient continues to complain of anterior shoulder pain that worsens when reaching her arm overhead. She reports that she does noticed numbness or tingling from the lateral aspect of the shoulder that travels to her elbow. Patient complains of weakness in the shoulder and decrease shoulder range of motion.     Accompanied by: no one  Observed: DEBORAH Romero   Location: right shoulder  Date of Onset: 2/9/2023   Context: see above   Severity: 9/10   Timing: intermittent  Quality: sharp and shooting  Associated signs and symptoms: numbness and tingling  Aggravating factors: movement  Alleviating factors: cold  Patient feels at 50/100.  COVID Vaccine status: 5/2021   Works at [employer] - Has not been working for 3 months.     Roomed By: MELISSA Love     REVIEW OF SYSTEMS:  Musculoskeletal:  see above HPI    Otherwise, see patient history.    Current Outpatient Medications   Medication Sig Dispense Refill   • metroNIDAZOLE (METROGEL-VAGINAL) 0.75 % vaginal gel 1 application at night for 5 days 70 g 0     No current facility-administered medications for this visit.  Rx Refill Note  Requested Prescriptions     Signed Prescriptions Disp Refills   • potassium chloride ER (K-TAB) 20 MEQ tablet controlled-release ER tablet 30 tablet 1     Sig: Take 1 tablet by mouth Daily.     Authorizing Provider: ADEOLA BARRERA     Ordering User: MARIA E TOLLIVER   • ferrous sulfate (FerrouSul) 325 (65 FE) MG tablet 30 tablet 1     Sig: Take 1 tablet by mouth Daily With Breakfast.     Authorizing Provider: ADEOLA BARRERA     Ordering User: MARIA E TOLLIVER   • furosemide (LASIX) 40 MG tablet 30 tablet 6     Sig: Take 1 tablet by mouth Daily.     Authorizing Provider: ADEOLA BARRERA     Ordering User: MARIA E TOLLIVER   • metFORMIN ER (GLUCOPHAGE-XR) 500 MG 24 hr tablet 90 tablet 1     Sig: Take 1 tablet by mouth Daily With Breakfast.     Authorizing Provider: ADEOLA BARRERA     Ordering User: MARIA E TOLLIVER   • metoprolol succinate XL (TOPROL-XL) 50 MG 24 hr tablet 30 tablet 6     Sig: Take 1 tablet by mouth Daily.     Authorizing Provider: ADEOLA BARRERA     Ordering User: MARIA E TOLLIVER   • pantoprazole (PROTONIX) 40 MG EC tablet 180 tablet 0     Sig: Take 1 tablet by mouth 2 (Two) Times a Day.     Authorizing Provider: ADEOLA BARRERA     Ordering User: MARIA E TOLLIVER   • rosuvastatin (CRESTOR) 20 MG tablet 90 tablet 3     Sig: Take 1 tablet by mouth Daily.     Authorizing Provider: ADEOLA BARRERA     Ordering User: MARIA E TOLLIVER   • spironolactone (ALDACTONE) 25 MG tablet 30 tablet 6     Sig: Take 1 tablet by mouth Daily.     Authorizing Provider: ADEOLA BARRERA     Ordering User: MARIA E TOLLIVER      Last office visit with prescribing clinician: 10/26/2021      Next office visit with prescribing clinician: 3/1/2022            Maria E Tolliver MA  01/13/22, 12:34 EST     Patient notified.          ALLERGIES:  No Known Allergies    Visit Vitals  /62   Pulse 90   Ht 5' 3\" (1.6 m)   Wt 51.7 kg (114 lb)   BMI 20.19 kg/m²     MSK:  Neck:  Range of motion is full but associated with pain localized to her right side of neck with rotation  Spurling's test is negative  No C-spine tenderness    Negative right upper limb A and B tension tests    Shoulder exam  Shoulder: Right     Palpation:  No AC joint tenderness. Proximal biceps tendon tenderness. Pain with palpation over the anterior aspect of the glenohumeral joint. No pain with palpation over the lateral aspect of the shoulder. No pain with palpation of the posterior aspect of the shoulder.     Range of Motion-Active:  Forward Flexion: 160  Abduction: 160  External rotation: 60   Internal rotation: to T3 spine     Strength:  Supraspinatus: 5/5  Infraspinatus/Teres minor: 5/5  Subscapularis: 5/5 with bear hug test     Special tests:  Speeds test is negative  Richardson test is negative  Neer's test is positive  [place] compression test is positive  Empty can test is negative.       Shoulder exam  Shoulder: Left     Palpation:  No AC joint tenderness. No proximal biceps tendon tenderness. No pain with palpation over the anterior aspect of the glenohumeral joint. No pain with palpation over the lateral aspect of the shoulder. No pain with palpation of the posterior aspect of the shoulder.     Range of Motion-Active:  Forward Flexion: 160  Abduction: 160  External rotation:60   Internal rotation: to T3 spine     Strength:  Supraspinatus: 5/5   Infraspinatus/Teres minor: 5/5  Subscapularis: 5/5 with bear hug test     Vascular: radial pulse +2    IMAGING &TEST RESULTS:  X-ray cervical spine series obtained on 6/9/2023 were reviewed by me and discussed with the patient.  No acute fracture noted.  No evidence of advanced degenerative disc disease or foraminal stenosis.  Straightening of the cervical spine noted    MRI program of the right shoulder completed at  [facility] on 6/8/2023 results available through our PACS system now  reviewed and discussed with the patient.  No evidence of full-thickness rotator cuff tear or labral tears.    Imaging and Procedures Note       EXAM: XR CERVICAL SPINE 4 OR 5 VIEWS     DATE OF EXAM:6/9/2023 7:23 AM     COMPARISON: None.     PROVIDED CLINICAL HISTORY: Numbness and tingling     Historical information of Current Exam: Pt states right shoulder pain since  right shoulder injury x 4 months ago lifting boxes     Symptoms: right shoulder pain     Technical limitations of the examination: n/a     Number of images: 5     Technologist's Name:  JCARLOS      FINDINGS: 5 views of the cervical spine demonstrate normal vertebral body  heights and alignment. The intervertebral disc spaces are normal. No bone  lesion. There is no evidence of significant bony neural foraminal narrowing  on the oblique views. The prevertebral soft tissues are unremarkable.     IMPRESSION:   Negative cervical spine radiographs.     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 Patient has also completed 10-day course of scheduled naproxen 220 mg taken 3 times a day and 10-day course of Relafen 750 mg taken twice a day. She was also using Neurontin on as-needed basis.  Patient previously reported resolution in her right hand numbness with the use of a volar wrist brace but today she is complaining of some numbness radiating down from her shoulder to her elbow.  Patient previously noted  improvement in her right shoulder pain but not resolution with formal physical therapy.    Due to concern for possible labral injury patient completed MRI arthrogram of her shoulder with no evidence of labral injury or evidence of full-thickness rotator cuff tear.  Patient has been working with restrictions.  This is a work comp case    Diagnosis: Right arm numbness.  Right shoulder pain concerning for cervical radiculopathy.  Right shoulder rotator cuff tendinopathy.  Treatment:  With continued right-sided shoulder pain and some pain localized to her neck with neck rotation on exam today, evidence of muscle spasms of cervical spine on her x-rays obtained today and patient complaining of right arm numbness recommend obtaining MRI of her cervical spine and EMG study of her right upper extremity.  Recommend completing remaining one session of formal physical therapy focusing on treatment for cervicalgia rather than her shoulder.  A note to physical therapist with the information was sent as well.  Recommend purchasing and using neck pillow for sleeping.  Recommend on as-needed basis icing of her shoulder but to include neck as well.  Today we agreed on continuation with work restrictions of no more than 30 pounds of pushing, pulling lifting with her right upper extremity, restricting her from working in the freezer and work hours of no more than 6 hours/day.  Follow-up with me in 4 weeks and earlier if the studies are completed prior to that.  Patient voiced understanding and was in agreement with plan.    Total time was 35 minutes. That includes chart review before the visit, the actual patient visit, and time spent on documentation after the visit.    Ancillary staff notes were reviewed and accepted.    Electronically signed by Piper Castillo MD 6/9/2023     Note to patient: The 21st Century Cures Act makes medical notes like these available to patients in the interest of transparency. However, be advised this  is a medical document. It is intended as peer to peer communication. It is written in medical language and may contain abbreviations or verbiage that are unfamiliar. It may appear blunt or direct. Medical documents are intended to carry relevant information, facts as evident, and the clinical opinion of the practitioner

## 2023-06-09 ENCOUNTER — TELEPHONE (OUTPATIENT)
Dept: CARDIOLOGY | Facility: CLINIC | Age: 83
End: 2023-06-09
Payer: MEDICARE

## 2023-06-09 DIAGNOSIS — E78.2 MIXED HYPERLIPIDEMIA: ICD-10-CM

## 2023-06-09 RX ORDER — ROSUVASTATIN CALCIUM 20 MG/1
TABLET, COATED ORAL
Qty: 30 TABLET | Refills: 1 | Status: SHIPPED | OUTPATIENT
Start: 2023-06-09

## 2023-06-09 NOTE — TELEPHONE ENCOUNTER
Rx Refill Note  Requested Prescriptions     Pending Prescriptions Disp Refills    rosuvastatin (CRESTOR) 20 MG tablet [Pharmacy Med Name: ROSUVASTATIN CALCIUM 20 MG TAB] 90 tablet 0     Sig: TAKE 1 TABLET BY MOUTH EVERY DAY      Last office visit with prescribing clinician: 10/20/2022   Last telemedicine visit with prescribing clinician: Visit date not found   Next office visit with prescribing clinician: 06/27/2023                        Would you like a call back once the refill request has been completed: [] Yes [] No    If the office needs to give you a call back, can they leave a voicemail: [] Yes [] No    April ULISES Jarquin  06/09/23, 08:27 EDT      Called pt and spoke with his wife she informed me that he is in hospice but will be released soon. So she went ahead and scheduled him an appointment.

## 2023-06-09 NOTE — TELEPHONE ENCOUNTER
Called to f/u with patient.  Spoke to spouse.  Reports a cough that has now resolved.  NO CP, pressure, worsening dyspnea, edema, weight gain.  Monitor at this time.  Did instruct to take an extra diuretic for 2 days for worsening dyspnea, edema, weight gain of 2-3 lbs.    Pt will d/c from hospice in 1 month and he would like to schedule an office visit next month.  Will coordinate with scheduling.

## 2023-06-09 NOTE — TELEPHONE ENCOUNTER
Per [employer] [facility] remote cardiac device transmission received today, 6/9/2023:    Elevated HeartLogic Index-41    I spoke with pt's spouse who reports pt is not experiencing lower extremity/abd edema, increased shortness of breath or orthopnea. Mrs. Cochran reports the patient has had a cough for the last couple of weeks. Mrs. Cochran also reports that pt is to be discharged from Hospice care soon because he his medical status has improved. Mrs. Cochran reports pt is compliant with all medications including Bumex & Entresto. Mrs. Cochran states she needs to make an appointment for the patient since he will be discharged from Hospice soon.

## 2023-07-30 PROCEDURE — 93295 DEV INTERROG REMOTE 1/2/MLT: CPT | Performed by: INTERNAL MEDICINE

## 2023-07-30 PROCEDURE — 93296 REM INTERROG EVL PM/IDS: CPT | Performed by: INTERNAL MEDICINE

## 2023-08-11 ENCOUNTER — OFFICE VISIT (OUTPATIENT)
Dept: CARDIOLOGY | Facility: HOSPITAL | Age: 83
End: 2023-08-11
Payer: MEDICARE

## 2023-08-11 VITALS
HEART RATE: 58 BPM | RESPIRATION RATE: 18 BRPM | TEMPERATURE: 97.5 F | DIASTOLIC BLOOD PRESSURE: 60 MMHG | BODY MASS INDEX: 29.98 KG/M2 | SYSTOLIC BLOOD PRESSURE: 129 MMHG | OXYGEN SATURATION: 98 % | WEIGHT: 209.4 LBS | HEIGHT: 70 IN

## 2023-08-11 DIAGNOSIS — I38 VALVULAR HEART DISEASE: ICD-10-CM

## 2023-08-11 DIAGNOSIS — I25.10 CORONARY ARTERY DISEASE INVOLVING NATIVE CORONARY ARTERY OF NATIVE HEART WITHOUT ANGINA PECTORIS: ICD-10-CM

## 2023-08-11 DIAGNOSIS — I50.22 CHRONIC SYSTOLIC CONGESTIVE HEART FAILURE: Primary | ICD-10-CM

## 2023-08-11 DIAGNOSIS — I10 ESSENTIAL HYPERTENSION: ICD-10-CM

## 2023-08-11 NOTE — PROGRESS NOTES
"[facility], 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     Pt denies worsening dyspnea.  Dyspnea intermittent mild-moderate with activity at baseline.  He has had weight gain without worsening dyspnea.  Good appetitie (eating TV dinners, soups due to removal of teeth and ease of meal prep for him and his wife).  No reported PND/orthopnea.  Sleeps with home o2 at night.  No CP or pressure, dizziness, near syncope, syncope.     Objective     Vital Signs:   Vitals:    08/11/23 1420   BP: 129/60   BP Location: Left arm   Patient Position: Sitting   Cuff Size: Adult   Pulse: 58   Resp: 18   Temp: 97.5 øF (36.4 øC)   TempSrc: Temporal   SpO2: 98%   Weight: 95 kg (209 lb 6.4 oz)   Height: 177.8 cm (70\")     Body mass index is 30.05 kg/mý.  Physical Exam  Vitals reviewed.   Constitutional:       General: He is not in acute distress.  Cardiovascular:      Rate and Rhythm: Normal rate and regular rhythm.   Pulmonary:      Effort: Pulmonary effort is normal.      Breath sounds: Normal breath sounds.   Musculoskeletal:      Right lower leg: Edema (mild lower extremity edema) present.      Left lower leg: Edema present.   Skin:     Coloration: Skin is not pale.   Neurological:      Mental Status: He is alert.   Psychiatric:         Mood and Affect: Mood normal. "         Behavior: Behavior normal. Behavior is cooperative.            Result Review  Data Reviewed:{ Labs  Result Review  Imaging  Med Tab  Media :23}     Echocardiogram 9/6/2022: EF 50%, grade 2 diastolic dysfunction, moderate AR, moderate to severe MR, RVSP greater than 55 mmHg    Lab on 07/07/2023   Component Date Value Ref Range Status    Glucose 07/07/2023 97  65 - 99 mg/dL Final    BUN 07/07/2023 16  8 - 23 mg/dL Final    Creatinine 07/07/2023 1.09  0.76 - 1.27 mg/dL Final    Sodium 07/07/2023 142  136 - 145 mmol/L Final    Potassium 07/07/2023 4.0  3.5 - 5.2 mmol/L Final    Chloride 07/07/2023 106  98 - 107 mmol/L Final    CO2 07/07/2023 25.0  22.0 - 29.0 mmol/L Final    Calcium 07/07/2023 9.9  8.6 - 10.5 mg/dL Final    Total Protein 07/07/2023 7.3  6.0 - 8.5 g/dL Final    Albumin 07/07/2023 4.4  3.5 - 5.2 g/dL Final    ALT (SGPT) 07/07/2023 8  1 - 41 U/L Final    AST (SGOT) 07/07/2023 21  1 - 40 U/L Final    Alkaline Phosphatase 07/07/2023 78  39 - 117 U/L Final    Total Bilirubin 07/07/2023 0.4  0.0 - 1.2 mg/dL Final    Globulin 07/07/2023 2.9  gm/dL Final    A/G Ratio 07/07/2023 1.5  g/dL Final    BUN/Creatinine Ratio 07/07/2023 14.7  7.0 - 25.0 Final    Anion Gap 07/07/2023 11.0  5.0 - 15.0 mmol/L Final    eGFR 07/07/2023 66.9  >60.0 mL/min/1.73 Final    WBC 07/07/2023 2.57 (L)  3.40 - 10.80 10*3/mm3 Final    RBC 07/07/2023 4.78  4.14 - 5.80 10*6/mm3 Final    Hemoglobin 07/07/2023 12.6 (L)  13.0 - 17.7 g/dL Final    Hematocrit 07/07/2023 39.4  37.5 - 51.0 % Final    MCV 07/07/2023 82.4  79.0 - 97.0 fL Final    MCH 07/07/2023 26.4 (L)  26.6 - 33.0 pg Final    MCHC 07/07/2023 32.0  31.5 - 35.7 g/dL Final    RDW 07/07/2023 14.1  12.3 - 15.4 % Final    RDW-SD 07/07/2023 41.6  37.0 - 54.0 fl Final    MPV 07/07/2023 13.0 (H)  6.0 - 12.0 fL Final    Platelets 07/07/2023 105 (L)  140 - 450 10*3/mm3 Final    Neutrophil % 07/07/2023 56.1  42.7 - 76.0 % Final    Lymphocyte % 07/07/2023 30.7  19.6 - 45.3 % Final     Monocyte % 07/07/2023 10.1  5.0 - 12.0 % Final    Eosinophil % 07/07/2023 2.7  0.3 - 6.2 % Final    Basophil % 07/07/2023 0.4  0.0 - 1.5 % Final    Immature Grans % 07/07/2023 0.0  0.0 - 0.5 % Final    Neutrophils, Absolute 07/07/2023 1.44 (L)  1.70 - 7.00 10*3/mm3 Final    Lymphocytes, Absolute 07/07/2023 0.79  0.70 - 3.10 10*3/mm3 Final    Monocytes, Absolute 07/07/2023 0.26  0.10 - 0.90 10*3/mm3 Final    Eosinophils, Absolute 07/07/2023 0.07  0.00 - 0.40 10*3/mm3 Final    Basophils, Absolute 07/07/2023 0.01  0.00 - 0.20 10*3/mm3 Final    Immature Grans, Absolute 07/07/2023 0.00  0.00 - 0.05 10*3/mm3 Final    nRBC 07/07/2023 0.4 (H)  0.0 - 0.2 /100 WBC Final                   Assessment and Plan {CC Problem List  Visit Diagnosis  ROS  Review (Popup)  Health Maintenance  Quality  BestPractice  Medications  SmartSets  SnapShot Encounters  Media :23}   1. Chronic systolic congestive heart failure  EF 15%  Continue Jardiance, Entresto, Bumex, metoprolol succinate    2. Valvular heart disease  Stable    3. Essential hypertension  Blood pressure control    4. Coronary artery disease involving native coronary artery of native heart without angina pectoris  Aspirin, statin    Samples given:  Jardiance 10 mg, quantity 4  NDC:  7456-9403-93, Lot:  [id number], Exp: 01/25    Entresto 21-26 mg, quantity 2  NDC:  8276-7783, Lot:  [id number], Exp: 10/24      Reach out to [facility] scheduling to schedule follow-up visit in the next 3 months to reestablish care with Dr. Mercedes.  F/u [facility] 6 months or sooner if needed.         Follow Up {Instructions Charge Capture  Follow-up Communications :23}   Return in about 6 months (around 2/11/2024), or if symptoms worsen or fail to improve, for Office visit, HF.    Patient was given instructions and counseling regarding his condition or for health maintenance advice. Please see specific information pulled into the AVS if appropriate.  Patient was instructed to call the [facility] with any questions, concerns, or worsening symptoms.

## 2023-08-24 DIAGNOSIS — E87.6 HYPOKALEMIA: ICD-10-CM

## 2023-08-25 RX ORDER — POTASSIUM CHLORIDE 1500 MG/1
TABLET, EXTENDED RELEASE ORAL
Qty: 90 TABLET | Refills: 3 | Status: SHIPPED | OUTPATIENT
Start: 2023-08-25

## 2023-08-25 NOTE — TELEPHONE ENCOUNTER
Tierra García with cardiology is prescriber.  Will forward to her.  Looks like she is still seeing patient.  Happy to take over prescriptions if she stops seeing him.

## 2023-08-25 NOTE — TELEPHONE ENCOUNTER
Rx Refill Note  Requested Prescriptions     Pending Prescriptions Disp Refills    potassium chloride ER (K-TAB) 20 MEQ tablet controlled-release ER tablet [Pharmacy Med Name: POTASSIUM CL ER 20 MEQ TABLET] 90 tablet 3     Sig: TAKE 1 TABLET BY MOUTH EVERY DAY      Last office visit with prescribing clinician: 7/7/2023   Last telemedicine visit with prescribing clinician: Visit date not found   Next office visit with prescribing clinician: 10/10/2023                         Would you like a call back once the refill request has been completed: [] Yes [] No    If the office needs to give you a call back, can they leave a voicemail: [] Yes [] No    Petra Bill MA  08/25/23, 08:21 EDT

## 2023-09-15 DIAGNOSIS — E11.65 TYPE 2 DIABETES MELLITUS WITH HYPERGLYCEMIA, WITHOUT LONG-TERM CURRENT USE OF INSULIN: Chronic | ICD-10-CM

## 2023-09-15 RX ORDER — BLOOD SUGAR DIAGNOSTIC
STRIP MISCELLANEOUS
Qty: 100 EACH | Refills: 11 | Status: SHIPPED | OUTPATIENT
Start: 2023-09-15

## 2023-09-15 RX ORDER — METFORMIN HYDROCHLORIDE 500 MG/1
TABLET, EXTENDED RELEASE ORAL
Qty: 90 TABLET | Refills: 3 | Status: SHIPPED | OUTPATIENT
Start: 2023-09-15

## 2023-09-15 NOTE — TELEPHONE ENCOUNTER
Rx Refill Note  Requested Prescriptions     Pending Prescriptions Disp Refills    metFORMIN ER (GLUCOPHAGE-XR) 500 MG 24 hr tablet [Pharmacy Med Name: METFORMIN HCL  MG TABLET] 90 tablet 3     Sig: TAKE 1 TABLET BY MOUTH EVERY DAY WITH BREAKFAST    OneTouch Ultra test strip [Pharmacy Med Name: ONE TOUCH ULTRA BLUE TEST STRP]  12     Sig: TEST DAILY AS DIRECTED      Last office visit with prescribing clinician: 7/7/2023   Last telemedicine visit with prescribing clinician: Visit date not found   Next office visit with prescribing clinician: 10/10/2023                         Would you like a call back once the refill request has been completed: [] Yes [] No    If the office needs to give you a call back, can they leave a voicemail: [] Yes [] No    Petra Bill MA  09/15/23, 13:01 EDT
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
## 2023-09-27 DIAGNOSIS — E78.2 MIXED HYPERLIPIDEMIA: ICD-10-CM

## 2023-09-27 RX ORDER — ROSUVASTATIN CALCIUM 20 MG/1
TABLET, COATED ORAL
Qty: 90 TABLET | Refills: 1 | Status: SHIPPED | OUTPATIENT
Start: 2023-09-27

## 2023-09-27 NOTE — TELEPHONE ENCOUNTER
Rx Refill Note  Requested Prescriptions     Pending Prescriptions Disp Refills    rosuvastatin (CRESTOR) 20 MG tablet [Pharmacy Med Name: ROSUVASTATIN CALCIUM 20 MG TAB] 30 tablet 1     Sig: TAKE 1 TABLET BY MOUTH EVERY DAY      Last office visit with prescribing clinician: 7/7/2023   Last telemedicine visit with prescribing clinician: Visit date not found   Next office visit with prescribing clinician: 10/10/2023                         Would you like a call back once the refill request has been completed: [] Yes [] No    If the office needs to give you a call back, can they leave a voicemail: [] Yes [] No    Petra Bill MA  09/27/23, 09:26 EDT

## 2023-10-02 RX ORDER — METOPROLOL SUCCINATE 50 MG/1
TABLET, EXTENDED RELEASE ORAL
Qty: 90 TABLET | Refills: 2 | Status: SHIPPED | OUTPATIENT
Start: 2023-10-02

## 2023-10-02 NOTE — TELEPHONE ENCOUNTER
Rx Refill Note  Requested Prescriptions     Pending Prescriptions Disp Refills    metoprolol succinate XL (TOPROL-XL) 50 MG 24 hr tablet [Pharmacy Med Name: METOPROLOL SUCC ER 50 MG TAB] 90 tablet 2     Sig: TAKE 1 TABLET BY MOUTH EVERY DAY      Last office visit with prescribing clinician: 7/7/2023   Last telemedicine visit with prescribing clinician: Visit date not found   Next office visit with prescribing clinician: 10/10/2023                         Would you like a call back once the refill request has been completed: [] Yes [] No    If the office needs to give you a call back, can they leave a voicemail: [] Yes [] No    Sheree Araiza MA  10/02/23, 08:46 EDT

## 2023-10-10 ENCOUNTER — OFFICE VISIT (OUTPATIENT)
Dept: FAMILY MEDICINE CLINIC | Facility: CLINIC | Age: 83
End: 2023-10-10
Payer: MEDICARE

## 2023-10-10 VITALS
RESPIRATION RATE: 14 BRPM | OXYGEN SATURATION: 98 % | HEIGHT: 70 IN | DIASTOLIC BLOOD PRESSURE: 64 MMHG | BODY MASS INDEX: 30.01 KG/M2 | HEART RATE: 64 BPM | SYSTOLIC BLOOD PRESSURE: 138 MMHG | WEIGHT: 209.6 LBS

## 2023-10-10 DIAGNOSIS — I10 ESSENTIAL HYPERTENSION: ICD-10-CM

## 2023-10-10 DIAGNOSIS — E78.5 HYPERLIPIDEMIA LDL GOAL <70: ICD-10-CM

## 2023-10-10 DIAGNOSIS — Z23 IMMUNIZATION DUE: ICD-10-CM

## 2023-10-10 DIAGNOSIS — Z00.00 PHYSICAL EXAM, ANNUAL: ICD-10-CM

## 2023-10-10 DIAGNOSIS — Z00.00 MEDICARE ANNUAL WELLNESS VISIT, SUBSEQUENT: Primary | ICD-10-CM

## 2023-10-10 DIAGNOSIS — E11.65 TYPE 2 DIABETES MELLITUS WITH HYPERGLYCEMIA, WITHOUT LONG-TERM CURRENT USE OF INSULIN: ICD-10-CM

## 2023-10-10 PROCEDURE — 3078F DIAST BP <80 MM HG: CPT | Performed by: PHYSICIAN ASSISTANT

## 2023-10-10 PROCEDURE — 99397 PER PM REEVAL EST PAT 65+ YR: CPT | Performed by: PHYSICIAN ASSISTANT

## 2023-10-10 PROCEDURE — 1159F MED LIST DOCD IN RCRD: CPT | Performed by: PHYSICIAN ASSISTANT

## 2023-10-10 PROCEDURE — 1160F RVW MEDS BY RX/DR IN RCRD: CPT | Performed by: PHYSICIAN ASSISTANT

## 2023-10-10 PROCEDURE — G0439 PPPS, SUBSEQ VISIT: HCPCS | Performed by: PHYSICIAN ASSISTANT

## 2023-10-10 PROCEDURE — G0008 ADMIN INFLUENZA VIRUS VAC: HCPCS | Performed by: PHYSICIAN ASSISTANT

## 2023-10-10 PROCEDURE — 3075F SYST BP GE 130 - 139MM HG: CPT | Performed by: PHYSICIAN ASSISTANT

## 2023-10-10 PROCEDURE — 90662 IIV NO PRSV INCREASED AG IM: CPT | Performed by: PHYSICIAN ASSISTANT

## 2023-10-10 NOTE — PROGRESS NOTES
The ABCs of the Annual Wellness Visit  Subsequent Medicare Wellness Visit    Subjective    Narendra Cochran is a 82 y.o. male who presents for a Subsequent Medicare Wellness Visit.    The following portions of the patient's history were reviewed and   updated as appropriate: allergies, current medications, past family history, past medical history, past social history, past surgical history, and problem list.    Compared to one year ago, the patient feels his physical   health is better.    Compared to one year ago, the patient feels his mental   health is the same.    Recent Hospitalizations:  He was not admitted to the hospital during the last year.       Current Medical Providers:  Patient Care Team:  Marguerite Moore PA-C as PCP - General (Physician Assistant)  Som Boyd DO as Consulting Physician (Cardiology)  Javad Mercedes MD as Consulting Physician (Cardiology)  Thomas Lui MD as Consulting Physician (Hematology and Oncology)    Outpatient Medications Prior to Visit   Medication Sig Dispense Refill    albuterol sulfate  (90 Base) MCG/ACT inhaler Inhale 2 puffs Every 4 (Four) Hours As Needed for Wheezing or Shortness of Air. 8 g 3    aspirin 81 MG chewable tablet Chew 1 tablet Daily.      Blood Glucose Monitoring Suppl (ONE TOUCH ULTRA 2) w/Device kit TEST DAILY AS DIRECTED      bumetanide (BUMEX) 1 MG tablet TAKE 1 TABLET BY MOUTH EVERY DAY 90 tablet 1    diclofenac (VOLTAREN) 1 % gel gel Apply 4 g topically to the appropriate area as directed 4 (Four) Times a Day As Needed (prn for pain). 60 tube 0    Diclofenac Sodium (VOLTAREN) 1 % gel gel APPLY 2 GRAMS TO FEET TWICE DAILY FOR PAIN AND STIFFNESS      empagliflozin (JARDIANCE) 10 MG tablet tablet Take 1 tablet by mouth Daily. 30 tablet 11    glucose blood (OneTouch Ultra) test strip TEST DAILY AS DIRECTED 100 each 11    glucose monitor monitoring kit 1 each Daily. Use daily as directed. 1 each 0    Lancets (OneTouch Delica Plus  [id number]) misc See Admin Instructions.      levETIRAcetam (KEPPRA) 750 MG tablet TAKE 1 TABLET BY MOUTH TWICE A  tablet 1    metFORMIN ER (GLUCOPHAGE-XR) 500 MG 24 hr tablet TAKE 1 TABLET BY MOUTH EVERY DAY WITH BREAKFAST 90 tablet 3    metoprolol succinate XL (TOPROL-XL) 50 MG 24 hr tablet TAKE 1 TABLET BY MOUTH EVERY DAY 90 tablet 2    pantoprazole (PROTONIX) 40 MG EC tablet TAKE 1 TABLET BY MOUTH TWICE A  tablet 1    polyethylene glycol (MIRALAX) 17 GM/SCOOP powder Take 17 g by mouth Every Night. 578 g 1    potassium chloride ER (K-TAB) 20 MEQ tablet controlled-release ER tablet TAKE 1 TABLET BY MOUTH EVERY DAY 90 tablet 3    rosuvastatin (CRESTOR) 20 MG tablet TAKE 1 TABLET BY MOUTH EVERY DAY 90 tablet 1    sacubitril-valsartan (Entresto) 24-26 MG tablet Take 1 tablet by mouth 2 (Two) Times a Day. 180 tablet 2     No facility-administered medications prior to visit.       No opioid medication identified on active medication list. I have reviewed chart for other potential  high risk medication/s and harmful drug interactions in the elderly.        Aspirin is on active medication list. Aspirin use is indicated based on review of current medical condition/s. Pros and cons of this therapy have been discussed today. Benefits of this medication outweigh potential harm.  Patient has been encouraged to continue taking this medication.  .      Patient Active Problem List   Diagnosis    Essential hypertension    Hyperlipidemia LDL goal <70    Paget disease of bone    H/O seizures on Keppra    Gonalgia    Osteitis deformans    COPD (chronic obstructive pulmonary disease)    Syncope    NSVT (nonsustained ventricular tachycardia)    Coronary artery disease involving native coronary artery of native heart with angina pectoris    NICM     OHCA    Hypokalemia    Hypomagnesemia    VHD; mild-mod AR, mild MR    Chronic systolic congestive heart failure    Former tobacco user    GERD    Marijuana use    Frequent PVCs  "   Presence of biventricular implantable cardioverter-defibrillator (ICD)    CKD (chronic kidney disease) stage 2, GFR 60-89 ml/min    Acute kidney injury    Moderate malnutrition    Acute on chronic congestive heart failure, unspecified heart failure type    Paroxysmal A-fib    Type 2 diabetes mellitus    Thrombocytopenia    Acute pulmonary edema    Elevated brain natriuretic peptide (BNP) level    Acute on chronic systolic heart failure     Advance Care Planning   Advance Care Planning     Advance Directive is on file.  ACP discussion was declined by the patient. Declined by patient and wife.     Objective    Vitals:    10/10/23 1438   BP: 138/64   BP Location: Left arm   Patient Position: Sitting   Cuff Size: Large Adult   Pulse: 64   Resp: 14   SpO2: 98%   Weight: 95.1 kg (209 lb 9.6 oz)   Height: 177.8 cm (70\")   PainSc: 0-No pain     Estimated body mass index is 30.07 kg/mý as calculated from the following:    Height as of this encounter: 177.8 cm (70\").    Weight as of this encounter: 95.1 kg (209 lb 9.6 oz).    BMI is >= 30 and <35. (Class 1 Obesity). The following options were offered after discussion;: exercise counseling/recommendations and nutrition counseling/recommendations      Does the patient have evidence of cognitive impairment? No          HEALTH RISK ASSESSMENT    Smoking Status:  Social History     Tobacco Use   Smoking Status Former    Packs/day: 0.50    Years: 65.00    Additional pack years: 0.00    Total pack years: 32.50    Types: Cigars, Cigarettes    Quit date: 2019    Years since quittin.1   Smokeless Tobacco Never     Alcohol Consumption:  Social History     Substance and Sexual Activity   Alcohol Use Yes    Comment: very rarely     Fall Risk Screen:    STEADI Fall Risk Assessment was completed, and patient is at LOW risk for falls.Assessment completed on:10/10/2023    Depression Screening:      10/10/2023     2:36 PM   PHQ-2/PHQ-9 Depression Screening   Little Interest or " Pleasure in Doing Things 0-->not at all   Feeling Down, Depressed or Hopeless 2-->more than half the days   Trouble Falling or Staying Asleep, or Sleeping Too Much 0-->not at all   Feeling Tired or Having Little Energy 0-->not at all   Poor Appetite or Overeating 0-->not at all   Feeling Bad about Yourself - or that You are a Failure or Have Let Yourself or Your Family Down 0-->not at all   Trouble Concentrating on Things, Such as Reading the Newspaper or Watching Television 0-->not at all   Moving or Speaking So Slowly that Other People Could Have Noticed? Or the Opposite - Being So Fidgety 0-->not at all   Thoughts that You Would be Better Off Dead or of Hurting Yourself in Some Way 0-->not at all   PHQ-9: Brief Depression Severity Measure Score 2   If You Checked Off Any Problems, How Difficult Have These Problems Made It For You to Do Your Work, Take Care of Things at Home, or Get Along with Other People? not difficult at all       Health Habits and Functional and Cognitive Screenin/6/2022    10:02 AM   Functional & Cognitive Status   Do you have difficulty preparing food and eating? Yes   Do you have difficulty bathing yourself, getting dressed or grooming yourself? No   Do you have difficulty using the toilet? No   Do you have difficulty moving around from place to place? No   Do you have trouble with steps or getting out of a bed or a chair? No   Current Diet Well Balanced Diet   Dental Exam Up to date   Eye Exam Up to date   Exercise (times per week) 4 times per week   Current Exercises Include Walking   Do you need help using the phone?  No   Are you deaf or do you have serious difficulty hearing?  Yes   Do you need help to go to places out of walking distance? No   Do you need help shopping? No   Do you need help preparing meals?  No   Do you need help with housework?  No   Do you need help with laundry? No   Do you need help taking your medications? No   Do you need help managing money? No   Do  you ever drive or ride in a car without wearing a seat belt? No       Age-appropriate Screening Schedule:  Refer to the list below for future screening recommendations based on patient's age, sex and/or medical conditions. Orders for these recommended tests are listed in the plan section. The patient has been provided with a written plan.    Health Maintenance   Topic Date Due    BMI FOLLOWUP  Never done    TDAP/TD VACCINES (1 - Tdap) Never done    ZOSTER VACCINE (1 of 2) Never done    URINE MICROALBUMIN  05/18/2022    INFLUENZA VACCINE  08/01/2023    COVID-19 Vaccine (4 - 2023-24 season) 09/01/2023    LIPID PANEL  09/06/2023    HEMOGLOBIN A1C  01/07/2024    DIABETIC EYE EXAM  08/28/2024    ANNUAL WELLNESS VISIT  10/10/2024    COLORECTAL CANCER SCREENING  12/02/2030    Pneumococcal Vaccine 65+  Completed                  CMS Preventative Services Quick Reference  Risk Factors Identified During Encounter  Immunizations Discussed/Encouraged: Influenza, Shingrix, and COVID19  Dental Screening Recommended  Vision Screening Recommended  The above risks/problems have been discussed with the patient.  Pertinent information has been shared with the patient in the After Visit Summary.  An After Visit Summary and PPPS were made available to the patient.    Follow Up:   Next Medicare Wellness visit to be scheduled in 1 year.       Additional E&M Note during same encounter follows:  Patient has multiple medical problems which are significant and separately identifiable that require additional work above and beyond the Medicare Wellness Visit.      Chief Complaint  Medicare Wellness-subsequent    Subjective        HPI  Narendra Cochran is also being seen today for Annual Physical Exam.    Review of Systems   Constitutional:  Positive for fatigue. Negative for chills and fever.   HENT:  Positive for dental problem and hearing loss. Negative for congestion, ear pain, postnasal drip, rhinorrhea, sinus pressure and sore throat.    Eyes:  " Negative for visual disturbance.   Respiratory:  Negative for cough, shortness of breath and wheezing.    Cardiovascular:  Negative for chest pain, palpitations and leg swelling.   Gastrointestinal:  Negative for abdominal pain, blood in stool, diarrhea, nausea and vomiting.   Endocrine: Negative for polyuria.   Genitourinary:  Negative for dysuria, flank pain, frequency and hematuria.   Musculoskeletal:  Positive for arthralgias and gait problem. Negative for myalgias.   Skin:  Negative for rash.   Neurological:  Negative for dizziness and confusion.   Hematological:  Does not bruise/bleed easily.   Psychiatric/Behavioral:  Negative for agitation, dysphoric mood, self-injury, sleep disturbance and suicidal ideas. The patient is not nervous/anxious.        Objective   Vital Signs:  /64 (BP Location: Left arm, Patient Position: Sitting, Cuff Size: Large Adult)   Pulse 64   Resp 14   Ht 177.8 cm (70\")   Wt 95.1 kg (209 lb 9.6 oz)   SpO2 98%   BMI 30.07 kg/mý     BMI is >= 30 and <35. (Class 1 Obesity). The following options were offered after discussion;: exercise counseling/recommendations and nutrition counseling/recommendations    Physical Exam  Vitals reviewed.   Constitutional:       General: He is not in acute distress.     Appearance: Normal appearance. He is well-developed and overweight. He is not ill-appearing or diaphoretic.   HENT:      Head: Normocephalic and atraumatic.      Right Ear: Decreased hearing noted.      Left Ear: Decreased hearing noted.      Ears:      Comments: Wearing hearing aids.     Nose: Nose normal.      Right Sinus: No maxillary sinus tenderness or frontal sinus tenderness.      Left Sinus: No maxillary sinus tenderness or frontal sinus tenderness.      Mouth/Throat:      Lips: Pink. No lesions.      Dentition: No gingival swelling.      Pharynx: Uvula midline.      Comments: No teeth, waiting to get dentures.  Eyes:      General: Lids are normal.      Extraocular " Movements: Extraocular movements intact.      Conjunctiva/sclera: Conjunctivae normal.   Neck:      Thyroid: No thyroid mass or thyromegaly.      Trachea: Trachea and phonation normal.   Cardiovascular:      Rate and Rhythm: Normal rate and regular rhythm.      Heart sounds: Normal heart sounds.   Pulmonary:      Effort: Pulmonary effort is normal.      Breath sounds: Normal breath sounds.   Abdominal:      General: Bowel sounds are normal. There is no distension.      Palpations: Abdomen is soft. Abdomen is not rigid.      Tenderness: There is no abdominal tenderness. There is no guarding.   Musculoskeletal:         General: Normal range of motion.      Cervical back: Normal range of motion.      Right lower leg: No edema.      Left lower leg: No edema.   Lymphadenopathy:      Cervical: No cervical adenopathy.      Right cervical: No superficial cervical adenopathy.     Left cervical: No superficial cervical adenopathy.   Skin:     General: Skin is warm.      Findings: No erythema or rash.      Nails: There is no clubbing.   Neurological:      Mental Status: He is alert and oriented to person, place, and time.      Coordination: Coordination normal.      Gait: Gait abnormal.      Deep Tendon Reflexes: Reflexes are normal and symmetric.      Comments: CN grossly intact.  Antalgic gait secondary to knee pain.   Psychiatric:         Attention and Perception: Attention and perception normal. He is attentive.         Mood and Affect: Mood and affect normal.         Speech: Speech normal.         Behavior: Behavior normal. Behavior is cooperative.         Thought Content: Thought content normal.         Cognition and Memory: Cognition and memory normal.         Judgment: Judgment normal.          Assessment and Plan   Diagnoses and all orders for this visit:    1. Medicare annual wellness visit, subsequent (Primary)    2. Physical exam, annual  -     Hemoglobin A1c; Future  -     Lipid Panel; Future  -     Comprehensive  Metabolic Panel; Future  -     CBC Auto Differential; Future  PE completed  Preventative labs ordered  Colonoscopy is up-to-date  Mammogram  Gynecologist  Dentist  Ophthalmologist  Dermatologist  Vaccinations discussed    3. Hyperlipidemia LDL goal <70  -     Lipid Panel; Future  On statin.  Followed by cardiology.    4. Essential hypertension  -     Comprehensive Metabolic Panel; Future  -     CBC Auto Differential; Future  Stable, well-controlled.  Compliant on medication.    5. Type 2 diabetes mellitus with hyperglycemia, without long-term current use of insulin  -     Hemoglobin A1c; Future  -     Microalbumin / Creatinine Urine Ratio - Urine, Clean Catch; Future  Recent hemoglobin AIC was 6.5%.  On metformin and jardiance.  Discussed avoiding sodas and catalina aid.    6. Immunization due  -     Fluzone High-Dose 65+yrs    Plan of care reviewed with patient at the conclusion of today's visit. Education was provided regarding diet , nutrition , exercise, weight management, supplements, preventative screenings, vaccinations, and mental health diagnosis, management and any prescribed or recommended OTC medications.  Patient verbalizes understanding of and agreement with management plan.          I spent 40 minutes caring for Narendra on this date of service. This time includes time spent by me in the following activities:preparing for the visit, reviewing tests, obtaining and/or reviewing a separately obtained history, performing a medically appropriate examination and/or evaluation , counseling and educating the patient/family/caregiver, ordering medications, tests, or procedures, referring and communicating with other health care professionals , documenting information in the medical record, independently interpreting results and communicating that information with the patient/family/caregiver, and care coordination  Follow Up   Return in about 6 months (around 4/10/2024) for Recheck, 6 month follow-up.  Patient was  given instructions and counseling regarding his condition or for health maintenance advice. Please see specific information pulled into the AVS if appropriate.

## 2023-10-23 DIAGNOSIS — E11.65 TYPE 2 DIABETES MELLITUS WITH HYPERGLYCEMIA: ICD-10-CM

## 2023-10-24 RX ORDER — LANCETS 33 GAUGE
EACH MISCELLANEOUS SEE ADMIN INSTRUCTIONS
Qty: 100 EACH | Refills: 12 | Status: SHIPPED | OUTPATIENT
Start: 2023-10-24

## 2023-10-24 NOTE — TELEPHONE ENCOUNTER
Rx Refill Note  Requested Prescriptions     Pending Prescriptions Disp Refills    Lancets (OneTouch Delica Plus [id number]) misc [Pharmacy Med Name: ONE TOUCH DELICA PLUS 33G LANC] 100 each 12     Sig: USE AS DIRECTED      Last office visit with prescribing clinician: 10/10/2023   Last telemedicine visit with prescribing clinician: Visit date not found   Next office visit with prescribing clinician: 4/10/2024                         Would you like a call back once the refill request has been completed: [] Yes [] No    If the office needs to give you a call back, can they leave a voicemail: [] Yes [] No    Elvira Treviño MA  10/24/23, 08:39 EDT

## 2023-10-25 ENCOUNTER — TELEPHONE (OUTPATIENT)
Dept: CARDIOLOGY | Facility: HOSPITAL | Age: 83
End: 2023-10-25
Payer: MEDICARE

## 2023-10-25 DIAGNOSIS — I50.32 CHRONIC HEART FAILURE WITH PRESERVED EJECTION FRACTION: ICD-10-CM

## 2023-10-25 DIAGNOSIS — I38 VALVULAR HEART DISEASE: ICD-10-CM

## 2023-10-25 DIAGNOSIS — I50.22 CHRONIC SYSTOLIC CONGESTIVE HEART FAILURE: ICD-10-CM

## 2023-10-25 DIAGNOSIS — I10 ESSENTIAL HYPERTENSION: ICD-10-CM

## 2023-10-25 RX ORDER — BUMETANIDE 1 MG/1
TABLET ORAL
Qty: 90 TABLET | Refills: 3 | Status: SHIPPED | OUTPATIENT
Start: 2023-10-25

## 2023-10-25 RX ORDER — SACUBITRIL AND VALSARTAN 24; 26 MG/1; MG/1
1 TABLET, FILM COATED ORAL 2 TIMES DAILY
Qty: 180 TABLET | Refills: 3 | Status: SHIPPED | OUTPATIENT
Start: 2023-10-25

## 2023-12-31 DIAGNOSIS — R56.9 SEIZURE: Chronic | ICD-10-CM

## 2024-01-02 RX ORDER — LEVETIRACETAM 750 MG/1
TABLET ORAL
Qty: 180 TABLET | Refills: 1 | Status: SHIPPED | OUTPATIENT
Start: 2024-01-02

## 2024-01-16 DIAGNOSIS — I50.22 CHRONIC SYSTOLIC CONGESTIVE HEART FAILURE: ICD-10-CM

## 2024-01-16 DIAGNOSIS — I50.32 CHRONIC HEART FAILURE WITH PRESERVED EJECTION FRACTION: ICD-10-CM

## 2024-01-16 DIAGNOSIS — I38 VALVULAR HEART DISEASE: ICD-10-CM

## 2024-01-16 DIAGNOSIS — I10 ESSENTIAL HYPERTENSION: ICD-10-CM

## 2024-01-16 DIAGNOSIS — E11.65 TYPE 2 DIABETES MELLITUS WITH HYPERGLYCEMIA, WITHOUT LONG-TERM CURRENT USE OF INSULIN: ICD-10-CM

## 2024-01-17 RX ORDER — SACUBITRIL AND VALSARTAN 24; 26 MG/1; MG/1
1 TABLET, FILM COATED ORAL 2 TIMES DAILY
Qty: 60 TABLET | Refills: 11 | Status: SHIPPED | OUTPATIENT
Start: 2024-01-17

## 2024-03-06 ENCOUNTER — OFFICE VISIT (OUTPATIENT)
Dept: CARDIOLOGY | Facility: HOSPITAL | Age: 84
End: 2024-03-06
Payer: MEDICARE

## 2024-03-06 VITALS
OXYGEN SATURATION: 96 % | DIASTOLIC BLOOD PRESSURE: 64 MMHG | RESPIRATION RATE: 18 BRPM | HEART RATE: 60 BPM | WEIGHT: 212.8 LBS | SYSTOLIC BLOOD PRESSURE: 122 MMHG | BODY MASS INDEX: 30.46 KG/M2 | HEIGHT: 70 IN | TEMPERATURE: 97.2 F

## 2024-03-06 DIAGNOSIS — I10 ESSENTIAL HYPERTENSION: ICD-10-CM

## 2024-03-06 DIAGNOSIS — I25.10 CORONARY ARTERY DISEASE INVOLVING NATIVE CORONARY ARTERY OF NATIVE HEART WITHOUT ANGINA PECTORIS: ICD-10-CM

## 2024-03-06 DIAGNOSIS — I38 VALVULAR HEART DISEASE: ICD-10-CM

## 2024-03-06 DIAGNOSIS — I50.22 CHRONIC SYSTOLIC CONGESTIVE HEART FAILURE: Primary | ICD-10-CM

## 2024-03-06 NOTE — PROGRESS NOTES
"[facility], [facility]    Chief Complaint  Congestive Heart Failure    Subjective    History of Present Illness {CC  Problem List  Visit  Diagnosis   Encounters  Notes  Medications  Labs  Result Review Imaging  Media :23}     Narendra Cochran presents to [facility]ARD[facility] for   History of Present Illness     83-year-old male with heart failure with reduced EF, valvular heart disease, CAD, sinus node dysfunction, PVCs status post pacemaker/ICD (placed after cardiac arrest), dyslipidemia, diabetes, COPD, diverticulosis, Paget's disease, seizure disorder, GERD      Pt has been under hospice care but recently discharged due to improvement and stabilization.     No recent hospitalization or ED visit.  No CP, pressure, dizziness, near syncope, syncope, edema.  Dyspnea on occasion that improved with inhaler.       Objective     Vital Signs:   Vitals:    03/06/24 1323 03/06/24 1340   BP: 149/67 122/64   BP Location: Left arm    Patient Position: Sitting    Cuff Size: Adult    Pulse: 60    Resp: 18    Temp: 97.2 °F (36.2 °C)    TempSrc: Temporal    SpO2: 96%    Weight: 96.5 kg (212 lb 12.8 oz)    Height: 177.8 cm (70\")      Body mass index is 30.53 kg/m².  Physical Exam         Result Review  Data Reviewed:{ Labs  Result Review  Imaging  Med Tab  Media :23}      Echocardiogram 9/6/2022: EF 50%, grade 2 diastolic dysfunction, moderate AR, moderate to severe MR, RVSP greater than 55 mmHg              Assessment and Plan {CC Problem List  Visit Diagnosis  ROS  Review (Popup)  Health Maintenance  Quality  BestPractice  Medications  SmartSets  SnapShot Encounters  Media :23}   1. Chronic systolic congestive heart failure  Stable  Continue bumex, metoprolol succ, enstresto, jardiance    Samples for Jardiance 10 mg, quantity 4. Lot:  [id number], Exp: 01/26    Repeat labs as ordered by PCP    2. Valvular heart disease  stable    3. Essential " hypertension  Repeat improved  Well controlled    4. Coronary artery disease involving native coronary artery of native heart without angina pectoris  Asa, statin    F/u with [facility] to be scheduled.  Reached out to [facility] schedule to schedule in 3 months.      Follow Up {Instructions Charge Capture  Follow-up Communications :23}   Return in about 6 months (around 9/6/2024), or if symptoms worsen or fail to improve, for HF.    Patient was given instructions and counseling regarding his condition or for health maintenance advice. Please see specific information pulled into the AVS if appropriate.  Patient was instructed to call the [facility] with any questions, concerns, or worsening symptoms.

## 2024-03-19 DIAGNOSIS — K21.9 GASTROESOPHAGEAL REFLUX DISEASE, UNSPECIFIED WHETHER ESOPHAGITIS PRESENT: ICD-10-CM

## 2024-03-19 RX ORDER — PANTOPRAZOLE SODIUM 40 MG/1
TABLET, DELAYED RELEASE ORAL
Qty: 180 TABLET | Refills: 1 | Status: SHIPPED | OUTPATIENT
Start: 2024-03-19

## 2024-03-31 DIAGNOSIS — E78.2 MIXED HYPERLIPIDEMIA: ICD-10-CM

## 2024-04-01 RX ORDER — ROSUVASTATIN CALCIUM 20 MG/1
TABLET, COATED ORAL
Qty: 90 TABLET | Refills: 1 | Status: SHIPPED | OUTPATIENT
Start: 2024-04-01

## 2024-04-01 NOTE — TELEPHONE ENCOUNTER
Rx Refill Note  Requested Prescriptions     Pending Prescriptions Disp Refills    rosuvastatin (CRESTOR) 20 MG tablet [Pharmacy Med Name: ROSUVASTATIN CALCIUM 20 MG TAB] 90 tablet 1     Sig: TAKE 1 TABLET BY MOUTH EVERY DAY      Last office visit with prescribing clinician: 10/10/2023   Last telemedicine visit with prescribing clinician: Visit date not found   Next office visit with prescribing clinician: 4/10/2024       Elham Coy MA  04/01/24, 11:02 EDT

## 2024-04-10 ENCOUNTER — LAB (OUTPATIENT)
Dept: LAB | Facility: HOSPITAL | Age: 84
End: 2024-04-10
Payer: MEDICARE

## 2024-04-10 ENCOUNTER — OFFICE VISIT (OUTPATIENT)
Dept: FAMILY MEDICINE CLINIC | Facility: CLINIC | Age: 84
End: 2024-04-10
Payer: MEDICARE

## 2024-04-10 VITALS
WEIGHT: 211.6 LBS | HEART RATE: 60 BPM | HEIGHT: 70 IN | OXYGEN SATURATION: 98 % | RESPIRATION RATE: 14 BRPM | DIASTOLIC BLOOD PRESSURE: 58 MMHG | BODY MASS INDEX: 30.29 KG/M2 | SYSTOLIC BLOOD PRESSURE: 130 MMHG

## 2024-04-10 DIAGNOSIS — E78.5 HYPERLIPIDEMIA LDL GOAL <70: ICD-10-CM

## 2024-04-10 DIAGNOSIS — I10 ESSENTIAL HYPERTENSION: Primary | ICD-10-CM

## 2024-04-10 DIAGNOSIS — E11.65 TYPE 2 DIABETES MELLITUS WITH HYPERGLYCEMIA, WITHOUT LONG-TERM CURRENT USE OF INSULIN: ICD-10-CM

## 2024-04-10 DIAGNOSIS — R56.9 SEIZURE: Chronic | ICD-10-CM

## 2024-04-10 DIAGNOSIS — I10 ESSENTIAL HYPERTENSION: ICD-10-CM

## 2024-04-10 LAB
BASOPHILS # BLD AUTO: 0.01 10*3/MM3 (ref 0–0.2)
BASOPHILS NFR BLD AUTO: 0.3 % (ref 0–1.5)
DEPRECATED RDW RBC AUTO: 43.3 FL (ref 37–54)
EOSINOPHIL # BLD AUTO: 0.05 10*3/MM3 (ref 0–0.4)
EOSINOPHIL NFR BLD AUTO: 1.5 % (ref 0.3–6.2)
ERYTHROCYTE [DISTWIDTH] IN BLOOD BY AUTOMATED COUNT: 14.3 % (ref 12.3–15.4)
EXPIRATION DATE: ABNORMAL
HBA1C MFR BLD: 7 % (ref 4.5–5.7)
HCT VFR BLD AUTO: 46.1 % (ref 37.5–51)
HGB BLD-MCNC: 14.5 G/DL (ref 13–17.7)
IMM GRANULOCYTES # BLD AUTO: 0.01 10*3/MM3 (ref 0–0.05)
IMM GRANULOCYTES NFR BLD AUTO: 0.3 % (ref 0–0.5)
LYMPHOCYTES # BLD AUTO: 1.16 10*3/MM3 (ref 0.7–3.1)
LYMPHOCYTES NFR BLD AUTO: 34.7 % (ref 19.6–45.3)
Lab: ABNORMAL
MCH RBC QN AUTO: 26.4 PG (ref 26.6–33)
MCHC RBC AUTO-ENTMCNC: 31.5 G/DL (ref 31.5–35.7)
MCV RBC AUTO: 83.8 FL (ref 79–97)
MONOCYTES # BLD AUTO: 0.36 10*3/MM3 (ref 0.1–0.9)
MONOCYTES NFR BLD AUTO: 10.8 % (ref 5–12)
NEUTROPHILS NFR BLD AUTO: 1.75 10*3/MM3 (ref 1.7–7)
NEUTROPHILS NFR BLD AUTO: 52.4 % (ref 42.7–76)
NRBC BLD AUTO-RTO: 0 /100 WBC (ref 0–0.2)
PLATELET # BLD AUTO: 122 10*3/MM3 (ref 140–450)
PMV BLD AUTO: 11.9 FL (ref 6–12)
RBC # BLD AUTO: 5.5 10*6/MM3 (ref 4.14–5.8)
WBC NRBC COR # BLD AUTO: 3.34 10*3/MM3 (ref 3.4–10.8)

## 2024-04-10 PROCEDURE — 80061 LIPID PANEL: CPT

## 2024-04-10 PROCEDURE — 85025 COMPLETE CBC W/AUTO DIFF WBC: CPT

## 2024-04-10 PROCEDURE — 84439 ASSAY OF FREE THYROXINE: CPT

## 2024-04-10 PROCEDURE — 80053 COMPREHEN METABOLIC PANEL: CPT

## 2024-04-10 PROCEDURE — 84443 ASSAY THYROID STIM HORMONE: CPT

## 2024-04-10 NOTE — PROGRESS NOTES
Chief Complaint   Patient presents with    Follow-up     6 mths from last visit       Narendra Cochran is a pleasant 83 y.o. male who is here for routine follow-up of diabetes type 2, hyperlipidemia and hypertension.  Patient is compliant on all medications.  His glucose values have been high at home.  He admits he is drinking a lot of orange pop.  He plans on drinking more water and switching to diet pop.  His blood pressure is stable and well-controlled.  Using a cane to ambulate.  Son is present with patient today.    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    Paget disease of bone        Past Surgical History:   Procedure Laterality Date    APPENDECTOMY      CARDIAC CATHETERIZATION N/A 3/18/2020    Procedure: Left Heart Cath;  Surgeon: Sonya Garibay MD;  Location:  [facility];  Service: Cardiology;  Laterality: N/A;  First availabel provider      CARDIAC CATHETERIZATION N/A 3/15/2021    Procedure: LEFT HEART CATH;  Surgeon: Doc Sahni IV, MD;  Location:  [facility];  Service: Cardiovascular;  Laterality: N/A;    CARDIAC CATHETERIZATION N/A 3/15/2021    Procedure: Coronary angiography;  Surgeon: Doc Sahni IV, MD;  Location:  [facility];  Service: Cardiovascular;  Laterality: N/A;    CARDIAC ELECTROPHYSIOLOGY PROCEDURE N/A 3/16/2021    Procedure: ICD DC new;  Surgeon: Som Boyd DO;  Location:  [facility];  Service: Cardiology;  Laterality: N/A;    COLON RESECTION      second to diverticulitis     FOOT SURGERY Left        Family History   Problem Relation Age of Onset    No Known Problems  "Daughter     No Known Problems Son     No Known Problems Son     No Known Problems Son     Diabetes Sister     Clotting disorder Sister     Hyperlipidemia Mother     No Known Problems Sister     No Known Problems Brother     Fainting Brother        Social History     Socioeconomic History    Marital status:    Tobacco Use    Smoking status: Former     Current packs/day: 0.00     Average packs/day: 0.5 packs/day for 65.0 years (32.5 ttl pk-yrs)     Types: Cigars, Cigarettes     Start date: 1954     Quit date: 2019     Years since quittin.6    Smokeless tobacco: Never   Vaping Use    Vaping status: Never Used   Substance and Sexual Activity    Alcohol use: Yes     Comment: very rarely    Drug use: Yes     Types: Marijuana     Comment: Pt states used weekly but now quitting     Sexual activity: Defer       No Known Allergies    ROS  Review of Systems   Constitutional:  Negative for chills and fever.   Eyes:  Negative for visual disturbance.   Respiratory:  Negative for cough, shortness of breath and wheezing.    Cardiovascular:  Negative for chest pain, palpitations and leg swelling.   Endocrine: Negative for polyuria.   Musculoskeletal:  Positive for arthralgias and gait problem.   Neurological:  Negative for dizziness and headache.       Vitals:    04/10/24 1411   BP: 130/58   BP Location: Left arm   Patient Position: Sitting   Cuff Size: Adult   Pulse: 60   Resp: 14   SpO2: 98%   Weight: 96 kg (211 lb 9.6 oz)   Height: 177.8 cm (70\")   PainSc:   2   PainLoc: Generalized     Body mass index is 30.36 kg/m².           Current Outpatient Medications on File Prior to Visit   Medication Sig Dispense Refill    albuterol sulfate  (90 Base) MCG/ACT inhaler Inhale 2 puffs Every 4 (Four) Hours As Needed for Wheezing or Shortness of Air. 8 g 3    aspirin 81 MG chewable tablet Chew 1 tablet Daily.      Blood Glucose Monitoring Suppl (ONE TOUCH ULTRA 2) w/Device kit TEST DAILY AS DIRECTED      bumetanide " (BUMEX) 1 MG tablet TAKE 1 TABLET BY MOUTH EVERY DAY 90 tablet 3    diclofenac (VOLTAREN) 1 % gel gel Apply 4 g topically to the appropriate area as directed 4 (Four) Times a Day As Needed (prn for pain). 60 tube 0    Diclofenac Sodium (VOLTAREN) 1 % gel gel APPLY 2 GRAMS TO FEET TWICE DAILY FOR PAIN AND STIFFNESS      empagliflozin (JARDIANCE) 10 MG tablet tablet Take 1 tablet by mouth Daily. 30 tablet 11    glucose blood (OneTouch Ultra) test strip TEST DAILY AS DIRECTED 100 each 11    glucose monitor monitoring kit 1 each Daily. Use daily as directed. 1 each 0    Lancets (OneTouch Delica Plus [id number]) misc USE AS DIRECTED 100 each 12    levETIRAcetam (KEPPRA) 750 MG tablet TAKE 1 TABLET BY MOUTH TWICE A  tablet 1    metFORMIN ER (GLUCOPHAGE-XR) 500 MG 24 hr tablet TAKE 1 TABLET BY MOUTH EVERY DAY WITH BREAKFAST 90 tablet 3    metoprolol succinate XL (TOPROL-XL) 50 MG 24 hr tablet TAKE 1 TABLET BY MOUTH EVERY DAY 90 tablet 2    pantoprazole (PROTONIX) 40 MG EC tablet TAKE 1 TABLET BY MOUTH TWICE A  tablet 1    polyethylene glycol (MIRALAX) 17 GM/SCOOP powder Take 17 g by mouth Every Night. 578 g 1    potassium chloride ER (K-TAB) 20 MEQ tablet controlled-release ER tablet TAKE 1 TABLET BY MOUTH EVERY DAY 90 tablet 3    rosuvastatin (CRESTOR) 20 MG tablet TAKE 1 TABLET BY MOUTH EVERY DAY 90 tablet 1    sacubitril-valsartan (Entresto) 24-26 MG tablet Take 1 tablet by mouth 2 (Two) Times a Day. 60 tablet 11     No current facility-administered medications on file prior to visit.       Results for orders placed or performed in visit on 04/10/24   POC Glycosylated Hemoglobin (Hb A1C)    Specimen: Blood   Result Value Ref Range    Hemoglobin A1C 7.0 (A) 4.5 - 5.7 %    Lot Number [id number]     Expiration Date 03/12/2025        PE    Physical Exam  Vitals reviewed.   Constitutional:       General: He is not in acute distress.     Appearance: Normal appearance. He is well-developed. He is not ill-appearing  or diaphoretic.   HENT:      Head: Normocephalic and atraumatic.   Eyes:      Extraocular Movements: Extraocular movements intact.      Conjunctiva/sclera: Conjunctivae normal.   Cardiovascular:      Rate and Rhythm: Normal rate and regular rhythm.      Heart sounds: Normal heart sounds.   Pulmonary:      Effort: No respiratory distress.   Musculoskeletal:      Cervical back: Normal range of motion.      Right lower leg: No edema.      Left lower leg: No edema.   Skin:     General: Skin is warm.      Findings: No erythema or rash.   Neurological:      Mental Status: He is alert.      Comments: Using cane to ambulate   Psychiatric:         Attention and Perception: He is attentive.         Mood and Affect: Mood normal.         Speech: Speech normal.         Behavior: Behavior normal. Behavior is cooperative.         Thought Content: Thought content normal.         Judgment: Judgment normal.           A/P    Diagnoses and all orders for this visit:    1. Essential hypertension (Primary)  -     CBC Auto Differential; Future  -     Comprehensive Metabolic Panel; Future  -     TSH Rfx On Abnormal To Free T4; Future  Stable, well-controlled.  Compliant on medication.  Followed by cardiology.    2. Hyperlipidemia LDL goal <70  -     Lipid Panel; Future  On statin.    3. Type 2 diabetes mellitus with hyperglycemia, without long-term current use of insulin  -     POC Glycosylated Hemoglobin (Hb A1C)  -     Microalbumin / Creatinine Urine Ratio - Urine, Clean Catch; Future  Previous hemoglobin A1c was 6.5%, hemoglobin A1c is 7% today.  Admits to drinking a lot of orange pop.  He will switch to diet and hydrate more with water.  Continue on metformin and Jardiance.  Return in 3 months.    4. H/O seizures on Keppra  No issues.  Compliant on keppra.       Plan of care reviewed with patient at the conclusion of today's visit. Education was provided regarding diagnosis, management and any prescribed or recommended OTC medications.   Patient verbalizes understanding of and agreement with management plan.    Dictated Utilizing Dragon Dictation     Please note that portions of this note were completed with a voice recognition program.     Part of this note may be an electronic transcription/translation of spoken language to printed text using the Dragon Dictation System.    Return in about 3 months (around 7/10/2024) for Recheck, VIKTORIA.     Marguerite Moore PA-C

## 2024-04-11 LAB
ALBUMIN SERPL-MCNC: 4.5 G/DL (ref 3.5–5.2)
ALBUMIN/GLOB SERPL: 1.4 G/DL
ALP SERPL-CCNC: 80 U/L (ref 39–117)
ALT SERPL W P-5'-P-CCNC: 8 U/L (ref 1–41)
ANION GAP SERPL CALCULATED.3IONS-SCNC: 12 MMOL/L (ref 5–15)
AST SERPL-CCNC: 17 U/L (ref 1–40)
BILIRUB SERPL-MCNC: 0.3 MG/DL (ref 0–1.2)
BUN SERPL-MCNC: 34 MG/DL (ref 8–23)
BUN/CREAT SERPL: 21.8 (ref 7–25)
CALCIUM SPEC-SCNC: 10.4 MG/DL (ref 8.6–10.5)
CHLORIDE SERPL-SCNC: 101 MMOL/L (ref 98–107)
CHOLEST SERPL-MCNC: 145 MG/DL (ref 0–200)
CO2 SERPL-SCNC: 26 MMOL/L (ref 22–29)
CREAT SERPL-MCNC: 1.56 MG/DL (ref 0.76–1.27)
EGFRCR SERPLBLD CKD-EPI 2021: 43.3 ML/MIN/1.73
GLOBULIN UR ELPH-MCNC: 3.3 GM/DL
GLUCOSE SERPL-MCNC: 92 MG/DL (ref 65–99)
HDLC SERPL-MCNC: 38 MG/DL (ref 40–60)
LDLC SERPL CALC-MCNC: 72 MG/DL (ref 0–100)
LDLC/HDLC SERPL: 1.7 {RATIO}
POTASSIUM SERPL-SCNC: 4.6 MMOL/L (ref 3.5–5.2)
PROT SERPL-MCNC: 7.8 G/DL (ref 6–8.5)
SODIUM SERPL-SCNC: 139 MMOL/L (ref 136–145)
T4 FREE SERPL-MCNC: 1.28 NG/DL (ref 0.93–1.7)
TRIGL SERPL-MCNC: 212 MG/DL (ref 0–150)
TSH SERPL DL<=0.05 MIU/L-ACNC: 4.88 UIU/ML (ref 0.27–4.2)
VLDLC SERPL-MCNC: 35 MG/DL (ref 5–40)

## 2024-05-23 ENCOUNTER — TELEPHONE (OUTPATIENT)
Dept: FAMILY MEDICINE CLINIC | Facility: CLINIC | Age: 84
End: 2024-05-23
Payer: MEDICARE

## 2024-05-23 NOTE — TELEPHONE ENCOUNTER
Received order for oxygen therapy.  Looks like patient was prescribed night time oxygen in the hospital back in July 2022.  Spoke with his wife and he is wearing this nightly.

## 2024-06-22 DIAGNOSIS — E87.6 HYPOKALEMIA: ICD-10-CM

## 2024-06-22 DIAGNOSIS — K21.9 GASTROESOPHAGEAL REFLUX DISEASE, UNSPECIFIED WHETHER ESOPHAGITIS PRESENT: ICD-10-CM

## 2024-06-22 DIAGNOSIS — E11.65 TYPE 2 DIABETES MELLITUS WITH HYPERGLYCEMIA, WITHOUT LONG-TERM CURRENT USE OF INSULIN: Chronic | ICD-10-CM

## 2024-06-26 DIAGNOSIS — R56.9 SEIZURE: Chronic | ICD-10-CM

## 2024-06-26 RX ORDER — LEVETIRACETAM 750 MG/1
TABLET ORAL
Qty: 180 TABLET | Refills: 1 | Status: SHIPPED | OUTPATIENT
Start: 2024-06-26

## 2024-06-26 RX ORDER — POTASSIUM CHLORIDE 1500 MG/1
TABLET, EXTENDED RELEASE ORAL
Qty: 90 TABLET | Refills: 3 | Status: SHIPPED | OUTPATIENT
Start: 2024-06-26

## 2024-06-26 NOTE — TELEPHONE ENCOUNTER
Rx Refill Note  Requested Prescriptions     Pending Prescriptions Disp Refills    levETIRAcetam (KEPPRA) 750 MG tablet [Pharmacy Med Name: LEVETIRACETAM 750 MG TABLET] 180 tablet 1     Sig: TAKE 1 TABLET BY MOUTH TWICE A DAY      Last office visit with prescribing clinician: 4/10/2024   Last telemedicine visit with prescribing clinician: Visit date not found   Next office visit with prescribing clinician: 7/10/2024       Tia Ramirez MA  06/26/24, 09:45 EDT

## 2024-07-10 ENCOUNTER — OFFICE VISIT (OUTPATIENT)
Dept: FAMILY MEDICINE CLINIC | Facility: CLINIC | Age: 84
End: 2024-07-10
Payer: MEDICARE

## 2024-07-10 ENCOUNTER — LAB (OUTPATIENT)
Dept: LAB | Facility: HOSPITAL | Age: 84
End: 2024-07-10
Payer: MEDICARE

## 2024-07-10 VITALS
HEART RATE: 60 BPM | HEIGHT: 70 IN | DIASTOLIC BLOOD PRESSURE: 58 MMHG | WEIGHT: 197.6 LBS | OXYGEN SATURATION: 96 % | SYSTOLIC BLOOD PRESSURE: 130 MMHG | BODY MASS INDEX: 28.29 KG/M2

## 2024-07-10 DIAGNOSIS — H61.23 BILATERAL IMPACTED CERUMEN: ICD-10-CM

## 2024-07-10 DIAGNOSIS — N18.32 TYPE 2 DIABETES MELLITUS WITH STAGE 3B CHRONIC KIDNEY DISEASE, WITHOUT LONG-TERM CURRENT USE OF INSULIN: Primary | ICD-10-CM

## 2024-07-10 DIAGNOSIS — I10 ESSENTIAL HYPERTENSION: ICD-10-CM

## 2024-07-10 DIAGNOSIS — E11.22 TYPE 2 DIABETES MELLITUS WITH STAGE 3B CHRONIC KIDNEY DISEASE, WITHOUT LONG-TERM CURRENT USE OF INSULIN: ICD-10-CM

## 2024-07-10 DIAGNOSIS — H91.93 BILATERAL HEARING LOSS, UNSPECIFIED HEARING LOSS TYPE: ICD-10-CM

## 2024-07-10 DIAGNOSIS — N18.32 TYPE 2 DIABETES MELLITUS WITH STAGE 3B CHRONIC KIDNEY DISEASE, WITHOUT LONG-TERM CURRENT USE OF INSULIN: ICD-10-CM

## 2024-07-10 DIAGNOSIS — E11.22 TYPE 2 DIABETES MELLITUS WITH STAGE 3B CHRONIC KIDNEY DISEASE, WITHOUT LONG-TERM CURRENT USE OF INSULIN: Primary | ICD-10-CM

## 2024-07-10 LAB
EXPIRATION DATE: ABNORMAL
HBA1C MFR BLD: 6.4 % (ref 4.5–5.7)
Lab: ABNORMAL

## 2024-07-10 PROCEDURE — 3078F DIAST BP <80 MM HG: CPT | Performed by: PHYSICIAN ASSISTANT

## 2024-07-10 PROCEDURE — 3044F HG A1C LEVEL LT 7.0%: CPT | Performed by: PHYSICIAN ASSISTANT

## 2024-07-10 PROCEDURE — 3075F SYST BP GE 130 - 139MM HG: CPT | Performed by: PHYSICIAN ASSISTANT

## 2024-07-10 PROCEDURE — 1160F RVW MEDS BY RX/DR IN RCRD: CPT | Performed by: PHYSICIAN ASSISTANT

## 2024-07-10 PROCEDURE — 83036 HEMOGLOBIN GLYCOSYLATED A1C: CPT | Performed by: PHYSICIAN ASSISTANT

## 2024-07-10 PROCEDURE — G2211 COMPLEX E/M VISIT ADD ON: HCPCS | Performed by: PHYSICIAN ASSISTANT

## 2024-07-10 PROCEDURE — 1159F MED LIST DOCD IN RCRD: CPT | Performed by: PHYSICIAN ASSISTANT

## 2024-07-10 PROCEDURE — 99214 OFFICE O/P EST MOD 30 MIN: CPT | Performed by: PHYSICIAN ASSISTANT

## 2024-07-10 PROCEDURE — 80048 BASIC METABOLIC PNL TOTAL CA: CPT

## 2024-07-10 PROCEDURE — 1125F AMNT PAIN NOTED PAIN PRSNT: CPT | Performed by: PHYSICIAN ASSISTANT

## 2024-07-10 RX ORDER — METOPROLOL SUCCINATE 50 MG/1
50 TABLET, EXTENDED RELEASE ORAL DAILY
Qty: 90 TABLET | Refills: 0 | Status: SHIPPED | OUTPATIENT
Start: 2024-07-10

## 2024-07-10 RX ORDER — METOPROLOL SUCCINATE 50 MG/1
TABLET, EXTENDED RELEASE ORAL
Qty: 90 TABLET | Refills: 2 | OUTPATIENT
Start: 2024-07-10

## 2024-07-10 NOTE — PROGRESS NOTES
Chief Complaint   Patient presents with    Diabetes     A1c check        Narendra Cochran is a pleasant 83 y.o. male who is here for routine follow-up of diabetes type 2, hypertension, kidney disease, heart failure, hearing loss.  Patient is doing well overall.  His blood pressure is stable and well-controlled.  He has an upcoming follow-up with cardiology.  He discontinued his Jardiance due to cost.  He tried to get medication from patient assistance but was declined.  He gave up all of his orange soda.  He is working on watching his diet and eating healthy.  He has chronic hearing loss.  He is wearing his hearing aids today.  He reports having some blood in his right ear after using a Q-tip.  He is not established with ENT.  Wife is present at appointment.    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    Paget disease of bone        Past Surgical History:   Procedure Laterality Date    APPENDECTOMY      CARDIAC CATHETERIZATION N/A 3/18/2020    Procedure: Left Heart Cath;  Surgeon: Sonya Garibay MD;  Location:  [facility];  Service: Cardiology;  Laterality: N/A;  First availabel provider      CARDIAC CATHETERIZATION N/A 3/15/2021    Procedure: LEFT HEART CATH;  Surgeon: Doc Sahni IV, MD;  Location:  [facility];  Service: Cardiovascular;  Laterality: N/A;    CARDIAC CATHETERIZATION N/A 3/15/2021    Procedure: Coronary angiography;  Surgeon: Doc Sahni IV, MD;  Location:  [facility];  Service: Cardiovascular;  Laterality: N/A;    CARDIAC ELECTROPHYSIOLOGY PROCEDURE N/A 3/16/2021    Procedure: ICD DC new;  Surgeon: Deb  "Som MAURER DO;  Location: [facility] INVAS[facility] LOCATION;  Service: Cardiology;  Laterality: N/A;    COLON RESECTION      second to diverticulitis     FOOT SURGERY Left        Family History   Problem Relation Age of Onset    No Known Problems Daughter     No Known Problems Son     No Known Problems Son     No Known Problems Son     Diabetes Sister     Clotting disorder Sister     Hyperlipidemia Mother     No Known Problems Sister     No Known Problems Brother     Fainting Brother        Social History     Socioeconomic History    Marital status:    Tobacco Use    Smoking status: Former     Current packs/day: 0.00     Average packs/day: 0.5 packs/day for 65.0 years (32.5 ttl pk-yrs)     Types: Cigars, Cigarettes     Start date: 1954     Quit date: 2019     Years since quittin.8    Smokeless tobacco: Never   Vaping Use    Vaping status: Never Used   Substance and Sexual Activity    Alcohol use: Yes     Comment: very rarely    Drug use: Yes     Types: Marijuana     Comment: Pt states used weekly but now quitting     Sexual activity: Defer       No Known Allergies    ROS  Review of Systems   Constitutional:  Negative for chills and fever.   HENT:  Positive for hearing loss. Negative for ear discharge and ear pain.    Eyes:  Negative for visual disturbance.   Respiratory:  Negative for cough, shortness of breath and wheezing.    Cardiovascular:  Negative for chest pain, palpitations and leg swelling.   Endocrine: Negative for polyuria.   Musculoskeletal:  Positive for arthralgias and gait problem.   Neurological:  Negative for dizziness and headache.       Vitals:    07/10/24 1353   BP: 130/58   Pulse: 60   SpO2: 96%   Weight: 89.6 kg (197 lb 9.6 oz)   Height: 177.8 cm (70\")     Body mass index is 28.35 kg/m².           Current Outpatient Medications on File Prior to Visit   Medication Sig Dispense Refill    albuterol sulfate  (90 Base) MCG/ACT inhaler Inhale 2 puffs Every 4 (Four) Hours As Needed " for Wheezing or Shortness of Air. 8 g 3    aspirin 81 MG chewable tablet Chew 1 tablet Daily.      Blood Glucose Monitoring Suppl (ONE TOUCH ULTRA 2) w/Device kit TEST DAILY AS DIRECTED      bumetanide (BUMEX) 1 MG tablet TAKE 1 TABLET BY MOUTH EVERY DAY 90 tablet 3    diclofenac (VOLTAREN) 1 % gel gel Apply 4 g topically to the appropriate area as directed 4 (Four) Times a Day As Needed (prn for pain). 60 tube 0    Diclofenac Sodium (VOLTAREN) 1 % gel gel APPLY 2 GRAMS TO FEET TWICE DAILY FOR PAIN AND STIFFNESS      glucose blood (OneTouch Ultra) test strip TEST DAILY AS DIRECTED 100 each 11    Lancets (OneTouch Delica Plus [id number]) misc USE AS DIRECTED 100 each 12    levETIRAcetam (KEPPRA) 750 MG tablet TAKE 1 TABLET BY MOUTH TWICE A  tablet 1    metFORMIN ER (GLUCOPHAGE-XR) 500 MG 24 hr tablet TAKE 1 TABLET BY MOUTH EVERY DAY WITH BREAKFAST 90 tablet 3    pantoprazole (PROTONIX) 40 MG EC tablet TAKE 1 TABLET BY MOUTH TWICE A  tablet 1    polyethylene glycol (MIRALAX) 17 GM/SCOOP powder Take 17 g by mouth Every Night. 578 g 1    potassium chloride ER (K-TAB) 20 MEQ tablet controlled-release ER tablet TAKE 1 TABLET BY MOUTH EVERY DAY 90 tablet 3    rosuvastatin (CRESTOR) 20 MG tablet TAKE 1 TABLET BY MOUTH EVERY DAY 90 tablet 1    sacubitril-valsartan (Entresto) 24-26 MG tablet Take 1 tablet by mouth 2 (Two) Times a Day. 60 tablet 11    [DISCONTINUED] metoprolol succinate XL (TOPROL-XL) 50 MG 24 hr tablet TAKE 1 TABLET BY MOUTH EVERY DAY 90 tablet 2    [DISCONTINUED] empagliflozin (JARDIANCE) 10 MG tablet tablet Take 1 tablet by mouth Daily. (Patient not taking: Reported on 7/10/2024) 30 tablet 11    [DISCONTINUED] glucose monitor monitoring kit 1 each Daily. Use daily as directed. (Patient not taking: Reported on 7/10/2024) 1 each 0     No current facility-administered medications on file prior to visit.       Results for orders placed or performed in visit on 07/10/24   POC Glycosylated Hemoglobin  (Hb A1C)    Specimen: Blood   Result Value Ref Range    Hemoglobin A1C 6.4 (A) 4.5 - 5.7 %    Lot Number [id number]     Expiration Date 3-        PE    Physical Exam  Vitals reviewed.   Constitutional:       General: He is not in acute distress.     Appearance: Normal appearance. He is well-developed and normal weight. He is not ill-appearing or diaphoretic.   HENT:      Head: Normocephalic and atraumatic.      Right Ear: There is impacted cerumen.      Left Ear: There is impacted cerumen.   Eyes:      Extraocular Movements: Extraocular movements intact.      Conjunctiva/sclera: Conjunctivae normal.   Cardiovascular:      Rate and Rhythm: Normal rate and regular rhythm.      Heart sounds: Murmur heard.   Pulmonary:      Effort: Pulmonary effort is normal.      Breath sounds: Normal breath sounds.   Musculoskeletal:      Cervical back: Normal range of motion.      Right lower leg: No edema.      Left lower leg: No edema.   Skin:     General: Skin is warm.      Findings: No erythema or rash.   Neurological:      General: No focal deficit present.      Mental Status: He is alert.      Gait: Gait abnormal.      Comments: Antalgic and stiff gait, using cane to ambulate.   Psychiatric:         Attention and Perception: He is attentive.         Mood and Affect: Mood normal.         Speech: Speech normal.         Behavior: Behavior normal. Behavior is cooperative.         Thought Content: Thought content normal.         Judgment: Judgment normal.           A/P    Diagnoses and all orders for this visit:    1. Type 2 diabetes mellitus with stage 3b chronic kidney disease, without long-term current use of insulin (Primary)  -     POC Glycosylated Hemoglobin (Hb A1C)  -     Basic Metabolic Panel; Future  Hemoglobin A1c is 6.4%.  His previous hemoglobin A1c was 7%.  He has stopped drinking soda which has made a big improvement in his A1c.  He also had to stop taking his Jardiance due to cost.  He was unable to get  patient's assistance.  Will repeat his kidney function since Jardiance was probably providing significant benefit to that and his CHF.  He is unable to leave urine today for microalbumin.    2. Essential hypertension  -     Basic Metabolic Panel; Future  -     metoprolol succinate XL (TOPROL-XL) 50 MG 24 hr tablet; Take 1 tablet by mouth Daily.  Dispense: 90 tablet; Refill: 0  Blood pressure is stable and well-controlled.  He has an upcoming appointment with cardiology.  Will defer future refills of metoprolol to his cardiology team.    3. Bilateral impacted cerumen  -     Ambulatory Referral to ENT (Otolaryngology)    4. Bilateral hearing loss, unspecified hearing loss type  -     Ambulatory Referral to ENT (Otolaryngology)         Plan of care reviewed with patient at the conclusion of today's visit. Education was provided regarding diagnosis, management and any prescribed or recommended OTC medications.  Patient verbalizes understanding of and agreement with management plan.    Dictated Utilizing Dragon Dictation     Please note that portions of this note were completed with a voice recognition program.     Part of this note may be an electronic transcription/translation of spoken language to printed text using the Dragon Dictation System.    Return in about 3 months (around 10/14/2024) for Medicare Wellness.     Marguerite Moore PA-C

## 2024-07-11 LAB
ANION GAP SERPL CALCULATED.3IONS-SCNC: 14.9 MMOL/L (ref 5–15)
BUN SERPL-MCNC: 31 MG/DL (ref 8–23)
BUN/CREAT SERPL: 17.9 (ref 7–25)
CALCIUM SPEC-SCNC: 9.8 MG/DL (ref 8.6–10.5)
CHLORIDE SERPL-SCNC: 105 MMOL/L (ref 98–107)
CO2 SERPL-SCNC: 20.1 MMOL/L (ref 22–29)
CREAT SERPL-MCNC: 1.73 MG/DL (ref 0.76–1.27)
EGFRCR SERPLBLD CKD-EPI 2021: 38.2 ML/MIN/1.73
GLUCOSE SERPL-MCNC: 98 MG/DL (ref 65–99)
POTASSIUM SERPL-SCNC: 4.4 MMOL/L (ref 3.5–5.2)
SODIUM SERPL-SCNC: 140 MMOL/L (ref 136–145)

## 2024-07-12 ENCOUNTER — TELEPHONE (OUTPATIENT)
Dept: FAMILY MEDICINE CLINIC | Facility: CLINIC | Age: 84
End: 2024-07-12
Payer: MEDICARE

## 2024-07-12 NOTE — TELEPHONE ENCOUNTER
----- Message from Chandana MOORE sent at 7/12/2024  8:31 AM EDT -----  Can you have someone check on samples and let them know when its ready  ----- Message -----  From: Marguerite Moore PA-C  Sent: 7/11/2024  11:51 AM EDT  To: [address] Clinical Pool    Please give patient enough samples of Jardiance 10 mg for 2-3 months until we can get him approved for patient assistance.  Call him to  and ask him to bring in his patient assistance paperwork so I can review them.  He is hard of hearing - so you probably need to call his wife.    Called and informed pt samples placed at  1st floor also called and informed to bring paperwork in, she states she will go the pharmacy that gave them to her last time, complete them and bring them to us

## 2024-08-05 NOTE — PROGRESS NOTES
Subjective:     Encounter Date:08/08/2024      Patient ID: Narendra Cochran is a 83 y.o.  -American male from [place], [place].    PCP: LYNNETTE Huitron  CARDIOLOGIST: Karan Mccracken MD  ELECTROPHYSIOLOGIST: Erick Walker MD    Chief Complaint:   Chief Complaint   Patient presents with    Chronic systolic congestive heart failure     Problem List:  Nonischemic cardiomyopathy / systolic congestive heart failure / Cardiac Arrest   [facility] March 2020 with mild nonobstructive CAD, EF 36-40%  Witnessed out of hospital cardiac arrest 3/12/2021 with documented bystander CPR with a single shock from AED with transfer to [facility] per EMS.  Echo 3/13/2021 EF 50%, no significant VHD  [facility] 3/14/2021 per Dr. Sahni with documented severe 1-vessel CAD involving a small posterior lateral branch of the of the RCA with no interval change to prior angiography  [facility] [place] [place] DDD ICD implant 3/16/2021 for secondary prevention of SCD     Premature ventricular contractions  Holter monitor February 2020:  PVCs burden 12.3%   Amiodarone therapy initiated at 200 mg daily for PVCs April 2020  Amiodarone discontinued December 2020  Videolla dual-chamber pacemaker/ICD implanted March 2021 after cardiac arrest, heart logic heart failure index 9 on remote interrogation July 2024     Sinus node dysfunction  HTN  Essential hypertension  Dyslipidemia  Type II diabetes mellitus; hemoglobin A1c 6.4% July 2024  COPD  Seizures  Paget's disease  Hearing loss  CKD stage III     No Known Allergies    Current Outpatient Medications   Medication Instructions    albuterol sulfate  (90 Base) MCG/ACT inhaler 2 puffs, Inhalation, Every 4 Hours PRN    aspirin 81 mg, Oral, Daily    Blood Glucose Monitoring Suppl (ONE TOUCH ULTRA 2) w/Device kit TEST DAILY AS DIRECTED    bumetanide (BUMEX) 1 MG tablet TAKE 1 TABLET BY MOUTH EVERY DAY    diclofenac (VOLTAREN) 4 g, Topical, 4 Times Daily PRN    Diclofenac Sodium (VOLTAREN) 1 % gel gel APPLY 2  GRAMS TO FEET TWICE DAILY FOR PAIN AND STIFFNESS    glucose blood (OneTouch Ultra) test strip TEST DAILY AS DIRECTED    Lancets (OneTouch Delica Plus [id number]) misc See Admin Instructions    levETIRAcetam (KEPPRA) 750 MG tablet TAKE 1 TABLET BY MOUTH TWICE A DAY    metFORMIN ER (GLUCOPHAGE-XR) 500 MG 24 hr tablet TAKE 1 TABLET BY MOUTH EVERY DAY WITH BREAKFAST    metoprolol succinate XL (TOPROL-XL) 50 mg, Oral, Daily    pantoprazole (PROTONIX) 40 MG EC tablet TAKE 1 TABLET BY MOUTH TWICE A DAY    polyethylene glycol (MIRALAX) 17 g, Oral, Nightly    potassium chloride ER (K-TAB) 20 MEQ tablet controlled-release ER tablet TAKE 1 TABLET BY MOUTH EVERY DAY    rosuvastatin (CRESTOR) 20 MG tablet TAKE 1 TABLET BY MOUTH EVERY DAY    sacubitril-valsartan (Entresto) 24-26 MG tablet 1 tablet, Oral, 2 Times Daily         HISTORY OF PRESENT ILLNESS:  The patient is here after a 23-month hiatus and was initially seen by Dr. Mccracken.  Patient has ICD for secondary prevention after SCD in 2021.  On the patient's heart logic index, alert was sent for increase to 9 with decrease in thoracic impedance.  Device interrogated today with no reprogramming.  Patient denies any chest pain, increased shortness of breath, palpitations, dizziness, presyncope, syncope, edema, or defibrillator shocks.  He has compression stockings to use at home but does not use them all the time.  He ambulates with a cane.  He says that he can go short distances but does not prefer to go long distances.  He feels like his shortness of breath is actually better over the past 6 months than it has been before.  Occasionally he will have to use an inhaler which helps.  He does not have a scale at home to weigh himself with.  He also does not have a blood pressure monitor to monitor his blood pressure with.  He does not have a nephrologist and admits that he does not drink very much fluid.  He thinks that at the most he drinks is probably around 12 ounces per day and  "was encouraged to increase his fluids.  He says that he was told that he needs to drink 40 ounces of fluid per day.  He does not drink much water and generally just drinks diet sodas.  He is compliant with his medications.  His creatinine was 1.7 in July 2024.  He is accompanied in office today by his wife.  The patient says that he has not had to take any extra doses of Bumex.  He also has no orthopnea.      ROS   All other systems reviewed and otherwise negative.      ECG 12 Lead    Date/Time: 8/8/2024 1:34 PM  Performed by: Lina Razo APRN    Authorized by: Lina Razo APRN  Rhythm comments: Atrial paced rhythm with prolonged AV conduction with occasional PVCs, left axis deviation, RBBB, abnormal ECG, 65 bpm,  ms, QTc 495 ms,  ms, QRS less prolonged compared to ECG in October 2022             Objective:       Vitals:    08/08/24 1302 08/08/24 1304   BP: 114/60 110/54   BP Location: Right arm Right leg   Patient Position: Sitting Standing   Pulse: 60 65   SpO2: 97% 99%   Weight: 88.9 kg (196 lb)    Height: 180.3 cm (71\")      Body mass index is 27.34 kg/m².  Wt Readings from Last 2 Encounters:   08/08/24 88.9 kg (196 lb)   07/10/24 89.6 kg (197 lb 9.6 oz)        Constitutional:       Appearance: Healthy appearance. Not in distress.   Neck:      Vascular: No JVR. JVD normal.   Pulmonary:      Effort: Pulmonary effort is normal.      Breath sounds: Normal breath sounds. No wheezing. No rhonchi. No rales.   Chest:      Chest wall: Not tender to palpatation.   Cardiovascular:      PMI at left midclavicular line. Normal rate. Regular rhythm. Normal S1. Normal S2.       Murmurs: There is no murmur.      No gallop.  No click. No rub.   Pulses:     Intact distal pulses.   Edema:     Peripheral edema absent.   Abdominal:      General: Bowel sounds are normal.      Palpations: Abdomen is soft.      Tenderness: There is no abdominal tenderness.   Musculoskeletal: Normal range of motion.         " General: No tenderness. Skin:     General: Skin is warm and dry.   Neurological:      General: No focal deficit present.      Mental Status: Alert and oriented to person, place and time.           Lab Review:   Lab Results   Component Value Date    GLUCOSE 98 07/10/2024    BUN 31 (H) 07/10/2024    CREATININE 1.73 (H) 07/10/2024    EGFRIFNONA 64 10/21/2019    EGFRIFAFRI 61 02/17/2022    BCR 17.9 07/10/2024    CO2 20.1 (L) 07/10/2024    CALCIUM 9.8 07/10/2024    PROTENTOTREF 6.2 07/30/2022    ALBUMIN 4.5 04/10/2024    LABIL2 1.0 07/30/2022    AST 17 04/10/2024    ALT 8 04/10/2024       Lab Results   Component Value Date    WBC 3.34 (L) 04/10/2024    HGB 14.5 04/10/2024    HCT 46.1 04/10/2024    MCV 83.8 04/10/2024     (L) 04/10/2024       Lab Results   Component Value Date    HGBA1C 6.4 (A) 07/10/2024       Lab Results   Component Value Date    TSH 4.880 (H) 04/10/2024       Lab Results   Component Value Date    CHOL 145 04/10/2024    CHOL 120 09/06/2022     Lab Results   Component Value Date    TRIG 212 (H) 04/10/2024    TRIG 69 09/06/2022     Lab Results   Component Value Date    HDL 38 (L) 04/10/2024    HDL 57 09/06/2022     Lab Results   Component Value Date    LDL 72 04/10/2024    LDL 49 09/06/2022           Results for orders placed during the hospital encounter of 09/14/22    Adult Transthoracic Echo Complete W/ Cont if Necessary Per Protocol    Interpretation Summary  · The left ventricular cavity is moderately dilated with normal wall thickness  · Estimated left ventricular EF = 15% severe global hypokinesis, apical motion consistent with pacemaker activation.  · Left ventricular diastolic function is consistent with grade 2 diastolic dysfunction  · The following left ventricular wall segments are hypokinetic: mid anterior, apical anterior, basal anterolateral, mid anterolateral, apical lateral, apical septal, apex hypokinetic, mid anteroseptal and basal anterior.  · Moderate aortic valve regurgitation  is present.  · Moderate to severe mitral valve regurgitation is present.  · Estimated right ventricular systolic pressure from tricuspid regurgitation is markedly elevated (>55 mmHg).  · Left atrial volume is severely increased.       Results for orders placed in visit on 12/08/17    SCANNED VASCULAR STUDIES     [facility] [place] ICD interrogation 8/8/2024: 64% atrial pacing, 12% ventricular pacing, 11 years battery longevity, 20 mode switches/21 VHR (over a year ago), no reprogramming    Advance Care Planning   ACP discussion was held with the patient during this visit. Patient does not have an advance directive, declines further assistance.      Assessment:     Overall continued acceptable course with no new interim cardiopulmonary complaints with acceptable functional status on limited activity. We will defer additional diagnostic or therapeutic intervention from a cardiac perspective at this time other than an echocardiogram to assess heart structure/function.  Continue GDMT. ICD device interrogation today in office with no reprogramming.  Patient will get repeat labs today.  May be approaching needing to see nephrology.     Diagnosis Plan   1. NICM   Echocardiogram      2. VHD; mild-mod AR, mild MR  Echocardiogram      3. Frequent PVCs Continue Toprol, no recurrence      4. Essential hypertension  Controlled, continue current cardiac medications      5. Hyperlipidemia LDL goal <70  Acceptable lipid panel April 2024, continue rosuvastatin      6. Chronic systolic congestive heart failure  No recurrent angina pectoris or CHF on current activity schedule; continue current treatment              Plan:         Patient to continue current medications and close follow up with the above providers.  Tentative cardiology follow up in April 2025 with AL or patient may return sooner PRN.  Echocardiogram  [facility] ICD  BMP, mag, proBNP today      Electronically signed by LEANDRO Rahman, 08/08/24, 1:41 PM EDT.

## 2024-08-07 ENCOUNTER — DOCUMENTATION (OUTPATIENT)
Dept: CARDIOLOGY | Facility: CLINIC | Age: 84
End: 2024-08-07
Payer: MEDICARE

## 2024-08-07 NOTE — PROGRESS NOTES
Per [email or web address] remote ICD transmission received today, 8/7/2024:     Elevated HeartLogic Index of 9, with a marked decrease in thoracic impedance 46.6 ohms.

## 2024-08-08 ENCOUNTER — OFFICE VISIT (OUTPATIENT)
Dept: CARDIOLOGY | Facility: CLINIC | Age: 84
End: 2024-08-08
Payer: MEDICARE

## 2024-08-08 ENCOUNTER — LAB (OUTPATIENT)
Dept: LAB | Facility: HOSPITAL | Age: 84
End: 2024-08-08
Payer: MEDICARE

## 2024-08-08 VITALS
SYSTOLIC BLOOD PRESSURE: 110 MMHG | HEART RATE: 65 BPM | WEIGHT: 196 LBS | BODY MASS INDEX: 27.44 KG/M2 | OXYGEN SATURATION: 99 % | HEIGHT: 71 IN | DIASTOLIC BLOOD PRESSURE: 54 MMHG

## 2024-08-08 DIAGNOSIS — I49.3 FREQUENT PVCS: Chronic | ICD-10-CM

## 2024-08-08 DIAGNOSIS — I10 ESSENTIAL HYPERTENSION: ICD-10-CM

## 2024-08-08 DIAGNOSIS — E78.5 HYPERLIPIDEMIA LDL GOAL <70: ICD-10-CM

## 2024-08-08 DIAGNOSIS — I42.8 NICM (NONISCHEMIC CARDIOMYOPATHY): Primary | Chronic | ICD-10-CM

## 2024-08-08 DIAGNOSIS — I38 VHD (VALVULAR HEART DISEASE): Chronic | ICD-10-CM

## 2024-08-08 DIAGNOSIS — I50.22 CHRONIC SYSTOLIC CONGESTIVE HEART FAILURE: ICD-10-CM

## 2024-08-08 LAB
ANION GAP SERPL CALCULATED.3IONS-SCNC: 12 MMOL/L (ref 5–15)
BUN SERPL-MCNC: 17 MG/DL (ref 8–23)
BUN/CREAT SERPL: 13.8 (ref 7–25)
CALCIUM SPEC-SCNC: 9.2 MG/DL (ref 8.6–10.5)
CHLORIDE SERPL-SCNC: 108 MMOL/L (ref 98–107)
CO2 SERPL-SCNC: 25 MMOL/L (ref 22–29)
CREAT SERPL-MCNC: 1.23 MG/DL (ref 0.76–1.27)
EGFRCR SERPLBLD CKD-EPI 2021: 57.5 ML/MIN/1.73
GLUCOSE SERPL-MCNC: 103 MG/DL (ref 65–99)
MAGNESIUM SERPL-MCNC: 1.5 MG/DL (ref 1.6–2.4)
NT-PROBNP SERPL-MCNC: 568 PG/ML (ref 0–1800)
POTASSIUM SERPL-SCNC: 3.9 MMOL/L (ref 3.5–5.2)
SODIUM SERPL-SCNC: 145 MMOL/L (ref 136–145)

## 2024-08-08 PROCEDURE — 80048 BASIC METABOLIC PNL TOTAL CA: CPT

## 2024-08-08 PROCEDURE — 83735 ASSAY OF MAGNESIUM: CPT

## 2024-08-08 PROCEDURE — 83880 ASSAY OF NATRIURETIC PEPTIDE: CPT

## 2024-08-08 PROCEDURE — 36415 COLL VENOUS BLD VENIPUNCTURE: CPT

## 2024-08-09 ENCOUNTER — DOCUMENTATION (OUTPATIENT)
Dept: CARDIOLOGY | Facility: CLINIC | Age: 84
End: 2024-08-09
Payer: MEDICARE

## 2024-08-09 NOTE — PROGRESS NOTES
I was able to review the patient's laboratory testing results.  Compared to last month, the patient's kidney function has significantly improved.  Also, no signs of heart failure.  When I get the patient's echocardiogram results back, I will let him know.  I would continue current cardiac medications.  Magnesium was a little low.  Will recheck this again in the future.  It looks like over the past couple years it has been on the lower side of normal. I will have ULISES Golden call the patient back with results/recommendations.       Electronically signed by LEANDRO Rahman, 08/09/24, 8:47 AM EDT.

## 2024-08-12 ENCOUNTER — TELEPHONE (OUTPATIENT)
Dept: CARDIOLOGY | Facility: CLINIC | Age: 84
End: 2024-08-12
Payer: MEDICARE

## 2024-08-12 NOTE — TELEPHONE ENCOUNTER
Called pt with results of recent lab work per LEANDRO Lopez    I was able to review the patient's laboratory testing results. Compared to last month, the patient's kidney function has significantly improved. Also, no signs of heart failure. When I get the patient's echocardiogram results back, I will let him know. I would continue current cardiac medications. Magnesium was a little low. Will recheck this again in the future. It looks like over the past couple years it has been on the lower side of normal.     No answer left voicemail for pt to call back

## 2024-08-15 PROCEDURE — 93295 DEV INTERROG REMOTE 1/2/MLT: CPT | Performed by: INTERNAL MEDICINE

## 2024-08-15 PROCEDURE — 93296 REM INTERROG EVL PM/IDS: CPT | Performed by: INTERNAL MEDICINE

## 2024-09-06 ENCOUNTER — OFFICE VISIT (OUTPATIENT)
Dept: CARDIOLOGY | Facility: HOSPITAL | Age: 84
End: 2024-09-06
Payer: MEDICARE

## 2024-09-06 VITALS
RESPIRATION RATE: 17 BRPM | BODY MASS INDEX: 28.42 KG/M2 | HEART RATE: 60 BPM | SYSTOLIC BLOOD PRESSURE: 148 MMHG | WEIGHT: 203 LBS | TEMPERATURE: 97.6 F | OXYGEN SATURATION: 98 % | DIASTOLIC BLOOD PRESSURE: 63 MMHG | HEIGHT: 71 IN

## 2024-09-06 DIAGNOSIS — I49.3 FREQUENT PVCS: ICD-10-CM

## 2024-09-06 DIAGNOSIS — I38 VALVULAR HEART DISEASE: ICD-10-CM

## 2024-09-06 DIAGNOSIS — E83.42 HYPOMAGNESEMIA: ICD-10-CM

## 2024-09-06 DIAGNOSIS — I25.10 CORONARY ARTERY DISEASE INVOLVING NATIVE CORONARY ARTERY OF NATIVE HEART WITHOUT ANGINA PECTORIS: ICD-10-CM

## 2024-09-06 DIAGNOSIS — I50.22 CHRONIC SYSTOLIC CONGESTIVE HEART FAILURE: Primary | ICD-10-CM

## 2024-09-06 DIAGNOSIS — I10 ESSENTIAL HYPERTENSION: ICD-10-CM

## 2024-09-06 RX ORDER — MAGNESIUM OXIDE 400 MG/1
400 TABLET ORAL DAILY
Qty: 30 TABLET | Refills: 3 | Status: SHIPPED | OUTPATIENT
Start: 2024-09-06

## 2024-09-06 NOTE — PROGRESS NOTES
"[facility] Vascular    Chief Complaint  Congestive Heart Failure, Hypertension, and Follow-up    Subjective    History of Present Illness {  Problem List  Visit  Diagnosis   Encounters  Notes  Medications  Labs  Result Review Imaging  Media :23}     Narendra Cochran presents to [facility] CARDIOLOG[facility] for   History of Present Illness     83-year-old male with heart failure with reduced EF, valvular heart disease, CAD, sinus node dysfunction, PVCs status post pacemaker/ICD (placed after cardiac arrest), dyslipidemia, diabetes, COPD, diverticulosis, Paget's disease, seizure disorder, GERD        No recent hospitalization or ED visit.     No CP, pressure, dizziness, near syncope, syncope, edema. Dyspnea on occasion that improved with inhaler.  Overall feels that he is doing well.  No concerns.    Objective     Vital Signs:   Vitals:    09/06/24 1326   BP: 148/63   BP Location: Left arm   Patient Position: Sitting   Cuff Size: Adult   Pulse: 60   Resp: 17   Temp: 97.6 °F (36.4 °C)   TempSrc: Temporal   SpO2: 98%   Weight: 92.1 kg (203 lb)   Height: 180.3 cm (71\")     Body mass index is 28.31 kg/m².  Physical Exam  Vitals reviewed.   Constitutional:       General: He is not in acute distress.  Cardiovascular:      Rate and Rhythm: Normal rate and regular rhythm.   Pulmonary:      Effort: Pulmonary effort is normal.      Breath sounds: Normal breath sounds.   Musculoskeletal:      Right lower leg: No edema.      Left lower leg: No edema.   Skin:     Coloration: Skin is not pale.   Neurological:      Mental Status: He is alert.   Psychiatric:         Mood and Affect: Mood normal.         Behavior: Behavior normal. Behavior is cooperative.              Result Review  Data Reviewed:{ Labs  Result Review  Imaging  Med Tab  Media :23}   Echocardiogram 9/6/2022: EF 15%, grade 2 diastolic dysfunction, moderate AR, moderate to severe MR, RVSP greater " than 55 mmHg     Lab on 08/08/2024   Component Date Value Ref Range Status    Glucose 08/08/2024 103 (H)  65 - 99 mg/dL Final    BUN 08/08/2024 17  8 - 23 mg/dL Final    Creatinine 08/08/2024 1.23  0.76 - 1.27 mg/dL Final    Sodium 08/08/2024 145  136 - 145 mmol/L Final    Potassium 08/08/2024 3.9  3.5 - 5.2 mmol/L Final    Chloride 08/08/2024 108 (H)  98 - 107 mmol/L Final    CO2 08/08/2024 25.0  22.0 - 29.0 mmol/L Final    Calcium 08/08/2024 9.2  8.6 - 10.5 mg/dL Final    BUN/Creatinine Ratio 08/08/2024 13.8  7.0 - 25.0 Final    Anion Gap 08/08/2024 12.0  5.0 - 15.0 mmol/L Final    eGFR 08/08/2024 57.5 (L)  >60.0 mL/min/1.73 Final    Magnesium 08/08/2024 1.5 (L)  1.6 - 2.4 mg/dL Final    proBNP 08/08/2024 568.0  0.0 - 1,800.0 pg/mL Final                   Assessment and Plan {CC Problem List  Visit Diagnosis  ROS  Review (Popup)  "[employer] Maintenance  Quality  BestPractice  Medications  SmartSets  SnapShot Encounters  Media :23}   1. Chronic systolic congestive heart failure  EF 15%, moderate AR, moderate to severe MR, RVSP greater than 55 mmHg    Euvolemic.    No recent hospitalizations or ED visits for heart failure exacerbation.    Stable on GDMT: Metoprolol succinate, Entresto, Bumex (max tolerated dosing)    History of worsening kidney function on Aldactone.    Repeat echocardiogram scheduled in 2 weeks.    Reviewed signs and symptoms of heart failure worsening, role the heart valve center and when to call    2. Valvular heart disease  moderate AR, moderate to severe MR, RVSP greater than 55 mmHg  Currently stable    3. Frequent PVCs  Recent labs with low magnesium    - Magnesium; 4 weeks  Initiate  - magnesium oxide (MAG-OX) 400 MG tablet; Take 1 tablet by mouth Daily.  Dispense: 30 tablet; Refill: 3    4. Essential hypertension  Blood pressure stable with age.  No dizziness, near-syncope, syncope.  No recent falls.    5. Coronary artery disease involving native coronary artery of native heart  without angina pectoris  No chest pain or pressure.    6. Hypomagnesemia    - Magnesium; Future  - magnesium oxide (MAG-OX) 400 MG tablet; Take 1 tablet by mouth Daily.  Dispense: 30 tablet; Refill: 3    Follow-up with cardiology as scheduled.  Follow-up in the heart valve center as needed or as determined by cardiology.      Follow Up {Instructions Charge Capture  Follow-up Communications :23}   Return if symptoms worsen or fail to improve.    Patient was given instructions and counseling regarding his condition or for health maintenance advice. Please see specific information pulled into the AVS if appropriate.  Patient was instructed to call the Heart and Valve Center with any questions, concerns, or worsening symptoms.

## 2024-09-16 DIAGNOSIS — E78.2 MIXED HYPERLIPIDEMIA: ICD-10-CM

## 2024-09-17 ENCOUNTER — HOSPITAL ENCOUNTER (OUTPATIENT)
Dept: CARDIOLOGY | Facility: HOSPITAL | Age: 84
Discharge: HOME | End: 2024-09-17
Admitting: NURSE PRACTITIONER
Payer: MEDICARE

## 2024-09-17 PROCEDURE — 93306 TTE W/DOPPLER COMPLETE: CPT

## 2024-09-18 LAB
BH CV ECHO MEAS - AO MAX PG: 5.8 MMHG
BH CV ECHO MEAS - AO ROOT DIAM: 3.8 CM
BH CV ECHO MEAS - AO V2 MAX: 120.7 CM/SEC
BH CV ECHO MEAS - IVS/LVPW: 1.17 CM
BH CV ECHO MEAS - IVSD: 1.4 CM
BH CV ECHO MEAS - LA DIMENSION: 5.1 CM
BH CV ECHO MEAS - LAT PEAK E' VEL: 10 CM/SEC
BH CV ECHO MEAS - LV MAX PG: 2.02 MMHG
BH CV ECHO MEAS - LV MEAN PG: 0.83 MMHG
BH CV ECHO MEAS - LV V1 MAX: 71.1 CM/SEC
BH CV ECHO MEAS - LV V1 VTI: 13 CM
BH CV ECHO MEAS - LVIDD: 5.5 CM
BH CV ECHO MEAS - LVIDS: 3.7 CM
BH CV ECHO MEAS - LVOT DIAM: 2.3 CM
BH CV ECHO MEAS - LVPWD: 1.2 CM
BH CV ECHO MEAS - MED PEAK E' VEL: 6.9 CM/SEC
BH CV ECHO MEAS - MV E MAX VEL: 35.1 CM/SEC
BH CV ECHO MEAS - MV MAX PG: 4.3 MMHG
BH CV ECHO MEAS - MV P1/2T: 74 MSEC
BH CV ECHO MEAS - PA ACC TIME: 0.12 SEC
BH CV ECHO MEAS - PI END-D VEL: 148.6 CM/SEC
BH CV ECHO MEAS - RAP SYSTOLE: 8 MMHG
BH CV ECHO MEAS - RVSP: 42 MMHG
BH CV ECHO MEAS - TAPSE (>1.6): 1.74 CM
BH CV ECHO MEAS - TR MAX PG: 34.1 MMHG
BH CV ECHO MEAS - TR MAX VEL: 291.2 CM/SEC
BH CV ECHO MEASUREMENTS AVERAGE E/E' RATIO: 4.15
BH CV XLRA - RV BASE: 5 CM
BH CV XLRA - RV LENGTH: 7.8 CM
BH CV XLRA - RV MID: 3.6 CM
BH CV XLRA - TDI S': 18.8 CM/SEC
LV EF 2D ECHO EST: 53.2 %

## 2024-09-18 RX ORDER — ROSUVASTATIN CALCIUM 20 MG/1
TABLET, COATED ORAL
Qty: 90 TABLET | Refills: 1 | Status: SHIPPED | OUTPATIENT
Start: 2024-09-18

## 2024-09-20 ENCOUNTER — TELEPHONE (OUTPATIENT)
Dept: CARDIOLOGY | Facility: CLINIC | Age: 84
End: 2024-09-20
Payer: MEDICARE

## 2024-10-05 DIAGNOSIS — I10 ESSENTIAL HYPERTENSION: ICD-10-CM

## 2024-10-05 DIAGNOSIS — R56.9 SEIZURE: Chronic | ICD-10-CM

## 2024-10-08 RX ORDER — METOPROLOL SUCCINATE 50 MG/1
50 TABLET, EXTENDED RELEASE ORAL DAILY
Qty: 90 TABLET | Refills: 0 | Status: SHIPPED | OUTPATIENT
Start: 2024-10-08

## 2024-10-08 RX ORDER — LEVETIRACETAM 750 MG/1
TABLET ORAL
Qty: 180 TABLET | Refills: 1 | OUTPATIENT
Start: 2024-10-08

## 2024-10-11 PROCEDURE — 93297 REM INTERROG DEV EVAL ICPMS: CPT | Performed by: INTERNAL MEDICINE

## 2024-10-14 ENCOUNTER — LAB (OUTPATIENT)
Dept: LAB | Facility: HOSPITAL | Age: 84
End: 2024-10-14
Payer: MEDICARE

## 2024-10-14 ENCOUNTER — OFFICE VISIT (OUTPATIENT)
Dept: FAMILY MEDICINE CLINIC | Facility: CLINIC | Age: 84
End: 2024-10-14
Payer: MEDICARE

## 2024-10-14 VITALS
DIASTOLIC BLOOD PRESSURE: 56 MMHG | HEART RATE: 60 BPM | OXYGEN SATURATION: 97 % | SYSTOLIC BLOOD PRESSURE: 144 MMHG | WEIGHT: 196 LBS | HEIGHT: 71 IN | BODY MASS INDEX: 27.44 KG/M2 | TEMPERATURE: 98.2 F

## 2024-10-14 DIAGNOSIS — E78.5 HYPERLIPIDEMIA LDL GOAL <70: ICD-10-CM

## 2024-10-14 DIAGNOSIS — Z23 IMMUNIZATION DUE: ICD-10-CM

## 2024-10-14 DIAGNOSIS — I49.3 FREQUENT PVCS: ICD-10-CM

## 2024-10-14 DIAGNOSIS — E83.42 HYPOMAGNESEMIA: ICD-10-CM

## 2024-10-14 DIAGNOSIS — Z00.00 MEDICARE ANNUAL WELLNESS VISIT, SUBSEQUENT: Primary | ICD-10-CM

## 2024-10-14 DIAGNOSIS — E11.65 TYPE 2 DIABETES MELLITUS WITH HYPERGLYCEMIA, WITHOUT LONG-TERM CURRENT USE OF INSULIN: Chronic | ICD-10-CM

## 2024-10-14 DIAGNOSIS — I10 ESSENTIAL HYPERTENSION: ICD-10-CM

## 2024-10-14 DIAGNOSIS — Z00.00 PHYSICAL EXAM, ANNUAL: ICD-10-CM

## 2024-10-14 DIAGNOSIS — K59.09 OTHER CONSTIPATION: ICD-10-CM

## 2024-10-14 DIAGNOSIS — K21.9 GASTROESOPHAGEAL REFLUX DISEASE, UNSPECIFIED WHETHER ESOPHAGITIS PRESENT: ICD-10-CM

## 2024-10-14 PROCEDURE — 90662 IIV NO PRSV INCREASED AG IM: CPT | Performed by: PHYSICIAN ASSISTANT

## 2024-10-14 PROCEDURE — 82043 UR ALBUMIN QUANTITATIVE: CPT

## 2024-10-14 PROCEDURE — 3077F SYST BP >= 140 MM HG: CPT | Performed by: PHYSICIAN ASSISTANT

## 2024-10-14 PROCEDURE — 1126F AMNT PAIN NOTED NONE PRSNT: CPT | Performed by: PHYSICIAN ASSISTANT

## 2024-10-14 PROCEDURE — G0439 PPPS, SUBSEQ VISIT: HCPCS | Performed by: PHYSICIAN ASSISTANT

## 2024-10-14 PROCEDURE — 84439 ASSAY OF FREE THYROXINE: CPT

## 2024-10-14 PROCEDURE — 1170F FXNL STATUS ASSESSED: CPT | Performed by: PHYSICIAN ASSISTANT

## 2024-10-14 PROCEDURE — 84443 ASSAY THYROID STIM HORMONE: CPT

## 2024-10-14 PROCEDURE — 85025 COMPLETE CBC W/AUTO DIFF WBC: CPT

## 2024-10-14 PROCEDURE — 80053 COMPREHEN METABOLIC PANEL: CPT

## 2024-10-14 PROCEDURE — 1160F RVW MEDS BY RX/DR IN RCRD: CPT | Performed by: PHYSICIAN ASSISTANT

## 2024-10-14 PROCEDURE — 82570 ASSAY OF URINE CREATININE: CPT

## 2024-10-14 PROCEDURE — 80061 LIPID PANEL: CPT

## 2024-10-14 PROCEDURE — 1159F MED LIST DOCD IN RCRD: CPT | Performed by: PHYSICIAN ASSISTANT

## 2024-10-14 PROCEDURE — G0008 ADMIN INFLUENZA VIRUS VAC: HCPCS | Performed by: PHYSICIAN ASSISTANT

## 2024-10-14 PROCEDURE — 82607 VITAMIN B-12: CPT

## 2024-10-14 PROCEDURE — 83036 HEMOGLOBIN GLYCOSYLATED A1C: CPT

## 2024-10-14 PROCEDURE — 3078F DIAST BP <80 MM HG: CPT | Performed by: PHYSICIAN ASSISTANT

## 2024-10-14 PROCEDURE — 82746 ASSAY OF FOLIC ACID SERUM: CPT

## 2024-10-14 PROCEDURE — 83735 ASSAY OF MAGNESIUM: CPT

## 2024-10-14 PROCEDURE — 99397 PER PM REEVAL EST PAT 65+ YR: CPT | Performed by: PHYSICIAN ASSISTANT

## 2024-10-14 RX ORDER — PANTOPRAZOLE SODIUM 40 MG/1
40 TABLET, DELAYED RELEASE ORAL 2 TIMES DAILY
Qty: 180 TABLET | Refills: 3 | Status: SHIPPED | OUTPATIENT
Start: 2024-10-14

## 2024-10-14 RX ORDER — METFORMIN HCL 500 MG
500 TABLET, EXTENDED RELEASE 24 HR ORAL
Qty: 90 TABLET | Refills: 3 | Status: SHIPPED | OUTPATIENT
Start: 2024-10-14

## 2024-10-14 RX ORDER — PANTOPRAZOLE SODIUM 40 MG/1
TABLET, DELAYED RELEASE ORAL
Qty: 180 TABLET | Refills: 1 | OUTPATIENT
Start: 2024-10-14

## 2024-10-14 RX ORDER — METFORMIN HCL 500 MG
TABLET, EXTENDED RELEASE 24 HR ORAL
Qty: 90 TABLET | Refills: 3 | OUTPATIENT
Start: 2024-10-14

## 2024-10-14 RX ORDER — POLYETHYLENE GLYCOL 3350 17 G/17G
17 POWDER, FOR SOLUTION ORAL NIGHTLY
Qty: 578 G | Refills: 1 | Status: SHIPPED | OUTPATIENT
Start: 2024-10-14

## 2024-10-14 RX ORDER — MAGNESIUM OXIDE 400 MG/1
400 TABLET ORAL DAILY
Qty: 90 TABLET | Refills: 3 | Status: SHIPPED | OUTPATIENT
Start: 2024-10-14

## 2024-10-14 NOTE — PROGRESS NOTES
Subjective   The ABCs of the Annual Wellness Visit  Medicare Wellness Visit      Narendra Cochran is a 83 y.o. patient who presents for a Medicare Wellness Visit.    The following portions of the patient's history were reviewed and   updated as appropriate: allergies, current medications, past family history, past medical history, past social history, past surgical history, and problem list.    Compared to one year ago, the patient's physical   health is the same.  Compared to one year ago, the patient's mental   health is the same.    Recent Hospitalizations:  He was not admitted to the hospital during the last year.     Current Medical Providers:  Patient Care Team:  Marguerite Moore PA-C as PCP - General (Physician Assistant)  Som Boyd DO as Consulting Physician (Cardiology)  Javad Mercedes MD as Consulting Physician (Cardiology)  Thomas Lui MD as Consulting Physician (Hematology and Oncology)    Outpatient Medications Prior to Visit   Medication Sig Dispense Refill    albuterol sulfate  (90 Base) MCG/ACT inhaler Inhale 2 puffs Every 4 (Four) Hours As Needed for Wheezing or Shortness of Air. 8 g 3    aspirin 81 MG chewable tablet Chew 1 tablet Daily.      Blood Glucose Monitoring Suppl (ONE TOUCH ULTRA 2) w/Device kit TEST DAILY AS DIRECTED      bumetanide (BUMEX) 1 MG tablet TAKE 1 TABLET BY MOUTH EVERY DAY 90 tablet 3    diclofenac (VOLTAREN) 1 % gel gel Apply 4 g topically to the appropriate area as directed 4 (Four) Times a Day As Needed (prn for pain). 60 tube 0    Diclofenac Sodium (VOLTAREN) 1 % gel gel APPLY 2 GRAMS TO FEET TWICE DAILY FOR PAIN AND STIFFNESS      glucose blood (OneTouch Ultra) test strip TEST DAILY AS DIRECTED 100 each 11    Lancets (OneTouch Delica Plus Fuinra61I) misc USE AS DIRECTED 100 each 12    levETIRAcetam (KEPPRA) 750 MG tablet TAKE 1 TABLET BY MOUTH TWICE A  tablet 1    metoprolol succinate XL (TOPROL-XL) 50 MG 24 hr tablet TAKE 1 TABLET BY  MOUTH EVERY DAY 90 tablet 0    potassium chloride ER (K-TAB) 20 MEQ tablet controlled-release ER tablet TAKE 1 TABLET BY MOUTH EVERY DAY 90 tablet 3    rosuvastatin (CRESTOR) 20 MG tablet TAKE 1 TABLET BY MOUTH EVERY DAY 90 tablet 1    sacubitril-valsartan (Entresto) 24-26 MG tablet Take 1 tablet by mouth 2 (Two) Times a Day. 60 tablet 11    magnesium oxide (MAG-OX) 400 MG tablet Take 1 tablet by mouth Daily. 30 tablet 3    metFORMIN ER (GLUCOPHAGE-XR) 500 MG 24 hr tablet TAKE 1 TABLET BY MOUTH EVERY DAY WITH BREAKFAST 90 tablet 3    pantoprazole (PROTONIX) 40 MG EC tablet TAKE 1 TABLET BY MOUTH TWICE A  tablet 1    polyethylene glycol (MIRALAX) 17 GM/SCOOP powder Take 17 g by mouth Every Night. 578 g 1     No facility-administered medications prior to visit.     No opioid medication identified on active medication list. I have reviewed chart for other potential  high risk medication/s and harmful drug interactions in the elderly.      Aspirin is on active medication list. Aspirin use is indicated based on review of current medical condition/s. Pros and cons of this therapy have been discussed today. Benefits of this medication outweigh potential harm.  Patient has been encouraged to continue taking this medication.  .      Patient Active Problem List   Diagnosis    Essential hypertension    Hyperlipidemia LDL goal <70    Paget disease of bone    H/O seizures on Keppra    Gonalgia    Osteitis deformans    COPD (chronic obstructive pulmonary disease)    Syncope    NSVT (nonsustained ventricular tachycardia)    Coronary artery disease involving native coronary artery of native heart with angina pectoris    NICM     OHCA    Hypokalemia    Hypomagnesemia    VHD; mild-mod AR, mild MR    Chronic systolic congestive heart failure    Former tobacco user    GERD    Marijuana use    Frequent PVCs    Presence of biventricular implantable cardioverter-defibrillator (ICD)    CKD (chronic kidney disease) stage 2, GFR 60-89  "ml/min    Moderate malnutrition    Paroxysmal A-fib    Type 2 diabetes mellitus    Thrombocytopenia    Acute pulmonary edema    Elevated brain natriuretic peptide (BNP) level     Advance Care Planning Advance Directive is on file.  ACP discussion was declined by the patient. Patient has an advance directive in EMR which is still valid.             Objective   Vitals:    10/14/24 1408   BP: 144/56   BP Location: Left arm   Patient Position: Sitting   Cuff Size: Adult   Pulse: 60   Temp: 98.2 °F (36.8 °C)   TempSrc: Oral   SpO2: 97%   Weight: 88.9 kg (196 lb)   Height: 180.3 cm (70.98\")       Estimated body mass index is 27.35 kg/m² as calculated from the following:    Height as of this encounter: 180.3 cm (70.98\").    Weight as of this encounter: 88.9 kg (196 lb).    BMI is >= 25 and <30. (Overweight) The following options were offered after discussion;: exercise counseling/recommendations and nutrition counseling/recommendations       Does the patient have evidence of cognitive impairment? No                                                                                                Health  Risk Assessment    Smoking Status:  Social History     Tobacco Use   Smoking Status Former    Current packs/day: 0.00    Average packs/day: 0.5 packs/day for 65.0 years (32.5 ttl pk-yrs)    Types: Cigars, Cigarettes    Start date: 1954    Quit date: 2019    Years since quittin.1   Smokeless Tobacco Never     Alcohol Consumption:  Social History     Substance and Sexual Activity   Alcohol Use Not Currently    Comment: very rarely       Fall Risk Screen  STEADI Fall Risk Assessment was completed, and patient is at LOW risk for falls.Assessment completed on:10/14/2024    Depression Screening:      10/14/2024     2:08 PM   PHQ-2/PHQ-9 Depression Screening   Little interest or pleasure in doing things Not at all   Feeling down, depressed, or hopeless Not at all     Health Habits and Functional and Cognitive " Screening:      10/14/2024     2:05 PM   Functional & Cognitive Status   Do you have difficulty preparing food and eating? No   Do you have difficulty bathing yourself, getting dressed or grooming yourself? No   Do you have difficulty using the toilet? No   Do you have difficulty moving around from place to place? No   Do you have trouble with steps or getting out of a bed or a chair? No   Current Diet Well Balanced Diet   Dental Exam Up to date   Eye Exam Up to date   Exercise (times per week) 0 times per week   Current Exercises Include No Regular Exercise   Do you need help using the phone?  No   Are you deaf or do you have serious difficulty hearing?  Yes   Do you need help to go to places out of walking distance? No   Do you need help shopping? No   Do you need help preparing meals?  No   Do you need help with housework?  No   Do you need help with laundry? No   Do you need help taking your medications? No   Do you need help managing money? No   Do you ever drive or ride in a car without wearing a seat belt? No   Have you felt unusual stress, anger or loneliness in the last month? No   Who do you live with? Spouse   If you need help, do you have trouble finding someone available to you? No   Have you been bothered in the last four weeks by sexual problems? No   Do you have difficulty concentrating, remembering or making decisions? Yes           Age-appropriate Screening Schedule:  Refer to the list below for future screening recommendations based on patient's age, sex and/or medical conditions. Orders for these recommended tests are listed in the plan section. The patient has been provided with a written plan.    Health Maintenance List  Health Maintenance   Topic Date Due    TDAP/TD VACCINES (1 - Tdap) Never done    ZOSTER VACCINE (1 of 2) Never done    RSV Vaccine - Adults (1 - 1-dose 75+ series) Never done    URINE MICROALBUMIN  05/18/2022    DIABETIC EYE EXAM  08/28/2024    COVID-19 Vaccine (4 - 2023-24  season) 09/01/2024    BMI FOLLOWUP  10/10/2024    HEMOGLOBIN A1C  01/10/2025    LIPID PANEL  04/10/2025    ANNUAL WELLNESS VISIT  10/14/2025    COLORECTAL CANCER SCREENING  12/02/2030    INFLUENZA VACCINE  Completed    Pneumococcal Vaccine 65+  Completed                                                                                                                                                CMS Preventative Services Quick Reference  Risk Factors Identified During Encounter  Immunizations Discussed/Encouraged: Influenza, Shingrix, COVID19, and RSV (Respiratory Syncytial Virus)  Dental Screening Recommended  Vision Screening Recommended    The above risks/problems have been discussed with the patient.  Pertinent information has been shared with the patient in the After Visit Summary.  An After Visit Summary and PPPS were made available to the patient.    Follow Up:   Next Medicare Wellness visit to be scheduled in 1 year.         Additional E&M Note during same encounter follows:  Patient has additional, significant, and separately identifiable condition(s)/problem(s) that require work above and beyond the Medicare Wellness Visit     Chief Complaint  Medicare Wellness-subsequent and Annual Exam    Subjective   HPI  Narendra is also being seen today for an annual adult preventative physical exam. Patient is doing well overall.  His son is present at appointment.  He is compliant on all medications.  BP is stable.  Followed by cardiology.      Review of Systems   Constitutional:  Negative for chills and fever.   HENT:  Positive for hearing loss. Negative for congestion, ear pain, postnasal drip, rhinorrhea, sinus pressure and sore throat.    Eyes:  Negative for visual disturbance.   Respiratory:  Negative for cough, shortness of breath and wheezing.    Cardiovascular:  Negative for chest pain, palpitations and leg swelling.   Gastrointestinal:  Negative for abdominal pain, blood in stool, diarrhea, nausea and  "vomiting.   Endocrine: Negative for polyuria.   Genitourinary:  Negative for dysuria, flank pain, frequency and hematuria.   Musculoskeletal:  Negative for arthralgias, gait problem and myalgias.   Skin:  Negative for rash.   Neurological:  Negative for dizziness and confusion.   Hematological:  Does not bruise/bleed easily.   Psychiatric/Behavioral:  Negative for agitation, dysphoric mood, self-injury, sleep disturbance and suicidal ideas. The patient is not nervous/anxious.               Objective   Vital Signs:  /56 (BP Location: Left arm, Patient Position: Sitting, Cuff Size: Adult)   Pulse 60   Temp 98.2 °F (36.8 °C) (Oral)   Ht 180.3 cm (70.98\")   Wt 88.9 kg (196 lb)   SpO2 97%   BMI 27.35 kg/m²   Physical Exam  Vitals reviewed.   Constitutional:       General: He is not in acute distress.     Appearance: Normal appearance. He is well-developed. He is not ill-appearing or diaphoretic.   HENT:      Head: Normocephalic and atraumatic.      Right Ear: Hearing, tympanic membrane, ear canal and external ear normal.      Left Ear: Hearing and external ear normal.      Ears:      Comments: Wearing hearing aids.  Has significant hearing deficit even with hearing aids.     Nose: Nose normal.      Right Sinus: No maxillary sinus tenderness or frontal sinus tenderness.      Left Sinus: No maxillary sinus tenderness or frontal sinus tenderness.      Mouth/Throat:      Pharynx: Uvula midline.   Eyes:      General: Lids are normal.      Extraocular Movements: Extraocular movements intact.      Conjunctiva/sclera: Conjunctivae normal.   Neck:      Thyroid: No thyroid mass or thyromegaly.      Trachea: Trachea and phonation normal.   Cardiovascular:      Rate and Rhythm: Normal rate and regular rhythm. Rhythm irregular.      Heart sounds: Normal heart sounds.   Pulmonary:      Effort: Pulmonary effort is normal.      Breath sounds: Normal breath sounds.   Abdominal:      General: There is no distension.      " Palpations: Abdomen is soft. Abdomen is not rigid.      Tenderness: There is no abdominal tenderness. There is no guarding.   Musculoskeletal:      Cervical back: Normal range of motion.      Right lower leg: No edema.      Left lower leg: No edema.      Comments: Using cane to walk.   Lymphadenopathy:      Cervical: No cervical adenopathy.      Right cervical: No superficial cervical adenopathy.     Left cervical: No superficial cervical adenopathy.   Skin:     General: Skin is warm.      Findings: No erythema or rash.      Nails: There is no clubbing.   Neurological:      Mental Status: He is alert and oriented to person, place, and time.      Coordination: Coordination normal.      Gait: Gait normal.      Deep Tendon Reflexes: Reflexes are normal and symmetric.      Comments: CN grossly intact   Psychiatric:         Attention and Perception: Attention and perception normal. He is attentive.         Mood and Affect: Mood and affect normal.         Speech: Speech normal.         Behavior: Behavior normal. Behavior is cooperative.         Thought Content: Thought content normal.         Cognition and Memory: Cognition and memory normal.         Judgment: Judgment normal.         The following data was reviewed by: Marguerite Moore PA-C on 10/14/2024:        Assessment and Plan Additional age appropriate preventative wellness advice topics were discussed during today's preventative wellness exam(some topics already addressed during AWV portion of the note above):    Physical Activity: Advised cardiovascular activity 150 minutes per week as tolerated. (example brisk walk for 30 minutes, 5 days a week).     Nutrition: Discussed nutrition plan with patient. Information shared in after visit summary. Goal is for a well balanced diet to enhance overall health.              Medicare annual wellness visit, subsequent    Physical exam, annual    Essential hypertension    Frequent PVCs    Hyperlipidemia LDL goal <70      Hypomagnesemia    Type 2 diabetes mellitus with hyperglycemia, without long-term current use of insulin    Gastroesophageal reflux disease, unspecified whether esophagitis present    Other constipation    Immunization due      Orders Placed This Encounter   Procedures    Fluzone High-Dose 65+yrs (7534-4130)    Comprehensive Metabolic Panel     Standing Status:   Future     Standing Expiration Date:   10/14/2025     Order Specific Question:   Release to patient     Answer:   Routine Release [[id number]]    CBC Auto Differential     Standing Status:   Future     Standing Expiration Date:   10/14/2025     Order Specific Question:   Release to patient     Answer:   Routine Release [[id number]]    TSH Rfx On Abnormal To Free T4     Standing Status:   Future     Standing Expiration Date:   10/14/2025     Order Specific Question:   Release to patient     Answer:   Routine Release [[id number]]    Magnesium     Standing Status:   Future     Standing Expiration Date:   10/14/2025     Order Specific Question:   Release to patient     Answer:   Routine Release [[id number]]    Hemoglobin A1c     Standing Status:   Future     Standing Expiration Date:   10/14/2025     Order Specific Question:   Release to patient     Answer:   Routine Release [[id number]]    Vitamin B12 & Folate     Standing Status:   Future     Standing Expiration Date:   10/14/2025     Order Specific Question:   Release to patient     Answer:   Routine Release [[id number]]    Microalbumin / Creatinine Urine Ratio - Urine, Clean Catch     Standing Status:   Future     Standing Expiration Date:   10/14/2025     Order Specific Question:   Release to patient     Answer:   Routine Release [[id number]]    Lipid Panel     Standing Status:   Future     Standing Expiration Date:   10/14/2025     Order Specific Question:   Release to patient     Answer:   Routine Release [[id number]]     New Medications Ordered This Visit   Medications    magnesium oxide (MAG-OX) 400  MG tablet     Sig: Take 1 tablet by mouth Daily.     Dispense:  90 tablet     Refill:  3    metFORMIN ER (GLUCOPHAGE-XR) 500 MG 24 hr tablet     Sig: Take 1 tablet by mouth Daily With Breakfast.     Dispense:  90 tablet     Refill:  3    empagliflozin (JARDIANCE) 10 MG tablet tablet     Sig: Take 1 tablet by mouth Daily.    pantoprazole (PROTONIX) 40 MG EC tablet     Sig: Take 1 tablet by mouth 2 (Two) Times a Day.     Dispense:  180 tablet     Refill:  3    polyethylene glycol (MIRALAX) 17 GM/SCOOP powder     Sig: Take 17 g by mouth Every Night.     Dispense:  578 g     Refill:  1        I spent 40 minutes caring for Narendra on this date of service. This time includes time spent by me in the following activities:preparing for the visit, reviewing tests, obtaining and/or reviewing a separately obtained history, performing a medically appropriate examination and/or evaluation , counseling and educating the patient/family/caregiver, ordering medications, tests, or procedures, documenting information in the medical record, independently interpreting results and communicating that information with the patient/family/caregiver, and care coordination  Follow Up   No follow-ups on file.  Patient was given instructions and counseling regarding his condition or for health maintenance advice. Please see specific information pulled into the AVS if appropriate.

## 2024-10-15 LAB
ALBUMIN SERPL-MCNC: 4.4 G/DL (ref 3.5–5.2)
ALBUMIN UR-MCNC: 6.5 MG/DL
ALBUMIN/GLOB SERPL: 1.2 G/DL
ALP SERPL-CCNC: 96 U/L (ref 39–117)
ALT SERPL W P-5'-P-CCNC: 12 U/L (ref 1–41)
ANION GAP SERPL CALCULATED.3IONS-SCNC: 15.6 MMOL/L (ref 5–15)
AST SERPL-CCNC: 22 U/L (ref 1–40)
BASOPHILS # BLD AUTO: 0.01 10*3/MM3 (ref 0–0.2)
BASOPHILS NFR BLD AUTO: 0.3 % (ref 0–1.5)
BILIRUB SERPL-MCNC: 0.4 MG/DL (ref 0–1.2)
BUN SERPL-MCNC: 20 MG/DL (ref 8–23)
BUN/CREAT SERPL: 16.8 (ref 7–25)
CALCIUM SPEC-SCNC: 9.9 MG/DL (ref 8.6–10.5)
CHLORIDE SERPL-SCNC: 105 MMOL/L (ref 98–107)
CHOLEST SERPL-MCNC: 150 MG/DL (ref 0–200)
CO2 SERPL-SCNC: 22.4 MMOL/L (ref 22–29)
CREAT SERPL-MCNC: 1.19 MG/DL (ref 0.76–1.27)
CREAT UR-MCNC: 76.4 MG/DL
DEPRECATED RDW RBC AUTO: 40.2 FL (ref 37–54)
EGFRCR SERPLBLD CKD-EPI 2021: 59.9 ML/MIN/1.73
EOSINOPHIL # BLD AUTO: 0.06 10*3/MM3 (ref 0–0.4)
EOSINOPHIL NFR BLD AUTO: 1.7 % (ref 0.3–6.2)
ERYTHROCYTE [DISTWIDTH] IN BLOOD BY AUTOMATED COUNT: 12.9 % (ref 12.3–15.4)
FOLATE SERPL-MCNC: 5.76 NG/ML (ref 4.78–24.2)
GLOBULIN UR ELPH-MCNC: 3.6 GM/DL
GLUCOSE SERPL-MCNC: 88 MG/DL (ref 65–99)
HBA1C MFR BLD: 6 % (ref 4.8–5.6)
HCT VFR BLD AUTO: 43.9 % (ref 37.5–51)
HDLC SERPL-MCNC: 52 MG/DL (ref 40–60)
HGB BLD-MCNC: 14 G/DL (ref 13–17.7)
IMM GRANULOCYTES # BLD AUTO: 0.01 10*3/MM3 (ref 0–0.05)
IMM GRANULOCYTES NFR BLD AUTO: 0.3 % (ref 0–0.5)
LDLC SERPL CALC-MCNC: 70 MG/DL (ref 0–100)
LDLC/HDLC SERPL: 1.25 {RATIO}
LYMPHOCYTES # BLD AUTO: 0.98 10*3/MM3 (ref 0.7–3.1)
LYMPHOCYTES NFR BLD AUTO: 27.7 % (ref 19.6–45.3)
MAGNESIUM SERPL-MCNC: 2.4 MG/DL (ref 1.6–2.4)
MCH RBC QN AUTO: 27.6 PG (ref 26.6–33)
MCHC RBC AUTO-ENTMCNC: 31.9 G/DL (ref 31.5–35.7)
MCV RBC AUTO: 86.4 FL (ref 79–97)
MICROALBUMIN/CREAT UR: 85.1 MG/G (ref 0–29)
MONOCYTES # BLD AUTO: 0.35 10*3/MM3 (ref 0.1–0.9)
MONOCYTES NFR BLD AUTO: 9.9 % (ref 5–12)
NEUTROPHILS NFR BLD AUTO: 2.13 10*3/MM3 (ref 1.7–7)
NEUTROPHILS NFR BLD AUTO: 60.1 % (ref 42.7–76)
NRBC BLD AUTO-RTO: 0 /100 WBC (ref 0–0.2)
PLATELET # BLD AUTO: 131 10*3/MM3 (ref 140–450)
PMV BLD AUTO: 12.1 FL (ref 6–12)
POTASSIUM SERPL-SCNC: 4.1 MMOL/L (ref 3.5–5.2)
PROT SERPL-MCNC: 8 G/DL (ref 6–8.5)
RBC # BLD AUTO: 5.08 10*6/MM3 (ref 4.14–5.8)
SODIUM SERPL-SCNC: 143 MMOL/L (ref 136–145)
T4 FREE SERPL-MCNC: 1.27 NG/DL (ref 0.92–1.68)
TRIGL SERPL-MCNC: 164 MG/DL (ref 0–150)
TSH SERPL DL<=0.05 MIU/L-ACNC: 4.63 UIU/ML (ref 0.27–4.2)
VIT B12 BLD-MCNC: 400 PG/ML (ref 211–946)
VLDLC SERPL-MCNC: 28 MG/DL (ref 5–40)
WBC NRBC COR # BLD AUTO: 3.54 10*3/MM3 (ref 3.4–10.8)

## 2024-11-06 DIAGNOSIS — I50.32 CHRONIC HEART FAILURE WITH PRESERVED EJECTION FRACTION: ICD-10-CM

## 2024-11-06 RX ORDER — BUMETANIDE 1 MG/1
TABLET ORAL
Qty: 30 TABLET | Refills: 11 | Status: SHIPPED | OUTPATIENT
Start: 2024-11-06

## 2024-11-14 PROCEDURE — 93296 REM INTERROG EVL PM/IDS: CPT | Performed by: INTERNAL MEDICINE

## 2024-11-14 PROCEDURE — 93295 DEV INTERROG REMOTE 1/2/MLT: CPT | Performed by: INTERNAL MEDICINE

## 2024-11-28 DIAGNOSIS — R56.9 SEIZURE: Chronic | ICD-10-CM

## 2024-12-02 RX ORDER — LEVETIRACETAM 750 MG/1
TABLET ORAL
Qty: 180 TABLET | Refills: 1 | Status: SHIPPED | OUTPATIENT
Start: 2024-12-02

## 2024-12-20 ENCOUNTER — TELEPHONE (OUTPATIENT)
Dept: FAMILY MEDICINE CLINIC | Facility: CLINIC | Age: 84
End: 2024-12-20
Payer: MEDICARE

## 2024-12-20 DIAGNOSIS — E11.65 TYPE 2 DIABETES MELLITUS WITH HYPERGLYCEMIA, WITHOUT LONG-TERM CURRENT USE OF INSULIN: Chronic | ICD-10-CM

## 2024-12-20 DIAGNOSIS — E11.65 TYPE 2 DIABETES MELLITUS WITH HYPERGLYCEMIA: ICD-10-CM

## 2024-12-20 RX ORDER — LANCETS 30 GAUGE
EACH MISCELLANEOUS
Qty: 100 EACH | Refills: 3 | Status: SHIPPED | OUTPATIENT
Start: 2024-12-20

## 2024-12-20 RX ORDER — BLOOD-GLUCOSE METER
KIT MISCELLANEOUS
Qty: 1 EACH | Refills: 0 | Status: SHIPPED | OUTPATIENT
Start: 2024-12-20

## 2024-12-20 NOTE — TELEPHONE ENCOUNTER
Caller: Jessica Cochran    Relationship: Emergency Contact    Best call back number: [phone]     What medication are you requesting: SIMPLE GLUCOMETER AND SUPPLIES    If a prescription is needed, what is your preferred pharmacy and phone number: [id number] - [address] - [phone] [facility] [phone] FX

## 2024-12-20 NOTE — TELEPHONE ENCOUNTER
Name: Jessica Cochran      Relationship: Emergency Contact      Best Callback Number: [phone]      [facility] PROVIDED THE RELAY MESSAGE FROM THE OFFICE    Left message for Narendra B Dimas  to return my call.     Hub may relay message and document     Called to confirm how often a day the patient is checking his blood sugar. I have to send this with the supplies to the pharmacy for insurance to cover. Please let me know and ill order.            PATIENT: VOICED UNDERSTANDING AND HAS NO FURTHER QUESTIONS AT THIS TIME    ADDITIONAL INFORMATION: CHECKS ONCE PER DAY

## 2024-12-20 NOTE — TELEPHONE ENCOUNTER
Left message for Narendra Cochran  to return my call.    Hub may relay message and document    Called to confirm how often a day the patient is checking his blood sugar. I have to send this with the supplies to the pharmacy for insurance to cover. Please let me know and ill order.

## 2025-01-05 DIAGNOSIS — I10 ESSENTIAL HYPERTENSION: ICD-10-CM

## 2025-01-07 ENCOUNTER — HOSPITAL ENCOUNTER (EMERGENCY)
Facility: HOSPITAL | Age: 85
Discharge: HOME | End: 2025-01-07
Attending: EMERGENCY MEDICINE | Admitting: EMERGENCY MEDICINE
Payer: MEDICARE

## 2025-01-07 ENCOUNTER — APPOINTMENT (OUTPATIENT)
Dept: CT IMAGING | Facility: HOSPITAL | Age: 85
End: 2025-01-07
Payer: MEDICARE

## 2025-01-07 VITALS
HEART RATE: 60 BPM | BODY MASS INDEX: 28.58 KG/M2 | TEMPERATURE: 98.9 F | DIASTOLIC BLOOD PRESSURE: 59 MMHG | RESPIRATION RATE: 18 BRPM | SYSTOLIC BLOOD PRESSURE: 106 MMHG | WEIGHT: 211 LBS | OXYGEN SATURATION: 93 % | HEIGHT: 72 IN

## 2025-01-07 DIAGNOSIS — E83.52 HYPERCALCEMIA: ICD-10-CM

## 2025-01-07 DIAGNOSIS — N28.9 RENAL INSUFFICIENCY: ICD-10-CM

## 2025-01-07 DIAGNOSIS — R10.9 CENTRAL ABDOMINAL PAIN: Primary | ICD-10-CM

## 2025-01-07 LAB
ALBUMIN SERPL-MCNC: 4.9 G/DL (ref 3.5–5.2)
ALBUMIN/GLOB SERPL: 1.3 G/DL
ALP SERPL-CCNC: 85 U/L (ref 39–117)
ALT SERPL W P-5'-P-CCNC: 6 U/L (ref 1–41)
ANION GAP SERPL CALCULATED.3IONS-SCNC: 15 MMOL/L (ref 5–15)
AST SERPL-CCNC: 17 U/L (ref 1–40)
BASOPHILS # BLD AUTO: 0.01 10*3/MM3 (ref 0–0.2)
BASOPHILS NFR BLD AUTO: 0.4 % (ref 0–1.5)
BILIRUB SERPL-MCNC: 0.5 MG/DL (ref 0–1.2)
BILIRUB UR QL STRIP: NEGATIVE
BUN BLDA-MCNC: 52 MG/DL (ref 8–26)
BUN SERPL-MCNC: 45 MG/DL (ref 8–23)
BUN/CREAT SERPL: 18.6 (ref 7–25)
CA-I BLDA-SCNC: 1.23 MMOL/L (ref 1.2–1.32)
CA-I SERPL ISE-MCNC: 1.18 MMOL/L (ref 1.15–1.3)
CALCIUM SPEC-SCNC: 10.6 MG/DL (ref 8.6–10.5)
CHLORIDE BLDA-SCNC: 104 MMOL/L (ref 98–109)
CHLORIDE SERPL-SCNC: 99 MMOL/L (ref 98–107)
CLARITY UR: CLEAR
CO2 BLDA-SCNC: 23 MMOL/L (ref 24–29)
CO2 SERPL-SCNC: 23 MMOL/L (ref 22–29)
COLOR UR: YELLOW
CREAT BLDA-MCNC: 2.6 MG/DL (ref 0.6–1.3)
CREAT SERPL-MCNC: 2.42 MG/DL (ref 0.76–1.27)
D-LACTATE SERPL-SCNC: 1.6 MMOL/L (ref 0.5–2)
D-LACTATE SERPL-SCNC: 2.5 MMOL/L (ref 0.5–2)
DEPRECATED RDW RBC AUTO: 43 FL (ref 37–54)
EGFRCR SERPLBLD CKD-EPI 2021: 23.3 ML/MIN/1.73
EGFRCR SERPLBLD CKD-EPI 2021: 25.4 ML/MIN/1.73
EOSINOPHIL # BLD AUTO: 0.03 10*3/MM3 (ref 0–0.4)
EOSINOPHIL NFR BLD AUTO: 1.1 % (ref 0.3–6.2)
ERYTHROCYTE [DISTWIDTH] IN BLOOD BY AUTOMATED COUNT: 14.4 % (ref 12.3–15.4)
GLOBULIN UR ELPH-MCNC: 3.7 GM/DL
GLUCOSE BLDC GLUCOMTR-MCNC: 116 MG/DL (ref 70–130)
GLUCOSE SERPL-MCNC: 119 MG/DL (ref 65–99)
GLUCOSE UR STRIP-MCNC: ABNORMAL MG/DL
HCT VFR BLD AUTO: 48.3 % (ref 37.5–51)
HCT VFR BLDA CALC: 52 % (ref 38–51)
HGB BLD-MCNC: 15.4 G/DL (ref 13–17.7)
HGB BLDA-MCNC: 17.7 G/DL (ref 12–17)
HGB UR QL STRIP.AUTO: NEGATIVE
IMM GRANULOCYTES # BLD AUTO: 0.01 10*3/MM3 (ref 0–0.05)
IMM GRANULOCYTES NFR BLD AUTO: 0.4 % (ref 0–0.5)
KETONES UR QL STRIP: NEGATIVE
LEUKOCYTE ESTERASE UR QL STRIP.AUTO: NEGATIVE
LIPASE SERPL-CCNC: 36 U/L (ref 13–60)
LYMPHOCYTES # BLD AUTO: 0.98 10*3/MM3 (ref 0.7–3.1)
LYMPHOCYTES NFR BLD AUTO: 36.7 % (ref 19.6–45.3)
MCH RBC QN AUTO: 26.4 PG (ref 26.6–33)
MCHC RBC AUTO-ENTMCNC: 31.9 G/DL (ref 31.5–35.7)
MCV RBC AUTO: 82.7 FL (ref 79–97)
MONOCYTES # BLD AUTO: 0.11 10*3/MM3 (ref 0.1–0.9)
MONOCYTES NFR BLD AUTO: 4.1 % (ref 5–12)
NEUTROPHILS NFR BLD AUTO: 1.53 10*3/MM3 (ref 1.7–7)
NEUTROPHILS NFR BLD AUTO: 57.3 % (ref 42.7–76)
NITRITE UR QL STRIP: NEGATIVE
NRBC BLD AUTO-RTO: 0 /100 WBC (ref 0–0.2)
PH UR STRIP.AUTO: <=5 [PH] (ref 5–8)
PLATELET # BLD AUTO: 110 10*3/MM3 (ref 140–450)
PMV BLD AUTO: 12.4 FL (ref 6–12)
POTASSIUM BLDA-SCNC: 4.5 MMOL/L (ref 3.5–4.9)
POTASSIUM SERPL-SCNC: 4.7 MMOL/L (ref 3.5–5.2)
PROT SERPL-MCNC: 8.6 G/DL (ref 6–8.5)
PROT UR QL STRIP: NEGATIVE
RBC # BLD AUTO: 5.84 10*6/MM3 (ref 4.14–5.8)
SODIUM BLD-SCNC: 137 MMOL/L (ref 138–146)
SODIUM SERPL-SCNC: 137 MMOL/L (ref 136–145)
SP GR UR STRIP: 1.01 (ref 1–1.03)
UROBILINOGEN UR QL STRIP: ABNORMAL
WBC NRBC COR # BLD AUTO: 2.67 10*3/MM3 (ref 3.4–10.8)

## 2025-01-07 PROCEDURE — 81003 URINALYSIS AUTO W/O SCOPE: CPT | Performed by: EMERGENCY MEDICINE

## 2025-01-07 PROCEDURE — 25010000002 HYDROMORPHONE PER 4 MG: Performed by: EMERGENCY MEDICINE

## 2025-01-07 PROCEDURE — 74176 CT ABD & PELVIS W/O CONTRAST: CPT

## 2025-01-07 PROCEDURE — 96375 TX/PRO/DX INJ NEW DRUG ADDON: CPT

## 2025-01-07 PROCEDURE — 25810000003 SODIUM CHLORIDE 0.9 % SOLUTION: Performed by: EMERGENCY MEDICINE

## 2025-01-07 PROCEDURE — 25010000002 ONDANSETRON PER 1 MG: Performed by: EMERGENCY MEDICINE

## 2025-01-07 PROCEDURE — 82330 ASSAY OF CALCIUM: CPT | Performed by: EMERGENCY MEDICINE

## 2025-01-07 PROCEDURE — 96374 THER/PROPH/DIAG INJ IV PUSH: CPT

## 2025-01-07 PROCEDURE — P9612 CATHETERIZE FOR URINE SPEC: HCPCS

## 2025-01-07 PROCEDURE — 83605 ASSAY OF LACTIC ACID: CPT | Performed by: EMERGENCY MEDICINE

## 2025-01-07 PROCEDURE — 83690 ASSAY OF LIPASE: CPT | Performed by: EMERGENCY MEDICINE

## 2025-01-07 PROCEDURE — 80047 BASIC METABLC PNL IONIZED CA: CPT

## 2025-01-07 PROCEDURE — 36415 COLL VENOUS BLD VENIPUNCTURE: CPT

## 2025-01-07 PROCEDURE — 80053 COMPREHEN METABOLIC PANEL: CPT | Performed by: EMERGENCY MEDICINE

## 2025-01-07 PROCEDURE — 99284 EMERGENCY DEPT VISIT MOD MDM: CPT

## 2025-01-07 PROCEDURE — 85014 HEMATOCRIT: CPT

## 2025-01-07 PROCEDURE — 85025 COMPLETE CBC W/AUTO DIFF WBC: CPT | Performed by: EMERGENCY MEDICINE

## 2025-01-07 RX ORDER — DICYCLOMINE HCL 20 MG
20 TABLET ORAL EVERY 8 HOURS PRN
Qty: 20 TABLET | Refills: 0 | Status: SHIPPED | OUTPATIENT
Start: 2025-01-07

## 2025-01-07 RX ORDER — HYDROMORPHONE HYDROCHLORIDE 1 MG/ML
0.5 INJECTION, SOLUTION INTRAMUSCULAR; INTRAVENOUS; SUBCUTANEOUS ONCE
Status: COMPLETED | OUTPATIENT
Start: 2025-01-07 | End: 2025-01-07

## 2025-01-07 RX ORDER — ONDANSETRON 2 MG/ML
4 INJECTION INTRAMUSCULAR; INTRAVENOUS ONCE
Status: COMPLETED | OUTPATIENT
Start: 2025-01-07 | End: 2025-01-07

## 2025-01-07 RX ORDER — METOPROLOL SUCCINATE 50 MG/1
50 TABLET, EXTENDED RELEASE ORAL DAILY
Qty: 90 TABLET | Refills: 1 | Status: SHIPPED | OUTPATIENT
Start: 2025-01-07

## 2025-01-07 RX ADMIN — HYDROMORPHONE HYDROCHLORIDE 0.5 MG: 1 INJECTION, SOLUTION INTRAMUSCULAR; INTRAVENOUS; SUBCUTANEOUS at 15:18

## 2025-01-07 RX ADMIN — ONDANSETRON 4 MG: 2 INJECTION INTRAMUSCULAR; INTRAVENOUS at 15:18

## 2025-01-07 RX ADMIN — SODIUM CHLORIDE 500 ML: 9 INJECTION, SOLUTION INTRAVENOUS at 15:16

## 2025-01-07 NOTE — ED PROVIDER NOTES
"Subjective   History of Present Illness  84-year-old male presents for evaluation via EMS for abdominal pain.  The patient tells me that for the past 3 months or so he has been experiencing intermittent central lower abdominal pain that sometimes is accompanied by constipation.  He is unsure as to what might be triggering his symptoms and denies any known dietary triggers.  He denies any accompanying nausea or vomiting.  He denies any known sick contacts.  He tells me that he is scheduled to see Dr. Moore regarding his symptoms 3 days from now; however, his symptoms worsened this morning so he came here for evaluation as he did not feel like \"he could wait until Friday.\"  He currently rates his pain at 5 out of 10 in severity.  He denies any aggravating or alleviating factors.      Review of Systems   Gastrointestinal:  Positive for abdominal pain and constipation.   All other systems reviewed and are negative.      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    Paget disease of bone        No Known Allergies    Past Surgical History:   Procedure Laterality Date    APPENDECTOMY      CARDIAC CATHETERIZATION N/A 3/18/2020    Procedure: Left Heart Cath;  Surgeon: Sonya Garibay MD;  Location:  [facility];  Service: Cardiology;  Laterality: N/A;  First availabel provider      CARDIAC CATHETERIZATION N/A 3/15/2021    Procedure: LEFT HEART CATH;  Surgeon: Doc Sahni IV, MD;  Location:  [facility];  Service: Cardiovascular;  Laterality: N/A;    CARDIAC CATHETERIZATION N/A 3/15/2021    Procedure: Coronary angiography;  Surgeon: Doc Sahni" MD JESSICA;  Location:  [facility];  Service: Cardiovascular;  Laterality: N/A;    CARDIAC ELECTROPHYSIOLOGY PROCEDURE N/A 3/16/2021    Procedure: ICD DC new;  Surgeon: Som Boyd DO;  Location:  [facility];  Service: Cardiology;  Laterality: N/A;    COLON RESECTION      second to diverticulitis     FOOT SURGERY Left        Family History   Problem Relation Age of Onset    No Known Problems Daughter     No Known Problems Son     No Known Problems Son     No Known Problems Son     Diabetes Sister     Clotting disorder Sister     Hyperlipidemia Mother     No Known Problems Sister     No Known Problems Brother     Fainting Brother        Social History     Socioeconomic History    Marital status:    Tobacco Use    Smoking status: Former     Current packs/day: 0.00     Average packs/day: 0.5 packs/day for 65.0 years (32.5 ttl pk-yrs)     Types: Cigars, Cigarettes     Start date: 1954     Quit date: 2019     Years since quittin.3    Smokeless tobacco: Never   Vaping Use    Vaping status: Never Used   Substance and Sexual Activity    Alcohol use: Not Currently     Comment: very rarely    Drug use: Not Currently     Types: Marijuana     Comment: Pt states used weekly but now quitting     Sexual activity: Defer           Objective   Physical Exam  Vitals and nursing note reviewed.   Constitutional:       General: He is not in acute distress.     Appearance: He is well-developed. He is not diaphoretic.      Comments: Nontoxic-appearing male   HENT:      Head: Normocephalic and atraumatic.      Comments: Hard of hearing  Neck:      Vascular: No JVD.   Cardiovascular:      Rate and Rhythm: Normal rate and regular rhythm.      Heart sounds: Normal heart sounds. No murmur heard.     No friction rub. No gallop.   Pulmonary:      Effort: Pulmonary effort is normal. No respiratory distress.      Breath sounds: Normal breath sounds. No wheezing or rales.   Abdominal:       General: Bowel sounds are normal. There is no distension.      Palpations: Abdomen is soft. There is no mass.      Tenderness: There is abdominal tenderness. There is no guarding.      Comments: Mild periumbilical abdominal tenderness present, no peritoneal signs, no pain out of proportion to exam   Genitourinary:     Comments: No CVA tenderness noted  Musculoskeletal:         General: Normal range of motion.      Cervical back: Normal range of motion.   Skin:     General: Skin is warm and dry.      Coloration: Skin is not pale.      Findings: No erythema or rash.      Comments:  no dermatomal rash present   Neurological:      Mental Status: He is alert and oriented to person, place, and time.   Psychiatric:         Mood and Affect: Mood normal.         Thought Content: Thought content normal.         Judgment: Judgment normal.         Procedures           ED Course  ED Course as of 01/07/25 1814 Tue Jan 07, 2025   2158 84-year-old male presents for evaluation via EMS for abdominal pain.  He tells me that for the past 3 months or so he has been experiencing intermittent central lower abdominal pain that is sometimes accompanied by constipation.  He is unsure as what might be triggering his symptoms.  He is scheduled to see Dr. Moore regarding his symptoms 3 days from now; however, his symptoms worsened this morning so he came to the ED to be evaluated.  He denies any accompanying nausea or vomiting.  No known dietary triggers for his symptoms.  No fevers.  On arrival, the patient is nontoxic-appearing.  Nonsurgical abdomen.  Differential diagnosis is quite broad.  We will obtain labs and imaging, and we will reassess following initial interventions. [DD]   1605 Labs remarkable for mildly worsening renal function when compared to baseline.  The patient is mildly hypercalcemic as well.  IV fluids given. [DD]   0387 I personally and independently reviewed the patient's CT images and findings, and I am in  agreement with the radiologist regarding CT interpretation--particularly there is no emergent/surgical intra-abdominal process present. [DD]   1809 Upon reevaluation, the patient looks and feels well.  He is tolerating p.o.  Doubt emergent/surgical intra-abdominal process at this time.  We discussed conservative measures that could help him with his symptoms.  We discussed dietary modifications.  Encouraged oral hydration for his renal insufficiency and mild hypercalcemia.  Prescription for Bentyl as needed.  He will follow-up with Dr. Moore as previously scheduled for outpatient follow-up in 3 days.  Agreeable with plan and given appropriate strict return precautions. [DD]      ED Course User Index  [DD] Hernan Chavez MD                                           Recent Results (from the past 24 hours)   Comprehensive Metabolic Panel    Collection Time: 01/07/25  3:11 PM    Specimen: Blood   Result Value Ref Range    Glucose 119 (H) 65 - 99 mg/dL    BUN 45 (H) 8 - 23 mg/dL    Creatinine 2.42 (H) 0.76 - 1.27 mg/dL    Sodium 137 136 - 145 mmol/L    Potassium 4.7 3.5 - 5.2 mmol/L    Chloride 99 98 - 107 mmol/L    CO2 23.0 22.0 - 29.0 mmol/L    Calcium 10.6 (H) 8.6 - 10.5 mg/dL    Total Protein 8.6 (H) 6.0 - 8.5 g/dL    Albumin 4.9 3.5 - 5.2 g/dL    ALT (SGPT) 6 1 - 41 U/L    AST (SGOT) 17 1 - 40 U/L    Alkaline Phosphatase 85 39 - 117 U/L    Total Bilirubin 0.5 0.0 - 1.2 mg/dL    Globulin 3.7 gm/dL    A/G Ratio 1.3 g/dL    BUN/Creatinine Ratio 18.6 7.0 - 25.0    Anion Gap 15.0 5.0 - 15.0 mmol/L    eGFR 25.4 (L) >60.0 mL/min/1.73   Lipase    Collection Time: 01/07/25  3:11 PM    Specimen: Blood   Result Value Ref Range    Lipase 36 13 - 60 U/L   Lactic Acid, Plasma    Collection Time: 01/07/25  3:11 PM    Specimen: Blood   Result Value Ref Range    Lactate 2.5 (C) 0.5 - 2.0 mmol/L   CBC Auto Differential    Collection Time: 01/07/25  3:11 PM    Specimen: Blood   Result Value Ref Range    WBC 2.67 (L) 3.40 -  10.80 10*3/mm3    RBC 5.84 (H) 4.14 - 5.80 10*6/mm3    Hemoglobin 15.4 13.0 - 17.7 g/dL    Hematocrit 48.3 37.5 - 51.0 %    MCV 82.7 79.0 - 97.0 fL    MCH 26.4 (L) 26.6 - 33.0 pg    MCHC 31.9 31.5 - 35.7 g/dL    RDW 14.4 12.3 - 15.4 %    RDW-SD 43.0 37.0 - 54.0 fl    MPV 12.4 (H) 6.0 - 12.0 fL    Platelets 110 (L) 140 - 450 10*3/mm3    Neutrophil % 57.3 42.7 - 76.0 %    Lymphocyte % 36.7 19.6 - 45.3 %    Monocyte % 4.1 (L) 5.0 - 12.0 %    Eosinophil % 1.1 0.3 - 6.2 %    Basophil % 0.4 0.0 - 1.5 %    Immature Grans % 0.4 0.0 - 0.5 %    Neutrophils, Absolute 1.53 (L) 1.70 - 7.00 10*3/mm3    Lymphocytes, Absolute 0.98 0.70 - 3.10 10*3/mm3    Monocytes, Absolute 0.11 0.10 - 0.90 10*3/mm3    Eosinophils, Absolute 0.03 0.00 - 0.40 10*3/mm3    Basophils, Absolute 0.01 0.00 - 0.20 10*3/mm3    Immature Grans, Absolute 0.01 0.00 - 0.05 10*3/mm3    nRBC 0.0 0.0 - 0.2 /100 WBC   POC CHEM 8    Collection Time: 01/07/25  3:17 PM    Specimen: Blood   Result Value Ref Range    Glucose 116 70 - 130 mg/dL    BUN 52 (H) 8 - 26 mg/dL    Creatinine 2.60 (H) 0.60 - 1.30 mg/dL    Sodium 137 (L) 138 - 146 mmol/L    POC Potassium 4.5 3.5 - 4.9 mmol/L    Chloride 104 98 - 109 mmol/L    Total CO2 23 (L) 24 - 29 mmol/L    Hemoglobin 17.7 (H) 12.0 - 17.0 g/dL    Hematocrit 52 (H) 38 - 51 %    Ionized Calcium 1.23 1.20 - 1.32 mmol/L    eGFR 23.3 (L) >60.0 mL/min/1.73   Urinalysis With Culture If Indicated - Straight Cath    Collection Time: 01/07/25  5:07 PM    Specimen: Straight Cath; Urine   Result Value Ref Range    Color, UA Yellow Yellow, Straw    Appearance, UA Clear Clear    pH, UA <=5.0 5.0 - 8.0    Specific Gravity, UA 1.013 1.001 - 1.030    Glucose,  mg/dL (2+) (A) Negative    Ketones, UA Negative Negative    Bilirubin, UA Negative Negative    Blood, UA Negative Negative    Protein, UA Negative Negative    Leuk Esterase, UA Negative Negative    Nitrite, UA Negative Negative    Urobilinogen, UA 0.2 E.U./dL 0.2 - 1.0 E.U./dL   STAT  Lactic Acid, Reflex    Collection Time: 01/07/25  5:22 PM    Specimen: Blood   Result Value Ref Range    Lactate 1.6 0.5 - 2.0 mmol/L   Calcium, Ionized    Collection Time: 01/07/25  5:22 PM    Specimen: Blood   Result Value Ref Range    Ionized Calcium 1.18 1.15 - 1.30 mmol/L     Note: In addition to lab results from this visit, the labs listed above may include labs taken at another facility or during a different encounter within the last 24 hours. Please correlate lab times with ED admission and discharge times for further clarification of the services performed during this visit.    CT Abdomen Pelvis Without Contrast   Final Result   Impression:   1.No acute process identified.   2.Similar chronic findings as detailed above.                  Electronically Signed: uBcky Argueta MD     1/7/2025 4:27 PM EST     Workstation ID: [id number]        Vitals:    01/07/25 1601 01/07/25 1603 01/07/25 1630 01/07/25 1700   BP: 128/67  102/58 114/60   BP Location:       Patient Position:       Pulse:  60 59 64   Resp:       Temp:       TempSrc:       SpO2:  99% 100% 95%   Weight:       Height:         Medications   sodium chloride 0.9 % bolus 500 mL (0 mL Intravenous Stopped 1/7/25 1601)   ondansetron (ZOFRAN) injection 4 mg (4 mg Intravenous Given 1/7/25 1518)   HYDROmorphone (DILAUDID) injection 0.5 mg (0.5 mg Intravenous Given 1/7/25 1518)     ECG/EMG Results (last 24 hours)       ** No results found for the last 24 hours. **          No orders to display                   Medical Decision Making      Final diagnoses:   Central abdominal pain   Renal insufficiency   Hypercalcemia       ED Disposition  ED Disposition       ED Disposition   Discharge    Condition   Stable    Comment   --               Marguerite Moore PA-C  [address] [id number] [phone]    In 3 days           Medication List        New Prescriptions      dicyclomine 20 MG tablet  Commonly known as: BENTYL  Take 1 tablet by  mouth Every 8 (Eight) Hours As Needed for Abdominal Cramping.               Where to Get Your Medications        These medications were sent to [facility]/pharmacy #[id number] - [place], [place] - [address] AT [facility] - [phone]  - [phone]   [address] [facility] [address]      Phone: [phone]   dicyclomine 20 MG tablet            Hernan Chavez MD  01/07/25 8200

## 2025-01-10 ENCOUNTER — OFFICE VISIT (OUTPATIENT)
Dept: FAMILY MEDICINE CLINIC | Facility: CLINIC | Age: 85
End: 2025-01-10
Payer: MEDICARE

## 2025-01-10 ENCOUNTER — LAB (OUTPATIENT)
Dept: LAB | Facility: HOSPITAL | Age: 85
End: 2025-01-10
Payer: MEDICARE

## 2025-01-10 VITALS
DIASTOLIC BLOOD PRESSURE: 58 MMHG | BODY MASS INDEX: 23.35 KG/M2 | HEART RATE: 61 BPM | HEIGHT: 72 IN | WEIGHT: 172.4 LBS | OXYGEN SATURATION: 97 % | SYSTOLIC BLOOD PRESSURE: 102 MMHG

## 2025-01-10 DIAGNOSIS — R30.9 PAIN WITH URINATION: ICD-10-CM

## 2025-01-10 DIAGNOSIS — K21.9 GASTROESOPHAGEAL REFLUX DISEASE, UNSPECIFIED WHETHER ESOPHAGITIS PRESENT: ICD-10-CM

## 2025-01-10 DIAGNOSIS — E83.52 HYPERCALCEMIA: ICD-10-CM

## 2025-01-10 DIAGNOSIS — N40.1 BENIGN PROSTATIC HYPERPLASIA WITH INCOMPLETE BLADDER EMPTYING: ICD-10-CM

## 2025-01-10 DIAGNOSIS — N18.2 CKD (CHRONIC KIDNEY DISEASE) STAGE 2, GFR 60-89 ML/MIN: Chronic | ICD-10-CM

## 2025-01-10 DIAGNOSIS — K59.09 OTHER CONSTIPATION: ICD-10-CM

## 2025-01-10 DIAGNOSIS — N18.4 STAGE 4 CHRONIC KIDNEY DISEASE: ICD-10-CM

## 2025-01-10 DIAGNOSIS — R10.84 GENERALIZED ABDOMINAL DISCOMFORT: Primary | ICD-10-CM

## 2025-01-10 DIAGNOSIS — E11.65 TYPE 2 DIABETES MELLITUS WITH HYPERGLYCEMIA, WITHOUT LONG-TERM CURRENT USE OF INSULIN: ICD-10-CM

## 2025-01-10 DIAGNOSIS — R39.14 BENIGN PROSTATIC HYPERPLASIA WITH INCOMPLETE BLADDER EMPTYING: ICD-10-CM

## 2025-01-10 LAB
BILIRUB BLD-MCNC: NEGATIVE MG/DL
CLARITY, POC: CLEAR
COLOR UR: NORMAL
EXPIRATION DATE: NORMAL
GLUCOSE UR STRIP-MCNC: NEGATIVE MG/DL
KETONES UR QL: NEGATIVE
LEUKOCYTE EST, POC: NEGATIVE
Lab: NORMAL
NITRITE UR-MCNC: NEGATIVE MG/ML
PH UR: 6 [PH] (ref 5–8)
PROT UR STRIP-MCNC: NEGATIVE MG/DL
RBC # UR STRIP: NEGATIVE /UL
SP GR UR: 1.01 (ref 1–1.03)
UROBILINOGEN UR QL: NORMAL

## 2025-01-10 RX ORDER — POLYETHYLENE GLYCOL 3350 17 G/17G
17 POWDER, FOR SOLUTION ORAL NIGHTLY
Qty: 578 G | Refills: 1 | Status: SHIPPED | OUTPATIENT
Start: 2025-01-10

## 2025-01-10 RX ORDER — PANTOPRAZOLE SODIUM 40 MG/1
40 TABLET, DELAYED RELEASE ORAL 2 TIMES DAILY
Qty: 90 TABLET | Refills: 1 | Status: SHIPPED | OUTPATIENT
Start: 2025-01-10

## 2025-01-12 PROBLEM — N18.32 STAGE 3B CHRONIC KIDNEY DISEASE: Status: ACTIVE | Noted: 2025-01-12

## 2025-01-12 PROBLEM — N18.4 STAGE 4 CHRONIC KIDNEY DISEASE: Status: ACTIVE | Noted: 2025-01-12

## 2025-01-12 NOTE — PROGRESS NOTES
Chief Complaint   Patient presents with    GI Problem     Pt states he gets stomach pain when trying to eat       Narendra Cochran is a pleasant 84 y.o. male who is here for routine follow-up of ED visit on 1/7 for stomach pain.  He reports generalized central abdominal pain worse after eating.  Denies any nausea, vomiting, heart burn or right upper quadrant pain.  Reports that he is not having daily bowel movements.  Has diarrhea and hard stools at times.  No blood in stool.  No fever or chills.  Also reports pain with urination.  States that he is not able to empty completely and has difficulty with flow.  Has known enlarged prostate.  Not established with urology.  Colonoscopy in 2020 was unremarkable.  CT abdo/pelvis at ED was unremarkable.  Labs and urinalysis all normal.      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    Paget disease of bone        Past Surgical History:   Procedure Laterality Date    APPENDECTOMY      CARDIAC CATHETERIZATION N/A 3/18/2020    Procedure: Left Heart Cath;  Surgeon: Sonya Garibay MD;  Location:  [facility];  Service: Cardiology;  Laterality: N/A;  First availabel provider      CARDIAC CATHETERIZATION N/A 3/15/2021    Procedure: LEFT HEART CATH;  Surgeon: Doc Sahni IV, MD;  Location:  [facility];  Service: Cardiovascular;  Laterality: N/A;    CARDIAC CATHETERIZATION N/A 3/15/2021    Procedure: Coronary angiography;  Surgeon: Doc Sahni IV, MD;  Location:  [facility];  Service: Cardiovascular;  Laterality: N/A;    CARDIAC ELECTROPHYSIOLOGY PROCEDURE N/A 3/16/2021    Procedure: ICD DC  "new;  Surgeon: Som Boyd DO;  Location: [facility] INVAS[facility] LOC[facility];  Service: Cardiology;  Laterality: N/A;    COLON RESECTION      second to diverticulitis     FOOT SURGERY Left        Family History   Problem Relation Age of Onset    No Known Problems Daughter     No Known Problems Son     No Known Problems Son     No Known Problems Son     Diabetes Sister     Clotting disorder Sister     Hyperlipidemia Mother     No Known Problems Sister     No Known Problems Brother     Fainting Brother        Social History     Socioeconomic History    Marital status:    Tobacco Use    Smoking status: Former     Current packs/day: 0.00     Average packs/day: 0.5 packs/day for 65.0 years (32.5 ttl pk-yrs)     Types: Cigars, Cigarettes     Start date: 1954     Quit date: 2019     Years since quittin.3    Smokeless tobacco: Never   Vaping Use    Vaping status: Never Used   Substance and Sexual Activity    Alcohol use: Not Currently     Comment: very rarely    Drug use: Not Currently     Types: Marijuana     Comment: Pt states used weekly but now quitting     Sexual activity: Defer       No Known Allergies    ROS  Review of Systems   Constitutional:  Negative for chills, fatigue and fever.   Respiratory:  Negative for cough, shortness of breath and wheezing.    Cardiovascular:  Negative for chest pain, palpitations and leg swelling.   Gastrointestinal:  Positive for abdominal pain, constipation and diarrhea. Negative for blood in stool, nausea, vomiting and indigestion.   Genitourinary:  Positive for decreased urine volume and dysuria. Negative for flank pain and hematuria.   Neurological:  Negative for dizziness and headache.       Vitals:    01/10/25 1231   BP: 102/58   Pulse: 61   SpO2: 97%   Weight: 78.2 kg (172 lb 6.4 oz)   Height: 182.9 cm (72.01\")     Body mass index is 23.38 kg/m².    BMI is within normal parameters. No other follow-up for BMI required.      Current Outpatient Medications on File " Prior to Visit   Medication Sig Dispense Refill    albuterol sulfate  (90 Base) MCG/ACT inhaler Inhale 2 puffs Every 4 (Four) Hours As Needed for Wheezing or Shortness of Air. 8 g 3    aspirin 81 MG chewable tablet Chew 1 tablet Daily.      bumetanide (BUMEX) 1 MG tablet TAKE 1 TABLET BY MOUTH EVERY DAY 30 tablet 11    Diclofenac Sodium (VOLTAREN) 1 % gel gel APPLY 2 GRAMS TO FEET TWICE DAILY FOR PAIN AND STIFFNESS      dicyclomine (BENTYL) 20 MG tablet Take 1 tablet by mouth Every 8 (Eight) Hours As Needed for Abdominal Cramping. 20 tablet 0    empagliflozin (JARDIANCE) 10 MG tablet tablet Take 1 tablet by mouth Daily.      glucose blood test strip Use to check blood sugars once daily 50 each 11    Lancets misc Use to check blood sugars once daily 100 each 3    levETIRAcetam (KEPPRA) 750 MG tablet TAKE 1 TABLET BY MOUTH TWICE A  tablet 1    magnesium oxide (MAG-OX) 400 MG tablet Take 1 tablet by mouth Daily. 90 tablet 3    metFORMIN ER (GLUCOPHAGE-XR) 500 MG 24 hr tablet Take 1 tablet by mouth Daily With Breakfast. 90 tablet 3    metoprolol succinate XL (TOPROL-XL) 50 MG 24 hr tablet TAKE 1 TABLET BY MOUTH EVERY DAY 90 tablet 1    potassium chloride ER (K-TAB) 20 MEQ tablet controlled-release ER tablet TAKE 1 TABLET BY MOUTH EVERY DAY 90 tablet 3    rosuvastatin (CRESTOR) 20 MG tablet TAKE 1 TABLET BY MOUTH EVERY DAY 90 tablet 1    sacubitril-valsartan (Entresto) 24-26 MG tablet Take 1 tablet by mouth 2 (Two) Times a Day. 60 tablet 11    Blood Glucose Monitoring Suppl (ONE TOUCH ULTRA 2) w/Device kit TEST DAILY AS DIRECTED (Patient not taking: Reported on 1/10/2025)      [DISCONTINUED] diclofenac (VOLTAREN) 1 % gel gel Apply 4 g topically to the appropriate area as directed 4 (Four) Times a Day As Needed (prn for pain). (Patient not taking: Reported on 1/10/2025) 60 tube 0    [DISCONTINUED] glucose monitor monitoring kit Use to check blood sugars once daily (Patient not taking: Reported on 1/10/2025)  1 each 0     No current facility-administered medications on file prior to visit.       Results for orders placed or performed in visit on 01/10/25   POC Urinalysis Dipstick, Automated    Collection Time: 01/10/25  1:27 PM    Specimen: Urine   Result Value Ref Range    Color Dark Yellow Yellow, Straw, Dark Yellow, Diane    Clarity, UA Clear Clear    Specific Gravity  1.015 1.005 - 1.030    pH, Urine 6.0 5.0 - 8.0    Leukocytes Negative Negative    Nitrite, UA Negative Negative    Protein, POC Negative Negative mg/dL    Glucose, UA Negative Negative mg/dL    Ketones, UA Negative Negative    Urobilinogen, UA 0.2 E.U./dL Normal, 0.2 E.U./dL    Bilirubin Negative Negative    Blood, UA Negative Negative    Lot Number [id number]     Expiration Date 02-        PE    Physical Exam  Vitals reviewed.   Constitutional:       General: He is not in acute distress.     Appearance: Normal appearance. He is well-developed and normal weight. He is not ill-appearing or diaphoretic.   HENT:      Head: Normocephalic and atraumatic.   Eyes:      Extraocular Movements: Extraocular movements intact.      Conjunctiva/sclera: Conjunctivae normal.   Pulmonary:      Effort: No respiratory distress.   Abdominal:      General: Abdomen is flat. Bowel sounds are decreased.      Palpations: Abdomen is soft.      Tenderness: There is generalized abdominal tenderness. There is no right CVA tenderness, left CVA tenderness, guarding or rebound.   Musculoskeletal:         General: Normal range of motion.      Cervical back: Normal range of motion.   Neurological:      General: No focal deficit present.      Mental Status: He is alert.   Psychiatric:         Attention and Perception: He is attentive.         Mood and Affect: Mood normal.         Speech: Speech normal.         Behavior: Behavior normal. Behavior is cooperative.         Thought Content: Thought content normal.         Judgment: Judgment normal.           A/P    Diagnoses and  all orders for this visit:    1. Generalized abdominal discomfort (Primary)  Episodic.  Mostly along lower/central region of abdomen.  No significant TTP on exam.  CT abdo/pelvis unremarkable.  Colonoscopy in 2020 was unremarkable.  Pain is worse after eating.  Not having regular bowel movements.  Suspect constipation.  Recommend miralax nightly.    2. Gastroesophageal reflux disease, unspecified whether esophagitis present  -     pantoprazole (PROTONIX) 40 MG EC tablet; Take 1 tablet by mouth 2 (Two) Times a Day.  Dispense: 90 tablet; Refill: 1  Reduce pantoprazole to once daily given recent decline in kidney function.  Patient has denied any symptoms of reflux, nausea or epigastric pain.  EGD completed in 2020 shows erosive gastritis, hiatal hernia.    3. Other constipation  -     polyethylene glycol (MIRALAX) 17 GM/SCOOP powder; Take 17 g by mouth Every Night.  Dispense: 578 g; Refill: 1    4. Benign prostatic hyperplasia with incomplete bladder emptying  -     Ambulatory Referral to Urology    5. Pain with urination  -     Ambulatory Referral to Urology  -     POC Urinalysis Dipstick, Automated  Urinalysis does not show any indications of infection.  Will start referral to urology.    6. Stage 4 chronic kidney disease  -     Renal Function Panel; Future  -     Ambulatory Referral to Nephrology  Recent labs at ED show decline in kidney function.  Will repeat labs and start referral to nephrology.  Will reduce pantoprazole to once daily.  Stop metformin.  Avoid NSAIDs.  Hydrate well with water.    7. Hypercalcemia  -     PTH, Intact; Future  -     Vitamin D,25-Hydroxy; Future  -     TSH Rfx On Abnormal To Free T4; Future  -     Ambulatory Referral to Nephrology    8. Type 2 diabetes mellitus with hyperglycemia, without long-term current use of insulin  -     Hemoglobin A1c; Future         Plan of care reviewed with patient at the conclusion of today's visit. Education was provided regarding diagnosis, management  and any prescribed or recommended OTC medications.  Patient verbalizes understanding of and agreement with management plan.    Dictated Utilizing Dragon Dictation     Please note that portions of this note were completed with a voice recognition program.     Part of this note may be an electronic transcription/translation of spoken language to printed text using the Dragon Dictation System.    No follow-ups on file.     Marguerite Moore PA-C

## 2025-01-13 ENCOUNTER — TELEPHONE (OUTPATIENT)
Dept: CARDIOLOGY | Facility: HOSPITAL | Age: 85
End: 2025-01-13
Payer: MEDICARE

## 2025-01-13 NOTE — TELEPHONE ENCOUNTER
Patient's wife called and stated that she is needing to have the provider portion of the [employer] patient assistance form. Patient states that she does not have any transportation and wants to email.

## 2025-01-20 DIAGNOSIS — E11.65 TYPE 2 DIABETES MELLITUS WITH HYPERGLYCEMIA, WITHOUT LONG-TERM CURRENT USE OF INSULIN: Chronic | ICD-10-CM

## 2025-01-21 RX ORDER — EMPAGLIFLOZIN 10 MG/1
10 TABLET, FILM COATED ORAL DAILY
Qty: 30 TABLET | Refills: 0 | Status: SHIPPED | OUTPATIENT
Start: 2025-01-21

## 2025-01-21 NOTE — TELEPHONE ENCOUNTER
Lab Results   Component Value Date    GLUCOSE 119 (H) 01/07/2025    BUN 45 (H) 01/07/2025    CREATININE 2.60 (H) 01/07/2025     01/07/2025    K 4.7 01/07/2025    CL 99 01/07/2025    CALCIUM 10.6 (H) 01/07/2025    PROTEINTOT 8.6 (H) 01/07/2025    ALBUMIN 4.9 01/07/2025    ALT 6 01/07/2025    AST 17 01/07/2025    ALKPHOS 85 01/07/2025    BILITOT 0.5 01/07/2025    GLOB 3.7 01/07/2025    AGRATIO 1.3 01/07/2025    BCR 18.6 01/07/2025    ANIONGAP 15.0 01/07/2025    EGFR 23.3 (L) 01/07/2025

## 2025-01-22 ENCOUNTER — LAB (OUTPATIENT)
Dept: LAB | Facility: HOSPITAL | Age: 85
End: 2025-01-22
Payer: MEDICARE

## 2025-01-22 DIAGNOSIS — E83.52 HYPERCALCEMIA: ICD-10-CM

## 2025-01-22 DIAGNOSIS — E11.65 TYPE 2 DIABETES MELLITUS WITH HYPERGLYCEMIA, WITHOUT LONG-TERM CURRENT USE OF INSULIN: Primary | ICD-10-CM

## 2025-01-24 ENCOUNTER — LAB (OUTPATIENT)
Dept: LAB | Facility: HOSPITAL | Age: 85
End: 2025-01-24
Payer: MEDICARE

## 2025-01-24 ENCOUNTER — TELEPHONE (OUTPATIENT)
Dept: CARDIOLOGY | Facility: HOSPITAL | Age: 85
End: 2025-01-24
Payer: MEDICARE

## 2025-01-24 DIAGNOSIS — E83.52 HYPERCALCEMIA: ICD-10-CM

## 2025-01-24 DIAGNOSIS — E11.65 TYPE 2 DIABETES MELLITUS WITH HYPERGLYCEMIA, WITHOUT LONG-TERM CURRENT USE OF INSULIN: ICD-10-CM

## 2025-01-24 LAB
25(OH)D3 SERPL-MCNC: 37.3 NG/ML (ref 30–100)
ALBUMIN SERPL-MCNC: 4 G/DL (ref 3.5–5.2)
ANION GAP SERPL CALCULATED.3IONS-SCNC: 15.2 MMOL/L (ref 5–15)
BUN SERPL-MCNC: 67 MG/DL (ref 8–23)
BUN/CREAT SERPL: 31.3 (ref 7–25)
CALCIUM SPEC-SCNC: 9.9 MG/DL (ref 8.6–10.5)
CHLORIDE SERPL-SCNC: 101 MMOL/L (ref 98–107)
CO2 SERPL-SCNC: 19.8 MMOL/L (ref 22–29)
CREAT SERPL-MCNC: 2.14 MG/DL (ref 0.76–1.27)
EGFRCR SERPLBLD CKD-EPI 2021: 29.4 ML/MIN/1.73
GLUCOSE SERPL-MCNC: 123 MG/DL (ref 65–99)
HBA1C MFR BLD: 6.9 % (ref 4.8–5.6)
PHOSPHATE SERPL-MCNC: 3 MG/DL (ref 2.5–4.5)
POTASSIUM SERPL-SCNC: 4.5 MMOL/L (ref 3.5–5.2)
PTH-INTACT SERPL-MCNC: 96.7 PG/ML (ref 15–65)
SODIUM SERPL-SCNC: 136 MMOL/L (ref 136–145)
TSH SERPL DL<=0.05 MIU/L-ACNC: 5.77 UIU/ML (ref 0.27–4.2)

## 2025-01-24 PROCEDURE — 83970 ASSAY OF PARATHORMONE: CPT

## 2025-01-24 PROCEDURE — 84439 ASSAY OF FREE THYROXINE: CPT

## 2025-01-24 PROCEDURE — 83036 HEMOGLOBIN GLYCOSYLATED A1C: CPT

## 2025-01-24 PROCEDURE — 84443 ASSAY THYROID STIM HORMONE: CPT

## 2025-01-24 PROCEDURE — 80069 RENAL FUNCTION PANEL: CPT

## 2025-01-24 PROCEDURE — 82306 VITAMIN D 25 HYDROXY: CPT

## 2025-01-24 PROCEDURE — 36415 COLL VENOUS BLD VENIPUNCTURE: CPT

## 2025-01-25 LAB — T4 FREE SERPL-MCNC: 1.46 NG/DL (ref 0.92–1.68)

## 2025-02-13 PROCEDURE — 93295 DEV INTERROG REMOTE 1/2/MLT: CPT | Performed by: INTERNAL MEDICINE

## 2025-02-13 PROCEDURE — 93296 REM INTERROG EVL PM/IDS: CPT | Performed by: INTERNAL MEDICINE

## 2025-02-17 ENCOUNTER — OFFICE VISIT (OUTPATIENT)
Dept: UROLOGY | Facility: CLINIC | Age: 85
End: 2025-02-17
Payer: MEDICARE

## 2025-02-17 VITALS — DIASTOLIC BLOOD PRESSURE: 62 MMHG | HEART RATE: 50 BPM | OXYGEN SATURATION: 100 % | SYSTOLIC BLOOD PRESSURE: 139 MMHG

## 2025-02-17 DIAGNOSIS — N13.8 BPH WITH OBSTRUCTION/LOWER URINARY TRACT SYMPTOMS: Primary | ICD-10-CM

## 2025-02-17 DIAGNOSIS — N40.1 BPH WITH OBSTRUCTION/LOWER URINARY TRACT SYMPTOMS: Primary | ICD-10-CM

## 2025-02-17 LAB
BILIRUB BLD-MCNC: NEGATIVE MG/DL
CLARITY, POC: CLEAR
COLOR UR: YELLOW
EXPIRATION DATE: ABNORMAL
GLUCOSE UR STRIP-MCNC: ABNORMAL MG/DL
KETONES UR QL: NEGATIVE
LEUKOCYTE EST, POC: NEGATIVE
Lab: ABNORMAL
NITRITE UR-MCNC: NEGATIVE MG/ML
PH UR: 6 [PH] (ref 5–8)
PROT UR STRIP-MCNC: NEGATIVE MG/DL
RBC # UR STRIP: NEGATIVE /UL
SP GR UR: 1.01 (ref 1–1.03)
UROBILINOGEN UR QL: NORMAL

## 2025-02-17 RX ORDER — TAMSULOSIN HYDROCHLORIDE 0.4 MG/1
1 CAPSULE ORAL DAILY
Qty: 30 CAPSULE | Refills: 2 | Status: SHIPPED | OUTPATIENT
Start: 2025-02-17

## 2025-02-17 NOTE — PROGRESS NOTES
LUTS Male Office Visit      Patient Name: Narendra Cochran  : 1940   MRN: [id number]     Chief Complaint:  Lower Urinary Tract Symptoms.   Chief Complaint   Patient presents with    Benign Prostatic Hypertrophy        Referring Provider: Marguerite Moore,*    History of Present Illness: Mr. Cochran is a 84 y.o. male with past medical history including frequent PVCs, hypertension, hyperlipidemia, CAD, ICD, A-fib, stage IV chronic kidney disease, GERD and type 2 diabetes.  Patient presents to urology today for lower urinary tract symptoms.      Patient urinary tract symptoms include increased urinary frequency, urgency, urinary leakage and nocturia 1-2.  Patient reports the symptoms are bothersome and affecting his quality of life.    He denies dysuria, gross hematuria or history of kidney stones. He has not seen a urologist in the past.       Subjective      Review of System:   Review of Systems   Genitourinary:  Positive for frequency and urgency.        Nocturia 2-3, urinary leakage   All other systems reviewed and are negative.     I have reviewed the ROS documented by my clinical staff, I have updated appropriately and I agree. LEANDRO Muñoz    Past Medical History:  Past Medical History:   Diagnosis Date    Arthritis     CHF (congestive heart failure)     Diabetes mellitus     Diabetic foot ulcers     Diverticulitis     Foot callus     GERD (gastroesophageal reflux disease)     Hammer toes, bilateral     Hearing loss     History of transfusion     Hyperlipidemia     Hypertension     Hypertensive urgency, malignant 2017    Paget disease of bone        Past Surgical History:  Past Surgical History:   Procedure Laterality Date    APPENDECTOMY      CARDIAC CATHETERIZATION N/A 3/18/2020    Procedure: Left Heart Cath;  Surgeon: Sonya Garibay MD;  Location: [facility] CATH INVASIVE LOCATION;  Service: Cardiology;  Laterality: N/A;  First availabel provider      CARDIAC CATHETERIZATION N/A 3/15/2021     Procedure: LEFT HEART CATH;  Surgeon: Doc Sahni IV, MD;  Location:  [facility] CATH INVASIVE LOCATION;  Service: Cardiovascular;  Laterality: N/A;    CARDIAC CATHETERIZATION N/A 3/15/2021    Procedure: Coronary angiography;  Surgeon: Doc Sahni IV, MD;  Location:  [facility] CATH INVASIVE LOCATION;  Service: Cardiovascular;  Laterality: N/A;    CARDIAC ELECTROPHYSIOLOGY PROCEDURE N/A 3/16/2021    Procedure: ICD DC new;  Surgeon: Som Boyd DO;  Location:  [facility] EP INVASIVE LOCATION;  Service: Cardiology;  Laterality: N/A;    COLON RESECTION      second to diverticulitis     FOOT SURGERY Left        Medications:    Current Outpatient Medications:     albuterol sulfate  (90 Base) MCG/ACT inhaler, Inhale 2 puffs Every 4 (Four) Hours As Needed for Wheezing or Shortness of Air., Disp: 8 g, Rfl: 3    aspirin 81 MG chewable tablet, Chew 1 tablet Daily., Disp: , Rfl:     Blood Glucose Monitoring Suppl (ONE TOUCH ULTRA 2) w/Device kit, , Disp: , Rfl:     bumetanide (BUMEX) 1 MG tablet, TAKE 1 TABLET BY MOUTH EVERY DAY, Disp: 30 tablet, Rfl: 11    Diclofenac Sodium (VOLTAREN) 1 % gel gel, APPLY 2 GRAMS TO FEET TWICE DAILY FOR PAIN AND STIFFNESS, Disp: , Rfl:     dicyclomine (BENTYL) 20 MG tablet, Take 1 tablet by mouth Every 8 (Eight) Hours As Needed for Abdominal Cramping., Disp: 20 tablet, Rfl: 0    empagliflozin (Jardiance) 10 MG tablet tablet, TAKE 1 TABLET BY MOUTH EVERY DAY, Disp: 30 tablet, Rfl: 0    glucose blood test strip, Use to check blood sugars once daily, Disp: 50 each, Rfl: 11    Lancets misc, Use to check blood sugars once daily, Disp: 100 each, Rfl: 3    levETIRAcetam (KEPPRA) 750 MG tablet, TAKE 1 TABLET BY MOUTH TWICE A DAY, Disp: 180 tablet, Rfl: 1    magnesium oxide (MAG-OX) 400 MG tablet, Take 1 tablet by mouth Daily., Disp: 90 tablet, Rfl: 3    metFORMIN ER (GLUCOPHAGE-XR) 500 MG 24 hr tablet, Take 1 tablet by mouth Daily With Breakfast., Disp: 90 tablet, Rfl: 3     metoprolol succinate XL (TOPROL-XL) 50 MG 24 hr tablet, TAKE 1 TABLET BY MOUTH EVERY DAY, Disp: 90 tablet, Rfl: 1    pantoprazole (PROTONIX) 40 MG EC tablet, Take 1 tablet by mouth 2 (Two) Times a Day., Disp: 90 tablet, Rfl: 1    polyethylene glycol (MIRALAX) 17 GM/SCOOP powder, Take 17 g by mouth Every Night., Disp: 578 g, Rfl: 1    potassium chloride ER (K-TAB) 20 MEQ tablet controlled-release ER tablet, TAKE 1 TABLET BY MOUTH EVERY DAY, Disp: 90 tablet, Rfl: 3    rosuvastatin (CRESTOR) 20 MG tablet, TAKE 1 TABLET BY MOUTH EVERY DAY, Disp: 90 tablet, Rfl: 1    sacubitril-valsartan (Entresto) 24-26 MG tablet, Take 1 tablet by mouth 2 (Two) Times a Day., Disp: 60 tablet, Rfl: 11    tamsulosin (FLOMAX) 0.4 MG capsule 24 hr capsule, Take 1 capsule by mouth Daily., Disp: 30 capsule, Rfl: 2    Allergies:  No Known Allergies    Social History:  Social History     Socioeconomic History    Marital status:    Tobacco Use    Smoking status: Former     Current packs/day: 0.00     Average packs/day: 0.5 packs/day for 65.0 years (32.5 ttl pk-yrs)     Types: Cigars, Cigarettes     Start date: 1954     Quit date: 2019     Years since quittin.4    Smokeless tobacco: Never   Vaping Use    Vaping status: Never Used   Substance and Sexual Activity    Alcohol use: Not Currently     Comment: very rarely    Drug use: Not Currently     Types: Marijuana     Comment: Pt states used weekly but now quitting     Sexual activity: Defer       Family History:  Family History   Problem Relation Age of Onset    No Known Problems Daughter     No Known Problems Son     No Known Problems Son     No Known Problems Son     Diabetes Sister     Clotting disorder Sister     Hyperlipidemia Mother     No Known Problems Sister     No Known Problems Brother     Fainting Brother        IPSS Questionnaire (AUA-7):  Over the past month…    1)  Incomplete Emptying  How often have you had a sensation of not emptying your bladder?  0 - Not at all    2)  Frequency  How often have you had to urinate less than every two hours? 4 - More than half the time   3)  Intermittency  How often have you found you stopped and started again several times when you urinated?  2 - Less than half the time   4) Urgency  How often have you found it difficult to postpone urination?  5 - Almost always   5) Weak Stream  How often have you had a weak urinary stream?  5 - Almost always   6) Straining  How often have you had to push or strain to begin urination?  3 - About half the time   7) Nocturia  How many times did you typically get up at night to urinate?  2 - 2 times   Total Score:  28   The International Prostate Symptom Score (IPSS) is used to screen, diagnose, track symptoms of benign prostatic hyperplasia (BPH).    0-7 pts (Mild Symptoms)  / 8-19 pts (Moderate) / 20-35 (Severe)    Quality of life due to urinary symptoms:  If you were to spend the rest of your life with your urinary condition the way it is now, how would you feel about that? 5-UnhappyP   Urine Leakage (Incontinence) 0-No Leakage       Post void residual bladder scan:   130 cc    Objective     Physical Exam:   Vital Signs:   Vitals:    02/17/25 1516   BP: 139/62   Pulse: 50   SpO2: 100%     There is no height or weight on file to calculate BMI.     Physical Exam  Vitals and nursing note reviewed.   Constitutional:       Appearance: Normal appearance.   Pulmonary:      Effort: Pulmonary effort is normal.   Neurological:      Mental Status: He is alert and oriented to person, place, and time.   Psychiatric:         Mood and Affect: Mood normal.         Behavior: Behavior normal.         Labs:   Lab Results   Component Value Date    PSA 1.600 02/18/2019    PSA 1.260 05/30/2017       Brief Urine Lab Results  (Last result in the past 365 days)        Color   Clarity   Blood   Leuk Est   Nitrite   Protein   CREAT   Urine HCG        02/05/25 1648 Yellow   Clear     Negative                            Lab Results    Component Value Date    GLUCOSE 123 (H) 01/24/2025    CALCIUM 9.9 01/24/2025     01/24/2025    K 4.5 01/24/2025    CO2 19.8 (L) 01/24/2025     01/24/2025    BUN 67 (H) 01/24/2025    CREATININE 2.14 (H) 01/24/2025    EGFRIFAFRI 61 02/17/2022    EGFRIFNONA 64 10/21/2019    BCR 31.3 (H) 01/24/2025    ANIONGAP 15.2 (H) 01/24/2025       Lab Results   Component Value Date    WBC 3.12 (L) 02/05/2025    HGB 13.4 (L) 02/05/2025    HCT 41.8 02/05/2025    MCV 83 02/05/2025     (L) 02/05/2025       Images:   CT Abdomen Pelvis Without Contrast    Result Date: 1/7/2025  Impression: 1.No acute process identified. 2.Similar chronic findings as detailed above. Electronically Signed: Bucky Argueta MD  1/7/2025 4:27 PM EST  Workstation ID: [id number]      Measures:   Tobacco:   Narendra Cochran  reports that he quit smoking about 5 years ago. His smoking use included cigars and cigarettes. He started smoking about 70 years ago. He has a 32.5 pack-year smoking history. He has never used smokeless tobacco.     Urine Incontinence: Patient reports that he is  currently experiencing symptoms of urinary incontinence.         Assessment / Plan      Assessment:  Mr. Cochran is a 84 y.o. male who presented today with lower urinary tract symptoms.  Urinalysis is negative for infection today.  We will send for urine guidance PCR to assess for uropathogens. We discussed Flomax daily to improve lower urinary tract symptoms. We discussed further anatomical evaluation including flexible cystoscopy and TRUS.    Patient and patient's daughter are understanding and agreeable to plan of care.  He will alert with questions or concerns in the interim.    Diagnoses and all orders for this visit:    1. BPH with obstruction/lower urinary tract symptoms (Primary)  -     tamsulosin (FLOMAX) 0.4 MG capsule 24 hr capsule; Take 1 capsule by mouth Daily.  Dispense: 30 capsule; Refill: 2            Follow Up:   Return in about 2 weeks (around 3/3/2025)  for Cysto with Dr. Méndez.    I spent approximately 40 minutes providing clinical care for this patient; including review of patient's chart and provider documentation, face to face time spent with patient in examination room (obtaining history, performing physical exam, discussing diagnosis and management options), placing orders, and completing patient documentation.     LEANDRO Muñoz  [facility]

## 2025-02-21 DIAGNOSIS — N30.00 ACUTE CYSTITIS WITHOUT HEMATURIA: Primary | ICD-10-CM

## 2025-02-21 RX ORDER — DOXYCYCLINE 100 MG/1
100 CAPSULE ORAL 2 TIMES DAILY
Qty: 7 CAPSULE | Refills: 0 | Status: SHIPPED | OUTPATIENT
Start: 2025-02-21 | End: 2025-02-28

## 2025-02-28 ENCOUNTER — PROCEDURE VISIT (OUTPATIENT)
Dept: UROLOGY | Facility: CLINIC | Age: 85
End: 2025-02-28
Payer: MEDICARE

## 2025-02-28 DIAGNOSIS — R31.9 HEMATURIA, UNSPECIFIED TYPE: ICD-10-CM

## 2025-02-28 DIAGNOSIS — N13.8 BPH WITH OBSTRUCTION/LOWER URINARY TRACT SYMPTOMS: Primary | ICD-10-CM

## 2025-02-28 DIAGNOSIS — N40.1 BPH WITH OBSTRUCTION/LOWER URINARY TRACT SYMPTOMS: Primary | ICD-10-CM

## 2025-02-28 PROCEDURE — 87086 URINE CULTURE/COLONY COUNT: CPT | Performed by: UROLOGY

## 2025-02-28 NOTE — PROGRESS NOTES
Follow Up Office Visit      Patient Name: Narendra Cochran  : 1940   MRN: [id number]     Chief Complaint:    Chief Complaint   Patient presents with    BPH with obstruction/lower urinary tract symptoms       Referring Provider: No ref. provider found    History of Present Illness: Narendra Cochran is a 84 y.o. male who presents today for follow up of lower urinary tract symptoms.  He has significant obstructive symptoms and complains of weak stream, intermittency, and hesitancy.  He was started on tamsulosin which helped mildly.  He is here for further workup of his bladder outlet.    Preprocedure diagnosis  LUTS    Postprocedure diagnosis  Same    Procedure  Flexible Cystourethroscopy    Attending surgeon  Rafita Méndez MD    Anesthesia  2% lidocaine jelly intraurethrally    Complications  None    Indications  84 y.o. male undergoing a flexible cystoscopy for the above mentioned indications.  Informed consent was obtained.      Findings  Cystoscopic findings included one right and left ureteral orifice in the normal orthotopic position with normal bladder mucosa and no tumors, masses or stones.   The urethral urothelium was within normal limits with no visible strictures.  The prostatic urethra revealed complete lateral lobe coaptation, with large median lobe.   Heavily trabeculated bladder.      Procedure  The patient was placed in supine position and prepped and draped in sterile fashion with lidocaine jelly instill per the urethra for anesthesia 5 minutes prior to procedure start.  A brief timeout was performed including available nursing staff and the patient.  The 16 Fr digital flexible cystoscope was lubricated and gently advanced into the urethral meatus. The penile, bulbar, prostatic, and membranous urethra appeared widely patent without obvious stricture or mucosal abnormality. The scope was then advanced into the bladder. The bladder was completely visualized starting with the trigone. There were  bilateral orthotopic ureteral orifices. The posterior wall, lateral walls, anterior wall, and dome were completely visualized WITHOUT obvious mucosal lesions, tumors, stones.  The cystoscope was retroflexed and the bladder neck and prostate were further visualized and appeared normal.  The cystoscope was then gently withdrawn while visualizing the urethra and the procedure was then terminated.  The patient tolerated the procedure well.      TRUS Volume obtained.    EMAGHAN: normal     Total Volume: 68.06 cc  Height 38.92 mm  Width 54.93 mm Length 60.88 mm      Uroflow     Avg flow: 5.7  ml/sec  Max flow: 13 ml/sec  Time to max flow: 1.5 sec  Voided volume: 264 mL     PVR: 13 cc        Subjective      Review of System:   Review of Systems   I have reviewed the ROS documented by my clinical staff, I have updated appropriately and I agree. Rafita Méndez MD    I have reviewed and the following portions of the patient's history were updated as appropriate: past family history, past medical history, past social history, past surgical history and problem list.    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    Paget disease of bone        Past Surgical History:  Past Surgical History:   Procedure Laterality Date    APPENDECTOMY      CARDIAC CATHETERIZATION N/A 3/18/2020    Procedure: Left Heart Cath;  Surgeon: Sonya Garibay MD;  Location:  [facility] CATH INVASIVE LOCATION;  Service: Cardiology;  Laterality: N/A;  First availabel provider      CARDIAC CATHETERIZATION N/A 3/15/2021    Procedure: LEFT HEART CATH;  Surgeon: Dco Sahni IV, MD;  Location:  [facility] CATH INVASIVE  LOCATION;  Service: Cardiovascular;  Laterality: N/A;    CARDIAC CATHETERIZATION N/A 3/15/2021    Procedure: Coronary angiography;  Surgeon: Doc Sahni IV, MD;  Location:  [facility] CATH INVASIVE LOCATION;  Service: Cardiovascular;  Laterality: N/A;    CARDIAC ELECTROPHYSIOLOGY PROCEDURE N/A 3/16/2021    Procedure: ICD DC new;  Surgeon: Som Boyd DO;  Location:  [facility] EP INVASIVE LOCATION;  Service: Cardiology;  Laterality: N/A;    COLON RESECTION      second to diverticulitis     FOOT SURGERY Left        Family History:   family history includes Clotting disorder in his sister; Diabetes in his sister; Fainting in his brother; Hyperlipidemia in his mother; No Known Problems in his brother, daughter, sister, son, son, and son.   Otherwise pertinent FHx was reviewed and not pertinent to current issue.    Social History:    reports that he quit smoking about 5 years ago. His smoking use included cigars and cigarettes. He started smoking about 70 years ago. He has a 32.5 pack-year smoking history. He has never used smokeless tobacco. He reports that he does not currently use alcohol. He reports that he does not currently use drugs after having used the following drugs: Marijuana.    Medications:     Current Outpatient Medications:     albuterol sulfate  (90 Base) MCG/ACT inhaler, Inhale 2 puffs Every 4 (Four) Hours As Needed for Wheezing or Shortness of Air., Disp: 8 g, Rfl: 3    aspirin 81 MG chewable tablet, Chew 1 tablet Daily., Disp: , Rfl:     Blood Glucose Monitoring Suppl (ONE TOUCH ULTRA 2) w/Device kit, , Disp: , Rfl:     bumetanide (BUMEX) 1 MG tablet, TAKE 1 TABLET BY MOUTH EVERY DAY, Disp: 30 tablet, Rfl: 11    Diclofenac Sodium (VOLTAREN) 1 % gel gel, APPLY 2 GRAMS TO FEET TWICE DAILY FOR PAIN AND STIFFNESS, Disp: , Rfl:     dicyclomine (BENTYL) 20 MG tablet, Take 1 tablet by mouth Every 8 (Eight) Hours As Needed for Abdominal Cramping., Disp: 20 tablet, Rfl: 0    doxycycline  (VIBRAMYCIN) 100 MG capsule, Take 1 capsule by mouth 2 (Two) Times a Day for 7 days., Disp: 7 capsule, Rfl: 0    empagliflozin (Jardiance) 10 MG tablet tablet, TAKE 1 TABLET BY MOUTH EVERY DAY, Disp: 30 tablet, Rfl: 0    glucose blood test strip, Use to check blood sugars once daily, Disp: 50 each, Rfl: 11    Lancets misc, Use to check blood sugars once daily, Disp: 100 each, Rfl: 3    levETIRAcetam (KEPPRA) 750 MG tablet, TAKE 1 TABLET BY MOUTH TWICE A DAY, Disp: 180 tablet, Rfl: 1    magnesium oxide (MAG-OX) 400 MG tablet, Take 1 tablet by mouth Daily., Disp: 90 tablet, Rfl: 3    metFORMIN ER (GLUCOPHAGE-XR) 500 MG 24 hr tablet, Take 1 tablet by mouth Daily With Breakfast., Disp: 90 tablet, Rfl: 3    metoprolol succinate XL (TOPROL-XL) 50 MG 24 hr tablet, TAKE 1 TABLET BY MOUTH EVERY DAY, Disp: 90 tablet, Rfl: 1    pantoprazole (PROTONIX) 40 MG EC tablet, Take 1 tablet by mouth 2 (Two) Times a Day., Disp: 90 tablet, Rfl: 1    polyethylene glycol (MIRALAX) 17 GM/SCOOP powder, Take 17 g by mouth Every Night., Disp: 578 g, Rfl: 1    potassium chloride ER (K-TAB) 20 MEQ tablet controlled-release ER tablet, TAKE 1 TABLET BY MOUTH EVERY DAY, Disp: 90 tablet, Rfl: 3    rosuvastatin (CRESTOR) 20 MG tablet, TAKE 1 TABLET BY MOUTH EVERY DAY, Disp: 90 tablet, Rfl: 1    sacubitril-valsartan (Entresto) 24-26 MG tablet, Take 1 tablet by mouth 2 (Two) Times a Day., Disp: 60 tablet, Rfl: 11    tamsulosin (FLOMAX) 0.4 MG capsule 24 hr capsule, Take 1 capsule by mouth Daily., Disp: 30 capsule, Rfl: 2    Allergies:   No Known Allergies      Objective     Physical Exam:   Vital Signs: There were no vitals filed for this visit.  There is no height or weight on file to calculate BMI.     Physical Exam  Vitals and nursing note reviewed.   Constitutional:       General: He is awake. He is not in acute distress.     Appearance: Normal appearance. He is well-developed. He is obese.   HENT:      Head: Normocephalic and atraumatic.       Right Ear: External ear normal.      Left Ear: External ear normal.      Nose: Nose normal.   Eyes:      Conjunctiva/sclera: Conjunctivae normal.   Pulmonary:      Effort: Pulmonary effort is normal.   Abdominal:      General: There is no distension.      Palpations: Abdomen is soft. There is no mass.      Tenderness: There is no abdominal tenderness. There is no right CVA tenderness, left CVA tenderness, guarding or rebound.      Hernia: No hernia is present. There is no hernia in the left inguinal area or right inguinal area.   Genitourinary:     Pubic Area: No rash.       Penis: Normal.       Testes: Normal.      Prostate: Normal.      Rectum: Normal. No mass or tenderness. Normal anal tone.   Musculoskeletal:      Cervical back: Normal range of motion.   Lymphadenopathy:      Lower Body: No right inguinal adenopathy. No left inguinal adenopathy.   Skin:     General: Skin is warm.   Neurological:      General: No focal deficit present.      Mental Status: He is alert and oriented to person, place, and time.   Psychiatric:         Behavior: Behavior normal. Behavior is cooperative.         Labs:   Brief Urine Lab Results  (Last result in the past 365 days)        Color   Clarity   Blood   Leuk Est   Nitrite   Protein   CREAT   Urine HCG        02/17/25 1551 Yellow   Clear   Negative   Negative   Negative   Negative                        Lab Results   Component Value Date    GLUCOSE 123 (H) 01/24/2025    CALCIUM 9.9 01/24/2025     01/24/2025    K 4.5 01/24/2025    CO2 19.8 (L) 01/24/2025     01/24/2025    BUN 67 (H) 01/24/2025    CREATININE 2.14 (H) 01/24/2025    EGFRIFAFRI 61 02/17/2022    EGFRIFNONA 64 10/21/2019    BCR 31.3 (H) 01/24/2025    ANIONGAP 15.2 (H) 01/24/2025       Lab Results   Component Value Date    WBC 3.12 (L) 02/05/2025    HGB 13.4 (L) 02/05/2025    HCT 41.8 02/05/2025    MCV 83 02/05/2025     (L) 02/05/2025       Lab Results   Component Value Date    PSA 1.600 02/18/2019     PSA 1.260 05/30/2017       Images:   CT Abdomen Pelvis Without Contrast    Result Date: 1/7/2025  Impression: 1.No acute process identified. 2.Similar chronic findings as detailed above. Electronically Signed: Bucky Argueta MD  1/7/2025 4:27 PM EST  Workstation ID: [id number]      Measures:   Tobacco:   Narendra Cochran  reports that he quit smoking about 5 years ago. His smoking use included cigars and cigarettes. He started smoking about 70 years ago. He has a 32.5 pack-year smoking history. He has never used smokeless tobacco.     Urine Incontinence: Patient reports that he is not currently experiencing any symptoms of urinary incontinence.         Assessment / Plan      Assessment/Plan:   84 y.o. male who presented today for follow up of lower urinary tract symptoms.  He has significant obstructive symptoms.  He is very interested in moving forward with an outlet procedure.  The tamsulosin has helped him somewhat but he reports he is still unhappy with his lower urinary tract symptoms.  I discussed his options with him today and given his age and overall core morbidities I believe greenlight laser would be his best option.  I discussed risk and benefits with him today along with alternatives and he is agreeable to move forward.  We will have him scheduled at his convenience.    Diagnoses and all orders for this visit:    1. BPH with obstruction/lower urinary tract symptoms (Primary)  -     Uroflowmetry; Future  -     US Prostate; Future  -     Case Request; Standing  -     CBC (No Diff); Future  -     Basic Metabolic Panel; Future  -     Protime-INR; Future  -     Urinalysis With Culture If Indicated -; Future  -     ECG 12 Lead; Future  -     ceFAZolin (ANCEF) 2,000 mg in sodium chloride 0.9 % 100 mL IVPB  -     Case Request    2. Hematuria, unspecified type  -     Protime-INR; Future    Other orders  -     Outpatient In A Bed; Standing  -     Follow Anesthesia Guidelines / Protocol; Future  -     Follow Anesthesia  Guidelines / Protocol; Standing  -     Verify NPO Status; Standing  -     Verify / Perform Chlorhexidine Skin Prep; Standing  -     Provide NPO Instructions to Patient; Future  -     Chlorhexidine Skin Prep; Future  -     Place Sequential Compression Device; Standing  -     Maintain Sequential Compression Device; Standing         Follow Up:   Return for Follow up after surgery.    I spent approximately 45 minutes providing clinical care for this patient; including review of patient's chart and provider documentation, face to face time spent with patient in examination room (obtaining history, performing physical exam, discussing diagnosis and management options), placing orders, and completing patient documentation.     Rafita Méndez MD  [facility]

## 2025-03-02 DIAGNOSIS — E78.2 MIXED HYPERLIPIDEMIA: ICD-10-CM

## 2025-03-02 LAB — BACTERIA SPEC AEROBE CULT: NO GROWTH

## 2025-03-03 RX ORDER — ROSUVASTATIN CALCIUM 20 MG/1
TABLET, COATED ORAL
Qty: 90 TABLET | Refills: 1 | Status: SHIPPED | OUTPATIENT
Start: 2025-03-03

## 2025-03-03 NOTE — TELEPHONE ENCOUNTER
Rx Refill Note  Requested Prescriptions     Pending Prescriptions Disp Refills    rosuvastatin (CRESTOR) 20 MG tablet [Pharmacy Med Name: ROSUVASTATIN CALCIUM 20 MG TAB] 90 tablet 1     Sig: TAKE 1 TABLET BY MOUTH EVERY DAY      Last office visit with prescribing clinician: 1/10/2025   Last telemedicine visit with prescribing clinician: Visit date not found   Next office visit with prescribing clinician: 4/14/2025                         Would you like a call back once the refill request has been completed: [] Yes [] No    If the office needs to give you a call back, can they leave a voicemail: [] Yes [] No    Trice Ramos MA  03/03/25, 10:35 EST

## 2025-03-07 DIAGNOSIS — E11.65 TYPE 2 DIABETES MELLITUS WITH HYPERGLYCEMIA, WITHOUT LONG-TERM CURRENT USE OF INSULIN: Chronic | ICD-10-CM

## 2025-03-13 ENCOUNTER — TELEPHONE (OUTPATIENT)
Dept: CARDIOLOGY | Facility: CLINIC | Age: 85
End: 2025-03-13
Payer: MEDICARE

## 2025-03-13 RX ORDER — EMPAGLIFLOZIN 10 MG/1
10 TABLET, FILM COATED ORAL DAILY
Qty: 30 TABLET | Refills: 0 | Status: SHIPPED | OUTPATIENT
Start: 2025-03-13

## 2025-03-13 NOTE — TELEPHONE ENCOUNTER
[facility] Heart Failure index is 32 and threshold is 16. Thoracic impedance is 45.4 ohms.      Available HF diagnostics and trends indicate possible fluid accumulation/HF decompensation.    Patient has been having Urology issues that may be contributing to   I attempted to call the patient for a symptom check. No answer, I left message on VM.

## 2025-03-17 NOTE — PROGRESS NOTES
Subjective:     Encounter Date:03/20/2025      Patient ID: Narendra Cochran is a 84 y.o.  -American male from [place], [place].     PCP: LYNNETTE Huitron  CARDIOLOGIST: Karan Mccracken MD  ELECTROPHYSIOLOGIST: Erick Wlaker MD.  UROLOGIST: Rafita Méndez MD    Chief Complaint:   Chief Complaint   Patient presents with    NICM     Problem List:  Nonischemic cardiomyopathy / systolic congestive heart failure / Cardiac Arrest   [facility] March 2020 with mild nonobstructive CAD, EF 36-40%  Witnessed out of hospital cardiac arrest 3/12/2021 with documented bystander CPR with a single shock from AED with transfer to [facility] per EMS.  Echo 3/13/2021 EF 50%, no significant VHD  [facility] 3/14/2021 per Dr. Sahni with documented severe 1-vessel CAD involving a small posterior lateral branch of the of the RCA with no interval change to prior angiography  [facility] DDD ICD implant 3/16/2021 for secondary prevention of SCD  Echocardiogram 9/17/2024: LVEF 51-55%, mild concentric hypertrophy, grade 1 dd, RV moderately dilated, mild AR, mild, bileaflet mitral valve thickening present. Mild mitral valve regurgitation is present , mild TR, RVSP 35-45mmHg, Compared with prior study in 2022 there has been significant improvement in LV systolic function and mitral valve regurgitation severity   Heart logic in the 30s since January 2025  Patient no longer on Entresto due to worsening renal function/hypotension     Premature ventricular contractions  Holter monitor February 2020:  PVCs burden 12.3%   Amiodarone therapy initiated at 200 mg daily for PVCs April 2020  Amiodarone discontinued December 2020  [employer] dual-chamber pacemaker/ICD implanted March 2021 after cardiac arrest, heart logic heart failure index 9 on remote interrogation July 2024  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     Sinus node dysfunction  Essential hypertension  Dyslipidemia  Type II diabetes mellitus; hemoglobin A1c 6.4% July 2024, 6.9% January  2025  COPD  Seizures  Paget's disease  Hearing loss  CKD stage III  Upcoming greenlight procedure 4/3/2025    No Known Allergies    Current Outpatient Medications   Medication Instructions    aspirin 81 mg, Oral, Daily    Blood Glucose Monitoring Suppl (ONE TOUCH ULTRA 2) w/Device kit     bumetanide (BUMEX) 1 MG tablet TAKE 1 TABLET BY MOUTH EVERY DAY    Diclofenac Sodium (VOLTAREN) 1 % gel gel APPLY 2 GRAMS TO FEET TWICE DAILY FOR PAIN AND STIFFNESS    glucose blood test strip Use to check blood sugars once daily    Jardiance 10 mg, Oral, Daily    Lancets misc Use to check blood sugars once daily    levETIRAcetam (KEPPRA) 750 MG tablet TAKE 1 TABLET BY MOUTH TWICE A DAY    magnesium oxide (MAG-OX) 400 mg, Oral, Daily    metFORMIN ER (GLUCOPHAGE-XR) 500 mg, Oral, Daily With Breakfast    metoprolol succinate XL (TOPROL-XL) 50 mg, Oral, Daily    pantoprazole (PROTONIX) 40 mg, Oral, 2 Times Daily    polyethylene glycol (MIRALAX) 17 g, Oral, Nightly    potassium chloride ER (K-TAB) 20 MEQ tablet controlled-release ER tablet TAKE 1 TABLET BY MOUTH EVERY DAY    rosuvastatin (CRESTOR) 20 MG tablet TAKE 1 TABLET BY MOUTH EVERY DAY    sacubitril-valsartan (Entresto) 24-26 MG tablet 1 tablet, Oral, 2 Times Daily    tamsulosin (FLOMAX) 0.4 mg, Oral, Daily         HISTORY OF PRESENT ILLNESS:  The patient is here for a 6 month follow up. Patient has ICD for secondary prevention after SCD in 2021. Echocardiogram 9/17/2024 showed  LVEF 51-55%, mild concentric hypertrophy, grade 1 dd, RV moderately dilated, mild AR, mild, bileaflet mitral valve thickening present. Mild mitral valve regurgitation is present , mild TR, RVSP 35-45mmHg, Compared with prior study in 2022 there has been significant improvement in LV systolic function and mitral valve regurgitation severity.I sent patient message in [employer] but he did not read it. Patient cancelled February 2025 appointment. Patient has an upcoming greenlight procedure 4/3/2025 with   "Dev. Heart logic elevated on ICD 3/13/2025 and has been in the 30s since January.  Patient denies any orthopnea, edema, chest pain, palpitations, dizziness, presyncope, or syncope.  Patient says that he always has shortness of breath.  He does not weigh himself daily but does have a scale that he can use.  Patient no longer on Entresto due to worsening renal function/hypotension.    ROS   All other systems reviewed and otherwise negative.    Procedures       Objective:       Vitals:    03/20/25 1318   BP: 118/62   BP Location: Right arm   Patient Position: Sitting   Cuff Size: Adult   Pulse: 50   SpO2: 96%   Weight: 82.4 kg (181 lb 9.6 oz)   Height: 182.9 cm (72.01\")     Body mass index is 24.62 kg/m².  Wt Readings from Last 2 Encounters:   03/20/25 82.4 kg (181 lb 9.6 oz)   01/10/25 78.2 kg (172 lb 6.4 oz)        Constitutional:       Appearance: Healthy appearance. Not in distress.   Neck:      Vascular: No JVR. JVD normal.   Pulmonary:      Effort: Pulmonary effort is normal.      Breath sounds: Normal breath sounds. Decreased breath sounds present. No wheezing. No rhonchi. No rales.   Chest:      Chest wall: Not tender to palpatation.   Cardiovascular:      PMI at left midclavicular line. Normal rate. Regular rhythm. Normal S1. Normal S2.       Murmurs: There is no murmur.      No gallop.  No click. No rub.   Pulses:     Intact distal pulses.   Edema:     Peripheral edema absent.   Abdominal:      General: Bowel sounds are normal.      Palpations: Abdomen is soft.      Tenderness: There is no abdominal tenderness.   Musculoskeletal: Normal range of motion.         General: No tenderness. Skin:     General: Skin is warm and dry.   Neurological:      General: No focal deficit present.      Mental Status: Alert and oriented to person, place and time.           Lab Review:   Lab Results   Component Value Date    GLUCOSE 123 (H) 01/24/2025    BUN 67 (H) 01/24/2025    CREATININE 2.14 (H) 01/24/2025    EGFRIFNONA " 64 10/21/2019    EGFRIFAFRI 61 02/17/2022    BCR 31.3 (H) 01/24/2025    CO2 19.8 (L) 01/24/2025    CALCIUM 9.9 01/24/2025    ALBUMIN 4.0 01/24/2025    AST 17 01/07/2025    ALT 6 01/07/2025       Lab Results   Component Value Date    WBC 3.12 (L) 02/05/2025    HGB 13.4 (L) 02/05/2025    HCT 41.8 02/05/2025    MCV 83 02/05/2025     (L) 02/05/2025       Lab Results   Component Value Date    HGBA1C 6.90 (H) 01/24/2025       Lab Results   Component Value Date    TSH 5.770 (H) 01/24/2025       Lab Results   Component Value Date    CHOL 150 10/14/2024    CHOL 145 04/10/2024     Lab Results   Component Value Date    TRIG 164 (H) 10/14/2024    TRIG 212 (H) 04/10/2024     Lab Results   Component Value Date    HDL 52 10/14/2024    HDL 38 (L) 04/10/2024     Lab Results   Component Value Date    LDL 70 10/14/2024    LDL 72 04/10/2024             Results for orders placed in visit on 08/08/24    Adult Transthoracic Echo Complete W/ Cont if Necessary Per Protocol    Interpretation Summary    Left ventricular systolic function is normal. Left ventricular ejection fraction appears to be 51 - 55%.    Left ventricular wall thickness is consistent with mild concentric hypertrophy. Left ventricular diastolic function is consistent with (grade I) impaired relaxation.    The right ventricular cavity is moderately dilated. Normal right ventricular systolic function noted. Electronic lead present in the ventricle.    Mild aortic valve regurgitation is present.    There is mild, bileaflet mitral valve thickening present. Mild mitral valve regurgitation is present.    Mild tricuspid valve regurgitation is present. Estimated right ventricular systolic pressure from tricuspid regurgitation is mildly elevated (35-45 mmHg).    Compared with prior study in 2022 there has been significant improvement in LV systolic function and mitral valve regurgitation severity       Results for orders placed in visit on 12/08/17    SCANNED VASCULAR  STUDIES     MURJ 3/13/2025: Heart Logic 32, threshold 16, Thoracic impedance is 45.4 ohms.    Available HF diagnostics and trends indicate possible fluid accumulation/HF decompensation. Patient has been having Urology issues that may be contributing to   I attempted to call the patient for a symptom check. No answer, I left message on VM.      [employer] ICD interrogation 3/20/2025: 79% RA pacing, 27% RV pacing, 10.5 years battery longevity, underlying rhythm sinus bradycardia with PVCs, no AT/AF, 4 VHR episodes for 10-15 beats, 1.1 million PVCs since August 2024, heart logic 34      Advance Care Planning   ACP discussion was held with the patient during this visit. Patient has an advance directive (not in EMR), copy requested.      Assessment:    Patient with shortness of breath on exertion but no orthopnea or LE swelling with elevated heart logic numbers since January 2025; continue GDMT.  Unfortunately patient no longer on Entresto due to worsening renal function/hypotension. Patient will take an extra Bumex the next couple days and get labs today.  Heart logic numbers have been in the 30s since January 2025.  Echocardiogram 9/17/2024 showed  LVEF 51-55%, mild concentric hypertrophy, grade 1 dd, RV moderately dilated, mild AR, mild, bileaflet mitral valve thickening present. Mild mitral valve regurgitation is present , mild TR, RVSP 35-45mmHg, Compared with prior study in 2022 there has been significant improvement in LV systolic function and mitral valve regurgitation severity.  Patient will have to have stress test and echocardiogram before I can give him cardiac clearance for his upcoming greenlight prostate surgery.  Device interrogation showed more frequent PVCs.      Diagnosis Plan   1. [facility]   Echocardiogram 9/17/2024 showed  LVEF 51-55%, mild concentric hypertrophy, grade 1 dd, RV moderately dilated, mild AR, mild, bileaflet mitral valve thickening present. Mild mitral valve regurgitation is present  , mild TR, RVSP 35-45mmHg, Compared with prior study in 2022 there has been significant improvement in LV systolic function and mitral valve regurgitation severity.  Stress test, echocardiogram      2. Essential hypertension  Controlled, continue current cardiac medications      3. Hyperlipidemia LDL goal <70  Acceptable lipid panel October 2024, continue rosuvastatin   4.  Frequent PVCs: Stress test, echocardiogram   5.  Acute on chronic systolic heart failure: Stress test, echocardiogram, labs today       Plan:         Patient to continue current medications and close follow up with the above providers.  Tentative cardiology follow up in June 2025 or patient may return sooner PRN.  BSC ICD interrogation  proBNP, Mag, BMP, TSH today  Echocardiogram  Stress test  Take an extra Bumex 1 mg the next 2 days  Encouraged patient to weigh himself and let me know if he is gaining more than 2-3 pounds in a day or 5 pounds in a week  Encouraged patient to use compression stockings  Unfortunately patient unable to tolerate Entresto due to worsening renal function/hypotension.    Electronically signed by LEANDRO Rahman, 03/20/25, 2:54 PM EDT.

## 2025-03-20 ENCOUNTER — LAB (OUTPATIENT)
Dept: LAB | Facility: HOSPITAL | Age: 85
End: 2025-03-20
Payer: MEDICARE

## 2025-03-20 ENCOUNTER — HOSPITAL ENCOUNTER (OUTPATIENT)
Dept: CARDIOLOGY | Facility: HOSPITAL | Age: 85
Discharge: HOME | End: 2025-03-20
Payer: MEDICARE

## 2025-03-20 ENCOUNTER — OFFICE VISIT (OUTPATIENT)
Dept: CARDIOLOGY | Facility: CLINIC | Age: 85
End: 2025-03-20
Payer: MEDICARE

## 2025-03-20 VITALS
DIASTOLIC BLOOD PRESSURE: 62 MMHG | SYSTOLIC BLOOD PRESSURE: 118 MMHG | HEART RATE: 50 BPM | BODY MASS INDEX: 24.6 KG/M2 | WEIGHT: 181.6 LBS | HEIGHT: 72 IN | OXYGEN SATURATION: 96 %

## 2025-03-20 VITALS
HEIGHT: 72 IN | BODY MASS INDEX: 24.52 KG/M2 | SYSTOLIC BLOOD PRESSURE: 160 MMHG | DIASTOLIC BLOOD PRESSURE: 71 MMHG | WEIGHT: 181 LBS

## 2025-03-20 DIAGNOSIS — I50.23 ACUTE ON CHRONIC SYSTOLIC CHF (CONGESTIVE HEART FAILURE): ICD-10-CM

## 2025-03-20 DIAGNOSIS — I10 ESSENTIAL HYPERTENSION: ICD-10-CM

## 2025-03-20 DIAGNOSIS — E78.5 HYPERLIPIDEMIA LDL GOAL <70: ICD-10-CM

## 2025-03-20 DIAGNOSIS — I42.8 NICM (NONISCHEMIC CARDIOMYOPATHY): Primary | Chronic | ICD-10-CM

## 2025-03-20 DIAGNOSIS — I42.8 NICM (NONISCHEMIC CARDIOMYOPATHY): Chronic | ICD-10-CM

## 2025-03-20 DIAGNOSIS — I49.3 FREQUENT PVCS: ICD-10-CM

## 2025-03-20 LAB
ANION GAP SERPL CALCULATED.3IONS-SCNC: 12 MMOL/L (ref 5–15)
AORTIC DIMENSIONLESS INDEX: 0.44 (DI)
ASCENDING AORTA: 3.5 CM
AV MEAN PRESS GRAD SYS DOP V1V2: 3.2 MMHG
AV VMAX SYS DOP: 124.7 CM/SEC
BH CV ECHO MEAS - 2D AUTO EF SIEMENS: 51.7 %
BH CV ECHO MEAS - AI P1/2T: 383 MSEC
BH CV ECHO MEAS - AO MAX PG: 6.2 MMHG
BH CV ECHO MEAS - AO ROOT DIAM: 3.3 CM
BH CV ECHO MEAS - AO V2 VTI: 26.3 CM
BH CV ECHO MEAS - AVA(I,D): 1.69 CM2
BH CV ECHO MEAS - IVS/LVPW: 1 CM
BH CV ECHO MEAS - IVSD: 1.2 CM
BH CV ECHO MEAS - LA DIMENSION: 5.6 CM
BH CV ECHO MEAS - LAT PEAK E' VEL: 10 CM/SEC
BH CV ECHO MEAS - LV MAX PG: 1.24 MMHG
BH CV ECHO MEAS - LV MEAN PG: 0.6 MMHG
BH CV ECHO MEAS - LV V1 MAX: 54.6 CM/SEC
BH CV ECHO MEAS - LV V1 VTI: 11.7 CM
BH CV ECHO MEAS - LVIDD: 5.6 CM
BH CV ECHO MEAS - LVIDS: 3.5 CM
BH CV ECHO MEAS - LVOT AREA: 3.8 CM2
BH CV ECHO MEAS - LVOT DIAM: 2.2 CM
BH CV ECHO MEAS - LVPWD: 1.2 CM
BH CV ECHO MEAS - MED PEAK E' VEL: 4.9 CM/SEC
BH CV ECHO MEAS - MV DEC SLOPE: 249.9 CM/SEC2
BH CV ECHO MEAS - MV E MAX VEL: 98.2 CM/SEC
BH CV ECHO MEAS - MV MAX PG: 4.2 MMHG
BH CV ECHO MEAS - MV MEAN PG: 1.73 MMHG
BH CV ECHO MEAS - MV P1/2T: 68 MSEC
BH CV ECHO MEAS - MV V2 VTI: 22.3 CM
BH CV ECHO MEAS - MVA(VTI): 2 CM2
BH CV ECHO MEAS - PA ACC TIME: 0.13 SEC
BH CV ECHO MEAS - PA V2 MAX: 60.6 CM/SEC
BH CV ECHO MEAS - RAP SYSTOLE: 3 MMHG
BH CV ECHO MEAS - RVSP: 30 MMHG
BH CV ECHO MEAS - SV(LVOT): 44.4 ML
BH CV ECHO MEAS - TAPSE (>1.6): 2.45 CM
BH CV ECHO MEAS - TR MAX PG: 27.3 MMHG
BH CV ECHO MEAS - TR MAX VEL: 261.3 CM/SEC
BH CV ECHO MEASUREMENTS AVERAGE E/E' RATIO: 13.18
BH CV VAS BP LEFT ARM: NORMAL MMHG
BH CV XLRA - RV BASE: 4.3 CM
BH CV XLRA - RV LENGTH: 8.5 CM
BH CV XLRA - RV MID: 3.5 CM
BH CV XLRA - TDI S': 16 CM/SEC
BUN SERPL-MCNC: 26 MG/DL (ref 8–23)
BUN/CREAT SERPL: 19.1 (ref 7–25)
CALCIUM SPEC-SCNC: 9.2 MG/DL (ref 8.6–10.5)
CHLORIDE SERPL-SCNC: 105 MMOL/L (ref 98–107)
CO2 SERPL-SCNC: 24 MMOL/L (ref 22–29)
CREAT SERPL-MCNC: 1.36 MG/DL (ref 0.76–1.27)
EGFRCR SERPLBLD CKD-EPI 2021: 50.7 ML/MIN/1.73
GLUCOSE SERPL-MCNC: 110 MG/DL (ref 65–99)
LEFT ATRIUM VOLUME INDEX: 46.2 ML/M2
MAGNESIUM SERPL-MCNC: 2.4 MG/DL (ref 1.6–2.4)
NT-PROBNP SERPL-MCNC: 2992 PG/ML (ref 0–1800)
POTASSIUM SERPL-SCNC: 3.8 MMOL/L (ref 3.5–5.2)
SODIUM SERPL-SCNC: 141 MMOL/L (ref 136–145)
TSH SERPL DL<=0.05 MIU/L-ACNC: 5.21 UIU/ML (ref 0.27–4.2)

## 2025-03-20 PROCEDURE — 83735 ASSAY OF MAGNESIUM: CPT

## 2025-03-20 PROCEDURE — 83880 ASSAY OF NATRIURETIC PEPTIDE: CPT

## 2025-03-20 PROCEDURE — 80048 BASIC METABOLIC PNL TOTAL CA: CPT

## 2025-03-20 PROCEDURE — 93306 TTE W/DOPPLER COMPLETE: CPT

## 2025-03-20 PROCEDURE — 84443 ASSAY THYROID STIM HORMONE: CPT

## 2025-03-20 PROCEDURE — 36415 COLL VENOUS BLD VENIPUNCTURE: CPT

## 2025-03-20 RX ORDER — DOCUSATE SODIUM 100 MG/1
100 CAPSULE, LIQUID FILLED ORAL 2 TIMES DAILY PRN
Qty: 30 CAPSULE | Refills: 0 | Status: SHIPPED | OUTPATIENT
Start: 2025-03-20

## 2025-03-21 ENCOUNTER — TELEPHONE (OUTPATIENT)
Dept: CARDIOLOGY | Facility: CLINIC | Age: 85
End: 2025-03-21
Payer: MEDICARE

## 2025-03-21 ENCOUNTER — DOCUMENTATION (OUTPATIENT)
Dept: CARDIOLOGY | Facility: CLINIC | Age: 85
End: 2025-03-21
Payer: MEDICARE

## 2025-03-21 DIAGNOSIS — I10 ESSENTIAL HYPERTENSION: ICD-10-CM

## 2025-03-21 DIAGNOSIS — I50.32 CHRONIC HEART FAILURE WITH PRESERVED EJECTION FRACTION: ICD-10-CM

## 2025-03-21 RX ORDER — BUMETANIDE 2 MG/1
2 TABLET ORAL DAILY
Qty: 90 TABLET | Refills: 1 | Status: SHIPPED | OUTPATIENT
Start: 2025-03-21

## 2025-03-21 RX ORDER — METOPROLOL SUCCINATE 50 MG/1
75 TABLET, EXTENDED RELEASE ORAL DAILY
Qty: 135 TABLET | Refills: 1 | Status: SHIPPED | OUTPATIENT
Start: 2025-03-21

## 2025-03-21 NOTE — TELEPHONE ENCOUNTER
Spoke with wife about results and recommendations from documentation today from Lina REEVES. Wife verbalizes understanding.

## 2025-03-21 NOTE — PROGRESS NOTES
I was able to review the patient's laboratory testing results.  Kidney function was much better than previous labs and went from 2.14 to 1.36.  Other electrolytes acceptable except glucose was just barely elevated at 110.  TSH was a little elevated but it looks like this has been a chronic issue over the past 4 years.  proBNP was elevated suggestive of heart failure.  Echocardiogram showed normal ejection fraction, mild to moderate mitral regurgitation, moderate aortic regurgitation and frequent PVCs noted during the exam. Amiodarone probably not the best option to add  in view of elevated TSH.  Will increase Bumex to 2 mg daily and get repeat labs in 1-1.5 weeks.  Will also increase Toprol to 75 mg nightly.  Patient will need to monitor blood pressure and heart rate for the next week and call back with readings.  When I get his stress test results back, I will let him know. I will have ULISES Golden call the patient back with results/recommendations.     Electronically signed by LEANDRO Rahman, 03/21/25, 7:58 AM EDT.

## 2025-03-27 ENCOUNTER — PRE-ADMISSION TESTING (OUTPATIENT)
Dept: PREADMISSION TESTING | Facility: HOSPITAL | Age: 85
End: 2025-03-27
Payer: MEDICARE

## 2025-03-27 VITALS — WEIGHT: 181.11 LBS | BODY MASS INDEX: 27.45 KG/M2 | HEIGHT: 68 IN

## 2025-03-27 DIAGNOSIS — N13.8 BPH WITH OBSTRUCTION/LOWER URINARY TRACT SYMPTOMS: ICD-10-CM

## 2025-03-27 DIAGNOSIS — N40.1 BPH WITH OBSTRUCTION/LOWER URINARY TRACT SYMPTOMS: ICD-10-CM

## 2025-03-27 DIAGNOSIS — R31.9 HEMATURIA, UNSPECIFIED TYPE: ICD-10-CM

## 2025-03-27 LAB
ANION GAP SERPL CALCULATED.3IONS-SCNC: 12 MMOL/L (ref 5–15)
BILIRUB UR QL STRIP: NEGATIVE
BUN SERPL-MCNC: 29 MG/DL (ref 8–23)
BUN/CREAT SERPL: 17.4 (ref 7–25)
CALCIUM SPEC-SCNC: 9.4 MG/DL (ref 8.6–10.5)
CHLORIDE SERPL-SCNC: 103 MMOL/L (ref 98–107)
CLARITY UR: CLEAR
CO2 SERPL-SCNC: 26 MMOL/L (ref 22–29)
COLOR UR: YELLOW
CREAT SERPL-MCNC: 1.67 MG/DL (ref 0.76–1.27)
DEPRECATED RDW RBC AUTO: 51.6 FL (ref 37–54)
EGFRCR SERPLBLD CKD-EPI 2021: 39.6 ML/MIN/1.73
ERYTHROCYTE [DISTWIDTH] IN BLOOD BY AUTOMATED COUNT: 15.6 % (ref 12.3–15.4)
GLUCOSE SERPL-MCNC: 105 MG/DL (ref 65–99)
GLUCOSE UR STRIP-MCNC: ABNORMAL MG/DL
HCT VFR BLD AUTO: 39.6 % (ref 37.5–51)
HGB BLD-MCNC: 12.3 G/DL (ref 13–17.7)
HGB UR QL STRIP.AUTO: NEGATIVE
INR PPP: 0.96 (ref 0.89–1.12)
KETONES UR QL STRIP: NEGATIVE
LEUKOCYTE ESTERASE UR QL STRIP.AUTO: NEGATIVE
MCH RBC QN AUTO: 27.7 PG (ref 26.6–33)
MCHC RBC AUTO-ENTMCNC: 31.1 G/DL (ref 31.5–35.7)
MCV RBC AUTO: 89.2 FL (ref 79–97)
NITRITE UR QL STRIP: NEGATIVE
PH UR STRIP.AUTO: 6 [PH] (ref 5–8)
PLATELET # BLD AUTO: 107 10*3/MM3 (ref 140–450)
PMV BLD AUTO: 11.8 FL (ref 6–12)
POTASSIUM SERPL-SCNC: 4 MMOL/L (ref 3.5–5.2)
PROT UR QL STRIP: NEGATIVE
PROTHROMBIN TIME: 12.9 SECONDS (ref 12.2–14.5)
QT INTERVAL: 462 MS
QTC INTERVAL: 491 MS
RBC # BLD AUTO: 4.44 10*6/MM3 (ref 4.14–5.8)
SODIUM SERPL-SCNC: 141 MMOL/L (ref 136–145)
SP GR UR STRIP: 1.01 (ref 1–1.03)
UROBILINOGEN UR QL STRIP: ABNORMAL
WBC NRBC COR # BLD AUTO: 3.29 10*3/MM3 (ref 3.4–10.8)

## 2025-03-27 PROCEDURE — 81003 URINALYSIS AUTO W/O SCOPE: CPT

## 2025-03-27 PROCEDURE — 93010 ELECTROCARDIOGRAM REPORT: CPT | Performed by: INTERNAL MEDICINE

## 2025-03-27 PROCEDURE — 93005 ELECTROCARDIOGRAM TRACING: CPT

## 2025-03-27 PROCEDURE — 85610 PROTHROMBIN TIME: CPT

## 2025-03-27 PROCEDURE — 85027 COMPLETE CBC AUTOMATED: CPT

## 2025-03-27 PROCEDURE — 80048 BASIC METABOLIC PNL TOTAL CA: CPT

## 2025-03-27 PROCEDURE — 36415 COLL VENOUS BLD VENIPUNCTURE: CPT

## 2025-03-27 NOTE — PAT
Patient viewed general PAT education video as instructed in their preoperative information received from their surgeon.  Patient stated the general PAT education video was viewed in its entirety and survey completed.  Copies of PAT general education handouts (Incentive Spirometry, Meds to Beds Program, Patient Belongings, Pre-op skin preparation instructions, Blood Glucose testing, Visitor policy, Surgery FAQ, Code H) distributed to patient if not printed. Education related to the PAT pass and skin preparation for surgery (if applicable) completed in PAT as a reinforcement to PAT education video. Patient instructed to return PAT pass provided today as well as completed skin preparation sheet (if applicable) on the day of procedure.     Additionally if patient had not viewed video yet but intended to view it at home or in our waiting area, then referred them to the handout with QR code/link provided during PAT visit.  Encouraged patient/family to read [facility] general education handouts thoroughly and notify PAT staff with any questions or concerns. Patient verbalized understanding of all information and priority content.    An arrival time for procedure was not provided during PAT visit. If patient had any questions or concerns about their arrival time, they were instructed to contact their surgeon/physician.  Additionally, if the patient referred to an arrival time that was acquired from their my chart account, patient was encouraged to verify that time with their surgeon/physician. Arrival times are NOT provided in Pre Admission Testing Department.    Patient denies any current skin issues.     Pt. To have GXT test 3/31/25 for cardiac clearnace per COMPA Stacy.    BSC ICD interrogation on chart from 3/20/25.

## 2025-03-31 ENCOUNTER — HOSPITAL ENCOUNTER (OUTPATIENT)
Dept: CARDIOLOGY | Facility: HOSPITAL | Age: 85
Discharge: HOME | End: 2025-03-31
Payer: MEDICARE

## 2025-03-31 DIAGNOSIS — I42.8 NICM (NONISCHEMIC CARDIOMYOPATHY): Chronic | ICD-10-CM

## 2025-03-31 DIAGNOSIS — I50.23 ACUTE ON CHRONIC SYSTOLIC CHF (CONGESTIVE HEART FAILURE): ICD-10-CM

## 2025-03-31 DIAGNOSIS — I49.3 FREQUENT PVCS: ICD-10-CM

## 2025-04-01 ENCOUNTER — HOSPITAL ENCOUNTER (OUTPATIENT)
Dept: CARDIOLOGY | Facility: HOSPITAL | Age: 85
Discharge: HOME | End: 2025-04-01
Payer: MEDICARE

## 2025-04-01 VITALS
BODY MASS INDEX: 27.47 KG/M2 | WEIGHT: 181.22 LBS | DIASTOLIC BLOOD PRESSURE: 72 MMHG | SYSTOLIC BLOOD PRESSURE: 144 MMHG | HEIGHT: 68 IN | HEART RATE: 64 BPM

## 2025-04-01 PROCEDURE — 93017 CV STRESS TEST TRACING ONLY: CPT

## 2025-04-01 PROCEDURE — A9500 TC99M SESTAMIBI: HCPCS | Performed by: NURSE PRACTITIONER

## 2025-04-01 PROCEDURE — 34310000005 TECHNETIUM SESTAMIBI: Performed by: NURSE PRACTITIONER

## 2025-04-01 PROCEDURE — 78452 HT MUSCLE IMAGE SPECT MULT: CPT

## 2025-04-01 PROCEDURE — 25010000002 REGADENOSON 0.4 MG/5ML SOLUTION: Performed by: NURSE PRACTITIONER

## 2025-04-01 RX ORDER — REGADENOSON 0.08 MG/ML
0.4 INJECTION, SOLUTION INTRAVENOUS ONCE
Status: COMPLETED | OUTPATIENT
Start: 2025-04-01 | End: 2025-04-01

## 2025-04-01 RX ADMIN — TECHNETIUM TC 99M SESTAMIBI 1 DOSE: 1 INJECTION INTRAVENOUS at 10:35

## 2025-04-01 RX ADMIN — REGADENOSON 0.4 MG: 0.08 INJECTION, SOLUTION INTRAVENOUS at 10:53

## 2025-04-01 RX ADMIN — TECHNETIUM TC 99M SESTAMIBI 1 DOSE: 1 INJECTION INTRAVENOUS at 09:15

## 2025-04-02 ENCOUNTER — DOCUMENTATION (OUTPATIENT)
Dept: CARDIOLOGY | Facility: CLINIC | Age: 85
End: 2025-04-02
Payer: MEDICARE

## 2025-04-02 ENCOUNTER — ANESTHESIA EVENT (OUTPATIENT)
Dept: PERIOP | Facility: HOSPITAL | Age: 85
End: 2025-04-02
Payer: MEDICARE

## 2025-04-02 LAB
BH CV REST NUCLEAR ISOTOPE DOSE: 9 MCI
BH CV STRESS BP STAGE 2: NORMAL
BH CV STRESS BP STAGE 4: NORMAL
BH CV STRESS COMMENTS STAGE 1: NORMAL
BH CV STRESS DOSE REGADENOSON STAGE 1: 0.4
BH CV STRESS DURATION MIN STAGE 1: 1
BH CV STRESS DURATION MIN STAGE 2: 1
BH CV STRESS DURATION MIN STAGE 3: 1
BH CV STRESS DURATION MIN STAGE 4: 1
BH CV STRESS DURATION SEC STAGE 1: 0
BH CV STRESS DURATION SEC STAGE 2: 0
BH CV STRESS DURATION SEC STAGE 3: 0
BH CV STRESS DURATION SEC STAGE 4: 0
BH CV STRESS HR STAGE 1: 60
BH CV STRESS HR STAGE 2: 78
BH CV STRESS HR STAGE 3: 70
BH CV STRESS HR STAGE 4: 67
BH CV STRESS NUCLEAR ISOTOPE DOSE: 32 MCI
BH CV STRESS O2 STAGE 1: 99
BH CV STRESS O2 STAGE 2: 99
BH CV STRESS O2 STAGE 3: 100
BH CV STRESS O2 STAGE 4: 100
BH CV STRESS PROTOCOL 1: NORMAL
BH CV STRESS RECOVERY BP: NORMAL MMHG
BH CV STRESS RECOVERY HR: 67 BPM
BH CV STRESS RECOVERY O2: 99 %
BH CV STRESS STAGE 1: 1
BH CV STRESS STAGE 2: 2
BH CV STRESS STAGE 3: 3
BH CV STRESS STAGE 4: 4
MAXIMAL PREDICTED HEART RATE: 134 BPM
PERCENT MAX PREDICTED HR: 58.21 %
SPECT HRT GATED+EF W RNC IV: 49 %
STRESS BASELINE BP: NORMAL MMHG
STRESS BASELINE HR: 64 BPM
STRESS O2 SAT REST: 100 %
STRESS PERCENT HR: 68 %
STRESS POST ESTIMATED WORKLOAD: 1 METS
STRESS POST EXERCISE DUR MIN: 4 MIN
STRESS POST EXERCISE DUR SEC: 0 SEC
STRESS POST O2 SAT PEAK: 100 %
STRESS POST PEAK BP: NORMAL MMHG
STRESS POST PEAK HR: 78 BPM
STRESS TARGET HR: 114 BPM

## 2025-04-02 RX ORDER — FAMOTIDINE 10 MG/ML
20 INJECTION, SOLUTION INTRAVENOUS ONCE
Status: CANCELLED | OUTPATIENT
Start: 2025-04-02 | End: 2025-04-02

## 2025-04-02 NOTE — PAT
Patient needed cardiac clearance before surgery.  Before Lina REEVES would clear the patient, patient needed an echo and stress test.  Patient has had both and the stress test results were back on 4/2/25 and ready for review.  No official clearance yet.  Left message for Lina REEVES regarding this situation.  No response as of 1600.  Chart sent to pre op without clearance letter.      Check epic for any updates.     1650-Lina REEVES states patient is cleared for surgery.  See message below.       RE: Stress test results finalized    Lina Razo APRN Preston, Kelli J, RN  Yes, he has clearance.                 ----- Message -----  From: Audelia Garrido, MERI  Sent: 4/2/2025   1:26 PM EDT  To: LEANDRO Rahman  Subject: Stress test results finalized                    Patient has prostate surgery tomorrow with Dr Méndez.  Stress test results are resulted.    Is patient cleared for surgery from a cardiac standpoint?

## 2025-04-03 ENCOUNTER — ANESTHESIA (OUTPATIENT)
Dept: PERIOP | Facility: HOSPITAL | Age: 85
End: 2025-04-03
Payer: MEDICARE

## 2025-04-03 ENCOUNTER — HOSPITAL ENCOUNTER (OUTPATIENT)
Facility: HOSPITAL | Age: 85
Discharge: HOME | End: 2025-04-03
Attending: UROLOGY | Admitting: UROLOGY
Payer: MEDICARE

## 2025-04-03 VITALS
TEMPERATURE: 97.7 F | HEART RATE: 60 BPM | SYSTOLIC BLOOD PRESSURE: 122 MMHG | BODY MASS INDEX: 24.52 KG/M2 | HEIGHT: 72 IN | DIASTOLIC BLOOD PRESSURE: 65 MMHG | RESPIRATION RATE: 14 BRPM | OXYGEN SATURATION: 94 % | WEIGHT: 181 LBS

## 2025-04-03 DIAGNOSIS — N40.1 BPH WITH OBSTRUCTION/LOWER URINARY TRACT SYMPTOMS: ICD-10-CM

## 2025-04-03 DIAGNOSIS — N13.8 BPH WITH OBSTRUCTION/LOWER URINARY TRACT SYMPTOMS: ICD-10-CM

## 2025-04-03 LAB — GLUCOSE BLDC GLUCOMTR-MCNC: 78 MG/DL (ref 70–130)

## 2025-04-03 PROCEDURE — 87086 URINE CULTURE/COLONY COUNT: CPT | Performed by: UROLOGY

## 2025-04-03 PROCEDURE — S0260 H&P FOR SURGERY: HCPCS | Performed by: UROLOGY

## 2025-04-03 PROCEDURE — A9270 NON-COVERED ITEM OR SERVICE: HCPCS | Performed by: ANESTHESIOLOGY

## 2025-04-03 PROCEDURE — 25010000002 DEXAMETHASONE PER 1 MG

## 2025-04-03 PROCEDURE — 25810000003 SODIUM CHLORIDE 0.9 % SOLUTION: Performed by: ANESTHESIOLOGY

## 2025-04-03 PROCEDURE — 25810000003 LACTATED RINGERS PER 1000 ML: Performed by: ANESTHESIOLOGY

## 2025-04-03 PROCEDURE — 25010000002 LIDOCAINE PF 1% 1 % SOLUTION: Performed by: ANESTHESIOLOGY

## 2025-04-03 PROCEDURE — 25010000002 CEFAZOLIN PER 500 MG: Performed by: UROLOGY

## 2025-04-03 PROCEDURE — 25010000002 FUROSEMIDE PER 20 MG

## 2025-04-03 PROCEDURE — 82948 REAGENT STRIP/BLOOD GLUCOSE: CPT

## 2025-04-03 PROCEDURE — 52648 LASER SURGERY OF PROSTATE: CPT | Performed by: UROLOGY

## 2025-04-03 PROCEDURE — 63710000001 FAMOTIDINE 20 MG TABLET: Performed by: ANESTHESIOLOGY

## 2025-04-03 PROCEDURE — 25010000002 LIDOCAINE PF 1% 1 % SOLUTION

## 2025-04-03 PROCEDURE — 25010000002 FENTANYL CITRATE (PF) 100 MCG/2ML SOLUTION

## 2025-04-03 PROCEDURE — 25010000002 ONDANSETRON PER 1 MG

## 2025-04-03 PROCEDURE — 25010000002 PROPOFOL 10 MG/ML EMULSION

## 2025-04-03 RX ORDER — PROPOFOL 10 MG/ML
VIAL (ML) INTRAVENOUS AS NEEDED
Status: DISCONTINUED | OUTPATIENT
Start: 2025-04-03 | End: 2025-04-03 | Stop reason: SURG

## 2025-04-03 RX ORDER — HYDRALAZINE HYDROCHLORIDE 20 MG/ML
5 INJECTION INTRAMUSCULAR; INTRAVENOUS
Status: DISCONTINUED | OUTPATIENT
Start: 2025-04-03 | End: 2025-04-03 | Stop reason: HOSPADM

## 2025-04-03 RX ORDER — EPHEDRINE SULFATE 50 MG/ML
INJECTION INTRAVENOUS AS NEEDED
Status: DISCONTINUED | OUTPATIENT
Start: 2025-04-03 | End: 2025-04-03 | Stop reason: SURG

## 2025-04-03 RX ORDER — LABETALOL HYDROCHLORIDE 5 MG/ML
5 INJECTION, SOLUTION INTRAVENOUS
Status: DISCONTINUED | OUTPATIENT
Start: 2025-04-03 | End: 2025-04-03 | Stop reason: HOSPADM

## 2025-04-03 RX ORDER — SODIUM CHLORIDE 9 MG/ML
9 INJECTION, SOLUTION INTRAVENOUS CONTINUOUS
Status: DISCONTINUED | OUTPATIENT
Start: 2025-04-03 | End: 2025-04-03 | Stop reason: HOSPADM

## 2025-04-03 RX ORDER — NALOXONE HCL 0.4 MG/ML
0.4 VIAL (ML) INJECTION AS NEEDED
Status: DISCONTINUED | OUTPATIENT
Start: 2025-04-03 | End: 2025-04-03 | Stop reason: HOSPADM

## 2025-04-03 RX ORDER — FENTANYL CITRATE 50 UG/ML
INJECTION, SOLUTION INTRAMUSCULAR; INTRAVENOUS AS NEEDED
Status: DISCONTINUED | OUTPATIENT
Start: 2025-04-03 | End: 2025-04-03 | Stop reason: SURG

## 2025-04-03 RX ORDER — FENTANYL CITRATE 50 UG/ML
50 INJECTION, SOLUTION INTRAMUSCULAR; INTRAVENOUS
Status: DISCONTINUED | OUTPATIENT
Start: 2025-04-03 | End: 2025-04-03 | Stop reason: HOSPADM

## 2025-04-03 RX ORDER — ONDANSETRON 2 MG/ML
4 INJECTION INTRAMUSCULAR; INTRAVENOUS ONCE AS NEEDED
Status: DISCONTINUED | OUTPATIENT
Start: 2025-04-03 | End: 2025-04-03 | Stop reason: HOSPADM

## 2025-04-03 RX ORDER — SODIUM CHLORIDE 0.9 % (FLUSH) 0.9 %
3 SYRINGE (ML) INJECTION EVERY 12 HOURS SCHEDULED
Status: DISCONTINUED | OUTPATIENT
Start: 2025-04-03 | End: 2025-04-03 | Stop reason: HOSPADM

## 2025-04-03 RX ORDER — SODIUM CHLORIDE 0.9 % (FLUSH) 0.9 %
10 SYRINGE (ML) INJECTION EVERY 12 HOURS SCHEDULED
Status: DISCONTINUED | OUTPATIENT
Start: 2025-04-03 | End: 2025-04-03 | Stop reason: HOSPADM

## 2025-04-03 RX ORDER — SODIUM CHLORIDE 9 MG/ML
9 INJECTION, SOLUTION INTRAVENOUS AS NEEDED
Status: DISCONTINUED | OUTPATIENT
Start: 2025-04-03 | End: 2025-04-03 | Stop reason: HOSPADM

## 2025-04-03 RX ORDER — MIDAZOLAM HYDROCHLORIDE 1 MG/ML
0.5 INJECTION, SOLUTION INTRAMUSCULAR; INTRAVENOUS
Status: DISCONTINUED | OUTPATIENT
Start: 2025-04-03 | End: 2025-04-03 | Stop reason: HOSPADM

## 2025-04-03 RX ORDER — OXYCODONE AND ACETAMINOPHEN 7.5; 325 MG/1; MG/1
1 TABLET ORAL EVERY 4 HOURS PRN
Status: DISCONTINUED | OUTPATIENT
Start: 2025-04-03 | End: 2025-04-03 | Stop reason: HOSPADM

## 2025-04-03 RX ORDER — LIDOCAINE HYDROCHLORIDE 10 MG/ML
0.5 INJECTION, SOLUTION EPIDURAL; INFILTRATION; INTRACAUDAL; PERINEURAL ONCE AS NEEDED
Status: COMPLETED | OUTPATIENT
Start: 2025-04-03 | End: 2025-04-03

## 2025-04-03 RX ORDER — LIDOCAINE HYDROCHLORIDE 10 MG/ML
INJECTION, SOLUTION EPIDURAL; INFILTRATION; INTRACAUDAL; PERINEURAL AS NEEDED
Status: DISCONTINUED | OUTPATIENT
Start: 2025-04-03 | End: 2025-04-03 | Stop reason: SURG

## 2025-04-03 RX ORDER — DEXAMETHASONE SODIUM PHOSPHATE 4 MG/ML
INJECTION, SOLUTION INTRA-ARTICULAR; INTRALESIONAL; INTRAMUSCULAR; INTRAVENOUS; SOFT TISSUE AS NEEDED
Status: DISCONTINUED | OUTPATIENT
Start: 2025-04-03 | End: 2025-04-03 | Stop reason: SURG

## 2025-04-03 RX ORDER — FAMOTIDINE 20 MG/1
20 TABLET, FILM COATED ORAL ONCE
Status: COMPLETED | OUTPATIENT
Start: 2025-04-03 | End: 2025-04-03

## 2025-04-03 RX ORDER — FUROSEMIDE 10 MG/ML
INJECTION INTRAMUSCULAR; INTRAVENOUS AS NEEDED
Status: DISCONTINUED | OUTPATIENT
Start: 2025-04-03 | End: 2025-04-03 | Stop reason: SURG

## 2025-04-03 RX ORDER — SULFAMETHOXAZOLE AND TRIMETHOPRIM 800; 160 MG/1; MG/1
1 TABLET ORAL 2 TIMES DAILY
Qty: 10 TABLET | Refills: 0 | Status: SHIPPED | OUTPATIENT
Start: 2025-04-03 | End: 2025-04-08

## 2025-04-03 RX ORDER — ONDANSETRON 2 MG/ML
INJECTION INTRAMUSCULAR; INTRAVENOUS AS NEEDED
Status: DISCONTINUED | OUTPATIENT
Start: 2025-04-03 | End: 2025-04-03 | Stop reason: SURG

## 2025-04-03 RX ORDER — SODIUM CHLORIDE, SODIUM LACTATE, POTASSIUM CHLORIDE, CALCIUM CHLORIDE 600; 310; 30; 20 MG/100ML; MG/100ML; MG/100ML; MG/100ML
9 INJECTION, SOLUTION INTRAVENOUS CONTINUOUS
Status: DISCONTINUED | OUTPATIENT
Start: 2025-04-03 | End: 2025-04-03 | Stop reason: HOSPADM

## 2025-04-03 RX ORDER — PHENAZOPYRIDINE HYDROCHLORIDE 200 MG/1
200 TABLET, FILM COATED ORAL 3 TIMES DAILY PRN
Qty: 20 TABLET | Refills: 0 | Status: SHIPPED | OUTPATIENT
Start: 2025-04-03

## 2025-04-03 RX ORDER — SODIUM CHLORIDE, SODIUM LACTATE, POTASSIUM CHLORIDE, CALCIUM CHLORIDE 600; 310; 30; 20 MG/100ML; MG/100ML; MG/100ML; MG/100ML
9 INJECTION, SOLUTION INTRAVENOUS CONTINUOUS
Status: DISCONTINUED | OUTPATIENT
Start: 2025-04-04 | End: 2025-04-03 | Stop reason: HOSPADM

## 2025-04-03 RX ORDER — TRAMADOL HYDROCHLORIDE 50 MG/1
50 TABLET ORAL EVERY 6 HOURS PRN
Qty: 12 TABLET | Refills: 0 | Status: SHIPPED | OUTPATIENT
Start: 2025-04-03

## 2025-04-03 RX ORDER — IPRATROPIUM BROMIDE AND ALBUTEROL SULFATE 2.5; .5 MG/3ML; MG/3ML
3 SOLUTION RESPIRATORY (INHALATION) ONCE AS NEEDED
Status: DISCONTINUED | OUTPATIENT
Start: 2025-04-03 | End: 2025-04-03 | Stop reason: HOSPADM

## 2025-04-03 RX ORDER — SODIUM CHLORIDE 0.9 % (FLUSH) 0.9 %
3-10 SYRINGE (ML) INJECTION AS NEEDED
Status: DISCONTINUED | OUTPATIENT
Start: 2025-04-03 | End: 2025-04-03 | Stop reason: HOSPADM

## 2025-04-03 RX ORDER — SODIUM CHLORIDE 0.9 % (FLUSH) 0.9 %
10 SYRINGE (ML) INJECTION AS NEEDED
Status: DISCONTINUED | OUTPATIENT
Start: 2025-04-03 | End: 2025-04-03 | Stop reason: HOSPADM

## 2025-04-03 RX ORDER — OXYBUTYNIN CHLORIDE 5 MG/1
5 TABLET ORAL EVERY 8 HOURS PRN
Qty: 20 TABLET | Refills: 0 | Status: SHIPPED | OUTPATIENT
Start: 2025-04-03

## 2025-04-03 RX ORDER — HYDROCODONE BITARTRATE AND ACETAMINOPHEN 5; 325 MG/1; MG/1
1 TABLET ORAL ONCE AS NEEDED
Status: DISCONTINUED | OUTPATIENT
Start: 2025-04-03 | End: 2025-04-03 | Stop reason: HOSPADM

## 2025-04-03 RX ADMIN — FUROSEMIDE 20 MG: 10 INJECTION, SOLUTION INTRAMUSCULAR; INTRAVENOUS at 11:07

## 2025-04-03 RX ADMIN — FENTANYL CITRATE 25 MCG: 50 INJECTION, SOLUTION INTRAMUSCULAR; INTRAVENOUS at 10:38

## 2025-04-03 RX ADMIN — DEXAMETHASONE SODIUM PHOSPHATE 4 MG: 4 INJECTION INTRA-ARTICULAR; INTRALESIONAL; INTRAMUSCULAR; INTRAVENOUS; SOFT TISSUE at 10:24

## 2025-04-03 RX ADMIN — EPHEDRINE SULFATE 10 MG: 50 INJECTION INTRAVENOUS at 10:28

## 2025-04-03 RX ADMIN — SODIUM CHLORIDE, POTASSIUM CHLORIDE, SODIUM LACTATE AND CALCIUM CHLORIDE: 600; 310; 30; 20 INJECTION, SOLUTION INTRAVENOUS at 09:49

## 2025-04-03 RX ADMIN — FAMOTIDINE 20 MG: 20 TABLET, FILM COATED ORAL at 07:23

## 2025-04-03 RX ADMIN — SODIUM CHLORIDE 9 ML/HR: 9 INJECTION, SOLUTION INTRAVENOUS at 07:24

## 2025-04-03 RX ADMIN — SODIUM CHLORIDE, POTASSIUM CHLORIDE, SODIUM LACTATE AND CALCIUM CHLORIDE: 600; 310; 30; 20 INJECTION, SOLUTION INTRAVENOUS at 11:07

## 2025-04-03 RX ADMIN — FENTANYL CITRATE 50 MCG: 50 INJECTION, SOLUTION INTRAMUSCULAR; INTRAVENOUS at 10:15

## 2025-04-03 RX ADMIN — ONDANSETRON 4 MG: 2 INJECTION INTRAMUSCULAR; INTRAVENOUS at 10:24

## 2025-04-03 RX ADMIN — FENTANYL CITRATE 25 MCG: 50 INJECTION, SOLUTION INTRAMUSCULAR; INTRAVENOUS at 10:51

## 2025-04-03 RX ADMIN — SODIUM CHLORIDE 2000 MG: 900 INJECTION INTRAVENOUS at 09:58

## 2025-04-03 RX ADMIN — PROPOFOL 200 MG: 10 INJECTION, EMULSION INTRAVENOUS at 10:24

## 2025-04-03 RX ADMIN — LIDOCAINE HYDROCHLORIDE 0.5 ML: 10 INJECTION, SOLUTION EPIDURAL; INFILTRATION; INTRACAUDAL; PERINEURAL at 07:23

## 2025-04-03 RX ADMIN — LIDOCAINE HYDROCHLORIDE 50 MG: 10 INJECTION, SOLUTION EPIDURAL; INFILTRATION; INTRACAUDAL; PERINEURAL at 10:24

## 2025-04-03 NOTE — ANESTHESIA POSTPROCEDURE EVALUATION
Patient: Narendra Cochran    Procedure Summary       Date: 04/03/25 Room / Location:  [facility]    Anesthesia Start: 0948 Anesthesia Stop: 1123    Procedure: Cystoscopy, Greenlight vaporization of the prostate (Bladder) Diagnosis:       BPH with obstruction/lower urinary tract symptoms      (BPH with obstruction/lower urinary tract symptoms [N40.1, N13.8])    Surgeons: Rafita Méndez MD Provider: Eric Bowser MD    Anesthesia Type: general ASA Status: 3            Anesthesia Type: general    Vitals  Vitals Value Taken Time   /56 04/03/25 11:23   Temp 97.8 °F (36.6 °C) 04/03/25 11:23   Pulse 76 04/03/25 11:23   Resp 14 04/03/25 11:23   SpO2 95 % 04/03/25 11:23           Post Anesthesia Care and Evaluation    Patient location during evaluation: PACU  Patient participation: complete - patient participated  Level of consciousness: awake and alert  Pain management: adequate    Airway patency: patent  Anesthetic complications: No anesthetic complications  PONV Status: none  Cardiovascular status: hemodynamically stable and acceptable  Respiratory status: nonlabored ventilation, acceptable, spontaneous ventilation and room air  Hydration status: acceptable  No anesthesia care post op

## 2025-04-03 NOTE — DISCHARGE INSTRUCTIONS
Laser Ablation of Prostate Post-Operative Care/Expectations     Please follow these guidelines after your procedure in order to assist with your recovery.     Anesthesia Precautions and Expectations  - Rest for 24 hours after receiving general anesthesia, make sure you have someone at home with you that can monitor you  - Do not operate a vehicle, drink alcohol, or make 'important decisions'/sign legal documentation during the immediate recovery period if you received sedation for your procedure  - You may experience a sore throat, jaw discomfort, or muscle aches related to anesthesia, these symptoms may last a few days    Activity  - Light activity is encouraged for 1 week to prevent urinary bleeding  - Avoid lifting heavy objects more than 20 lbs, running, or endurance exercise for 1 week   - Do not operate a vehicle or drink alcohol if you were prescribed narcotic pain medications     Bathing/Showering  - You may resume normal bathing and showering post-procedure    Pain/Urinary Symptoms  - You may experience burning urinary pain for a few days, and/or increased urinary urgency/frequency post-procedure for a few weeks which is expected  - A medication to treat burning urinary pain (Phenazopyridine) may be prescribed by your doctor, take as directed  - A medication to prevent urinary urgency/frequency (sometimes referred to as “bladder spasms”) (Hyoscyamine, Oxybutynin, Mirabegron, Solifenacin, etc.) may be prescribed by your doctor for up to 1 month, take as directed     Urinary Bleeding (Hematuria)   - Some degree of light urinary bleeding (hematuria) is expected for up to 1-2 weeks (this may be as light as pink lemonade or somewhat darker like clear/pale red Gatorade); a good rule of thumb is that your urine should remain see-through    - If you experience heavy urinary bleeding (like the color and consistency of tomato juice, or red wine), large blood clots, or you are unable to urinate for more than 8 hours  you should contact your doctor and present to the nearest Emergency Department  - Drink plenty of water at home and stay hydrated, as this will help naturally flush out your bladder and urethra    Sexual Activity  - Refrain from sexual activity and ejaculation for 1 week while you are healing which may help prevent pain and bleeding    Bernardo Catheter   - You may be sent home with a urethral catheter sometimes for up to 1 week post-procedure; catheter specific instructions are as follows:   - Do not attempt to remove your urinary catheter (unless explicitly instructed by your doctor with at home catheter removal instructions/equipment)  - If you feel that your catheter is not working the right way, call your doctor   - Keep your skin and catheter clean, clean the skin around your catheter at least two times each day, clean your skin  and catheter after every bowel movement  - Always keep your urine collection bag below the level of your bladder; keeping the bag below your bladder will help keep your urine from flowing back into your bladder from urine collection bag, which may cause infection     - Drink plenty of liquids, at least 6-8 glasses of healthy liquids or water each day which will help flush out your bladder  - Do not attempt sexual intercourse while you have a catheter in place  - Do not tug or pull on your catheter tubing. This can cause you to bleed and hurt your urethra. Do not step on the tubing when you walk. Always keep the catheter secured to your inner leg with either leg-tape provided, the special catheter sticker/securing device, or leg strap            When to Call Your Doctor  - Severe pain not controlled by oral medications  - Temperature above 101 F degrees  - Severe urinary bleeding or large blood clots in urine  - Inability to urinate for more than 8 hours post-surgery

## 2025-04-03 NOTE — PROGRESS NOTES
I was able to review the patient's stress test results.He has cardiac clearance for prostate surgery with Dr Méndez. Continue current cardiac medications. I will have ULISES Golden call the patient back with results/recommendations. Preop already contacted.      Normal stress myocardial perfusion study with no evidence of ischemia. Impressions are consistent with a low risk study. LVEF 49%, Compared to the prior study from 3/12/2020 the current study reveals no changes to perfusion. EF appears improved from previous report consistent with more recent echocardiogram.

## 2025-04-03 NOTE — PERIOPERATIVE NURSING NOTE
Caregiver Telephone Number    Phone Number for Ride/Caregiver: DEE DEE REYNOLDS (WIFE) [phone]     Who will be staying with you post procedure for the next 24 hours? Name and Phone Number?: DEE DEE REYNOLDS (WIFE) [phone]

## 2025-04-03 NOTE — ANESTHESIA PROCEDURE NOTES
Airway  Reason: elective    Date/Time: 4/3/2025 10:25 AM  Airway not difficult    General Information and Staff    Patient location during procedure: OR  CRNA/CAA: Bhavya Zarco CRNA    Indications and Patient Condition  Indications for airway management: airway protection    Preoxygenated: yes    Mask difficulty assessment: 1 - vent by mask    Final Airway Details    Final airway type: supraglottic airway      Successful airway: I-gel  Size: 4   Number of attempts at approach: 1  Assessment: lips, teeth, and gum same as pre-op    Additional Comments  LMA placed without difficulty, ventilation with assist, equal breath sounds and symmetric chest rise and fall

## 2025-04-03 NOTE — OP NOTE
CYSTOSCOPY TRANSURETHRAL RESECTION OF PROSTATE GREENLIGHT  Procedure Note    Narendra Cochran  4/3/2025    Pre-op Diagnosis:   BPH with obstruction/lower urinary tract symptoms [N40.1, N13.8]    Post-op Diagnosis:     Post-Op Diagnosis Codes:     * BPH with obstruction/lower urinary tract symptoms [N40.1, N13.8]    Procedure/CPT® Codes:  TX LASER VAPORIZATION OF PROSTATE FOR URINE FLOW [44990]    Procedure(s):  Cystoscopy, Greenlight vaporization of the prostate    Surgeon(s):  Rafita Méndez MD    Anesthesia: see anesthesia record    Staff:   Circulator: Shama Whittington, MERI; Freya Cuadra RN  Scrub Person: Elham Multani; Teodoro Riley  Vendor Representative: Terrell Hyde  Nurse Extern: Sharron Rivera RN Extern    Estimated Blood Loss: minimal  Urine Voided: * No values recorded between 4/3/2025  9:48 AM and 4/3/2025 11:16 AM *    Specimens:                ID Type Source Tests Collected by Time   1 : urine for culture Urine Urinary Bladder URINE CULTURE Rafita Méndez MD 4/3/2025 1038         Drains: 20 f chamberlain catheter w/ 30 cc balloon    Findings:   Cystoscopy with no masses or lesions within the walls of the bladder.  Heavily trabeculated bladder with trilobar hypertrophy of the prostate.  Complete vaporization of the prostate with wide open prostatic channel from the verumontanum to bladder.  No evidence of injury to the bladder or ureteral orifices bilaterally at end of case  20 Tamazight Chamberlain catheter placed with clear urine draining    Blood: N/A    Complications: None immediate    Indication: Mr. Cochran is an 84-year-old gentleman who presented with severe lower urinary tract symptoms refractory to medical management.  He underwent workup and was found to have trilobar hypertrophy.  Given his misshapened prostate we discussed his options and they elected to move forward with greenlight laser vaporization.    Description of Procedure: The patient was seen and examined in the preoperative area.  The  risk, benefits, and alternatives were explained to the patient and his family and informed consent was obtained.  He was then taken to the operating theater placed on the table in a supine position.  Venodyne boots were placed on the bilateral lower extremities and general anesthesia was induced without any complications.  He was placed in the dorsal lithotomy position with all pressure points padded and secured.  He was prepped and draped in the usual sterile fashion and a timeout was taken.     A 20 Vincentian rigid greenlight scope was then passed through the urethra and into the bladder.  A cystoscopy was performed in a systematic fashion and there were no masses or lesions noted within the walls of the bladder.  We then inserted the greenlight laser fiber and using a setting of 80 began our resection of the lateral lobes at the bladder neck.  Once this was vaporized we increased the setting to 100 J and completed our vaporization.  This was carried from the bladder neck all the way to the verumontanum being careful not to vaporize beyond the veru.  Once they large channel had been created the water was turned off to assess for any bleeding.  Any sites of bleeders were cauterized.  The bladder was drained and the area was inspected again and hemostasis was excellent.  The bladder was reinspected for any injury of which there was none.  Both the ureteral orifices were intact.  The bladder was filled and the scope was withdrawn.  A 20 Vincentian Bernardo catheter was inserted with clear urine draining.  30 cc was instilled into the balloon.  He was given lidocaine jelly as well as 20 mg of Lasix.  The patient tolerated the procedure well and there was no complication to the procedure.  He was taken the PACU in stable and extubated condition.    Disposition: The patient will be discharged home once PACU criteria is met.  He will follow-up on Monday 4/7/25 for catheter removal.  The intraoperative findings and postoperative  plans were discussed with the patient and family.    Rafita Méndez MD     Date: 4/3/2025  Time: 11:21 EDT

## 2025-04-03 NOTE — H&P
Pre-Op H&P  Narendra Cochran  [id number]  1940      Chief complaint: Difficulty with urination      Subjective:  Patient is a 84 y.o.male presents for scheduled surgery by Dr. Méndez. He anticipates a Cystoscopy, Greenlight vaporization of the prostate  today. His urinary tract symptoms include increased urinary frequency, urgency, urinary leakage and nocturia 1-2.  He states the symptoms are bothersome and affecting his quality of life. He denies dysuria, gross hematuria or history of kidney stones.      Review of Systems:  Constitutional-- No fever, chills or sweats. No fatigue.  CV-- No chest pain, palpitation or syncope. +HTN, HLD, CHF, afib, CAD, PPM +cardiac clearance   Resp-- No SOB, cough, hemoptysis  Skin--No rashes or lesions      Allergies: No Known Allergies      Home Meds:  Medications Prior to Admission   Medication Sig Dispense Refill Last Dose/Taking    aspirin 81 MG chewable tablet Chew 1 tablet Daily.       Blood Glucose Monitoring Suppl (ONE TOUCH ULTRA 2) w/Device kit        bumetanide (BUMEX) 2 MG tablet Take 1 tablet by mouth Daily. 90 tablet 1     docusate sodium (Colace) 100 MG capsule Take 1 capsule by mouth 2 (Two) Times a Day As Needed for Constipation. 30 capsule 0     glucose blood test strip Use to check blood sugars once daily 50 each 11     Jardiance 10 MG tablet tablet TAKE 1 TABLET BY MOUTH EVERY DAY 30 tablet 0     Lancets misc Use to check blood sugars once daily 100 each 3     levETIRAcetam (KEPPRA) 750 MG tablet TAKE 1 TABLET BY MOUTH TWICE A  tablet 1     magnesium oxide (MAG-OX) 400 MG tablet Take 1 tablet by mouth Daily. 90 tablet 3     metoprolol succinate XL (TOPROL-XL) 50 MG 24 hr tablet Take 1.5 tablets by mouth Daily. 135 tablet 1     pantoprazole (PROTONIX) 40 MG EC tablet Take 1 tablet by mouth 2 (Two) Times a Day. 90 tablet 1     potassium chloride ER (K-TAB) 20 MEQ tablet controlled-release ER tablet TAKE 1 TABLET BY MOUTH EVERY DAY 90 tablet 3      rosuvastatin (CRESTOR) 20 MG tablet TAKE 1 TABLET BY MOUTH EVERY DAY 90 tablet 1     tamsulosin (FLOMAX) 0.4 MG capsule 24 hr capsule Take 1 capsule by mouth Daily. 30 capsule 2          PMH:   Past Medical History:   Diagnosis Date    Arthritis     CHF (congestive heart failure)     Diabetes mellitus     Diabetic foot ulcers     Diverticulitis     Foot callus     GERD (gastroesophageal reflux disease)     Hammer toes, bilateral     Hearing loss     History of transfusion     No known reaction    Hyperlipidemia     Hypertension     Hypertensive urgency, malignant 2017    Paget disease of bone      PSH:    Past Surgical History:   Procedure Laterality Date    APPENDECTOMY      CARDIAC CATHETERIZATION N/A 3/18/2020    Procedure: Left Heart Cath;  Surgeon: Sonya Garibay MD;  Location:  [facility];  Service: Cardiology;  Laterality: N/A;  First availabel provider      CARDIAC CATHETERIZATION N/A 3/15/2021    Procedure: LEFT HEART CATH;  Surgeon: Doc Sahni IV, MD;  Location:  [facility];  Service: Cardiovascular;  Laterality: N/A;    CARDIAC CATHETERIZATION N/A 3/15/2021    Procedure: Coronary angiography;  Surgeon: Doc Sahni IV, MD;  Location:  [facility];  Service: Cardiovascular;  Laterality: N/A;    CARDIAC ELECTROPHYSIOLOGY PROCEDURE N/A 3/16/2021    Procedure: ICD DC new;  Surgeon: Som Boyd DO;  Location:  [facility];  Service: Cardiology;  Laterality: N/A;    COLON RESECTION      second to diverticulitis     FOOT SURGERY Left        Immunization History:  Influenza: UTD  Pneumococcal: UTD  Tetanus: UTD    Social History:   Tobacco:   Social History     Tobacco Use   Smoking Status Former    Current packs/day: 0.00    Average packs/day: 0.5 packs/day for 65.0 years (32.5 ttl pk-yrs)    Types: Cigars, Cigarettes    Start date: 1954    Quit date: 2019    Years since quittin.5    Passive  "exposure: Past   Smokeless Tobacco Never      Alcohol:     Social History     Substance and Sexual Activity   Alcohol Use Not Currently    Comment: very rarely         Physical Exam:/88 (BP Location: Right arm)   Pulse 66   Temp 97.3 °F (36.3 °C) (Temporal)   Resp 16   Ht 182.9 cm (72\")   Wt 82.1 kg (181 lb)   SpO2 99%   BMI 24.55 kg/m²       General Appearance:    Alert, cooperative, no distress, appears stated age   Head:    Normocephalic, without obvious abnormality, atraumatic   Lungs:     Clear to auscultation bilaterally, respirations unlabored    Heart:   Regular rate and rhythm, S1 and S2 normal    Abdomen:    Soft without tenderness   Extremities:   Extremities normal, atraumatic, no cyanosis or edema   Skin:   Skin color, texture, turgor normal, no rashes or lesions   Neurologic:   Grossly intact     Results Review:     LABS:  Lab Results   Component Value Date    WBC 3.29 (L) 03/27/2025    HGB 12.3 (L) 03/27/2025    HCT 39.6 03/27/2025    MCV 89.2 03/27/2025     (L) 03/27/2025    NEUTROABS 1.52 (L) 02/05/2025    GLUCOSE 105 (H) 03/27/2025    BUN 29 (H) 03/27/2025    CREATININE 1.67 (H) 03/27/2025    EGFRIFNONA 64 10/21/2019    EGFRIFAFRI 61 02/17/2022     03/27/2025    K 4.0 03/27/2025     03/27/2025    CO2 26.0 03/27/2025    MG 2.4 03/20/2025    PHOS 3.0 01/24/2025    CALCIUM 9.4 03/27/2025    ALBUMIN 4.0 01/24/2025    AST 17 01/07/2025    ALT 6 01/07/2025    BILITOT 0.5 01/07/2025       RADIOLOGY:  Imaging Results (Last 72 Hours)       ** No results found for the last 72 hours. **            I reviewed the patient's new clinical results.    Cancer Staging (if applicable)  Cancer Patient: __ yes __no __unknown; If yes, clinical stage T:__ N:__M:__, stage group or __N/A      Impression: BPH with obstruction/lower urinary tract symptoms       Plan: Cystoscopy, Greenlight vaporization of the prostate       LEANDRO Vides   4/3/2025   07:01 EDT  "

## 2025-04-03 NOTE — ANESTHESIA PREPROCEDURE EVALUATION
Anesthesia Evaluation     Patient summary reviewed and Nursing notes reviewed   NPO Solid Status: > 8 hours  NPO Liquid Status: > 8 hours           Airway   Mallampati: I  TM distance: >3 FB  Neck ROM: full  No difficulty expected  Dental      Pulmonary     breath sounds clear to auscultation  Cardiovascular   Exercise tolerance: unable to assess    Rhythm: irregular  Rate: normal        Neuro/Psych  GI/Hepatic/Renal/Endo      Musculoskeletal     Abdominal    Substance History      OB/GYN          Other                          Anesthesia Plan    ASA 3     general     intravenous induction     Anesthetic plan, risks, benefits, and alternatives have been provided, discussed and informed consent has been obtained with: patient.        CODE STATUS:

## 2025-04-04 LAB — BACTERIA SPEC AEROBE CULT: NO GROWTH

## 2025-04-07 ENCOUNTER — CLINICAL SUPPORT (OUTPATIENT)
Dept: UROLOGY | Facility: CLINIC | Age: 85
End: 2025-04-07
Payer: MEDICARE

## 2025-04-07 DIAGNOSIS — E11.65 TYPE 2 DIABETES MELLITUS WITH HYPERGLYCEMIA, WITHOUT LONG-TERM CURRENT USE OF INSULIN: Chronic | ICD-10-CM

## 2025-04-07 DIAGNOSIS — Z46.6 ENCOUNTER FOR FOLEY CATHETER REMOVAL: Primary | ICD-10-CM

## 2025-04-07 RX ORDER — EMPAGLIFLOZIN 10 MG/1
10 TABLET, FILM COATED ORAL DAILY
Qty: 30 TABLET | Refills: 6 | Status: SHIPPED | OUTPATIENT
Start: 2025-04-07

## 2025-04-07 NOTE — PROGRESS NOTES
PT presented to our office for chamberlain catheter removal and voiding trial. PT is accompanied by his daughter. PT's chamberlain catheter was draining yellowish urine without Clots. I first explained to the PT what I will be doing throughout the voiding trial and answered any questions PT and family may have. Then I removed the bag from the catheter and using a piston syringe, inserted 100 mL of sterile water into the PT's bladder. When PT notified me they began to feel a strong urge to urinate, I deflated the catheter balloon holding 20 mL of water. Once the balloon was empty, I removed the catheter in a smooth and gentle movement. I instructed the PT to try and urinate into the hand-held urinal . PT was able to void 100 mL of yellowish urine. PT tolerated well. I advised PT and family to drink lots of water, rest, call us if they are suddenly unable to urinate, or if large amounts of blood/clots are suddenly in urine. PT verbalized understanding.

## 2025-04-14 ENCOUNTER — OFFICE VISIT (OUTPATIENT)
Dept: FAMILY MEDICINE CLINIC | Facility: CLINIC | Age: 85
End: 2025-04-14
Payer: MEDICARE

## 2025-04-14 VITALS
BODY MASS INDEX: 24.16 KG/M2 | OXYGEN SATURATION: 97 % | HEART RATE: 66 BPM | DIASTOLIC BLOOD PRESSURE: 68 MMHG | HEIGHT: 72 IN | SYSTOLIC BLOOD PRESSURE: 132 MMHG | WEIGHT: 178.4 LBS

## 2025-04-14 DIAGNOSIS — N40.1 BPH WITH OBSTRUCTION/LOWER URINARY TRACT SYMPTOMS: ICD-10-CM

## 2025-04-14 DIAGNOSIS — I50.22 CHRONIC SYSTOLIC CONGESTIVE HEART FAILURE: ICD-10-CM

## 2025-04-14 DIAGNOSIS — N18.4 STAGE 4 CHRONIC KIDNEY DISEASE: ICD-10-CM

## 2025-04-14 DIAGNOSIS — E11.65 TYPE 2 DIABETES MELLITUS WITH HYPERGLYCEMIA, WITHOUT LONG-TERM CURRENT USE OF INSULIN: Primary | ICD-10-CM

## 2025-04-14 DIAGNOSIS — I10 ESSENTIAL HYPERTENSION: ICD-10-CM

## 2025-04-14 DIAGNOSIS — R56.9 SEIZURE: Chronic | ICD-10-CM

## 2025-04-14 DIAGNOSIS — N13.8 BPH WITH OBSTRUCTION/LOWER URINARY TRACT SYMPTOMS: ICD-10-CM

## 2025-04-14 LAB
EXPIRATION DATE: NORMAL
HBA1C MFR BLD: 5.6 % (ref 4.5–5.7)
Lab: NORMAL

## 2025-04-15 NOTE — PROGRESS NOTES
Chief Complaint   Patient presents with    Diabetes    Post-op Follow-up     Surgery 4/3/25 due to CYSTOSCOPY, GREENLIGHT VAPORIZATION OF THE PROSTATE       Narendra Cochran is a pleasant 84 y.o. male who is here for routine follow-up of diabetes type 2, hypertension, seizures, urinary symptoms and CKD.  Patient is doing well today.  Recently had procedure for his BPH with obstruction on 04/03 by Dr. Méndez.  He has some pain and urinary discoloration still but feels like it is improving.  He has routine follow-up with cardiology.  BP is normal today.  He is compliant on all medications.  Has been watching his diet recently.  Compliant on Jardiance 10 mg daily.  No recent seizures.  Established with nephrology - has upcoming appointment.  Daughter is present at appointment today with patient.    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    Paget disease of bone        Past Surgical History:   Procedure Laterality Date    APPENDECTOMY      CARDIAC CATHETERIZATION N/A 3/18/2020    Procedure: Left Heart Cath;  Surgeon: Sonya Garibay MD;  Location:  [facility];  Service: Cardiology;  Laterality: N/A;  First availabel provider      CARDIAC CATHETERIZATION N/A 3/15/2021    Procedure: LEFT HEART CATH;  Surgeon: Doc Sahni IV, MD;  Location:  [facility];  Service: Cardiovascular;  Laterality: N/A;    CARDIAC CATHETERIZATION N/A 3/15/2021    Procedure: Coronary angiography;  Surgeon: Doc Sahni IV, MD;  Location:  [facility];  Service: Cardiovascular;  Laterality: N/A;    CARDIAC ELECTROPHYSIOLOGY PROCEDURE  "N/A 3/16/2021    Procedure: ICD DC new;  Surgeon: Som Boyd DO;  Location:  [facility];  Service: Cardiology;  Laterality: N/A;    COLON RESECTION      second to diverticulitis     CYSTOSCOPY TRANSURETHRAL RESECTION OF PROSTATE N/A 4/3/2025    Procedure: CYSTOSCOPY, GREENLIGHT VAPORIZATION OF THE PROSTATE;  Surgeon: Rafita Méndez MD;  Location[facility];  Service: Urology;  Laterality: N/A;    FOOT SURGERY Left        Family History   Problem Relation Age of Onset    No Known Problems Daughter     No Known Problems Son     No Known Problems Son     No Known Problems Son     Diabetes Sister     Clotting disorder Sister     Hyperlipidemia Mother     No Known Problems Sister     No Known Problems Brother     Fainting Brother        Social History     Socioeconomic History    Marital status:    Tobacco Use    Smoking status: Former     Current packs/day: 0.00     Average packs/day: 0.5 packs/day for 65.0 years (32.5 ttl pk-yrs)     Types: Cigars, Cigarettes     Start date: 1956     Quit date: 2019     Years since quittin.6     Passive exposure: Past    Smokeless tobacco: Never   Vaping Use    Vaping status: Never Used   Substance and Sexual Activity    Alcohol use: Not Currently     Comment: very rarely    Drug use: Not Currently     Types: Marijuana     Comment: Pt states used weekly but now quitting     Sexual activity: Defer       No Known Allergies    ROS  Review of Systems   Constitutional:  Negative for chills and fever.   Eyes:  Negative for visual disturbance.   Respiratory:  Negative for cough, shortness of breath and wheezing.    Cardiovascular:  Negative for chest pain, palpitations and leg swelling.   Endocrine: Negative for polyuria.   Neurological:  Negative for dizziness and headache.       Vitals:    25 1342   BP: 132/68   Pulse: 66   SpO2: 97%   Weight: 80.9 kg (178 lb 6.4 oz)   Height: 182.9 cm (72.01\")   PainSc: 2    PainLoc: Generalized     Body mass " index is 24.19 kg/m².    BMI is within normal parameters. No other follow-up for BMI required.      Current Outpatient Medications on File Prior to Visit   Medication Sig Dispense Refill    aspirin 81 MG chewable tablet Chew 1 tablet Daily.      Blood Glucose Monitoring Suppl (ONE TOUCH ULTRA 2) w/Device kit       bumetanide (BUMEX) 2 MG tablet Take 1 tablet by mouth Daily. 90 tablet 1    docusate sodium (Colace) 100 MG capsule Take 1 capsule by mouth 2 (Two) Times a Day As Needed for Constipation. 30 capsule 0    empagliflozin (Jardiance) 10 MG tablet tablet TAKE 1 TABLET BY MOUTH EVERY DAY 30 tablet 6    glucose blood test strip Use to check blood sugars once daily 50 each 11    Lancets misc Use to check blood sugars once daily 100 each 3    levETIRAcetam (KEPPRA) 750 MG tablet TAKE 1 TABLET BY MOUTH TWICE A  tablet 1    magnesium oxide (MAG-OX) 400 MG tablet Take 1 tablet by mouth Daily. 90 tablet 3    metoprolol succinate XL (TOPROL-XL) 50 MG 24 hr tablet Take 1.5 tablets by mouth Daily. 135 tablet 1    oxybutynin (DITROPAN) 5 MG tablet Take 1 tablet by mouth Every 8 (Eight) Hours As Needed for Bladder spasm. 20 tablet 0    pantoprazole (PROTONIX) 40 MG EC tablet Take 1 tablet by mouth 2 (Two) Times a Day. 90 tablet 1    phenazopyridine (Pyridium) 200 MG tablet Take 1 tablet by mouth 3 (Three) Times a Day As Needed for Dysuria. 20 tablet 0    potassium chloride ER (K-TAB) 20 MEQ tablet controlled-release ER tablet TAKE 1 TABLET BY MOUTH EVERY DAY 90 tablet 3    rosuvastatin (CRESTOR) 20 MG tablet TAKE 1 TABLET BY MOUTH EVERY DAY 90 tablet 1    tamsulosin (FLOMAX) 0.4 MG capsule 24 hr capsule Take 1 capsule by mouth Daily. 30 capsule 2    traMADol (Ultram) 50 MG tablet Take 1 tablet by mouth Every 6 (Six) Hours As Needed for Severe Pain. 12 tablet 0     No current facility-administered medications on file prior to visit.       Results for orders placed or performed in visit on 04/14/25   POC Glycosylated  Hemoglobin (Hb A1C)    Collection Time: 04/14/25  2:01 PM    Specimen: Blood   Result Value Ref Range    Hemoglobin A1C 5.6 4.5 - 5.7 %    Lot Number [id number]     Expiration Date 12/5/26        PE    Physical Exam  Vitals reviewed.   Constitutional:       General: He is not in acute distress.     Appearance: Normal appearance. He is well-developed and normal weight. He is not ill-appearing or diaphoretic.   HENT:      Head: Normocephalic and atraumatic.   Eyes:      Extraocular Movements: Extraocular movements intact.      Conjunctiva/sclera: Conjunctivae normal.   Cardiovascular:      Rate and Rhythm: Normal rate and regular rhythm.   Pulmonary:      Effort: Pulmonary effort is normal. No respiratory distress.   Musculoskeletal:         General: Normal range of motion.      Cervical back: Normal range of motion.      Right lower leg: No edema.      Left lower leg: No edema.   Skin:     General: Skin is warm.      Findings: No erythema or rash.   Neurological:      General: No focal deficit present.      Mental Status: He is alert.   Psychiatric:         Attention and Perception: He is attentive.         Mood and Affect: Mood normal.         Speech: Speech normal.         Behavior: Behavior normal. Behavior is cooperative.         Thought Content: Thought content normal.         Judgment: Judgment normal.           A/P    Diagnoses and all orders for this visit:    1. Type 2 diabetes mellitus with hyperglycemia, without long-term current use of insulin (Primary)  -     POC Glycosylated Hemoglobin (Hb A1C)  Hemoglobin A1c is 5.6%.  Much improved compared to 6.9% a few months ago.  Compliant on Jardiance 10 mg daily.    2. Essential hypertension  Stable, well-controlled.  Compliant on medication.    3. Chronic systolic congestive heart failure  Followed by cardiology.  Has upcoming appointment.  No leg swelling or worsening SOB.    4. BPH with obstruction/lower urinary tract symptoms  Had procedure on 04/03 and is  still healing/recovering.  States it is getting better with time.    5. H/O seizures on Keppra  No recent seizures.  Compliant on medication.    6. Stage 4 chronic kidney disease  Reviewed recent labs.  Followed by nephrology - has upcoming appointment at .       Plan of care reviewed with patient at the conclusion of today's visit. Education was provided regarding diagnosis, management and any prescribed or recommended OTC medications.  Patient verbalizes understanding of and agreement with management plan.    Dictated Utilizing Dragon Dictation     Please note that portions of this note were completed with a voice recognition program.     Part of this note may be an electronic transcription/translation of spoken language to printed text using the Dragon Dictation System.    Return in about 6 months (around 10/15/2025) for Medicare Wellness.     Marguerite Moore PA-C

## 2025-04-22 ENCOUNTER — OFFICE VISIT (OUTPATIENT)
Dept: CARDIOLOGY | Facility: CLINIC | Age: 85
End: 2025-04-22
Payer: MEDICARE

## 2025-04-22 VITALS
HEIGHT: 72 IN | BODY MASS INDEX: 24.65 KG/M2 | OXYGEN SATURATION: 99 % | DIASTOLIC BLOOD PRESSURE: 54 MMHG | HEART RATE: 62 BPM | SYSTOLIC BLOOD PRESSURE: 138 MMHG | WEIGHT: 182 LBS

## 2025-04-22 DIAGNOSIS — I50.22 CHRONIC SYSTOLIC CONGESTIVE HEART FAILURE: Chronic | ICD-10-CM

## 2025-04-22 DIAGNOSIS — I10 ESSENTIAL HYPERTENSION: ICD-10-CM

## 2025-04-22 DIAGNOSIS — Z95.810 ICD (IMPLANTABLE CARDIOVERTER-DEFIBRILLATOR), DUAL, IN SITU: Primary | ICD-10-CM

## 2025-04-22 DIAGNOSIS — I49.3 FREQUENT PVCS: Chronic | ICD-10-CM

## 2025-04-22 RX ORDER — METOPROLOL SUCCINATE 100 MG/1
150 TABLET, EXTENDED RELEASE ORAL DAILY
Qty: 135 TABLET | Refills: 3 | Status: SHIPPED | OUTPATIENT
Start: 2025-04-22

## 2025-04-22 NOTE — PROGRESS NOTES
Cardiac Electrophysiology Outpatient Note  [facility] at [facility]    Office Visit     Narendra Cochran  [id number]  04/22/2025    Primary Care Physician: Marguerite Moore PA-C    Referred By: No ref. provider found    Subjective     Chief Complaint   Patient presents with    NICM     Problem List:  Nonischemic cardiomyopathy / systolic congestive heart failure / Cardiac Arrest   [facility] March 2020 with mild nonobstructive CAD, EF 36-40%  Witnessed out of hospital cardiac arrest 3/12/2021 with documented bystander CPR with a single shock from AED with transfer to [facility] per EMS.  Echo 3/13/2021 EF 50%, no significant VHD  [facility] 3/14/2021 per Dr. Sahni with documented severe 1-vessel CAD involving a small posterior lateral branch of the of the RCA with no interval change to prior angiography  [facility] DDD ICD implant 3/16/2021 for secondary prevention of SCD  Echocardiogram 9/17/2024: LVEF 51-55%, mild concentric hypertrophy, grade 1 dd, RV moderately dilated, mild AR, mild, bileaflet mitral valve thickening present. Mild mitral valve regurgitation is present , mild TR, RVSP 35-45mmHg, Compared with prior study in 2022 there has been significant improvement in LV systolic function and mitral valve regurgitation severity   Heart logic in the 30s since January 2025  Patient no longer on Entresto due to worsening renal function/hypotension     Premature ventricular contractions  Holter monitor February 2020:  PVCs burden 12.3%   Amiodarone therapy initiated at 200 mg daily for PVCs April 2020  Amiodarone discontinued December 2020  [employer] dual-chamber pacemaker/ICD implanted March 2021 after cardiac arrest, heart logic heart failure index 9 on remote interrogation July 2024  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 male who presents to my electrophysiology clinic for follow up of nonischemic cardiomyopathy with recovered LVEF s/p dual-chamber ICD and PVCs.  We have not seen the patient since being admitted in the hospital in 2022.  He has been managed by LEANDRO Lopez.  She referred him back to us for frequent PVCs.  He reports that he feels okay.  Sometimes he feels a little tired and he has a rare palpitation, but these are tolerable symptoms to him and he lives independently without requiring much support.    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    Paget disease of bone        Past Surgical History:   Procedure Laterality Date    APPENDECTOMY      CARDIAC CATHETERIZATION N/A 03/18/2020    Procedure: Left Heart Cath;  Surgeon: Sonya Garibay MD;  Location:  [facility];  Service: Cardiology;  Laterality: N/A;  First availabel provider      CARDIAC CATHETERIZATION N/A 03/15/2021    Procedure: LEFT HEART CATH;  Surgeon: Doc Sahni IV, MD;  Location:  [facility];  Service: Cardiovascular;  Laterality: N/A;    CARDIAC CATHETERIZATION N/A 03/15/2021    Procedure: Coronary angiography;  Surgeon: Doc Sahni IV, MD;  Location:  [facility];  Service: Cardiovascular;  Laterality: N/A;    CARDIAC ELECTROPHYSIOLOGY PROCEDURE N/A 03/16/2021    Procedure: ICD DC new;  Surgeon: Som Boyd DO;  Location:  [facility];  Service: Cardiology;  Laterality: N/A;    COLON  RESECTION      second to diverticulitis     CYSTOSCOPY TRANSURETHRAL RESECTION OF PROSTATE N/A 2025    Procedure: CYSTOSCOPY, GREENLIGHT VAPORIZATION OF THE PROSTATE;  Surgeon: Rafita Méndez MD;  Location: [facility];  Service: Urology;  Laterality: N/A;    FOOT SURGERY Left     PROSTATE SURGERY         Family History   Problem Relation Age of Onset    No Known Problems Daughter     No Known Problems Son     No Known Problems Son     No Known Problems Son     Diabetes Sister     Clotting disorder Sister     Hyperlipidemia Mother     No Known Problems Sister     No Known Problems Brother     Fainting Brother        Social History     Socioeconomic History    Marital status:    Tobacco Use    Smoking status: Former     Current packs/day: 0.00     Average packs/day: 0.5 packs/day for 65.0 years (32.5 ttl pk-yrs)     Types: Cigars, Cigarettes     Start date: 1954     Quit date: 2019     Years since quittin.6     Passive exposure: Past    Smokeless tobacco: Never   Vaping Use    Vaping status: Never Used   Substance and Sexual Activity    Alcohol use: Not Currently     Comment: very rarely    Drug use: Not Currently     Types: Marijuana     Comment: Pt states used weekly but now quitting     Sexual activity: Defer         Current Outpatient Medications:     aspirin 81 MG chewable tablet, Chew 1 tablet Daily., Disp: , Rfl:     Blood Glucose Monitoring Suppl (ONE TOUCH ULTRA 2) w/Device kit, , Disp: , Rfl:     bumetanide (BUMEX) 2 MG tablet, Take 1 tablet by mouth Daily., Disp: 90 tablet, Rfl: 1    docusate sodium (Colace) 100 MG capsule, Take 1 capsule by mouth 2 (Two) Times a Day As Needed for Constipation., Disp: 30 capsule, Rfl: 0    empagliflozin (Jardiance) 10 MG tablet tablet, TAKE 1 TABLET BY MOUTH EVERY DAY, Disp: 30 tablet, Rfl: 6    glucose blood test strip, Use to check blood sugars once daily, Disp: 50 each, Rfl: 11    Lancets misc, Use to check blood sugars once daily, Disp: 100 each,  "Rfl: 3    levETIRAcetam (KEPPRA) 750 MG tablet, TAKE 1 TABLET BY MOUTH TWICE A DAY, Disp: 180 tablet, Rfl: 1    magnesium oxide (MAG-OX) 400 MG tablet, Take 1 tablet by mouth Daily., Disp: 90 tablet, Rfl: 3    metoprolol succinate XL (TOPROL-XL) 100 MG 24 hr tablet, Take 1.5 tablets by mouth Daily., Disp: 135 tablet, Rfl: 3    oxybutynin (DITROPAN) 5 MG tablet, Take 1 tablet by mouth Every 8 (Eight) Hours As Needed for Bladder spasm., Disp: 20 tablet, Rfl: 0    pantoprazole (PROTONIX) 40 MG EC tablet, Take 1 tablet by mouth 2 (Two) Times a Day., Disp: 90 tablet, Rfl: 1    phenazopyridine (Pyridium) 200 MG tablet, Take 1 tablet by mouth 3 (Three) Times a Day As Needed for Dysuria., Disp: 20 tablet, Rfl: 0    potassium chloride ER (K-TAB) 20 MEQ tablet controlled-release ER tablet, TAKE 1 TABLET BY MOUTH EVERY DAY, Disp: 90 tablet, Rfl: 3    rosuvastatin (CRESTOR) 20 MG tablet, TAKE 1 TABLET BY MOUTH EVERY DAY, Disp: 90 tablet, Rfl: 1    tamsulosin (FLOMAX) 0.4 MG capsule 24 hr capsule, Take 1 capsule by mouth Daily., Disp: 30 capsule, Rfl: 2    traMADol (Ultram) 50 MG tablet, Take 1 tablet by mouth Every 6 (Six) Hours As Needed for Severe Pain., Disp: 12 tablet, Rfl: 0    Allergies:   No Known Allergies    Objective   Vital Signs: Blood pressure 138/54, pulse 62, height 182.9 cm (72\"), weight 82.6 kg (182 lb), SpO2 99%.    PHYSICAL EXAM  General appearance: Awake, alert, cooperative  Head: Normocephalic, without obvious abnormality, atraumatic  Lungs: Clear to ascultation bilaterally  Heart: Regular rate and rhythm, no murmurs, no lower extremity swelling  Skin: Skin color, turgor normal, no rashes or lesions  Neurologic: Grossly normal     Lab Results   Component Value Date    GLUCOSE 105 (H) 03/27/2025    CALCIUM 9.4 03/27/2025     03/27/2025    K 4.0 03/27/2025    CO2 26.0 03/27/2025     03/27/2025    BUN 29 (H) 03/27/2025    CREATININE 1.67 (H) 03/27/2025    EGFRIFAFRI 61 02/17/2022    EGFRIFNONA 64 " 10/21/2019    BCR 17.4 03/27/2025    ANIONGAP 12.0 03/27/2025     Lab Results   Component Value Date    WBC 3.29 (L) 03/27/2025    HGB 12.3 (L) 03/27/2025    HCT 39.6 03/27/2025    MCV 89.2 03/27/2025     (L) 03/27/2025     Lab Results   Component Value Date    INR 0.96 03/27/2025    INR 1.03 09/22/2022    INR 1.13 03/12/2021    PROTIME 12.9 03/27/2025    PROTIME 13.4 09/22/2022    PROTIME 14.2 (H) 03/12/2021     Lab Results   Component Value Date    TSH 5.210 (H) 03/20/2025          Results for orders placed in visit on 03/20/25    Adult Transthoracic Echo Complete W/ Cont if Necessary Per Protocol    Interpretation Summary    Left ventricular ejection fraction appears to be 51 - 55%.    Left ventricular wall thickness is consistent with mild concentric hypertrophy    Normal right ventricular wall thickness and systolic function noted. The right ventricular cavity is mildly dilated. Electronic lead present in the ventricle.    Mild to moderate mitral valve regurgitation is present    The aortic valve appears trileaflet. Moderate aortic valve regurgitation is present. No aortic valve stenosis is present.    Frequent PVCs noted during exam     Results for orders placed during the hospital encounter of 03/12/21    Cardiac Catheterization/Vascular Study    Conclusion  · Severe 1-vessel CAD involving a small posterior lateral branch of the of the RCA.  · Compared angiography performed 1 year ago, there has been no interval change.  · No left ventriculography performed. However, an echocardiogram performed yesterday demonstrated LVEF 50%.  · Mildly elevated LV filling pressure      I personally viewed and interpreted the patient's EKG/Telemetry/lab data    Procedures    Narendra Cochran  reports that he quit smoking about 5 years ago. His smoking use included cigars and cigarettes. He started smoking about 70 years ago. He has a 32.5 pack-year smoking history. He has been exposed to tobacco smoke. He has never used  smokeless tobacco.       Advance Care Planning   Advance Care Planning: ACP discussion was declined by the patient. Patient has an advance directive in EMR which is still valid.      Assessment & Plan    1. Chronic systolic congestive heart failure  With recovered LVEF  Heart failure management per LEANDRO Lopez  Now s/p dual-chamber ICD    2. ICD (implantable cardioverter-defibrillator), dual, in situ  The patient's [employer] dual-chamber ICD was interrogated the office demonstrate normal device function with 10 years left on the battery, 75% atrial pacing, 36% ventricular pacing, acceptable threshold impedance values, 10 years left on battery and no events other than about 13% PVCs.  He has a very long AV interval.  Heart logic at 6 abnormal is >12.    3. Frequent PVCs  14%.  Patient is asymptomatic with good LVEF most recently on echocardiogram.  We discussed amiodarone initiation but due to toxicities long-term we will first try to increase his metoprolol to 150 mg daily.    4. Essential hypertension  BP controlled in the office today.  Continue current antihypertensive regimen.  - metoprolol succinate XL (TOPROL-XL) 100 MG 24 hr tablet; Take 1.5 tablets by mouth Daily.  Dispense: 135 tablet; Refill: 3       Follow Up:  Return in about 6 months (around 10/22/2025).      Thank you for allowing me to participate in the care of your patient. Please do not hesitate to contact me with additional questions or concerns.      Carlos Gaspar PA-C  Cardiac Electrophysiology  [facility] / [facility]

## 2025-05-01 DIAGNOSIS — K21.9 GASTROESOPHAGEAL REFLUX DISEASE, UNSPECIFIED WHETHER ESOPHAGITIS PRESENT: ICD-10-CM

## 2025-05-01 DIAGNOSIS — R56.9 SEIZURE: Chronic | ICD-10-CM

## 2025-05-01 DIAGNOSIS — N40.1 BPH WITH OBSTRUCTION/LOWER URINARY TRACT SYMPTOMS: ICD-10-CM

## 2025-05-01 DIAGNOSIS — N13.8 BPH WITH OBSTRUCTION/LOWER URINARY TRACT SYMPTOMS: ICD-10-CM

## 2025-05-01 RX ORDER — LEVETIRACETAM 750 MG/1
750 TABLET ORAL EVERY 12 HOURS SCHEDULED
Qty: 180 TABLET | Refills: 1 | Status: SHIPPED | OUTPATIENT
Start: 2025-05-01

## 2025-05-01 RX ORDER — TAMSULOSIN HYDROCHLORIDE 0.4 MG/1
1 CAPSULE ORAL DAILY
Qty: 90 CAPSULE | Refills: 3 | Status: SHIPPED | OUTPATIENT
Start: 2025-05-01

## 2025-05-01 RX ORDER — PANTOPRAZOLE SODIUM 40 MG/1
40 TABLET, DELAYED RELEASE ORAL 2 TIMES DAILY
Qty: 180 TABLET | Refills: 0 | Status: SHIPPED | OUTPATIENT
Start: 2025-05-01

## 2025-05-01 NOTE — TELEPHONE ENCOUNTER
Rx Refill Note  Requested Prescriptions     Pending Prescriptions Disp Refills    tamsulosin (FLOMAX) 0.4 MG capsule 24 hr capsule [Pharmacy Med Name: TAMSULOSIN HCL 0.4 MG CAPSULE] 90 capsule      Sig: TAKE 1 CAPSULE BY MOUTH EVERY DAY      Last office visit with prescribing clinician: 2/17/2025   Last telemedicine visit with prescribing clinician: Visit date not found   Next office visit with prescribing clinician: Visit date not found       Petra Murry  05/01/25, 11:57 EDT

## 2025-05-02 RX ORDER — DOCUSATE SODIUM 100 MG/1
CAPSULE, LIQUID FILLED ORAL
Qty: 30 CAPSULE | Refills: 0 | OUTPATIENT
Start: 2025-05-02

## 2025-05-07 ENCOUNTER — OFFICE VISIT (OUTPATIENT)
Dept: UROLOGY | Facility: CLINIC | Age: 85
End: 2025-05-07
Payer: MEDICARE

## 2025-05-07 VITALS — WEIGHT: 182 LBS | BODY MASS INDEX: 24.65 KG/M2 | HEIGHT: 72 IN

## 2025-05-07 DIAGNOSIS — N40.1 BPH WITH OBSTRUCTION/LOWER URINARY TRACT SYMPTOMS: Primary | ICD-10-CM

## 2025-05-07 DIAGNOSIS — N13.8 BPH WITH OBSTRUCTION/LOWER URINARY TRACT SYMPTOMS: Primary | ICD-10-CM

## 2025-05-07 LAB
BILIRUB BLD-MCNC: NEGATIVE MG/DL
CLARITY, POC: ABNORMAL
COLOR UR: YELLOW
EXPIRATION DATE: ABNORMAL
GLUCOSE UR STRIP-MCNC: ABNORMAL MG/DL
KETONES UR QL: NEGATIVE
LEUKOCYTE EST, POC: ABNORMAL
Lab: ABNORMAL
NITRITE UR-MCNC: NEGATIVE MG/ML
PH UR: 6 [PH] (ref 5–8)
PROT UR STRIP-MCNC: NEGATIVE MG/DL
RBC # UR STRIP: ABNORMAL /UL
SP GR UR: 1.01 (ref 1–1.03)
UROBILINOGEN UR QL: NORMAL

## 2025-05-07 PROCEDURE — 87086 URINE CULTURE/COLONY COUNT: CPT | Performed by: UROLOGY

## 2025-05-07 RX ORDER — BUMETANIDE 1 MG/1
TABLET ORAL
COMMUNITY
Start: 2025-05-01

## 2025-05-07 NOTE — PROGRESS NOTES
Follow Up Office Visit      Patient Name: Narendra Cochran  : 1940   MRN: [id number]     Chief Complaint:    Chief Complaint   Patient presents with    Benign Prostatic Hypertrophy       Referring Provider: No ref. provider found    History of Present Illness: Narendra Cochran is a 84 y.o. male who presents today for follow up after greenlight laser vaporization.  He reports he has done very well since his procedure.  His stream is stronger and his symptoms have improved.  He reports no dysuria or gross hematuria.  He is very happy with his outcomes.    PVR: 20 cc    Subjective      Review of System:   Review of Systems   I have reviewed the ROS documented by my clinical staff, I have updated appropriately and I agree. Rafita Méndez MD    I have reviewed and the following portions of the patient's history were updated as appropriate: past family history, past medical history, past social history, past surgical history and problem list.    Past Medical History:   Past Medical History:   Diagnosis Date    Arthritis     CHF (congestive heart failure)     Diabetes mellitus     Diabetic foot ulcers     Diverticulitis     Foot callus     GERD (gastroesophageal reflux disease)     Hammer toes, bilateral     Hearing loss     History of transfusion     No known reaction    Hyperlipidemia     Hypertension     Hypertensive urgency, malignant 2017    Paget disease of bone        Past Surgical History:  Past Surgical History:   Procedure Laterality Date    APPENDECTOMY      CARDIAC CATHETERIZATION N/A 2020    Procedure: Left Heart Cath;  Surgeon: Sonya Garibay MD;  Location:  [facility];  Service: Cardiology;  Laterality: N/A;  First availabel provider      CARDIAC CATHETERIZATION N/A 03/15/2021    Procedure: LEFT HEART CATH;  Surgeon: Doc Sahni IV, MD;  Location:  [facility];  Service: Cardiovascular;  Laterality: N/A;    CARDIAC CATHETERIZATION N/A  03/15/2021    Procedure: Coronary angiography;  Surgeon: Doc Sahni IV, MD;  [facility]: [facility] INV[facility] LOC[facility];  Service: Cardiovascular;  Laterality: N/A;    CARDIAC ELECTROPHYSIOLOGY PROCEDURE N/A 03/16/2021    Procedure: ICD DC new;  Surgeon: Som Boyd DO;  Location:  [facility];  Service: Cardiology;  Laterality: N/A;    COLON RESECTION      second to diverticulitis     CYSTOSCOPY TRANSURETHRAL RESECTION OF PROSTATE N/A 04/03/2025    Procedure: CYSTOSCOPY, GREENLIGHT VAPORIZATION OF THE PROSTATE;  Surgeon: Rafita Méndez MD;  Location:  [facility];  Service: Urology;  Laterality: N/A;    FOOT SURGERY Left     PROSTATE SURGERY         Family History:   family history includes Clotting disorder in his sister; Diabetes in his sister; Fainting in his brother; Hyperlipidemia in his mother; No Known Problems in his brother, daughter, sister, son, son, and son.   Otherwise pertinent FHx was reviewed and not pertinent to current issue.    Social History:    reports that he quit smoking about 5 years ago. His smoking use included cigars and cigarettes. He started smoking about 70 years ago. He has a 32.5 pack-year smoking history. He has been exposed to tobacco smoke. He has never used smokeless tobacco. He reports that he does not currently use alcohol. He reports that he does not currently use drugs after having used the following drugs: Marijuana.    Medications:     Current Outpatient Medications:     aspirin 81 MG chewable tablet, Chew 1 tablet Daily., Disp: , Rfl:     Blood Glucose Monitoring Suppl (ONE TOUCH ULTRA 2) w/Device kit, , Disp: , Rfl:     bumetanide (BUMEX) 1 MG tablet, , Disp: , Rfl:     docusate sodium (Colace) 100 MG capsule, Take 1 capsule by mouth 2 (Two) Times a Day As Needed for Constipation., Disp: 30 capsule, Rfl: 0    empagliflozin (Jardiance) 10 MG tablet tablet, TAKE 1 TABLET BY MOUTH EVERY DAY, Disp: 30 tablet, Rfl: 6    glucose blood test strip, Use  "to check blood sugars once daily, Disp: 50 each, Rfl: 11    Lancets misc, Use to check blood sugars once daily, Disp: 100 each, Rfl: 3    levETIRAcetam (KEPPRA) 750 MG tablet, TAKE 1 TABLET BY MOUTH TWICE A DAY, Disp: 180 tablet, Rfl: 1    magnesium oxide (MAG-OX) 400 MG tablet, Take 1 tablet by mouth Daily., Disp: 90 tablet, Rfl: 3    metoprolol succinate XL (TOPROL-XL) 100 MG 24 hr tablet, Take 1.5 tablets by mouth Daily., Disp: 135 tablet, Rfl: 3    oxybutynin (DITROPAN) 5 MG tablet, Take 1 tablet by mouth Every 8 (Eight) Hours As Needed for Bladder spasm., Disp: 20 tablet, Rfl: 0    pantoprazole (PROTONIX) 40 MG EC tablet, TAKE 1 TABLET BY MOUTH TWICE A DAY, Disp: 180 tablet, Rfl: 0    potassium chloride ER (K-TAB) 20 MEQ tablet controlled-release ER tablet, TAKE 1 TABLET BY MOUTH EVERY DAY, Disp: 90 tablet, Rfl: 3    rosuvastatin (CRESTOR) 20 MG tablet, TAKE 1 TABLET BY MOUTH EVERY DAY, Disp: 90 tablet, Rfl: 1    tamsulosin (FLOMAX) 0.4 MG capsule 24 hr capsule, TAKE 1 CAPSULE BY MOUTH EVERY DAY, Disp: 90 capsule, Rfl: 3    traMADol (Ultram) 50 MG tablet, Take 1 tablet by mouth Every 6 (Six) Hours As Needed for Severe Pain., Disp: 12 tablet, Rfl: 0    phenazopyridine (Pyridium) 200 MG tablet, Take 1 tablet by mouth 3 (Three) Times a Day As Needed for Dysuria. (Patient not taking: Reported on 5/7/2025), Disp: 20 tablet, Rfl: 0    Allergies:   No Known Allergies      Objective     Physical Exam:   Vital Signs:   Vitals:    05/07/25 1530   Weight: 82.6 kg (182 lb)   Height: 182.9 cm (72\")  Comment: pt stated height   PainSc: 0-No pain     Body mass index is 24.68 kg/m².     Physical Exam  Vitals and nursing note reviewed.   Constitutional:       General: He is awake. He is not in acute distress.     Appearance: Normal appearance. He is well-developed.   HENT:      Head: Normocephalic and atraumatic.      Right Ear: External ear normal.      Left Ear: External ear normal.      Nose: Nose normal.   Eyes:      " Conjunctiva/sclera: Conjunctivae normal.   Pulmonary:      Effort: Pulmonary effort is normal.   Abdominal:      General: There is no distension.      Palpations: Abdomen is soft. There is no mass.      Tenderness: There is no abdominal tenderness. There is no right CVA tenderness, left CVA tenderness, guarding or rebound.      Hernia: No hernia is present. There is no hernia in the left inguinal area or right inguinal area.   Genitourinary:     Pubic Area: No rash.       Rectum: No mass or tenderness. Normal anal tone.   Musculoskeletal:      Cervical back: Normal range of motion.   Lymphadenopathy:      Lower Body: No right inguinal adenopathy. No left inguinal adenopathy.   Skin:     General: Skin is warm.   Neurological:      General: No focal deficit present.      Mental Status: He is alert and oriented to person, place, and time.   Psychiatric:         Behavior: Behavior normal. Behavior is cooperative.         Labs:   Brief Urine Lab Results  (Last result in the past 365 days)        Color   Clarity   Blood   Leuk Est   Nitrite   Protein   CREAT   Urine HCG        05/07/25 1603 Yellow   Slightly Cloudy   2+   Moderate (2+)   Negative   Negative                   Urine Culture          2/28/2025    09:32 4/3/2025    10:38   Urine Culture   Urine Culture No growth  No growth         Lab Results   Component Value Date    GLUCOSE 105 (H) 03/27/2025    CALCIUM 9.4 03/27/2025     03/27/2025    K 4.0 03/27/2025    CO2 26.0 03/27/2025     03/27/2025    BUN 29 (H) 03/27/2025    CREATININE 1.67 (H) 03/27/2025    EGFRIFAFRI 61 02/17/2022    EGFRIFNONA 64 10/21/2019    BCR 17.4 03/27/2025    ANIONGAP 12.0 03/27/2025       Lab Results   Component Value Date    WBC 3.29 (L) 03/27/2025    HGB 12.3 (L) 03/27/2025    HCT 39.6 03/27/2025    MCV 89.2 03/27/2025     (L) 03/27/2025       Lab Results   Component Value Date    PSA 1.600 02/18/2019    PSA 1.260 05/30/2017       Images:   US Renal Bilateral  Result  Date: 3/21/2025  Bilateral simple and septated renal cysts measuring up to 7.8 cm in the right kidney, 5.1 cm in the left kidney. Prostatomegaly. CRITICAL RESULT: No. COMMUNICATION: Per this written report. By electronically signing this report, I, the attending physician, attest that I have personally reviewed the images/data for the above examination(s) and agree with the final edited report. Drafted by Terrell Rajan MD on 3/21/2025 2:58 PM Final report signed by Li Carrington MD on 3/21/2025 4:31 PM    CT Abdomen Pelvis Without Contrast  Result Date: 1/7/2025  Impression: 1.No acute process identified. 2.Similar chronic findings as detailed above. Electronically Signed: Bucky Argueta MD  1/7/2025 4:27 PM EST  Workstation ID: [id number]      Measures:   Tobacco:   Narendra Cochran  reports that he quit smoking about 5 years ago. His smoking use included cigars and cigarettes. He started smoking about 70 years ago. He has a 32.5 pack-year smoking history. He has been exposed to tobacco smoke. He has never used smokeless tobacco.       Urine Incontinence: Patient reports that he is not currently experiencing any symptoms of urinary incontinence.         Assessment / Plan      Assessment/Plan:   84 y.o. male who presented today for follow up after greenlight laser vaporization.  He has done very well.  His UA had leukocyte Estrace and I will send it for culture.  However, he is not having any dysuria and reports he is voiding much better.  I will see him back in 6 months.  I will call him if his culture is positive.    Diagnoses and all orders for this visit:    1. 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    I spent approximately 30 minutes providing clinical care for this patient; including review of patient's chart and provider documentation, face to face time spent with patient  in examination room (obtaining history, performing physical exam, discussing diagnosis and management options), placing orders, and completing patient documentation.     Rafita Méndez MD  [facility] [place] [facility] [place]

## 2025-05-08 LAB — BACTERIA SPEC AEROBE CULT: NO GROWTH

## 2025-06-10 NOTE — PROGRESS NOTES
Subjective:     Encounter Date:05/23/2025      Patient ID: Narendra Cochran is a 84 y.o.  -American male from [place], [place].     PCP: LYNNETTE Huitron  CARDIOLOGIST: Karan Mccracken MD  ELECTROPHYSIOLOGIST: Erick Walker MD.  UROLOGIST: Rafita Méndez MD.    Chief Complaint:   Chief Complaint   Patient presents with    NICM     Problem List:  Nonischemic cardiomyopathy / systolic congestive heart failure / Cardiac Arrest   [facility] March 2020 with mild nonobstructive CAD, EF 36-40%  Witnessed out of hospital cardiac arrest 3/12/2021 with documented bystander CPR with a single shock from AED with transfer to [facility] per EMS.  Echo 3/13/2021 EF 50%, no significant VHD  [facility] 3/14/2021 per Dr. Sahni with documented severe 1-vessel CAD involving a small posterior lateral branch of the of the RCA with no interval change to prior angiography  [facility] DDD ICD implant 3/16/2021 for secondary prevention of SCD  Echocardiogram 9/17/2024: LVEF 51-55%, mild concentric hypertrophy, grade 1 dd, RV moderately dilated, mild AR, mild, bileaflet mitral valve thickening present. Mild mitral valve regurgitation is present , mild TR, RVSP 35-45mmHg, Compared with prior study in 2022 there has been significant improvement in LV systolic function and mitral valve regurgitation severity   Heart logic in the 30s since January 2025  Patient no longer on Entresto due to worsening renal function/hypotension  Echocardiogram 3/20/2025: LVEF 51 to 55%, LV wall thickness consistent with mild concentric hypertrophy, mild to moderate MR, moderate AR, no aortic stenosis, frequent PVCs  Heart logic 6 April 2025 abnormal greater than 12     Premature ventricular contractions  Holter monitor February 2020:  PVCs burden 12.3%   Amiodarone therapy initiated at 200 mg daily for PVCs April 2020  Amiodarone discontinued December 2020  [employer] dual-chamber pacemaker/ICD implanted March 2021 after cardiac arrest, heart logic heart failure  index 9 on remote interrogation July 2024  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 with 14% burden  Regadenoson stress test 4/1/2025: LVEF 49%, Normal stress myocardial perfusion study with no evidence of ischemia. Impressions are consistent with a low risk study.      Sinus node dysfunction  Essential hypertension  Dyslipidemia  Type II diabetes mellitus; hemoglobin A1c 6.4% July 2024, 6.9% January 2025  COPD  Seizures  Paget's disease  Hearing loss  CKD stage III  Cystoscopy April 2025    No Known Allergies    Current Outpatient Medications   Medication Instructions    aspirin 81 mg, Oral, Daily    bumetanide (BUMEX) 1 MG tablet     Jardiance 10 mg, Oral, Daily    levETIRAcetam (KEPPRA) 750 mg, Oral, Every 12 Hours Scheduled    magnesium oxide (MAG-OX) 400 mg, Oral, Daily    metoprolol succinate XL (TOPROL-XL) 150 mg, Oral, Daily    pantoprazole (PROTONIX) 40 mg, Oral, 2 Times Daily    potassium chloride ER (K-TAB) 20 MEQ tablet controlled-release ER tablet TAKE 1 TABLET BY MOUTH EVERY DAY    rosuvastatin (CRESTOR) 20 MG tablet TAKE 1 TABLET BY MOUTH EVERY DAY    tamsulosin (FLOMAX) 0.4 mg, Oral, Daily         HISTORY OF PRESENT ILLNESS:  The patient is here for 2-month follow-up.  In the interim he had a cystoscopy in 2025.  He saw EP with increase in Toprol to 150mg daily.  Amiodarone deferred.  Echocardiogram March 2025 showed LVEF 51 to 55%, LV wall thickness consistent with mild concentric hypertrophy, mild to moderate MR, moderate AR, no aortic stenosis, frequent PVCs.  Regadenoson stress test April 2025 showed LVEF 49%, Normal stress myocardial perfusion study with no evidence of ischemia. Impressions are consistent with a low risk study.  Patient does not do a lot of activity and often is just sitting in his chair playing word games or watching television.  Sometimes he will go to the community room but not as often as his wife.  He denies any chest pain, palpitations, dizziness, presyncope, or syncope.  Sometimes he has  "some pedal edema and he is encouraged to prop up his feet if he is going to be watching TV and to start increasing activity.  Occasionally he will have some shortness of breath but not all the time.      ROS   All other systems reviewed and otherwise negative.    Procedures       Objective:       Vitals:    06/13/25 1510   BP: 120/82   BP Location: Right arm   Patient Position: Sitting   Cuff Size: Adult   Pulse: 63   SpO2: 97%   Weight: 82.9 kg (182 lb 12.8 oz)   Height: 180.3 cm (71\")     Body mass index is 25.5 kg/m².  Wt Readings from Last 2 Encounters:   06/13/25 82.9 kg (182 lb 12.8 oz)   05/07/25 82.6 kg (182 lb)        Constitutional:       Appearance: Healthy appearance. Not in distress.      Comments: Ambulating with walker   Neck:      Vascular: No JVR. JVD normal.   Pulmonary:      Effort: Pulmonary effort is normal.      Breath sounds: Normal breath sounds. No wheezing. No rhonchi. No rales.   Chest:      Chest wall: Not tender to palpatation.   Cardiovascular:      PMI at left midclavicular line. Normal rate. Irregular rhythm. Normal S1. Normal S2.       Murmurs: There is no murmur.      No gallop.  No click. No rub.   Pulses:     Intact distal pulses.   Edema:     Ankle: bilateral 1+ edema of the ankle.     Feet: bilateral 1+ edema of the feet.  Abdominal:      General: Bowel sounds are normal.      Palpations: Abdomen is soft.      Tenderness: There is no abdominal tenderness.   Musculoskeletal: Normal range of motion.         General: No tenderness. Skin:     General: Skin is warm and dry.   Neurological:      General: No focal deficit present.      Mental Status: Alert and oriented to person, place and time.           Lab Review:   Lab Results   Component Value Date    GLUCOSE 105 (H) 03/27/2025    BUN 29 (H) 03/27/2025    CREATININE 1.67 (H) 03/27/2025    EGFRIFNONA 64 10/21/2019    EGFRIFAFRI 61 02/17/2022    BCR 17.4 03/27/2025    CO2 26.0 03/27/2025    CALCIUM 9.4 03/27/2025    ALBUMIN 4.0 " 01/24/2025    AST 17 01/07/2025    ALT 6 01/07/2025       Lab Results   Component Value Date    WBC 3.29 (L) 03/27/2025    HGB 12.3 (L) 03/27/2025    HCT 39.6 03/27/2025    MCV 89.2 03/27/2025     (L) 03/27/2025       Lab Results   Component Value Date    HGBA1C 5.6 04/14/2025       Lab Results   Component Value Date    TSH 5.210 (H) 03/20/2025       Lab Results   Component Value Date    CHOL 150 10/14/2024    CHOL 145 04/10/2024     Lab Results   Component Value Date    TRIG 164 (H) 10/14/2024    TRIG 212 (H) 04/10/2024     Lab Results   Component Value Date    HDL 52 10/14/2024    HDL 38 (L) 04/10/2024     Lab Results   Component Value Date    LDL 70 10/14/2024    LDL 72 04/10/2024             Results for orders placed in visit on 03/20/25    Adult Transthoracic Echo Complete W/ Cont if Necessary Per Protocol    Interpretation Summary    Left ventricular ejection fraction appears to be 51 - 55%.    Left ventricular wall thickness is consistent with mild concentric hypertrophy    Normal right ventricular wall thickness and systolic function noted. The right ventricular cavity is mildly dilated. Electronic lead present in the ventricle.    Mild to moderate mitral valve regurgitation is present    The aortic valve appears trileaflet. Moderate aortic valve regurgitation is present. No aortic valve stenosis is present.    Frequent PVCs noted during exam       Results for orders placed in visit on 12/08/17    SCANNED VASCULAR STUDIES       Advance Care Planning   ACP discussion was held with the patient during this visit. Patient has an advance directive (not in EMR), copy requested.      Assessment:   Overall continued acceptable course with no new interim cardiopulmonary complaints with good functional status. We will defer additional diagnostic or therapeutic intervention from a cardiac perspective at this time.    Echocardiogram March 2025 showed LVEF 51 to 55%, LV wall thickness consistent with mild  concentric hypertrophy, mild to moderate MR, moderate AR, no aortic stenosis, frequent PVCs.  Regadenoson stress test April 2025 showed LVEF 49%, Normal stress myocardial perfusion study with no evidence of ischemia. Impressions are consistent with a low risk study.     Diagnosis Plan   1. Coronary artery disease involving native coronary artery of native heart with angina pectoris  No recurrent angina pectoris or CHF on current activity schedule; continue current treatment       2. Chronic systolic congestive heart failure  No recurrent angina pectoris or CHF on current activity schedule; continue current treatment       3. Essential hypertension  Controlled, continue current cardiac medications      4. Hyperlipidemia LDL goal <70  Acceptable lipid panel October 2024, continue rosuvastatin      5. VHD; mild-mod AR, mild MR  Echocardiogram March 2025 showed LVEF 51 to 55%, LV wall thickness consistent with mild concentric hypertrophy, mild to moderate MR, moderate AR, no aortic stenosis, frequent PVCs.                Plan:         Patient to continue current medications and close follow up with the above providers.  Tentative cardiology follow up in November 2025 or patient may return sooner PRN.      Electronically signed by LEANDRO Rahman, 06/13/25, 3:30 PM EDT.

## 2025-06-13 ENCOUNTER — OFFICE VISIT (OUTPATIENT)
Dept: CARDIOLOGY | Facility: CLINIC | Age: 85
End: 2025-06-13
Payer: MEDICARE

## 2025-06-13 VITALS
SYSTOLIC BLOOD PRESSURE: 120 MMHG | HEIGHT: 71 IN | OXYGEN SATURATION: 97 % | DIASTOLIC BLOOD PRESSURE: 82 MMHG | WEIGHT: 182.8 LBS | HEART RATE: 63 BPM | BODY MASS INDEX: 25.59 KG/M2

## 2025-06-13 DIAGNOSIS — I10 ESSENTIAL HYPERTENSION: ICD-10-CM

## 2025-06-13 DIAGNOSIS — I38 VHD (VALVULAR HEART DISEASE): Chronic | ICD-10-CM

## 2025-06-13 DIAGNOSIS — I50.22 CHRONIC SYSTOLIC CONGESTIVE HEART FAILURE: ICD-10-CM

## 2025-06-13 DIAGNOSIS — E78.5 HYPERLIPIDEMIA LDL GOAL <70: ICD-10-CM

## 2025-06-13 DIAGNOSIS — I25.119 CORONARY ARTERY DISEASE INVOLVING NATIVE CORONARY ARTERY OF NATIVE HEART WITH ANGINA PECTORIS: Primary | ICD-10-CM

## 2025-08-09 LAB
MC_CV_MDC_IDC_RATE_1: 170
MC_CV_MDC_IDC_RATE_1: 200
MC_CV_MDC_IDC_RATE_1: 250
MC_CV_MDC_IDC_SHOCK_MEASURED_IMPEDANCE: 71
MC_CV_MDC_IDC_THERAPIES: NORMAL
MC_CV_MDC_IDC_THERAPIES: NORMAL
MC_CV_MDC_IDC_ZONE_ID: 1
MC_CV_MDC_IDC_ZONE_ID: 2
MC_CV_MDC_IDC_ZONE_ID: 3
MDC_IDC_MSMT_BATTERY_REMAINING_LONGEVITY: 114 MO
MDC_IDC_MSMT_BATTERY_REMAINING_PERCENTAGE: 100 %
MDC_IDC_MSMT_BATTERY_STATUS: NORMAL
MDC_IDC_MSMT_CAP_CHARGE_TIME: 10.2
MDC_IDC_MSMT_LEADCHNL_RA_DTM: NORMAL
MDC_IDC_MSMT_LEADCHNL_RA_IMPEDANCE_VALUE: 703
MDC_IDC_MSMT_LEADCHNL_RA_PACING_THRESHOLD_AMPLITUDE: 0.6
MDC_IDC_MSMT_LEADCHNL_RA_PACING_THRESHOLD_POLARITY: NORMAL
MDC_IDC_MSMT_LEADCHNL_RA_PACING_THRESHOLD_PULSEWIDTH: 0.4
MDC_IDC_MSMT_LEADCHNL_RV_DTM: NORMAL
MDC_IDC_MSMT_LEADCHNL_RV_IMPEDANCE_VALUE: 492
MDC_IDC_MSMT_LEADCHNL_RV_PACING_THRESHOLD_AMPLITUDE: 1
MDC_IDC_MSMT_LEADCHNL_RV_PACING_THRESHOLD_POLARITY: NORMAL
MDC_IDC_MSMT_LEADCHNL_RV_PACING_THRESHOLD_PULSEWIDTH: 0.4
MDC_IDC_MSMT_LEADCHNL_RV_SENSING_INTR_AMPL: 5
MDC_IDC_PG_IMPLANT_DTM: NORMAL
MDC_IDC_PG_MFG: NORMAL
MDC_IDC_PG_MODEL: NORMAL
MDC_IDC_PG_SERIAL: NORMAL
MDC_IDC_PG_TYPE: NORMAL
MDC_IDC_SESS_DTM: NORMAL
MDC_IDC_SESS_TYPE: NORMAL
MDC_IDC_SET_BRADY_AT_MODE_SWITCH_RATE: 170
MDC_IDC_SET_BRADY_LOWRATE: 60
MDC_IDC_SET_BRADY_MAX_SENSOR_RATE: 130
MDC_IDC_SET_BRADY_MAX_TRACKING_RATE: 130
MDC_IDC_SET_BRADY_MODE: NORMAL
MDC_IDC_SET_BRADY_PAV_DELAY: 220
MDC_IDC_SET_BRADY_SAV_DELAY: 220
MDC_IDC_SET_LEADCHNL_RA_PACING_AMPLITUDE: 2
MDC_IDC_SET_LEADCHNL_RA_PACING_POLARITY: NORMAL
MDC_IDC_SET_LEADCHNL_RA_PACING_PULSEWIDTH: 0.4
MDC_IDC_SET_LEADCHNL_RA_SENSING_POLARITY: NORMAL
MDC_IDC_SET_LEADCHNL_RA_SENSING_SENSITIVITY: 0.25
MDC_IDC_SET_LEADCHNL_RV_PACING_AMPLITUDE: 2
MDC_IDC_SET_LEADCHNL_RV_PACING_POLARITY: NORMAL
MDC_IDC_SET_LEADCHNL_RV_PACING_PULSEWIDTH: 0.4
MDC_IDC_SET_LEADCHNL_RV_SENSING_POLARITY: NORMAL
MDC_IDC_SET_LEADCHNL_RV_SENSING_SENSITIVITY: 0.6
MDC_IDC_SET_ZONE_STATUS: NORMAL
MDC_IDC_SET_ZONE_TYPE: NORMAL
MDC_IDC_STAT_AT_BURDEN_PERCENT: 0
MDC_IDC_STAT_BRADY_RA_PERCENT_PACED: 94
MDC_IDC_STAT_BRADY_RV_PERCENT_PACED: 67
MDC_IDC_STAT_TACHYTHERAPY_ATP_DELIVERED_RECENT: 0
MDC_IDC_STAT_TACHYTHERAPY_SHOCKS_ABORTED_RECENT: 0
MDC_IDC_STAT_TACHYTHERAPY_SHOCKS_DELIVERED_RECENT: 0

## 2025-08-14 LAB
MC_CV_MDC_IDC_RATE_1: 170
MC_CV_MDC_IDC_RATE_1: 200
MC_CV_MDC_IDC_RATE_1: 250
MC_CV_MDC_IDC_SHOCK_MEASURED_IMPEDANCE: 62
MC_CV_MDC_IDC_THERAPIES: NORMAL
MC_CV_MDC_IDC_THERAPIES: NORMAL
MC_CV_MDC_IDC_ZONE_ID: 1
MC_CV_MDC_IDC_ZONE_ID: 2
MC_CV_MDC_IDC_ZONE_ID: 3
MDC_IDC_MSMT_BATTERY_REMAINING_LONGEVITY: 114 MO
MDC_IDC_MSMT_BATTERY_REMAINING_PERCENTAGE: 100 %
MDC_IDC_MSMT_BATTERY_STATUS: NORMAL
MDC_IDC_MSMT_CAP_CHARGE_TIME: 10.2
MDC_IDC_MSMT_LEADCHNL_RA_DTM: NORMAL
MDC_IDC_MSMT_LEADCHNL_RA_IMPEDANCE_VALUE: 693
MDC_IDC_MSMT_LEADCHNL_RA_PACING_THRESHOLD_AMPLITUDE: 0.6
MDC_IDC_MSMT_LEADCHNL_RA_PACING_THRESHOLD_POLARITY: NORMAL
MDC_IDC_MSMT_LEADCHNL_RA_PACING_THRESHOLD_PULSEWIDTH: 0.4
MDC_IDC_MSMT_LEADCHNL_RA_SENSING_INTR_AMPL: 2.4
MDC_IDC_MSMT_LEADCHNL_RV_DTM: NORMAL
MDC_IDC_MSMT_LEADCHNL_RV_IMPEDANCE_VALUE: 476
MDC_IDC_MSMT_LEADCHNL_RV_PACING_THRESHOLD_AMPLITUDE: 0.9
MDC_IDC_MSMT_LEADCHNL_RV_PACING_THRESHOLD_POLARITY: NORMAL
MDC_IDC_MSMT_LEADCHNL_RV_PACING_THRESHOLD_PULSEWIDTH: 0.4
MDC_IDC_MSMT_LEADCHNL_RV_SENSING_INTR_AMPL: 15.2
MDC_IDC_PG_IMPLANT_DTM: NORMAL
MDC_IDC_PG_MFG: NORMAL
MDC_IDC_PG_MODEL: NORMAL
MDC_IDC_PG_SERIAL: NORMAL
MDC_IDC_PG_TYPE: NORMAL
MDC_IDC_SESS_DTM: NORMAL
MDC_IDC_SESS_TYPE: NORMAL
MDC_IDC_SET_BRADY_AT_MODE_SWITCH_RATE: 170
MDC_IDC_SET_BRADY_LOWRATE: 60
MDC_IDC_SET_BRADY_MAX_SENSOR_RATE: 130
MDC_IDC_SET_BRADY_MAX_TRACKING_RATE: 130
MDC_IDC_SET_BRADY_MODE: NORMAL
MDC_IDC_SET_BRADY_PAV_DELAY: 220
MDC_IDC_SET_BRADY_SAV_DELAY: 220
MDC_IDC_SET_LEADCHNL_RA_PACING_AMPLITUDE: 2
MDC_IDC_SET_LEADCHNL_RA_PACING_POLARITY: NORMAL
MDC_IDC_SET_LEADCHNL_RA_PACING_PULSEWIDTH: 0.4
MDC_IDC_SET_LEADCHNL_RA_SENSING_POLARITY: NORMAL
MDC_IDC_SET_LEADCHNL_RA_SENSING_SENSITIVITY: 0.25
MDC_IDC_SET_LEADCHNL_RV_PACING_AMPLITUDE: 2
MDC_IDC_SET_LEADCHNL_RV_PACING_POLARITY: NORMAL
MDC_IDC_SET_LEADCHNL_RV_PACING_PULSEWIDTH: 0.4
MDC_IDC_SET_LEADCHNL_RV_SENSING_POLARITY: NORMAL
MDC_IDC_SET_LEADCHNL_RV_SENSING_SENSITIVITY: 0.6
MDC_IDC_SET_ZONE_STATUS: NORMAL
MDC_IDC_SET_ZONE_TYPE: NORMAL
MDC_IDC_STAT_AT_BURDEN_PERCENT: 0
MDC_IDC_STAT_BRADY_RA_PERCENT_PACED: 94
MDC_IDC_STAT_BRADY_RV_PERCENT_PACED: 67
MDC_IDC_STAT_TACHYTHERAPY_ATP_DELIVERED_RECENT: 0
MDC_IDC_STAT_TACHYTHERAPY_SHOCKS_ABORTED_RECENT: 0
MDC_IDC_STAT_TACHYTHERAPY_SHOCKS_DELIVERED_RECENT: 0

## 2025-08-22 DIAGNOSIS — I50.32 CHRONIC HEART FAILURE WITH PRESERVED EJECTION FRACTION: ICD-10-CM

## 2025-08-22 LAB
MC_CV_MDC_IDC_RATE_1: 170
MC_CV_MDC_IDC_RATE_1: 170
MC_CV_MDC_IDC_RATE_1: 200
MC_CV_MDC_IDC_RATE_1: 200
MC_CV_MDC_IDC_RATE_1: 250
MC_CV_MDC_IDC_RATE_1: 250
MC_CV_MDC_IDC_SHOCK_MEASURED_IMPEDANCE: 61
MC_CV_MDC_IDC_SHOCK_MEASURED_IMPEDANCE: 71
MC_CV_MDC_IDC_THERAPIES: NORMAL
MC_CV_MDC_IDC_ZONE_ID: 1
MC_CV_MDC_IDC_ZONE_ID: 1
MC_CV_MDC_IDC_ZONE_ID: 2
MC_CV_MDC_IDC_ZONE_ID: 2
MC_CV_MDC_IDC_ZONE_ID: 3
MC_CV_MDC_IDC_ZONE_ID: 3
MDC_IDC_MSMT_BATTERY_REMAINING_LONGEVITY: 114 MO
MDC_IDC_MSMT_BATTERY_REMAINING_LONGEVITY: 114 MO
MDC_IDC_MSMT_BATTERY_REMAINING_PERCENTAGE: 100 %
MDC_IDC_MSMT_BATTERY_REMAINING_PERCENTAGE: 100 %
MDC_IDC_MSMT_BATTERY_STATUS: NORMAL
MDC_IDC_MSMT_BATTERY_STATUS: NORMAL
MDC_IDC_MSMT_CAP_CHARGE_TIME: 10.2
MDC_IDC_MSMT_CAP_CHARGE_TIME: 10.2
MDC_IDC_MSMT_LEADCHNL_RA_DTM: NORMAL
MDC_IDC_MSMT_LEADCHNL_RA_DTM: NORMAL
MDC_IDC_MSMT_LEADCHNL_RA_IMPEDANCE_VALUE: 689
MDC_IDC_MSMT_LEADCHNL_RA_IMPEDANCE_VALUE: 703
MDC_IDC_MSMT_LEADCHNL_RA_PACING_THRESHOLD_AMPLITUDE: 0.6
MDC_IDC_MSMT_LEADCHNL_RA_PACING_THRESHOLD_AMPLITUDE: 0.6
MDC_IDC_MSMT_LEADCHNL_RA_PACING_THRESHOLD_POLARITY: NORMAL
MDC_IDC_MSMT_LEADCHNL_RA_PACING_THRESHOLD_POLARITY: NORMAL
MDC_IDC_MSMT_LEADCHNL_RA_PACING_THRESHOLD_PULSEWIDTH: 0.4
MDC_IDC_MSMT_LEADCHNL_RA_PACING_THRESHOLD_PULSEWIDTH: 0.4
MDC_IDC_MSMT_LEADCHNL_RV_DTM: NORMAL
MDC_IDC_MSMT_LEADCHNL_RV_DTM: NORMAL
MDC_IDC_MSMT_LEADCHNL_RV_IMPEDANCE_VALUE: 492
MDC_IDC_MSMT_LEADCHNL_RV_IMPEDANCE_VALUE: 497
MDC_IDC_MSMT_LEADCHNL_RV_PACING_THRESHOLD_AMPLITUDE: 0.9
MDC_IDC_MSMT_LEADCHNL_RV_PACING_THRESHOLD_AMPLITUDE: 1
MDC_IDC_MSMT_LEADCHNL_RV_PACING_THRESHOLD_POLARITY: NORMAL
MDC_IDC_MSMT_LEADCHNL_RV_PACING_THRESHOLD_POLARITY: NORMAL
MDC_IDC_MSMT_LEADCHNL_RV_PACING_THRESHOLD_PULSEWIDTH: 0.4
MDC_IDC_MSMT_LEADCHNL_RV_PACING_THRESHOLD_PULSEWIDTH: 0.4
MDC_IDC_MSMT_LEADCHNL_RV_SENSING_INTR_AMPL: 25
MDC_IDC_MSMT_LEADCHNL_RV_SENSING_INTR_AMPL: 5
MDC_IDC_PG_IMPLANT_DTM: NORMAL
MDC_IDC_PG_IMPLANT_DTM: NORMAL
MDC_IDC_PG_MFG: NORMAL
MDC_IDC_PG_MFG: NORMAL
MDC_IDC_PG_MODEL: NORMAL
MDC_IDC_PG_MODEL: NORMAL
MDC_IDC_PG_SERIAL: NORMAL
MDC_IDC_PG_SERIAL: NORMAL
MDC_IDC_PG_TYPE: NORMAL
MDC_IDC_PG_TYPE: NORMAL
MDC_IDC_SESS_DTM: NORMAL
MDC_IDC_SESS_DTM: NORMAL
MDC_IDC_SESS_TYPE: NORMAL
MDC_IDC_SESS_TYPE: NORMAL
MDC_IDC_SET_BRADY_AT_MODE_SWITCH_RATE: 170
MDC_IDC_SET_BRADY_AT_MODE_SWITCH_RATE: 170
MDC_IDC_SET_BRADY_LOWRATE: 60
MDC_IDC_SET_BRADY_LOWRATE: 60
MDC_IDC_SET_BRADY_MAX_SENSOR_RATE: 130
MDC_IDC_SET_BRADY_MAX_SENSOR_RATE: 130
MDC_IDC_SET_BRADY_MAX_TRACKING_RATE: 130
MDC_IDC_SET_BRADY_MAX_TRACKING_RATE: 130
MDC_IDC_SET_BRADY_MODE: NORMAL
MDC_IDC_SET_BRADY_MODE: NORMAL
MDC_IDC_SET_BRADY_PAV_DELAY: 220
MDC_IDC_SET_BRADY_PAV_DELAY: 220
MDC_IDC_SET_BRADY_SAV_DELAY: 220
MDC_IDC_SET_BRADY_SAV_DELAY: 220
MDC_IDC_SET_LEADCHNL_RA_PACING_AMPLITUDE: 2
MDC_IDC_SET_LEADCHNL_RA_PACING_AMPLITUDE: 2
MDC_IDC_SET_LEADCHNL_RA_PACING_POLARITY: NORMAL
MDC_IDC_SET_LEADCHNL_RA_PACING_POLARITY: NORMAL
MDC_IDC_SET_LEADCHNL_RA_PACING_PULSEWIDTH: 0.4
MDC_IDC_SET_LEADCHNL_RA_PACING_PULSEWIDTH: 0.4
MDC_IDC_SET_LEADCHNL_RA_SENSING_POLARITY: NORMAL
MDC_IDC_SET_LEADCHNL_RA_SENSING_POLARITY: NORMAL
MDC_IDC_SET_LEADCHNL_RA_SENSING_SENSITIVITY: 0.25
MDC_IDC_SET_LEADCHNL_RA_SENSING_SENSITIVITY: 0.25
MDC_IDC_SET_LEADCHNL_RV_PACING_AMPLITUDE: 2
MDC_IDC_SET_LEADCHNL_RV_PACING_AMPLITUDE: 2
MDC_IDC_SET_LEADCHNL_RV_PACING_POLARITY: NORMAL
MDC_IDC_SET_LEADCHNL_RV_PACING_POLARITY: NORMAL
MDC_IDC_SET_LEADCHNL_RV_PACING_PULSEWIDTH: 0.4
MDC_IDC_SET_LEADCHNL_RV_PACING_PULSEWIDTH: 0.4
MDC_IDC_SET_LEADCHNL_RV_SENSING_POLARITY: NORMAL
MDC_IDC_SET_LEADCHNL_RV_SENSING_POLARITY: NORMAL
MDC_IDC_SET_LEADCHNL_RV_SENSING_SENSITIVITY: 0.6
MDC_IDC_SET_LEADCHNL_RV_SENSING_SENSITIVITY: 0.6
MDC_IDC_SET_ZONE_STATUS: NORMAL
MDC_IDC_SET_ZONE_TYPE: NORMAL
MDC_IDC_STAT_AT_BURDEN_PERCENT: 0
MDC_IDC_STAT_AT_BURDEN_PERCENT: 0
MDC_IDC_STAT_BRADY_RA_PERCENT_PACED: 94
MDC_IDC_STAT_BRADY_RA_PERCENT_PACED: 94
MDC_IDC_STAT_BRADY_RV_PERCENT_PACED: 67
MDC_IDC_STAT_BRADY_RV_PERCENT_PACED: 67
MDC_IDC_STAT_TACHYTHERAPY_ATP_DELIVERED_RECENT: 0
MDC_IDC_STAT_TACHYTHERAPY_ATP_DELIVERED_RECENT: 0
MDC_IDC_STAT_TACHYTHERAPY_SHOCKS_ABORTED_RECENT: 0
MDC_IDC_STAT_TACHYTHERAPY_SHOCKS_ABORTED_RECENT: 0
MDC_IDC_STAT_TACHYTHERAPY_SHOCKS_DELIVERED_RECENT: 0
MDC_IDC_STAT_TACHYTHERAPY_SHOCKS_DELIVERED_RECENT: 0

## 2025-08-26 DIAGNOSIS — E78.2 MIXED HYPERLIPIDEMIA: ICD-10-CM

## 2025-08-26 RX ORDER — BUMETANIDE 2 MG/1
2 TABLET ORAL DAILY
Qty: 90 TABLET | Refills: 1 | OUTPATIENT
Start: 2025-08-26

## 2025-08-26 RX ORDER — ROSUVASTATIN CALCIUM 20 MG/1
20 TABLET, COATED ORAL DAILY
Qty: 90 TABLET | Refills: 1 | Status: SHIPPED | OUTPATIENT
Start: 2025-08-26

## (undated) DEVICE — DEV COMP RAD PRELUDESYNC 24CM

## (undated) DEVICE — INTRO TEAR AWAY/LVD W/SD PRT 9F 13CM

## (undated) DEVICE — CVR TRANSD FLX 3DIMEN 14X29.2CM LF STRL

## (undated) DEVICE — SKIN PREP TRAY W/CHG: Brand: MEDLINE INDUSTRIES, INC.

## (undated) DEVICE — SET PRIMARY GRVTY 10DP MALE LL 104IN

## (undated) DEVICE — CAUTERY TIP POLISHER: Brand: DEVON

## (undated) DEVICE — FIBR LASR MOXY XPS GREENLIGHT

## (undated) DEVICE — ST INF PRI SMRTSTE 20DRP 2VLV 24ML 117

## (undated) DEVICE — LEX ELECTRO PHYSIOLOGY: Brand: MEDLINE INDUSTRIES, INC.

## (undated) DEVICE — CATH FOL BARDEX 2WY 20F 30CC

## (undated) DEVICE — CANN NASL CO2 DIVIDED A/

## (undated) DEVICE — PK CATH CARD 10

## (undated) DEVICE — GLV SURG BIOGEL LTX PF 7

## (undated) DEVICE — NDL ANGIOGR ADV THN SMOTH SGLWALL 21G 1.5

## (undated) DEVICE — SOL NACL 0.9PCT 1000ML

## (undated) DEVICE — SYR LUERLOK 30CC

## (undated) DEVICE — SYR LL TP 10ML STRL

## (undated) DEVICE — PENCL ES MEGADINE EZ/CLEAN BUTN W/HOLSTR 10FT

## (undated) DEVICE — MODEL AT P65, P/N 701554-001KIT CONTENTS: HAND CONTROLLER, 3-WAY HIGH-PRESSURE STOPCOCK WITH ROTATING END AND PREMIUM HIGH-PRESSURE TUBING: Brand: ANGIOTOUCH® KIT

## (undated) DEVICE — GLIDESHEATH BASIC HYDROPHILIC COATED INTRODUCER SHEATH: Brand: GLIDESHEATH

## (undated) DEVICE — DRSNG SURESITE123 4X4.8IN

## (undated) DEVICE — ST EXT IV SMARTSITE 2VLV SP M LL 5ML IV1

## (undated) DEVICE — MEDI-VAC YANKAUER SUCTION HANDLE W/BULBOUS TIP: Brand: CARDINAL HEALTH

## (undated) DEVICE — ADULT, W/LG. BACK PAD, RADIOTRANSPARENT ELEMENT AND LEAD WIRE: Brand: DEFIBRILLATION ELECTRODES

## (undated) DEVICE — INTRO SHEATH PRELUDE IDEAL SPRNG COIL 021 6F 23X80CM

## (undated) DEVICE — PK CYSTO-TUR BASIC 10

## (undated) DEVICE — TUBING, SUCTION, 1/4" X 10', STRAIGHT: Brand: MEDLINE

## (undated) DEVICE — Device

## (undated) DEVICE — GUIDE CATHETER: Brand: MACH1™

## (undated) DEVICE — DECANT BG O JET

## (undated) DEVICE — CATH DIAG EXPO M/ PK 6FR FL4/FR4 PIG 3PK

## (undated) DEVICE — CATH DIAG EXPO M/ PK 5F FL4/FR4 PIG

## (undated) DEVICE — INTRO TEAR AWAY/LVD W/SD PRT 6F 13CM

## (undated) DEVICE — CATH DIAG EXPO .056 FL3.5 6F 100CM

## (undated) DEVICE — LIMB HOLDER, WRIST/ANKLE: Brand: DEROYAL

## (undated) DEVICE — CATH DIAG EXPO .045 FL3  5F 100CM

## (undated) DEVICE — ST PRIM GRVTY NDLESS 3 INJ PORT 105IN

## (undated) DEVICE — MODEL BT2000 P/N 700287-012KIT CONTENTS: MANIFOLD WITH SALINE AND CONTRAST PORTS, SALINE TUBING WITH SPIKE AND HAND SYRINGE, TRANSDUCER: Brand: BT2000 AUTOMATED MANIFOLD KIT

## (undated) DEVICE — DRSNG TELFA PAD NONADH STR 1S 3X8IN

## (undated) DEVICE — GW INQWIRE FC PTFE STD J/1.5 .035 260

## (undated) DEVICE — CVR FTSWITCH UNIV